# Patient Record
Sex: FEMALE | Race: WHITE | NOT HISPANIC OR LATINO | Employment: OTHER | URBAN - NONMETROPOLITAN AREA
[De-identification: names, ages, dates, MRNs, and addresses within clinical notes are randomized per-mention and may not be internally consistent; named-entity substitution may affect disease eponyms.]

---

## 2017-02-03 RX ORDER — LANCETS 33 GAUGE
EACH MISCELLANEOUS
Refills: 0 | OUTPATIENT
Start: 2017-02-03

## 2017-02-09 ENCOUNTER — OFFICE VISIT (OUTPATIENT)
Dept: FAMILY MEDICINE CLINIC | Facility: CLINIC | Age: 72
End: 2017-02-09

## 2017-02-09 VITALS
WEIGHT: 186 LBS | BODY MASS INDEX: 32.96 KG/M2 | HEIGHT: 63 IN | HEART RATE: 85 BPM | DIASTOLIC BLOOD PRESSURE: 80 MMHG | OXYGEN SATURATION: 94 % | TEMPERATURE: 98.5 F | SYSTOLIC BLOOD PRESSURE: 144 MMHG

## 2017-02-09 DIAGNOSIS — E78.2 MIXED HYPERLIPIDEMIA: ICD-10-CM

## 2017-02-09 DIAGNOSIS — J40 BRONCHITIS: Primary | ICD-10-CM

## 2017-02-09 DIAGNOSIS — J30.1 ALLERGIC RHINITIS DUE TO POLLEN, UNSPECIFIED RHINITIS SEASONALITY: ICD-10-CM

## 2017-02-09 DIAGNOSIS — I10 ESSENTIAL HYPERTENSION: ICD-10-CM

## 2017-02-09 PROCEDURE — 99214 OFFICE O/P EST MOD 30 MIN: CPT | Performed by: PHYSICIAN ASSISTANT

## 2017-02-09 RX ORDER — SULFAMETHOXAZOLE AND TRIMETHOPRIM 800; 160 MG/1; MG/1
1 TABLET ORAL 2 TIMES DAILY
Qty: 20 TABLET | Refills: 0 | Status: SHIPPED | OUTPATIENT
Start: 2017-02-09 | End: 2017-03-09

## 2017-02-11 NOTE — PROGRESS NOTES
Subjective   Evie Shah is a 71 y.o. female.     History of Present Illness     Acute illness-  She complains of productive cough and runny nose x 3 days.    Hypertension - may be elevated due to acute illness.  Stable     Dyslipidemia  Compliance with treatment has been good. The patient exercises occasionally. She is currently being prescribed the following medication for her dyslipidemia - Lipitor (atorvastatin). Patient denies side effects associated with her medications. Most recent lipids include  Lab Results   Component Value Date    TRIG 203 (H) 10/31/2016    TRIG 197 (H) 02/25/2015    HDL 33 (L) 10/31/2016    HDL 32 (L) 02/25/2015    LDL 43 10/31/2016   stable    Allergic rhinitis - not at goal due to acute illness       The following portions of the patient's history were reviewed and updated as appropriate: allergies, current medications, past family history, past medical history, past social history, past surgical history and problem list.    Review of Systems   Constitutional: Negative for activity change, appetite change and fever.   HENT: Positive for rhinorrhea. Negative for ear pain, sinus pressure and sore throat.    Eyes: Negative for pain and visual disturbance.   Respiratory: Positive for cough. Negative for chest tightness.    Cardiovascular: Negative for chest pain and palpitations.   Gastrointestinal: Negative for abdominal pain, constipation, diarrhea, nausea and vomiting.   Endocrine: Negative for polydipsia and polyuria.   Genitourinary: Negative for dysuria and frequency.   Musculoskeletal: Negative for back pain and myalgias.   Skin: Negative for color change and rash.   Allergic/Immunologic: Negative for food allergies and immunocompromised state.   Neurological: Negative for dizziness, syncope and headaches.   Hematological: Negative for adenopathy. Does not bruise/bleed easily.   Psychiatric/Behavioral: Negative for hallucinations and suicidal ideas. The patient is not  nervous/anxious.        Objective    Physical Exam   Constitutional: She is oriented to person, place, and time. She appears well-developed and well-nourished.   HENT:   Head: Normocephalic and atraumatic.   Nose: Nose normal.   Mouth/Throat: Oropharynx is clear and moist.   Eyes: Conjunctivae and EOM are normal. Pupils are equal, round, and reactive to light.   Neck: Normal range of motion. Neck supple. No tracheal deviation present. No thyromegaly present.   Cardiovascular: Normal rate, regular rhythm, normal heart sounds and intact distal pulses.    No murmur heard.  Pulmonary/Chest: Effort normal and breath sounds normal. No respiratory distress. She has no wheezes.   Abdominal: Soft. Bowel sounds are normal. There is no tenderness. There is no guarding.   Musculoskeletal: Normal range of motion. She exhibits no edema or tenderness.   Lymphadenopathy:     She has no cervical adenopathy.   Neurological: She is alert and oriented to person, place, and time.   Skin: Skin is warm and dry. No rash noted.   Psychiatric: She has a normal mood and affect. Her behavior is normal.   Nursing note and vitals reviewed.      Assessment/Plan   Diagnoses and all orders for this visit:    Bronchitis  -     sulfamethoxazole-trimethoprim (BACTRIM DS) 800-160 MG per tablet; Take 1 tablet by mouth 2 (Two) Times a Day.    Essential hypertension    Mixed hyperlipidemia    Allergic rhinitis due to pollen, unspecified rhinitis seasonality

## 2017-03-07 PROBLEM — I73.9 PERIPHERAL ARTERIAL DISEASE: Status: ACTIVE | Noted: 2017-03-07

## 2017-03-07 PROBLEM — E66.9 OBESITY: Status: ACTIVE | Noted: 2017-03-07

## 2017-03-09 ENCOUNTER — OFFICE VISIT (OUTPATIENT)
Dept: FAMILY MEDICINE CLINIC | Facility: CLINIC | Age: 72
End: 2017-03-09

## 2017-03-09 VITALS
SYSTOLIC BLOOD PRESSURE: 134 MMHG | DIASTOLIC BLOOD PRESSURE: 80 MMHG | TEMPERATURE: 98.4 F | HEART RATE: 92 BPM | HEIGHT: 64 IN | WEIGHT: 185 LBS | OXYGEN SATURATION: 90 % | BODY MASS INDEX: 31.58 KG/M2

## 2017-03-09 DIAGNOSIS — B07.9 VIRAL WARTS, UNSPECIFIED TYPE: ICD-10-CM

## 2017-03-09 DIAGNOSIS — Z00.00 MEDICARE ANNUAL WELLNESS VISIT, SUBSEQUENT: Primary | ICD-10-CM

## 2017-03-09 PROCEDURE — G0439 PPPS, SUBSEQ VISIT: HCPCS | Performed by: PHYSICIAN ASSISTANT

## 2017-03-09 PROCEDURE — 96160 PT-FOCUSED HLTH RISK ASSMT: CPT | Performed by: PHYSICIAN ASSISTANT

## 2017-03-09 RX ORDER — PHENOL 1.4 %
600 AEROSOL, SPRAY (ML) MUCOUS MEMBRANE DAILY
COMMUNITY
End: 2017-09-11

## 2017-03-09 RX ORDER — BUPROPION HYDROCHLORIDE 150 MG/1
150 TABLET, EXTENDED RELEASE ORAL DAILY
COMMUNITY
End: 2017-06-20

## 2017-03-09 NOTE — PROGRESS NOTES
QUICK REFERENCE INFORMATION:  The ABCs of the Annual Wellness Visit    Subsequent Medicare Wellness Visit    HEALTH RISK ASSESSMENT    1945    Recent Hospitalizations:  No recent hospitalization(s)..        Current Medical Providers:  Patient Care Team:  RUBI Michael as PCP - General  RUBI Michael as PCP - Family Medicine        Smoking Status:  History   Smoking Status   • Former Smoker   • Types: Cigarettes   • Quit date: 5/26/2015   Smokeless Tobacco   • Not on file       Alcohol Consumption:  History   Alcohol Use No       Depression Screen:   PHQ-9 Depression Screening 3/9/2017   Little interest or pleasure in doing things 0   Feeling down, depressed, or hopeless 0   Trouble falling or staying asleep, or sleeping too much 0   Feeling tired or having little energy 0   Poor appetite or overeating 0   Feeling bad about yourself - or that you are a failure or have let yourself or your family down 0   Trouble concentrating on things, such as reading the newspaper or watching television 0   Moving or speaking so slowly that other people could have noticed. Or the opposite - being so fidgety or restless that you have been moving around a lot more than usual 0   Thoughts that you would be better off dead, or of hurting yourself in some way 0   PHQ-9 Total Score 0   If you checked off any problems, how difficult have these problems made it for you to do your work, take care of things at home, or get along with other people? Not difficult at all       Health Habits and Functional and Cognitive Screening:  Functional & Cognitive Status 3/9/2017   Do you have difficulty preparing food and eating? No   Do you have difficulty bathing yourself? No   Do you have difficulty getting dressed? No   Do you have difficulty using the toilet? No   Do you have difficulty moving around from place to place? No   In the past year have you fallen or experienced a near fall? No   Do you need help using the phone?  No    Are you deaf or do you have serious difficulty hearing?  No   Do you need help with transportation? No   Do you need help shopping? No   Do you need help preparing meals?  No   Do you need help with housework?  No   Do you need help with laundry? No   Do you need help taking your medications? No   Do you need help managing money? No   Do you have difficulty concentrating, remembering or making decisions? No       Health Habits  Current Diet: Well Balanced Diet  Dental Exam: Not up to date   Dental exam 2002 and pt only has 5 teeth.  She states she is not interested in dental exam at this time.    Eye Exam: Up to date  Exercise: 3  Current Exercise Activities Include: Housecleaning    Does the patient have evidence of cognitive impairment? No    Asprin use counseling:yes      Recent Lab Results:  CMP:  Lab Results   Component Value Date     (H) 10/31/2016    BUN 20 10/31/2016    CREATININE 0.77 10/31/2016    EGFRIFNONA 74 10/31/2016    EGFRIFAFRI 90 10/31/2016    BCR 26.0 (H) 10/31/2016     10/31/2016    K 4.7 10/31/2016    CO2 20.3 (L) 10/31/2016    CALCIUM 9.4 10/31/2016    PROTENTOTREF 6.9 10/31/2016    ALBUMIN 4.40 10/31/2016    LABGLOBREF 2.5 10/31/2016    LABIL2 1.8 10/31/2016    BILITOT 0.6 10/31/2016    ALKPHOS 115 10/31/2016    AST 19 10/31/2016    ALT 19 10/31/2016     Lipid Panel:  Lab Results   Component Value Date    CHLPL 117 10/31/2016    TRIG 203 (H) 10/31/2016    HDL 33 (L) 10/31/2016    VLDL 40.6 10/31/2016    LDL 43 10/31/2016     LDL:     HbA1c:  Lab Results   Component Value Date    HGBA1C 7.00 (H) 10/31/2016     Urine Microalbumin:     Visual Acuity:  Right eye 20/20  Left eye 20/25  Both eyes 20/20    Age-appropriate Screening Schedule:  Refer to the list below for future screening recommendations based on patient's age, sex and/or medical conditions. Orders for these recommended tests are listed in the plan section. The patient has been provided with a written plan.    Health  Maintenance   Topic Date Due   • TDAP/TD VACCINES (1 - Tdap) 05/09/2017 (Originally 6/5/1964)   • PNEUMOCOCCAL VACCINES (65+ LOW/MEDIUM RISK) (1 of 2 - PCV13) 05/23/2017 (Originally 6/5/2012)   • DIABETIC EYE EXAM  08/16/2017   • DXA SCAN  08/20/2017   • LIPID PANEL  10/31/2017   • HEMOGLOBIN A1C  10/31/2017   • DIABETIC FOOT EXAM  11/07/2017   • URINE MICROALBUMIN  11/07/2017   • MAMMOGRAM  04/21/2018   • COLONOSCOPY  11/15/2026   • INFLUENZA VACCINE  Completed   • ZOSTER VACCINE  Addressed        Subjective   History of Present Illness    Evie Shah is a 71 y.o. female who presents for an Subsequent Wellness Visit.    The following portions of the patient's history were reviewed and updated as appropriate: allergies, current medications, past family history, past medical history, past social history, past surgical history and problem list.    Outpatient Medications Prior to Visit   Medication Sig Dispense Refill   • amLODIPine-benazepril (LOTREL) 10-40 MG per capsule Take 1 capsule by mouth Daily. 30 capsule 5   • atorvastatin (LIPITOR) 10 MG tablet Take 1 tablet by mouth Every Night. 30 tablet 5   • clopidogrel (PLAVIX) 75 MG tablet Take 1 tablet by mouth Daily. 30 tablet 5   • JARDIANCE 25 MG tablet TAKE 1 TABLET BY MOUTH EVERY MORNING 30 tablet 0   • Loratadine 10 MG capsule Take 10 mg by mouth Daily. 30 each 5   • omega-3 acid ethyl esters (LOVAZA) 1 G capsule Take 2 capsules by mouth 2 (Two) Times a Day. 120 capsule 5   • vitamin D (ERGOCALCIFEROL) 12826 UNITS capsule capsule Take 1 capsule by mouth 1 (One) Time Per Week. 4 capsule 5   • sulfamethoxazole-trimethoprim (BACTRIM DS) 800-160 MG per tablet Take 1 tablet by mouth 2 (Two) Times a Day. 20 tablet 0     No facility-administered medications prior to visit.        Patient Active Problem List   Diagnosis   • DM type 2 with diabetic mixed hyperlipidemia   • HLD (hyperlipidemia)   • HTN (hypertension)   • Senile osteoporosis   • Aorto-iliac  atherosclerosis   • Allergic rhinitis   • Tobacco use disorder   • Peripheral arterial disease   • Obesity       Advanced Care Planning:  has NO advanced directive - information provided to the patient today    Identification of Risk Factors:  Risk factors include: weight , cardiovascular risk and inactivity.    Review of Systems   Constitutional: Negative for activity change, appetite change and fever.   HENT: Negative for ear pain, sinus pressure and sore throat.    Eyes: Negative for pain and visual disturbance.   Respiratory: Negative for cough and chest tightness.    Cardiovascular: Negative for chest pain and palpitations.   Gastrointestinal: Negative for abdominal pain, constipation, diarrhea, nausea and vomiting.   Endocrine: Negative for polydipsia and polyuria.   Genitourinary: Negative for dysuria and frequency.   Musculoskeletal: Negative for back pain and myalgias.   Skin: Negative for color change and rash.        Wart on left arm   Allergic/Immunologic: Negative for food allergies and immunocompromised state.   Neurological: Negative for dizziness, syncope and headaches.   Hematological: Negative for adenopathy. Does not bruise/bleed easily.   Psychiatric/Behavioral: Negative for hallucinations and suicidal ideas. The patient is not nervous/anxious.        Compared to one year ago, the patient feels her physical health is the same.  Compared to one year ago, the patient feels her mental health is the same.    Objective     Physical Exam   Constitutional: She is oriented to person, place, and time. She appears well-developed and well-nourished.   HENT:   Head: Normocephalic and atraumatic.   Nose: Nose normal.   Mouth/Throat: Oropharynx is clear and moist.   Eyes: Conjunctivae and EOM are normal. Pupils are equal, round, and reactive to light.   Neck: Normal range of motion. Neck supple. No tracheal deviation present. No thyromegaly present.   Cardiovascular: Normal rate, regular rhythm, normal heart  "sounds and intact distal pulses.    No murmur heard.  Pulmonary/Chest: Effort normal and breath sounds normal. No respiratory distress. She has no wheezes.   Abdominal: Soft. Bowel sounds are normal. There is no tenderness. There is no guarding.   Musculoskeletal: Normal range of motion. She exhibits no edema or tenderness.   Lymphadenopathy:     She has no cervical adenopathy.   Neurological: She is alert and oriented to person, place, and time.   Skin: Skin is warm and dry. No rash noted.   Wart on left upper arm   Psychiatric: She has a normal mood and affect. Her behavior is normal.   Nursing note and vitals reviewed.      Vitals:    03/09/17 1131   BP: 134/80   BP Location: Left arm   Patient Position: Sitting   Cuff Size: Adult   Pulse: 92   Temp: 98.4 °F (36.9 °C)   TempSrc: Oral   SpO2: 90%   Weight: 185 lb (83.9 kg)   Height: 63.5\" (161.3 cm)       Body mass index is 32.26 kg/(m^2).  Discussed the patient's BMI with her. The BMI is above average; BMI management plan is completed.    Assessment/Plan   Patient Self-Management and Personalized Health Advice  The patient has been provided with information about: diet and exercise and preventive services including:   · Advanced directives: has NO advanced directive - information provided to the patient today, Exercise counseling provided.    Visit Diagnoses:    ICD-10-CM ICD-9-CM   1. Medicare annual wellness visit, subsequent Z00.00 V70.0   2. Viral warts, unspecified type B07.9 078.10    cryotherapy was performed on wart located on her left upper arm today.  Patient tolerated the procedure well.    No orders of the defined types were placed in this encounter.      Outpatient Encounter Prescriptions as of 3/9/2017   Medication Sig Dispense Refill   • amLODIPine-benazepril (LOTREL) 10-40 MG per capsule Take 1 capsule by mouth Daily. 30 capsule 5   • atorvastatin (LIPITOR) 10 MG tablet Take 1 tablet by mouth Every Night. 30 tablet 5   • buPROPion SR (WELLBUTRIN " SR) 150 MG 12 hr tablet Take 150 mg by mouth Daily.     • calcium carbonate (OS-DOMINGUEZ) 600 MG tablet Take 600 mg by mouth Daily.     • clopidogrel (PLAVIX) 75 MG tablet Take 1 tablet by mouth Daily. 30 tablet 5   • JARDIANCE 25 MG tablet TAKE 1 TABLET BY MOUTH EVERY MORNING 30 tablet 0   • Loratadine 10 MG capsule Take 10 mg by mouth Daily. 30 each 5   • omega-3 acid ethyl esters (LOVAZA) 1 G capsule Take 2 capsules by mouth 2 (Two) Times a Day. 120 capsule 5   • vitamin D (ERGOCALCIFEROL) 55495 UNITS capsule capsule Take 1 capsule by mouth 1 (One) Time Per Week. 4 capsule 5   • [DISCONTINUED] sulfamethoxazole-trimethoprim (BACTRIM DS) 800-160 MG per tablet Take 1 tablet by mouth 2 (Two) Times a Day. 20 tablet 0     No facility-administered encounter medications on file as of 3/9/2017.        Reviewed use of high risk medication in the elderly: yes  Reviewed for potential of harmful drug interactions in the elderly: yes    Follow Up:  Return in about 3 months (around 6/9/2017) for Followup with Camryn.     An After Visit Summary and PPPS with all of these plans were given to the patient.

## 2017-03-09 NOTE — PATIENT INSTRUCTIONS
Advance Directive  Advance directives are the legal documents that allow you to make choices about your health care and medical treatment if you cannot speak for yourself. Advance directives are a way for you to communicate your wishes to family, friends, and health care providers. The specified people can then convey your decisions about end-of-life care to avoid confusion if you should become unable to communicate.  Ideally, the process of discussing and writing advance directives should happen over time rather than making decisions all at once. Advance directives can be modified as your situation changes, and you can change your mind at any time, even after you have signed the advance directives. Each state has its own laws regarding advance directives.  You may want to check with your health care provider, , or state representative about the law in your state. Below are some examples of advance directives.  LIVING WILL  A living will is a set of instructions documenting your wishes about medical care when you cannot care for yourself. It is used if you become:  · Terminally ill.  · Incapacitated.  · Unable to communicate.  · Unable to make decisions.  Items to consider in your living will include:  · The use or non-use of life-sustaining equipment, such as dialysis machines and breathing machines (ventilators).  · A do not resuscitate (DNR) order, which is the instruction not to use cardiopulmonary resuscitation (CPR) if breathing or heartbeat stops.  · Tube feeding.  · Withholding of food and fluids.  · Comfort (palliative) care when the goal becomes comfort rather than a cure.  · Organ and tissue donation.  A living will does not give instructions about distribution of your money and property if you should pass away. It is advisable to seek the expert advice of a  in drawing up a will regarding your possessions. Decisions about taxes, beneficiaries, and asset distribution will be legally binding.  This process can relieve your family and friends of any burdens surrounding disputes or questions that may come up about the allocation of your assets.  DO NOT RESUSCITATE (DNR)  A do not resuscitate (DNR) order is a request to not have CPR in the event that your heart stops beating or you stop breathing. Unless given other instructions, a health care provider will try to help any patient whose heart has stopped or who has stopped breathing.   HEALTH CARE PROXY AND DURABLE POWER OF  FOR HEALTH CARE  A health care proxy is a person (agent) appointed to make medical decisions for you if you cannot. Generally, people choose someone they know well and trust to represent their preferences when they can no longer do so. You should be sure to ask this person for agreement to act as your agent. An agent may have to exercise judgment in the event of a medical decision for which your wishes are not known.  The durable power of  for health care is the legal document that names your health care proxy. Once written, it should be:  · Signed.  · Notarized.  · Dated.  · Copied.  · Witnessed.  · Incorporated into your medical record.  You may also want to appoint someone to manage your financial affairs if you cannot. This is called a durable power of  for finances. It is a separate legal document from the durable power of  for health care. You may choose the same person or someone different from your health care proxy to act as your agent in financial matters.     This information is not intended to replace advice given to you by your health care provider. Make sure you discuss any questions you have with your health care provider.     Document Released: 03/26/2009 Document Revised: 12/23/2014 Document Reviewed: 05/07/2014  Elsevier Interactive Patient Education ©2016 [employer]

## 2017-04-20 ENCOUNTER — OFFICE VISIT (OUTPATIENT)
Dept: FAMILY MEDICINE CLINIC | Facility: CLINIC | Age: 72
End: 2017-04-20

## 2017-04-20 VITALS
OXYGEN SATURATION: 88 % | WEIGHT: 184 LBS | SYSTOLIC BLOOD PRESSURE: 150 MMHG | BODY MASS INDEX: 31.41 KG/M2 | TEMPERATURE: 98.4 F | DIASTOLIC BLOOD PRESSURE: 90 MMHG | HEART RATE: 88 BPM | HEIGHT: 64 IN

## 2017-04-20 DIAGNOSIS — E11.69 DM TYPE 2 WITH DIABETIC MIXED HYPERLIPIDEMIA (HCC): ICD-10-CM

## 2017-04-20 DIAGNOSIS — J30.1 ALLERGIC RHINITIS DUE TO POLLEN, UNSPECIFIED RHINITIS SEASONALITY: ICD-10-CM

## 2017-04-20 DIAGNOSIS — E78.2 DM TYPE 2 WITH DIABETIC MIXED HYPERLIPIDEMIA (HCC): ICD-10-CM

## 2017-04-20 DIAGNOSIS — I10 ESSENTIAL HYPERTENSION: ICD-10-CM

## 2017-04-20 DIAGNOSIS — N76.0 ACUTE VAGINITIS: Primary | ICD-10-CM

## 2017-04-20 PROCEDURE — 99214 OFFICE O/P EST MOD 30 MIN: CPT | Performed by: PHYSICIAN ASSISTANT

## 2017-04-20 RX ORDER — FLUCONAZOLE 150 MG/1
150 TABLET ORAL DAILY
Qty: 2 TABLET | Refills: 0 | Status: SHIPPED | OUTPATIENT
Start: 2017-04-20 | End: 2017-09-11

## 2017-04-21 DIAGNOSIS — E55.9 VITAMIN D DEFICIENCY: ICD-10-CM

## 2017-04-21 RX ORDER — ERGOCALCIFEROL 1.25 MG/1
CAPSULE ORAL
Qty: 4 CAPSULE | Refills: 0 | Status: SHIPPED | OUTPATIENT
Start: 2017-04-21 | End: 2017-09-11 | Stop reason: SDUPTHER

## 2017-04-23 NOTE — PROGRESS NOTES
Subjective   Evie Shah is a 71 y.o. female.     History of Present Illness     Vaginitis-   She complains of moderate vaginal itching without burning x 1 month.      Hypertension - stable but may be elevated due to acute issue    Allergic rhinitis - stable    Diabetes  Current symptoms include paresthesia of the feet.  Evaluation to date has been: fasting blood sugar. Home sugars: BGs are running  consistent with Hgb A1C. Current treatments: more intensive attention to diet which has been effective. Most recent hemoglobin A1c   Lab Results   Component Value Date    HGBA1C 7.00 (H) 10/31/2016    HGBA1C 7.1 (H) 02/25/2015    stable    The following portions of the patient's history were reviewed and updated as appropriate: allergies, current medications, past family history, past medical history, past social history, past surgical history and problem list.    Review of Systems   Constitutional: Negative for activity change, appetite change and fever.   HENT: Negative for ear pain, sinus pressure and sore throat.    Eyes: Negative for pain and visual disturbance.   Respiratory: Negative for cough and chest tightness.    Cardiovascular: Negative for chest pain and palpitations.   Gastrointestinal: Negative for abdominal pain, constipation, diarrhea, nausea and vomiting.   Endocrine: Negative for polydipsia and polyuria.   Genitourinary: Negative for dysuria and frequency.        Vaginitis   Musculoskeletal: Negative for back pain and myalgias.   Skin: Negative for color change and rash.   Allergic/Immunologic: Negative for food allergies and immunocompromised state.   Neurological: Negative for dizziness, syncope and headaches.   Hematological: Negative for adenopathy. Does not bruise/bleed easily.   Psychiatric/Behavioral: Negative for hallucinations and suicidal ideas. The patient is not nervous/anxious.         Objective   Physical Exam   Constitutional: She is oriented to person, place, and time. She  appears well-developed and well-nourished.   HENT:   Head: Normocephalic and atraumatic.   Nose: Nose normal.   Mouth/Throat: Oropharynx is clear and moist.   Eyes: Conjunctivae and EOM are normal. Pupils are equal, round, and reactive to light.   Neck: Normal range of motion. Neck supple. No tracheal deviation present. No thyromegaly present.   Cardiovascular: Normal rate, regular rhythm, normal heart sounds and intact distal pulses.    No murmur heard.  Pulmonary/Chest: Effort normal and breath sounds normal. No respiratory distress. She has no wheezes.   Abdominal: Soft. Bowel sounds are normal. There is no tenderness. There is no guarding.   Musculoskeletal: Normal range of motion. She exhibits no edema or tenderness.   Lymphadenopathy:     She has no cervical adenopathy.   Neurological: She is alert and oriented to person, place, and time.   Skin: Skin is warm and dry. No rash noted.   Psychiatric: She has a normal mood and affect. Her behavior is normal.   Nursing note and vitals reviewed.      Assessment/Plan   Diagnoses and all orders for this visit:    Acute vaginitis  -     fluconazole (DIFLUCAN) 150 MG tablet; Take 1 tablet by mouth Daily.    Essential hypertension    Allergic rhinitis due to pollen, unspecified rhinitis seasonality    DM type 2 with diabetic mixed hyperlipidemia

## 2017-05-16 ENCOUNTER — OFFICE VISIT (OUTPATIENT)
Dept: CARDIOLOGY | Facility: CLINIC | Age: 72
End: 2017-05-16

## 2017-05-16 VITALS
DIASTOLIC BLOOD PRESSURE: 88 MMHG | BODY MASS INDEX: 32.18 KG/M2 | WEIGHT: 181.6 LBS | HEIGHT: 63 IN | HEART RATE: 69 BPM | SYSTOLIC BLOOD PRESSURE: 130 MMHG

## 2017-05-16 DIAGNOSIS — I73.9 PERIPHERAL ARTERIAL DISEASE (HCC): Primary | ICD-10-CM

## 2017-05-16 DIAGNOSIS — E55.9 VITAMIN D DEFICIENCY: ICD-10-CM

## 2017-05-16 DIAGNOSIS — E78.5 DYSLIPIDEMIA: ICD-10-CM

## 2017-05-16 DIAGNOSIS — I10 ESSENTIAL HYPERTENSION: ICD-10-CM

## 2017-05-16 PROCEDURE — 99214 OFFICE O/P EST MOD 30 MIN: CPT | Performed by: INTERNAL MEDICINE

## 2017-05-18 RX ORDER — ERGOCALCIFEROL 1.25 MG/1
CAPSULE ORAL
Qty: 4 CAPSULE | Refills: 0 | Status: SHIPPED | OUTPATIENT
Start: 2017-05-18 | End: 2017-06-12 | Stop reason: SINTOL

## 2017-06-12 ENCOUNTER — OFFICE VISIT (OUTPATIENT)
Dept: FAMILY MEDICINE CLINIC | Facility: CLINIC | Age: 72
End: 2017-06-12

## 2017-06-12 VITALS
OXYGEN SATURATION: 92 % | HEART RATE: 80 BPM | TEMPERATURE: 98.9 F | HEIGHT: 63 IN | SYSTOLIC BLOOD PRESSURE: 120 MMHG | DIASTOLIC BLOOD PRESSURE: 70 MMHG | WEIGHT: 182 LBS | BODY MASS INDEX: 32.25 KG/M2

## 2017-06-12 DIAGNOSIS — I10 ESSENTIAL HYPERTENSION: ICD-10-CM

## 2017-06-12 DIAGNOSIS — E78.2 MIXED HYPERLIPIDEMIA: ICD-10-CM

## 2017-06-12 DIAGNOSIS — E11.69 DM TYPE 2 WITH DIABETIC MIXED HYPERLIPIDEMIA (HCC): Primary | ICD-10-CM

## 2017-06-12 DIAGNOSIS — E78.2 DM TYPE 2 WITH DIABETIC MIXED HYPERLIPIDEMIA (HCC): Primary | ICD-10-CM

## 2017-06-12 DIAGNOSIS — E55.9 VITAMIN D DEFICIENCY: ICD-10-CM

## 2017-06-12 PROCEDURE — 90670 PCV13 VACCINE IM: CPT | Performed by: PHYSICIAN ASSISTANT

## 2017-06-12 PROCEDURE — 99214 OFFICE O/P EST MOD 30 MIN: CPT | Performed by: PHYSICIAN ASSISTANT

## 2017-06-12 PROCEDURE — G0009 ADMIN PNEUMOCOCCAL VACCINE: HCPCS | Performed by: PHYSICIAN ASSISTANT

## 2017-06-12 RX ORDER — ATORVASTATIN CALCIUM 10 MG/1
TABLET, FILM COATED ORAL
COMMUNITY
Start: 2017-05-17 | End: 2017-11-16

## 2017-06-12 NOTE — PROGRESS NOTES
Subjective   Evie Shah is a 72 y.o. female.     History of Present Illness     Diabetes  She is here today to follow-up on chronic issues including diabetes.  Current symptoms include none.  Evaluation to date has been: fasting blood sugar. Home sugars: BGs are running  consistent with Hgb A1C. Current treatments: SGLT2 inhibitor - Jardiance Most recent hemoglobin A1c   Lab Results   Component Value Date    HGBA1C 7.00 (H) 10/31/2016    HGBA1C 7.1 (H) 02/25/2015    Stable    Dyslipidemia  Compliance with treatment has been good. The patient exercises intermittently. She is currently being prescribed the following medication for her dyslipidemia - atorvastatin. Patient denies side effects associated with her medications. Most recent lipids include  Lab Results   Component Value Date    TRIG 203 (H) 10/31/2016    TRIG 197 (H) 02/25/2015    HDL 33 (L) 10/31/2016    HDL 32 (L) 02/25/2015    LDL 43 10/31/2016   Not at goal    Hypertension-well controlled with Lotrel    Vitamin D deficiency-stable with supplementation    The following portions of the patient's history were reviewed and updated as appropriate: allergies, current medications, past family history, past medical history, past social history, past surgical history and problem list.    Review of Systems   Constitutional: Negative for activity change, appetite change and fever.   HENT: Negative for ear pain, sinus pressure and sore throat.    Eyes: Negative for pain and visual disturbance.   Respiratory: Negative for cough and chest tightness.    Cardiovascular: Negative for chest pain and palpitations.   Gastrointestinal: Negative for abdominal pain, constipation, diarrhea, nausea and vomiting.   Endocrine: Negative for polydipsia and polyuria.   Genitourinary: Negative for dysuria and frequency.   Musculoskeletal: Negative for back pain and myalgias.   Skin: Negative for color change and rash.   Allergic/Immunologic: Negative for food allergies and  immunocompromised state.   Neurological: Negative for dizziness, syncope and headaches.   Hematological: Negative for adenopathy. Does not bruise/bleed easily.   Psychiatric/Behavioral: Negative for hallucinations and suicidal ideas. The patient is not nervous/anxious.        Objective   Physical Exam   Constitutional: She is oriented to person, place, and time. She appears well-developed and well-nourished.   HENT:   Head: Normocephalic and atraumatic.   Nose: Nose normal.   Mouth/Throat: Oropharynx is clear and moist.   Eyes: Conjunctivae and EOM are normal. Pupils are equal, round, and reactive to light.   Neck: Normal range of motion. Neck supple. No tracheal deviation present. No thyromegaly present.   Cardiovascular: Normal rate, regular rhythm, normal heart sounds and intact distal pulses.    No murmur heard.  Pulmonary/Chest: Effort normal and breath sounds normal. No respiratory distress. She has no wheezes.   Abdominal: Soft. Bowel sounds are normal. There is no tenderness. There is no guarding.   Musculoskeletal: Normal range of motion. She exhibits no edema or tenderness.   Lymphadenopathy:     She has no cervical adenopathy.   Neurological: She is alert and oriented to person, place, and time.   Skin: Skin is warm and dry. No rash noted.   Psychiatric: She has a normal mood and affect. Her behavior is normal.   Nursing note and vitals reviewed.      Assessment/Plan   Diagnoses and all orders for this visit:    DM type 2 with diabetic mixed hyperlipidemia  -     Hemoglobin A1c; Future  -     MicroAlbumin, Urine, Random; Future    Mixed hyperlipidemia  -     Comprehensive Metabolic Panel; Future  -     Lipid Panel; Future    Essential hypertension    Vitamin D deficiency  -     Vitamin D 25 Hydroxy; Future    Other orders  -     Pneumococcal Conjugate Vaccine 13-Valent All

## 2017-06-17 DIAGNOSIS — E55.9 VITAMIN D DEFICIENCY: ICD-10-CM

## 2017-06-20 RX ORDER — ERGOCALCIFEROL 1.25 MG/1
CAPSULE ORAL
Qty: 4 CAPSULE | Refills: 0 | Status: SHIPPED | OUTPATIENT
Start: 2017-06-20 | End: 2017-07-21 | Stop reason: SDUPTHER

## 2017-06-20 RX ORDER — BUPROPION HYDROCHLORIDE 150 MG/1
TABLET, EXTENDED RELEASE ORAL
Qty: 60 TABLET | Refills: 0 | Status: SHIPPED | OUTPATIENT
Start: 2017-06-20 | End: 2017-08-19 | Stop reason: SDUPTHER

## 2017-07-21 DIAGNOSIS — E55.9 VITAMIN D DEFICIENCY: ICD-10-CM

## 2017-07-24 RX ORDER — ERGOCALCIFEROL 1.25 MG/1
CAPSULE ORAL
Qty: 4 CAPSULE | Refills: 0 | Status: SHIPPED | OUTPATIENT
Start: 2017-07-24 | End: 2017-09-11

## 2017-08-19 DIAGNOSIS — E11.69 DM TYPE 2 WITH DIABETIC MIXED HYPERLIPIDEMIA (HCC): ICD-10-CM

## 2017-08-19 DIAGNOSIS — E78.2 DM TYPE 2 WITH DIABETIC MIXED HYPERLIPIDEMIA (HCC): ICD-10-CM

## 2017-08-19 DIAGNOSIS — E55.9 VITAMIN D DEFICIENCY: ICD-10-CM

## 2017-08-22 RX ORDER — BUPROPION HYDROCHLORIDE 150 MG/1
TABLET, EXTENDED RELEASE ORAL
Qty: 60 TABLET | Refills: 0 | Status: SHIPPED | OUTPATIENT
Start: 2017-08-22 | End: 2017-09-11

## 2017-08-22 RX ORDER — EMPAGLIFLOZIN 25 MG/1
TABLET, FILM COATED ORAL
Qty: 30 TABLET | Refills: 5 | Status: SHIPPED | OUTPATIENT
Start: 2017-08-22 | End: 2017-09-11

## 2017-08-22 RX ORDER — BUPROPION HYDROCHLORIDE 150 MG/1
TABLET, EXTENDED RELEASE ORAL
Qty: 60 TABLET | Refills: 0 | Status: SHIPPED | OUTPATIENT
Start: 2017-08-22 | End: 2017-09-11 | Stop reason: SDUPTHER

## 2017-08-22 RX ORDER — ERGOCALCIFEROL 1.25 MG/1
CAPSULE ORAL
Qty: 4 CAPSULE | Refills: 0 | Status: SHIPPED | OUTPATIENT
Start: 2017-08-22 | End: 2018-03-15 | Stop reason: SDUPTHER

## 2017-09-05 ENCOUNTER — LAB (OUTPATIENT)
Dept: FAMILY MEDICINE CLINIC | Facility: CLINIC | Age: 72
End: 2017-09-05

## 2017-09-05 DIAGNOSIS — E55.9 VITAMIN D DEFICIENCY: ICD-10-CM

## 2017-09-05 DIAGNOSIS — E11.69 DM TYPE 2 WITH DIABETIC MIXED HYPERLIPIDEMIA (HCC): ICD-10-CM

## 2017-09-05 DIAGNOSIS — E78.2 DM TYPE 2 WITH DIABETIC MIXED HYPERLIPIDEMIA (HCC): ICD-10-CM

## 2017-09-05 DIAGNOSIS — E78.2 MIXED HYPERLIPIDEMIA: ICD-10-CM

## 2017-09-06 LAB
25(OH)D3+25(OH)D2 SERPL-MCNC: 50 NG/ML
ALBUMIN SERPL-MCNC: 4.6 G/DL (ref 3.4–4.8)
ALBUMIN/GLOB SERPL: 1.9 G/DL (ref 1.5–2.5)
ALP SERPL-CCNC: 96 U/L (ref 35–104)
ALT SERPL-CCNC: 19 U/L (ref 10–36)
AST SERPL-CCNC: 21 U/L (ref 10–30)
BILIRUB SERPL-MCNC: 0.6 MG/DL (ref 0.2–1.8)
BUN SERPL-MCNC: 25 MG/DL (ref 7–21)
BUN/CREAT SERPL: 32.5 (ref 7–25)
CALCIUM SERPL-MCNC: 9.7 MG/DL (ref 7.7–10)
CHLORIDE SERPL-SCNC: 107 MMOL/L (ref 99–112)
CHOLEST SERPL-MCNC: 130 MG/DL (ref 0–200)
CO2 SERPL-SCNC: 23.1 MMOL/L (ref 24.3–31.9)
CREAT SERPL-MCNC: 0.77 MG/DL (ref 0.43–1.29)
GLOBULIN SER CALC-MCNC: 2.4 GM/DL
GLUCOSE SERPL-MCNC: 128 MG/DL (ref 70–110)
HBA1C MFR BLD: 6.5 % (ref 4.5–5.7)
HDLC SERPL-MCNC: 36 MG/DL (ref 60–100)
LDLC SERPL CALC-MCNC: 60 MG/DL (ref 0–100)
MICROALBUMIN UR-MCNC: 33.8 UG/ML
POTASSIUM SERPL-SCNC: 4.1 MMOL/L (ref 3.5–5.3)
PROT SERPL-MCNC: 7 G/DL (ref 6–8)
SODIUM SERPL-SCNC: 141 MMOL/L (ref 135–153)
TRIGL SERPL-MCNC: 172 MG/DL (ref 0–150)
VLDLC SERPL CALC-MCNC: 34.4 MG/DL

## 2017-09-08 NOTE — PROGRESS NOTES
Called and informed pt about labs encouraged her to consume more water. Let her know about her A1c and told her what the number represented. CRISTINE

## 2017-09-11 ENCOUNTER — OFFICE VISIT (OUTPATIENT)
Dept: FAMILY MEDICINE CLINIC | Facility: CLINIC | Age: 72
End: 2017-09-11

## 2017-09-11 VITALS
OXYGEN SATURATION: 87 % | HEIGHT: 63 IN | WEIGHT: 182 LBS | SYSTOLIC BLOOD PRESSURE: 158 MMHG | HEART RATE: 79 BPM | DIASTOLIC BLOOD PRESSURE: 88 MMHG | TEMPERATURE: 98.6 F | BODY MASS INDEX: 32.25 KG/M2

## 2017-09-11 DIAGNOSIS — I10 ESSENTIAL HYPERTENSION: ICD-10-CM

## 2017-09-11 DIAGNOSIS — E11.69 DM TYPE 2 WITH DIABETIC MIXED HYPERLIPIDEMIA (HCC): ICD-10-CM

## 2017-09-11 DIAGNOSIS — E55.9 VITAMIN D DEFICIENCY: ICD-10-CM

## 2017-09-11 DIAGNOSIS — H68.013 EUSTACHIAN SALPINGITIS, ACUTE, BILATERAL: ICD-10-CM

## 2017-09-11 DIAGNOSIS — J44.1 COPD EXACERBATION (HCC): Primary | ICD-10-CM

## 2017-09-11 DIAGNOSIS — F17.200 TOBACCO USE DISORDER: ICD-10-CM

## 2017-09-11 DIAGNOSIS — E78.2 MIXED HYPERLIPIDEMIA: ICD-10-CM

## 2017-09-11 DIAGNOSIS — E78.2 DM TYPE 2 WITH DIABETIC MIXED HYPERLIPIDEMIA (HCC): ICD-10-CM

## 2017-09-11 PROCEDURE — 99214 OFFICE O/P EST MOD 30 MIN: CPT | Performed by: PHYSICIAN ASSISTANT

## 2017-09-11 RX ORDER — BUPROPION HYDROCHLORIDE 150 MG/1
150 TABLET, EXTENDED RELEASE ORAL 2 TIMES DAILY
Qty: 60 TABLET | Refills: 5 | Status: SHIPPED | OUTPATIENT
Start: 2017-09-11 | End: 2018-03-15 | Stop reason: SDUPTHER

## 2017-09-11 RX ORDER — DOXYCYCLINE HYCLATE 100 MG/1
100 CAPSULE ORAL 2 TIMES DAILY
Qty: 20 CAPSULE | Refills: 0 | Status: SHIPPED | OUTPATIENT
Start: 2017-09-11 | End: 2017-12-11

## 2017-09-11 RX ORDER — FLUTICASONE PROPIONATE 50 MCG
2 SPRAY, SUSPENSION (ML) NASAL DAILY
Qty: 18.2 ML | Refills: 3 | Status: SHIPPED | OUTPATIENT
Start: 2017-09-11 | End: 2018-03-15 | Stop reason: SDUPTHER

## 2017-09-11 RX ORDER — ERGOCALCIFEROL 1.25 MG/1
50000 CAPSULE ORAL
Qty: 4 CAPSULE | Refills: 5 | Status: SHIPPED | OUTPATIENT
Start: 2017-09-11 | End: 2017-12-11

## 2017-09-11 RX ORDER — ALBUTEROL SULFATE 2.5 MG/3ML
2.5 SOLUTION RESPIRATORY (INHALATION) EVERY 4 HOURS PRN
Qty: 270 ML | Refills: 5 | Status: SHIPPED | OUTPATIENT
Start: 2017-09-11 | End: 2018-03-15 | Stop reason: SDUPTHER

## 2017-09-11 NOTE — PROGRESS NOTES
Subjective   Evie Shah is a 72 y.o. female.     History of Present Illness     Acute illness-  She complains of productive cough with thick clear sputum for the last 2 weeks.  She reports that her ears feel full and she also has associated chest congestion.    Previous tobacco smoker-she states that she is no longer smoking cigarettes with the use of Wellbutrin 150 mg.    Vitamin D deficiency-stable with vitamin D supplementation every weekly    Diabetes mellitus type 2 with associated hyperlipidemia-well controlled despite acute illness.    Hypertension-stable    Dyslipidemia  Compliance with treatment has been good. The patient exercises intermittently. She is currently being prescribed the following medication for her dyslipidemia - atorvastatin. Patient denies side effects associated with her medications. Most recent lipids include  Lab Results   Component Value Date    TRIG 172 (H) 09/05/2017    TRIG 203 (H) 10/31/2016    HDL 36 (L) 09/05/2017    HDL 33 (L) 10/31/2016    LDL 60 09/05/2017    LDL 43 10/31/2016       The following portions of the patient's history were reviewed and updated as appropriate: allergies, current medications, past family history, past medical history, past social history, past surgical history and problem list.    Review of Systems   Constitutional: Negative for activity change, appetite change and fever.   HENT: Positive for congestion. Negative for ear pain, sinus pressure and sore throat.    Eyes: Negative for pain and visual disturbance.   Respiratory: Positive for cough. Negative for chest tightness.    Cardiovascular: Negative for chest pain and palpitations.   Gastrointestinal: Negative for abdominal pain, constipation, diarrhea, nausea and vomiting.   Endocrine: Negative for polydipsia and polyuria.   Genitourinary: Negative for dysuria and frequency.   Musculoskeletal: Negative for back pain and myalgias.   Skin: Negative for color change and rash.    Allergic/Immunologic: Negative for food allergies and immunocompromised state.   Neurological: Negative for dizziness, syncope and headaches.   Hematological: Negative for adenopathy. Does not bruise/bleed easily.   Psychiatric/Behavioral: Negative for hallucinations and suicidal ideas. The patient is not nervous/anxious.        Objective   Physical Exam   Constitutional: She is oriented to person, place, and time. She appears well-developed and well-nourished.   HENT:   Head: Normocephalic and atraumatic.   Nose: Nose normal.   Clear fluid noted behind TMs bilaterally.  Oropharynx erythematous.   Eyes: Conjunctivae and EOM are normal. Pupils are equal, round, and reactive to light.   Neck: Normal range of motion. Neck supple. No tracheal deviation present. No thyromegaly present.   Cardiovascular: Normal rate, regular rhythm, normal heart sounds and intact distal pulses.    No murmur heard.  Pulmonary/Chest: Effort normal. No respiratory distress. She has no wheezes.   Bilateral bronchovesicular breath sounds noted diffusely   Abdominal: Soft. Bowel sounds are normal. There is no tenderness. There is no guarding.   Musculoskeletal: Normal range of motion. She exhibits no edema or tenderness.   Lymphadenopathy:     She has no cervical adenopathy.   Neurological: She is alert and oriented to person, place, and time.   Skin: Skin is warm and dry. No rash noted.   Psychiatric: She has a normal mood and affect. Her behavior is normal.   Nursing note and vitals reviewed.      Assessment/Plan   Diagnoses and all orders for this visit:    COPD exacerbation  -     albuterol (PROVENTIL) (2.5 MG/3ML) 0.083% nebulizer solution; Take 2.5 mg by nebulization Every 4 (Four) Hours As Needed for Wheezing.  -     doxycycline (VIBRAMYCIN) 100 MG capsule; Take 1 capsule by mouth 2 (Two) Times a Day.    Vitamin D deficiency  -     vitamin D (ERGOCALCIFEROL) 14543 units capsule capsule; Take 1 capsule by mouth Every 7 (Seven)  Days.    Tobacco use disorder  -     buPROPion SR (WELLBUTRIN SR) 150 MG 12 hr tablet; Take 1 tablet by mouth 2 (Two) Times a Day.    DM type 2 with diabetic mixed hyperlipidemia    Essential hypertension    Mixed hyperlipidemia    Eustachian salpingitis, acute, bilateral  -     fluticasone (FLONASE) 50 MCG/ACT nasal spray; 2 sprays into each nostril Daily.

## 2017-10-12 ENCOUNTER — FLU SHOT (OUTPATIENT)
Dept: FAMILY MEDICINE CLINIC | Facility: CLINIC | Age: 72
End: 2017-10-12

## 2017-10-12 PROCEDURE — G0008 ADMIN INFLUENZA VIRUS VAC: HCPCS | Performed by: GENERAL PRACTICE

## 2017-10-12 PROCEDURE — 90686 IIV4 VACC NO PRSV 0.5 ML IM: CPT | Performed by: GENERAL PRACTICE

## 2017-11-14 DIAGNOSIS — E78.2 MIXED HYPERLIPIDEMIA: ICD-10-CM

## 2017-11-14 DIAGNOSIS — I70.0 AORTO-ILIAC ATHEROSCLEROSIS (HCC): ICD-10-CM

## 2017-11-14 DIAGNOSIS — J30.2 OTHER SEASONAL ALLERGIC RHINITIS: ICD-10-CM

## 2017-11-14 DIAGNOSIS — I10 ESSENTIAL HYPERTENSION: ICD-10-CM

## 2017-11-14 DIAGNOSIS — I70.8 AORTO-ILIAC ATHEROSCLEROSIS (HCC): ICD-10-CM

## 2017-11-16 RX ORDER — LORATADINE 10 MG/1
TABLET ORAL
Qty: 30 TABLET | Refills: 5 | Status: SHIPPED | OUTPATIENT
Start: 2017-11-16 | End: 2018-03-15 | Stop reason: SDUPTHER

## 2017-11-16 RX ORDER — CLOPIDOGREL BISULFATE 75 MG/1
TABLET ORAL
Qty: 30 TABLET | Refills: 5 | Status: SHIPPED | OUTPATIENT
Start: 2017-11-16 | End: 2018-03-15 | Stop reason: SDUPTHER

## 2017-11-16 RX ORDER — AMLODIPINE BESYLATE AND BENAZEPRIL HYDROCHLORIDE 10; 40 MG/1; MG/1
CAPSULE ORAL
Qty: 30 CAPSULE | Refills: 5 | Status: SHIPPED | OUTPATIENT
Start: 2017-11-16 | End: 2018-03-15 | Stop reason: SDUPTHER

## 2017-11-16 RX ORDER — ATORVASTATIN CALCIUM 10 MG/1
TABLET, FILM COATED ORAL
Qty: 30 TABLET | Refills: 5 | Status: SHIPPED | OUTPATIENT
Start: 2017-11-16 | End: 2018-03-15 | Stop reason: SDUPTHER

## 2017-11-29 ENCOUNTER — OFFICE VISIT (OUTPATIENT)
Dept: FAMILY MEDICINE CLINIC | Facility: CLINIC | Age: 72
End: 2017-11-29

## 2017-11-29 VITALS
HEART RATE: 88 BPM | OXYGEN SATURATION: 92 % | SYSTOLIC BLOOD PRESSURE: 140 MMHG | DIASTOLIC BLOOD PRESSURE: 90 MMHG | TEMPERATURE: 97.8 F | HEIGHT: 63 IN | WEIGHT: 183 LBS | BODY MASS INDEX: 32.43 KG/M2

## 2017-11-29 DIAGNOSIS — E78.2 DM TYPE 2 WITH DIABETIC MIXED HYPERLIPIDEMIA (HCC): ICD-10-CM

## 2017-11-29 DIAGNOSIS — I73.9 PERIPHERAL ARTERIAL DISEASE (HCC): ICD-10-CM

## 2017-11-29 DIAGNOSIS — I70.8 AORTO-ILIAC ATHEROSCLEROSIS (HCC): ICD-10-CM

## 2017-11-29 DIAGNOSIS — E11.69 DM TYPE 2 WITH DIABETIC MIXED HYPERLIPIDEMIA (HCC): ICD-10-CM

## 2017-11-29 DIAGNOSIS — E78.2 MIXED HYPERLIPIDEMIA: ICD-10-CM

## 2017-11-29 DIAGNOSIS — R68.89 FLU-LIKE SYMPTOMS: Primary | ICD-10-CM

## 2017-11-29 DIAGNOSIS — I70.0 AORTO-ILIAC ATHEROSCLEROSIS (HCC): ICD-10-CM

## 2017-11-29 DIAGNOSIS — J06.9 ACUTE URI: ICD-10-CM

## 2017-11-29 DIAGNOSIS — I10 ESSENTIAL HYPERTENSION: ICD-10-CM

## 2017-11-29 DIAGNOSIS — M81.0 SENILE OSTEOPOROSIS: ICD-10-CM

## 2017-11-29 DIAGNOSIS — H61.23 EXCESSIVE CERUMEN IN BOTH EAR CANALS: ICD-10-CM

## 2017-11-29 LAB
EXPIRATION DATE: NORMAL
FLUAV AG NPH QL: NORMAL
FLUBV AG NPH QL: NORMAL
INTERNAL CONTROL: NORMAL
Lab: NORMAL

## 2017-11-29 PROCEDURE — 69210 REMOVE IMPACTED EAR WAX UNI: CPT | Performed by: NURSE PRACTITIONER

## 2017-11-29 PROCEDURE — 87804 INFLUENZA ASSAY W/OPTIC: CPT | Performed by: NURSE PRACTITIONER

## 2017-11-29 PROCEDURE — 99214 OFFICE O/P EST MOD 30 MIN: CPT | Performed by: NURSE PRACTITIONER

## 2017-11-29 PROCEDURE — 96372 THER/PROPH/DIAG INJ SC/IM: CPT | Performed by: NURSE PRACTITIONER

## 2017-11-29 RX ORDER — METHYLPREDNISOLONE ACETATE 40 MG/ML
40 INJECTION, SUSPENSION INTRA-ARTICULAR; INTRALESIONAL; INTRAMUSCULAR; SOFT TISSUE ONCE
Status: COMPLETED | OUTPATIENT
Start: 2017-11-29 | End: 2017-11-29

## 2017-11-29 RX ORDER — CEFTRIAXONE 1 G/1
1 INJECTION, POWDER, FOR SOLUTION INTRAMUSCULAR; INTRAVENOUS ONCE
Status: COMPLETED | OUTPATIENT
Start: 2017-11-29 | End: 2017-11-29

## 2017-11-29 RX ORDER — AZITHROMYCIN 250 MG/1
TABLET, FILM COATED ORAL
Qty: 6 TABLET | Refills: 0 | Status: SHIPPED | OUTPATIENT
Start: 2017-11-29 | End: 2017-12-11

## 2017-11-29 RX ADMIN — CEFTRIAXONE 1 G: 1 INJECTION, POWDER, FOR SOLUTION INTRAMUSCULAR; INTRAVENOUS at 10:33

## 2017-11-29 RX ADMIN — METHYLPREDNISOLONE ACETATE 40 MG: 40 INJECTION, SUSPENSION INTRA-ARTICULAR; INTRALESIONAL; INTRAMUSCULAR; SOFT TISSUE at 10:42

## 2017-11-29 NOTE — PROGRESS NOTES
Subjective   Evie Shah is a 72 y.o. female.     Chief Complaint   Patient presents with   • URI   • Cough   • Diabetes   • Hypertension       History of Present Illness       Patient is here today complaining of cough and URI symptoms ×3 days.  She is a former smoker.  Currently takes Wellbutrin to help prevent smoking.  History of hypertension which is often elevated.  She currently receives amlodipine and Benzapril.  She does check her blood pressure randomly at home and reports that her primary care provider is aware that her blood pressure generally runs elevated.    She does have an inhaler at home which she has used over the weekend without much improvement.    Positive chills and fatigue.  Positive body aches and productive cough.  Sinus pressure is present.  Patient complains of ear pain and itching.        Medical history is significant for type 2 diabetes, hyperlipidemia, hypertension, aortic iliac atherosclerosis, peripheral artery disease, senile osteoporosis and history of tobacco abuse.      The following portions of the patient's history were reviewed and updated as appropriate: allergies, current medications, past family history, past medical history, past social history, past surgical history and problem list.    Review of Systems   Constitutional: Positive for activity change, appetite change, chills, fatigue and fever. Negative for diaphoresis.   HENT: Positive for congestion, ear discharge, ear pain and sinus pain. Negative for sore throat and trouble swallowing.    Respiratory: Positive for cough, chest tightness, shortness of breath and wheezing. Negative for choking.    Cardiovascular: Negative for chest pain and palpitations.   Gastrointestinal: Negative for abdominal pain, blood in stool, constipation, diarrhea, nausea and vomiting.   Endocrine: Negative.    Genitourinary: Negative for dysuria.   Musculoskeletal: Positive for arthralgias.   Skin: Negative for rash.  "  Allergic/Immunologic: Negative.    Hematological: Negative.    Psychiatric/Behavioral: Negative for dysphoric mood, self-injury and suicidal ideas.       Objective     /90  Pulse 88  Temp 97.8 °F (36.6 °C) (Oral)   Ht 63\" (160 cm)  Wt 183 lb (83 kg)  SpO2 92%  BMI 32.42 kg/m2      Physical Exam   Constitutional: She is oriented to person, place, and time. She appears well-developed and well-nourished. She has a sickly appearance.   Appears acutely ill.    HENT:   Head: Normocephalic.   Right Ear: Hearing normal. No lacerations. There is drainage. No swelling. No foreign bodies. No mastoid tenderness. A middle ear effusion is present.   Left Ear: Hearing normal. No lacerations. There is drainage. No swelling. No foreign bodies. No mastoid tenderness. A middle ear effusion is present.   Nose: Mucosal edema and rhinorrhea present. No nose lacerations, sinus tenderness or nasal deformity. No epistaxis.  No foreign bodies.   Mouth/Throat: Uvula is midline. Oropharyngeal exudate and posterior oropharyngeal erythema present. No tonsillar abscesses.   TM's are cloudy bilateral.  Bilateral ear canals impacted with cerumen, removed via provider with flexi-loop. Honey colored cerumen removed, tolerated well.      Eyes: EOM are normal. Pupils are equal, round, and reactive to light.   Neck: Normal range of motion. Neck supple. No thyromegaly present.   Cardiovascular: Normal rate, regular rhythm, normal heart sounds and intact distal pulses.    Initial elevation of blood pressure with improved reading obtained prior to injection administration    Pulmonary/Chest: Effort normal. No respiratory distress. She has wheezes in the right upper field and the left upper field.   Cough noted    Abdominal: Soft. Bowel sounds are normal. She exhibits no distension. There is no tenderness. There is no guarding.   Lymphadenopathy:     She has cervical adenopathy.        Right cervical: Superficial cervical adenopathy present. " No deep cervical adenopathy present.       Left cervical: Superficial cervical adenopathy present. No deep cervical adenopathy present.   Neurological: She is alert and oriented to person, place, and time. She has normal reflexes.   Skin: Skin is warm and dry. No rash noted.   Psychiatric: She has a normal mood and affect. Her speech is normal and behavior is normal. Judgment and thought content normal. Cognition and memory are normal.       Assessment/Plan     Problem List Items Addressed This Visit        Cardiovascular and Mediastinum    DM type 2 with diabetic mixed hyperlipidemia    HLD (hyperlipidemia)    HTN (hypertension)    Aorto-iliac atherosclerosis    Overview     S/p stenting left external iliac artery         Peripheral arterial disease    Overview     a. Class III left lower extremity claudication.  b. Arterial duplex, 10/27/2014, showed moderate-to-severe stenosis of the left lower extremity.  c. Peripheral angiograms by Dr. Bernard, 02/25/2015, showed 90% stenosis of the left external iliac artery treated with 7.0 x 39 mm stent. Otherwise, non-obstructive plaque.               Respiratory    Acute URI    Relevant Medications    cefTRIAXone (ROCEPHIN) injection 1 g (Start on 11/29/2017 10:45 AM)    azithromycin (ZITHROMAX Z-RAJNI) 250 MG tablet    methylPREDNISolone acetate (DEPO-medrol) injection 40 mg (Start on 11/29/2017 11:00 AM)       Musculoskeletal and Integument    Senile osteoporosis       Other    Flu-like symptoms - Primary    Relevant Orders    POCT Influenza A/B (Completed)      Other Visit Diagnoses     Excessive cerumen in both ear canals          I have discussed diagnosis in detail today allowing time for questions and answers. Pt is aware of reasons to seek urgent or emergent medical care as well as reasons to return to the clinic for evaluation. Possible side effects, interactions and progression of symptoms discussed as well. Pt / family states understanding.   Avoid use of inhaler  for at least 4 hours after use of 3 way cough syrup. Avoid 3 way cough syrup for more than 3 days consistently.   Fluids, rest, symptomatic treatment advised. Discussed symptoms to report as well as reasons to seek urgent or emergent medical attention. Understanding stated.   RTC 2-5 days if not improved, sooner if condition worsens/changes. Symptomatic care advised as well as reasons for urgent or emergent care. Pt / family state understanding.   Continue to monitor blood pressure and report readings > 150/90 or pulse elevations over 100.   Follow up with PCP as scheduled.       Written script for 3-way cough syrup one tsp every eight hours prn, disp 6 oz. Equal parts Robitussin DM, Liquid phenergan and Albuterol syrup.          This document has been electronically signed by:  JOAQUINA Harvey, NP-C

## 2017-12-11 ENCOUNTER — OFFICE VISIT (OUTPATIENT)
Dept: FAMILY MEDICINE CLINIC | Facility: CLINIC | Age: 72
End: 2017-12-11

## 2017-12-11 VITALS
TEMPERATURE: 97.8 F | SYSTOLIC BLOOD PRESSURE: 130 MMHG | HEIGHT: 63 IN | BODY MASS INDEX: 32.78 KG/M2 | WEIGHT: 185 LBS | OXYGEN SATURATION: 92 % | DIASTOLIC BLOOD PRESSURE: 90 MMHG | HEART RATE: 88 BPM

## 2017-12-11 DIAGNOSIS — M81.0 SENILE OSTEOPOROSIS: Primary | ICD-10-CM

## 2017-12-11 DIAGNOSIS — E78.2 DM TYPE 2 WITH DIABETIC MIXED HYPERLIPIDEMIA (HCC): ICD-10-CM

## 2017-12-11 DIAGNOSIS — E11.69 DM TYPE 2 WITH DIABETIC MIXED HYPERLIPIDEMIA (HCC): ICD-10-CM

## 2017-12-11 DIAGNOSIS — I10 ESSENTIAL HYPERTENSION: ICD-10-CM

## 2017-12-11 PROCEDURE — 99214 OFFICE O/P EST MOD 30 MIN: CPT | Performed by: PHYSICIAN ASSISTANT

## 2017-12-12 PROBLEM — J06.9 ACUTE URI: Status: RESOLVED | Noted: 2017-11-29 | Resolved: 2017-12-12

## 2017-12-12 PROBLEM — R68.89 FLU-LIKE SYMPTOMS: Status: RESOLVED | Noted: 2017-11-29 | Resolved: 2017-12-12

## 2017-12-12 NOTE — PROGRESS NOTES
Subjective   Evie Shah is a 72 y.o. female.     History of Present Illness     Osteoporosis-  She's here today to follow-up on chronic issues including osteoporosis.  She currently takes calcium with vitamin D daily.  She is due for her bone density scan.  Not at goal    Diabetes  Current symptoms include none.  Evaluation to date has been: fasting blood sugar. Home sugars: BGs are running  consistent with Hgb A1C. Current treatments: SGLT2 inhibitor - Jardiance and more intensive attention to diet which has been effective. Most recent hemoglobin A1c   Lab Results   Component Value Date    HGBA1C 6.50 (H) 09/05/2017    HGBA1C 7.00 (H) 10/31/2016    HGBA1C 7.1 (H) 02/25/2015    Stable    Hypertension-controlled with Lotrel 10/40 mg daily    The following portions of the patient's history were reviewed and updated as appropriate: allergies, current medications, past family history, past medical history, past social history, past surgical history and problem list.    Review of Systems   Constitutional: Negative for activity change, appetite change and fever.   HENT: Negative for ear pain, sinus pressure and sore throat.    Eyes: Negative for pain and visual disturbance.   Respiratory: Negative for cough and chest tightness.    Cardiovascular: Negative for chest pain and palpitations.   Gastrointestinal: Negative for abdominal pain, constipation, diarrhea, nausea and vomiting.   Endocrine: Negative for polydipsia and polyuria.   Genitourinary: Negative for dysuria and frequency.   Musculoskeletal: Negative for back pain and myalgias.   Skin: Negative for color change and rash.   Allergic/Immunologic: Negative for food allergies and immunocompromised state.   Neurological: Negative for dizziness, syncope and headaches.   Hematological: Negative for adenopathy. Does not bruise/bleed easily.   Psychiatric/Behavioral: Negative for hallucinations and suicidal ideas. The patient is not nervous/anxious.         Objective   Physical Exam   Constitutional: She is oriented to person, place, and time. She appears well-developed and well-nourished.   HENT:   Head: Normocephalic and atraumatic.   Nose: Nose normal.   Mouth/Throat: Oropharynx is clear and moist.   Eyes: Conjunctivae and EOM are normal. Pupils are equal, round, and reactive to light.   Neck: Normal range of motion. Neck supple. No tracheal deviation present. No thyromegaly present.   Cardiovascular: Normal rate, regular rhythm, normal heart sounds and intact distal pulses.    No murmur heard.  Pulmonary/Chest: Effort normal and breath sounds normal. No respiratory distress. She has no wheezes.   Abdominal: Soft. Bowel sounds are normal. There is no tenderness. There is no guarding.   Musculoskeletal: Normal range of motion. She exhibits no edema or tenderness.   Lymphadenopathy:     She has no cervical adenopathy.   Neurological: She is alert and oriented to person, place, and time.   Skin: Skin is warm and dry. No rash noted.   Psychiatric: She has a normal mood and affect. Her behavior is normal.   Nursing note and vitals reviewed.      Assessment/Plan   Diagnoses and all orders for this visit:    Senile osteoporosis  -     DEXA Bone Density Axial; Future    DM type 2 with diabetic mixed hyperlipidemia  -     Empagliflozin (JARDIANCE) 25 MG tablet; Take 25 mg by mouth Daily.    Essential hypertension    I will follow-up with her in 3 months time or sooner if needed

## 2017-12-26 DIAGNOSIS — E78.2 MIXED HYPERLIPIDEMIA: ICD-10-CM

## 2017-12-28 RX ORDER — OMEGA-3-ACID ETHYL ESTERS 1 G/1
CAPSULE, LIQUID FILLED ORAL
Qty: 120 CAPSULE | Refills: 5 | OUTPATIENT
Start: 2017-12-28 | End: 2018-03-15 | Stop reason: SDUPTHER

## 2017-12-29 ENCOUNTER — OFFICE VISIT (OUTPATIENT)
Dept: FAMILY MEDICINE CLINIC | Facility: CLINIC | Age: 72
End: 2017-12-29

## 2017-12-29 ENCOUNTER — HOSPITAL ENCOUNTER (OUTPATIENT)
Dept: BONE DENSITY | Facility: HOSPITAL | Age: 72
Discharge: HOME | End: 2017-12-29
Admitting: PHYSICIAN ASSISTANT

## 2017-12-29 VITALS
SYSTOLIC BLOOD PRESSURE: 130 MMHG | OXYGEN SATURATION: 88 % | DIASTOLIC BLOOD PRESSURE: 82 MMHG | WEIGHT: 183 LBS | TEMPERATURE: 98.4 F | HEIGHT: 63 IN | BODY MASS INDEX: 32.43 KG/M2 | HEART RATE: 98 BPM

## 2017-12-29 DIAGNOSIS — I10 ESSENTIAL HYPERTENSION: ICD-10-CM

## 2017-12-29 DIAGNOSIS — B37.31 VAGINA, CANDIDIASIS: ICD-10-CM

## 2017-12-29 DIAGNOSIS — M81.0 SENILE OSTEOPOROSIS: ICD-10-CM

## 2017-12-29 DIAGNOSIS — J40 BRONCHITIS: Primary | ICD-10-CM

## 2017-12-29 DIAGNOSIS — E78.2 MIXED HYPERLIPIDEMIA: ICD-10-CM

## 2017-12-29 PROCEDURE — 77080 DXA BONE DENSITY AXIAL: CPT

## 2017-12-29 PROCEDURE — 99214 OFFICE O/P EST MOD 30 MIN: CPT | Performed by: PHYSICIAN ASSISTANT

## 2017-12-29 PROCEDURE — 77080 DXA BONE DENSITY AXIAL: CPT | Performed by: RADIOLOGY

## 2017-12-29 PROCEDURE — 96372 THER/PROPH/DIAG INJ SC/IM: CPT | Performed by: PHYSICIAN ASSISTANT

## 2017-12-29 RX ORDER — METHYLPREDNISOLONE ACETATE 80 MG/ML
80 INJECTION, SUSPENSION INTRA-ARTICULAR; INTRALESIONAL; INTRAMUSCULAR; SOFT TISSUE ONCE
Status: COMPLETED | OUTPATIENT
Start: 2017-12-29 | End: 2017-12-29

## 2017-12-29 RX ORDER — LEVOFLOXACIN 500 MG/1
500 TABLET, FILM COATED ORAL DAILY
Qty: 10 TABLET | Refills: 0 | Status: SHIPPED | OUTPATIENT
Start: 2017-12-29 | End: 2018-01-08

## 2017-12-29 RX ORDER — FLUCONAZOLE 150 MG/1
150 TABLET ORAL DAILY
Qty: 3 TABLET | Refills: 0 | Status: SHIPPED | OUTPATIENT
Start: 2017-12-29 | End: 2018-03-15

## 2017-12-29 RX ADMIN — METHYLPREDNISOLONE ACETATE 80 MG: 80 INJECTION, SUSPENSION INTRA-ARTICULAR; INTRALESIONAL; INTRAMUSCULAR; SOFT TISSUE at 16:27

## 2017-12-29 NOTE — PROGRESS NOTES
Subjective   Evie Shah is a 72 y.o. female.     History of Present Illness     Cough-  She complains of cough productive of thick green sputum, fatigue, bilateral earache, ear congestion and nausea intermittently for the past month.  She initially took a azithromycin 5 day pack with minimal relief.    Vaginal discharge-  She complains of white thick vaginal discharge with associated pruritus.  Onset 3 weeks ago after finishing Z-Isael.    Hypertension-well controlled despite acute illness    Dyslipidemia  Compliance with treatment has been good. The patient exercises intermittently. She is currently being prescribed the following medication for her dyslipidemia - atorvastatin. Patient denies side effects associated with her medications. Most recent lipids include  Lab Results   Component Value Date    TRIG 172 (H) 09/05/2017    TRIG 203 (H) 10/31/2016    HDL 36 (L) 09/05/2017    HDL 33 (L) 10/31/2016    LDL 60 09/05/2017    LDL 43 10/31/2016   Stable    Osteoporosis-stable with vitamin D and calcium supplementation    The following portions of the patient's history were reviewed and updated as appropriate: allergies, current medications, past family history, past medical history, past social history, past surgical history and problem list.    Review of Systems   Constitutional: Positive for chills and fatigue. Negative for activity change, appetite change and fever.   HENT: Positive for ear pain and sore throat. Negative for sinus pressure.    Eyes: Negative for pain and visual disturbance.   Respiratory: Positive for cough. Negative for chest tightness.    Cardiovascular: Negative for chest pain and palpitations.   Gastrointestinal: Positive for nausea. Negative for abdominal pain, constipation, diarrhea and vomiting.   Endocrine: Negative for polydipsia and polyuria.   Genitourinary: Negative for dysuria and frequency.   Musculoskeletal: Negative for back pain and myalgias.   Skin: Negative for color  change and rash.   Allergic/Immunologic: Negative for food allergies and immunocompromised state.   Neurological: Negative for dizziness, syncope and headaches.   Hematological: Negative for adenopathy. Does not bruise/bleed easily.   Psychiatric/Behavioral: Negative for hallucinations and suicidal ideas. The patient is not nervous/anxious.        Objective   Physical Exam   Constitutional: She is oriented to person, place, and time. She appears well-developed and well-nourished.   HENT:   Head: Normocephalic and atraumatic.   Nose: Nose normal.   Oropharynx erythematous.  Clear fluid noted behind left TM.   Eyes: Conjunctivae and EOM are normal. Pupils are equal, round, and reactive to light.   Neck: Normal range of motion. Neck supple. No tracheal deviation present. No thyromegaly present.   Cardiovascular: Normal rate, regular rhythm, normal heart sounds and intact distal pulses.    No murmur heard.  Pulmonary/Chest: Effort normal. No respiratory distress. She has no wheezes.   Bronchovesicular bilateral breath sounds noted.   Abdominal: Soft. Bowel sounds are normal. There is no tenderness. There is no guarding.   Musculoskeletal: Normal range of motion. She exhibits no edema or tenderness.   Lymphadenopathy:     She has no cervical adenopathy.   Neurological: She is alert and oriented to person, place, and time.   Skin: Skin is warm and dry. No rash noted.   Psychiatric: She has a normal mood and affect. Her behavior is normal.   Nursing note and vitals reviewed.      Assessment/Plan   Diagnoses and all orders for this visit:    Bronchitis  -     levoFLOXacin (LEVAQUIN) 500 MG tablet; Take 1 tablet by mouth Daily for 10 days.  -     methylPREDNISolone acetate (DEPO-medrol) injection 80 mg; Inject 1 mL into the shoulder, thigh, or buttocks 1 (One) Time.    Vagina, candidiasis  -     fluconazole (DIFLUCAN) 150 MG tablet; Take 1 tablet by mouth Daily.    Essential hypertension    Mixed hyperlipidemia    Senile  osteoporosis    Prescription was written for nebulizer machine and tubing to use every 6 hours for albuterol nebulizer treatments.  I'll be glad to follow-up with her as needed for these issues.

## 2018-01-02 DIAGNOSIS — M81.0 SENILE OSTEOPOROSIS: Primary | ICD-10-CM

## 2018-01-02 RX ORDER — ALENDRONATE SODIUM 70 MG/1
70 TABLET ORAL
Qty: 4 TABLET | Refills: 5 | Status: SHIPPED | OUTPATIENT
Start: 2018-01-02 | End: 2018-01-29 | Stop reason: SDDI

## 2018-01-29 ENCOUNTER — OFFICE VISIT (OUTPATIENT)
Dept: FAMILY MEDICINE CLINIC | Facility: CLINIC | Age: 73
End: 2018-01-29

## 2018-01-29 VITALS
WEIGHT: 182 LBS | SYSTOLIC BLOOD PRESSURE: 104 MMHG | HEIGHT: 63 IN | DIASTOLIC BLOOD PRESSURE: 80 MMHG | BODY MASS INDEX: 32.25 KG/M2 | TEMPERATURE: 98.3 F | OXYGEN SATURATION: 92 % | HEART RATE: 96 BPM

## 2018-01-29 DIAGNOSIS — M85.80 SENILE OSTEOPENIA: ICD-10-CM

## 2018-01-29 DIAGNOSIS — E78.2 MIXED HYPERLIPIDEMIA: ICD-10-CM

## 2018-01-29 DIAGNOSIS — I10 ESSENTIAL HYPERTENSION: ICD-10-CM

## 2018-01-29 DIAGNOSIS — Z00.00 MEDICARE ANNUAL WELLNESS VISIT, SUBSEQUENT: Primary | ICD-10-CM

## 2018-01-29 PROCEDURE — G0439 PPPS, SUBSEQ VISIT: HCPCS | Performed by: PHYSICIAN ASSISTANT

## 2018-01-29 PROCEDURE — 96160 PT-FOCUSED HLTH RISK ASSMT: CPT | Performed by: PHYSICIAN ASSISTANT

## 2018-01-29 RX ORDER — ASPIRIN 81 MG/1
81 TABLET ORAL DAILY
COMMUNITY
End: 2018-12-14 | Stop reason: SDUPTHER

## 2018-01-29 NOTE — PROGRESS NOTES
QUICK REFERENCE INFORMATION:  The ABCs of the Annual Wellness Visit    Subsequent Medicare Wellness Visit    HEALTH RISK ASSESSMENT    1945    Recent Hospitalizations:  No hospitalization(s) within the last year..        Current Medical Providers:  Patient Care Team:  RUBI Michael as PCP - General  RUBI Michael as PCP - Family Medicine        Smoking Status:  History   Smoking Status   • Former Smoker   • Types: Cigarettes   • Quit date: 5/26/2015   Smokeless Tobacco   • Not on file       Alcohol Consumption:  History   Alcohol Use No       Depression Screen:   PHQ-2/PHQ-9 Depression Screening 1/29/2018   Little interest or pleasure in doing things 0   Feeling down, depressed, or hopeless 0   Trouble falling or staying asleep, or sleeping too much 0   Feeling tired or having little energy 1   Poor appetite or overeating 0   Feeling bad about yourself - or that you are a failure or have let yourself or your family down 0   Trouble concentrating on things, such as reading the newspaper or watching television 0   Moving or speaking so slowly that other people could have noticed. Or the opposite - being so fidgety or restless that you have been moving around a lot more than usual 0   Thoughts that you would be better off dead, or of hurting yourself in some way 0   Total Score 1   If you checked off any problems, how difficult have these problems made it for you to do your work, take care of things at home, or get along with other people? -       Health Habits and Functional and Cognitive Screening:  Functional & Cognitive Status 1/29/2018   Do you have difficulty preparing food and eating? No   Do you have difficulty bathing yourself, getting dressed or grooming yourself? No   Do you have difficulty using the toilet? No   Do you have difficulty moving around from place to place? No   Do you have trouble with steps or getting out of a bed or a chair? Yes   In the past year have you fallen or  experienced a near fall? Yes   Current Diet Low Fat Diet   Dental Exam Not up to date   Eye Exam Up to date   Exercise (times per week) 0 times per week   Current Exercise Activities Include None   Do you need help using the phone?  No   Are you deaf or do you have serious difficulty hearing?  No   Do you need help with transportation? No   Do you need help shopping? No   Do you need help preparing meals?  No   Do you need help with housework?  No   Do you need help with laundry? No   Do you need help taking your medications? No   Do you need help managing money? No   Have you felt unusual stress, anger or loneliness in the last month? No   Who do you live with? Child   If you need help, do you have trouble finding someone available to you? No   Have you been bothered in the last four weeks by sexual problems? No   Do you have difficulty concentrating, remembering or making decisions? No           Does the patient have evidence of cognitive impairment? No    Aspirin use counseling: Taking ASA appropriately as indicated      Recent Lab Results:  CMP:  Lab Results   Component Value Date     (H) 09/05/2017    BUN 25 (H) 09/05/2017    CREATININE 0.77 09/05/2017    EGFRIFNONA 74 09/05/2017    EGFRIFAFRI 90 09/05/2017    BCR 32.5 (H) 09/05/2017     09/05/2017    K 4.1 09/05/2017    CO2 23.1 (L) 09/05/2017    CALCIUM 9.7 09/05/2017    PROTENTOTREF 7.0 09/05/2017    ALBUMIN 4.60 09/05/2017    LABGLOBREF 2.4 09/05/2017    LABIL2 1.9 09/05/2017    BILITOT 0.6 09/05/2017    ALKPHOS 96 09/05/2017    AST 21 09/05/2017    ALT 19 09/05/2017     Lipid Panel:  Lab Results   Component Value Date    TRIG 172 (H) 09/05/2017    HDL 36 (L) 09/05/2017    VLDL 34.4 09/05/2017    LDLDIRECT 63 02/25/2015     HbA1c:  Lab Results   Component Value Date    HGBA1C 6.50 (H) 09/05/2017       Visual Acuity:   Visual Acuity Screening    Right eye Left eye Both eyes   Without correction:      With correction: 20/20 20/25 20/20      Hearing acuity:  Whisper test  Greater than 5 feet left ear  Greater than 5 feet right ear    Age-appropriate Screening Schedule:  Refer to the list below for future screening recommendations based on patient's age, sex and/or medical conditions. Orders for these recommended tests are listed in the plan section. The patient has been provided with a written plan.    Health Maintenance   Topic Date Due   • HEMOGLOBIN A1C  03/05/2018   • MAMMOGRAM  04/21/2018   • PNEUMOCOCCAL VACCINES (65+ LOW/MEDIUM RISK) (2 of 2 - PPSV23) 06/12/2018   • LIPID PANEL  09/05/2018   • URINE MICROALBUMIN  09/05/2018   • DIABETIC EYE EXAM  11/21/2018   • DIABETIC FOOT EXAM  12/11/2018   • DXA SCAN  12/29/2020   • COLONOSCOPY  11/07/2026   • TDAP/TD VACCINES (2 - Td) 06/12/2027   • INFLUENZA VACCINE  Completed   • ZOSTER VACCINE  Addressed        Subjective   History of Present Illness    Evie Shah is a 72 y.o. female who presents for an Subsequent Wellness Visit.    Osteoporosis-  Not at goal.  12/29/2017 DEXA scan revealed T score right femur neck of -2.5.  She reports GI intolerance with the use of Fosamax.  She does continue to take calcium and vitamin D daily.    Hypertension-stable    Dyslipidemia  Compliance with treatment has been good. The patient exercises intermittently. She is currently being prescribed the following medication for her dyslipidemia - lifestyle modifcation, atorvastatin, omega 3 fatty acids. Patient denies side effects associated with her medications. Most recent lipids include  Lab Results   Component Value Date    TRIG 172 (H) 09/05/2017    TRIG 203 (H) 10/31/2016    HDL 36 (L) 09/05/2017    HDL 33 (L) 10/31/2016    LDL 60 09/05/2017    LDL 43 10/31/2016   Not at goal      The following portions of the patient's history were reviewed and updated as appropriate: allergies, current medications, past family history, past medical history, past social history, past surgical history and problem  list.    Outpatient Medications Prior to Visit   Medication Sig Dispense Refill   • albuterol (PROVENTIL) (2.5 MG/3ML) 0.083% nebulizer solution Take 2.5 mg by nebulization Every 4 (Four) Hours As Needed for Wheezing. 270 mL 5   • amLODIPine-benazepril (LOTREL) 10-40 MG per capsule TAKE ONE CAPSULE BY MOUTH ONCE DAILY 30 capsule 5   • atorvastatin (LIPITOR) 10 MG tablet TAKE 1 TABLET BY MOUTH EVERY NIGHT 30 tablet 5   • buPROPion SR (WELLBUTRIN SR) 150 MG 12 hr tablet Take 1 tablet by mouth 2 (Two) Times a Day. 60 tablet 5   • clopidogrel (PLAVIX) 75 MG tablet TAKE ONE TABLET BY MOUTH ONCE DAILY 30 tablet 5   • Empagliflozin (JARDIANCE) 25 MG tablet Take 25 mg by mouth Daily. 30 tablet 5   • fluconazole (DIFLUCAN) 150 MG tablet Take 1 tablet by mouth Daily. 3 tablet 0   • fluticasone (FLONASE) 50 MCG/ACT nasal spray 2 sprays into each nostril Daily. 18.2 mL 3   • loratadine (CLARITIN) 10 MG tablet TAKE ONE TABLET BY MOUTH ONCE DAILY 30 tablet 5   • omega-3 acid ethyl esters (LOVAZA) 1 g capsule TAKE TWO CAPSULES BY MOUTH TWICE DAILY 120 capsule 5   • vitamin D (ERGOCALCIFEROL) 09107 units capsule capsule TAKE ONE CAPSULE BY MOUTH ONCE A WEEK 4 capsule 0   • alendronate (FOSAMAX) 70 MG tablet Take 1 tablet by mouth Every 7 (Seven) Days. 4 tablet 5     No facility-administered medications prior to visit.        Patient Active Problem List   Diagnosis   • DM type 2 with diabetic mixed hyperlipidemia   • HLD (hyperlipidemia)   • HTN (hypertension)   • Senile osteoporosis   • Aorto-iliac atherosclerosis   • Allergic rhinitis   • Tobacco use disorder   • Peripheral arterial disease   • Obesity       Advance Care Planning:  has an advance directive - a copy HAS NOT been provided. Have asked the patient to send this to us to add to record.    Identification of Risk Factors:  Risk factors include: weight , cardiovascular risk, inactivity and polypharmacy.    Review of Systems   Constitutional: Negative for activity change,  appetite change and fever.   HENT: Negative for ear pain, sinus pressure and sore throat.    Eyes: Negative for pain and visual disturbance.   Respiratory: Negative for cough and chest tightness.    Cardiovascular: Negative for chest pain and palpitations.   Gastrointestinal: Negative for abdominal pain, constipation, diarrhea, nausea and vomiting.   Endocrine: Negative for polydipsia and polyuria.   Genitourinary: Negative for dysuria and frequency.   Musculoskeletal: Negative for back pain and myalgias.   Skin: Negative for color change and rash.   Allergic/Immunologic: Negative for food allergies and immunocompromised state.   Neurological: Negative for dizziness, syncope and headaches.   Hematological: Negative for adenopathy. Does not bruise/bleed easily.   Psychiatric/Behavioral: Negative for hallucinations and suicidal ideas. The patient is not nervous/anxious.        Compared to one year ago, the patient feels her physical health is the same.  Compared to one year ago, the patient feels her mental health is the same.    Objective     Physical Exam   Constitutional: She is oriented to person, place, and time. She appears well-developed and well-nourished.   HENT:   Head: Normocephalic and atraumatic.   Nose: Nose normal.   Mouth/Throat: Oropharynx is clear and moist.   Eyes: Conjunctivae and EOM are normal. Pupils are equal, round, and reactive to light.   Neck: Normal range of motion. Neck supple. No tracheal deviation present. No thyromegaly present.   Cardiovascular: Normal rate, regular rhythm, normal heart sounds and intact distal pulses.    No murmur heard.  Pulmonary/Chest: Effort normal and breath sounds normal. No respiratory distress. She has no wheezes.   Abdominal: Soft. Bowel sounds are normal. There is no tenderness. There is no guarding.   Musculoskeletal: Normal range of motion. She exhibits no edema or tenderness.   Lymphadenopathy:     She has no cervical adenopathy.   Neurological: She is  "alert and oriented to person, place, and time.   Skin: Skin is warm and dry. No rash noted.   Psychiatric: She has a normal mood and affect. Her behavior is normal.   Nursing note and vitals reviewed.      Vitals:    01/29/18 1515   BP: 104/80   Pulse: 96   Temp: 98.3 °F (36.8 °C)   TempSrc: Oral   SpO2: 92%   Weight: 82.6 kg (182 lb)   Height: 160 cm (62.99\")       Body mass index is 32.25 kg/(m^2).  Discussed the patient's BMI with her. BMI is above normal parameters. Follow-up plan includes:  exercise counseling and nutrition counseling.    Assessment/Plan   Patient Self-Management and Personalized Health Advice  The patient has been provided with information about: prevention of cardiac or vascular disease and designing advance directives and preventive services including:   · Advance directive, Nutrition counseling provided.    Visit Diagnoses:    ICD-10-CM ICD-9-CM   1. Medicare annual wellness visit, subsequent Z00.00 V70.0   2. Senile osteopenia M85.80 733.90   3. Essential hypertension I10 401.9   4. Mixed hyperlipidemia E78.2 272.2     Discontinue Fosamax due to GI intolerance  Start Prolia    No orders of the defined types were placed in this encounter.      Outpatient Encounter Prescriptions as of 1/29/2018   Medication Sig Dispense Refill   • albuterol (PROVENTIL) (2.5 MG/3ML) 0.083% nebulizer solution Take 2.5 mg by nebulization Every 4 (Four) Hours As Needed for Wheezing. 270 mL 5   • amLODIPine-benazepril (LOTREL) 10-40 MG per capsule TAKE ONE CAPSULE BY MOUTH ONCE DAILY 30 capsule 5   • aspirin 81 MG EC tablet Take 81 mg by mouth Daily.     • atorvastatin (LIPITOR) 10 MG tablet TAKE 1 TABLET BY MOUTH EVERY NIGHT 30 tablet 5   • buPROPion SR (WELLBUTRIN SR) 150 MG 12 hr tablet Take 1 tablet by mouth 2 (Two) Times a Day. 60 tablet 5   • clopidogrel (PLAVIX) 75 MG tablet TAKE ONE TABLET BY MOUTH ONCE DAILY 30 tablet 5   • Empagliflozin (JARDIANCE) 25 MG tablet Take 25 mg by mouth Daily. 30 tablet 5   • " fluconazole (DIFLUCAN) 150 MG tablet Take 1 tablet by mouth Daily. 3 tablet 0   • fluticasone (FLONASE) 50 MCG/ACT nasal spray 2 sprays into each nostril Daily. 18.2 mL 3   • loratadine (CLARITIN) 10 MG tablet TAKE ONE TABLET BY MOUTH ONCE DAILY 30 tablet 5   • omega-3 acid ethyl esters (LOVAZA) 1 g capsule TAKE TWO CAPSULES BY MOUTH TWICE DAILY 120 capsule 5   • vitamin D (ERGOCALCIFEROL) 76823 units capsule capsule TAKE ONE CAPSULE BY MOUTH ONCE A WEEK 4 capsule 0   • denosumab (PROLIA) 60 MG/ML solution syringe Inject 1 mL under the skin 1 (One) Time for 1 dose. 1.8 mL 1   • [DISCONTINUED] alendronate (FOSAMAX) 70 MG tablet Take 1 tablet by mouth Every 7 (Seven) Days. 4 tablet 5     No facility-administered encounter medications on file as of 1/29/2018.        Reviewed use of high risk medication in the elderly: yes  Reviewed for potential of harmful drug interactions in the elderly: yes    Follow Up:  No Follow-up on file.     An After Visit Summary and PPPS with all of these plans were given to the patient.

## 2018-01-31 ENCOUNTER — CLINICAL SUPPORT (OUTPATIENT)
Dept: FAMILY MEDICINE CLINIC | Facility: CLINIC | Age: 73
End: 2018-01-31

## 2018-01-31 DIAGNOSIS — M81.0 SENILE OSTEOPOROSIS: Primary | ICD-10-CM

## 2018-01-31 PROCEDURE — 96372 THER/PROPH/DIAG INJ SC/IM: CPT | Performed by: PHYSICIAN ASSISTANT

## 2018-03-03 DIAGNOSIS — E55.9 VITAMIN D DEFICIENCY: ICD-10-CM

## 2018-03-05 RX ORDER — ERGOCALCIFEROL 1.25 MG/1
CAPSULE ORAL
Qty: 4 CAPSULE | Refills: 5 | Status: SHIPPED | OUTPATIENT
Start: 2018-03-05 | End: 2018-03-15 | Stop reason: SDUPTHER

## 2018-03-05 RX ORDER — LANCETS 33 GAUGE
EACH MISCELLANEOUS
Qty: 60 EACH | Refills: 5 | Status: SHIPPED | OUTPATIENT
Start: 2018-03-05 | End: 2021-06-01

## 2018-03-15 ENCOUNTER — OFFICE VISIT (OUTPATIENT)
Dept: FAMILY MEDICINE CLINIC | Facility: CLINIC | Age: 73
End: 2018-03-15

## 2018-03-15 VITALS
TEMPERATURE: 98.5 F | HEIGHT: 63 IN | HEART RATE: 93 BPM | DIASTOLIC BLOOD PRESSURE: 80 MMHG | BODY MASS INDEX: 32.43 KG/M2 | SYSTOLIC BLOOD PRESSURE: 150 MMHG | WEIGHT: 183 LBS | OXYGEN SATURATION: 90 %

## 2018-03-15 DIAGNOSIS — J30.2 OTHER SEASONAL ALLERGIC RHINITIS: ICD-10-CM

## 2018-03-15 DIAGNOSIS — J30.1 CHRONIC ALLERGIC RHINITIS DUE TO POLLEN, UNSPECIFIED SEASONALITY: ICD-10-CM

## 2018-03-15 DIAGNOSIS — B37.31 VAGINA, CANDIDIASIS: Primary | ICD-10-CM

## 2018-03-15 DIAGNOSIS — F17.200 TOBACCO USE DISORDER: ICD-10-CM

## 2018-03-15 DIAGNOSIS — E78.2 MIXED HYPERLIPIDEMIA: ICD-10-CM

## 2018-03-15 DIAGNOSIS — E11.69 DM TYPE 2 WITH DIABETIC MIXED HYPERLIPIDEMIA (HCC): ICD-10-CM

## 2018-03-15 DIAGNOSIS — I70.0 AORTO-ILIAC ATHEROSCLEROSIS (HCC): ICD-10-CM

## 2018-03-15 DIAGNOSIS — I10 ESSENTIAL HYPERTENSION: ICD-10-CM

## 2018-03-15 DIAGNOSIS — E55.9 VITAMIN D DEFICIENCY: ICD-10-CM

## 2018-03-15 DIAGNOSIS — J42 CHRONIC BRONCHITIS, UNSPECIFIED CHRONIC BRONCHITIS TYPE (HCC): ICD-10-CM

## 2018-03-15 DIAGNOSIS — I70.8 AORTO-ILIAC ATHEROSCLEROSIS (HCC): ICD-10-CM

## 2018-03-15 DIAGNOSIS — E78.2 DM TYPE 2 WITH DIABETIC MIXED HYPERLIPIDEMIA (HCC): ICD-10-CM

## 2018-03-15 PROCEDURE — 99214 OFFICE O/P EST MOD 30 MIN: CPT | Performed by: PHYSICIAN ASSISTANT

## 2018-03-15 RX ORDER — MELATONIN
1000 DAILY
COMMUNITY
End: 2018-12-14 | Stop reason: SDUPTHER

## 2018-03-16 RX ORDER — ERGOCALCIFEROL 1.25 MG/1
50000 CAPSULE ORAL WEEKLY
Qty: 4 CAPSULE | Refills: 5 | Status: SHIPPED | OUTPATIENT
Start: 2018-03-16 | End: 2018-06-15 | Stop reason: SDUPTHER

## 2018-03-16 RX ORDER — ALBUTEROL SULFATE 2.5 MG/3ML
2.5 SOLUTION RESPIRATORY (INHALATION) EVERY 4 HOURS PRN
Qty: 270 ML | Refills: 5 | Status: SHIPPED | OUTPATIENT
Start: 2018-03-16 | End: 2020-04-02

## 2018-03-16 RX ORDER — AMLODIPINE BESYLATE AND BENAZEPRIL HYDROCHLORIDE 10; 40 MG/1; MG/1
1 CAPSULE ORAL DAILY
Qty: 30 CAPSULE | Refills: 5 | Status: SHIPPED | OUTPATIENT
Start: 2018-03-16 | End: 2018-06-15 | Stop reason: SDUPTHER

## 2018-03-16 RX ORDER — FLUTICASONE PROPIONATE 50 MCG
2 SPRAY, SUSPENSION (ML) NASAL DAILY
Qty: 18.2 ML | Refills: 3 | Status: SHIPPED | OUTPATIENT
Start: 2018-03-16 | End: 2018-06-15 | Stop reason: SDUPTHER

## 2018-03-16 RX ORDER — BUPROPION HYDROCHLORIDE 150 MG/1
150 TABLET, EXTENDED RELEASE ORAL EVERY 12 HOURS SCHEDULED
Qty: 60 TABLET | Refills: 5 | Status: SHIPPED | OUTPATIENT
Start: 2018-03-16 | End: 2018-06-15 | Stop reason: SDUPTHER

## 2018-03-16 RX ORDER — OMEGA-3-ACID ETHYL ESTERS 1 G/1
2 CAPSULE, LIQUID FILLED ORAL 2 TIMES DAILY
Qty: 120 CAPSULE | Refills: 5 | OUTPATIENT
Start: 2018-03-16 | End: 2018-06-15

## 2018-03-16 RX ORDER — LORATADINE 10 MG/1
10 TABLET ORAL DAILY
Qty: 30 TABLET | Refills: 5 | Status: SHIPPED | OUTPATIENT
Start: 2018-03-16 | End: 2018-06-15 | Stop reason: SDUPTHER

## 2018-03-16 RX ORDER — CLOPIDOGREL BISULFATE 75 MG/1
75 TABLET ORAL DAILY
Qty: 30 TABLET | Refills: 5 | Status: SHIPPED | OUTPATIENT
Start: 2018-03-16 | End: 2018-06-15 | Stop reason: SDUPTHER

## 2018-03-16 RX ORDER — FLUCONAZOLE 150 MG/1
150 TABLET ORAL DAILY
Qty: 5 TABLET | Refills: 0 | Status: SHIPPED | OUTPATIENT
Start: 2018-03-16 | End: 2018-06-15

## 2018-03-16 RX ORDER — ATORVASTATIN CALCIUM 10 MG/1
10 TABLET, FILM COATED ORAL NIGHTLY
Qty: 30 TABLET | Refills: 5 | Status: SHIPPED | OUTPATIENT
Start: 2018-03-16 | End: 2018-06-15 | Stop reason: SDUPTHER

## 2018-03-16 NOTE — PROGRESS NOTES
Subjective   Evie Shah is a 72 y.o. female.     Chief complaint-vaginal itching    History of Present Illness     Vaginal itching-  She complains of vaginal itching and white thick vaginal discharge for the past week.    COPD-stable with albuterol HFA    Hypertension-stable at home with blood pressure readings reported to be less than 140/90 consistently.    Dyslipidemia  Compliance with treatment has been good. The patient exercises intermittently. She is currently being prescribed the following medication for her dyslipidemia - lifestyle modifcation, atorvastatin. Patient denies side effects associated with her medications. Most recent lipids include  Lab Results   Component Value Date    TRIG 172 (H) 09/05/2017    TRIG 203 (H) 10/31/2016    HDL 36 (L) 09/05/2017    HDL 33 (L) 10/31/2016    LDL 60 09/05/2017    LDL 43 10/31/2016    LDL 63 02/25/2015   Not at goal    Diabetes  Current symptoms include none and hld.  Evaluation to date has been: fasting blood sugar. Home sugars: BGs are running  consistent with Hgb A1C. Current treatments: SGLT2 inhibitor - Jardiance and more intensive attention to diet which has been effective.  Most recent hemoglobin A1c   Lab Results   Component Value Date    HGBA1C 6.50 (H) 09/05/2017    HGBA1C 7.00 (H) 10/31/2016    HGBA1C 7.1 (H) 02/25/2015    Stable    Chronic allergic rhinitis-stable with Flonase    Vitamin D deficiency-stable with vitamin D supplementation      The following portions of the patient's history were reviewed and updated as appropriate: allergies, current medications, past family history, past medical history, past social history, past surgical history and problem list.    Review of Systems   Constitutional: Negative for activity change, appetite change and fever.   HENT: Negative for ear pain, sinus pressure and sore throat.    Eyes: Negative for pain and visual disturbance.   Respiratory: Negative for cough and chest tightness.    Cardiovascular:  "Negative for chest pain and palpitations.   Gastrointestinal: Negative for abdominal pain, constipation, diarrhea, nausea and vomiting.   Endocrine: Negative for polydipsia and polyuria.   Genitourinary: Positive for vaginal discharge (and itching). Negative for dysuria and frequency.   Musculoskeletal: Negative for back pain and myalgias.   Skin: Negative for color change and rash.   Allergic/Immunologic: Negative for food allergies and immunocompromised state.   Neurological: Negative for dizziness, syncope and headaches.   Hematological: Negative for adenopathy. Does not bruise/bleed easily.   Psychiatric/Behavioral: Negative for hallucinations and suicidal ideas. The patient is not nervous/anxious.        /80   Pulse 93   Temp 98.5 °F (36.9 °C) (Oral)   Ht 160 cm (62.99\")   Wt 83 kg (183 lb)   SpO2 90%   BMI 32.43 kg/m²       Physical Exam   Constitutional: She is oriented to person, place, and time. She appears well-developed and well-nourished.   HENT:   Head: Normocephalic and atraumatic.   Nose: Nose normal.   Mouth/Throat: Oropharynx is clear and moist.   Eyes: Conjunctivae and EOM are normal. Pupils are equal, round, and reactive to light.   Neck: Normal range of motion. Neck supple. No tracheal deviation present. No thyromegaly present.   Cardiovascular: Normal rate, regular rhythm, normal heart sounds and intact distal pulses.    No murmur heard.  Pulmonary/Chest: Effort normal and breath sounds normal. No respiratory distress. She has no wheezes.   Abdominal: Soft. Bowel sounds are normal. There is no tenderness. There is no guarding.   Musculoskeletal: Normal range of motion. She exhibits no edema or tenderness.   Lymphadenopathy:     She has no cervical adenopathy.   Neurological: She is alert and oriented to person, place, and time.   Skin: Skin is warm and dry. No rash noted.   Psychiatric: She has a normal mood and affect. Her behavior is normal.   Nursing note and vitals " reviewed.      Assessment/Plan     Diagnoses and all orders for this visit:    Vagina, candidiasis  -     fluconazole (DIFLUCAN) 150 MG tablet; Take 1 tablet by mouth Daily.    Chronic bronchitis, unspecified chronic bronchitis type  -     albuterol (PROVENTIL) (2.5 MG/3ML) 0.083% nebulizer solution; Take 2.5 mg by nebulization Every 4 (Four) Hours As Needed for Wheezing.    Essential hypertension  -     amLODIPine-benazepril (LOTREL) 10-40 MG per capsule; Take 1 capsule by mouth Daily.  -     Comprehensive Metabolic Panel; Future    Mixed hyperlipidemia  Comments:  She was advised follow-up cholesterol diet  Orders:  -     atorvastatin (LIPITOR) 10 MG tablet; Take 1 tablet by mouth Every Night.  -     omega-3 acid ethyl esters (LOVAZA) 1 g capsule; Take 2 capsules by mouth 2 (Two) Times a Day.  -     Lipid Panel; Future    Tobacco use disorder  -     buPROPion SR (WELLBUTRIN SR) 150 MG 12 hr tablet; Take 1 tablet by mouth Every 12 (Twelve) Hours.    Aorto-iliac atherosclerosis  -     clopidogrel (PLAVIX) 75 MG tablet; Take 1 tablet by mouth Daily.    DM type 2 with diabetic mixed hyperlipidemia  -     Empagliflozin (JARDIANCE) 25 MG tablet; Take 25 mg by mouth Daily.  -     Hemoglobin A1c; Future  -     MicroAlbumin, Urine, Random - Urine, Clean Catch; Future    Chronic allergic rhinitis due to pollen, unspecified seasonality  -     fluticasone (FLONASE) 50 MCG/ACT nasal spray; 2 sprays into each nostril Daily.    Other seasonal allergic rhinitis  -     loratadine (CLARITIN) 10 MG tablet; Take 1 tablet by mouth Daily.    Vitamin D deficiency  -     vitamin D (ERGOCALCIFEROL) 06392 units capsule capsule; Take 1 capsule by mouth 1 (One) Time Per Week.  -     Vitamin D 25 Hydroxy; Future                 This document has been electronically signed by:  Camryn Mota PA-C

## 2018-06-08 ENCOUNTER — LAB (OUTPATIENT)
Dept: FAMILY MEDICINE CLINIC | Facility: CLINIC | Age: 73
End: 2018-06-08

## 2018-06-08 DIAGNOSIS — E55.9 VITAMIN D DEFICIENCY: ICD-10-CM

## 2018-06-08 DIAGNOSIS — E11.69 DM TYPE 2 WITH DIABETIC MIXED HYPERLIPIDEMIA (HCC): ICD-10-CM

## 2018-06-08 DIAGNOSIS — E78.2 DM TYPE 2 WITH DIABETIC MIXED HYPERLIPIDEMIA (HCC): ICD-10-CM

## 2018-06-08 DIAGNOSIS — I10 ESSENTIAL HYPERTENSION: ICD-10-CM

## 2018-06-08 DIAGNOSIS — E78.2 MIXED HYPERLIPIDEMIA: ICD-10-CM

## 2018-06-08 LAB
25(OH)D3+25(OH)D2 SERPL-MCNC: 60 NG/ML
ALBUMIN SERPL-MCNC: 4.5 G/DL (ref 3.4–4.8)
ALBUMIN/GLOB SERPL: 2 G/DL (ref 1.5–2.5)
ALP SERPL-CCNC: 95 U/L (ref 35–104)
ALT SERPL-CCNC: 25 U/L (ref 10–36)
AST SERPL-CCNC: 24 U/L (ref 10–30)
BILIRUB SERPL-MCNC: 0.5 MG/DL (ref 0.2–1.8)
BUN SERPL-MCNC: 18 MG/DL (ref 7–21)
BUN/CREAT SERPL: 23.1 (ref 7–25)
CALCIUM SERPL-MCNC: 9 MG/DL (ref 7.7–10)
CHLORIDE SERPL-SCNC: 108 MMOL/L (ref 99–112)
CHOLEST SERPL-MCNC: 104 MG/DL (ref 0–200)
CO2 SERPL-SCNC: 24.8 MMOL/L (ref 24.3–31.9)
CREAT SERPL-MCNC: 0.78 MG/DL (ref 0.43–1.29)
GFR SERPLBLD CREATININE-BSD FMLA CKD-EPI: 73 ML/MIN/1.73
GFR SERPLBLD CREATININE-BSD FMLA CKD-EPI: 88 ML/MIN/1.73
GLOBULIN SER CALC-MCNC: 2.3 GM/DL
GLUCOSE SERPL-MCNC: 147 MG/DL (ref 70–110)
HBA1C MFR BLD: 6.5 % (ref 4.5–5.7)
HDLC SERPL-MCNC: 34 MG/DL (ref 60–100)
LDLC SERPL CALC-MCNC: 33 MG/DL (ref 0–100)
POTASSIUM SERPL-SCNC: 4 MMOL/L (ref 3.5–5.3)
PROT SERPL-MCNC: 6.8 G/DL (ref 6–8)
SODIUM SERPL-SCNC: 140 MMOL/L (ref 135–153)
TRIGL SERPL-MCNC: 187 MG/DL (ref 0–150)
VLDLC SERPL CALC-MCNC: 37.4 MG/DL

## 2018-06-15 ENCOUNTER — OFFICE VISIT (OUTPATIENT)
Dept: FAMILY MEDICINE CLINIC | Facility: CLINIC | Age: 73
End: 2018-06-15

## 2018-06-15 VITALS
DIASTOLIC BLOOD PRESSURE: 72 MMHG | HEIGHT: 63 IN | WEIGHT: 181 LBS | BODY MASS INDEX: 32.07 KG/M2 | OXYGEN SATURATION: 97 % | SYSTOLIC BLOOD PRESSURE: 122 MMHG | HEART RATE: 87 BPM | TEMPERATURE: 98.2 F

## 2018-06-15 DIAGNOSIS — E78.2 MIXED HYPERLIPIDEMIA: ICD-10-CM

## 2018-06-15 DIAGNOSIS — E11.69 DM TYPE 2 WITH DIABETIC MIXED HYPERLIPIDEMIA (HCC): Primary | ICD-10-CM

## 2018-06-15 DIAGNOSIS — I70.8 AORTO-ILIAC ATHEROSCLEROSIS (HCC): ICD-10-CM

## 2018-06-15 DIAGNOSIS — J30.2 OTHER SEASONAL ALLERGIC RHINITIS: ICD-10-CM

## 2018-06-15 DIAGNOSIS — E78.2 DM TYPE 2 WITH DIABETIC MIXED HYPERLIPIDEMIA (HCC): Primary | ICD-10-CM

## 2018-06-15 DIAGNOSIS — I10 ESSENTIAL HYPERTENSION: ICD-10-CM

## 2018-06-15 DIAGNOSIS — B37.2 CANDIDIASIS, INTERTRIGINOUS: ICD-10-CM

## 2018-06-15 DIAGNOSIS — E55.9 VITAMIN D DEFICIENCY: ICD-10-CM

## 2018-06-15 DIAGNOSIS — I70.0 AORTO-ILIAC ATHEROSCLEROSIS (HCC): ICD-10-CM

## 2018-06-15 DIAGNOSIS — F17.200 TOBACCO USE DISORDER: ICD-10-CM

## 2018-06-15 DIAGNOSIS — J30.1 CHRONIC ALLERGIC RHINITIS DUE TO POLLEN, UNSPECIFIED SEASONALITY: ICD-10-CM

## 2018-06-15 PROCEDURE — 99214 OFFICE O/P EST MOD 30 MIN: CPT | Performed by: PHYSICIAN ASSISTANT

## 2018-06-15 RX ORDER — LORATADINE 10 MG/1
10 TABLET ORAL DAILY
Qty: 30 TABLET | Refills: 5 | Status: SHIPPED | OUTPATIENT
Start: 2018-06-15 | End: 2018-12-14 | Stop reason: SDUPTHER

## 2018-06-15 RX ORDER — FLUTICASONE PROPIONATE 50 MCG
2 SPRAY, SUSPENSION (ML) NASAL DAILY
Qty: 18.2 ML | Refills: 3 | Status: SHIPPED | OUTPATIENT
Start: 2018-06-15 | End: 2020-03-13 | Stop reason: SDUPTHER

## 2018-06-15 RX ORDER — AMLODIPINE BESYLATE AND BENAZEPRIL HYDROCHLORIDE 10; 40 MG/1; MG/1
1 CAPSULE ORAL DAILY
Qty: 30 CAPSULE | Refills: 5 | Status: SHIPPED | OUTPATIENT
Start: 2018-06-15 | End: 2018-12-14 | Stop reason: SDUPTHER

## 2018-06-15 RX ORDER — ATORVASTATIN CALCIUM 10 MG/1
10 TABLET, FILM COATED ORAL NIGHTLY
Qty: 30 TABLET | Refills: 5 | Status: SHIPPED | OUTPATIENT
Start: 2018-06-15 | End: 2018-12-14 | Stop reason: SDUPTHER

## 2018-06-15 RX ORDER — ERGOCALCIFEROL 1.25 MG/1
50000 CAPSULE ORAL WEEKLY
Qty: 4 CAPSULE | Refills: 5 | Status: SHIPPED | OUTPATIENT
Start: 2018-06-15 | End: 2018-12-14 | Stop reason: SDUPTHER

## 2018-06-15 RX ORDER — BUPROPION HYDROCHLORIDE 150 MG/1
150 TABLET, EXTENDED RELEASE ORAL EVERY 12 HOURS SCHEDULED
Qty: 60 TABLET | Refills: 5 | Status: SHIPPED | OUTPATIENT
Start: 2018-06-15 | End: 2018-12-14 | Stop reason: SDUPTHER

## 2018-06-15 RX ORDER — FLUCONAZOLE 150 MG/1
150 TABLET ORAL DAILY
Qty: 4 TABLET | Refills: 1 | Status: SHIPPED | OUTPATIENT
Start: 2018-06-15 | End: 2018-09-14 | Stop reason: SDUPTHER

## 2018-06-15 RX ORDER — CLOPIDOGREL BISULFATE 75 MG/1
75 TABLET ORAL DAILY
Qty: 30 TABLET | Refills: 5 | Status: SHIPPED | OUTPATIENT
Start: 2018-06-15 | End: 2018-12-14 | Stop reason: SDUPTHER

## 2018-06-15 NOTE — PROGRESS NOTES
"Subjective   Evie Shah is a 73 y.o. female.     Chief complaint-hyperlipidemia    History of Present Illness     Hyperlipidemia-  She is here to follow-up on chronic issues including hyperlipidemia.  Well controlled with Lipitor    Diabetes mellitus type 2-  She reports home blood glucose runs around 150s.  Hemoglobin A1c on 6/8/2018 was 6.5.  Well controlled with Jardiance    Hypertension-stable with Lotrel    Allergic rhinitis-controlled with loratadine    Vitamin D deficiency-stable with vitamin D supplementation    Atherosclerosis-stable with Plavix    Rash-  She complains of rash in groin area that is pruritic.  Onset one week ago.    The following portions of the patient's history were reviewed and updated as appropriate: allergies, current medications, past family history, past medical history, past social history, past surgical history and problem list.    Review of Systems   Constitutional: Negative for activity change, appetite change and fever.   HENT: Negative for ear pain, sinus pressure and sore throat.    Eyes: Negative for pain and visual disturbance.   Respiratory: Negative for cough and chest tightness.    Cardiovascular: Negative for chest pain and palpitations.   Gastrointestinal: Negative for abdominal pain, constipation, diarrhea, nausea and vomiting.   Endocrine: Negative for polydipsia and polyuria.   Genitourinary: Negative for dysuria and frequency.   Musculoskeletal: Negative for back pain and myalgias.   Skin: Positive for rash. Negative for color change.   Allergic/Immunologic: Negative for food allergies and immunocompromised state.   Neurological: Negative for dizziness, syncope and headaches.   Hematological: Negative for adenopathy. Does not bruise/bleed easily.   Psychiatric/Behavioral: Negative for hallucinations and suicidal ideas. The patient is not nervous/anxious.        /72   Pulse 87   Temp 98.2 °F (36.8 °C) (Oral)   Ht 160 cm (62.99\")   Wt 82.1 kg (181 " lb)   SpO2 97%   BMI 32.07 kg/m²   Lab on 06/08/2018   Component Date Value Ref Range Status   • Glucose 06/08/2018 147* 70 - 110 mg/dL Final   • BUN 06/08/2018 18  7 - 21 mg/dL Final   • Creatinine 06/08/2018 0.78  0.43 - 1.29 mg/dL Final   • eGFR Non  Am 06/08/2018 73  >60 mL/min/1.73 Final   • eGFR African Am 06/08/2018 88  >60 mL/min/1.73 Final   • BUN/Creatinine Ratio 06/08/2018 23.1  7.0 - 25.0 Final   • Sodium 06/08/2018 140  135 - 153 mmol/L Final   • Potassium 06/08/2018 4.0  3.5 - 5.3 mmol/L Final   • Chloride 06/08/2018 108  99 - 112 mmol/L Final   • Total CO2 06/08/2018 24.8  24.3 - 31.9 mmol/L Final   • Calcium 06/08/2018 9.0  7.7 - 10.0 mg/dL Final   • Total Protein 06/08/2018 6.8  6.0 - 8.0 g/dL Final   • Albumin 06/08/2018 4.50  3.40 - 4.80 g/dL Final   • Globulin 06/08/2018 2.3  gm/dL Final   • A/G Ratio 06/08/2018 2.0  1.5 - 2.5 g/dL Final   • Total Bilirubin 06/08/2018 0.5  0.2 - 1.8 mg/dL Final   • Alkaline Phosphatase 06/08/2018 95  35 - 104 U/L Final   • AST (SGOT) 06/08/2018 24  10 - 30 U/L Final   • ALT (SGPT) 06/08/2018 25  10 - 36 U/L Final   • Total Cholesterol 06/08/2018 104  0 - 200 mg/dL Final   • Triglycerides 06/08/2018 187* 0 - 150 mg/dL Final   • HDL Cholesterol 06/08/2018 34* 60 - 100 mg/dL Final   • VLDL Cholesterol 06/08/2018 37.4  mg/dL Final   • LDL Cholesterol  06/08/2018 33  0 - 100 mg/dL Final   • Hemoglobin A1C 06/08/2018 6.50* 4.50 - 5.70 % Final   • 25 Hydroxy, Vitamin D 06/08/2018 60.0  ng/ml Final       Physical Exam   Constitutional: She is oriented to person, place, and time. She appears well-developed and well-nourished.   HENT:   Head: Normocephalic and atraumatic.   Nose: Nose normal.   Mouth/Throat: Oropharynx is clear and moist.   Eyes: Conjunctivae and EOM are normal. Pupils are equal, round, and reactive to light.   Neck: Normal range of motion. Neck supple. No tracheal deviation present. No thyromegaly present.   Cardiovascular: Normal rate, regular  rhythm, normal heart sounds and intact distal pulses.    No murmur heard.  Pulmonary/Chest: Effort normal and breath sounds normal. No respiratory distress. She has no wheezes.   Abdominal: Soft. Bowel sounds are normal. There is no tenderness. There is no guarding.   Musculoskeletal: Normal range of motion. She exhibits no edema or tenderness.   Lymphadenopathy:     She has no cervical adenopathy.   Neurological: She is alert and oriented to person, place, and time.   Skin: Skin is warm and dry. Rash noted.   Erythematous moist skin noted in intertriginous coronary   Psychiatric: She has a normal mood and affect. Her behavior is normal.   Nursing note and vitals reviewed.      Assessment/Plan     Diagnoses and all orders for this visit:    DM type 2 with diabetic mixed hyperlipidemia  -     Empagliflozin (JARDIANCE) 25 MG tablet; Take 25 mg by mouth Daily.    Mixed hyperlipidemia  -     atorvastatin (LIPITOR) 10 MG tablet; Take 1 tablet by mouth Every Night.    Essential hypertension  -     amLODIPine-benazepril (LOTREL) 10-40 MG per capsule; Take 1 capsule by mouth Daily.    Other seasonal allergic rhinitis  -     loratadine (CLARITIN) 10 MG tablet; Take 1 tablet by mouth Daily.    Mixed hyperlipidemia  Comments:  She was advised follow-up cholesterol diet  Orders:  -     atorvastatin (LIPITOR) 10 MG tablet; Take 1 tablet by mouth Every Night.    Tobacco use disorder  -     buPROPion SR (WELLBUTRIN SR) 150 MG 12 hr tablet; Take 1 tablet by mouth Every 12 (Twelve) Hours.    Chronic allergic rhinitis due to pollen, unspecified seasonality  -     fluticasone (FLONASE) 50 MCG/ACT nasal spray; 2 sprays into each nostril Daily.    Vitamin D deficiency  -     vitamin D (ERGOCALCIFEROL) 21156 units capsule capsule; Take 1 capsule by mouth 1 (One) Time Per Week.    Aorto-iliac atherosclerosis  -     clopidogrel (PLAVIX) 75 MG tablet; Take 1 tablet by mouth Daily.    Candidiasis, intertriginous  -     fluconazole (DIFLUCAN)  150 MG tablet; Take 1 tablet by mouth Daily.                 This document has been electronically signed by:  Camryn Mota PA-C

## 2018-07-09 ENCOUNTER — OFFICE VISIT (OUTPATIENT)
Dept: FAMILY MEDICINE CLINIC | Facility: CLINIC | Age: 73
End: 2018-07-09

## 2018-07-09 VITALS
SYSTOLIC BLOOD PRESSURE: 150 MMHG | HEART RATE: 99 BPM | TEMPERATURE: 98.2 F | OXYGEN SATURATION: 94 % | BODY MASS INDEX: 32.43 KG/M2 | WEIGHT: 183 LBS | DIASTOLIC BLOOD PRESSURE: 80 MMHG | HEIGHT: 63 IN

## 2018-07-09 DIAGNOSIS — S61.211A LACERATION OF LEFT INDEX FINGER WITHOUT FOREIGN BODY WITHOUT DAMAGE TO NAIL, INITIAL ENCOUNTER: Primary | ICD-10-CM

## 2018-07-09 PROCEDURE — 12001 RPR S/N/AX/GEN/TRNK 2.5CM/<: CPT | Performed by: PHYSICIAN ASSISTANT

## 2018-07-09 PROCEDURE — 90471 IMMUNIZATION ADMIN: CPT | Performed by: PHYSICIAN ASSISTANT

## 2018-07-09 PROCEDURE — 90715 TDAP VACCINE 7 YRS/> IM: CPT | Performed by: PHYSICIAN ASSISTANT

## 2018-07-09 PROCEDURE — 99213 OFFICE O/P EST LOW 20 MIN: CPT | Performed by: PHYSICIAN ASSISTANT

## 2018-07-09 NOTE — PROGRESS NOTES
"Subjective   Evie Shah is a 73 y.o. female.     Chief complaint-finger laceration    History of Present Illness     Finger laceration-  She complains of cutting her left index finger with a sharp knife while cutting meat this afternoon.  Injury occurred approximately 1 hour ago.  She states that she has been holding pressure to minimize bleeding.    The following portions of the patient's history were reviewed and updated as appropriate: allergies, current medications, past family history, past medical history, past social history, past surgical history and problem list.    Review of Systems   Constitutional: Negative for activity change, appetite change and fever.   HENT: Negative for ear pain, sinus pressure and sore throat.    Eyes: Negative for pain and visual disturbance.   Respiratory: Negative for cough and chest tightness.    Cardiovascular: Negative for chest pain and palpitations.   Gastrointestinal: Negative for abdominal pain, constipation, diarrhea, nausea and vomiting.   Endocrine: Negative for polydipsia and polyuria.   Genitourinary: Negative for dysuria and frequency.   Musculoskeletal: Negative for back pain and myalgias.   Skin: Positive for wound. Negative for color change and rash.   Allergic/Immunologic: Negative for food allergies and immunocompromised state.   Neurological: Negative for dizziness, syncope and headaches.   Hematological: Negative for adenopathy. Does not bruise/bleed easily.   Psychiatric/Behavioral: Negative for hallucinations and suicidal ideas. The patient is not nervous/anxious.        /80   Pulse 99   Temp 98.2 °F (36.8 °C) (Oral)   Ht 160 cm (62.99\")   Wt 83 kg (183 lb)   SpO2 94%   BMI 32.43 kg/m²     Physical Exam   Cardiovascular: Normal rate, regular rhythm and normal heart sounds.    No murmur heard.  Pulmonary/Chest: Effort normal and breath sounds normal. She has no wheezes.   Musculoskeletal: Normal range of motion. She exhibits tenderness. "   Left index finger range of motion intact with flexion and extension.  Tenderness is as expected with swelling noted tip of left index finger.  Approximately 1.5 centimeter U-shaped laceration noted that does not involve the fingernail.   Skin: Skin is warm and dry.   Psychiatric: She has a normal mood and affect. Her behavior is normal.   Nursing note and vitals reviewed.      Assessment/Plan     Diagnoses and all orders for this visit:    Laceration of left index finger without foreign body without damage to nail, initial encounter  -     Tdap Vaccine Greater Than or Equal To 8yo IM      Procedure: Suture placement left index finger due to laceration  The procedure risks and benefits were explained to patient she gave verbal consent have the procedure performed.  Indications: Laceration left index finger  Provider: Camryn Mota PA-C  Description: The left hand/finger was prepped and draped in sterile fashion.  A digital nerve block was performed on the left index finger using 2 cc of 1% lidocaine.  6 simple interrupted sutures were placed to close the lacerated area.  Pressure was held and a sterile dressing was placed.  No consultations  Estimated blood loss: Minimal  Patient tolerated the procedure well.    She will return in 10 days for suture removal or sooner if needed.       This document has been electronically signed by:  Camryn Mota PA-C

## 2018-07-19 ENCOUNTER — OFFICE VISIT (OUTPATIENT)
Dept: FAMILY MEDICINE CLINIC | Facility: CLINIC | Age: 73
End: 2018-07-19

## 2018-07-19 VITALS
HEART RATE: 80 BPM | BODY MASS INDEX: 32.43 KG/M2 | SYSTOLIC BLOOD PRESSURE: 134 MMHG | DIASTOLIC BLOOD PRESSURE: 74 MMHG | OXYGEN SATURATION: 92 % | TEMPERATURE: 98.4 F | WEIGHT: 183 LBS | HEIGHT: 63 IN

## 2018-07-19 DIAGNOSIS — S61.211D LACERATION OF LEFT INDEX FINGER WITHOUT FOREIGN BODY WITHOUT DAMAGE TO NAIL, SUBSEQUENT ENCOUNTER: Primary | ICD-10-CM

## 2018-07-19 PROCEDURE — 99024 POSTOP FOLLOW-UP VISIT: CPT | Performed by: PHYSICIAN ASSISTANT

## 2018-07-19 NOTE — PROGRESS NOTES
"Subjective   Evie Shah is a 73 y.o. female.     Chief complaint-finger laceration    History of Present Illness     Finger laceration-  She complains of cutting her left index finger with a sharp knife while cutting meat 10 days ago. She has been healing well after suturing.    The following portions of the patient's history were reviewed and updated as appropriate: allergies, current medications, past family history, past medical history, past social history, past surgical history and problem list.    Review of Systems   Constitutional: Negative for activity change, appetite change and fever.   HENT: Negative for ear pain, sinus pressure and sore throat.    Eyes: Negative for pain and visual disturbance.   Respiratory: Negative for cough and chest tightness.    Cardiovascular: Negative for chest pain and palpitations.   Gastrointestinal: Negative for abdominal pain, constipation, diarrhea, nausea and vomiting.   Endocrine: Negative for polydipsia and polyuria.   Genitourinary: Negative for dysuria and frequency.   Musculoskeletal: Negative for back pain and myalgias.   Skin: Positive for wound. Negative for color change and rash.   Allergic/Immunologic: Negative for food allergies and immunocompromised state.   Neurological: Negative for dizziness, syncope and headaches.   Hematological: Negative for adenopathy. Does not bruise/bleed easily.   Psychiatric/Behavioral: Negative for hallucinations and suicidal ideas. The patient is not nervous/anxious.        /74   Pulse 80   Temp 98.4 °F (36.9 °C) (Oral)   Ht 160 cm (62.99\")   Wt 83 kg (183 lb)   SpO2 92%   BMI 32.43 kg/m²     Physical Exam   Cardiovascular: Normal rate, regular rhythm and normal heart sounds.    No murmur heard.  Pulmonary/Chest: Effort normal and breath sounds normal. She has no wheezes.   Musculoskeletal: Normal range of motion. She exhibits no edema or tenderness.   Left index finger range of motion intact with flexion and " extension.  Approximately 1.5 centimeter U-shaped laceration healing well with sutures intact.  It should be noted that does not involve the fingernail.   Skin: Skin is warm and dry.   Psychiatric: She has a normal mood and affect. Her behavior is normal.   Nursing note and vitals reviewed.      Assessment/Plan     Diagnoses and all orders for this visit:    Laceration of left index finger without foreign body without damage to nail, subsequent encounter        This is her postop visit.  Sutures were removed today.  Patient tolerated suture removal well.    This document has been electronically signed by:  Camryn Mota PA-C

## 2018-08-06 RX ORDER — OMEGA-3-ACID ETHYL ESTERS 1 G/1
CAPSULE, LIQUID FILLED ORAL
Qty: 120 CAPSULE | Refills: 5 | Status: SHIPPED | OUTPATIENT
Start: 2018-08-06 | End: 2018-12-14 | Stop reason: SDUPTHER

## 2018-09-14 ENCOUNTER — OFFICE VISIT (OUTPATIENT)
Dept: FAMILY MEDICINE CLINIC | Facility: CLINIC | Age: 73
End: 2018-09-14

## 2018-09-14 VITALS
DIASTOLIC BLOOD PRESSURE: 80 MMHG | OXYGEN SATURATION: 99 % | SYSTOLIC BLOOD PRESSURE: 148 MMHG | WEIGHT: 184 LBS | BODY MASS INDEX: 32.6 KG/M2 | HEIGHT: 63 IN | HEART RATE: 92 BPM | TEMPERATURE: 97.8 F

## 2018-09-14 DIAGNOSIS — Z12.31 ENCOUNTER FOR SCREENING MAMMOGRAM FOR MALIGNANT NEOPLASM OF BREAST: ICD-10-CM

## 2018-09-14 DIAGNOSIS — I70.90 ATHEROSCLEROSIS: ICD-10-CM

## 2018-09-14 DIAGNOSIS — I10 ESSENTIAL HYPERTENSION: ICD-10-CM

## 2018-09-14 DIAGNOSIS — B37.2 CANDIDIASIS, INTERTRIGINOUS: ICD-10-CM

## 2018-09-14 DIAGNOSIS — I73.9 PAD (PERIPHERAL ARTERY DISEASE) (HCC): Primary | ICD-10-CM

## 2018-09-14 DIAGNOSIS — E11.69 DM TYPE 2 WITH DIABETIC MIXED HYPERLIPIDEMIA (HCC): ICD-10-CM

## 2018-09-14 DIAGNOSIS — E78.2 MIXED HYPERLIPIDEMIA: ICD-10-CM

## 2018-09-14 DIAGNOSIS — E78.2 DM TYPE 2 WITH DIABETIC MIXED HYPERLIPIDEMIA (HCC): ICD-10-CM

## 2018-09-14 PROCEDURE — 99214 OFFICE O/P EST MOD 30 MIN: CPT | Performed by: PHYSICIAN ASSISTANT

## 2018-09-14 PROCEDURE — G0009 ADMIN PNEUMOCOCCAL VACCINE: HCPCS | Performed by: PHYSICIAN ASSISTANT

## 2018-09-14 PROCEDURE — 90732 PPSV23 VACC 2 YRS+ SUBQ/IM: CPT | Performed by: PHYSICIAN ASSISTANT

## 2018-09-14 PROCEDURE — 90686 IIV4 VACC NO PRSV 0.5 ML IM: CPT | Performed by: PHYSICIAN ASSISTANT

## 2018-09-14 PROCEDURE — G0008 ADMIN INFLUENZA VIRUS VAC: HCPCS | Performed by: PHYSICIAN ASSISTANT

## 2018-09-14 RX ORDER — FLUCONAZOLE 150 MG/1
150 TABLET ORAL DAILY
Qty: 4 TABLET | Refills: 1 | Status: SHIPPED | OUTPATIENT
Start: 2018-09-14 | End: 2018-12-14

## 2018-09-14 NOTE — PROGRESS NOTES
Subjective   Evie Shah is a 73 y.o. female.     Chief complaint-peripheral artery disease    History of Present Illness     Peripheral artery disease-  She's here today with complaints of chronic leg pain.  Significantly improved with Plavix.  She states that she had previously canceled her appointment with cardiology but would like to see them now.    2/25/2015 cardiac catheterization with percutaneous angioplasty of the left external iliac artery and stenting of the left external iliac artery    Vaginal itching-  She complains of recurrent vaginal itching and white discharge.  Onset one week ago.    Hypertension-stable with Lotrel.  She reports home blush blood pressure less than 140/90 consistently.    Hyperlipidemia-improved with Lipitor    Diabetes mellitus type 2-well controlled with the use of Jardiance    The following portions of the patient's history were reviewed and updated as appropriate: allergies, current medications, past family history, past medical history, past social history, past surgical history and problem list.    Review of Systems   Constitutional: Negative for activity change, appetite change and fever.   HENT: Negative for ear pain, sinus pressure and sore throat.    Eyes: Negative for pain and visual disturbance.   Respiratory: Negative for cough and chest tightness.    Cardiovascular: Negative for chest pain and palpitations.   Gastrointestinal: Negative for abdominal pain, constipation, diarrhea, nausea and vomiting.   Endocrine: Negative for polydipsia and polyuria.   Genitourinary: Negative for dysuria and frequency.   Musculoskeletal: Negative for back pain and myalgias.        Chronic leg pain   Skin: Negative for color change and rash.   Allergic/Immunologic: Negative for food allergies and immunocompromised state.   Neurological: Negative for dizziness, syncope and headaches.   Hematological: Negative for adenopathy. Does not bruise/bleed easily.  "  Psychiatric/Behavioral: Negative for hallucinations and suicidal ideas. The patient is not nervous/anxious.        /80   Pulse 92   Temp 97.8 °F (36.6 °C) (Tympanic)   Ht 160 cm (62.99\")   Wt 83.5 kg (184 lb)   SpO2 99%   BMI 32.60 kg/m²   Lab on 06/08/2018   Component Date Value Ref Range Status   • Glucose 06/08/2018 147* 70 - 110 mg/dL Final   • BUN 06/08/2018 18  7 - 21 mg/dL Final   • Creatinine 06/08/2018 0.78  0.43 - 1.29 mg/dL Final   • eGFR Non  Am 06/08/2018 73  >60 mL/min/1.73 Final   • eGFR African Am 06/08/2018 88  >60 mL/min/1.73 Final   • BUN/Creatinine Ratio 06/08/2018 23.1  7.0 - 25.0 Final   • Sodium 06/08/2018 140  135 - 153 mmol/L Final   • Potassium 06/08/2018 4.0  3.5 - 5.3 mmol/L Final   • Chloride 06/08/2018 108  99 - 112 mmol/L Final   • Total CO2 06/08/2018 24.8  24.3 - 31.9 mmol/L Final   • Calcium 06/08/2018 9.0  7.7 - 10.0 mg/dL Final   • Total Protein 06/08/2018 6.8  6.0 - 8.0 g/dL Final   • Albumin 06/08/2018 4.50  3.40 - 4.80 g/dL Final   • Globulin 06/08/2018 2.3  gm/dL Final   • A/G Ratio 06/08/2018 2.0  1.5 - 2.5 g/dL Final   • Total Bilirubin 06/08/2018 0.5  0.2 - 1.8 mg/dL Final   • Alkaline Phosphatase 06/08/2018 95  35 - 104 U/L Final   • AST (SGOT) 06/08/2018 24  10 - 30 U/L Final   • ALT (SGPT) 06/08/2018 25  10 - 36 U/L Final   • Total Cholesterol 06/08/2018 104  0 - 200 mg/dL Final   • Triglycerides 06/08/2018 187* 0 - 150 mg/dL Final   • HDL Cholesterol 06/08/2018 34* 60 - 100 mg/dL Final   • VLDL Cholesterol 06/08/2018 37.4  mg/dL Final   • LDL Cholesterol  06/08/2018 33  0 - 100 mg/dL Final   • Hemoglobin A1C 06/08/2018 6.50* 4.50 - 5.70 % Final   • 25 Hydroxy, Vitamin D 06/08/2018 60.0  ng/ml Final       Physical Exam   Constitutional: She is oriented to person, place, and time. She appears well-developed and well-nourished.   HENT:   Head: Normocephalic and atraumatic.   Nose: Nose normal.   Mouth/Throat: Oropharynx is clear and moist.   Eyes: " Pupils are equal, round, and reactive to light. Conjunctivae and EOM are normal.   Neck: Normal range of motion. Neck supple. No tracheal deviation present. No thyromegaly present.   Cardiovascular: Normal rate, regular rhythm, normal heart sounds and intact distal pulses.    No murmur heard.  Pulmonary/Chest: Effort normal and breath sounds normal. No respiratory distress. She has no wheezes.   Abdominal: Soft. Bowel sounds are normal. There is no tenderness. There is no guarding.   Musculoskeletal: Normal range of motion. She exhibits no edema or tenderness.   Lymphadenopathy:     She has no cervical adenopathy.   Neurological: She is alert and oriented to person, place, and time.   Skin: Skin is warm and dry. No rash noted.   Psychiatric: She has a normal mood and affect. Her behavior is normal.   Nursing note and vitals reviewed.      Assessment/Plan     Diagnoses and all orders for this visit:    PAD (peripheral artery disease) (CMS/[facility])  -     Ambulatory Referral to Cardiology    Candidiasis, intertriginous  -     fluconazole (DIFLUCAN) 150 MG tablet; Take 1 tablet by mouth Daily.  -     Mammo Screening Digital Tomosynthesis Bilateral With CAD; Future    Encounter for screening mammogram for malignant neoplasm of breast   -     Mammo Screening Digital Tomosynthesis Bilateral With CAD; Future    Atherosclerosis  -     Ambulatory Referral to Cardiology    Essential hypertension    Mixed hyperlipidemia    DM type 2 with diabetic mixed hyperlipidemia (CMS/[facility])    Other orders  -     Pneumococcal Polysaccharide Vaccine 23-Valent Greater Than or Equal To 3yo Subcutaneous / IM  -     Fluarix/Fluzone/Afluria Quad>6 Months                 This document has been electronically signed by:  Camryn Mota PA-C

## 2018-10-30 ENCOUNTER — OFFICE VISIT (OUTPATIENT)
Dept: CARDIOLOGY | Facility: CLINIC | Age: 73
End: 2018-10-30

## 2018-10-30 VITALS
HEIGHT: 63 IN | DIASTOLIC BLOOD PRESSURE: 74 MMHG | WEIGHT: 186 LBS | BODY MASS INDEX: 32.96 KG/M2 | SYSTOLIC BLOOD PRESSURE: 130 MMHG | OXYGEN SATURATION: 92 % | HEART RATE: 81 BPM

## 2018-10-30 DIAGNOSIS — I70.90 ATHEROSCLEROSIS: ICD-10-CM

## 2018-10-30 DIAGNOSIS — I10 ESSENTIAL HYPERTENSION: ICD-10-CM

## 2018-10-30 DIAGNOSIS — E78.2 MIXED HYPERLIPIDEMIA: ICD-10-CM

## 2018-10-30 DIAGNOSIS — F17.200 TOBACCO USE DISORDER: ICD-10-CM

## 2018-10-30 DIAGNOSIS — I73.9 PAD (PERIPHERAL ARTERY DISEASE) (HCC): Primary | ICD-10-CM

## 2018-10-30 PROCEDURE — 99213 OFFICE O/P EST LOW 20 MIN: CPT | Performed by: INTERNAL MEDICINE

## 2018-10-30 NOTE — PROGRESS NOTES
Encounter Date:10/30/2018      Patient ID: Evie Shah is a 73 y.o. female.    Chief Complaint: Peripheral Vascular Disease      PROBLEM LIST:  1. Peripheral arterial disease:   a. Class III left lower extremity  claudication.  b. Arterial duplex, 10/27/2014, showed moderate-to-severe stenosis of the left lower extremity.  c. Peripheral angiograms by Dr. Bernard, 02/25/2015, showed 90% stenosis of the left external iliac artery treated with 7.0 x 39 mm stent. Otherwise,  non-obstructive plaque.   2. Hypertension.   3. Dyslipidemia.   4.  Type 2 diabetes.   5. Obesity with BMI 35.   6. History of tobacco abuse with cessation.  7. Osteoporosis.   8. Vertigo.   9. Surgical history:  a.  Tonsillectomy and adenoidectomy.  1. Skin cancer excision from nose.     History of Present Illness  Patient presents today for follow-up with a history of PAD and cardiac risk factors.  She comes in today with no complaint of exertional chest pain or dyspnea, and orthopnea PND no claudication.  She does state she has some tingling in the left toes last night which Her awake.  She has no exertional lower extremity pain.  Her blood pressure generally runs 120-130 systolic.  Her cholesterol was checked this past June and shows an essentially 1-1 HDL to LDL ratio.  She states she frequently holds her statin for Diflucan dosing.    No Known Allergies      Current Outpatient Prescriptions:   •  albuterol (PROVENTIL) (2.5 MG/3ML) 0.083% nebulizer solution, Take 2.5 mg by nebulization Every 4 (Four) Hours As Needed for Wheezing., Disp: 270 mL, Rfl: 5  •  amLODIPine-benazepril (LOTREL) 10-40 MG per capsule, Take 1 capsule by mouth Daily., Disp: 30 capsule, Rfl: 5  •  aspirin 81 MG EC tablet, Take 81 mg by mouth Daily., Disp: , Rfl:   •  atorvastatin (LIPITOR) 10 MG tablet, Take 1 tablet by mouth Every Night., Disp: 30 tablet, Rfl: 5  •  buPROPion SR (WELLBUTRIN SR) 150 MG 12 hr tablet, Take 1 tablet by mouth Every 12 (Twelve)  Hours. (Patient taking differently: Take 150 mg by mouth Daily.), Disp: 60 tablet, Rfl: 5  •  cholecalciferol (VITAMIN D3) 1000 units tablet, Take 1,000 Units by mouth Daily., Disp: , Rfl:   •  clopidogrel (PLAVIX) 75 MG tablet, Take 1 tablet by mouth Daily., Disp: 30 tablet, Rfl: 5  •  Empagliflozin (JARDIANCE) 25 MG tablet, Take 25 mg by mouth Daily., Disp: 30 tablet, Rfl: 5  •  fluconazole (DIFLUCAN) 150 MG tablet, Take 1 tablet by mouth Daily. (Patient taking differently: Take 150 mg by mouth Daily As Needed.), Disp: 4 tablet, Rfl: 1  •  fluticasone (FLONASE) 50 MCG/ACT nasal spray, 2 sprays into each nostril Daily., Disp: 18.2 mL, Rfl: 3  •  loratadine (CLARITIN) 10 MG tablet, Take 1 tablet by mouth Daily., Disp: 30 tablet, Rfl: 5  •  omega-3 acid ethyl esters (LOVAZA) 1 g capsule, TAKE TWO CAPSULES BY MOUTH TWICE DAILY, Disp: 120 capsule, Rfl: 5  •  ONE TOUCH ULTRA TEST test strip, USE ONE STRIP TO CHECK GLUCOSE ONCE DAILY, Disp: 100 each, Rfl: 5  •  ONETOUCH DELICA LANCETS 33G misc, USE A NEW LANCET TO CHECK GLUCOSE ONCE DAILY, Disp: 60 each, Rfl: 5  •  vitamin D (ERGOCALCIFEROL) 16233 units capsule capsule, Take 1 capsule by mouth 1 (One) Time Per Week., Disp: 4 capsule, Rfl: 5    The following portions of the patient's history were reviewed and updated as appropriate: allergies, current medications, past family history, past medical history, past social history, past surgical history and problem list.    ROS  Review of Systems   Constitution: Negative for chills, fever, weight gain and weight loss.   Cardiovascular: Negative for chest pain, claudication, dyspnea on exertion, leg swelling, orthopnea, palpitations, paroxysmal nocturnal dyspnea and syncope.        No dizziness   Gastrointestinal: Negative for abdominal pain, constipation, diarrhea, nausea and vomiting.   Genitourinary:        No urinary symptoms   Neurological:        No symptoms of stroke.   All other systems reviewed and are  "negative.    Objective:     Blood pressure 130/74, pulse 81, height 160 cm (63\"), weight 84.4 kg (186 lb), SpO2 92 %.        Physical Exam  Constitutional: She appears well-developed and well-nourished.   HENT:   HEENT exam unremarkable.   Neck: Neck supple. No JVD present.   No carotid bruits.   Cardiovascular: Normal rate, regular rhythm and normal heart sounds.    No murmur heard.  2 plus symmetric pulses.   Pulmonary/Chest: Breath sounds normal. Does not exhibit tenderness.   Abdominal:   Abdomen benign.   Musculoskeletal: Does not exhibit edema.   Neurological:   Neurological exam unremarkable.   Vitals reviewed.    Lab Review:       Triglycerides 0 - 150 mg/dL 187   172   203      HDL Cholesterol 60 - 100 mg/dL 34   36   33      VLDL Cholesterol mg/dL 37.4  34.4  40.6     LDL Cholesterol  0 - 100 mg/dL 33  60  43    Resulting Agency  [facility]           Procedures       Assessment:      Diagnosis Plan   1. PAD (peripheral artery disease) (CMS/HCC)  No current claudication    2.     3. Essential hypertension  Well controlled current medical regimen    4. Mixed hyperlipidemia  Excellent response to current therapy      Plan:   Stable from cardiovascular standpoint.  Continue current medications.   FU in 12MO, sooner as needed.  Thank you for allowing us to participate in the care of your patient.       Scribed for Rashad Bernard MD by Frank Parker PA-C. 10/30/2018  5:09 PM     IRashad MD, personally performed the services described in this documentation as scribed by the above named individual in my presence, and it is both accurate and complete.  10/30/2018  5:09 PM        Please note that portions of this note may have been completed with a voice recognition program. Efforts were made to edit the dictations, but occasionally words are mistranscribed.      "

## 2018-11-05 ENCOUNTER — HOSPITAL ENCOUNTER (OUTPATIENT)
Dept: MAMMOGRAPHY | Facility: HOSPITAL | Age: 73
Discharge: HOME | End: 2018-11-05
Admitting: PHYSICIAN ASSISTANT

## 2018-11-05 DIAGNOSIS — B37.2 CANDIDIASIS, INTERTRIGINOUS: ICD-10-CM

## 2018-11-05 DIAGNOSIS — Z12.31 ENCOUNTER FOR SCREENING MAMMOGRAM FOR MALIGNANT NEOPLASM OF BREAST: ICD-10-CM

## 2018-11-05 PROCEDURE — 77067 SCR MAMMO BI INCL CAD: CPT

## 2018-11-05 PROCEDURE — 77063 BREAST TOMOSYNTHESIS BI: CPT

## 2018-11-05 PROCEDURE — 77067 SCR MAMMO BI INCL CAD: CPT | Performed by: RADIOLOGY

## 2018-11-05 PROCEDURE — 77063 BREAST TOMOSYNTHESIS BI: CPT | Performed by: RADIOLOGY

## 2018-11-09 ENCOUNTER — TELEPHONE (OUTPATIENT)
Dept: FAMILY MEDICINE CLINIC | Facility: CLINIC | Age: 73
End: 2018-11-09

## 2018-11-09 NOTE — TELEPHONE ENCOUNTER
I called sophie informed her mammogram was normal      ----- Message from RUBI Michael sent at 11/8/2018  2:38 PM EST -----  Please inform the patient that her mammogram was benign

## 2018-11-09 NOTE — TELEPHONE ENCOUNTER
-I called Evie informed her mammogram was nor                ---- Message from RUBI Michael sent at 11/8/2018  2:38 PM EST -----  Please inform the patient that her mammogram was benign

## 2018-12-14 ENCOUNTER — OFFICE VISIT (OUTPATIENT)
Dept: FAMILY MEDICINE CLINIC | Facility: CLINIC | Age: 73
End: 2018-12-14

## 2018-12-14 VITALS
HEIGHT: 63 IN | TEMPERATURE: 98.4 F | WEIGHT: 186 LBS | BODY MASS INDEX: 32.96 KG/M2 | OXYGEN SATURATION: 97 % | SYSTOLIC BLOOD PRESSURE: 140 MMHG | HEART RATE: 90 BPM | DIASTOLIC BLOOD PRESSURE: 70 MMHG

## 2018-12-14 DIAGNOSIS — E11.69 DM TYPE 2 WITH DIABETIC MIXED HYPERLIPIDEMIA (HCC): Primary | ICD-10-CM

## 2018-12-14 DIAGNOSIS — F17.200 TOBACCO USE DISORDER: ICD-10-CM

## 2018-12-14 DIAGNOSIS — J30.2 OTHER SEASONAL ALLERGIC RHINITIS: ICD-10-CM

## 2018-12-14 DIAGNOSIS — E55.9 VITAMIN D DEFICIENCY: ICD-10-CM

## 2018-12-14 DIAGNOSIS — E78.2 DM TYPE 2 WITH DIABETIC MIXED HYPERLIPIDEMIA (HCC): Primary | ICD-10-CM

## 2018-12-14 DIAGNOSIS — I70.0 AORTO-ILIAC ATHEROSCLEROSIS (HCC): ICD-10-CM

## 2018-12-14 DIAGNOSIS — E78.2 MIXED HYPERLIPIDEMIA: ICD-10-CM

## 2018-12-14 DIAGNOSIS — I70.8 AORTO-ILIAC ATHEROSCLEROSIS (HCC): ICD-10-CM

## 2018-12-14 DIAGNOSIS — I10 ESSENTIAL HYPERTENSION: ICD-10-CM

## 2018-12-14 PROCEDURE — 99214 OFFICE O/P EST MOD 30 MIN: CPT | Performed by: PHYSICIAN ASSISTANT

## 2018-12-14 RX ORDER — ATORVASTATIN CALCIUM 10 MG/1
10 TABLET, FILM COATED ORAL NIGHTLY
Qty: 30 TABLET | Refills: 5 | Status: SHIPPED | OUTPATIENT
Start: 2018-12-14 | End: 2019-06-14 | Stop reason: SDUPTHER

## 2018-12-14 RX ORDER — ASPIRIN 81 MG/1
81 TABLET ORAL DAILY
Qty: 30 TABLET | Refills: 5 | Status: SHIPPED | OUTPATIENT
Start: 2018-12-14 | End: 2020-09-28 | Stop reason: SDUPTHER

## 2018-12-14 RX ORDER — LORATADINE 10 MG/1
10 TABLET ORAL DAILY
Qty: 30 TABLET | Refills: 5 | Status: SHIPPED | OUTPATIENT
Start: 2018-12-14 | End: 2019-06-14 | Stop reason: SDUPTHER

## 2018-12-14 RX ORDER — ERGOCALCIFEROL 1.25 MG/1
50000 CAPSULE ORAL WEEKLY
Qty: 4 CAPSULE | Refills: 5 | Status: SHIPPED | OUTPATIENT
Start: 2018-12-14 | End: 2019-06-14 | Stop reason: SDUPTHER

## 2018-12-14 RX ORDER — BUPROPION HYDROCHLORIDE 150 MG/1
150 TABLET, EXTENDED RELEASE ORAL EVERY 12 HOURS SCHEDULED
Qty: 60 TABLET | Refills: 5 | Status: SHIPPED | OUTPATIENT
Start: 2018-12-14 | End: 2019-06-14 | Stop reason: SDUPTHER

## 2018-12-14 RX ORDER — OMEGA-3-ACID ETHYL ESTERS 1 G/1
2 CAPSULE, LIQUID FILLED ORAL 2 TIMES DAILY
Qty: 120 CAPSULE | Refills: 5 | Status: SHIPPED | OUTPATIENT
Start: 2018-12-14 | End: 2019-06-14 | Stop reason: SDUPTHER

## 2018-12-14 RX ORDER — MELATONIN
1000 DAILY
Qty: 30 TABLET | Refills: 5 | Status: SHIPPED | OUTPATIENT
Start: 2018-12-14 | End: 2019-06-14 | Stop reason: SDUPTHER

## 2018-12-14 RX ORDER — AMLODIPINE BESYLATE AND BENAZEPRIL HYDROCHLORIDE 10; 40 MG/1; MG/1
1 CAPSULE ORAL DAILY
Qty: 30 CAPSULE | Refills: 5 | Status: SHIPPED | OUTPATIENT
Start: 2018-12-14 | End: 2019-03-14 | Stop reason: SDUPTHER

## 2018-12-14 RX ORDER — CLOPIDOGREL BISULFATE 75 MG/1
75 TABLET ORAL DAILY
Qty: 30 TABLET | Refills: 5 | Status: SHIPPED | OUTPATIENT
Start: 2018-12-14 | End: 2019-03-14 | Stop reason: SDUPTHER

## 2018-12-17 NOTE — PROGRESS NOTES
"Subjective   Evie Shah is a 73 y.o. female.       Chief Complaint -  diabetes    History of Present Illness -      Diabetes-  Stable with jardiance and diet  Lab Results   Component Value Date    HGBA1C 6.50 (H) 06/08/2018     Hypertension- stable with lotrel    Hyperlipidemia- improved with lipitor and lovaza    Atherosclerosis of aorta- stable with plavix    Allergic rhinitis- stable with claritin    Vit d def- stable with vit d supplementation      The following portions of the patient's history were reviewed and updated as appropriate: allergies, current medications, past family history, past medical history, past social history, past surgical history and problem list.    Review of Systems   Constitutional: Negative for activity change, appetite change and fever.   HENT: Negative for ear pain, sinus pressure and sore throat.    Eyes: Negative for pain and visual disturbance.   Respiratory: Negative for cough and chest tightness.    Cardiovascular: Negative for chest pain and palpitations.   Gastrointestinal: Negative for abdominal pain, constipation, diarrhea, nausea and vomiting.   Endocrine: Negative for polydipsia and polyuria.   Genitourinary: Negative for dysuria and frequency.   Musculoskeletal: Negative for back pain and myalgias.   Skin: Negative for color change and rash.   Allergic/Immunologic: Negative for food allergies and immunocompromised state.   Neurological: Negative for dizziness, syncope and headaches.   Hematological: Negative for adenopathy. Does not bruise/bleed easily.   Psychiatric/Behavioral: Negative for hallucinations and suicidal ideas. The patient is not nervous/anxious.        /70   Pulse 90   Temp 98.4 °F (36.9 °C) (Oral)   Ht 160 cm (62.99\")   Wt 84.4 kg (186 lb)   SpO2 97%   BMI 32.96 kg/m²   Lab on 06/08/2018   Component Date Value Ref Range Status   • Glucose 06/08/2018 147* 70 - 110 mg/dL Final   • BUN 06/08/2018 18  7 - 21 mg/dL Final   • Creatinine " 06/08/2018 0.78  0.43 - 1.29 mg/dL Final   • eGFR Non  Am 06/08/2018 73  >60 mL/min/1.73 Final   • eGFR African Am 06/08/2018 88  >60 mL/min/1.73 Final   • BUN/Creatinine Ratio 06/08/2018 23.1  7.0 - 25.0 Final   • Sodium 06/08/2018 140  135 - 153 mmol/L Final   • Potassium 06/08/2018 4.0  3.5 - 5.3 mmol/L Final   • Chloride 06/08/2018 108  99 - 112 mmol/L Final   • Total CO2 06/08/2018 24.8  24.3 - 31.9 mmol/L Final   • Calcium 06/08/2018 9.0  7.7 - 10.0 mg/dL Final   • Total Protein 06/08/2018 6.8  6.0 - 8.0 g/dL Final   • Albumin 06/08/2018 4.50  3.40 - 4.80 g/dL Final   • Globulin 06/08/2018 2.3  gm/dL Final   • A/G Ratio 06/08/2018 2.0  1.5 - 2.5 g/dL Final   • Total Bilirubin 06/08/2018 0.5  0.2 - 1.8 mg/dL Final   • Alkaline Phosphatase 06/08/2018 95  35 - 104 U/L Final   • AST (SGOT) 06/08/2018 24  10 - 30 U/L Final   • ALT (SGPT) 06/08/2018 25  10 - 36 U/L Final   • Total Cholesterol 06/08/2018 104  0 - 200 mg/dL Final   • Triglycerides 06/08/2018 187* 0 - 150 mg/dL Final   • HDL Cholesterol 06/08/2018 34* 60 - 100 mg/dL Final   • VLDL Cholesterol 06/08/2018 37.4  mg/dL Final   • LDL Cholesterol  06/08/2018 33  0 - 100 mg/dL Final   • Hemoglobin A1C 06/08/2018 6.50* 4.50 - 5.70 % Final   • 25 Hydroxy, Vitamin D 06/08/2018 60.0  ng/ml Final       Physical Exam   Constitutional: She is oriented to person, place, and time. She appears well-developed and well-nourished.   HENT:   Head: Normocephalic and atraumatic.   Nose: Nose normal.   Mouth/Throat: Oropharynx is clear and moist.   Eyes: Conjunctivae and EOM are normal. Pupils are equal, round, and reactive to light.   Neck: Normal range of motion. Neck supple. No tracheal deviation present. No thyromegaly present.   Cardiovascular: Normal rate, regular rhythm, normal heart sounds and intact distal pulses.   No murmur heard.  Pulmonary/Chest: Effort normal and breath sounds normal. No respiratory distress. She has no wheezes.   Abdominal: Soft. Bowel  sounds are normal. There is no tenderness. There is no guarding.   Musculoskeletal: Normal range of motion. She exhibits no edema or tenderness.   Lymphadenopathy:     She has no cervical adenopathy.   Neurological: She is alert and oriented to person, place, and time.   Skin: Skin is warm and dry. No rash noted.   Psychiatric: She has a normal mood and affect. Her behavior is normal.   Nursing note and vitals reviewed.      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  -     Hemoglobin A1c; Future  -     MicroAlbumin, Urine, Random - Urine, Clean Catch; Future    Essential hypertension  -     amLODIPine-benazepril (LOTREL) 10-40 MG per capsule; Take 1 capsule by mouth Daily.  -     Comprehensive Metabolic Panel; Future    Mixed hyperlipidemia  Comments:  She was advised follow-up cholesterol diet  Orders:  -     atorvastatin (LIPITOR) 10 MG tablet; Take 1 tablet by mouth Every Night.  -     Lipid Panel; Future    Tobacco use disorder  -     buPROPion SR (WELLBUTRIN SR) 150 MG 12 hr tablet; Take 1 tablet by mouth Every 12 (Twelve) Hours.    Aorto-iliac atherosclerosis (CMS/HCC)  -     clopidogrel (PLAVIX) 75 MG tablet; Take 1 tablet by mouth Daily.    Other seasonal allergic rhinitis  -     loratadine (CLARITIN) 10 MG tablet; Take 1 tablet by mouth Daily.    Vitamin D deficiency  -     vitamin D (ERGOCALCIFEROL) 42939 units capsule capsule; Take 1 capsule by mouth 1 (One) Time Per Week.    Other orders  -     aspirin 81 MG EC tablet; Take 1 tablet by mouth Daily.  -     cholecalciferol (VITAMIN D3) 1000 units tablet; Take 1 tablet by mouth Daily.  -     omega-3 acid ethyl esters (LOVAZA) 1 g capsule; Take 2 capsules by mouth 2 (Two) Times a Day.                 This document has been electronically signed by:  Camryn Mota PA-C

## 2018-12-20 DIAGNOSIS — B37.2 CANDIDIASIS, INTERTRIGINOUS: ICD-10-CM

## 2018-12-20 RX ORDER — FLUCONAZOLE 150 MG/1
TABLET ORAL
Qty: 4 TABLET | Refills: 1 | Status: SHIPPED | OUTPATIENT
Start: 2018-12-20 | End: 2019-03-14 | Stop reason: SDUPTHER

## 2019-03-10 NOTE — TELEPHONE ENCOUNTER
refill  
Airway patent, Nasal mucosa clear. Mouth- +mucous membranes dry. Throat has no vesicles, no oropharyngeal exudates and uvula is midline. +oropharynx with multiple teeth missing, pt states is chronic

## 2019-03-11 ENCOUNTER — LAB (OUTPATIENT)
Dept: FAMILY MEDICINE CLINIC | Facility: CLINIC | Age: 74
End: 2019-03-11

## 2019-03-11 DIAGNOSIS — E11.69 DM TYPE 2 WITH DIABETIC MIXED HYPERLIPIDEMIA (HCC): ICD-10-CM

## 2019-03-11 DIAGNOSIS — E55.9 VITAMIN D DEFICIENCY: ICD-10-CM

## 2019-03-11 DIAGNOSIS — I10 ESSENTIAL HYPERTENSION: ICD-10-CM

## 2019-03-11 DIAGNOSIS — J30.1 ALLERGIC RHINITIS DUE TO POLLEN, UNSPECIFIED SEASONALITY: ICD-10-CM

## 2019-03-11 DIAGNOSIS — E78.2 MIXED HYPERLIPIDEMIA: ICD-10-CM

## 2019-03-11 DIAGNOSIS — E78.2 DM TYPE 2 WITH DIABETIC MIXED HYPERLIPIDEMIA (HCC): ICD-10-CM

## 2019-03-11 DIAGNOSIS — I70.0 AORTO-ILIAC ATHEROSCLEROSIS (HCC): Primary | ICD-10-CM

## 2019-03-11 DIAGNOSIS — E53.9 VITAMIN B DEFICIENCY: ICD-10-CM

## 2019-03-11 DIAGNOSIS — I73.9 PAD (PERIPHERAL ARTERY DISEASE) (HCC): ICD-10-CM

## 2019-03-11 DIAGNOSIS — I70.90 ATHEROSCLEROSIS: ICD-10-CM

## 2019-03-11 DIAGNOSIS — I70.8 AORTO-ILIAC ATHEROSCLEROSIS (HCC): Primary | ICD-10-CM

## 2019-03-11 PROCEDURE — 36415 COLL VENOUS BLD VENIPUNCTURE: CPT | Performed by: PHYSICIAN ASSISTANT

## 2019-03-12 LAB
25(OH)D3+25(OH)D2 SERPL-MCNC: 29.4 NG/ML (ref 30–100)
ALBUMIN SERPL-MCNC: 4.5 G/DL (ref 3.5–5.2)
ALBUMIN/GLOB SERPL: 1.9 G/DL
ALP SERPL-CCNC: 110 U/L (ref 39–117)
ALT SERPL-CCNC: 21 U/L (ref 1–33)
AST SERPL-CCNC: 14 U/L (ref 1–32)
BASOPHILS # BLD AUTO: 0.06 10*3/MM3 (ref 0–0.2)
BASOPHILS NFR BLD AUTO: 0.6 % (ref 0–1.5)
BILIRUB SERPL-MCNC: 0.4 MG/DL (ref 0.1–1.2)
BUN SERPL-MCNC: 28 MG/DL (ref 8–23)
BUN/CREAT SERPL: 30.8 (ref 7–25)
CALCIUM SERPL-MCNC: 9.7 MG/DL (ref 8.6–10.5)
CHLORIDE SERPL-SCNC: 103 MMOL/L (ref 98–107)
CHOLEST SERPL-MCNC: 144 MG/DL (ref 0–200)
CO2 SERPL-SCNC: 23.8 MMOL/L (ref 22–29)
CREAT SERPL-MCNC: 0.91 MG/DL (ref 0.57–1)
EOSINOPHIL # BLD AUTO: 0.25 10*3/MM3 (ref 0–0.4)
EOSINOPHIL NFR BLD AUTO: 2.7 % (ref 0.3–6.2)
ERYTHROCYTE [DISTWIDTH] IN BLOOD BY AUTOMATED COUNT: 13.3 % (ref 12.3–15.4)
GLOBULIN SER CALC-MCNC: 2.4 GM/DL
GLUCOSE SERPL-MCNC: 160 MG/DL (ref 65–99)
HBA1C MFR BLD: 6.7 % (ref 4.8–5.6)
HCT VFR BLD AUTO: 52 % (ref 34–46.6)
HDLC SERPL-MCNC: 41 MG/DL (ref 40–60)
HGB BLD-MCNC: 16.7 G/DL (ref 12–15.9)
IMM GRANULOCYTES # BLD AUTO: 0.03 10*3/MM3 (ref 0–0.05)
IMM GRANULOCYTES NFR BLD AUTO: 0.3 % (ref 0–0.5)
LDLC SERPL CALC-MCNC: 41 MG/DL (ref 0–100)
LYMPHOCYTES # BLD AUTO: 2.76 10*3/MM3 (ref 0.7–3.1)
LYMPHOCYTES NFR BLD AUTO: 29.7 % (ref 19.6–45.3)
MCH RBC QN AUTO: 30 PG (ref 26.6–33)
MCHC RBC AUTO-ENTMCNC: 32.1 G/DL (ref 31.5–35.7)
MCV RBC AUTO: 93.5 FL (ref 79–97)
MONOCYTES # BLD AUTO: 0.75 10*3/MM3 (ref 0.1–0.9)
MONOCYTES NFR BLD AUTO: 8.1 % (ref 5–12)
NEUTROPHILS # BLD AUTO: 5.43 10*3/MM3 (ref 1.4–7)
NEUTROPHILS NFR BLD AUTO: 58.6 % (ref 42.7–76)
NRBC BLD AUTO-RTO: 0 /100 WBC (ref 0–0)
PLATELET # BLD AUTO: 208 10*3/MM3 (ref 140–450)
POTASSIUM SERPL-SCNC: 4.3 MMOL/L (ref 3.5–5.2)
PROT SERPL-MCNC: 6.9 G/DL (ref 6–8.5)
RBC # BLD AUTO: 5.56 10*6/MM3 (ref 3.77–5.28)
SODIUM SERPL-SCNC: 141 MMOL/L (ref 136–145)
TRIGL SERPL-MCNC: 311 MG/DL (ref 0–150)
TSH SERPL DL<=0.005 MIU/L-ACNC: 3.08 UIU/ML (ref 0.45–4.5)
VIT B12 SERPL-MCNC: 168 PG/ML (ref 211–946)
VLDLC SERPL CALC-MCNC: 62.2 MG/DL (ref 5–40)
WBC # BLD AUTO: 9.28 10*3/MM3 (ref 3.4–10.8)

## 2019-03-14 ENCOUNTER — TELEPHONE (OUTPATIENT)
Dept: FAMILY MEDICINE CLINIC | Facility: CLINIC | Age: 74
End: 2019-03-14

## 2019-03-14 ENCOUNTER — OFFICE VISIT (OUTPATIENT)
Dept: FAMILY MEDICINE CLINIC | Facility: CLINIC | Age: 74
End: 2019-03-14

## 2019-03-14 VITALS
OXYGEN SATURATION: 92 % | SYSTOLIC BLOOD PRESSURE: 150 MMHG | HEART RATE: 91 BPM | DIASTOLIC BLOOD PRESSURE: 88 MMHG | HEIGHT: 63 IN | BODY MASS INDEX: 33.66 KG/M2 | WEIGHT: 190 LBS | TEMPERATURE: 98.3 F

## 2019-03-14 DIAGNOSIS — J30.1 ALLERGIC RHINITIS DUE TO POLLEN, UNSPECIFIED SEASONALITY: ICD-10-CM

## 2019-03-14 DIAGNOSIS — M81.0 SENILE OSTEOPOROSIS: ICD-10-CM

## 2019-03-14 DIAGNOSIS — F17.200 TOBACCO USE DISORDER: ICD-10-CM

## 2019-03-14 DIAGNOSIS — E66.09 CLASS 1 OBESITY DUE TO EXCESS CALORIES WITH SERIOUS COMORBIDITY AND BODY MASS INDEX (BMI) OF 33.0 TO 33.9 IN ADULT: ICD-10-CM

## 2019-03-14 DIAGNOSIS — B37.2 CANDIDIASIS, INTERTRIGINOUS: ICD-10-CM

## 2019-03-14 DIAGNOSIS — I10 ESSENTIAL HYPERTENSION: ICD-10-CM

## 2019-03-14 DIAGNOSIS — E78.2 DM TYPE 2 WITH DIABETIC MIXED HYPERLIPIDEMIA (HCC): ICD-10-CM

## 2019-03-14 DIAGNOSIS — E78.2 MIXED HYPERLIPIDEMIA: ICD-10-CM

## 2019-03-14 DIAGNOSIS — E11.69 DM TYPE 2 WITH DIABETIC MIXED HYPERLIPIDEMIA (HCC): ICD-10-CM

## 2019-03-14 DIAGNOSIS — I70.0 AORTO-ILIAC ATHEROSCLEROSIS (HCC): ICD-10-CM

## 2019-03-14 DIAGNOSIS — Z00.00 MEDICARE ANNUAL WELLNESS VISIT, SUBSEQUENT: Primary | ICD-10-CM

## 2019-03-14 DIAGNOSIS — I70.8 AORTO-ILIAC ATHEROSCLEROSIS (HCC): ICD-10-CM

## 2019-03-14 DIAGNOSIS — I73.9 PAD (PERIPHERAL ARTERY DISEASE) (HCC): ICD-10-CM

## 2019-03-14 PROCEDURE — G0439 PPPS, SUBSEQ VISIT: HCPCS | Performed by: PHYSICIAN ASSISTANT

## 2019-03-14 PROCEDURE — 96160 PT-FOCUSED HLTH RISK ASSMT: CPT | Performed by: PHYSICIAN ASSISTANT

## 2019-03-14 RX ORDER — FLUCONAZOLE 150 MG/1
150 TABLET ORAL DAILY
Qty: 10 TABLET | Refills: 1 | Status: SHIPPED | OUTPATIENT
Start: 2019-03-14 | End: 2019-06-14

## 2019-03-14 RX ORDER — CLOPIDOGREL BISULFATE 75 MG/1
75 TABLET ORAL DAILY
Qty: 90 TABLET | Refills: 1 | Status: SHIPPED | OUTPATIENT
Start: 2019-03-14 | End: 2019-09-13 | Stop reason: SDUPTHER

## 2019-03-14 RX ORDER — AMLODIPINE BESYLATE AND BENAZEPRIL HYDROCHLORIDE 10; 40 MG/1; MG/1
1 CAPSULE ORAL DAILY
Qty: 90 CAPSULE | Refills: 1 | Status: SHIPPED | OUTPATIENT
Start: 2019-03-14 | End: 2019-09-13 | Stop reason: SDUPTHER

## 2019-03-14 NOTE — PATIENT INSTRUCTIONS
"Carbohydrate Counting for Diabetes Mellitus, Adult  Carbohydrate counting is a method of keeping track of how many carbohydrates you eat. Eating carbohydrates naturally increases the amount of sugar (glucose) in the blood. Counting how many carbohydrates you eat helps keep your blood glucose within normal limits, which helps you manage your diabetes (diabetes mellitus).  It is important to know how many carbohydrates you can safely have in each meal. This is different for every person. A diet and nutrition specialist (registered dietitian) can help you make a meal plan and calculate how many carbohydrates you should have at each meal and snack.  Carbohydrates are found in the following foods:  · Grains, such as breads and cereals.  · Dried beans and soy products.  · Starchy vegetables, such as potatoes, peas, and corn.  · Fruit and fruit juices.  · Milk and yogurt.  · Sweets and snack foods, such as cake, cookies, candy, chips, and soft drinks.    How do I count carbohydrates?  There are two ways to count carbohydrates in food. You can use either of the methods or a combination of both.  Reading \"Nutrition Facts\" on packaged food  The \"Nutrition Facts\" list is included on the labels of almost all packaged foods and beverages in the [place] It includes:  · The serving size.  · Information about nutrients in each serving, including the grams (g) of carbohydrate per serving.    To use the “Nutrition Facts\":  · Decide how many servings you will have.  · Multiply the number of servings by the number of carbohydrates per serving.  · The resulting number is the total amount of carbohydrates that you will be having.    Learning standard serving sizes of other foods  When you eat carbohydrate foods that are not packaged or do not include \"Nutrition Facts\" on the label, you need to measure the servings in order to count the amount of carbohydrates:  · Measure the foods that you will eat with a food scale or measuring cup, if " needed.  · Decide how many standard-size servings you will eat.  · Multiply the number of servings by 15. Most carbohydrate-rich foods have about 15 g of carbohydrates per serving.  ? For example, if you eat 8 oz (170 g) of strawberries, you will have eaten 2 servings and 30 g of carbohydrates (2 servings x 15 g = 30 g).  · For foods that have more than one food mixed, such as soups and casseroles, you must count the carbohydrates in each food that is included.    The following list contains standard serving sizes of common carbohydrate-rich foods. Each of these servings has about 15 g of carbohydrates:  · ½ hamburger bun or ½ English muffin.  · ½ oz (15 mL) syrup.  · ½ oz (14 g) jelly.  · 1 slice of bread.  · 1 six-inch tortilla.  · 3 oz (85 g) cooked rice or pasta.  · 4 oz (113 g) cooked dried beans.  · 4 oz (113 g) starchy vegetable, such as peas, corn, or potatoes.  · 4 oz (113 g) hot cereal.  · 4 oz (113 g) mashed potatoes or ¼ of a large baked potato.  · 4 oz (113 g) canned or frozen fruit.  · 4 oz (120 mL) fruit juice.  · 4-6 crackers.  · 6 chicken nuggets.  · 6 oz (170 g) unsweetened dry cereal.  · 6 oz (170 g) plain fat-free yogurt or yogurt sweetened with artificial sweeteners.  · 8 oz (240 mL) milk.  · 8 oz (170 g) fresh fruit or one small piece of fruit.  · 24 oz (680 g) popped popcorn.    Example of carbohydrate counting  Sample meal  · 3 oz (85 g) chicken breast.  · 6 oz (170 g) brown rice.  · 4 oz (113 g) corn.  · 8 oz (240 mL) milk.  · 8 oz (170 g) strawberries with sugar-free whipped topping.  Carbohydrate calculation  1. Identify the foods that contain carbohydrates:  ? Rice.  ? Corn.  ? Milk.  ? Strawberries.  2. Calculate how many servings you have of each food:  ? 2 servings rice.  ? 1 serving corn.  ? 1 serving milk.  ? 1 serving strawberries.  3. Multiply each number of servings by 15 g:  ? 2 servings rice x 15 g = 30 g.  ? 1 serving corn x 15 g = 15 g.  ? 1 serving milk x 15 g = 15 g.  ? 1  serving strawberries x 15 g = 15 g.  4. Add together all of the amounts to find the total grams of carbohydrates eaten:  ? 30 g + 15 g + 15 g + 15 g = 75 g of carbohydrates total.  Summary  · Carbohydrate counting is a method of keeping track of how many carbohydrates you eat.  · Eating carbohydrates naturally increases the amount of sugar (glucose) in the blood.  · Counting how many carbohydrates you eat helps keep your blood glucose within normal limits, which helps you manage your diabetes.  · A diet and nutrition specialist (registered dietitian) can help you make a meal plan and calculate how many carbohydrates you should have at each meal and snack.  This information is not intended to replace advice given to you by your health care provider. Make sure you discuss any questions you have with your health care provider.  Document Released: 12/18/2006 Document Revised: 06/27/2018 Document Reviewed: 05/31/2017  [employer] Interactive Patient Education © 2019 [employer]  Cholesterol  Cholesterol is a fat. Your body needs a small amount of cholesterol. Cholesterol (plaque) may build up in your blood vessels (arteries). That makes you more likely to have a heart attack or stroke.  You cannot feel your cholesterol level. Having a blood test is the only way to find out if your level is high. Keep your test results. Work with your doctor to keep your cholesterol at a good level.  What do the results mean?  · Total cholesterol is how much cholesterol is in your blood.  · LDL is bad cholesterol. This is the type that can build up. Try to have low LDL.  · HDL is good cholesterol. It cleans your blood vessels and carries LDL away. Try to have high HDL.  · Triglycerides are fat that the body can store or burn for energy.  What are good levels of cholesterol?  · Total cholesterol below 200.  · LDL below 100 is good for people who have health risks. LDL below 70 is good for people who have very high risks.  · HDL above 40 is  good. It is best to have HDL of 60 or higher.  · Triglycerides below 150.  How can I lower my cholesterol?  Diet  Follow your diet program as told by your doctor.  · Choose fish, white meat chicken, or turkey that is roasted or baked. Try not to eat red meat, fried foods, sausage, or lunch meats.  · Eat lots of fresh fruits and vegetables.  · Choose whole grains, beans, pasta, potatoes, and cereals.  · Choose olive oil, corn oil, or canola oil. Only use small amounts.  · Try not to eat butter, mayonnaise, shortening, or palm kernel oils.  · Try not to eat foods with trans fats.  · Choose low-fat or nonfat dairy foods.  ? Drink skim or nonfat milk.  ? Eat low-fat or nonfat yogurt and cheeses.  ? Try not to drink whole milk or cream.  ? Try not to eat ice cream, egg yolks, or full-fat cheeses.  · Healthy desserts include martita food cake, alf snaps, animal crackers, hard candy, popsicles, and low-fat or nonfat frozen yogurt. Try not to eat pastries, cakes, pies, and cookies.    Exercise  Follow your exercise program as told by your doctor.  · Be more active. Try gardening, walking, and taking the stairs.  · Ask your doctor about ways that you can be more active.    Medicine  · Take over-the-counter and prescription medicines only as told by your doctor.  This information is not intended to replace advice given to you by your health care provider. Make sure you discuss any questions you have with your health care provider.  Document Released: 03/16/2010 Document Revised: 07/19/2017 Document Reviewed: 06/29/2017  [employer] Interactive Patient Education © 2019 [employer]  Advance Directive  Advance directives are legal documents that let you make choices ahead of time about your health care and medical treatment in case you become unable to communicate for yourself. Advance directives are a way for you to communicate your wishes to family, friends, and health care providers. This can help convey your decisions about  end-of-life care if you become unable to communicate.  Discussing and writing advance directives should happen over time rather than all at once. Advance directives can be changed depending on your situation and what you want, even after you have signed the advance directives.  If you do not have an advance directive, some states assign family decision makers to act on your behalf based on how closely you are related to them. Each state has its own laws regarding advance directives. You may want to check with your health care provider, , or state representative about the laws in your state. There are different types of advance directives, such as:  · Medical power of .  · Living will.  · Do not resuscitate (DNR) or do not attempt resuscitation (DNAR) order.    Health care proxy and medical power of   A health care proxy, also called a health care agent, is a person who is appointed to make medical decisions for you in cases in which you are unable to make the decisions yourself. Generally, people choose someone they know well and trust to represent their preferences. Make sure to ask this person for an agreement to act as your proxy. A proxy may have to exercise judgment in the event of a medical decision for which your wishes are not known.  A medical power of  is a legal document that names your health care proxy. Depending on the laws in your state, after the document is written, it may also need to be:  · Signed.  · Notarized.  · Dated.  · Copied.  · Witnessed.  · Incorporated into your medical record.    You may also want to appoint someone to manage your financial affairs in a situation in which you are unable to do so. This is called a durable power of  for finances. It is a separate legal document from the durable power of  for health care. You may choose the same person or someone different from your health care proxy to act as your agent in financial  matters.  If you do not appoint a proxy, or if there is a concern that the proxy is not acting in your best interests, a court-appointed guardian may be designated to act on your behalf.  Living will  A living will is a set of instructions documenting your wishes about medical care when you cannot express them yourself. Health care providers should keep a copy of your living will in your medical record. You may want to give a copy to family members or friends. To alert caregivers in case of an emergency, you can place a card in your wallet to let them know that you have a living will and where they can find it. A living will is used if you become:  · Terminally ill.  · Incapacitated.  · Unable to communicate or make decisions.    Items to consider in your living will include:  · The use or non-use of life-sustaining equipment, such as dialysis machines and breathing machines (ventilators).  · A DNR or DNAR order, which is the instruction not to use cardiopulmonary resuscitation (CPR) if breathing or heartbeat stops.  · The use or non-use of tube feeding.  · Withholding of food and fluids.  · Comfort (palliative) care when the goal becomes comfort rather than a cure.  · Organ and tissue donation.    A living will does not give instructions for distributing your money and property if you should pass away. It is recommended that you seek the advice of a  when writing a will. Decisions about taxes, beneficiaries, and asset distribution will be legally binding. This process can relieve your family and friends of any concerns surrounding disputes or questions that may come up about the distribution of your assets.  DNR or DNAR  A DNR or DNAR order is a request not to have CPR in the event that your heart stops beating or you stop breathing. If a DNR or DNAR order has not been made and shared, a health care provider will try to help any patient whose heart has stopped or who has stopped breathing. If you plan to have  surgery, talk with your health care provider about how your DNR or DNAR order will be followed if problems occur.  Summary  · Advance directives are the legal documents that allow you to make choices ahead of time about your health care and medical treatment in case you become unable to communicate for yourself.  · The process of discussing and writing advance directives should happen over time. You can change the advance directives, even after you have signed them.  · Advance directives include DNR or DNAR orders, living douglass, and designating an agent as your medical power of .  This information is not intended to replace advice given to you by your health care provider. Make sure you discuss any questions you have with your health care provider.  Document Released: 2009 Document Revised: 2017 Document Reviewed: 2017  [employer] Interactive Patient Education © 2019 [employer]    Medicare Wellness  Personal Prevention Plan of Service     Date of Office Visit:  2019  Encounter Provider:  RUBI Michael  Place of Service:  [facility]  Patient Name: Evie Shah  :  1945    As part of the Medicare Wellness portion of your visit today, we are providing you with this personalized preventive plan of services (PPPS). This plan is based upon recommendations of the [place] Preventive Services Task Force (USPSTF) and the Advisory Committee on Immunization Practices (ACIP).    This lists the preventive care services that should be considered, and provides dates of when you are due. Items listed as completed are up-to-date and do not require any further intervention.    Health Maintenance   Topic Date Due   • ZOSTER VACCINE (2 of 2) 2017   • DIABETIC EYE EXAM  2019   • HEMOGLOBIN A1C  2019   • LIPID PANEL  2020   • MEDICARE ANNUAL WELLNESS  2020   • DIABETIC FOOT EXAM  2020   • URINE MICROALBUMIN   03/14/2020   • MAMMOGRAM  11/05/2020   • DXA SCAN  12/29/2020   • COLONOSCOPY  11/07/2026   • TDAP/TD VACCINES (2 - Td) 07/09/2028   • INFLUENZA VACCINE  Completed   • PNEUMOCOCCAL VACCINES (65+ LOW/MEDIUM RISK)  Completed   • HEPATITIS C SCREENING  Addressed       Orders Placed This Encounter   Procedures   • MicroAlbumin, Urine, Random - Urine, Clean Catch     Standing Status:   Future     Standing Expiration Date:   3/13/2020   • Ambulatory Referral to Podiatry     Referral Priority:   Routine     Referral Type:   Consultation     Referral Reason:   Specialty Services Required     Requested Specialty:   Podiatry     Number of Visits Requested:   1       No Follow-up on file.

## 2019-03-14 NOTE — PROGRESS NOTES
QUICK REFERENCE INFORMATION:  The ABCs of the Annual Wellness Visit    Subsequent Medicare Wellness Visit    HEALTH RISK ASSESSMENT    1945    Recent Hospitalizations:  No hospitalization(s) within the last year..        Current Medical Providers:  Patient Care Team:  Camryn Mota PA as PCP - General  Camryn Mota PA as PCP - Family Medicine        Smoking Status:  Social History     Tobacco Use   Smoking Status Former Smoker   • Types: Cigarettes   • Last attempt to quit: 5/26/2015   • Years since quitting: 3.8       Alcohol Consumption:  Social History     Substance and Sexual Activity   Alcohol Use No       Depression Screen:   PHQ-2/PHQ-9 Depression Screening 3/14/2019   Little interest or pleasure in doing things 0   Feeling down, depressed, or hopeless 0   Trouble falling or staying asleep, or sleeping too much -   Feeling tired or having little energy -   Poor appetite or overeating -   Feeling bad about yourself - or that you are a failure or have let yourself or your family down -   Trouble concentrating on things, such as reading the newspaper or watching television -   Moving or speaking so slowly that other people could have noticed. Or the opposite - being so fidgety or restless that you have been moving around a lot more than usual -   Thoughts that you would be better off dead, or of hurting yourself in some way -   Total Score 0   If you checked off any problems, how difficult have these problems made it for you to do your work, take care of things at home, or get along with other people? -       Health Habits and Functional and Cognitive Screening:  Functional & Cognitive Status 3/14/2019   Do you have difficulty preparing food and eating? No   Do you have difficulty bathing yourself, getting dressed or grooming yourself? No   Do you have difficulty using the toilet? No   Do you have difficulty moving around from place to place? No   Do you have trouble with steps or getting out of  a bed or a chair? No   In the past year have you fallen or experienced a near fall? No   Current Diet Well Balanced Diet   Dental Exam Up to date   Eye Exam Not up to date   Exercise (times per week) 0 times per week   Current Exercise Activities Include None   Do you need help using the phone?  No   Are you deaf or do you have serious difficulty hearing?  No   Do you need help with transportation? No   Do you need help shopping? No   Do you need help preparing meals?  -   Do you need help with housework?  No   Do you need help with laundry? No   Do you need help taking your medications? No   Do you need help managing money? No   Do you ever drive or ride in a car without wearing a seat belt? No   Have you felt unusual stress, anger or loneliness in the last month? -   Who do you live with? Alone   If you need help, do you have trouble finding someone available to you? No   Have you been bothered in the last four weeks by sexual problems? No   Do you have difficulty concentrating, remembering or making decisions? No       Does the patient have evidence of cognitive impairment? No    Aspirin use counseling: Does not need ASA (and currently is not on it)    Fall Assessment risk= Zero  The patient does not have a history of falls. I did complete a risk assessment for falls. A plan of care for falls was not documented.      Recent Lab Results:  CMP:  Lab Results   Component Value Date     (H) 03/11/2019    BUN 28 (H) 03/11/2019    CREATININE 0.91 03/11/2019    EGFRIFNONA 61 03/11/2019    EGFRIFAFRI 74 03/11/2019    BCR 30.8 (H) 03/11/2019     03/11/2019    K 4.3 03/11/2019    CO2 23.8 03/11/2019    CALCIUM 9.7 03/11/2019    PROTENTOTREF 6.9 03/11/2019    ALBUMIN 4.50 03/11/2019    LABGLOBREF 2.4 03/11/2019    LABIL2 1.9 03/11/2019    BILITOT 0.4 03/11/2019    ALKPHOS 110 03/11/2019    AST 14 03/11/2019    ALT 21 03/11/2019     Lipid Panel:  Lab Results   Component Value Date    TRIG 311 (H) 03/11/2019     HDL 41 03/11/2019    VLDL 62.2 (H) 03/11/2019     HbA1c:  Lab Results   Component Value Date    HGBA1C 6.70 (H) 03/11/2019       Visual Acuity:   Visual Acuity Screening    Right eye Left eye Both eyes   Without correction:      With correction: 20/20 20/25 20/20     Hearing acuity:  Whisper test  Greater than 5 feet left ear  Greater than 5 feet right ear    Age-appropriate Screening Schedule:  Refer to the list below for future screening recommendations based on patient's age, sex and/or medical conditions. Orders for these recommended tests are listed in the plan section. The patient has been provided with a written plan.    Health Maintenance   Topic Date Due   • ZOSTER VACCINE (2 of 2) 04/06/2017   • DIABETIC EYE EXAM  04/18/2019   • HEMOGLOBIN A1C  09/11/2019   • LIPID PANEL  03/11/2020   • DIABETIC FOOT EXAM  03/14/2020   • URINE MICROALBUMIN  03/14/2020   • MAMMOGRAM  11/05/2020   • DXA SCAN  12/29/2020   • COLONOSCOPY  11/07/2026   • TDAP/TD VACCINES (2 - Td) 07/09/2028   • INFLUENZA VACCINE  Completed   • PNEUMOCOCCAL VACCINES (65+ LOW/MEDIUM RISK)  Completed        Subjective   History of Present Illness    Evie Shah is a 73 y.o. female who presents for an Subsequent Wellness Visit.    Hypertension-stable with Lotrel    Intertriginous candidiasis-stable with Diflucan as needed    Atherosclerosis-stable with risk factor modification including Lipitor and Plavix    Diabetes mellitus type 2-controlled with Jardiance and diabetic diet  Lab Results   Component Value Date    HGBA1C 6.70 (H) 03/11/2019     Hyperlipidemia-improved with Lipitor    Peripheral artery disease-stable with risk factor modification and Plavix    Allergic rhinitis-stable with Flonase and Claritin    Obesity-not at goal with current diet.    Osteoporosis-stable with calcium and vitamin D supplementation    Tobacco use disorder-not at goal due to continued smoking    The following portions of the patient's history were reviewed  and updated as appropriate: allergies, current medications, past family history, past medical history, past social history, past surgical history and problem list.    Outpatient Medications Prior to Visit   Medication Sig Dispense Refill   • albuterol (PROVENTIL) (2.5 MG/3ML) 0.083% nebulizer solution Take 2.5 mg by nebulization Every 4 (Four) Hours As Needed for Wheezing. 270 mL 5   • aspirin 81 MG EC tablet Take 1 tablet by mouth Daily. 30 tablet 5   • atorvastatin (LIPITOR) 10 MG tablet Take 1 tablet by mouth Every Night. 30 tablet 5   • buPROPion SR (WELLBUTRIN SR) 150 MG 12 hr tablet Take 1 tablet by mouth Every 12 (Twelve) Hours. 60 tablet 5   • cholecalciferol (VITAMIN D3) 1000 units tablet Take 1 tablet by mouth Daily. 30 tablet 5   • Empagliflozin (JARDIANCE) 25 MG tablet Take 25 mg by mouth Daily. 30 tablet 5   • fluticasone (FLONASE) 50 MCG/ACT nasal spray 2 sprays into each nostril Daily. 18.2 mL 3   • loratadine (CLARITIN) 10 MG tablet Take 1 tablet by mouth Daily. 30 tablet 5   • omega-3 acid ethyl esters (LOVAZA) 1 g capsule Take 2 capsules by mouth 2 (Two) Times a Day. 120 capsule 5   • ONE TOUCH ULTRA TEST test strip USE ONE STRIP TO CHECK GLUCOSE ONCE DAILY 100 each 5   • ONETOUCH DELICA LANCETS 33G misc USE A NEW LANCET TO CHECK GLUCOSE ONCE DAILY 60 each 5   • vitamin D (ERGOCALCIFEROL) 54456 units capsule capsule Take 1 capsule by mouth 1 (One) Time Per Week. 4 capsule 5   • amLODIPine-benazepril (LOTREL) 10-40 MG per capsule Take 1 capsule by mouth Daily. 30 capsule 5   • clopidogrel (PLAVIX) 75 MG tablet Take 1 tablet by mouth Daily. 30 tablet 5   • fluconazole (DIFLUCAN) 150 MG tablet TAKE 1 TABLET BY MOUTH ONCE DAILY 4 tablet 1     No facility-administered medications prior to visit.        Patient Active Problem List   Diagnosis   • DM type 2 with diabetic mixed hyperlipidemia (CMS/HCC)   • Hyperlipidemia   • HTN (hypertension)   • Senile osteoporosis   • Aorto-iliac atherosclerosis  (CMS/[facility])   • Allergic rhinitis   • Tobacco use disorder   • PAD (peripheral artery disease) (CMS/[facility])   • Obesity   • Atherosclerosis       Advance Care Planning:  has NO advance directive - information provided to the patient today    Identification of Risk Factors:  Risk factors include: weight , cardiovascular risk and polypharmacy.    Review of Systems   Constitutional: Negative for activity change, appetite change and fever.   HENT: Negative for ear pain, sinus pressure and sore throat.    Eyes: Negative for pain and visual disturbance.   Respiratory: Negative for cough and chest tightness.    Cardiovascular: Negative for chest pain and palpitations.   Gastrointestinal: Negative for abdominal pain, constipation, diarrhea, nausea and vomiting.   Endocrine: Negative for polydipsia and polyuria.   Genitourinary: Negative for dysuria and frequency.   Musculoskeletal: Negative for back pain and myalgias.   Skin: Negative for color change and rash.   Allergic/Immunologic: Negative for food allergies and immunocompromised state.   Neurological: Negative for dizziness, syncope and headaches.   Hematological: Negative for adenopathy. Does not bruise/bleed easily.   Psychiatric/Behavioral: Negative for hallucinations and suicidal ideas. The patient is not nervous/anxious.        Compared to one year ago, the patient feels her physical health is the same.  Compared to one year ago, the patient feels her mental health is the same.    Objective     Physical Exam   Constitutional: She is oriented to person, place, and time. She appears well-developed and well-nourished.   HENT:   Head: Normocephalic and atraumatic.   Nose: Nose normal.   Mouth/Throat: Oropharynx is clear and moist.   Eyes: Conjunctivae and EOM are normal. Pupils are equal, round, and reactive to light.   Neck: Normal range of motion. Neck supple. No tracheal deviation present. No thyromegaly present.   Cardiovascular: Normal rate, regular rhythm, normal  "heart sounds and intact distal pulses.   No murmur heard.  Pulmonary/Chest: Effort normal and breath sounds normal. No respiratory distress. She has no wheezes.   Abdominal: Soft. Bowel sounds are normal. There is no tenderness. There is no guarding.   Musculoskeletal: Normal range of motion. She exhibits no edema or tenderness.   Lymphadenopathy:     She has no cervical adenopathy.   Neurological: She is alert and oriented to person, place, and time.   Skin: Skin is warm and dry. No rash noted.   Psychiatric: She has a normal mood and affect. Her behavior is normal.   Nursing note and vitals reviewed.      Vitals:    03/14/19 1019   BP: 150/88   Pulse: 91   Temp: 98.3 °F (36.8 °C)   TempSrc: Oral   SpO2: 92%   Weight: 86.2 kg (190 lb)   Height: 160 cm (62.99\")   PainSc: 0-No pain       Patient's Body mass index is 33.67 kg/m². BMI is above normal parameters. Recommendations include: educational material and nutrition counseling.      Assessment/Plan   Patient Self-Management and Personalized Health Advice  The patient has been provided with information about: diet and designing advance directives and preventive services including:   · Advance directive, Fall Risk assessment done, Nutrition counseling provided.    Visit Diagnoses:    ICD-10-CM ICD-9-CM   1. Medicare annual wellness visit, subsequent Z00.00 V70.0   2. Essential hypertension I10 401.9   3. Candidiasis, intertriginous B37.2 112.3   4. Aorto-iliac atherosclerosis (CMS/[facility]) I70.0 440.0    I70.299 440.20   5. DM type 2 with diabetic mixed hyperlipidemia (CMS/[facility]) E11.69 250.80    E78.2 272.2   6. Mixed hyperlipidemia E78.2 272.2   7. PAD (peripheral artery disease) (CMS/[facility]) I73.9 443.9   8. Allergic rhinitis due to pollen, unspecified seasonality J30.1 477.0   9. Class 1 obesity due to excess calories with serious comorbidity and body mass index (BMI) of 33.0 to 33.9 in adult E66.09 278.00    Z68.33 V85.33   10. Senile osteoporosis M81.0 733.01   11. " Tobacco use disorder F17.200 305.1       Orders Placed This Encounter   Procedures   • MicroAlbumin, Urine, Random - Urine, Clean Catch     Standing Status:   Future     Standing Expiration Date:   3/13/2020   • Ambulatory Referral to Podiatry     Referral Priority:   Routine     Referral Type:   Consultation     Referral Reason:   Specialty Services Required     Requested Specialty:   Podiatry     Number of Visits Requested:   1       Outpatient Encounter Medications as of 3/14/2019   Medication Sig Dispense Refill   • albuterol (PROVENTIL) (2.5 MG/3ML) 0.083% nebulizer solution Take 2.5 mg by nebulization Every 4 (Four) Hours As Needed for Wheezing. 270 mL 5   • amLODIPine-benazepril (LOTREL) 10-40 MG per capsule Take 1 capsule by mouth Daily. 90 capsule 1   • aspirin 81 MG EC tablet Take 1 tablet by mouth Daily. 30 tablet 5   • atorvastatin (LIPITOR) 10 MG tablet Take 1 tablet by mouth Every Night. 30 tablet 5   • buPROPion SR (WELLBUTRIN SR) 150 MG 12 hr tablet Take 1 tablet by mouth Every 12 (Twelve) Hours. 60 tablet 5   • cholecalciferol (VITAMIN D3) 1000 units tablet Take 1 tablet by mouth Daily. 30 tablet 5   • clopidogrel (PLAVIX) 75 MG tablet Take 1 tablet by mouth Daily. 90 tablet 1   • Empagliflozin (JARDIANCE) 25 MG tablet Take 25 mg by mouth Daily. 30 tablet 5   • fluconazole (DIFLUCAN) 150 MG tablet Take 1 tablet by mouth Daily. 10 tablet 1   • fluticasone (FLONASE) 50 MCG/ACT nasal spray 2 sprays into each nostril Daily. 18.2 mL 3   • loratadine (CLARITIN) 10 MG tablet Take 1 tablet by mouth Daily. 30 tablet 5   • omega-3 acid ethyl esters (LOVAZA) 1 g capsule Take 2 capsules by mouth 2 (Two) Times a Day. 120 capsule 5   • ONE TOUCH ULTRA TEST test strip USE ONE STRIP TO CHECK GLUCOSE ONCE DAILY 100 each 5   • ONETOUCH DELICA LANCETS 33G misc USE A NEW LANCET TO CHECK GLUCOSE ONCE DAILY 60 each 5   • vitamin D (ERGOCALCIFEROL) 83370 units capsule capsule Take 1 capsule by mouth 1 (One) Time Per Week.  4 capsule 5   • [DISCONTINUED] amLODIPine-benazepril (LOTREL) 10-40 MG per capsule Take 1 capsule by mouth Daily. 30 capsule 5   • [DISCONTINUED] clopidogrel (PLAVIX) 75 MG tablet Take 1 tablet by mouth Daily. 30 tablet 5   • [DISCONTINUED] fluconazole (DIFLUCAN) 150 MG tablet TAKE 1 TABLET BY MOUTH ONCE DAILY 4 tablet 1     No facility-administered encounter medications on file as of 3/14/2019.        Reviewed use of high risk medication in the elderly: yes  Reviewed for potential of harmful drug interactions in the elderly: yes    Follow Up:  Return in about 3 months (around 6/14/2019) for Followup with Camryn.     An After Visit Summary and PPPS with all of these plans were given to the patient.

## 2019-03-14 NOTE — TELEPHONE ENCOUNTER
I called [facility] about sophie glucose monitor and they are cking with her to see if she has anew card if it doesn't pay they will call me back                            ----- Message from RUBI Michael sent at 3/14/2019 10:56 AM EDT -----   Please CALL [facility] and see what I have to fill out to get her a blood glucose monitor.  She has already taken rx to them 3 months ago and says then state we have to fill out something.

## 2019-03-18 ENCOUNTER — TELEPHONE (OUTPATIENT)
Dept: FAMILY MEDICINE CLINIC | Facility: CLINIC | Age: 74
End: 2019-03-18

## 2019-03-18 DIAGNOSIS — E53.8 B12 DEFICIENCY: Primary | ICD-10-CM

## 2019-03-18 RX ORDER — CYANOCOBALAMIN 1000 UG/ML
1000 INJECTION, SOLUTION INTRAMUSCULAR; SUBCUTANEOUS
Status: DISCONTINUED | OUTPATIENT
Start: 2019-03-19 | End: 2019-11-05

## 2019-03-18 NOTE — TELEPHONE ENCOUNTER
--  I called evie , she will be in tomorrow for vitamin b12 inj                      --- Message from RUBI Michael sent at 3/18/2019  2:12 PM EDT -----  I have put in an order for her to have monthly B12 injections.  Asked her to come in for B12 injection to bring her B12 up.  This should increase her energy level.    ----- Message -----  From: Keila Murphy MA  Sent: 3/18/2019  10:12 AM  To: RUBI Michael    Evie is not taking vitamin b .she thought she was but its vitamin d that she taking bid. She does take a otc vitamin  ----- Message -----  From: Camryn Mota PA  Sent: 3/17/2019   8:12 PM  To: [facility]    Please inform the patient that her B12 level was low.  She currently taking B12 supplementation?

## 2019-03-18 NOTE — TELEPHONE ENCOUNTER
I called sophie she is taking otc vitamin and vitamin d but no b12          ----- Message from RUBI Michael sent at 3/17/2019  8:12 PM EDT -----  Please inform the patient that her B12 level was low.  She currently taking B12 supplementation?

## 2019-03-19 ENCOUNTER — CLINICAL SUPPORT (OUTPATIENT)
Dept: FAMILY MEDICINE CLINIC | Facility: CLINIC | Age: 74
End: 2019-03-19

## 2019-03-19 DIAGNOSIS — E53.8 VITAMIN B 12 DEFICIENCY: ICD-10-CM

## 2019-03-19 PROCEDURE — 96372 THER/PROPH/DIAG INJ SC/IM: CPT | Performed by: PHYSICIAN ASSISTANT

## 2019-03-19 RX ADMIN — CYANOCOBALAMIN 1000 MCG: 1000 INJECTION, SOLUTION INTRAMUSCULAR; SUBCUTANEOUS at 10:01

## 2019-04-19 ENCOUNTER — CLINICAL SUPPORT (OUTPATIENT)
Dept: FAMILY MEDICINE CLINIC | Facility: CLINIC | Age: 74
End: 2019-04-19

## 2019-04-19 DIAGNOSIS — E53.8 B12 DEFICIENCY: ICD-10-CM

## 2019-04-19 PROCEDURE — 96372 THER/PROPH/DIAG INJ SC/IM: CPT | Performed by: PHYSICIAN ASSISTANT

## 2019-04-19 RX ADMIN — CYANOCOBALAMIN 1000 MCG: 1000 INJECTION, SOLUTION INTRAMUSCULAR; SUBCUTANEOUS at 11:14

## 2019-05-23 ENCOUNTER — CLINICAL SUPPORT (OUTPATIENT)
Dept: FAMILY MEDICINE CLINIC | Facility: CLINIC | Age: 74
End: 2019-05-23

## 2019-05-23 PROCEDURE — 96372 THER/PROPH/DIAG INJ SC/IM: CPT | Performed by: PHYSICIAN ASSISTANT

## 2019-05-23 RX ADMIN — CYANOCOBALAMIN 1000 MCG: 1000 INJECTION, SOLUTION INTRAMUSCULAR; SUBCUTANEOUS at 10:18

## 2019-06-14 ENCOUNTER — OFFICE VISIT (OUTPATIENT)
Dept: FAMILY MEDICINE CLINIC | Facility: CLINIC | Age: 74
End: 2019-06-14

## 2019-06-14 VITALS
HEIGHT: 63 IN | TEMPERATURE: 98.6 F | DIASTOLIC BLOOD PRESSURE: 80 MMHG | HEART RATE: 87 BPM | BODY MASS INDEX: 33.31 KG/M2 | OXYGEN SATURATION: 93 % | SYSTOLIC BLOOD PRESSURE: 128 MMHG | WEIGHT: 188 LBS

## 2019-06-14 DIAGNOSIS — E78.2 MIXED HYPERLIPIDEMIA: ICD-10-CM

## 2019-06-14 DIAGNOSIS — E11.69 DM TYPE 2 WITH DIABETIC MIXED HYPERLIPIDEMIA (HCC): ICD-10-CM

## 2019-06-14 DIAGNOSIS — E11.42 DIABETIC PERIPHERAL NEUROPATHY ASSOCIATED WITH TYPE 2 DIABETES MELLITUS (HCC): Primary | ICD-10-CM

## 2019-06-14 DIAGNOSIS — E55.9 VITAMIN D DEFICIENCY: ICD-10-CM

## 2019-06-14 DIAGNOSIS — E53.8 B12 DEFICIENCY: ICD-10-CM

## 2019-06-14 DIAGNOSIS — E78.2 DM TYPE 2 WITH DIABETIC MIXED HYPERLIPIDEMIA (HCC): ICD-10-CM

## 2019-06-14 DIAGNOSIS — F41.1 GAD (GENERALIZED ANXIETY DISORDER): ICD-10-CM

## 2019-06-14 DIAGNOSIS — J30.2 OTHER SEASONAL ALLERGIC RHINITIS: ICD-10-CM

## 2019-06-14 PROCEDURE — 99214 OFFICE O/P EST MOD 30 MIN: CPT | Performed by: PHYSICIAN ASSISTANT

## 2019-06-14 PROCEDURE — 96372 THER/PROPH/DIAG INJ SC/IM: CPT | Performed by: PHYSICIAN ASSISTANT

## 2019-06-14 RX ORDER — ATORVASTATIN CALCIUM 10 MG/1
10 TABLET, FILM COATED ORAL NIGHTLY
Qty: 30 TABLET | Refills: 5 | Status: SHIPPED | OUTPATIENT
Start: 2019-06-14 | End: 2019-09-13 | Stop reason: SDUPTHER

## 2019-06-14 RX ORDER — MELATONIN
1000 DAILY
Qty: 30 TABLET | Refills: 5 | Status: SHIPPED | OUTPATIENT
Start: 2019-06-14 | End: 2019-09-13

## 2019-06-14 RX ORDER — ERGOCALCIFEROL 1.25 MG/1
50000 CAPSULE ORAL WEEKLY
Qty: 4 CAPSULE | Refills: 5 | Status: SHIPPED | OUTPATIENT
Start: 2019-06-14 | End: 2019-12-13 | Stop reason: SDUPTHER

## 2019-06-14 RX ORDER — BUPROPION HYDROCHLORIDE 150 MG/1
150 TABLET, EXTENDED RELEASE ORAL EVERY 12 HOURS SCHEDULED
Qty: 60 TABLET | Refills: 5 | Status: SHIPPED | OUTPATIENT
Start: 2019-06-14 | End: 2019-09-13 | Stop reason: SDUPTHER

## 2019-06-14 RX ORDER — CYANOCOBALAMIN 1000 UG/ML
1000 INJECTION, SOLUTION INTRAMUSCULAR; SUBCUTANEOUS
Status: DISCONTINUED | OUTPATIENT
Start: 2019-06-14 | End: 2019-11-05

## 2019-06-14 RX ORDER — LORATADINE 10 MG/1
10 TABLET ORAL DAILY
Qty: 30 TABLET | Refills: 5 | Status: SHIPPED | OUTPATIENT
Start: 2019-06-14 | End: 2019-09-13 | Stop reason: SDUPTHER

## 2019-06-14 RX ORDER — OMEGA-3-ACID ETHYL ESTERS 1 G/1
2 CAPSULE, LIQUID FILLED ORAL 2 TIMES DAILY
Qty: 120 CAPSULE | Refills: 5 | Status: SHIPPED | OUTPATIENT
Start: 2019-06-14 | End: 2019-09-13 | Stop reason: SDUPTHER

## 2019-06-14 RX ADMIN — CYANOCOBALAMIN 1000 MCG: 1000 INJECTION, SOLUTION INTRAMUSCULAR; SUBCUTANEOUS at 11:57

## 2019-06-16 NOTE — PROGRESS NOTES
Subjective   Evie Shah is a 74 y.o. female.       Chief Complaint -foot pain    History of Present Illness -      Foot pain-  She complains of foot pain and lower leg pain secondary to diabetic peripheral neuropathy.  Pain is described as moderate burning that is worse in the evenings and at night.  Onset greater than 6 months.  She declines the use of gabapentin or Lyrica at this time due to them being narcotic medications and having side effects of weight gain.    Diabetes mellitus type 2- continue Jardiance and low-cholesterol diet  Lab Results   Component Value Date    HGBA1C 6.70 (H) 03/11/2019     B12 deficiency- not at goal.  She is due for B12 injection today.    Hyperlipidemia-improved with Lipitor but not at goal  Lab Results   Component Value Date    CHLPL 144 03/11/2019    TRIG 311 (H) 03/11/2019    HDL 41 03/11/2019    LDL 41 03/11/2019     Generalized anxiety disorder-stable with Wellbutrin usually start for smoking cessation but no longer smokes.    Seasonal allergic rhinitis-stable with Claritin    Vitamin D deficiency-stable with vitamin D supplementation      The following portions of the patient's history were reviewed and updated as appropriate: allergies, current medications, past family history, past medical history, past social history, past surgical history and problem list.    Review of Systems   Constitutional: Negative for activity change, appetite change and fever.   HENT: Negative for ear pain, sinus pressure and sore throat.    Eyes: Negative for pain and visual disturbance.   Respiratory: Negative for cough and chest tightness.    Cardiovascular: Negative for chest pain and palpitations.   Gastrointestinal: Negative for abdominal pain, constipation, diarrhea, nausea and vomiting.   Endocrine: Negative for polydipsia and polyuria.   Genitourinary: Negative for dysuria and frequency.   Musculoskeletal: Negative for back pain and myalgias.        Foot/ lower leg pain   Skin:  "Negative for color change and rash.   Allergic/Immunologic: Negative for food allergies and immunocompromised state.   Neurological: Negative for dizziness, syncope and headaches.   Hematological: Negative for adenopathy. Does not bruise/bleed easily.   Psychiatric/Behavioral: Negative for hallucinations and suicidal ideas. The patient is not nervous/anxious.        /80   Pulse 87   Temp 98.6 °F (37 °C) (Oral)   Ht 160 cm (62.99\")   Wt 85.3 kg (188 lb)   SpO2 93%   BMI 33.31 kg/m²   Lab on 03/11/2019   Component Date Value Ref Range Status   • Vitamin B-12 03/11/2019 168* 211 - 946 pg/mL Final   • 25 Hydroxy, Vitamin D 03/11/2019 29.4* 30.0 - 100.0 ng/ml Final   • TSH 03/11/2019 3.080  0.450 - 4.500 uIU/mL Final   • WBC 03/11/2019 9.28  3.40 - 10.80 10*3/mm3 Final   • RBC 03/11/2019 5.56* 3.77 - 5.28 10*6/mm3 Final   • Hemoglobin 03/11/2019 16.7* 12.0 - 15.9 g/dL Final   • Hematocrit 03/11/2019 52.0* 34.0 - 46.6 % Final   • MCV 03/11/2019 93.5  79.0 - 97.0 fL Final   • MCH 03/11/2019 30.0  26.6 - 33.0 pg Final   • MCHC 03/11/2019 32.1  31.5 - 35.7 g/dL Final   • RDW 03/11/2019 13.3  12.3 - 15.4 % Final   • Platelets 03/11/2019 208  140 - 450 10*3/mm3 Final   • Neutrophil Rel % 03/11/2019 58.6  42.7 - 76.0 % Final   • Lymphocyte Rel % 03/11/2019 29.7  19.6 - 45.3 % Final   • Monocyte Rel % 03/11/2019 8.1  5.0 - 12.0 % Final   • Eosinophil Rel % 03/11/2019 2.7  0.3 - 6.2 % Final   • Basophil Rel % 03/11/2019 0.6  0.0 - 1.5 % Final   • Neutrophils Absolute 03/11/2019 5.43  1.40 - 7.00 10*3/mm3 Final   • Lymphocytes Absolute 03/11/2019 2.76  0.70 - 3.10 10*3/mm3 Final   • Monocytes Absolute 03/11/2019 0.75  0.10 - 0.90 10*3/mm3 Final   • Eosinophils Absolute 03/11/2019 0.25  0.00 - 0.40 10*3/mm3 Final   • Basophils Absolute 03/11/2019 0.06  0.00 - 0.20 10*3/mm3 Final   • Immature Granulocyte Rel % 03/11/2019 0.3  0.0 - 0.5 % Final   • Immature Grans Absolute 03/11/2019 0.03  0.00 - 0.05 10*3/mm3 Final   • " nRBC 03/11/2019 0.0  0.0 - 0.0 /100 WBC Final   • Glucose 03/11/2019 160* 65 - 99 mg/dL Final   • BUN 03/11/2019 28* 8 - 23 mg/dL Final   • Creatinine 03/11/2019 0.91  0.57 - 1.00 mg/dL Final   • eGFR Non African Am 03/11/2019 61  >60 mL/min/1.73 Final   • eGFR African Am 03/11/2019 74  >60 mL/min/1.73 Final   • BUN/Creatinine Ratio 03/11/2019 30.8* 7.0 - 25.0 Final   • Sodium 03/11/2019 141  136 - 145 mmol/L Final   • Potassium 03/11/2019 4.3  3.5 - 5.2 mmol/L Final   • Chloride 03/11/2019 103  98 - 107 mmol/L Final   • Total CO2 03/11/2019 23.8  22.0 - 29.0 mmol/L Final   • Calcium 03/11/2019 9.7  8.6 - 10.5 mg/dL Final   • Total Protein 03/11/2019 6.9  6.0 - 8.5 g/dL Final   • Albumin 03/11/2019 4.50  3.50 - 5.20 g/dL Final   • Globulin 03/11/2019 2.4  gm/dL Final   • A/G Ratio 03/11/2019 1.9  g/dL Final   • Total Bilirubin 03/11/2019 0.4  0.1 - 1.2 mg/dL Final   • Alkaline Phosphatase 03/11/2019 110  39 - 117 U/L Final   • AST (SGOT) 03/11/2019 14  1 - 32 U/L Final   • ALT (SGPT) 03/11/2019 21  1 - 33 U/L Final   • Total Cholesterol 03/11/2019 144  0 - 200 mg/dL Final   • Triglycerides 03/11/2019 311* 0 - 150 mg/dL Final   • HDL Cholesterol 03/11/2019 41  40 - 60 mg/dL Final   • VLDL Cholesterol 03/11/2019 62.2* 5 - 40 mg/dL Final   • LDL Cholesterol  03/11/2019 41  0 - 100 mg/dL Final   • Hemoglobin A1C 03/11/2019 6.70* 4.80 - 5.60 % Final       Physical Exam   Constitutional: She is oriented to person, place, and time. She appears well-developed and well-nourished.   HENT:   Head: Normocephalic and atraumatic.   Nose: Nose normal.   Mouth/Throat: Oropharynx is clear and moist.   Eyes: Conjunctivae and EOM are normal. Pupils are equal, round, and reactive to light.   Neck: Normal range of motion. Neck supple. No tracheal deviation present. No thyromegaly present.   Cardiovascular: Normal rate, regular rhythm, normal heart sounds and intact distal pulses.   No murmur heard.  Pulmonary/Chest: Effort normal and  breath sounds normal. No respiratory distress. She has no wheezes.   Abdominal: Soft. Bowel sounds are normal. There is no tenderness. There is no guarding.   Musculoskeletal: Normal range of motion. She exhibits tenderness. She exhibits no edema.   Decreased vibratory sense noted tops of bilateral feet.  2+ dorsalis pedis pulses noted bilaterally.  Diabetic foot exam was performed today.   Lymphadenopathy:     She has no cervical adenopathy.   Neurological: She is alert and oriented to person, place, and time.   Skin: Skin is warm and dry. No rash noted.   Psychiatric: She has a normal mood and affect. Her behavior is normal.   Nursing note and vitals reviewed.      Assessment/Plan     Diagnoses and all orders for this visit:    Diabetic peripheral neuropathy associated with type 2 diabetes mellitus (CMS/HCC)  Comments:  Prescription written for compounded neuropathic/musculoskeletal transdermal pain cream and faxed to [facility] pharmacy.  Prescription written for diabetic shoes    Mixed hyperlipidemia  Comments:  Continue Lipitor and start a low carbohydrate diet since triglycerides still elevated at 311  Orders:  -     atorvastatin (LIPITOR) 10 MG tablet; Take 1 tablet by mouth Every Night.  -     omega-3 acid ethyl esters (LOVAZA) 1 g capsule; Take 2 capsules by mouth 2 (Two) Times a Day.  -     Comprehensive Metabolic Panel; Future  -     Lipid Panel; Future    VISHAL (generalized anxiety disorder)  -     buPROPion SR (WELLBUTRIN SR) 150 MG 12 hr tablet; Take 1 tablet by mouth Every 12 (Twelve) Hours.    DM type 2 with diabetic mixed hyperlipidemia (CMS/HCC)  -     Empagliflozin (JARDIANCE) 25 MG tablet; Take 25 mg by mouth Daily.  -     MicroAlbumin, Urine, Random - Urine, Clean Catch; Future  -     Hemoglobin A1c; Future    Other seasonal allergic rhinitis  -     loratadine (CLARITIN) 10 MG tablet; Take 1 tablet by mouth Daily.    Vitamin D deficiency  -     cholecalciferol (VITAMIN D3) 1000 units tablet; Take 1  tablet by mouth Daily.  -     vitamin D (ERGOCALCIFEROL) 68523 units capsule capsule; Take 1 capsule by mouth 1 (One) Time Per Week.  -     Vitamin D 25 Hydroxy; Future    B12 deficiency  Comments:  B12 injection given today  Orders:  -     cyanocobalamin injection 1,000 mcg    Patient's Body mass index is 33.31 kg/m². BMI is above normal parameters. Recommendations include: educational material.                This document has been electronically signed by:  Camryn Mota PA-C

## 2019-06-16 NOTE — PATIENT INSTRUCTIONS
"Carbohydrate Counting for Diabetes Mellitus, Adult  Carbohydrate counting is a method of keeping track of how many carbohydrates you eat. Eating carbohydrates naturally increases the amount of sugar (glucose) in the blood. Counting how many carbohydrates you eat helps keep your blood glucose within normal limits, which helps you manage your diabetes (diabetes mellitus).  It is important to know how many carbohydrates you can safely have in each meal. This is different for every person. A diet and nutrition specialist (registered dietitian) can help you make a meal plan and calculate how many carbohydrates you should have at each meal and snack.  Carbohydrates are found in the following foods:  · Grains, such as breads and cereals.  · Dried beans and soy products.  · Starchy vegetables, such as potatoes, peas, and corn.  · Fruit and fruit juices.  · Milk and yogurt.  · Sweets and snack foods, such as cake, cookies, candy, chips, and soft drinks.    How do I count carbohydrates?  There are two ways to count carbohydrates in food. You can use either of the methods or a combination of both.  Reading \"Nutrition Facts\" on packaged food  The \"Nutrition Facts\" list is included on the labels of almost all packaged foods and beverages in the [place] It includes:  · The serving size.  · Information about nutrients in each serving, including the grams (g) of carbohydrate per serving.    To use the “Nutrition Facts\":  · Decide how many servings you will have.  · Multiply the number of servings by the number of carbohydrates per serving.  · The resulting number is the total amount of carbohydrates that you will be having.    Learning standard serving sizes of other foods  When you eat carbohydrate foods that are not packaged or do not include \"Nutrition Facts\" on the label, you need to measure the servings in order to count the amount of carbohydrates:  · Measure the foods that you will eat with a food scale or measuring cup, if " needed.  · Decide how many standard-size servings you will eat.  · Multiply the number of servings by 15. Most carbohydrate-rich foods have about 15 g of carbohydrates per serving.  ? For example, if you eat 8 oz (170 g) of strawberries, you will have eaten 2 servings and 30 g of carbohydrates (2 servings x 15 g = 30 g).  · For foods that have more than one food mixed, such as soups and casseroles, you must count the carbohydrates in each food that is included.    The following list contains standard serving sizes of common carbohydrate-rich foods. Each of these servings has about 15 g of carbohydrates:  · ½ hamburger bun or ½ English muffin.  · ½ oz (15 mL) syrup.  · ½ oz (14 g) jelly.  · 1 slice of bread.  · 1 six-inch tortilla.  · 3 oz (85 g) cooked rice or pasta.  · 4 oz (113 g) cooked dried beans.  · 4 oz (113 g) starchy vegetable, such as peas, corn, or potatoes.  · 4 oz (113 g) hot cereal.  · 4 oz (113 g) mashed potatoes or ¼ of a large baked potato.  · 4 oz (113 g) canned or frozen fruit.  · 4 oz (120 mL) fruit juice.  · 4-6 crackers.  · 6 chicken nuggets.  · 6 oz (170 g) unsweetened dry cereal.  · 6 oz (170 g) plain fat-free yogurt or yogurt sweetened with artificial sweeteners.  · 8 oz (240 mL) milk.  · 8 oz (170 g) fresh fruit or one small piece of fruit.  · 24 oz (680 g) popped popcorn.    Example of carbohydrate counting  Sample meal  · 3 oz (85 g) chicken breast.  · 6 oz (170 g) brown rice.  · 4 oz (113 g) corn.  · 8 oz (240 mL) milk.  · 8 oz (170 g) strawberries with sugar-free whipped topping.  Carbohydrate calculation  1. Identify the foods that contain carbohydrates:  ? Rice.  ? Corn.  ? Milk.  ? Strawberries.  2. Calculate how many servings you have of each food:  ? 2 servings rice.  ? 1 serving corn.  ? 1 serving milk.  ? 1 serving strawberries.  3. Multiply each number of servings by 15 g:  ? 2 servings rice x 15 g = 30 g.  ? 1 serving corn x 15 g = 15 g.  ? 1 serving milk x 15 g = 15 g.  ? 1  serving strawberries x 15 g = 15 g.  4. Add together all of the amounts to find the total grams of carbohydrates eaten:  ? 30 g + 15 g + 15 g + 15 g = 75 g of carbohydrates total.  Summary  · Carbohydrate counting is a method of keeping track of how many carbohydrates you eat.  · Eating carbohydrates naturally increases the amount of sugar (glucose) in the blood.  · Counting how many carbohydrates you eat helps keep your blood glucose within normal limits, which helps you manage your diabetes.  · A diet and nutrition specialist (registered dietitian) can help you make a meal plan and calculate how many carbohydrates you should have at each meal and snack.  This information is not intended to replace advice given to you by your health care provider. Make sure you discuss any questions you have with your health care provider.  Document Released: 12/18/2006 Document Revised: 06/27/2018 Document Reviewed: 05/31/2017  [employer] Interactive Patient Education © 2019 [employer]

## 2019-06-27 DIAGNOSIS — B37.2 CANDIDIASIS, INTERTRIGINOUS: ICD-10-CM

## 2019-06-27 RX ORDER — FLUCONAZOLE 150 MG/1
TABLET ORAL
Qty: 10 TABLET | Refills: 1 | Status: SHIPPED | OUTPATIENT
Start: 2019-06-27 | End: 2019-09-13

## 2019-07-17 ENCOUNTER — CLINICAL SUPPORT (OUTPATIENT)
Dept: FAMILY MEDICINE CLINIC | Facility: CLINIC | Age: 74
End: 2019-07-17

## 2019-07-17 PROCEDURE — 96372 THER/PROPH/DIAG INJ SC/IM: CPT | Performed by: PHYSICIAN ASSISTANT

## 2019-07-17 RX ADMIN — CYANOCOBALAMIN 1000 MCG: 1000 INJECTION, SOLUTION INTRAMUSCULAR; SUBCUTANEOUS at 10:09

## 2019-08-15 ENCOUNTER — CLINICAL SUPPORT (OUTPATIENT)
Dept: FAMILY MEDICINE CLINIC | Facility: CLINIC | Age: 74
End: 2019-08-15

## 2019-08-15 DIAGNOSIS — E53.8 VITAMIN B12 DEFICIENCY: ICD-10-CM

## 2019-08-15 PROCEDURE — 96372 THER/PROPH/DIAG INJ SC/IM: CPT | Performed by: PHYSICIAN ASSISTANT

## 2019-08-15 RX ADMIN — CYANOCOBALAMIN 1000 MCG: 1000 INJECTION, SOLUTION INTRAMUSCULAR; SUBCUTANEOUS at 09:00

## 2019-09-06 ENCOUNTER — LAB (OUTPATIENT)
Dept: FAMILY MEDICINE CLINIC | Facility: CLINIC | Age: 74
End: 2019-09-06

## 2019-09-06 DIAGNOSIS — E11.69 DM TYPE 2 WITH DIABETIC MIXED HYPERLIPIDEMIA (HCC): ICD-10-CM

## 2019-09-06 DIAGNOSIS — E78.2 MIXED HYPERLIPIDEMIA: ICD-10-CM

## 2019-09-06 DIAGNOSIS — E55.9 VITAMIN D DEFICIENCY: ICD-10-CM

## 2019-09-06 DIAGNOSIS — E78.2 DM TYPE 2 WITH DIABETIC MIXED HYPERLIPIDEMIA (HCC): ICD-10-CM

## 2019-09-07 LAB
25(OH)D3+25(OH)D2 SERPL-MCNC: 32.6 NG/ML (ref 30–100)
ALBUMIN SERPL-MCNC: 4.3 G/DL (ref 3.5–5.2)
ALBUMIN/GLOB SERPL: 2.2 G/DL
ALP SERPL-CCNC: 101 U/L (ref 39–117)
ALT SERPL-CCNC: 15 U/L (ref 1–33)
AST SERPL-CCNC: 15 U/L (ref 1–32)
BILIRUB SERPL-MCNC: 0.5 MG/DL (ref 0.2–1.2)
BUN SERPL-MCNC: 16 MG/DL (ref 8–23)
BUN/CREAT SERPL: 23.9 (ref 7–25)
CALCIUM SERPL-MCNC: 9 MG/DL (ref 8.6–10.5)
CHLORIDE SERPL-SCNC: 106 MMOL/L (ref 98–107)
CHOLEST SERPL-MCNC: 114 MG/DL (ref 0–200)
CO2 SERPL-SCNC: 21.8 MMOL/L (ref 22–29)
CREAT SERPL-MCNC: 0.67 MG/DL (ref 0.57–1)
GLOBULIN SER CALC-MCNC: 2 GM/DL
GLUCOSE SERPL-MCNC: 150 MG/DL (ref 65–99)
HBA1C MFR BLD: 7 % (ref 4.8–5.6)
HDLC SERPL-MCNC: 38 MG/DL (ref 40–60)
LDLC SERPL CALC-MCNC: 34 MG/DL (ref 0–100)
MICROALBUMIN UR-MCNC: 15.9 UG/ML
POTASSIUM SERPL-SCNC: 3.8 MMOL/L (ref 3.5–5.2)
PROT SERPL-MCNC: 6.3 G/DL (ref 6–8.5)
SODIUM SERPL-SCNC: 142 MMOL/L (ref 136–145)
TRIGL SERPL-MCNC: 211 MG/DL (ref 0–150)
VLDLC SERPL CALC-MCNC: 42.2 MG/DL

## 2019-09-13 ENCOUNTER — OFFICE VISIT (OUTPATIENT)
Dept: FAMILY MEDICINE CLINIC | Facility: CLINIC | Age: 74
End: 2019-09-13

## 2019-09-13 VITALS
OXYGEN SATURATION: 91 % | HEART RATE: 90 BPM | BODY MASS INDEX: 32.78 KG/M2 | TEMPERATURE: 98 F | RESPIRATION RATE: 16 BRPM | SYSTOLIC BLOOD PRESSURE: 144 MMHG | HEIGHT: 63 IN | WEIGHT: 185 LBS | DIASTOLIC BLOOD PRESSURE: 78 MMHG

## 2019-09-13 DIAGNOSIS — E78.2 DM TYPE 2 WITH DIABETIC MIXED HYPERLIPIDEMIA (HCC): ICD-10-CM

## 2019-09-13 DIAGNOSIS — E11.69 DM TYPE 2 WITH DIABETIC MIXED HYPERLIPIDEMIA (HCC): ICD-10-CM

## 2019-09-13 DIAGNOSIS — I70.8 AORTO-ILIAC ATHEROSCLEROSIS (HCC): ICD-10-CM

## 2019-09-13 DIAGNOSIS — I10 ESSENTIAL HYPERTENSION: ICD-10-CM

## 2019-09-13 DIAGNOSIS — E55.9 VITAMIN D DEFICIENCY: Primary | ICD-10-CM

## 2019-09-13 DIAGNOSIS — J30.2 OTHER SEASONAL ALLERGIC RHINITIS: ICD-10-CM

## 2019-09-13 DIAGNOSIS — E78.2 MIXED HYPERLIPIDEMIA: ICD-10-CM

## 2019-09-13 DIAGNOSIS — E53.8 B12 DEFICIENCY: ICD-10-CM

## 2019-09-13 DIAGNOSIS — F41.1 GAD (GENERALIZED ANXIETY DISORDER): ICD-10-CM

## 2019-09-13 DIAGNOSIS — I70.0 AORTO-ILIAC ATHEROSCLEROSIS (HCC): ICD-10-CM

## 2019-09-13 PROCEDURE — 90674 CCIIV4 VAC NO PRSV 0.5 ML IM: CPT | Performed by: PHYSICIAN ASSISTANT

## 2019-09-13 PROCEDURE — 99214 OFFICE O/P EST MOD 30 MIN: CPT | Performed by: PHYSICIAN ASSISTANT

## 2019-09-13 PROCEDURE — 96372 THER/PROPH/DIAG INJ SC/IM: CPT | Performed by: PHYSICIAN ASSISTANT

## 2019-09-13 PROCEDURE — G0008 ADMIN INFLUENZA VIRUS VAC: HCPCS | Performed by: PHYSICIAN ASSISTANT

## 2019-09-13 RX ORDER — CLOPIDOGREL BISULFATE 75 MG/1
75 TABLET ORAL DAILY
Qty: 90 TABLET | Refills: 1 | Status: SHIPPED | OUTPATIENT
Start: 2019-09-13 | End: 2020-03-13 | Stop reason: SDUPTHER

## 2019-09-13 RX ORDER — OMEGA-3-ACID ETHYL ESTERS 1 G/1
2 CAPSULE, LIQUID FILLED ORAL 2 TIMES DAILY
Qty: 120 CAPSULE | Refills: 5 | Status: SHIPPED | OUTPATIENT
Start: 2019-09-13 | End: 2020-03-13 | Stop reason: SDUPTHER

## 2019-09-13 RX ORDER — LORATADINE 10 MG/1
10 TABLET ORAL DAILY
Qty: 30 TABLET | Refills: 5 | Status: SHIPPED | OUTPATIENT
Start: 2019-09-13 | End: 2020-03-13 | Stop reason: SDUPTHER

## 2019-09-13 RX ORDER — AMLODIPINE BESYLATE AND BENAZEPRIL HYDROCHLORIDE 10; 40 MG/1; MG/1
1 CAPSULE ORAL DAILY
Qty: 90 CAPSULE | Refills: 1 | Status: SHIPPED | OUTPATIENT
Start: 2019-09-13 | End: 2020-03-13 | Stop reason: SDUPTHER

## 2019-09-13 RX ORDER — BUPROPION HYDROCHLORIDE 150 MG/1
150 TABLET, EXTENDED RELEASE ORAL EVERY 12 HOURS SCHEDULED
Qty: 60 TABLET | Refills: 5 | Status: SHIPPED | OUTPATIENT
Start: 2019-09-13 | End: 2020-03-13 | Stop reason: SDUPTHER

## 2019-09-13 RX ORDER — CYANOCOBALAMIN 1000 UG/ML
1000 INJECTION, SOLUTION INTRAMUSCULAR; SUBCUTANEOUS ONCE
Status: COMPLETED | OUTPATIENT
Start: 2019-09-13 | End: 2019-09-13

## 2019-09-13 RX ORDER — ATORVASTATIN CALCIUM 10 MG/1
10 TABLET, FILM COATED ORAL NIGHTLY
Qty: 30 TABLET | Refills: 5 | Status: SHIPPED | OUTPATIENT
Start: 2019-09-13 | End: 2020-03-13 | Stop reason: SDUPTHER

## 2019-09-13 RX ADMIN — CYANOCOBALAMIN 1000 MCG: 1000 INJECTION, SOLUTION INTRAMUSCULAR; SUBCUTANEOUS at 10:52

## 2019-09-13 NOTE — PROGRESS NOTES
Subjective   Evie Shah is a 74 y.o. female.       Chief Complaint -nausea    History of Present Illness -      Nausea-  She complains of nausea when she takes daily vitamin D.  She states that she is able to tolerate the weekly vitamin D if she eats with it.    Vitamin D deficiency- not at goal since she is not tolerating her vitamin D supplements due to nausea.    Diabetes-  Stable with Jardiance and diabetic diet  Lab Results   Component Value Date    HGBA1C 7.00 (H) 09/06/2019     Hyperlipidemia-improved with lovaza and Lipitor  Lab Results   Component Value Date    CHLPL 114 09/06/2019    CHLPL 144 03/11/2019    CHLPL 104 06/08/2018     Lab Results   Component Value Date    TRIG 211 (H) 09/06/2019    TRIG 311 (H) 03/11/2019    TRIG 187 (H) 06/08/2018     Lab Results   Component Value Date    HDL 38 (L) 09/06/2019    HDL 41 03/11/2019    HDL 34 (L) 06/08/2018     Lab Results   Component Value Date    LDL 34 09/06/2019    LDL 41 03/11/2019    LDL 33 06/08/2018     Generalized anxiety disorder-improved with Wellbutrin.  She denies any SI or HI.    Hypertension- slightly elevated today.  She states that she does not take blood pressure regularly at home but she is taking her Lotrel    Atherosclerosis-stable with risk factor modification including Plavix and Lipitor    Allergic rhinitis- stable with Flonase    B12 deficiency-stable with B12 supplementation.  She does complain of some fatigue      The following portions of the patient's history were reviewed and updated as appropriate: allergies, current medications, past family history, past medical history, past social history, past surgical history and problem list.    Review of Systems   Constitutional: Positive for fatigue. Negative for activity change, appetite change and fever.   HENT: Negative for ear pain, sinus pressure and sore throat.    Eyes: Negative for pain and visual disturbance.   Respiratory: Negative for cough and chest tightness.   "  Cardiovascular: Negative for chest pain and palpitations.   Gastrointestinal: Positive for nausea. Negative for abdominal pain, constipation, diarrhea and vomiting.   Endocrine: Negative for polydipsia and polyuria.   Genitourinary: Negative for dysuria and frequency.   Musculoskeletal: Negative for back pain and myalgias.   Skin: Negative for color change and rash.   Allergic/Immunologic: Negative for food allergies and immunocompromised state.   Neurological: Negative for dizziness, syncope and headaches.   Hematological: Negative for adenopathy. Does not bruise/bleed easily.   Psychiatric/Behavioral: Negative for hallucinations and suicidal ideas. The patient is not nervous/anxious.        /78 (BP Location: Right arm, Patient Position: Sitting)   Pulse 90   Temp 98 °F (36.7 °C) (Oral)   Resp 16   Ht 160 cm (62.99\")   Wt 83.9 kg (185 lb)   SpO2 91%   BMI 32.78 kg/m²   Lab on 09/06/2019   Component Date Value Ref Range Status   • 25 Hydroxy, Vitamin D 09/06/2019 32.6  30.0 - 100.0 ng/ml Final   • Total Cholesterol 09/06/2019 114  0 - 200 mg/dL Final   • Triglycerides 09/06/2019 211* 0 - 150 mg/dL Final   • HDL Cholesterol 09/06/2019 38* 40 - 60 mg/dL Final   • VLDL Cholesterol 09/06/2019 42.2  mg/dL Final   • LDL Cholesterol  09/06/2019 34  0 - 100 mg/dL Final   • Hemoglobin A1C 09/06/2019 7.00* 4.80 - 5.60 % Final   • Microalbumin, Urine 09/06/2019 15.9  Not Estab. ug/mL Final   • Glucose 09/06/2019 150* 65 - 99 mg/dL Final   • BUN 09/06/2019 16  8 - 23 mg/dL Final   • Creatinine 09/06/2019 0.67  0.57 - 1.00 mg/dL Final   • eGFR Non  Am 09/06/2019 87  >60 mL/min/1.73 Final   • eGFR African Am 09/06/2019 105  >60 mL/min/1.73 Final   • BUN/Creatinine Ratio 09/06/2019 23.9  7.0 - 25.0 Final   • Sodium 09/06/2019 142  136 - 145 mmol/L Final   • Potassium 09/06/2019 3.8  3.5 - 5.2 mmol/L Final   • Chloride 09/06/2019 106  98 - 107 mmol/L Final   • Total CO2 09/06/2019 21.8* 22.0 - 29.0 mmol/L " Final   • Calcium 09/06/2019 9.0  8.6 - 10.5 mg/dL Final   • Total Protein 09/06/2019 6.3  6.0 - 8.5 g/dL Final   • Albumin 09/06/2019 4.30  3.50 - 5.20 g/dL Final   • Globulin 09/06/2019 2.0  gm/dL Final   • A/G Ratio 09/06/2019 2.2  g/dL Final   • Total Bilirubin 09/06/2019 0.5  0.2 - 1.2 mg/dL Final   • Alkaline Phosphatase 09/06/2019 101  39 - 117 U/L Final   • AST (SGOT) 09/06/2019 15  1 - 32 U/L Final   • ALT (SGPT) 09/06/2019 15  1 - 33 U/L Final       Physical Exam   Constitutional: She is oriented to person, place, and time. She appears well-developed and well-nourished.   HENT:   Head: Normocephalic and atraumatic.   Nose: Nose normal.   Mouth/Throat: Oropharynx is clear and moist.   Eyes: Conjunctivae and EOM are normal. Pupils are equal, round, and reactive to light.   Neck: Normal range of motion. Neck supple. No tracheal deviation present. No thyromegaly present.   Cardiovascular: Normal rate, regular rhythm, normal heart sounds and intact distal pulses.   No murmur heard.  Pulmonary/Chest: Effort normal and breath sounds normal. No respiratory distress. She has no wheezes.   Abdominal: Soft. Bowel sounds are normal. There is no tenderness. There is no guarding.   Musculoskeletal: Normal range of motion. She exhibits no edema or tenderness.   Lymphadenopathy:     She has no cervical adenopathy.   Neurological: She is alert and oriented to person, place, and time.   Skin: Skin is warm and dry. No rash noted.   Psychiatric: She has a normal mood and affect. Her behavior is normal.   Nursing note and vitals reviewed.      Assessment/Plan     Diagnoses and all orders for this visit:    Vitamin D deficiency  Comments:  d/c daily vit d 1000iu due to nausea  continue vit d 50,000 iu weekly with food    Essential hypertension  Comments:  Advise daily blood pressure and pulse log and bring with her to the next visit.  Continue Lotrel  Orders:  -     amLODIPine-benazepril (LOTREL) 10-40 MG per capsule; Take 1  capsule by mouth Daily.    Mixed hyperlipidemia  Comments:  Continue Lovaza and Lipitor and advised low carbohydrate diet  Orders:  -     atorvastatin (LIPITOR) 10 MG tablet; Take 1 tablet by mouth Every Night.  -     omega-3 acid ethyl esters (LOVAZA) 1 g capsule; Take 2 capsules by mouth 2 (Two) Times a Day.    VISHAL (generalized anxiety disorder)  Comments:  Continue Wellbutrin  Orders:  -     buPROPion SR (WELLBUTRIN SR) 150 MG 12 hr tablet; Take 1 tablet by mouth Every 12 (Twelve) Hours.    Aorto-iliac atherosclerosis (CMS/HCC)  Comments:  Continue Plavix and Lipitor  Orders:  -     clopidogrel (PLAVIX) 75 MG tablet; Take 1 tablet by mouth Daily.    DM type 2 with diabetic mixed hyperlipidemia (CMS/HCC)  -     Empagliflozin (JARDIANCE) 25 MG tablet; Take 25 mg by mouth Daily.    Other seasonal allergic rhinitis  -     loratadine (CLARITIN) 10 MG tablet; Take 1 tablet by mouth Daily.    B12 deficiency  -     cyanocobalamin injection 1,000 mcg    Other orders  -     Flucelvax Quad=>4Years (PFS)                     This document has been electronically signed by:  Camryn Mota PA-C

## 2019-10-17 DIAGNOSIS — B37.2 CANDIDIASIS, INTERTRIGINOUS: ICD-10-CM

## 2019-10-17 RX ORDER — FLUCONAZOLE 150 MG/1
TABLET ORAL
Qty: 10 TABLET | Refills: 1 | Status: SHIPPED | OUTPATIENT
Start: 2019-10-17 | End: 2019-12-13

## 2019-10-18 ENCOUNTER — CLINICAL SUPPORT (OUTPATIENT)
Dept: FAMILY MEDICINE CLINIC | Facility: CLINIC | Age: 74
End: 2019-10-18

## 2019-10-18 DIAGNOSIS — E53.8 VITAMIN B12 DEFICIENCY: ICD-10-CM

## 2019-10-18 PROCEDURE — 96372 THER/PROPH/DIAG INJ SC/IM: CPT | Performed by: PHYSICIAN ASSISTANT

## 2019-10-18 RX ADMIN — CYANOCOBALAMIN 1000 MCG: 1000 INJECTION, SOLUTION INTRAMUSCULAR; SUBCUTANEOUS at 11:46

## 2019-11-05 ENCOUNTER — OFFICE VISIT (OUTPATIENT)
Dept: CARDIOLOGY | Facility: CLINIC | Age: 74
End: 2019-11-05

## 2019-11-05 VITALS
BODY MASS INDEX: 33.13 KG/M2 | SYSTOLIC BLOOD PRESSURE: 126 MMHG | HEART RATE: 88 BPM | WEIGHT: 187 LBS | HEIGHT: 63 IN | DIASTOLIC BLOOD PRESSURE: 66 MMHG

## 2019-11-05 DIAGNOSIS — I70.0 AORTO-ILIAC ATHEROSCLEROSIS (HCC): Primary | ICD-10-CM

## 2019-11-05 DIAGNOSIS — I70.8 AORTO-ILIAC ATHEROSCLEROSIS (HCC): Primary | ICD-10-CM

## 2019-11-05 DIAGNOSIS — I10 ESSENTIAL HYPERTENSION: ICD-10-CM

## 2019-11-05 DIAGNOSIS — E78.2 MIXED HYPERLIPIDEMIA: ICD-10-CM

## 2019-11-05 PROCEDURE — 99214 OFFICE O/P EST MOD 30 MIN: CPT | Performed by: INTERNAL MEDICINE

## 2019-11-05 NOTE — PROGRESS NOTES
[facility] Cardiology    Encounter Date:2019        Patient ID: Evie Shah is a 74 y.o. female.  : 1945     PCP: Camryn Mota PA     Chief Complaint: PAD (peripheral artery disease) (CMS/Formerly [facility])      PROBLEM LIST:  1. Peripheral arterial disease:   a. Class III left lower extremity claudication.  b. Arterial duplex, 10/27/2014: Moderate-to-severe stenosis of the left lower extremity.  c. Peripheral angiograms, 2015, Dr. Bernard: 90% stenosis of the left external iliac artery treated with 7.0 x 39 mm stent. Otherwise, non-obstructive plaque.   2. Hypertension.   3. Dyslipidemia.   4. Type 2 diabetes.   5. Obesity with BMI 32.   6. History of tobacco abuse with cessation in .  7. Osteoporosis.   8. Vertigo.   9. Surgical history:  a. Tonsillectomy and adenoidectomy.  b. Skin cancer excision from nose.    History of Present Illness  Patient presents today for annual follow-up with a history of peripheral artery disease and cardiac risk factors. Since last visit, she has been feeling well overall from a cardiovascular standpoint. She remains active with house work, with no significant limitations. She denies any chest pain, shortness of breath, claudication, palpitations, dizziness, and syncope.    No Known Allergies      Current Outpatient Medications:   •  albuterol (PROVENTIL) (2.5 MG/3ML) 0.083% nebulizer solution, Take 2.5 mg by nebulization Every 4 (Four) Hours As Needed for Wheezing., Disp: 270 mL, Rfl: 5  •  amLODIPine-benazepril (LOTREL) 10-40 MG per capsule, Take 1 capsule by mouth Daily., Disp: 90 capsule, Rfl: 1  •  aspirin 81 MG EC tablet, Take 1 tablet by mouth Daily., Disp: 30 tablet, Rfl: 5  •  atorvastatin (LIPITOR) 10 MG tablet, Take 1 tablet by mouth Every Night., Disp: 30 tablet, Rfl: 5  •  buPROPion SR (WELLBUTRIN SR) 150 MG 12 hr tablet, Take 1 tablet by mouth Every 12 (Twelve) Hours., Disp: 60 tablet, Rfl: 5  •  clopidogrel (PLAVIX)  75 MG tablet, Take 1 tablet by mouth Daily., Disp: 90 tablet, Rfl: 1  •  Empagliflozin (JARDIANCE) 25 MG tablet, Take 25 mg by mouth Daily., Disp: 30 tablet, Rfl: 5  •  fluconazole (DIFLUCAN) 150 MG tablet, TAKE 1 TABLET BY MOUTH ONCE DAILY, Disp: 10 tablet, Rfl: 1  •  fluticasone (FLONASE) 50 MCG/ACT nasal spray, 2 sprays into each nostril Daily., Disp: 18.2 mL, Rfl: 3  •  loratadine (CLARITIN) 10 MG tablet, Take 1 tablet by mouth Daily., Disp: 30 tablet, Rfl: 5  •  omega-3 acid ethyl esters (LOVAZA) 1 g capsule, Take 2 capsules by mouth 2 (Two) Times a Day., Disp: 120 capsule, Rfl: 5  •  ONE TOUCH ULTRA TEST test strip, USE ONE STRIP TO CHECK GLUCOSE ONCE DAILY, Disp: 100 each, Rfl: 5  •  ONETOUCH DELICA LANCETS 33G misc, USE A NEW LANCET TO CHECK GLUCOSE ONCE DAILY, Disp: 60 each, Rfl: 5  •  vitamin D (ERGOCALCIFEROL) 87397 units capsule capsule, Take 1 capsule by mouth 1 (One) Time Per Week., Disp: 4 capsule, Rfl: 5  No current facility-administered medications for this visit.     The following portions of the patient's history were reviewed and updated as appropriate: allergies, current medications, past family history, past medical history, past social history, past surgical history and problem list.    ROS  Review of Systems   Constitution: Negative for chills, fatigue, fever, generalized weakness, weight gain and weight loss.   Cardiovascular: Negative for chest pain, claudication, dyspnea on exertion, leg swelling, orthopnea, palpitations, paroxysmal nocturnal dyspnea and syncope.   Respiratory: Negative for cough, shortness of breath, and wheezing.  HENT: Negative for ear pain, nosebleeds, and tinnitus.  Gastrointestinal: Negative for abdominal pain, constipation, diarrhea, nausea and vomiting.   Genitourinary: No urinary symptoms   Musculoskeletal: Negative for muscle cramps.  Neurological: Negative for dizziness, headaches, loss of balance, numbness, and symptoms of stroke. Positive for  "neuropathy.  Psychiatric: Normal mental status.     All other systems reviewed and are negative.    Objective:     /66 (BP Location: Right arm, Patient Position: Sitting)   Pulse 88   Ht 160 cm (63\")   Wt 84.8 kg (187 lb)   BMI 33.13 kg/m²        Physical Exam  Constitutional: Patient appears well-developed and well-nourished.   HENT: HEENT exam unremarkable.   Neck: Neck supple. No JVD present. No carotid bruits.   Cardiovascular: Normal rate, regular rhythm and normal heart sounds. No murmur heard.   2+ symmetric pulses.   Pulmonary/Chest: Breath sounds normal. Does not exhibit tenderness.  Few rhonchi at lung bases which cleared with coughing  Abdominal: Abdomen benign.   Musculoskeletal: Does not exhibit edema.   Neurological: Neurological exam unremarkable.   Vitals reviewed.    Lab Review:   Lab Results   Component Value Date    GLUCOSE 101 (H) 02/26/2015    BUN 16 09/06/2019    CREATININE 0.67 09/06/2019    EGFRIFNONA 87 09/06/2019    EGFRIFAFRI 105 09/06/2019    BCR 23.9 09/06/2019    K 3.8 09/06/2019    CO2 21.8 (L) 09/06/2019    CALCIUM 9.0 09/06/2019    PROTENTOTREF 6.3 09/06/2019    ALBUMIN 4.30 09/06/2019    LABIL2 2.2 09/06/2019    AST 15 09/06/2019    ALT 15 09/06/2019     Lab Results   Component Value Date    CHLPL 114 09/06/2019    TRIG 211 (H) 09/06/2019    HDL 38 (L) 09/06/2019    LDL 34 09/06/2019      Lab Results   Component Value Date    WBC 9.28 03/11/2019    HGB 16.7 (H) 03/11/2019    HCT 52.0 (H) 03/11/2019    MCV 93.5 03/11/2019     03/11/2019     Lab Results   Component Value Date    TSH 3.080 03/11/2019     Lab Results   Component Value Date    HGBA1C 7.00 (H) 09/06/2019        Procedures       Assessment:      Diagnosis Plan   1. PAD status post previous intervention, stable. No current claudication. Continue current medications.   2. Essential hypertension  Well-controlled, continue current medications.   3. Mixed hyperlipidemia  Well-controlled, continue atorvastatin " 10 mg.  Monitored by PCP.     Plan:   Breathing exercises recommended.  Stable cardiac status.  Continue current medications.   FU in 12 MO, sooner as needed.  Thank you for allowing us to participate in the care of your patient.     Scribed for Rashad Bernard MD by Sole Cleemnts. 11/5/2019  1:23 PM      IRashad MD, personally performed the services described in this documentation as scribed by the above named individual in my presence, and it is both accurate and complete.  11/5/2019  1:27 PM        Please note that portions of this note may have been completed with a voice recognition program. Efforts were made to edit the dictations, but occasionally words are mistranscribed.

## 2019-11-26 ENCOUNTER — OFFICE VISIT (OUTPATIENT)
Dept: FAMILY MEDICINE CLINIC | Facility: CLINIC | Age: 74
End: 2019-11-26

## 2019-11-26 VITALS
DIASTOLIC BLOOD PRESSURE: 64 MMHG | BODY MASS INDEX: 32.96 KG/M2 | OXYGEN SATURATION: 93 % | HEART RATE: 80 BPM | TEMPERATURE: 98.5 F | SYSTOLIC BLOOD PRESSURE: 120 MMHG | WEIGHT: 186 LBS | HEIGHT: 63 IN

## 2019-11-26 DIAGNOSIS — J40 BRONCHITIS: Primary | ICD-10-CM

## 2019-11-26 PROCEDURE — 99213 OFFICE O/P EST LOW 20 MIN: CPT | Performed by: PHYSICIAN ASSISTANT

## 2019-11-26 RX ORDER — CEFDINIR 300 MG/1
600 CAPSULE ORAL DAILY
Qty: 20 CAPSULE | Refills: 0 | Status: SHIPPED | OUTPATIENT
Start: 2019-11-26 | End: 2019-12-06

## 2019-11-26 NOTE — PROGRESS NOTES
"Subjective   Evie Shah is a 74 y.o. female.       Chief Complaint -cough    History of Present Illness -       Cough-  She complains of productive cough with green thick sputum and associated sore throat for the past 4 days.  Minimal relief with Coricidin HBP.    The following portions of the patient's history were reviewed and updated as appropriate: allergies, current medications, past family history, past medical history, past social history, past surgical history and problem list.    Review of Systems   Constitutional: Positive for chills and fatigue. Negative for activity change and appetite change.   HENT: Positive for congestion. Negative for ear pain, sinus pressure and sore throat.    Eyes: Negative for pain and visual disturbance.   Respiratory: Positive for cough. Negative for chest tightness.    Cardiovascular: Negative for chest pain and palpitations.   Gastrointestinal: Negative for abdominal pain, constipation, diarrhea, nausea and vomiting.   Endocrine: Negative for polydipsia and polyuria.   Genitourinary: Negative for dysuria and frequency.   Musculoskeletal: Negative for back pain and myalgias.   Skin: Negative for color change and rash.   Allergic/Immunologic: Negative for food allergies and immunocompromised state.   Neurological: Negative for dizziness, syncope and headaches.   Hematological: Negative for adenopathy. Does not bruise/bleed easily.   Psychiatric/Behavioral: Negative for hallucinations and suicidal ideas. The patient is not nervous/anxious.        /64   Pulse 80   Temp 98.5 °F (36.9 °C) (Oral)   Ht 160 cm (62.99\")   Wt 84.4 kg (186 lb)   SpO2 93%   BMI 32.96 kg/m²     Physical Exam   Constitutional: She is oriented to person, place, and time. She appears well-developed and well-nourished. No distress.   HENT:   Head: Normocephalic and atraumatic.   Right Ear: Tympanic membrane, external ear and ear canal normal.   Left Ear: Tympanic membrane, external ear and " ear canal normal.   Oropharynx erythematous without exudates   Neck: Normal range of motion. Neck supple. No thyromegaly present.   Cardiovascular: Normal rate, regular rhythm and normal heart sounds.   No murmur heard.  Pulmonary/Chest: Effort normal. She has no wheezes. She has no rales.   Bronchovesicular breath sounds noted bilaterally   Lymphadenopathy:     She has no cervical adenopathy.   Neurological: She is alert and oriented to person, place, and time.   Skin: Skin is warm and dry. No rash noted. She is not diaphoretic.   Psychiatric: She has a normal mood and affect. Her behavior is normal.   Nursing note and vitals reviewed.      Assessment/Plan     Diagnoses and all orders for this visit:    Bronchitis  -     cefdinir (OMNICEF) 300 MG capsule; Take 2 capsules by mouth Daily for 10 days.                           This document has been electronically signed by:  Camryn Mota PA-C

## 2019-12-06 RX ORDER — LIDOCAINE AND PRILOCAINE 25; 25 MG/G; MG/G
CREAM TOPICAL
Qty: 240 G | Refills: 5 | Status: SHIPPED | OUTPATIENT
Start: 2019-12-06 | End: 2022-08-12

## 2019-12-13 ENCOUNTER — OFFICE VISIT (OUTPATIENT)
Dept: FAMILY MEDICINE CLINIC | Facility: CLINIC | Age: 74
End: 2019-12-13

## 2019-12-13 VITALS
TEMPERATURE: 98.4 F | WEIGHT: 186.4 LBS | HEIGHT: 63 IN | HEART RATE: 80 BPM | SYSTOLIC BLOOD PRESSURE: 140 MMHG | DIASTOLIC BLOOD PRESSURE: 70 MMHG | OXYGEN SATURATION: 91 % | BODY MASS INDEX: 33.03 KG/M2

## 2019-12-13 DIAGNOSIS — M81.0 SENILE OSTEOPOROSIS: ICD-10-CM

## 2019-12-13 DIAGNOSIS — E78.2 MIXED HYPERLIPIDEMIA: ICD-10-CM

## 2019-12-13 DIAGNOSIS — E53.8 VITAMIN B12 DEFICIENCY: ICD-10-CM

## 2019-12-13 DIAGNOSIS — E11.69 DM TYPE 2 WITH DIABETIC MIXED HYPERLIPIDEMIA (HCC): Primary | ICD-10-CM

## 2019-12-13 DIAGNOSIS — E78.2 DM TYPE 2 WITH DIABETIC MIXED HYPERLIPIDEMIA (HCC): Primary | ICD-10-CM

## 2019-12-13 DIAGNOSIS — E55.9 VITAMIN D DEFICIENCY: ICD-10-CM

## 2019-12-13 DIAGNOSIS — I10 ESSENTIAL HYPERTENSION: ICD-10-CM

## 2019-12-13 PROCEDURE — 96372 THER/PROPH/DIAG INJ SC/IM: CPT | Performed by: PHYSICIAN ASSISTANT

## 2019-12-13 PROCEDURE — 99214 OFFICE O/P EST MOD 30 MIN: CPT | Performed by: PHYSICIAN ASSISTANT

## 2019-12-13 RX ORDER — AZITHROMYCIN 250 MG/1
TABLET, FILM COATED ORAL
Qty: 6 TABLET | Refills: 0 | Status: SHIPPED | OUTPATIENT
Start: 2019-12-13 | End: 2020-03-13

## 2019-12-13 RX ORDER — ERGOCALCIFEROL 1.25 MG/1
50000 CAPSULE ORAL WEEKLY
Qty: 4 CAPSULE | Refills: 5 | Status: SHIPPED | OUTPATIENT
Start: 2019-12-13 | End: 2020-03-13 | Stop reason: SDUPTHER

## 2019-12-13 RX ORDER — CYANOCOBALAMIN 1000 UG/ML
1000 INJECTION, SOLUTION INTRAMUSCULAR; SUBCUTANEOUS
Status: DISCONTINUED | OUTPATIENT
Start: 2019-12-13 | End: 2020-08-27

## 2019-12-13 RX ORDER — ZOLEDRONIC ACID 5 MG/100ML
5 INJECTION, SOLUTION INTRAVENOUS ONCE
Qty: 100 ML | Refills: 0
Start: 2019-12-13 | End: 2019-12-13

## 2019-12-13 RX ADMIN — CYANOCOBALAMIN 1000 MCG: 1000 INJECTION, SOLUTION INTRAMUSCULAR; SUBCUTANEOUS at 10:44

## 2019-12-13 NOTE — PROGRESS NOTES
Subjective   Evie Shah is a 74 y.o. female.       Chief Complaint -diabetes    History of Present Illness -      Diabetes mellitus type 2-  Controlled with Jardiance and diet  Lab Results   Component Value Date    HGBA1C 7.00 (H) 09/06/2019     Hypertension- stable with Lotrel.  She reports home blood pressure less than 130/80 consistently    Hyperlipidemia-improved with Lipitor  Lab Results   Component Value Date    CHLPL 114 09/06/2019    CHLPL 144 03/11/2019    CHLPL 104 06/08/2018     Lab Results   Component Value Date    TRIG 211 (H) 09/06/2019    TRIG 311 (H) 03/11/2019    TRIG 187 (H) 06/08/2018     Lab Results   Component Value Date    HDL 38 (L) 09/06/2019    HDL 41 03/11/2019    HDL 34 (L) 06/08/2018     Lab Results   Component Value Date    LDL 34 09/06/2019    LDL 41 03/11/2019    LDL 33 06/08/2018     B12 deficiency-stable with B12 supplementation    Osteoporosis-  She is unable to tolerate oral bisphosphonates such as Fosamax due to esophageal reflux.  Stable with the use of Reclast yearly calcium and vitamin D.    Vitamin D deficiency-stable with vitamin D supplementation    The following portions of the patient's history were reviewed and updated as appropriate: allergies, current medications, past family history, past medical history, past social history, past surgical history and problem list.    Review of Systems   Constitutional: Positive for fatigue (chronic with b12 def). Negative for activity change, appetite change and fever.   HENT: Negative for ear pain, sinus pressure and sore throat.    Eyes: Negative for pain and visual disturbance.   Respiratory: Negative for cough and chest tightness.    Cardiovascular: Negative for chest pain and palpitations.   Gastrointestinal: Negative for abdominal pain, constipation, diarrhea, nausea and vomiting.   Endocrine: Negative for polydipsia and polyuria.   Genitourinary: Negative for dysuria and frequency.   Musculoskeletal: Negative for back  "pain and myalgias.   Skin: Negative for color change and rash.   Allergic/Immunologic: Negative for food allergies and immunocompromised state.   Neurological: Negative for dizziness, syncope and headaches.   Hematological: Negative for adenopathy. Does not bruise/bleed easily.   Psychiatric/Behavioral: Negative for hallucinations and suicidal ideas. The patient is not nervous/anxious.        /70 (BP Location: Right arm, Patient Position: Sitting, Cuff Size: Adult)   Pulse 80   Temp 98.4 °F (36.9 °C) (Temporal)   Ht 160 cm (62.99\")   Wt 84.6 kg (186 lb 6.4 oz)   SpO2 91%   BMI 33.03 kg/m²   Lab on 09/06/2019   Component Date Value Ref Range Status   • 25 Hydroxy, Vitamin D 09/06/2019 32.6  30.0 - 100.0 ng/ml Final   • Total Cholesterol 09/06/2019 114  0 - 200 mg/dL Final   • Triglycerides 09/06/2019 211* 0 - 150 mg/dL Final   • HDL Cholesterol 09/06/2019 38* 40 - 60 mg/dL Final   • VLDL Cholesterol 09/06/2019 42.2  mg/dL Final   • LDL Cholesterol  09/06/2019 34  0 - 100 mg/dL Final   • Hemoglobin A1C 09/06/2019 7.00* 4.80 - 5.60 % Final   • Microalbumin, Urine 09/06/2019 15.9  Not Estab. ug/mL Final   • Glucose 09/06/2019 150* 65 - 99 mg/dL Final   • BUN 09/06/2019 16  8 - 23 mg/dL Final   • Creatinine 09/06/2019 0.67  0.57 - 1.00 mg/dL Final   • eGFR Non  Am 09/06/2019 87  >60 mL/min/1.73 Final   • eGFR African Am 09/06/2019 105  >60 mL/min/1.73 Final   • BUN/Creatinine Ratio 09/06/2019 23.9  7.0 - 25.0 Final   • Sodium 09/06/2019 142  136 - 145 mmol/L Final   • Potassium 09/06/2019 3.8  3.5 - 5.2 mmol/L Final   • Chloride 09/06/2019 106  98 - 107 mmol/L Final   • Total CO2 09/06/2019 21.8* 22.0 - 29.0 mmol/L Final   • Calcium 09/06/2019 9.0  8.6 - 10.5 mg/dL Final   • Total Protein 09/06/2019 6.3  6.0 - 8.5 g/dL Final   • Albumin 09/06/2019 4.30  3.50 - 5.20 g/dL Final   • Globulin 09/06/2019 2.0  gm/dL Final   • A/G Ratio 09/06/2019 2.2  g/dL Final   • Total Bilirubin 09/06/2019 0.5  0.2 - 1.2 " mg/dL Final   • Alkaline Phosphatase 09/06/2019 101  39 - 117 U/L Final   • AST (SGOT) 09/06/2019 15  1 - 32 U/L Final   • ALT (SGPT) 09/06/2019 15  1 - 33 U/L Final       Physical Exam   Constitutional: She is oriented to person, place, and time. She appears well-developed and well-nourished.   HENT:   Head: Normocephalic and atraumatic.   Nose: Nose normal.   Mouth/Throat: Oropharynx is clear and moist.   Eyes: Pupils are equal, round, and reactive to light. Conjunctivae and EOM are normal.   Neck: Normal range of motion. Neck supple. No tracheal deviation present. No thyromegaly present.   Cardiovascular: Normal rate, regular rhythm, normal heart sounds and intact distal pulses.   No murmur heard.  Pulmonary/Chest: Effort normal and breath sounds normal. No respiratory distress. She has no wheezes.   Abdominal: Soft. Bowel sounds are normal. There is no tenderness. There is no guarding.   Musculoskeletal: Normal range of motion. She exhibits no edema or tenderness.   Lymphadenopathy:     She has no cervical adenopathy.   Neurological: She is alert and oriented to person, place, and time.   Skin: Skin is warm and dry. No rash noted.   Psychiatric: She has a normal mood and affect. Her behavior is normal.   Nursing note and vitals reviewed.      Assessment/Plan     Diagnoses and all orders for this visit:    DM type 2 with diabetic mixed hyperlipidemia (CMS/HCC)  Comments:  Continue Jardiance and diet  Orders:  -     Hemoglobin A1c; Future  -     MicroAlbumin, Urine, Random - Urine, Clean Catch; Future    Vitamin B12 deficiency  -     cyanocobalamin injection 1,000 mcg    Essential hypertension  Comments:  Continue Lotrel  Orders:  -     Comprehensive Metabolic Panel; Future    Mixed hyperlipidemia  Comments:  Continue Lipitor and advised lower carbohydrate diet due to continued elevation in triglycerides  Orders:  -     Lipid Panel; Future    Vitamin D deficiency  -     vitamin D (ERGOCALCIFEROL) 1.25 MG (38803  UT) capsule capsule; Take 1 capsule by mouth 1 (One) Time Per Week.  -     Vitamin D 25 Hydroxy; Future    Senile osteoporosis  -     zoledronic acid (RECLAST) 5 MG/100ML solution infusion; Infuse 100 mL into a venous catheter 1 (One) Time for 1 dose.  -     Ambulatory Referral to [facility] For Infusion Treatment    Other orders  -     azithromycin (ZITHROMAX) 250 MG tablet; Take 2 tablets the first day, then 1 tablet daily for 4 days.                 This document has been electronically signed by:  Camryn Mota PA-C

## 2020-01-09 ENCOUNTER — OFFICE VISIT (OUTPATIENT)
Dept: FAMILY MEDICINE CLINIC | Facility: CLINIC | Age: 75
End: 2020-01-09

## 2020-01-09 VITALS
DIASTOLIC BLOOD PRESSURE: 88 MMHG | SYSTOLIC BLOOD PRESSURE: 160 MMHG | HEART RATE: 107 BPM | BODY MASS INDEX: 33.31 KG/M2 | HEIGHT: 63 IN | TEMPERATURE: 98.3 F | WEIGHT: 188 LBS | OXYGEN SATURATION: 89 %

## 2020-01-09 DIAGNOSIS — I10 ESSENTIAL HYPERTENSION: ICD-10-CM

## 2020-01-09 DIAGNOSIS — J10.1 INFLUENZA A: Primary | ICD-10-CM

## 2020-01-09 DIAGNOSIS — E78.2 DM TYPE 2 WITH DIABETIC MIXED HYPERLIPIDEMIA (HCC): ICD-10-CM

## 2020-01-09 DIAGNOSIS — E78.2 MIXED HYPERLIPIDEMIA: ICD-10-CM

## 2020-01-09 DIAGNOSIS — E11.69 DM TYPE 2 WITH DIABETIC MIXED HYPERLIPIDEMIA (HCC): ICD-10-CM

## 2020-01-09 LAB
EXPIRATION DATE: ABNORMAL
FLUAV AG NPH QL: POSITIVE
FLUBV AG NPH QL: NEGATIVE
INTERNAL CONTROL: ABNORMAL
Lab: ABNORMAL

## 2020-01-09 PROCEDURE — 87804 INFLUENZA ASSAY W/OPTIC: CPT | Performed by: PHYSICIAN ASSISTANT

## 2020-01-09 PROCEDURE — 99214 OFFICE O/P EST MOD 30 MIN: CPT | Performed by: PHYSICIAN ASSISTANT

## 2020-01-10 ENCOUNTER — TELEPHONE (OUTPATIENT)
Dept: FAMILY MEDICINE CLINIC | Facility: CLINIC | Age: 75
End: 2020-01-10

## 2020-01-10 NOTE — PROGRESS NOTES
Subjective   Evie Shah is a 74 y.o. female.       Chief Complaint -cough    History of Present Illness -      Cough-  She complains of sudden onset of nausea vomiting diarrhea and dry cough 1 week ago.  She reports that vomiting and diarrhea have subsided but she continues to cough and has extreme fatigue.  Minimal relief with azithromycin.    Hypertension-  Uncontrolled today with elevated blood pressure despite taking Lotrel.  May be elevated today due to acute illness    Diabetes mellitus type 2-  Stable with diet.  Lab Results   Component Value Date    HGBA1C 7.00 (H) 09/06/2019     Hyperlipidemia-stable with Lipitor  Lab Results   Component Value Date    CHLPL 114 09/06/2019    CHLPL 144 03/11/2019    CHLPL 104 06/08/2018     Lab Results   Component Value Date    TRIG 211 (H) 09/06/2019    TRIG 311 (H) 03/11/2019    TRIG 187 (H) 06/08/2018     Lab Results   Component Value Date    HDL 38 (L) 09/06/2019    HDL 41 03/11/2019    HDL 34 (L) 06/08/2018     Lab Results   Component Value Date    LDL 34 09/06/2019    LDL 41 03/11/2019    LDL 33 06/08/2018     Rapid influenza testing in office today was positive     The following portions of the patient's history were reviewed and updated as appropriate: allergies, current medications, past family history, past medical history, past social history, past surgical history and problem list.    Review of Systems   Constitutional: Positive for appetite change and fatigue. Negative for activity change and fever.   HENT: Positive for congestion. Negative for ear pain, sinus pressure and sore throat.    Eyes: Negative for pain and visual disturbance.   Respiratory: Positive for cough. Negative for chest tightness.    Cardiovascular: Negative for chest pain and palpitations.   Gastrointestinal: Positive for diarrhea, nausea and vomiting. Negative for abdominal pain and constipation.   Endocrine: Negative for polydipsia and polyuria.   Genitourinary: Negative for  "dysuria and frequency.   Musculoskeletal: Negative for back pain and myalgias.   Skin: Negative for color change and rash.   Allergic/Immunologic: Negative for food allergies and immunocompromised state.   Neurological: Positive for headaches. Negative for dizziness and syncope.   Hematological: Negative for adenopathy. Does not bruise/bleed easily.   Psychiatric/Behavioral: Negative for hallucinations and suicidal ideas. The patient is not nervous/anxious.        /88   Pulse 107   Temp 98.3 °F (36.8 °C) (Oral)   Ht 160 cm (62.99\")   Wt 85.3 kg (188 lb)   SpO2 (!) 89%   BMI 33.31 kg/m²   Lab on 09/06/2019   Component Date Value Ref Range Status   • 25 Hydroxy, Vitamin D 09/06/2019 32.6  30.0 - 100.0 ng/ml Final   • Total Cholesterol 09/06/2019 114  0 - 200 mg/dL Final   • Triglycerides 09/06/2019 211* 0 - 150 mg/dL Final   • HDL Cholesterol 09/06/2019 38* 40 - 60 mg/dL Final   • VLDL Cholesterol 09/06/2019 42.2  mg/dL Final   • LDL Cholesterol  09/06/2019 34  0 - 100 mg/dL Final   • Hemoglobin A1C 09/06/2019 7.00* 4.80 - 5.60 % Final   • Microalbumin, Urine 09/06/2019 15.9  Not Estab. ug/mL Final   • Glucose 09/06/2019 150* 65 - 99 mg/dL Final   • BUN 09/06/2019 16  8 - 23 mg/dL Final   • Creatinine 09/06/2019 0.67  0.57 - 1.00 mg/dL Final   • eGFR Non  Am 09/06/2019 87  >60 mL/min/1.73 Final   • eGFR African Am 09/06/2019 105  >60 mL/min/1.73 Final   • BUN/Creatinine Ratio 09/06/2019 23.9  7.0 - 25.0 Final   • Sodium 09/06/2019 142  136 - 145 mmol/L Final   • Potassium 09/06/2019 3.8  3.5 - 5.2 mmol/L Final   • Chloride 09/06/2019 106  98 - 107 mmol/L Final   • Total CO2 09/06/2019 21.8* 22.0 - 29.0 mmol/L Final   • Calcium 09/06/2019 9.0  8.6 - 10.5 mg/dL Final   • Total Protein 09/06/2019 6.3  6.0 - 8.5 g/dL Final   • Albumin 09/06/2019 4.30  3.50 - 5.20 g/dL Final   • Globulin 09/06/2019 2.0  gm/dL Final   • A/G Ratio 09/06/2019 2.2  g/dL Final   • Total Bilirubin 09/06/2019 0.5  0.2 - 1.2 " mg/dL Final   • Alkaline Phosphatase 09/06/2019 101  39 - 117 U/L Final   • AST (SGOT) 09/06/2019 15  1 - 32 U/L Final   • ALT (SGPT) 09/06/2019 15  1 - 33 U/L Final       Physical Exam   Constitutional: She is oriented to person, place, and time. She appears well-developed. No distress.   Lethargic pleasant lady   HENT:   Head: Normocephalic and atraumatic.   Nose: Nose normal.   Oropharynx erythematous without exudates   Eyes: Pupils are equal, round, and reactive to light. Conjunctivae and EOM are normal.   Neck: Normal range of motion. Neck supple. No tracheal deviation present. No thyromegaly present.   Cardiovascular: Normal rate, regular rhythm, normal heart sounds and intact distal pulses.   No murmur heard.  Pulmonary/Chest: Effort normal and breath sounds normal. No respiratory distress. She has no wheezes.   Abdominal: Soft. Bowel sounds are normal. There is no tenderness. There is no guarding.   Musculoskeletal: Normal range of motion. She exhibits no edema or tenderness.   Lymphadenopathy:     She has no cervical adenopathy.   Neurological: She is alert and oriented to person, place, and time.   Skin: Skin is warm and dry. No rash noted. She is not diaphoretic.   Psychiatric: She has a normal mood and affect. Her behavior is normal.   Nursing note and vitals reviewed.      Assessment/Plan     Diagnoses and all orders for this visit:    Influenza A  Comments:  Symptomatic care advised.  She was advised to stay well-hydrated and to RTC if symptoms persist >5 days.  Orders:  -     POCT Influenza A/B    Essential hypertension  Comments:  Continue Lotrel  Continue to monitor as this may be elevated today due to acute illness    DM type 2 with diabetic mixed hyperlipidemia (CMS/HCC)  Comments:  Continue diabetic diet and advised patient that she may have temporarily elevated glucose due to acute illness    Mixed hyperlipidemia  Comments:  Continue Lipitor  Advised low-cholesterol diet            This document  has been electronically signed by:  Camryn Mota PA-C

## 2020-01-10 NOTE — TELEPHONE ENCOUNTER
I CALLED Concepcion TO SEE HOW SHE WAS DOING AND SHE SAID SHE WAS FEELING BETTER AND DOING JUST WHAT SHAY TOLD HER TO DO        ----- Message from RUBI Michael sent at 1/10/2020 12:52 PM EST -----  Please contact Concepcion today and check on her.  She was diagnosed with influenza yesterday and her O2 sat was only 89%.  How she feeling today?  How is her cough better or worse?  Does she need anything?

## 2020-01-24 ENCOUNTER — RESULTS ENCOUNTER (OUTPATIENT)
Dept: FAMILY MEDICINE CLINIC | Facility: CLINIC | Age: 75
End: 2020-01-24

## 2020-01-24 DIAGNOSIS — E78.2 MIXED HYPERLIPIDEMIA: ICD-10-CM

## 2020-01-25 ENCOUNTER — OFFICE VISIT (OUTPATIENT)
Dept: FAMILY MEDICINE CLINIC | Facility: CLINIC | Age: 75
End: 2020-01-25

## 2020-01-25 VITALS
RESPIRATION RATE: 14 BRPM | WEIGHT: 185.4 LBS | SYSTOLIC BLOOD PRESSURE: 130 MMHG | BODY MASS INDEX: 32.85 KG/M2 | HEART RATE: 81 BPM | HEIGHT: 63 IN | TEMPERATURE: 98 F | OXYGEN SATURATION: 91 % | DIASTOLIC BLOOD PRESSURE: 82 MMHG

## 2020-01-25 DIAGNOSIS — J06.9 UPPER RESPIRATORY TRACT INFECTION, UNSPECIFIED TYPE: ICD-10-CM

## 2020-01-25 DIAGNOSIS — J40 BRONCHITIS: Primary | ICD-10-CM

## 2020-01-25 LAB
EXPIRATION DATE: NORMAL
FLUAV AG NPH QL: NEGATIVE
FLUBV AG NPH QL: NEGATIVE
INTERNAL CONTROL: NORMAL
Lab: NORMAL

## 2020-01-25 PROCEDURE — 99213 OFFICE O/P EST LOW 20 MIN: CPT | Performed by: NURSE PRACTITIONER

## 2020-01-25 PROCEDURE — 87804 INFLUENZA ASSAY W/OPTIC: CPT | Performed by: NURSE PRACTITIONER

## 2020-01-25 PROCEDURE — 96372 THER/PROPH/DIAG INJ SC/IM: CPT | Performed by: NURSE PRACTITIONER

## 2020-01-25 RX ORDER — ALBUTEROL SULFATE 90 UG/1
2 AEROSOL, METERED RESPIRATORY (INHALATION) EVERY 4 HOURS PRN
Qty: 1 INHALER | Refills: 1 | Status: SHIPPED | OUTPATIENT
Start: 2020-01-25 | End: 2020-03-13 | Stop reason: SDUPTHER

## 2020-01-25 RX ORDER — METHYLPREDNISOLONE ACETATE 80 MG/ML
80 INJECTION, SUSPENSION INTRA-ARTICULAR; INTRALESIONAL; INTRAMUSCULAR; SOFT TISSUE ONCE
Status: COMPLETED | OUTPATIENT
Start: 2020-01-25 | End: 2020-01-25

## 2020-01-25 RX ORDER — BENZONATATE 200 MG/1
200 CAPSULE ORAL 3 TIMES DAILY PRN
Qty: 20 CAPSULE | Refills: 0 | Status: SHIPPED | OUTPATIENT
Start: 2020-01-25 | End: 2020-05-29

## 2020-01-25 RX ADMIN — METHYLPREDNISOLONE ACETATE 80 MG: 80 INJECTION, SUSPENSION INTRA-ARTICULAR; INTRALESIONAL; INTRAMUSCULAR; SOFT TISSUE at 10:49

## 2020-01-25 NOTE — PROGRESS NOTES
Evie Shah is a 74 y.o. female who presents to the clinic today c/o upper respiratory symptoms which started three to four weeks ago. In that time, she has been diagnosed with Strep throat and Influenza A during different episodes. Associated symptoms include congestion, ear pressure, headache, nausea, rhinorrhea, and cough with associated wheezing. She had felt better in the last week but then went shopping and the next day, she was developing respiratory symptoms. She has tried no medications or home remedies for her symptoms.     URI    The current episode started 1 to 4 weeks ago. The problem has been waxing and waning. There has been no fever. Associated symptoms include congestion, coughing, ear pain, headaches, nausea, rhinorrhea and wheezing. Pertinent negatives include no chest pain, sore throat or vomiting. She has tried nothing for the symptoms.      Refer to ROS for additional information.    The following portions of the patient's history were reviewed and updated as appropriate: allergies, current medications, past family history, past medical history, past social history, past surgical history and problem list.    Current Outpatient Medications:   •  albuterol (PROVENTIL) (2.5 MG/3ML) 0.083% nebulizer solution, Take 2.5 mg by nebulization Every 4 (Four) Hours As Needed for Wheezing., Disp: 270 mL, Rfl: 5  •  amLODIPine-benazepril (LOTREL) 10-40 MG per capsule, Take 1 capsule by mouth Daily., Disp: 90 capsule, Rfl: 1  •  aspirin 81 MG EC tablet, Take 1 tablet by mouth Daily., Disp: 30 tablet, Rfl: 5  •  atorvastatin (LIPITOR) 10 MG tablet, Take 1 tablet by mouth Every Night., Disp: 30 tablet, Rfl: 5  •  buPROPion SR (WELLBUTRIN SR) 150 MG 12 hr tablet, Take 1 tablet by mouth Every 12 (Twelve) Hours., Disp: 60 tablet, Rfl: 5  •  clopidogrel (PLAVIX) 75 MG tablet, Take 1 tablet by mouth Daily., Disp: 90 tablet, Rfl: 1  •  Empagliflozin (JARDIANCE) 25 MG tablet, Take 25 mg by mouth Daily.,  Disp: 30 tablet, Rfl: 5  •  fluticasone (FLONASE) 50 MCG/ACT nasal spray, 2 sprays into each nostril Daily., Disp: 18.2 mL, Rfl: 3  •  lidocaine-prilocaine (EMLA) 2.5-2.5 % cream, APPLY 1-2 GRAMS (1-2 PUMPS) TO AFFECTED AREA 3-4 TIMES DAILY, Disp: 240 g, Rfl: 5  •  loratadine (CLARITIN) 10 MG tablet, Take 1 tablet by mouth Daily., Disp: 30 tablet, Rfl: 5  •  omega-3 acid ethyl esters (LOVAZA) 1 g capsule, Take 2 capsules by mouth 2 (Two) Times a Day., Disp: 120 capsule, Rfl: 5  •  ONE TOUCH ULTRA TEST test strip, USE ONE STRIP TO CHECK GLUCOSE ONCE DAILY, Disp: 100 each, Rfl: 5  •  ONETOUCH DELICA LANCETS 33G misc, USE A NEW LANCET TO CHECK GLUCOSE ONCE DAILY, Disp: 60 each, Rfl: 5  •  vitamin D (ERGOCALCIFEROL) 1.25 MG (91807 UT) capsule capsule, Take 1 capsule by mouth 1 (One) Time Per Week., Disp: 4 capsule, Rfl: 5  •  albuterol sulfate  (90 Base) MCG/ACT inhaler, Inhale 2 puffs Every 4 (Four) Hours As Needed for Shortness of Air., Disp: 1 inhaler, Rfl: 1  •  azithromycin (ZITHROMAX) 250 MG tablet, Take 2 tablets the first day, then 1 tablet daily for 4 days., Disp: 6 tablet, Rfl: 0  •  benzonatate (TESSALON) 200 MG capsule, Take 1 capsule by mouth 3 (Three) Times a Day As Needed for Cough., Disp: 20 capsule, Rfl: 0    Current Facility-Administered Medications:   •  cyanocobalamin injection 1,000 mcg, 1,000 mcg, Intramuscular, Q28 Days, Camryn Mota PA, 1,000 mcg at 12/13/19 1044    No Known Allergies    Review of Systems   Constitutional: Positive for appetite change, fatigue and fever.   HENT: Positive for congestion, ear pain, postnasal drip, rhinorrhea and sinus pressure. Negative for sore throat.    Respiratory: Positive for cough, chest tightness and wheezing.    Cardiovascular: Negative for chest pain.   Gastrointestinal: Positive for nausea. Negative for vomiting.   Neurological: Positive for headaches.   Hematological: Positive for adenopathy.   All other systems reviewed and are  "negative.    Visit Vitals  /82   Pulse 81   Temp 98 °F (36.7 °C) (Temporal)   Resp 14   Ht 160 cm (62.99\")   Wt 84.1 kg (185 lb 6.4 oz)   SpO2 91%   BMI 32.85 kg/m²     Physical Exam   Constitutional: She is oriented to person, place, and time. She appears well-developed and well-nourished. No distress.   HENT:   Head: Normocephalic.   Right Ear: Ear canal normal. Tympanic membrane is not erythematous and not bulging. A middle ear effusion is present.   Left Ear: Ear canal normal. Tympanic membrane is not erythematous and not bulging. A middle ear effusion is present.   Nose: Mucosal edema and rhinorrhea present. Right sinus exhibits no maxillary sinus tenderness and no frontal sinus tenderness. Left sinus exhibits no maxillary sinus tenderness and no frontal sinus tenderness.   Mouth/Throat: Oropharynx is clear and moist and mucous membranes are normal. No oropharyngeal exudate.   Eyes: Pupils are equal, round, and reactive to light. Conjunctivae are normal. Right eye exhibits no discharge. Left eye exhibits no discharge. No scleral icterus.   Neck: Neck supple.   Cardiovascular: Normal rate, regular rhythm and normal heart sounds. Exam reveals no friction rub.   No murmur heard.  Pulmonary/Chest: Effort normal. No respiratory distress. She has decreased breath sounds. She has no wheezes. She has no rhonchi. She has no rales.   Abdominal: There is no tenderness. There is no guarding.   Musculoskeletal: She exhibits no edema.   Lymphadenopathy:     She has no cervical adenopathy.   Neurological: She is alert and oriented to person, place, and time.   Skin: Skin is warm and dry. Capillary refill takes less than 2 seconds. No rash noted. No erythema.   Psychiatric: She has a normal mood and affect. Her behavior is normal. Judgment and thought content normal.   Nursing note and vitals reviewed.    Results for orders placed or performed in visit on 01/25/20   POC Influenza A / B   Result Value Ref Range    Rapid " Influenza A Ag Negative Negative    Rapid Influenza B Ag Negative Negative    Internal Control Passed Passed    Lot Number [id number]     Expiration Date 09/30/2020        Assessment/Plan   Diagnoses and all orders for this visit:    Bronchitis  -     methylPREDNISolone acetate (DEPO-medrol) injection 80 mg  -     benzonatate (TESSALON) 200 MG capsule; Take 1 capsule by mouth 3 (Three) Times a Day As Needed for Cough.  -     albuterol sulfate  (90 Base) MCG/ACT inhaler; Inhale 2 puffs Every 4 (Four) Hours As Needed for Shortness of Air.    Upper respiratory tract infection, unspecified type  -     POC Influenza A / B    Findings and recommendations discussed with Evie. Reviewed results of her Influenza A&B tests which were negative. Treatment options discussed. Counseled regarding supportive care measures. She does have a nebulizer at home but has not been using it. Recommended she use it consistently for the next two to three days. Encouraged her to seek further medical evaluation  if symptoms worsen or do not improve within 48-72 hours.       This document has been electronically signed by JOAQUINA Arellano, AB OLIVAS  January 26, 2020 11:54 AM

## 2020-03-09 ENCOUNTER — LAB (OUTPATIENT)
Dept: FAMILY MEDICINE CLINIC | Facility: CLINIC | Age: 75
End: 2020-03-09

## 2020-03-09 DIAGNOSIS — E78.2 DM TYPE 2 WITH DIABETIC MIXED HYPERLIPIDEMIA (HCC): ICD-10-CM

## 2020-03-09 DIAGNOSIS — I10 ESSENTIAL HYPERTENSION: ICD-10-CM

## 2020-03-09 DIAGNOSIS — E11.69 DM TYPE 2 WITH DIABETIC MIXED HYPERLIPIDEMIA (HCC): ICD-10-CM

## 2020-03-09 DIAGNOSIS — E55.9 VITAMIN D DEFICIENCY: ICD-10-CM

## 2020-03-10 LAB
25(OH)D3+25(OH)D2 SERPL-MCNC: 32.3 NG/ML (ref 30–100)
ALBUMIN SERPL-MCNC: 4.2 G/DL (ref 3.5–5.2)
ALBUMIN/GLOB SERPL: 1.7 G/DL
ALP SERPL-CCNC: 121 U/L (ref 39–117)
ALT SERPL-CCNC: 14 U/L (ref 1–33)
AST SERPL-CCNC: 9 U/L (ref 1–32)
BILIRUB SERPL-MCNC: 0.5 MG/DL (ref 0.2–1.2)
BUN SERPL-MCNC: 16 MG/DL (ref 8–23)
BUN/CREAT SERPL: 21.6 (ref 7–25)
CALCIUM SERPL-MCNC: 9.2 MG/DL (ref 8.6–10.5)
CHLORIDE SERPL-SCNC: 104 MMOL/L (ref 98–107)
CHOLEST SERPL-MCNC: 131 MG/DL (ref 0–200)
CO2 SERPL-SCNC: 21.2 MMOL/L (ref 22–29)
CREAT SERPL-MCNC: 0.74 MG/DL (ref 0.57–1)
GLOBULIN SER CALC-MCNC: 2.5 GM/DL
GLUCOSE SERPL-MCNC: 174 MG/DL (ref 65–99)
HBA1C MFR BLD: 7.2 % (ref 4.8–5.6)
HDLC SERPL-MCNC: 43 MG/DL (ref 40–60)
LDLC SERPL CALC-MCNC: 55 MG/DL (ref 0–100)
POTASSIUM SERPL-SCNC: 4 MMOL/L (ref 3.5–5.2)
PROT SERPL-MCNC: 6.7 G/DL (ref 6–8.5)
SODIUM SERPL-SCNC: 140 MMOL/L (ref 136–145)
TRIGL SERPL-MCNC: 166 MG/DL (ref 0–150)
VLDLC SERPL CALC-MCNC: 33.2 MG/DL

## 2020-03-13 ENCOUNTER — OFFICE VISIT (OUTPATIENT)
Dept: FAMILY MEDICINE CLINIC | Facility: CLINIC | Age: 75
End: 2020-03-13

## 2020-03-13 VITALS
HEIGHT: 63 IN | WEIGHT: 185 LBS | OXYGEN SATURATION: 94 % | HEART RATE: 106 BPM | DIASTOLIC BLOOD PRESSURE: 94 MMHG | BODY MASS INDEX: 32.78 KG/M2 | SYSTOLIC BLOOD PRESSURE: 160 MMHG | TEMPERATURE: 97.9 F

## 2020-03-13 DIAGNOSIS — I10 ESSENTIAL HYPERTENSION: ICD-10-CM

## 2020-03-13 DIAGNOSIS — J30.1 CHRONIC ALLERGIC RHINITIS DUE TO POLLEN: ICD-10-CM

## 2020-03-13 DIAGNOSIS — I70.8 AORTO-ILIAC ATHEROSCLEROSIS (HCC): ICD-10-CM

## 2020-03-13 DIAGNOSIS — F41.1 GAD (GENERALIZED ANXIETY DISORDER): ICD-10-CM

## 2020-03-13 DIAGNOSIS — I70.0 AORTO-ILIAC ATHEROSCLEROSIS (HCC): ICD-10-CM

## 2020-03-13 DIAGNOSIS — J30.2 OTHER SEASONAL ALLERGIC RHINITIS: ICD-10-CM

## 2020-03-13 DIAGNOSIS — E78.2 DM TYPE 2 WITH DIABETIC MIXED HYPERLIPIDEMIA (HCC): ICD-10-CM

## 2020-03-13 DIAGNOSIS — E78.2 MIXED HYPERLIPIDEMIA: ICD-10-CM

## 2020-03-13 DIAGNOSIS — E11.69 DM TYPE 2 WITH DIABETIC MIXED HYPERLIPIDEMIA (HCC): ICD-10-CM

## 2020-03-13 DIAGNOSIS — R05.9 COUGH: ICD-10-CM

## 2020-03-13 DIAGNOSIS — E11.65 TYPE 2 DIABETES MELLITUS WITH HYPERGLYCEMIA, WITHOUT LONG-TERM CURRENT USE OF INSULIN (HCC): Primary | ICD-10-CM

## 2020-03-13 DIAGNOSIS — E55.9 VITAMIN D DEFICIENCY: ICD-10-CM

## 2020-03-13 PROCEDURE — 99214 OFFICE O/P EST MOD 30 MIN: CPT | Performed by: PHYSICIAN ASSISTANT

## 2020-03-13 RX ORDER — LORATADINE 10 MG/1
10 TABLET ORAL DAILY
Qty: 30 TABLET | Refills: 5 | Status: SHIPPED | OUTPATIENT
Start: 2020-03-13 | End: 2020-05-29 | Stop reason: SDUPTHER

## 2020-03-13 RX ORDER — ALBUTEROL SULFATE 90 UG/1
2 AEROSOL, METERED RESPIRATORY (INHALATION) EVERY 4 HOURS PRN
Qty: 1 INHALER | Refills: 1 | Status: SHIPPED | OUTPATIENT
Start: 2020-03-13 | End: 2021-01-28 | Stop reason: SDUPTHER

## 2020-03-13 RX ORDER — FLUTICASONE PROPIONATE 50 MCG
2 SPRAY, SUSPENSION (ML) NASAL DAILY
Qty: 18.2 ML | Refills: 3 | Status: SHIPPED | OUTPATIENT
Start: 2020-03-13 | End: 2020-07-27

## 2020-03-13 RX ORDER — CLOPIDOGREL BISULFATE 75 MG/1
75 TABLET ORAL DAILY
Qty: 90 TABLET | Refills: 1 | Status: SHIPPED | OUTPATIENT
Start: 2020-03-13 | End: 2020-07-27 | Stop reason: SDUPTHER

## 2020-03-13 RX ORDER — BUPROPION HYDROCHLORIDE 150 MG/1
150 TABLET, EXTENDED RELEASE ORAL EVERY 12 HOURS SCHEDULED
Qty: 60 TABLET | Refills: 5 | Status: SHIPPED | OUTPATIENT
Start: 2020-03-13 | End: 2020-07-27 | Stop reason: SDUPTHER

## 2020-03-13 RX ORDER — OMEGA-3-ACID ETHYL ESTERS 1 G/1
2 CAPSULE, LIQUID FILLED ORAL 2 TIMES DAILY
Qty: 120 CAPSULE | Refills: 5 | Status: SHIPPED | OUTPATIENT
Start: 2020-03-13 | End: 2020-07-27 | Stop reason: SDUPTHER

## 2020-03-13 RX ORDER — ERGOCALCIFEROL 1.25 MG/1
50000 CAPSULE ORAL WEEKLY
Qty: 4 CAPSULE | Refills: 5 | Status: SHIPPED | OUTPATIENT
Start: 2020-03-13 | End: 2020-07-27 | Stop reason: SDUPTHER

## 2020-03-13 RX ORDER — AMLODIPINE BESYLATE AND BENAZEPRIL HYDROCHLORIDE 10; 40 MG/1; MG/1
1 CAPSULE ORAL DAILY
Qty: 90 CAPSULE | Refills: 1 | Status: SHIPPED | OUTPATIENT
Start: 2020-03-13 | End: 2020-07-27 | Stop reason: SDUPTHER

## 2020-03-13 RX ORDER — ATORVASTATIN CALCIUM 10 MG/1
10 TABLET, FILM COATED ORAL NIGHTLY
Qty: 30 TABLET | Refills: 5 | Status: SHIPPED | OUTPATIENT
Start: 2020-03-13 | End: 2020-07-27 | Stop reason: SDUPTHER

## 2020-03-14 NOTE — PROGRESS NOTES
Subjective   Evie Shah is a 74 y.o. female.       Chief Complaint -diabetes    History of Present Illness -      Diabetes-  Not at goal with Jardiance.  She does have associated hyperlipidemia  Lab Results   Component Value Date    HGBA1C 7.20 (H) 03/09/2020     Hyperlipidemia-stable with Lovaza and Lipitor  Lab Results   Component Value Date    CHLPL 131 03/09/2020    CHLPL 114 09/06/2019    CHLPL 144 03/11/2019     Lab Results   Component Value Date    TRIG 166 (H) 03/09/2020    TRIG 211 (H) 09/06/2019    TRIG 311 (H) 03/11/2019     Lab Results   Component Value Date    HDL 43 03/09/2020    HDL 38 (L) 09/06/2019    HDL 41 03/11/2019     Lab Results   Component Value Date    LDL 55 03/09/2020    LDL 34 09/06/2019    LDL 41 03/11/2019     Hypertension- not at goal today despite the use of Lotrel.  Patient states that her blood pressure is less than 130/80 consistently at home.  She declines any medication changes at this time and states that she would like to check it at home prior to changing medication.    Cough-  Intermittent cough stable with albuterol    Anxiety-stable with Wellbutrin.  She denies any SI or HI.    Arterio iliac atherosclerosis-stable with risk factor modification including Plavix and Lipitor    Chronic allergic rhinitis-stable with Flonase    Vitamin D deficiency-stable with vitamin D      The following portions of the patient's history were reviewed and updated as appropriate: allergies, current medications, past family history, past medical history, past social history, past surgical history and problem list.    Review of Systems   Constitutional: Negative for activity change, appetite change, fatigue and fever.   HENT: Negative for ear pain, sinus pressure and sore throat.    Eyes: Negative for pain and visual disturbance.   Respiratory: Positive for cough. Negative for chest tightness.    Cardiovascular: Negative for chest pain and palpitations.   Gastrointestinal: Negative for  "abdominal pain, constipation, diarrhea, nausea and vomiting.   Endocrine: Negative for polydipsia and polyuria.   Genitourinary: Negative for dysuria and frequency.   Musculoskeletal: Negative for back pain and myalgias.   Skin: Negative for color change and rash.   Allergic/Immunologic: Negative for food allergies and immunocompromised state.   Neurological: Negative for dizziness, syncope and headaches.   Hematological: Negative for adenopathy. Does not bruise/bleed easily.   Psychiatric/Behavioral: Negative for hallucinations and suicidal ideas. The patient is not nervous/anxious.        /94   Pulse 106   Temp 97.9 °F (36.6 °C) (Oral)   Ht 160 cm (63\")   Wt 83.9 kg (185 lb)   SpO2 94%   BMI 32.77 kg/m²     Physical Exam   Constitutional: She is oriented to person, place, and time. She appears well-developed and well-nourished.   HENT:   Head: Normocephalic and atraumatic.   Nose: Nose normal.   Mouth/Throat: Oropharynx is clear and moist.   Eyes: Pupils are equal, round, and reactive to light. Conjunctivae and EOM are normal.   Neck: Normal range of motion. Neck supple. No tracheal deviation present. No thyromegaly present.   Cardiovascular: Normal rate, regular rhythm, normal heart sounds and intact distal pulses.   No murmur heard.  Pulmonary/Chest: Effort normal and breath sounds normal. No respiratory distress. She has no wheezes.   Abdominal: Soft. Bowel sounds are normal. There is no tenderness. There is no guarding.   Musculoskeletal: Normal range of motion. She exhibits no edema or tenderness.   Lymphadenopathy:     She has no cervical adenopathy.   Neurological: She is alert and oriented to person, place, and time.   Skin: Skin is warm and dry. No rash noted.   Psychiatric: She has a normal mood and affect. Her behavior is normal.   Nursing note and vitals reviewed.      Assessment/Plan     Diagnoses and all orders for this visit:    Type 2 diabetes mellitus with hyperglycemia, without " long-term current use of insulin (CMS/[facility])  Comments:  Continue Jardiance  Advised low carbohydrate diabetic diet    Cough  -     albuterol sulfate  (90 Base) MCG/ACT inhaler; Inhale 2 puffs Every 4 (Four) Hours As Needed for Shortness of Air.    Essential hypertension  Comments:  Advise daily blood pressure and pulse log   Patient to report readings in 2 weeks time.  Patient declines medication changes at this time  Orders:  -     amLODIPine-benazepril (LOTREL) 10-40 MG per capsule; Take 1 capsule by mouth Daily.    Mixed hyperlipidemia  Comments:  Continue Lovaza and Lipitor and advised low carbohydrate diet  Orders:  -     atorvastatin (LIPITOR) 10 MG tablet; Take 1 tablet by mouth Every Night.  -     omega-3 acid ethyl esters (LOVAZA) 1 g capsule; Take 2 capsules by mouth 2 (Two) Times a Day.    VISHAL (generalized anxiety disorder)  Comments:  Continue Wellbutrin  Orders:  -     buPROPion SR (WELLBUTRIN SR) 150 MG 12 hr tablet; Take 1 tablet by mouth Every 12 (Twelve) Hours.    Aorto-iliac atherosclerosis (CMS/[facility])  Comments:  Continue Plavix and Lipitor  Orders:  -     clopidogrel (PLAVIX) 75 MG tablet; Take 1 tablet by mouth Daily.    DM type 2 with diabetic mixed hyperlipidemia (CMS/[facility])  -     Empagliflozin (JARDIANCE) 25 MG tablet; Take 25 mg by mouth Daily.    Chronic allergic rhinitis due to pollen  -     fluticasone (FLONASE) 50 MCG/ACT nasal spray; 2 sprays into the nostril(s) as directed by provider Daily.    Other seasonal allergic rhinitis  -     loratadine (CLARITIN) 10 MG tablet; Take 1 tablet by mouth Daily.    Vitamin D deficiency  -     vitamin D (ERGOCALCIFEROL) 1.25 MG (67357 UT) capsule capsule; Take 1 capsule by mouth 1 (One) Time Per Week.            This document has been electronically signed by:  Camryn Mota PA-C

## 2020-03-14 NOTE — PATIENT INSTRUCTIONS
"Carbohydrate Counting for Diabetes Mellitus, Adult    Carbohydrate counting is a method of keeping track of how many carbohydrates you eat. Eating carbohydrates naturally increases the amount of sugar (glucose) in the blood. Counting how many carbohydrates you eat helps keep your blood glucose within normal limits, which helps you manage your diabetes (diabetes mellitus).  It is important to know how many carbohydrates you can safely have in each meal. This is different for every person. A diet and nutrition specialist (registered dietitian) can help you make a meal plan and calculate how many carbohydrates you should have at each meal and snack.  Carbohydrates are found in the following foods:  · Grains, such as breads and cereals.  · Dried beans and soy products.  · Starchy vegetables, such as potatoes, peas, and corn.  · Fruit and fruit juices.  · Milk and yogurt.  · Sweets and snack foods, such as cake, cookies, candy, chips, and soft drinks.  How do I count carbohydrates?  There are two ways to count carbohydrates in food. You can use either of the methods or a combination of both.  Reading \"Nutrition Facts\" on packaged food  The \"Nutrition Facts\" list is included on the labels of almost all packaged foods and beverages in the [place] It includes:  · The serving size.  · Information about nutrients in each serving, including the grams (g) of carbohydrate per serving.  To use the “Nutrition Facts\":  · Decide how many servings you will have.  · Multiply the number of servings by the number of carbohydrates per serving.  · The resulting number is the total amount of carbohydrates that you will be having.  Learning standard serving sizes of other foods  When you eat carbohydrate foods that are not packaged or do not include \"Nutrition Facts\" on the label, you need to measure the servings in order to count the amount of carbohydrates:  · Measure the foods that you will eat with a food scale or measuring cup, if " needed.  · Decide how many standard-size servings you will eat.  · Multiply the number of servings by 15. Most carbohydrate-rich foods have about 15 g of carbohydrates per serving.  ? For example, if you eat 8 oz (170 g) of strawberries, you will have eaten 2 servings and 30 g of carbohydrates (2 servings x 15 g = 30 g).  · For foods that have more than one food mixed, such as soups and casseroles, you must count the carbohydrates in each food that is included.  The following list contains standard serving sizes of common carbohydrate-rich foods. Each of these servings has about 15 g of carbohydrates:  · ½ hamburger bun or ½ English muffin.  · ½ oz (15 mL) syrup.  · ½ oz (14 g) jelly.  · 1 slice of bread.  · 1 six-inch tortilla.  · 3 oz (85 g) cooked rice or pasta.  · 4 oz (113 g) cooked dried beans.  · 4 oz (113 g) starchy vegetable, such as peas, corn, or potatoes.  · 4 oz (113 g) hot cereal.  · 4 oz (113 g) mashed potatoes or ¼ of a large baked potato.  · 4 oz (113 g) canned or frozen fruit.  · 4 oz (120 mL) fruit juice.  · 4-6 crackers.  · 6 chicken nuggets.  · 6 oz (170 g) unsweetened dry cereal.  · 6 oz (170 g) plain fat-free yogurt or yogurt sweetened with artificial sweeteners.  · 8 oz (240 mL) milk.  · 8 oz (170 g) fresh fruit or one small piece of fruit.  · 24 oz (680 g) popped popcorn.  Example of carbohydrate counting  Sample meal  · 3 oz (85 g) chicken breast.  · 6 oz (170 g) brown rice.  · 4 oz (113 g) corn.  · 8 oz (240 mL) milk.  · 8 oz (170 g) strawberries with sugar-free whipped topping.  Carbohydrate calculation  1. Identify the foods that contain carbohydrates:  ? Rice.  ? Corn.  ? Milk.  ? Strawberries.  2. Calculate how many servings you have of each food:  ? 2 servings rice.  ? 1 serving corn.  ? 1 serving milk.  ? 1 serving strawberries.  3. Multiply each number of servings by 15 g:  ? 2 servings rice x 15 g = 30 g.  ? 1 serving corn x 15 g = 15 g.  ? 1 serving milk x 15 g = 15 g.  ? 1  serving strawberries x 15 g = 15 g.  4. Add together all of the amounts to find the total grams of carbohydrates eaten:  ? 30 g + 15 g + 15 g + 15 g = 75 g of carbohydrates total.  Summary  · Carbohydrate counting is a method of keeping track of how many carbohydrates you eat.  · Eating carbohydrates naturally increases the amount of sugar (glucose) in the blood.  · Counting how many carbohydrates you eat helps keep your blood glucose within normal limits, which helps you manage your diabetes.  · A diet and nutrition specialist (registered dietitian) can help you make a meal plan and calculate how many carbohydrates you should have at each meal and snack.  This information is not intended to replace advice given to you by your health care provider. Make sure you discuss any questions you have with your health care provider.  Document Released: 12/18/2006 Document Revised: 01/14/2020 Document Reviewed: 05/31/2017  [employer] Patient Education © 2020 [employer]

## 2020-03-17 ENCOUNTER — APPOINTMENT (OUTPATIENT)
Dept: INFUSION THERAPY | Facility: HOSPITAL | Age: 75
End: 2020-03-17

## 2020-04-01 DIAGNOSIS — J42 CHRONIC BRONCHITIS, UNSPECIFIED CHRONIC BRONCHITIS TYPE (HCC): ICD-10-CM

## 2020-04-02 RX ORDER — ALBUTEROL SULFATE 2.5 MG/3ML
SOLUTION RESPIRATORY (INHALATION)
Qty: 225 ML | Refills: 0 | Status: SHIPPED | OUTPATIENT
Start: 2020-04-02 | End: 2020-04-29 | Stop reason: SDUPTHER

## 2020-04-07 ENCOUNTER — TELEPHONE (OUTPATIENT)
Dept: FAMILY MEDICINE CLINIC | Facility: CLINIC | Age: 75
End: 2020-04-07

## 2020-04-07 RX ORDER — AMOXICILLIN 875 MG/1
875 TABLET, COATED ORAL EVERY 12 HOURS SCHEDULED
Qty: 20 TABLET | Refills: 0 | Status: SHIPPED | OUTPATIENT
Start: 2020-04-07 | End: 2020-05-29

## 2020-04-07 NOTE — TELEPHONE ENCOUNTER
Pt is aware of this information.       ----- Message from JOAQUINA Lanza sent at 4/7/2020  3:47 PM EDT -----  I sent Amoxicillin    ----- Message -----  From: Genevieve Hoffmann MA  Sent: 4/7/2020   3:36 PM EDT  To: JOAQUINA aLnza    Patient called in and stated that she has been sick for 3-4 days now.     She is coughing, has sob, wheezing, headache, and a lot of drainage. No fever. She has been using her inhaler and allergy medicine.     She stated that she does get seasonal allergies and this is how her flare up usually start. She wanted to see if we could send her in a antibiotic?

## 2020-04-14 ENCOUNTER — OFFICE VISIT (OUTPATIENT)
Dept: FAMILY MEDICINE CLINIC | Facility: CLINIC | Age: 75
End: 2020-04-14

## 2020-04-14 VITALS — TEMPERATURE: 97.1 F

## 2020-04-14 DIAGNOSIS — J42 CHRONIC BRONCHITIS, UNSPECIFIED CHRONIC BRONCHITIS TYPE (HCC): Primary | ICD-10-CM

## 2020-04-14 PROCEDURE — 99441 PR PHYS/QHP TELEPHONE EVALUATION 5-10 MIN: CPT | Performed by: NURSE PRACTITIONER

## 2020-04-14 RX ORDER — GUAIFENESIN AND CODEINE PHOSPHATE 100; 10 MG/5ML; MG/5ML
5 SOLUTION ORAL 3 TIMES DAILY PRN
Qty: 180 ML | Refills: 0 | Status: SHIPPED | OUTPATIENT
Start: 2020-04-14 | End: 2020-05-29

## 2020-04-14 RX ORDER — DOXYCYCLINE 100 MG/1
100 CAPSULE ORAL EVERY 12 HOURS SCHEDULED
Qty: 20 CAPSULE | Refills: 0 | Status: SHIPPED | OUTPATIENT
Start: 2020-04-14 | End: 2020-04-22 | Stop reason: SDUPTHER

## 2020-04-14 NOTE — PROGRESS NOTES
You have chosen to receive care through a telephone visit today. Do you consent to use a telephone visit for your medical care today? Yes    Establish patient called office of APRN to discuss health conditions.  Patient was due for routine checkup but due to current pandemic is not able to come to the office.    Brief HPI/ROS obtained as follows:    URI symptoms-reports not any better since beginning amoxicillin approx 1 week ago.  No new symptoms.  She reports continued cough.  She is using nebulizer treatments.  She has used Robitussin OTC but it has not helped much.  She reports she is not able to sleep due to cough.  No fever.  Mild SOA.  Rhinorrhea.  No HA or dizziness.  No GI symptoms.  She reports     Review of Systems   Constitutional: Negative for appetite change, fatigue and unexpected weight change.   HENT: Negative for congestion, ear pain, nosebleeds, postnasal drip, rhinorrhea, sore throat, trouble swallowing and voice change.    Eyes: Negative for photophobia, pain and visual disturbance.   Respiratory: Positive for cough, shortness of breath and wheezing. Negative for chest tightness.    Cardiovascular: Negative for chest pain and palpitations.   Gastrointestinal: Negative for abdominal pain, blood in stool, constipation, diarrhea and nausea.   Endocrine: Negative for cold intolerance and polydipsia.   Genitourinary: Negative for difficulty urinating, flank pain, frequency and hematuria.   Musculoskeletal: Negative for arthralgias, back pain, gait problem, joint swelling and myalgias.   Skin: Negative for color change and rash.   Allergic/Immunologic: Negative.    Neurological: Negative for dizziness, syncope, numbness and headaches.   Hematological: Negative.    Psychiatric/Behavioral: Negative for dysphoric mood, sleep disturbance and suicidal ideas. The patient is not nervous/anxious.    All other systems reviewed and are negative.      The current allergy list and medication list was reviewed  with patient for accuracy.     Assessment     Objective     Physical Exam     Patient alert and oriented.  Able to follow conversation without sounding breathless.  No obvious distress.  Thought processes congruent with conversation.  Answers and ask questions without difficulty.  Patient actively coughing during conversation.     Problem List Items Addressed This Visit     None      Visit Diagnoses     Chronic bronchitis, unspecified chronic bronchitis type (CMS/HCC)    -  Primary    Relevant Medications    guaiFENesin-codeine (G Tussin AC) 100-10 MG/5ML liquid    doxycycline (MONODOX) 100 MG capsule            Patient instructed and advised to call if symptoms are increasing or new symptoms occur.    Understands reasons for urgent and emergent care.  Patient (& family) verbalized agreement for treatment plan.     Generalized precautions advised including social distancing, hand washing, cough/sneeze hygiene.     [facility] #[id number] reviewed no medications noted.  Rx for cough syrup with codeine provided.    Stop Amoxicillin.  Will begin Doxycycline  Patient to report if symptoms are not improving by the end of week.   Will consider oral steroids if not improving.       This visit has been rescheduled as a phone visit to comply with patient safety concerns in accordance with CDC recommendations. Total time of discussion was 6 minutes.          This document has been electronically signed by:  JOAQUINA Lanza, TRE-C

## 2020-04-22 DIAGNOSIS — J42 CHRONIC BRONCHITIS, UNSPECIFIED CHRONIC BRONCHITIS TYPE (HCC): ICD-10-CM

## 2020-04-22 RX ORDER — DOXYCYCLINE 100 MG/1
100 CAPSULE ORAL EVERY 12 HOURS SCHEDULED
Qty: 20 CAPSULE | Refills: 0 | Status: SHIPPED | OUTPATIENT
Start: 2020-04-22 | End: 2020-05-29

## 2020-04-22 NOTE — TELEPHONE ENCOUNTER
Patient called in requesting a refill on this medicine. She said she is some better but not completely cleared up.

## 2020-04-28 ENCOUNTER — APPOINTMENT (OUTPATIENT)
Dept: INFUSION THERAPY | Facility: HOSPITAL | Age: 75
End: 2020-04-28

## 2020-04-29 RX ORDER — IPRATROPIUM BROMIDE AND ALBUTEROL SULFATE 2.5; .5 MG/3ML; MG/3ML
3 SOLUTION RESPIRATORY (INHALATION) EVERY 4 HOURS PRN
Qty: 540 ML | Refills: 5 | Status: SHIPPED | OUTPATIENT
Start: 2020-04-29 | End: 2020-09-28

## 2020-05-01 ENCOUNTER — LAB (OUTPATIENT)
Dept: LAB | Facility: HOSPITAL | Age: 75
End: 2020-05-01

## 2020-05-01 ENCOUNTER — OFFICE VISIT (OUTPATIENT)
Dept: FAMILY MEDICINE CLINIC | Facility: CLINIC | Age: 75
End: 2020-05-01

## 2020-05-01 VITALS
WEIGHT: 187 LBS | DIASTOLIC BLOOD PRESSURE: 90 MMHG | TEMPERATURE: 98.2 F | HEART RATE: 78 BPM | HEIGHT: 63 IN | SYSTOLIC BLOOD PRESSURE: 160 MMHG | BODY MASS INDEX: 33.13 KG/M2 | OXYGEN SATURATION: 88 %

## 2020-05-01 DIAGNOSIS — R05.9 COUGH: ICD-10-CM

## 2020-05-01 DIAGNOSIS — I10 ESSENTIAL HYPERTENSION: ICD-10-CM

## 2020-05-01 DIAGNOSIS — J44.1 COPD WITH ACUTE EXACERBATION (HCC): Primary | ICD-10-CM

## 2020-05-01 DIAGNOSIS — E78.2 MIXED HYPERLIPIDEMIA: ICD-10-CM

## 2020-05-01 DIAGNOSIS — R09.02 HYPOXIA: ICD-10-CM

## 2020-05-01 PROCEDURE — 99214 OFFICE O/P EST MOD 30 MIN: CPT | Performed by: PHYSICIAN ASSISTANT

## 2020-05-01 PROCEDURE — U0003 INFECTIOUS AGENT DETECTION BY NUCLEIC ACID (DNA OR RNA); SEVERE ACUTE RESPIRATORY SYNDROME CORONAVIRUS 2 (SARS-COV-2) (CORONAVIRUS DISEASE [COVID-19]), AMPLIFIED PROBE TECHNIQUE, MAKING USE OF HIGH THROUGHPUT TECHNOLOGIES AS DESCRIBED BY CMS-2020-01-R: HCPCS

## 2020-05-01 RX ORDER — MONTELUKAST SODIUM 10 MG/1
TABLET ORAL
COMMUNITY
Start: 2020-04-15 | End: 2020-05-29 | Stop reason: SDUPTHER

## 2020-05-01 RX ORDER — PREDNISONE 20 MG/1
TABLET ORAL
COMMUNITY
Start: 2020-04-15 | End: 2020-05-01

## 2020-05-01 RX ORDER — AZITHROMYCIN 250 MG/1
TABLET, FILM COATED ORAL
Qty: 6 TABLET | Refills: 0 | Status: SHIPPED | OUTPATIENT
Start: 2020-05-01 | End: 2020-05-29

## 2020-05-01 RX ORDER — PREDNISONE 20 MG/1
20 TABLET ORAL SEE ADMIN INSTRUCTIONS
Qty: 20 TABLET | Refills: 0 | Status: SHIPPED | OUTPATIENT
Start: 2020-05-01 | End: 2020-05-29

## 2020-05-01 NOTE — PATIENT INSTRUCTIONS

## 2020-05-01 NOTE — PROGRESS NOTES
Subjective   Evie Shah is a 74 y.o. female.       Chief Complaint -cough    History of Present Illness -      Cough-  She complains of productive cough with thick sputum, shortness of breath and extreme fatigue.  Onset approximately 2 weeks ago.  Patient states that she finished 1 round of doxycycline and symptoms had worsened so she went to [facility] emergency room on 4/15/2020.  She was placed on doxycycline (second course), prednisone 20 mg daily, and continues to take her Claritin and Singulair.  She states she has had some minimal improvement in her symptoms.  She has had some relief with DuoNeb nebulizer treatments.  Patient states that her shortness of breath and extreme fatigue are not improving although her cough seems to be more productive now.    COPD-not at goal due to acute respiratory illness despite the use of albuterol/DuoNeb    Hypertension- not at goal with Lotrel likely due to acute coughing    Hyperlipidemia-stable with atorvastatin  Lab Results   Component Value Date    CHLPL 131 03/09/2020    CHLPL 114 09/06/2019    CHLPL 144 03/11/2019     Lab Results   Component Value Date    TRIG 166 (H) 03/09/2020    TRIG 211 (H) 09/06/2019    TRIG 311 (H) 03/11/2019     Lab Results   Component Value Date    HDL 43 03/09/2020    HDL 38 (L) 09/06/2019    HDL 41 03/11/2019     Lab Results   Component Value Date    LDL 55 03/09/2020    LDL 34 09/06/2019    LDL 41 03/11/2019         The following portions of the patient's history were reviewed and updated as appropriate: allergies, current medications, past family history, past medical history, past social history, past surgical history and problem list.    Review of Systems   Constitutional: Positive for fatigue. Negative for activity change, appetite change and fever.   HENT: Positive for congestion and sore throat. Negative for ear pain and sinus pressure.    Eyes: Negative for pain and visual disturbance.   Respiratory: Positive for cough  "and shortness of breath. Negative for chest tightness.    Cardiovascular: Negative for chest pain and palpitations.   Gastrointestinal: Negative for abdominal pain, constipation, diarrhea, nausea and vomiting.   Endocrine: Negative for polydipsia and polyuria.   Genitourinary: Negative for dysuria and frequency.   Musculoskeletal: Negative for back pain and myalgias.   Skin: Negative for color change and rash.   Allergic/Immunologic: Negative for food allergies and immunocompromised state.   Neurological: Negative for dizziness, syncope and headaches.   Hematological: Negative for adenopathy. Does not bruise/bleed easily.   Psychiatric/Behavioral: Negative for hallucinations and suicidal ideas. The patient is not nervous/anxious.        /90   Pulse 78   Temp 98.2 °F (36.8 °C) (Temporal)   Ht 160 cm (63\")   Wt 84.8 kg (187 lb)   SpO2 (!) 88%   BMI 33.13 kg/m²   Appointment on 03/10/2020   Component Date Value Ref Range Status   • Creatinine, Urine 03/10/2020 30.0  Not Estab. mg/dL Final   • Microalbumin, Urine 03/10/2020 9.2  Not Estab. ug/mL Final   • Microalbumin/Creatinine Ratio 03/10/2020 31* 0 - 29 mg/g creat Final     Lab Results   Component Value Date    WBC 9.28 03/11/2019    HGB 16.7 (H) 03/11/2019    HCT 52.0 (H) 03/11/2019    MCV 93.5 03/11/2019     03/11/2019         Physical Exam   Constitutional: She is oriented to person, place, and time. She appears well-developed and well-nourished.   HENT:   Head: Normocephalic and atraumatic.   Nose: Nose normal.   Mouth/Throat: Oropharynx is clear and moist.   Eyes: Pupils are equal, round, and reactive to light. Conjunctivae and EOM are normal.   Neck: Normal range of motion. Neck supple. No tracheal deviation present. No thyromegaly present.   Cardiovascular: Normal rate, regular rhythm, normal heart sounds and intact distal pulses.   No murmur heard.  Pulmonary/Chest: Effort normal. No respiratory distress. She has no wheezes. "   Bronchovesicular breath sounds noted bilaterally   Abdominal: Soft. Bowel sounds are normal. There is no tenderness. There is no guarding.   Musculoskeletal: Normal range of motion. She exhibits no edema or tenderness.   Lymphadenopathy:     She has no cervical adenopathy.   Neurological: She is alert and oriented to person, place, and time.   Skin: Skin is warm and dry. No rash noted.   Psychiatric: She has a normal mood and affect. Her behavior is normal.   Nursing note and vitals reviewed.      Assessment/Plan     Diagnoses and all orders for this visit:    COPD with acute exacerbation (CMS/[facility])  Comments:  Further assessment including SARS testing and chest x-ray  Continue doxycycline until gone  Start azithromycin and increase prednisone  Orders:  -     predniSONE (DELTASONE) 20 MG tablet; Take 1 tablet by mouth See Admin Instructions. 2 daily x 5 days then 1 daily x 5 days  -     azithromycin (Zithromax) 250 MG tablet; Take 2 tablets the first day, then 1 tablet daily for 4 days.  -     XR Chest PA & Lateral; Future    Cough  -     SARS-CoV-2, MAXI ([facility]) - Swab, Nasopharynx; Future  -     XR Chest PA & Lateral; Future    Essential hypertension  Comments:  Continue to monitor  Continue current medications including Lotrel    Mixed hyperlipidemia  Comments:  Advised low-cholesterol diet  Continue Lipitor    Hypoxia  Comments:  Testing for COVID-19 ordered  Advise DuoNeb every 6 hours      Patient advised to quarantine at home for the next 2 weeks and ensure test results are available.  She was advised that if symptoms should persist or worsen that she should immediately contact the office or on-call provider for further instructions.  I plan to follow-up with her after test results all are available or sooner if needed.          This document has been electronically signed by:  Camryn Mota PA-C

## 2020-05-03 LAB — SARS-COV-2 RNA RESP QL NAA+PROBE: NOT DETECTED

## 2020-05-29 ENCOUNTER — OFFICE VISIT (OUTPATIENT)
Dept: FAMILY MEDICINE CLINIC | Facility: CLINIC | Age: 75
End: 2020-05-29

## 2020-05-29 DIAGNOSIS — E78.2 DM TYPE 2 WITH DIABETIC MIXED HYPERLIPIDEMIA (HCC): ICD-10-CM

## 2020-05-29 DIAGNOSIS — I10 ESSENTIAL HYPERTENSION: ICD-10-CM

## 2020-05-29 DIAGNOSIS — J30.1 NON-SEASONAL ALLERGIC RHINITIS DUE TO POLLEN: Primary | ICD-10-CM

## 2020-05-29 DIAGNOSIS — J44.1 COPD EXACERBATION (HCC): ICD-10-CM

## 2020-05-29 DIAGNOSIS — E11.69 DM TYPE 2 WITH DIABETIC MIXED HYPERLIPIDEMIA (HCC): ICD-10-CM

## 2020-05-29 PROCEDURE — 99443 PR PHYS/QHP TELEPHONE EVALUATION 21-30 MIN: CPT | Performed by: PHYSICIAN ASSISTANT

## 2020-05-29 RX ORDER — LORATADINE 10 MG/1
10 TABLET ORAL DAILY
Qty: 30 TABLET | Refills: 5 | Status: SHIPPED | OUTPATIENT
Start: 2020-05-29 | End: 2020-07-27 | Stop reason: SDUPTHER

## 2020-05-29 RX ORDER — MONTELUKAST SODIUM 10 MG/1
10 TABLET ORAL NIGHTLY
Qty: 30 TABLET | Refills: 5 | Status: SHIPPED | OUTPATIENT
Start: 2020-05-29 | End: 2020-07-27 | Stop reason: SDUPTHER

## 2020-05-29 NOTE — PATIENT INSTRUCTIONS
"Carbohydrate Counting for Diabetes Mellitus, Adult    Carbohydrate counting is a method of keeping track of how many carbohydrates you eat. Eating carbohydrates naturally increases the amount of sugar (glucose) in the blood. Counting how many carbohydrates you eat helps keep your blood glucose within normal limits, which helps you manage your diabetes (diabetes mellitus).  It is important to know how many carbohydrates you can safely have in each meal. This is different for every person. A diet and nutrition specialist (registered dietitian) can help you make a meal plan and calculate how many carbohydrates you should have at each meal and snack.  Carbohydrates are found in the following foods:  · Grains, such as breads and cereals.  · Dried beans and soy products.  · Starchy vegetables, such as potatoes, peas, and corn.  · Fruit and fruit juices.  · Milk and yogurt.  · Sweets and snack foods, such as cake, cookies, candy, chips, and soft drinks.  How do I count carbohydrates?  There are two ways to count carbohydrates in food. You can use either of the methods or a combination of both.  Reading \"Nutrition Facts\" on packaged food  The \"Nutrition Facts\" list is included on the labels of almost all packaged foods and beverages in the [place] It includes:  · The serving size.  · Information about nutrients in each serving, including the grams (g) of carbohydrate per serving.  To use the “Nutrition Facts\":  · Decide how many servings you will have.  · Multiply the number of servings by the number of carbohydrates per serving.  · The resulting number is the total amount of carbohydrates that you will be having.  Learning standard serving sizes of other foods  When you eat carbohydrate foods that are not packaged or do not include \"Nutrition Facts\" on the label, you need to measure the servings in order to count the amount of carbohydrates:  · Measure the foods that you will eat with a food scale or measuring cup, if " needed.  · Decide how many standard-size servings you will eat.  · Multiply the number of servings by 15. Most carbohydrate-rich foods have about 15 g of carbohydrates per serving.  ? For example, if you eat 8 oz (170 g) of strawberries, you will have eaten 2 servings and 30 g of carbohydrates (2 servings x 15 g = 30 g).  · For foods that have more than one food mixed, such as soups and casseroles, you must count the carbohydrates in each food that is included.  The following list contains standard serving sizes of common carbohydrate-rich foods. Each of these servings has about 15 g of carbohydrates:  · ½ hamburger bun or ½ English muffin.  · ½ oz (15 mL) syrup.  · ½ oz (14 g) jelly.  · 1 slice of bread.  · 1 six-inch tortilla.  · 3 oz (85 g) cooked rice or pasta.  · 4 oz (113 g) cooked dried beans.  · 4 oz (113 g) starchy vegetable, such as peas, corn, or potatoes.  · 4 oz (113 g) hot cereal.  · 4 oz (113 g) mashed potatoes or ¼ of a large baked potato.  · 4 oz (113 g) canned or frozen fruit.  · 4 oz (120 mL) fruit juice.  · 4-6 crackers.  · 6 chicken nuggets.  · 6 oz (170 g) unsweetened dry cereal.  · 6 oz (170 g) plain fat-free yogurt or yogurt sweetened with artificial sweeteners.  · 8 oz (240 mL) milk.  · 8 oz (170 g) fresh fruit or one small piece of fruit.  · 24 oz (680 g) popped popcorn.  Example of carbohydrate counting  Sample meal  · 3 oz (85 g) chicken breast.  · 6 oz (170 g) brown rice.  · 4 oz (113 g) corn.  · 8 oz (240 mL) milk.  · 8 oz (170 g) strawberries with sugar-free whipped topping.  Carbohydrate calculation  1. Identify the foods that contain carbohydrates:  ? Rice.  ? Corn.  ? Milk.  ? Strawberries.  2. Calculate how many servings you have of each food:  ? 2 servings rice.  ? 1 serving corn.  ? 1 serving milk.  ? 1 serving strawberries.  3. Multiply each number of servings by 15 g:  ? 2 servings rice x 15 g = 30 g.  ? 1 serving corn x 15 g = 15 g.  ? 1 serving milk x 15 g = 15 g.  ? 1  serving strawberries x 15 g = 15 g.  4. Add together all of the amounts to find the total grams of carbohydrates eaten:  ? 30 g + 15 g + 15 g + 15 g = 75 g of carbohydrates total.  Summary  · Carbohydrate counting is a method of keeping track of how many carbohydrates you eat.  · Eating carbohydrates naturally increases the amount of sugar (glucose) in the blood.  · Counting how many carbohydrates you eat helps keep your blood glucose within normal limits, which helps you manage your diabetes.  · A diet and nutrition specialist (registered dietitian) can help you make a meal plan and calculate how many carbohydrates you should have at each meal and snack.  This information is not intended to replace advice given to you by your health care provider. Make sure you discuss any questions you have with your health care provider.  Document Released: 12/18/2006 Document Revised: 07/12/2018 Document Reviewed: 05/31/2017  Elsevier Patient Education © 2020 [employer]

## 2020-05-29 NOTE — PROGRESS NOTES
"Subjective   Evie Shah is a 74 y.o. female.     Telephone visit    You have chosen to receive care through a telephone visit. Do you consent to use a telephone visit for your medical care today? Yes    This visit has been rescheduled as a phone visit to comply with patient safety concerns in accordance with CDC recommendations. Total time of discussion was 21 minutes.      Chief Complaint -shortness of breath    History of Present Illness -      Shortness of breath-  She complains of continued shortness of breath due to COPD.  She states that her symptoms are exacerbated by environmental allergens.  Some relief with Claritin.    COPD-not at goal due to shortness of breath and environmental allergens.  The following portions of the patient's history were reviewed and updated as appropriate: allergies, current medications, past family history, past medical history, past social history, past surgical history and problem list.  She was treated earlier this month with azithromycin and prednisone which significantly improved her COPD exacerbation.  She does still have some residual cough as well as this shortness of breath.  She states she is not interested in oxygen in her home as she does not want to get \"addicted to it.\"    Diabetes mellitus type 2-stable with Jardiance and diet  Lab Results   Component Value Date    HGBA1C 7.20 (H) 03/09/2020       Hypertension- controlled with Lotrel    Review of Systems   Constitutional: Negative for activity change, appetite change, fatigue and fever.   HENT: Negative for congestion, ear pain, sinus pressure and sore throat.    Eyes: Negative for pain and visual disturbance.   Respiratory: Positive for cough and shortness of breath. Negative for chest tightness.    Cardiovascular: Negative for chest pain and palpitations.   Gastrointestinal: Negative for abdominal pain, constipation, diarrhea, nausea and vomiting.   Endocrine: Negative for polydipsia and polyuria. " "  Genitourinary: Negative for dysuria and frequency.   Musculoskeletal: Negative for back pain and myalgias.   Skin: Negative for color change and rash.   Allergic/Immunologic: Negative for food allergies and immunocompromised state.   Neurological: Negative for dizziness, syncope and headaches.   Hematological: Negative for adenopathy. Does not bruise/bleed easily.   Psychiatric/Behavioral: Negative for hallucinations and suicidal ideas. The patient is not nervous/anxious.        There were no vitals taken for this visit.  Lab on 05/01/2020   Component Date Value Ref Range Status   • SARS-CoV-2, MAXI 05/01/2020 Not Detected  Not Detected Final       Physical Exam   Psychiatric: She has a normal mood and affect. Her behavior is normal. Thought content normal.       Assessment/Plan     Diagnoses and all orders for this visit:    Non-seasonal allergic rhinitis due to pollen  Comments:  Start Singulair  Continue Claritin  Orders:  -     montelukast (SINGULAIR) 10 MG tablet; Take 1 tablet by mouth Every Night.  -     loratadine (CLARITIN) 10 MG tablet; Take 1 tablet by mouth Daily.    COPD exacerbation (CMS/[facility])  Comments:  Start Singulair  Advised albuterol neb treatments every 6 hours  Patient declines home oxygen    DM type 2 with diabetic mixed hyperlipidemia (CMS/HCC)  Comments:  Advised low carbohydrate diabetic diet  Continue Jardiance  Continue to monitor    Essential hypertension  Comments:  Continue Lotrel  Continue to monitor      COPD-  We had a discussion regarding her O2 sat of 88% while ill earlier this month.  Patient states that she would like to try conservative measures including the Singulair and albuterol nebulizer treatments at this time.  She declines evaluation for home oxygen stating that she does not want to get \"addicted to it.\"    copd  Advised regarding symptomatic treatment.  Discussed the importance of avoiding unnecessary antibiotic therapy.  Suggested OTC remedies.  Encouraged to report " if any worse or if any new symptoms.  Call in 5 days if symptoms aren't resolving.    I will follow-up with her in 2 weeks time.    This document has been electronically signed by:  Camryn Mota PA-C

## 2020-06-09 ENCOUNTER — OFFICE VISIT (OUTPATIENT)
Dept: FAMILY MEDICINE CLINIC | Facility: CLINIC | Age: 75
End: 2020-06-09

## 2020-06-09 DIAGNOSIS — J44.1 COPD WITH ACUTE EXACERBATION (HCC): Primary | ICD-10-CM

## 2020-06-09 DIAGNOSIS — R09.02 HYPOXIA: ICD-10-CM

## 2020-06-09 DIAGNOSIS — E78.2 DM TYPE 2 WITH DIABETIC MIXED HYPERLIPIDEMIA (HCC): ICD-10-CM

## 2020-06-09 DIAGNOSIS — E11.69 DM TYPE 2 WITH DIABETIC MIXED HYPERLIPIDEMIA (HCC): ICD-10-CM

## 2020-06-09 DIAGNOSIS — I10 ESSENTIAL HYPERTENSION: ICD-10-CM

## 2020-06-09 PROCEDURE — 99443 PR PHYS/QHP TELEPHONE EVALUATION 21-30 MIN: CPT | Performed by: PHYSICIAN ASSISTANT

## 2020-06-09 RX ORDER — PREDNISONE 20 MG/1
TABLET ORAL
Qty: 10 TABLET | Refills: 0 | Status: SHIPPED | OUTPATIENT
Start: 2020-06-09 | End: 2020-06-19

## 2020-06-09 RX ORDER — LEVOFLOXACIN 500 MG/1
500 TABLET, FILM COATED ORAL DAILY
Qty: 10 TABLET | Refills: 0 | Status: SHIPPED | OUTPATIENT
Start: 2020-06-09 | End: 2020-06-19

## 2020-06-09 NOTE — PROGRESS NOTES
Subjective   Evie Shah is a 75 y.o. female.     Telephone visit    You have chosen to receive care through a telephone visit. Do you consent to use a telephone visit for your medical care today? Yes    This visit has been rescheduled as a phone visit to comply with patient safety concerns in accordance with CDC recommendations. Total time of discussion was 21 minutes.      Chief Complaint -shortness of breath    History of Present Illness -      Shortness of breath-  She complains of productive cough and shortness of breath with home oxygen saturation ranging from 82-85% without oxygen.  She states that she did borrow some oxygen from her daughter which helped but she only wore it for about 30 minutes last night.  She denies any fever.    COPD-not at goal due to acute exacerbation    Diabetes mellitus type 2- currently controlled with diet  Lab Results   Component Value Date    HGBA1C 7.20 (H) 03/09/2020     Hypertension-stable with amlodipine/benazepril    The following portions of the patient's history were reviewed and updated as appropriate: allergies, current medications, past family history, past medical history, past social history, past surgical history and problem list.    Review of Systems   Constitutional: Positive for activity change and fatigue. Negative for appetite change and fever.   HENT: Positive for congestion. Negative for ear pain, sinus pressure and sore throat.    Eyes: Negative for pain and visual disturbance.   Respiratory: Positive for cough and shortness of breath. Negative for chest tightness.    Cardiovascular: Negative for chest pain and palpitations.   Gastrointestinal: Negative for abdominal pain, constipation, diarrhea, nausea and vomiting.   Endocrine: Negative for polydipsia and polyuria.   Genitourinary: Negative for dysuria and frequency.   Musculoskeletal: Negative for back pain and myalgias.   Skin: Negative for color change and rash.   Allergic/Immunologic: Negative  for food allergies and immunocompromised state.   Neurological: Negative for dizziness, syncope and headaches.   Hematological: Negative for adenopathy. Does not bruise/bleed easily.   Psychiatric/Behavioral: Negative for hallucinations and suicidal ideas. The patient is not nervous/anxious.        There were no vitals taken for this visit.  Lab on 05/01/2020   Component Date Value Ref Range Status   • SARS-CoV-2, MAXI 05/01/2020 Not Detected  Not Detected Final       Physical Exam   Psychiatric: She has a normal mood and affect. Her behavior is normal. Thought content normal.       Assessment/Plan     Diagnoses and all orders for this visit:    COPD with acute exacerbation (CMS/[facility])  Comments:  She declines ER visit for potential hospitalization.  Start Levaquin and prednisone  Orders:  -     levoFLOXacin (LEVAQUIN) 500 MG tablet; Take 1 tablet by mouth Daily for 10 days.  -     predniSONE (DELTASONE) 20 MG tablet; 2 daily    DM type 2 with diabetic mixed hyperlipidemia (CMS/[facility])  Comments:  Advised low carbohydrate diabetic diet    Essential hypertension  Comments:  Continue Lotrel  Continue to monitor    Hypoxia  Comments:  Order written for home oxygen supplies that will be sent to [facility] so they can set this up for her at home      COPD-  We had a long discussion regarding her symptoms and the fact that she likely needs to be hospitalized to stabilize her O2 sat.  Patient declines ER evaluation and hospitalization at this time.  Plan to set up home oxygen and treat her with antibiotic and prednisone.  Patient was strongly advised that if symptoms do not immediately improve that she should go to the emergency room.      This document has been electronically signed by:  Camryn Mota PA-C   deferred

## 2020-06-12 ENCOUNTER — OFFICE VISIT (OUTPATIENT)
Dept: FAMILY MEDICINE CLINIC | Facility: CLINIC | Age: 75
End: 2020-06-12

## 2020-06-12 DIAGNOSIS — E11.69 DM TYPE 2 WITH DIABETIC MIXED HYPERLIPIDEMIA (HCC): ICD-10-CM

## 2020-06-12 DIAGNOSIS — E78.2 DM TYPE 2 WITH DIABETIC MIXED HYPERLIPIDEMIA (HCC): ICD-10-CM

## 2020-06-12 DIAGNOSIS — Z00.00 HEALTHCARE MAINTENANCE: ICD-10-CM

## 2020-06-12 DIAGNOSIS — Z87.891 FORMER SMOKER: ICD-10-CM

## 2020-06-12 DIAGNOSIS — I73.9 PAD (PERIPHERAL ARTERY DISEASE) (HCC): Primary | ICD-10-CM

## 2020-06-12 DIAGNOSIS — E78.2 MIXED HYPERLIPIDEMIA: ICD-10-CM

## 2020-06-12 DIAGNOSIS — I10 ESSENTIAL HYPERTENSION: ICD-10-CM

## 2020-06-12 DIAGNOSIS — J44.9 COPD MIXED TYPE (HCC): ICD-10-CM

## 2020-06-12 PROCEDURE — 99214 OFFICE O/P EST MOD 30 MIN: CPT | Performed by: GENERAL PRACTICE

## 2020-06-12 RX ORDER — BUDESONIDE 1 MG/2ML
1 INHALANT ORAL 2 TIMES DAILY
Qty: 60 EACH | Refills: 5 | Status: SHIPPED | OUTPATIENT
Start: 2020-06-12 | End: 2020-07-27 | Stop reason: SDUPTHER

## 2020-06-12 NOTE — PROGRESS NOTES
Subjective   Evie Shah is a 75 y.o. female.     History of Present Illness     COPD  She complains of persistent shortness of breath with any exertion associated with a cough productive of whitish-yellowish sputum and wheezing.  There is no history of any chest pain or hemoptysis.  She admits to intermittent nasal congestion but denies any other upper respiratory tract symptoms and has had no fever, chills, night sweats, or weight loss.  She quit smoking over 5 years ago following a more than 50-pack-year history.  She is currently using either albuterol by MDI or albuterol/ipratropium by nebulizer every 4 hours on average.  She is currently on a course of oral corticosteroids and levofloxacin with no apparent side effects.  Chest x-ray performed at [facility] on 4/15/2020 was reported as showing a tortuous aorta but was otherwise unremarkable    PAD  She has a history of a previous left lower extremity angioplasty with stenting.  She remains on dual antiplatelet therapy but admits that she is not taking her aspirin regularly.  She denies any apparent side effects however    Diabetes  Current symptoms include none. Patient denies paresthesia of the feet, visual disturbances, polydipsia, polyuria, hypoglycemia and foot ulcerations. Evaluation to date has been: hemoglobin A1C.  Current treatments: SGLT2 inhibitor - empagliflozin. Most recent hemoglobin A1c   Lab Results   Component Value Date    HGBA1C 7.20 (H) 03/09/2020    HGBA1C 7.00 (H) 09/06/2019    HGBA1C 6.70 (H) 03/11/2019      Dyslipidemia  Compliance with treatment has been fair. The patient's activity remains quite limited due to her shortness of breath. She is currently being prescribed the following medication for her dyslipidemia - atorvastatin, omega 3 fatty acids. Patient denies side effects associated with her medications. Most recent lipids include  Lab Results   Component Value Date    TRIG 166 (H) 03/09/2020    TRIG 211 (H)  09/06/2019    HDL 43 03/09/2020    HDL 38 (L) 09/06/2019    LDL 55 03/09/2020    LDL 34 09/06/2019    LDL 63 02/25/2015     Hypertension  Home blood pressure readings: not doing. Associated signs and symptoms: dyspnea. Patient denies: chest pain, palpitations, orthopnea, paroxysmal nocturnal dyspnea and peripheral edema. Current antihypertensive medications includes benazepril and amlodipine. Medication compliance: taking as prescribed. Most recent creatinine   Lab Results   Component Value Date    CREATININE 0.74 03/09/2020     The following portions of the patient's history were reviewed and updated as appropriate: allergies, current medications, past family history, past medical history, past social history, past surgical history and problem list.    Review of Systems   Constitutional: Positive for activity change and fatigue. Negative for appetite change, chills and fever.   HENT: Positive for congestion and rhinorrhea. Negative for ear pain, postnasal drip, sinus pressure and sore throat.    Eyes: Negative for visual disturbance.   Respiratory: Positive for cough, shortness of breath and wheezing. Negative for chest tightness.    Cardiovascular: Negative for chest pain and palpitations.   Gastrointestinal: Negative for abdominal pain, constipation, diarrhea, nausea and vomiting.   Endocrine: Negative for polydipsia and polyuria.   Genitourinary: Negative for dysuria and frequency.   Musculoskeletal: Negative for arthralgias, back pain and myalgias.   Skin: Negative for rash.   Neurological: Negative for dizziness, weakness, numbness and headaches.   Psychiatric/Behavioral: Negative for hallucinations and suicidal ideas. The patient is not nervous/anxious.      Objective   Physical Exam   Constitutional: She is oriented to person, place, and time. No distress.   Alert and oriented.  Bright and in fair spirits.  Mild shortness of breath with exertion.  No apparent distress at rest.  No pallor cyanosis diaphoresis  or jaundice   Eyes: Pupils are equal, round, and reactive to light. EOM are normal. No scleral icterus.   Neck: No JVD present. Carotid bruit is not present. No tracheal deviation present. No thyromegaly present.   Cardiovascular: Normal rate, regular rhythm, S1 normal, S2 normal and intact distal pulses. Exam reveals no gallop, no S3 and no S4.   No murmur heard.  Pulmonary/Chest: No stridor. No respiratory distress. She has decreased breath sounds in the right lower field and the left lower field. She has wheezes (diffuse - mild). She has rhonchi in the right lower field. She has no rales.   Pulmonary hyperinflation   Abdominal: Soft. Normal aorta and bowel sounds are normal. She exhibits no distension, no abdominal bruit and no mass. There is no hepatosplenomegaly. There is no tenderness. No hernia.   Musculoskeletal: She exhibits no tenderness or deformity.     Vascular Status -  Her right foot exhibits no edema. Her left foot exhibits no edema.  Lymphadenopathy:        Head (right side): No submandibular adenopathy present.        Head (left side): No submandibular adenopathy present.     She has no cervical adenopathy.   Neurological: She is alert and oriented to person, place, and time. No cranial nerve deficit. She exhibits normal muscle tone. Coordination normal.   Skin: Skin is warm and dry. No rash noted. She is not diaphoretic. No cyanosis. No pallor. Nails show no clubbing.   Psychiatric: She has a normal mood and affect.     Assessment/Plan   Problems Addressed this Visit        Cardiovascular and Mediastinum    DM type 2 with diabetic mixed hyperlipidemia (CMS/[facility])  Encouraged to continue to work on her diet and exercise plan.  Continue current medication    Essential hypertension  As above.   Continue current medication.    Mixed hyperlipidemia  As above.   Continue current medication.    PAD (peripheral artery disease) (CMS/[facility])   Reminded regarding the importance of risk factor  modification.  Reviewed the potential benefits and risks of low-dose rivaroxaban.  This would take the place of ASA.  Patient will consider       Respiratory    COPD mixed type (CMS/HCC)  With recent exacerbation  Will add nebulized corticosteroids  Continue remainder medication  Screening low-dose CT of the chest will be arranged  We will consider a pulmonology assessment    Relevant Medications    budesonide (PULMICORT) 1 MG/2ML nebulizer solution       Other    Former smoker    Relevant Orders    CT Chest Low Dose Wo    Healthcare maintenance    Relevant Orders    CT Chest Low Dose Wo

## 2020-06-13 VITALS
RESPIRATION RATE: 14 BRPM | HEART RATE: 100 BPM | WEIGHT: 190 LBS | OXYGEN SATURATION: 86 % | HEIGHT: 63 IN | DIASTOLIC BLOOD PRESSURE: 88 MMHG | TEMPERATURE: 97.1 F | BODY MASS INDEX: 33.66 KG/M2 | SYSTOLIC BLOOD PRESSURE: 154 MMHG

## 2020-06-13 PROBLEM — I70.90 ATHEROSCLEROSIS: Status: RESOLVED | Noted: 2018-09-14 | Resolved: 2020-06-13

## 2020-06-17 ENCOUNTER — HOSPITAL ENCOUNTER (OUTPATIENT)
Dept: CT IMAGING | Facility: HOSPITAL | Age: 75
Discharge: HOME | End: 2020-06-17
Admitting: GENERAL PRACTICE

## 2020-06-17 DIAGNOSIS — Z87.891 FORMER SMOKER: ICD-10-CM

## 2020-06-17 DIAGNOSIS — Z00.00 HEALTHCARE MAINTENANCE: ICD-10-CM

## 2020-06-17 PROCEDURE — G0297 LDCT FOR LUNG CA SCREEN: HCPCS

## 2020-06-17 PROCEDURE — G0297 LDCT FOR LUNG CA SCREEN: HCPCS | Performed by: RADIOLOGY

## 2020-06-18 NOTE — PROGRESS NOTES
She is doing better but she has been using her O2 24/7.    -- Please let patient know that her CT moderate COPD and coronary artery calcifications - no surprises   How is her breathing this week?

## 2020-06-26 ENCOUNTER — OFFICE VISIT (OUTPATIENT)
Dept: FAMILY MEDICINE CLINIC | Facility: CLINIC | Age: 75
End: 2020-06-26

## 2020-06-26 DIAGNOSIS — J44.9 COPD MIXED TYPE (HCC): Primary | ICD-10-CM

## 2020-06-26 PROCEDURE — 99441 PR PHYS/QHP TELEPHONE EVALUATION 5-10 MIN: CPT | Performed by: PHYSICIAN ASSISTANT

## 2020-06-26 NOTE — PROGRESS NOTES
Subjective   Evie Shah is a 75 y.o. female.     Telephone visit    You have chosen to receive care through a telephone visit. Do you consent to use a telephone visit for your medical care today? Yes    This visit has been rescheduled as a phone visit to comply with patient safety concerns in accordance with CDC recommendations. Total time of discussion was 8 minutes.      Chief Complaint -COPD    History of Present Illness -      COPD-  She reports that shortness of breath is improved at rest.  She now has home oxygen at 2 L.  She states that her oxygen at home remains 94-95% when she is wearing her 2 L of oxygen.  She does wear the oxygen 24/7.  Some improvement with the use of Pulmicort, DuoNeb, and albuterol.  She states that she does not have to rinse out her mouth after use.    The following portions of the patient's history were reviewed and updated as appropriate: allergies, current medications, past family history, past medical history, past social history, past surgical history and problem list.    Review of Systems   Constitutional: Negative for activity change, appetite change, fatigue and fever.   HENT: Negative for ear pain, sinus pressure and sore throat.    Eyes: Negative for pain and visual disturbance.   Respiratory: Positive for shortness of breath. Negative for cough and chest tightness.    Cardiovascular: Negative for chest pain and palpitations.   Gastrointestinal: Negative for abdominal pain, constipation, diarrhea, nausea and vomiting.   Endocrine: Negative for polydipsia and polyuria.   Genitourinary: Negative for dysuria and frequency.   Musculoskeletal: Negative for back pain and myalgias.   Skin: Negative for color change and rash.   Allergic/Immunologic: Negative for food allergies and immunocompromised state.   Neurological: Negative for dizziness, syncope and headaches.   Hematological: Negative for adenopathy. Does not bruise/bleed easily.   Psychiatric/Behavioral: Negative  for hallucinations and suicidal ideas. The patient is not nervous/anxious.        There were no vitals taken for this visit.  Lab on 05/01/2020   Component Date Value Ref Range Status   • SARS-CoV-2, MAXI 05/01/2020 Not Detected  Not Detected Final       Physical Exam   Psychiatric: She has a normal mood and affect. Her behavior is normal. Thought content normal.       Assessment/Plan     Diagnoses and all orders for this visit:    COPD mixed type (CMS/[facility])  Comments:  Continue Pulmicort, DuoNeb, and albuterol  Continue home O2 at 2 L 24/7  Advised to report any change in symptoms            This document has been electronically signed by:  Camryn Mota PA-C

## 2020-07-02 DIAGNOSIS — B37.2 CANDIDIASIS, INTERTRIGINOUS: ICD-10-CM

## 2020-07-06 RX ORDER — FLUCONAZOLE 150 MG/1
TABLET ORAL
Qty: 10 TABLET | Refills: 0 | Status: SHIPPED | OUTPATIENT
Start: 2020-07-06 | End: 2020-07-27

## 2020-07-27 ENCOUNTER — OFFICE VISIT (OUTPATIENT)
Dept: FAMILY MEDICINE CLINIC | Facility: CLINIC | Age: 75
End: 2020-07-27

## 2020-07-27 DIAGNOSIS — I70.0 AORTO-ILIAC ATHEROSCLEROSIS (HCC): ICD-10-CM

## 2020-07-27 DIAGNOSIS — E55.9 VITAMIN D DEFICIENCY: ICD-10-CM

## 2020-07-27 DIAGNOSIS — I70.8 AORTO-ILIAC ATHEROSCLEROSIS (HCC): ICD-10-CM

## 2020-07-27 DIAGNOSIS — E78.2 MIXED HYPERLIPIDEMIA: ICD-10-CM

## 2020-07-27 DIAGNOSIS — J96.11 CHRONIC RESPIRATORY FAILURE WITH HYPOXIA (HCC): ICD-10-CM

## 2020-07-27 DIAGNOSIS — E11.69 DM TYPE 2 WITH DIABETIC MIXED HYPERLIPIDEMIA (HCC): ICD-10-CM

## 2020-07-27 DIAGNOSIS — J44.9 COPD MIXED TYPE (HCC): Primary | ICD-10-CM

## 2020-07-27 DIAGNOSIS — I10 ESSENTIAL HYPERTENSION: ICD-10-CM

## 2020-07-27 DIAGNOSIS — E78.2 DM TYPE 2 WITH DIABETIC MIXED HYPERLIPIDEMIA (HCC): ICD-10-CM

## 2020-07-27 DIAGNOSIS — J30.1 NON-SEASONAL ALLERGIC RHINITIS DUE TO POLLEN: ICD-10-CM

## 2020-07-27 DIAGNOSIS — F41.1 GAD (GENERALIZED ANXIETY DISORDER): ICD-10-CM

## 2020-07-27 PROCEDURE — 99442 PR PHYS/QHP TELEPHONE EVALUATION 11-20 MIN: CPT | Performed by: PHYSICIAN ASSISTANT

## 2020-07-27 RX ORDER — LORATADINE 10 MG/1
10 TABLET ORAL DAILY
Qty: 90 TABLET | Refills: 3 | Status: SHIPPED | OUTPATIENT
Start: 2020-07-27 | End: 2021-06-01 | Stop reason: SDUPTHER

## 2020-07-27 RX ORDER — BUDESONIDE 1 MG/2ML
1 INHALANT ORAL 2 TIMES DAILY
Qty: 60 EACH | Refills: 5 | Status: SHIPPED | OUTPATIENT
Start: 2020-07-27 | End: 2021-03-04

## 2020-07-27 RX ORDER — BUPROPION HYDROCHLORIDE 150 MG/1
150 TABLET, EXTENDED RELEASE ORAL EVERY 12 HOURS SCHEDULED
Qty: 180 TABLET | Refills: 3 | Status: SHIPPED | OUTPATIENT
Start: 2020-07-27 | End: 2021-06-01 | Stop reason: SDUPTHER

## 2020-07-27 RX ORDER — ERGOCALCIFEROL 1.25 MG/1
50000 CAPSULE ORAL WEEKLY
Qty: 12 CAPSULE | Refills: 3 | Status: SHIPPED | OUTPATIENT
Start: 2020-07-27 | End: 2021-06-01 | Stop reason: SDUPTHER

## 2020-07-27 RX ORDER — MONTELUKAST SODIUM 10 MG/1
10 TABLET ORAL NIGHTLY
Qty: 90 TABLET | Refills: 3 | Status: SHIPPED | OUTPATIENT
Start: 2020-07-27 | End: 2021-06-01 | Stop reason: SDUPTHER

## 2020-07-27 RX ORDER — AMLODIPINE BESYLATE AND BENAZEPRIL HYDROCHLORIDE 10; 40 MG/1; MG/1
1 CAPSULE ORAL DAILY
Qty: 90 CAPSULE | Refills: 3 | Status: SHIPPED | OUTPATIENT
Start: 2020-07-27 | End: 2021-06-01 | Stop reason: SDUPTHER

## 2020-07-27 RX ORDER — OMEGA-3-ACID ETHYL ESTERS 1 G/1
2 CAPSULE, LIQUID FILLED ORAL 2 TIMES DAILY
Qty: 180 CAPSULE | Refills: 3 | Status: SHIPPED | OUTPATIENT
Start: 2020-07-27 | End: 2021-04-12

## 2020-07-27 RX ORDER — ATORVASTATIN CALCIUM 10 MG/1
10 TABLET, FILM COATED ORAL NIGHTLY
Qty: 90 TABLET | Refills: 3 | Status: SHIPPED | OUTPATIENT
Start: 2020-07-27 | End: 2021-06-01 | Stop reason: SDUPTHER

## 2020-07-27 RX ORDER — CLOPIDOGREL BISULFATE 75 MG/1
75 TABLET ORAL DAILY
Qty: 90 TABLET | Refills: 3 | Status: SHIPPED | OUTPATIENT
Start: 2020-07-27 | End: 2021-06-01 | Stop reason: SDUPTHER

## 2020-07-29 VITALS — BODY MASS INDEX: 33.66 KG/M2 | WEIGHT: 190 LBS | HEIGHT: 63 IN | OXYGEN SATURATION: 93 % | HEART RATE: 82 BPM

## 2020-07-29 NOTE — PROGRESS NOTES
Subjective   Evie Shah is a 75 y.o. female.     Telephone Visit    You have chosen to receive care through a telephone visit. Do you consent to use a telephone visit for your medical care today? Yes    This visit has been rescheduled as a phone visit to comply with patient safety concerns in accordance with CDC recommendations. Total time of discussion was 11 minutes.    Chief Complaint -COPD    History of Present Illness -      COPD-  Not at goal with DuoNeb alone.  Patient states that her cough and shortness of breath have worsened since she stopped inhaled steroids and went strictly on her DuoNeb.  She states oxygen today is 93% on 2 L which she uses 24/7.  She is interested in a portable oxygen concentrator.    Hypertension-stable with amlodipine/benazepril    Hyperlipidemia-stable with advised on Lipitor  Lab Results   Component Value Date    CHLPL 131 03/09/2020    CHLPL 114 09/06/2019    CHLPL 144 03/11/2019     Lab Results   Component Value Date    TRIG 166 (H) 03/09/2020    TRIG 211 (H) 09/06/2019    TRIG 311 (H) 03/11/2019     Lab Results   Component Value Date    HDL 43 03/09/2020    HDL 38 (L) 09/06/2019    HDL 41 03/11/2019     Lab Results   Component Value Date    LDL 55 03/09/2020    LDL 34 09/06/2019    LDL 41 03/11/2019     Generalized anxiety disorder-stable with Wellbutrin.  She denies any SI or HI.    Arterio iliac atherosclerosis-stable with risk factor modification including Plavix and Lipitor    Diabetes mellitus type 2-  Stable with Jardiance and diet    Allergic rhinitis-improved with Singulair since it was added to her Claritin.    Vitamin deficiency-stable vitamin D supplementation      The following portions of the patient's history were reviewed and updated as appropriate: allergies, current medications, past family history, past medical history, past social history, past surgical history and problem list.    Review of Systems   Constitutional: Negative for activity change,  "appetite change, fatigue and fever.   HENT: Negative for ear pain, sinus pressure and sore throat.    Eyes: Negative for pain and visual disturbance.   Respiratory: Positive for cough and shortness of breath. Negative for chest tightness.    Cardiovascular: Negative for chest pain and palpitations.   Gastrointestinal: Negative for abdominal pain, constipation, diarrhea, nausea and vomiting.   Endocrine: Negative for polydipsia and polyuria.   Genitourinary: Negative for dysuria and frequency.   Musculoskeletal: Negative for back pain and myalgias.   Skin: Negative for color change and rash.   Allergic/Immunologic: Negative for food allergies and immunocompromised state.   Neurological: Negative for dizziness, syncope and headaches.   Hematological: Negative for adenopathy. Does not bruise/bleed easily.   Psychiatric/Behavioral: Negative for hallucinations and suicidal ideas. The patient is not nervous/anxious.        Pulse 82   Ht 160 cm (63\")   Wt 86.2 kg (190 lb)   SpO2 93%   BMI 33.66 kg/m²     Physical Exam   Psychiatric: She has a normal mood and affect. Her behavior is normal. Thought content normal.       Assessment/Plan     Diagnoses and all orders for this visit:    COPD mixed type (CMS/HCC)  Comments:  Start Pulmicort  Advised patient to rinse out mouth and gargle after use to prevent thrush  Orders:  -     budesonide (PULMICORT) 1 MG/2ML nebulizer solution; Take 2 mL by nebulization 2 (two) times a day.    Essential hypertension  Comments:  Advise daily blood pressure and pulse log   Continue current medications  Orders:  -     amLODIPine-benazepril (LOTREL) 10-40 MG per capsule; Take 1 capsule by mouth Daily.    Mixed hyperlipidemia  Comments:  Continue Lovaza and Lipitor and advised low carbohydrate diet  Orders:  -     atorvastatin (LIPITOR) 10 MG tablet; Take 1 tablet by mouth Every Night.  -     omega-3 acid ethyl esters (LOVAZA) 1 g capsule; Take 2 capsules by mouth 2 (Two) Times a Day.    VISHAL " (generalized anxiety disorder)  Comments:  Continue Wellbutrin  Orders:  -     buPROPion SR (WELLBUTRIN SR) 150 MG 12 hr tablet; Take 1 tablet by mouth Every 12 (Twelve) Hours.    Aorto-iliac atherosclerosis (CMS/HCC)  Comments:  Continue Plavix and Lipitor  Orders:  -     clopidogrel (PLAVIX) 75 MG tablet; Take 1 tablet by mouth Daily.    DM type 2 with diabetic mixed hyperlipidemia (CMS/[facility])  Comments:  Continue Jardiance  Advised fasting and 2-hour PP blood glucose readings  Orders:  -     Empagliflozin (Jardiance) 25 MG tablet; Take 25 mg by mouth Daily.    Non-seasonal allergic rhinitis due to pollen  Comments:  Continue Singulair and Claritin  Orders:  -     loratadine (CLARITIN) 10 MG tablet; Take 1 tablet by mouth Daily.  -     montelukast (SINGULAIR) 10 MG tablet; Take 1 tablet by mouth Every Night.    Vitamin D deficiency  -     vitamin D (ERGOCALCIFEROL) 1.25 MG (79050 UT) capsule capsule; Take 1 capsule by mouth 1 (One) Time Per Week.    Chronic respiratory failure with hypoxia (CMS/[facility])  Comments:  Prescription written for portable oxygen concentrator  Continue oxygen 24/7            This document has been electronically signed by:  Camryn Mota PA-C

## 2020-08-27 ENCOUNTER — OFFICE VISIT (OUTPATIENT)
Dept: FAMILY MEDICINE CLINIC | Facility: CLINIC | Age: 75
End: 2020-08-27

## 2020-08-27 VITALS
OXYGEN SATURATION: 93 % | BODY MASS INDEX: 33.66 KG/M2 | SYSTOLIC BLOOD PRESSURE: 162 MMHG | HEIGHT: 63 IN | DIASTOLIC BLOOD PRESSURE: 92 MMHG | WEIGHT: 190 LBS

## 2020-08-27 DIAGNOSIS — E78.2 MIXED HYPERLIPIDEMIA: ICD-10-CM

## 2020-08-27 DIAGNOSIS — E11.69 DM TYPE 2 WITH DIABETIC MIXED HYPERLIPIDEMIA (HCC): ICD-10-CM

## 2020-08-27 DIAGNOSIS — E78.2 DM TYPE 2 WITH DIABETIC MIXED HYPERLIPIDEMIA (HCC): ICD-10-CM

## 2020-08-27 DIAGNOSIS — I10 ESSENTIAL HYPERTENSION: Primary | ICD-10-CM

## 2020-08-27 PROCEDURE — 99442 PR PHYS/QHP TELEPHONE EVALUATION 11-20 MIN: CPT | Performed by: PHYSICIAN ASSISTANT

## 2020-08-27 RX ORDER — POTASSIUM CHLORIDE 750 MG/1
10 TABLET, FILM COATED, EXTENDED RELEASE ORAL DAILY
Qty: 30 TABLET | Refills: 5 | Status: SHIPPED | OUTPATIENT
Start: 2020-08-27 | End: 2020-09-28 | Stop reason: SDUPTHER

## 2020-08-27 RX ORDER — FUROSEMIDE 20 MG/1
20 TABLET ORAL DAILY
Qty: 30 TABLET | Refills: 5 | Status: SHIPPED | OUTPATIENT
Start: 2020-08-27 | End: 2020-09-28 | Stop reason: SDUPTHER

## 2020-08-27 NOTE — PATIENT INSTRUCTIONS
"Carbohydrate Counting for Diabetes Mellitus, Adult    Carbohydrate counting is a method of keeping track of how many carbohydrates you eat. Eating carbohydrates naturally increases the amount of sugar (glucose) in the blood. Counting how many carbohydrates you eat helps keep your blood glucose within normal limits, which helps you manage your diabetes (diabetes mellitus).  It is important to know how many carbohydrates you can safely have in each meal. This is different for every person. A diet and nutrition specialist (registered dietitian) can help you make a meal plan and calculate how many carbohydrates you should have at each meal and snack.  Carbohydrates are found in the following foods:  · Grains, such as breads and cereals.  · Dried beans and soy products.  · Starchy vegetables, such as potatoes, peas, and corn.  · Fruit and fruit juices.  · Milk and yogurt.  · Sweets and snack foods, such as cake, cookies, candy, chips, and soft drinks.  How do I count carbohydrates?  There are two ways to count carbohydrates in food. You can use either of the methods or a combination of both.  Reading \"Nutrition Facts\" on packaged food  The \"Nutrition Facts\" list is included on the labels of almost all packaged foods and beverages in the [place] It includes:  · The serving size.  · Information about nutrients in each serving, including the grams (g) of carbohydrate per serving.  To use the “Nutrition Facts\":  · Decide how many servings you will have.  · Multiply the number of servings by the number of carbohydrates per serving.  · The resulting number is the total amount of carbohydrates that you will be having.  Learning standard serving sizes of other foods  When you eat carbohydrate foods that are not packaged or do not include \"Nutrition Facts\" on the label, you need to measure the servings in order to count the amount of carbohydrates:  · Measure the foods that you will eat with a food scale or measuring cup, if " needed.  · Decide how many standard-size servings you will eat.  · Multiply the number of servings by 15. Most carbohydrate-rich foods have about 15 g of carbohydrates per serving.  ? For example, if you eat 8 oz (170 g) of strawberries, you will have eaten 2 servings and 30 g of carbohydrates (2 servings x 15 g = 30 g).  · For foods that have more than one food mixed, such as soups and casseroles, you must count the carbohydrates in each food that is included.  The following list contains standard serving sizes of common carbohydrate-rich foods. Each of these servings has about 15 g of carbohydrates:  · ½ hamburger bun or ½ English muffin.  · ½ oz (15 mL) syrup.  · ½ oz (14 g) jelly.  · 1 slice of bread.  · 1 six-inch tortilla.  · 3 oz (85 g) cooked rice or pasta.  · 4 oz (113 g) cooked dried beans.  · 4 oz (113 g) starchy vegetable, such as peas, corn, or potatoes.  · 4 oz (113 g) hot cereal.  · 4 oz (113 g) mashed potatoes or ¼ of a large baked potato.  · 4 oz (113 g) canned or frozen fruit.  · 4 oz (120 mL) fruit juice.  · 4-6 crackers.  · 6 chicken nuggets.  · 6 oz (170 g) unsweetened dry cereal.  · 6 oz (170 g) plain fat-free yogurt or yogurt sweetened with artificial sweeteners.  · 8 oz (240 mL) milk.  · 8 oz (170 g) fresh fruit or one small piece of fruit.  · 24 oz (680 g) popped popcorn.  Example of carbohydrate counting  Sample meal  · 3 oz (85 g) chicken breast.  · 6 oz (170 g) brown rice.  · 4 oz (113 g) corn.  · 8 oz (240 mL) milk.  · 8 oz (170 g) strawberries with sugar-free whipped topping.  Carbohydrate calculation  1. Identify the foods that contain carbohydrates:  ? Rice.  ? Corn.  ? Milk.  ? Strawberries.  2. Calculate how many servings you have of each food:  ? 2 servings rice.  ? 1 serving corn.  ? 1 serving milk.  ? 1 serving strawberries.  3. Multiply each number of servings by 15 g:  ? 2 servings rice x 15 g = 30 g.  ? 1 serving corn x 15 g = 15 g.  ? 1 serving milk x 15 g = 15 g.  ? 1  serving strawberries x 15 g = 15 g.  4. Add together all of the amounts to find the total grams of carbohydrates eaten:  ? 30 g + 15 g + 15 g + 15 g = 75 g of carbohydrates total.  Summary  · Carbohydrate counting is a method of keeping track of how many carbohydrates you eat.  · Eating carbohydrates naturally increases the amount of sugar (glucose) in the blood.  · Counting how many carbohydrates you eat helps keep your blood glucose within normal limits, which helps you manage your diabetes.  · A diet and nutrition specialist (registered dietitian) can help you make a meal plan and calculate how many carbohydrates you should have at each meal and snack.  This information is not intended to replace advice given to you by your health care provider. Make sure you discuss any questions you have with your health care provider.  Document Released: 12/18/2006 Document Revised: 07/12/2018 Document Reviewed: 05/31/2017  [employer] Patient Education © 2020 [employer]      Fat and Cholesterol Restricted Eating Plan  Getting too much fat and cholesterol in your diet may cause health problems. Choosing the right foods helps keep your fat and cholesterol at normal levels. This can keep you from getting certain diseases.  Your doctor may recommend an eating plan that includes:  · Total fat: ______% or less of total calories a day.  · Saturated fat: ______% or less of total calories a day.  · Cholesterol: less than _________mg a day.  · Fiber: ______g a day.  What are tips for following this plan?  Meal planning  · At meals, divide your plate into four equal parts:  ? Fill one-half of your plate with vegetables and green salads.  ? Fill one-fourth of your plate with whole grains.  ? Fill one-fourth of your plate with low-fat (lean) protein foods.  · Eat fish that is high in omega-3 fats at least two times a week. This includes mackerel, tuna, sardines, and salmon.  · Eat foods that are high in fiber, such as whole grains, beans,  "apples, broccoli, carrots, peas, and barley.  General tips    · Work with your doctor to lose weight if you need to.  · Avoid:  ? Foods with added sugar.  ? Fried foods.  ? Foods with partially hydrogenated oils.  · Limit alcohol intake to no more than 1 drink a day for nonpregnant women and 2 drinks a day for men. One drink equals 12 oz of beer, 5 oz of wine, or 1½ oz of hard liquor.  Reading food labels  · Check food labels for:  ? Trans fats.  ? Partially hydrogenated oils.  ? Saturated fat (g) in each serving.  ? Cholesterol (mg) in each serving.  ? Fiber (g) in each serving.  · Choose foods with healthy fats, such as:  ? Monounsaturated fats.  ? Polyunsaturated fats.  ? Omega-3 fats.  · Choose grain products that have whole grains. Look for the word \"whole\" as the first word in the ingredient list.  Cooking  · Cook foods using low-fat methods. These include baking, boiling, grilling, and broiling.  · Eat more home-cooked foods. Eat at restaurants and buffets less often.  · Avoid cooking using saturated fats, such as butter, cream, palm oil, palm kernel oil, and coconut oil.  Recommended foods    Fruits  · All fresh, canned (in natural juice), or frozen fruits.  Vegetables  · Fresh or frozen vegetables (raw, steamed, roasted, or grilled). Green salads.  Grains  · Whole grains, such as whole wheat or whole grain breads, crackers, cereals, and pasta. Unsweetened oatmeal, bulgur, barley, quinoa, or brown rice. Corn or whole wheat flour tortillas.  Meats and other protein foods  · Ground beef (85% or leaner), grass-fed beef, or beef trimmed of fat. Skinless chicken or turkey. Ground chicken or turkey. Pork trimmed of fat. All fish and seafood. Egg whites. Dried beans, peas, or lentils. Unsalted nuts or seeds. Unsalted canned beans. Nut butters without added sugar or oil.  Dairy  · Low-fat or nonfat dairy products, such as skim or 1% milk, 2% or reduced-fat cheeses, low-fat and fat-free ricotta or cottage cheese, or " plain low-fat and nonfat yogurt.  Fats and oils  · Tub margarine without trans fats. Light or reduced-fat mayonnaise and salad dressings. Avocado. Olive, canola, sesame, or safflower oils.  The items listed above may not be a complete list of foods and beverages you can eat. Contact a dietitian for more information.  Foods to avoid  Fruits  · Canned fruit in heavy syrup. Fruit in cream or butter sauce. Fried fruit.  Vegetables  · Vegetables cooked in cheese, cream, or butter sauce. Fried vegetables.  Grains  · White bread. White pasta. White rice. Cornbread. Bagels, pastries, and croissants. Crackers and snack foods that contain trans fat and hydrogenated oils.  Meats and other protein foods  · Fatty cuts of meat. Ribs, chicken wings, palomo, sausage, bologna, salami, chitterlings, fatback, hot dogs, bratwurst, and packaged lunch meats. Liver and organ meats. Whole eggs and egg yolks. Chicken and turkey with skin. Fried meat.  Dairy  · Whole or 2% milk, cream, half-and-half, and cream cheese. Whole milk cheeses. Whole-fat or sweetened yogurt. Full-fat cheeses. Nondairy creamers and whipped toppings. Processed cheese, cheese spreads, and cheese curds.  Beverages  · Alcohol. Sugar-sweetened drinks such as sodas, lemonade, and fruit drinks.  Fats and oils  · Butter, stick margarine, lard, shortening, ghee, or palomo fat. Coconut, palm kernel, and palm oils.  Sweets and desserts  · Corn syrup, sugars, honey, and molasses. Candy. Jam and jelly. Syrup. Sweetened cereals. Cookies, pies, cakes, donuts, muffins, and ice cream.  The items listed above may not be a complete list of foods and beverages you should avoid. Contact a dietitian for more information.  Summary  · Choosing the right foods helps keep your fat and cholesterol at normal levels. This can keep you from getting certain diseases.  · At meals, fill one-half of your plate with vegetables and green salads.  · Eat high-fiber foods, like whole grains, beans, apples,  carrots, peas, and barley.  · Limit added sugar, saturated fats, alcohol, and fried foods.  This information is not intended to replace advice given to you by your health care provider. Make sure you discuss any questions you have with your health care provider.  Document Released: 06/18/2013 Document Revised: 08/21/2019 Document Reviewed: 09/04/2018  [employer] Patient Education © 2020 [employer]

## 2020-08-27 NOTE — PROGRESS NOTES
Subjective   Evie Shah is a 75 y.o. female.     Telephone Visit    You have chosen to receive care through a telephone visit. Do you consent to use a telephone visit for your medical care today? Yes    This visit has been rescheduled as a phone visit to comply with patient safety concerns in accordance with CDC recommendations. Total time of discussion was 11 minutes.    Chief Complaint -hypertension    History of Present Illness -      Hypertension-  She reports elevated blood pressure 150-160s over 80s-90s consistently despite the use of Lotrel 10/40 mg daily.  Not at goal    Diabetes mellitus-  Stable with Jardiance and diet.  She reports 2-hour postprandial blood glucose 150-160s  Lab Results   Component Value Date    HGBA1C 7.20 (H) 03/09/2020     Hyperlipidemia-stable with atorvastatin and diet  Lab Results   Component Value Date    CHLPL 131 03/09/2020    CHLPL 114 09/06/2019    CHLPL 144 03/11/2019     Lab Results   Component Value Date    TRIG 166 (H) 03/09/2020    TRIG 211 (H) 09/06/2019    TRIG 311 (H) 03/11/2019     Lab Results   Component Value Date    HDL 43 03/09/2020    HDL 38 (L) 09/06/2019    HDL 41 03/11/2019     Lab Results   Component Value Date    LDL 55 03/09/2020    LDL 34 09/06/2019    LDL 41 03/11/2019           The following portions of the patient's history were reviewed and updated as appropriate: allergies, current medications, past family history, past medical history, past social history, past surgical history and problem list.    Review of Systems   Constitutional: Negative for activity change, appetite change, fatigue and fever.   HENT: Negative for ear pain, sinus pressure and sore throat.    Eyes: Negative for pain and visual disturbance.   Respiratory: Negative for cough and chest tightness.    Cardiovascular: Negative for chest pain and palpitations.   Gastrointestinal: Negative for abdominal pain, constipation, diarrhea, nausea and vomiting.   Endocrine: Negative for  "polydipsia and polyuria.   Genitourinary: Negative for dysuria and frequency.   Musculoskeletal: Negative for back pain and myalgias.   Skin: Negative for color change and rash.   Allergic/Immunologic: Negative for food allergies and immunocompromised state.   Neurological: Negative for dizziness, syncope and headaches.   Hematological: Negative for adenopathy. Does not bruise/bleed easily.   Psychiatric/Behavioral: Negative for hallucinations and suicidal ideas. The patient is not nervous/anxious.        /92 Comment: patient reported  Ht 160 cm (63\")   Wt 86.2 kg (190 lb) Comment: patient reported  SpO2 93% Comment: patient reported  BMI 33.66 kg/m²     Physical Exam   Constitutional: She is oriented to person, place, and time.   Neurological: She is alert and oriented to person, place, and time.   Psychiatric: She has a normal mood and affect. Her behavior is normal. Thought content normal.       Assessment/Plan     Diagnoses and all orders for this visit:    Essential hypertension  Comments:  Continue Lotrel 10/40 mg daily  Start Lasix and potassium every morning  Advised daily blood pressure and pulse log  Orders:  -     furosemide (Lasix) 20 MG tablet; Take 1 tablet by mouth Daily.  -     potassium chloride (K-DUR) 10 MEQ CR tablet; Take 1 tablet by mouth Daily.    DM type 2 with diabetic mixed hyperlipidemia (CMS/HCC)  Comments:  Continue Jardiance  Advised low carbohydrate diabetic diet    Mixed hyperlipidemia  Comments:  Continue atorvastatin  Advised low-cholesterol diet      I will follow-up with her in 1 month's time or sooner if needed      This document has been electronically signed by:  Camryn Mota PA-C    "

## 2020-09-28 ENCOUNTER — OFFICE VISIT (OUTPATIENT)
Dept: FAMILY MEDICINE CLINIC | Facility: CLINIC | Age: 75
End: 2020-09-28

## 2020-09-28 VITALS
BODY MASS INDEX: 35.4 KG/M2 | DIASTOLIC BLOOD PRESSURE: 82 MMHG | TEMPERATURE: 97.7 F | HEART RATE: 93 BPM | HEIGHT: 63 IN | SYSTOLIC BLOOD PRESSURE: 156 MMHG | WEIGHT: 199.8 LBS | OXYGEN SATURATION: 96 %

## 2020-09-28 DIAGNOSIS — E78.2 DM TYPE 2 WITH DIABETIC MIXED HYPERLIPIDEMIA (HCC): ICD-10-CM

## 2020-09-28 DIAGNOSIS — E11.69 DM TYPE 2 WITH DIABETIC MIXED HYPERLIPIDEMIA (HCC): ICD-10-CM

## 2020-09-28 DIAGNOSIS — E78.2 MIXED HYPERLIPIDEMIA: ICD-10-CM

## 2020-09-28 DIAGNOSIS — I10 ESSENTIAL HYPERTENSION: Primary | ICD-10-CM

## 2020-09-28 PROCEDURE — G0008 ADMIN INFLUENZA VIRUS VAC: HCPCS | Performed by: PHYSICIAN ASSISTANT

## 2020-09-28 PROCEDURE — 99214 OFFICE O/P EST MOD 30 MIN: CPT | Performed by: PHYSICIAN ASSISTANT

## 2020-09-28 PROCEDURE — 90686 IIV4 VACC NO PRSV 0.5 ML IM: CPT | Performed by: PHYSICIAN ASSISTANT

## 2020-09-28 RX ORDER — FUROSEMIDE 20 MG/1
20 TABLET ORAL DAILY
Qty: 90 TABLET | Refills: 3 | Status: SHIPPED | OUTPATIENT
Start: 2020-09-28 | End: 2021-06-01 | Stop reason: SDUPTHER

## 2020-09-28 RX ORDER — ASPIRIN 81 MG/1
81 TABLET ORAL DAILY
Qty: 90 TABLET | Refills: 3 | Status: SHIPPED | OUTPATIENT
Start: 2020-09-28 | End: 2022-06-14 | Stop reason: SDUPTHER

## 2020-09-28 RX ORDER — CHLORTHALIDONE 25 MG/1
25 TABLET ORAL DAILY
Qty: 30 TABLET | Refills: 5 | Status: SHIPPED | OUTPATIENT
Start: 2020-09-28 | End: 2021-01-28

## 2020-09-28 RX ORDER — POTASSIUM CHLORIDE 750 MG/1
10 TABLET, FILM COATED, EXTENDED RELEASE ORAL DAILY
Qty: 90 TABLET | Refills: 3 | Status: SHIPPED | OUTPATIENT
Start: 2020-09-28 | End: 2021-06-01

## 2020-09-28 NOTE — PROGRESS NOTES
Subjective   Evie Shah is a 75 y.o. female.       Chief Complaint -hypertension    History of Present Illness -      Hypertension-  Uncontrolled with amlodipine 10 mg/benazepril 40 mg and Lasix 20 mg daily.  Patient reports blood pressure usually runs 140- 160/80-90s.    Diabetes mellitus type 2- not at goal as patient states that she has been eating more carbohydrates lately.  She currently uses Jardiance.    Lab Results   Component Value Date    HGBA1C 7.20 (H) 03/09/2020     Hyperlipidemia-stable with atorvastatin and low-cholesterol diet  Lab Results   Component Value Date    CHLPL 131 03/09/2020    CHLPL 114 09/06/2019    CHLPL 144 03/11/2019     Lab Results   Component Value Date    TRIG 166 (H) 03/09/2020    TRIG 211 (H) 09/06/2019    TRIG 311 (H) 03/11/2019     Lab Results   Component Value Date    HDL 43 03/09/2020    HDL 38 (L) 09/06/2019    HDL 41 03/11/2019     Lab Results   Component Value Date    LDL 55 03/09/2020    LDL 34 09/06/2019    LDL 41 03/11/2019       The following portions of the patient's history were reviewed and updated as appropriate: allergies, current medications, past family history, past medical history, past social history, past surgical history and problem list.    Review of Systems   Constitutional: Negative for activity change, appetite change, fatigue and fever.   HENT: Negative for ear pain, sinus pressure and sore throat.    Eyes: Negative for pain and visual disturbance.   Respiratory: Negative for cough and chest tightness.    Cardiovascular: Negative for chest pain and palpitations.   Gastrointestinal: Negative for abdominal pain, constipation, diarrhea, nausea and vomiting.   Endocrine: Negative for polydipsia and polyuria.   Genitourinary: Negative for dysuria and frequency.   Musculoskeletal: Negative for back pain and myalgias.   Skin: Negative for color change and rash.   Allergic/Immunologic: Negative for food allergies and immunocompromised state.  "  Neurological: Positive for headaches (with elevated bp). Negative for dizziness and syncope.   Hematological: Negative for adenopathy. Does not bruise/bleed easily.   Psychiatric/Behavioral: Negative for hallucinations and suicidal ideas. The patient is not nervous/anxious.        /82   Pulse 93   Temp 97.7 °F (36.5 °C)   Ht 160 cm (63\")   Wt 90.6 kg (199 lb 12.8 oz)   SpO2 96%   BMI 35.39 kg/m²     Physical Exam  Vitals signs and nursing note reviewed.   Constitutional:       Appearance: She is well-developed. She is obese.   HENT:      Head: Normocephalic and atraumatic.      Right Ear: Tympanic membrane normal.      Left Ear: Tympanic membrane normal.      Nose: Nose normal.      Mouth/Throat:      Mouth: Mucous membranes are moist.      Pharynx: No posterior oropharyngeal erythema.   Eyes:      Extraocular Movements: Extraocular movements intact.      Conjunctiva/sclera: Conjunctivae normal.   Neck:      Musculoskeletal: Normal range of motion and neck supple.      Thyroid: No thyromegaly.      Trachea: No tracheal deviation.   Cardiovascular:      Rate and Rhythm: Normal rate and regular rhythm.      Heart sounds: Normal heart sounds. No murmur.   Pulmonary:      Effort: Pulmonary effort is normal. No respiratory distress.      Breath sounds: Normal breath sounds. No wheezing.   Abdominal:      General: Bowel sounds are normal.      Palpations: Abdomen is soft.      Tenderness: There is no abdominal tenderness. There is no guarding.   Musculoskeletal: Normal range of motion.         General: No tenderness.   Lymphadenopathy:      Cervical: No cervical adenopathy.   Skin:     General: Skin is warm and dry.      Findings: No rash.   Neurological:      General: No focal deficit present.      Mental Status: She is alert and oriented to person, place, and time.   Psychiatric:         Mood and Affect: Mood normal.         Behavior: Behavior normal.         Thought Content: Thought content normal. "         Assessment/Plan     Diagnoses and all orders for this visit:    Essential hypertension  Comments:  Continue Lotrel 10/40 mg daily  Start Lasix and potassium every morning  Advised daily blood pressure and pulse log  Orders:  -     furosemide (Lasix) 20 MG tablet; Take 1 tablet by mouth Daily.  -     potassium chloride 10 MEQ CR tablet; Take 1 tablet by mouth Daily.  -     chlorthalidone (HYGROTON) 25 MG tablet; Take 1 tablet by mouth Daily.    DM type 2 with diabetic mixed hyperlipidemia (CMS/[facility])  -     Blood Glucose Monitoring Suppl misc; 1 each 3 (Three) Times a Day.    Mixed hyperlipidemia  Comments:  Advised low-cholesterol diet  Continue atorvastatin    Other orders  -     Fluarix Quad >6 Months (5038-6229)  -     aspirin 81 MG EC tablet; Take 1 tablet by mouth Daily.    She was advised to do blood pressure and pulse log daily and bring with her to follow-up visit in 2 weeks.        This document has been electronically signed by:  Camryn Mota PA-C

## 2020-09-28 NOTE — PATIENT INSTRUCTIONS

## 2020-10-08 ENCOUNTER — OFFICE VISIT (OUTPATIENT)
Dept: FAMILY MEDICINE CLINIC | Facility: CLINIC | Age: 75
End: 2020-10-08

## 2020-10-08 VITALS
OXYGEN SATURATION: 93 % | TEMPERATURE: 96.9 F | HEIGHT: 63 IN | DIASTOLIC BLOOD PRESSURE: 76 MMHG | SYSTOLIC BLOOD PRESSURE: 118 MMHG | WEIGHT: 196.2 LBS | BODY MASS INDEX: 34.76 KG/M2 | HEART RATE: 94 BPM

## 2020-10-08 DIAGNOSIS — E78.2 DM TYPE 2 WITH DIABETIC MIXED HYPERLIPIDEMIA (HCC): ICD-10-CM

## 2020-10-08 DIAGNOSIS — I10 ESSENTIAL HYPERTENSION: Primary | ICD-10-CM

## 2020-10-08 DIAGNOSIS — E11.69 DM TYPE 2 WITH DIABETIC MIXED HYPERLIPIDEMIA (HCC): ICD-10-CM

## 2020-10-08 DIAGNOSIS — M81.0 SENILE OSTEOPOROSIS: ICD-10-CM

## 2020-10-08 DIAGNOSIS — E78.2 MIXED HYPERLIPIDEMIA: ICD-10-CM

## 2020-10-08 LAB
25(OH)D3+25(OH)D2 SERPL-MCNC: 39.5 NG/ML (ref 30–100)
ALBUMIN SERPL-MCNC: 4.7 G/DL (ref 3.5–5.2)
ALBUMIN/GLOB SERPL: 2.2 G/DL
ALP SERPL-CCNC: 105 U/L (ref 39–117)
ALT SERPL-CCNC: 21 U/L (ref 1–33)
AST SERPL-CCNC: 15 U/L (ref 1–32)
BILIRUB SERPL-MCNC: 0.7 MG/DL (ref 0–1.2)
BUN SERPL-MCNC: 37 MG/DL (ref 8–23)
BUN/CREAT SERPL: 29.6 (ref 7–25)
CALCIUM SERPL-MCNC: 9.3 MG/DL (ref 8.6–10.5)
CHLORIDE SERPL-SCNC: 94 MMOL/L (ref 98–107)
CHOLEST SERPL-MCNC: 139 MG/DL (ref 0–200)
CO2 SERPL-SCNC: 26.6 MMOL/L (ref 22–29)
CREAT SERPL-MCNC: 1.25 MG/DL (ref 0.57–1)
GLOBULIN SER CALC-MCNC: 2.1 GM/DL
GLUCOSE SERPL-MCNC: 233 MG/DL (ref 65–99)
HBA1C MFR BLD: 8 % (ref 4.8–5.6)
HDLC SERPL-MCNC: 48 MG/DL (ref 40–60)
LDLC SERPL CALC-MCNC: 49 MG/DL (ref 0–100)
POTASSIUM SERPL-SCNC: 3.5 MMOL/L (ref 3.5–5.2)
PROT SERPL-MCNC: 6.8 G/DL (ref 6–8.5)
SODIUM SERPL-SCNC: 134 MMOL/L (ref 136–145)
TRIGL SERPL-MCNC: 276 MG/DL (ref 0–150)
VLDLC SERPL CALC-MCNC: 42 MG/DL (ref 5–40)

## 2020-10-08 PROCEDURE — 99214 OFFICE O/P EST MOD 30 MIN: CPT | Performed by: PHYSICIAN ASSISTANT

## 2020-10-08 NOTE — PROGRESS NOTES
Subjective   Evie Shah is a 75 y.o. female.       Chief Complaint -hypertension    History of Present Illness -      Hypertension-  Significantly improved since she started taking Lasix along with her Lotrel 10/40 mg daily.  Patient reports that her home blood pressures less than 130/80 consistently.    Diabetes mellitus type 2-stable with Jardiance and diet  Lab Results   Component Value Date    HGBA1C 7.20 (H) 03/09/2020     Hyperlipidemia- stable with atorvastatin and diet  Lab Results   Component Value Date    CHLPL 131 03/09/2020    CHLPL 114 09/06/2019    CHLPL 144 03/11/2019     Lab Results   Component Value Date    TRIG 166 (H) 03/09/2020    TRIG 211 (H) 09/06/2019    TRIG 311 (H) 03/11/2019     Lab Results   Component Value Date    HDL 43 03/09/2020    HDL 38 (L) 09/06/2019    HDL 41 03/11/2019     Lab Results   Component Value Date    LDL 55 03/09/2020    LDL 34 09/06/2019    LDL 41 03/11/2019     Osteoporosis-stable with calcium and vitamin D supplementation    The following portions of the patient's history were reviewed and updated as appropriate: allergies, current medications, past family history, past medical history, past social history, past surgical history and problem list.    Review of Systems   Constitutional: Negative for activity change, appetite change, fatigue and fever.   HENT: Negative for ear pain, sinus pressure and sore throat.    Eyes: Negative for pain and visual disturbance.   Respiratory: Negative for cough and chest tightness.    Cardiovascular: Negative for chest pain and palpitations.   Gastrointestinal: Negative for abdominal pain, constipation, diarrhea, nausea and vomiting.   Endocrine: Negative for polydipsia and polyuria.   Genitourinary: Negative for dysuria and frequency.   Musculoskeletal: Negative for back pain and myalgias.   Skin: Negative for color change and rash.   Allergic/Immunologic: Negative for food allergies and immunocompromised state.  "  Neurological: Negative for dizziness, syncope and headaches.   Hematological: Negative for adenopathy. Does not bruise/bleed easily.   Psychiatric/Behavioral: Negative for hallucinations and suicidal ideas. The patient is not nervous/anxious.        /76   Pulse 94   Temp 96.9 °F (36.1 °C)   Ht 160 cm (63\")   Wt 89 kg (196 lb 3.2 oz)   SpO2 93%   BMI 34.76 kg/m²     Physical Exam  Vitals signs and nursing note reviewed.   Constitutional:       Appearance: Normal appearance. She is well-developed.   HENT:      Head: Normocephalic and atraumatic.      Right Ear: Tympanic membrane normal.      Left Ear: Tympanic membrane normal.      Nose: Nose normal.      Mouth/Throat:      Mouth: Mucous membranes are moist.      Pharynx: No oropharyngeal exudate or posterior oropharyngeal erythema.   Eyes:      Extraocular Movements: Extraocular movements intact.      Conjunctiva/sclera: Conjunctivae normal.   Neck:      Musculoskeletal: Normal range of motion and neck supple.      Thyroid: No thyromegaly.      Trachea: No tracheal deviation.   Cardiovascular:      Rate and Rhythm: Normal rate and regular rhythm.      Heart sounds: Normal heart sounds. No murmur.   Pulmonary:      Effort: Pulmonary effort is normal. No respiratory distress.      Breath sounds: Normal breath sounds. No wheezing.   Abdominal:      General: Bowel sounds are normal.      Palpations: Abdomen is soft.      Tenderness: There is no abdominal tenderness. There is no guarding.   Musculoskeletal: Normal range of motion.         General: No tenderness.   Lymphadenopathy:      Cervical: No cervical adenopathy.   Skin:     General: Skin is warm and dry.      Findings: No rash.   Neurological:      General: No focal deficit present.      Mental Status: She is alert and oriented to person, place, and time.   Psychiatric:         Mood and Affect: Mood normal.         Behavior: Behavior normal.         Thought Content: Thought content normal. "         Assessment/Plan     Diagnoses and all orders for this visit:    Essential hypertension  Comments:  Advised to continue Lotrel and Lasix along with potassium supplementation  Continue to monitor and report any consistent BP readings greater than 130/80  Orders:  -     Comprehensive Metabolic Panel; Future  -     Comprehensive Metabolic Panel  -     Lipid panel    DM type 2 with diabetic mixed hyperlipidemia (CMS/HCC)  Comments:  Continue Jardiance  Continue low carbohydrate diabetic diet  Orders:  -     Hemoglobin A1c; Future  -     Hemoglobin A1c  -     Lipid panel    Mixed hyperlipidemia  Comments:  Advised low-cholesterol diet  Continue Lipitor  Orders:  -     Lipid Panel; Future  -     Lipid Panel  -     Lipid panel    Senile osteoporosis  Comments:  Continue calcium and vitamin D supplementation  Fall risk precaution advised  Orders:  -     Vitamin D 25 Hydroxy; Future  -     Vitamin D 25 Hydroxy  -     Lipid panel            This document has been electronically signed by:  Camryn Mota PA-C

## 2020-10-27 ENCOUNTER — OFFICE VISIT (OUTPATIENT)
Dept: FAMILY MEDICINE CLINIC | Facility: CLINIC | Age: 75
End: 2020-10-27

## 2020-10-27 VITALS
SYSTOLIC BLOOD PRESSURE: 110 MMHG | OXYGEN SATURATION: 94 % | TEMPERATURE: 97.1 F | BODY MASS INDEX: 34.91 KG/M2 | HEART RATE: 90 BPM | DIASTOLIC BLOOD PRESSURE: 80 MMHG | HEIGHT: 63 IN | WEIGHT: 197 LBS

## 2020-10-27 DIAGNOSIS — J44.9 COPD MIXED TYPE (HCC): ICD-10-CM

## 2020-10-27 DIAGNOSIS — I10 ESSENTIAL HYPERTENSION: ICD-10-CM

## 2020-10-27 DIAGNOSIS — I73.9 PAD (PERIPHERAL ARTERY DISEASE) (HCC): ICD-10-CM

## 2020-10-27 DIAGNOSIS — F41.1 GAD (GENERALIZED ANXIETY DISORDER): ICD-10-CM

## 2020-10-27 DIAGNOSIS — E66.09 CLASS 1 OBESITY DUE TO EXCESS CALORIES WITH SERIOUS COMORBIDITY AND BODY MASS INDEX (BMI) OF 34.0 TO 34.9 IN ADULT: ICD-10-CM

## 2020-10-27 DIAGNOSIS — Z00.00 MEDICARE ANNUAL WELLNESS VISIT, SUBSEQUENT: Primary | ICD-10-CM

## 2020-10-27 DIAGNOSIS — J30.9 CHRONIC ALLERGIC RHINITIS: ICD-10-CM

## 2020-10-27 DIAGNOSIS — M81.0 SENILE OSTEOPOROSIS: ICD-10-CM

## 2020-10-27 DIAGNOSIS — E78.2 DM TYPE 2 WITH DIABETIC MIXED HYPERLIPIDEMIA (HCC): ICD-10-CM

## 2020-10-27 DIAGNOSIS — E78.2 MIXED HYPERLIPIDEMIA: ICD-10-CM

## 2020-10-27 DIAGNOSIS — E11.69 DM TYPE 2 WITH DIABETIC MIXED HYPERLIPIDEMIA (HCC): ICD-10-CM

## 2020-10-27 PROCEDURE — G0439 PPPS, SUBSEQ VISIT: HCPCS | Performed by: PHYSICIAN ASSISTANT

## 2020-10-27 PROCEDURE — 96160 PT-FOCUSED HLTH RISK ASSMT: CPT | Performed by: PHYSICIAN ASSISTANT

## 2020-10-27 NOTE — PATIENT INSTRUCTIONS
Medicare Wellness  Personal Prevention Plan of Service     Date of Office Visit:  10/27/2020  Encounter Provider:  RUBI Michael  Place of Service:  [facility]  Patient Name: Evie Shah  :  1945    As part of the Medicare Wellness portion of your visit today, we are providing you with this personalized preventive plan of services (PPPS). This plan is based upon recommendations of the [place] Preventive Services Task Force (USPSTF) and the Advisory Committee on Immunization Practices (ACIP).    This lists the preventive care services that should be considered, and provides dates of when you are due. Items listed as completed are up-to-date and do not require any further intervention.    Health Maintenance   Topic Date Due   • ZOSTER VACCINE (2 of 2) 10/27/2020 (Originally 2017)   • DIABETIC FOOT EXAM  2021 (Originally 3/14/2020)   • MAMMOGRAM  2020   • DXA SCAN  2020   • URINE MICROALBUMIN  03/10/2021   • HEMOGLOBIN A1C  2021   • LIPID PANEL  10/08/2021   • ANNUAL WELLNESS VISIT  10/27/2021   • DIABETIC EYE EXAM  10/27/2021   • COLONOSCOPY  2026   • TDAP/TD VACCINES (3 - Td) 2028   • INFLUENZA VACCINE  Completed   • Pneumococcal Vaccine 65+  Completed   • HEPATITIS C SCREENING  Addressed       Orders Placed This Encounter   Procedures   • Ambulatory Referral to Ophthalmology     Referral Priority:   Routine     Referral Type:   Consultation     Referral Reason:   Patient Preference     Requested Specialty:   Ophthalmology     Number of Visits Requested:   1       Return in about 3 months (around 2021) for Followup with Camryn.          Advance Directive    Advance directives are legal documents that let you make choices ahead of time about your health care and medical treatment in case you become unable to communicate for yourself. Advance directives are a way for you to make known your wishes to family,  friends, and health care providers. This can let others know about your end-of-life care if you become unable to communicate.  Discussing and writing advance directives should happen over time rather than all at once. Advance directives can be changed depending on your situation and what you want, even after you have signed the advance directives.  There are different types of advance directives, such as:  · Medical power of .  · Living will.  · Do not resuscitate (DNR) or do not attempt resuscitation (DNAR) order.  Health care proxy and medical power of   A health care proxy is also called a health care agent. This is a person who is appointed to make medical decisions for you in cases where you are unable to make the decisions yourself. Generally, people choose someone they know well and trust to represent their preferences. Make sure to ask this person for an agreement to act as your proxy. A proxy may have to exercise judgment in the event of a medical decision for which your wishes are not known.  A medical power of  is a legal document that names your health care proxy. Depending on the laws in your state, after the document is written, it may also need to be:  · Signed.  · Notarized.  · Dated.  · Copied.  · Witnessed.  · Incorporated into your medical record.  You may also want to appoint someone to manage your money in a situation in which you are unable to do so. This is called a durable power of  for finances. It is a separate legal document from the durable power of  for health care. You may choose the same person or someone different from your health care proxy to act as your agent in money matters.  If you do not appoint a proxy, or if there is a concern that the proxy is not acting in your best interests, a court may appoint a guardian to act on your behalf.  Living will  A living will is a set of instructions that state your wishes about medical care when you  cannot express them yourself. Health care providers should keep a copy of your living will in your medical record. You may want to give a copy to family members or friends. To alert caregivers in case of an emergency, you can place a card in your wallet to let them know that you have a living will and where they can find it. A living will is used if you become:  · Terminally ill.  · Disabled.  · Unable to communicate or make decisions.  Items to consider in your living will include:  · To use or not to use life-support equipment, such as dialysis machines and breathing machines (ventilators).  · A DNR or DNAR order. This tells health care providers not to use cardiopulmonary resuscitation (CPR) if breathing or heartbeat stops.  · To use or not to use tube feeding.  · To be given or not to be given food and fluids.  · Comfort (palliative) care when the goal becomes comfort rather than a cure.  · Donation of organs and tissues.  A living will does not give instructions for distributing your money and property if you should pass away.  DNR or DNAR  A DNR or DNAR order is a request not to have CPR in the event that your heart stops beating or you stop breathing. If a DNR or DNAR order has not been made and shared, a health care provider will try to help any patient whose heart has stopped or who has stopped breathing. If you plan to have surgery, talk with your health care provider about how your DNR or DNAR order will be followed if problems occur.  What if I do not have an advance directive?  If you do not have an advance directive, some states assign family decision makers to act on your behalf based on how closely you are related to them. Each state has its own laws about advance directives. You may want to check with your health care provider, , or state representative about the laws in your state.  Summary  · Advance directives are the legal documents that allow you to make choices ahead of time about your  "health care and medical treatment in case you become unable to tell others about your care.  · The process of discussing and writing advance directives should happen over time. You can change the advance directives, even after you have signed them.  · Advance directives include DNR or DNAR orders, living douglass, and designating an agent as your medical power of .  This information is not intended to replace advice given to you by your health care provider. Make sure you discuss any questions you have with your health care provider.  Document Released: 03/26/2009 Document Revised: 07/16/2020 Document Reviewed: 07/16/2020  [employer] Patient Education © 2020 [employer]      Carbohydrate Counting for Diabetes Mellitus, Adult    Carbohydrate counting is a method of keeping track of how many carbohydrates you eat. Eating carbohydrates naturally increases the amount of sugar (glucose) in the blood. Counting how many carbohydrates you eat helps keep your blood glucose within normal limits, which helps you manage your diabetes (diabetes mellitus).  It is important to know how many carbohydrates you can safely have in each meal. This is different for every person. A diet and nutrition specialist (registered dietitian) can help you make a meal plan and calculate how many carbohydrates you should have at each meal and snack.  Carbohydrates are found in the following foods:  · Grains, such as breads and cereals.  · Dried beans and soy products.  · Starchy vegetables, such as potatoes, peas, and corn.  · Fruit and fruit juices.  · Milk and yogurt.  · Sweets and snack foods, such as cake, cookies, candy, chips, and soft drinks.  How do I count carbohydrates?  There are two ways to count carbohydrates in food. You can use either of the methods or a combination of both.  Reading \"Nutrition Facts\" on packaged food  The \"Nutrition Facts\" list is included on the labels of almost all packaged foods and beverages in the [place] It " "includes:  · The serving size.  · Information about nutrients in each serving, including the grams (g) of carbohydrate per serving.  To use the “Nutrition Facts\":  · Decide how many servings you will have.  · Multiply the number of servings by the number of carbohydrates per serving.  · The resulting number is the total amount of carbohydrates that you will be having.  Learning standard serving sizes of other foods  When you eat carbohydrate foods that are not packaged or do not include \"Nutrition Facts\" on the label, you need to measure the servings in order to count the amount of carbohydrates:  · Measure the foods that you will eat with a food scale or measuring cup, if needed.  · Decide how many standard-size servings you will eat.  · Multiply the number of servings by 15. Most carbohydrate-rich foods have about 15 g of carbohydrates per serving.  ? For example, if you eat 8 oz (170 g) of strawberries, you will have eaten 2 servings and 30 g of carbohydrates (2 servings x 15 g = 30 g).  · For foods that have more than one food mixed, such as soups and casseroles, you must count the carbohydrates in each food that is included.  The following list contains standard serving sizes of common carbohydrate-rich foods. Each of these servings has about 15 g of carbohydrates:  · ½ hamburger bun or ½ English muffin.  · ½ oz (15 mL) syrup.  · ½ oz (14 g) jelly.  · 1 slice of bread.  · 1 six-inch tortilla.  · 3 oz (85 g) cooked rice or pasta.  · 4 oz (113 g) cooked dried beans.  · 4 oz (113 g) starchy vegetable, such as peas, corn, or potatoes.  · 4 oz (113 g) hot cereal.  · 4 oz (113 g) mashed potatoes or ¼ of a large baked potato.  · 4 oz (113 g) canned or frozen fruit.  · 4 oz (120 mL) fruit juice.  · 4-6 crackers.  · 6 chicken nuggets.  · 6 oz (170 g) unsweetened dry cereal.  · 6 oz (170 g) plain fat-free yogurt or yogurt sweetened with artificial sweeteners.  · 8 oz (240 mL) milk.  · 8 oz (170 g) fresh fruit or one " small piece of fruit.  · 24 oz (680 g) popped popcorn.  Example of carbohydrate counting  Sample meal  · 3 oz (85 g) chicken breast.  · 6 oz (170 g) brown rice.  · 4 oz (113 g) corn.  · 8 oz (240 mL) milk.  · 8 oz (170 g) strawberries with sugar-free whipped topping.  Carbohydrate calculation  1. Identify the foods that contain carbohydrates:  ? Rice.  ? Corn.  ? Milk.  ? Strawberries.  2. Calculate how many servings you have of each food:  ? 2 servings rice.  ? 1 serving corn.  ? 1 serving milk.  ? 1 serving strawberries.  3. Multiply each number of servings by 15 g:  ? 2 servings rice x 15 g = 30 g.  ? 1 serving corn x 15 g = 15 g.  ? 1 serving milk x 15 g = 15 g.  ? 1 serving strawberries x 15 g = 15 g.  4. Add together all of the amounts to find the total grams of carbohydrates eaten:  ? 30 g + 15 g + 15 g + 15 g = 75 g of carbohydrates total.  Summary  · Carbohydrate counting is a method of keeping track of how many carbohydrates you eat.  · Eating carbohydrates naturally increases the amount of sugar (glucose) in the blood.  · Counting how many carbohydrates you eat helps keep your blood glucose within normal limits, which helps you manage your diabetes.  · A diet and nutrition specialist (registered dietitian) can help you make a meal plan and calculate how many carbohydrates you should have at each meal and snack.  This information is not intended to replace advice given to you by your health care provider. Make sure you discuss any questions you have with your health care provider.  Document Released: 12/18/2006 Document Revised: 07/12/2018 Document Reviewed: 05/31/2017  Elsevier Patient Education © 2020 [employer]

## 2020-10-27 NOTE — PROGRESS NOTES
The ABCs of the Annual Wellness Visit  Subsequent Medicare Wellness Visit    Chief Complaint   Patient presents with   • Medicare Wellness-subsequent       Subjective   History of Present Illness:  Evie Shah is a 75 y.o. female who presents for a Subsequent Medicare Wellness Visit.    Diabetes mellitus type 2-not at goal with Jardiance and diet  Lab Results   Component Value Date    HGBA1C 8.00 (H) 10/08/2020     Hypertension-controlled with Lotrel, chlorthalidone and Lasix    Hyperlipidemia-  Stable with atorvastatin and diet    Chronic allergic rhinitis-stable with Singulair and Claritin    COPD-stable with albuterol and Pulmicort    Obesity-  Not at goal with current diet and exercise    Osteoporosis-stable with calcium and vitamin D supplementation    Generalized anxiety disorder-stable with Wellbutrin.  She denies any SI or HI.    Peripheral artery disease-stable with risk factor modification including aspirin, Plavix and Lipitor    HEALTH RISK ASSESSMENT    Recent Hospitalizations:  No hospitalization(s) within the last year.    Current Medical Providers:  Patient Care Team:  Camryn Mota PA as PCP - General  Camryn Mota PA as PCP - Family Medicine    Smoking Status:  Social History     Tobacco Use   Smoking Status Former Smoker   • Types: Cigarettes   • Quit date: 2015   • Years since quittin.4   Smokeless Tobacco Never Used       Alcohol Consumption:  Social History     Substance and Sexual Activity   Alcohol Use No       Depression Screen:   PHQ-2/PHQ-9 Depression Screening 10/27/2020   Little interest or pleasure in doing things 2   Feeling down, depressed, or hopeless 0   Trouble falling or staying asleep, or sleeping too much 0   Feeling tired or having little energy 1   Poor appetite or overeating 0   Feeling bad about yourself - or that you are a failure or have let yourself or your family down 0   Trouble concentrating on things, such as reading the newspaper or  watching television 0   Moving or speaking so slowly that other people could have noticed. Or the opposite - being so fidgety or restless that you have been moving around a lot more than usual 0   Thoughts that you would be better off dead, or of hurting yourself in some way 0   Total Score 3   If you checked off any problems, how difficult have these problems made it for you to do your work, take care of things at home, or get along with other people? Not difficult at all       Fall Risk Screen:  She has a low fall risk.    Health Habits and Functional and Cognitive Screening:  Functional & Cognitive Status 10/27/2020   Do you have difficulty preparing food and eating? No   Do you have difficulty bathing yourself, getting dressed or grooming yourself? No   Do you have difficulty using the toilet? No   Do you have difficulty moving around from place to place? No   Do you have trouble with steps or getting out of a bed or a chair? Yes   Current Diet Well Balanced Diet   Dental Exam Not up to date   Eye Exam Not up to date   Exercise (times per week) 5 times per week   Current Exercises Include House Cleaning   Current Exercise Activities Include -   Do you need help using the phone?  No   Are you deaf or do you have serious difficulty hearing?  Yes   Do you need help with transportation? No   Do you need help shopping? Yes   Do you need help preparing meals?  No   Do you need help with housework?  No   Do you need help with laundry? No   Do you need help taking your medications? No   Do you need help managing money? No   Do you ever drive or ride in a car without wearing a seat belt? No   Have you felt unusual stress, anger or loneliness in the last month? No   Who do you live with? Alone   If you need help, do you have trouble finding someone available to you? No   Have you been bothered in the last four weeks by sexual problems? No   Do you have difficulty concentrating, remembering or making decisions? Yes      Does the patient have evidence of cognitive impairment? No    Vision Screening (10/27/2020)  Edited by: Char Avery MA   Right eye Left eye Both eyes   With correction 20/20 20/25 20/20       Asprin use counseling:Taking ASA appropriately as indicated    Age-appropriate Screening Schedule:  Refer to the list below for future screening recommendations based on patient's age, sex and/or medical conditions. Orders for these recommended tests are listed in the plan section. The patient has been provided with a written plan.    Health Maintenance   Topic Date Due   • ZOSTER VACCINE (2 of 2) 10/27/2020 (Originally 4/6/2017)   • DIABETIC FOOT EXAM  07/13/2021 (Originally 3/14/2020)   • MAMMOGRAM  11/05/2020   • DXA SCAN  12/29/2020   • URINE MICROALBUMIN  03/10/2021   • HEMOGLOBIN A1C  04/08/2021   • LIPID PANEL  10/08/2021   • DIABETIC EYE EXAM  10/27/2021   • COLONOSCOPY  11/07/2026   • TDAP/TD VACCINES (3 - Td) 07/09/2028   • INFLUENZA VACCINE  Completed          The following portions of the patient's history were reviewed and updated as appropriate: allergies, current medications, past family history, past medical history, past social history, past surgical history and problem list.    Outpatient Medications Prior to Visit   Medication Sig Dispense Refill   • albuterol sulfate  (90 Base) MCG/ACT inhaler Inhale 2 puffs Every 4 (Four) Hours As Needed for Shortness of Air. 1 inhaler 1   • amLODIPine-benazepril (LOTREL) 10-40 MG per capsule Take 1 capsule by mouth Daily. 90 capsule 3   • aspirin 81 MG EC tablet Take 1 tablet by mouth Daily. 90 tablet 3   • atorvastatin (LIPITOR) 10 MG tablet Take 1 tablet by mouth Every Night. 90 tablet 3   • Blood Glucose Monitoring Suppl misc 1 each 3 (Three) Times a Day. 1 each 0   • budesonide (PULMICORT) 1 MG/2ML nebulizer solution Take 2 mL by nebulization 2 (two) times a day. 60 each 5   • buPROPion SR (WELLBUTRIN SR) 150 MG 12 hr tablet Take 1 tablet by mouth Every  12 (Twelve) Hours. 180 tablet 3   • chlorthalidone (HYGROTON) 25 MG tablet Take 1 tablet by mouth Daily. 30 tablet 5   • clopidogrel (PLAVIX) 75 MG tablet Take 1 tablet by mouth Daily. 90 tablet 3   • Empagliflozin (Jardiance) 25 MG tablet Take 25 mg by mouth Daily. 90 tablet 3   • furosemide (Lasix) 20 MG tablet Take 1 tablet by mouth Daily. 90 tablet 3   • lidocaine-prilocaine (EMLA) 2.5-2.5 % cream APPLY 1-2 GRAMS (1-2 PUMPS) TO AFFECTED AREA 3-4 TIMES DAILY 240 g 5   • loratadine (CLARITIN) 10 MG tablet Take 1 tablet by mouth Daily. 90 tablet 3   • montelukast (SINGULAIR) 10 MG tablet Take 1 tablet by mouth Every Night. 90 tablet 3   • omega-3 acid ethyl esters (LOVAZA) 1 g capsule Take 2 capsules by mouth 2 (Two) Times a Day. 180 capsule 3   • ONE TOUCH ULTRA TEST test strip USE ONE STRIP TO CHECK GLUCOSE ONCE DAILY 100 each 5   • ONETOUCH DELICA LANCETS 33G misc USE A NEW LANCET TO CHECK GLUCOSE ONCE DAILY 60 each 5   • potassium chloride 10 MEQ CR tablet Take 1 tablet by mouth Daily. 90 tablet 3   • vitamin D (ERGOCALCIFEROL) 1.25 MG (12893 UT) capsule capsule Take 1 capsule by mouth 1 (One) Time Per Week. 12 capsule 3     No facility-administered medications prior to visit.        Patient Active Problem List   Diagnosis   • DM type 2 with diabetic mixed hyperlipidemia (CMS/HCC)   • Mixed hyperlipidemia   • Essential hypertension   • Senile osteoporosis   • Chronic allergic rhinitis   • VISHAL (generalized anxiety disorder)   • PAD (peripheral artery disease) (CMS/HCC)   • Obesity   • COPD mixed type (CMS/HCC)   • Former smoker       Advanced Care Planning:  ACP discussion was held with the patient during this visit. Patient does not have an advance directive, information provided.    Review of Systems   Constitutional: Negative for activity change, appetite change, fatigue and fever.   HENT: Negative for ear pain, sinus pressure and sore throat.    Eyes: Negative for pain and visual disturbance.   Respiratory:  "Negative for cough and chest tightness.    Cardiovascular: Negative for chest pain and palpitations.   Gastrointestinal: Negative for abdominal pain, constipation, diarrhea, nausea and vomiting.   Endocrine: Negative for polydipsia and polyuria.   Genitourinary: Negative for dysuria and frequency.   Musculoskeletal: Negative for back pain and myalgias.   Skin: Negative for color change and rash.   Allergic/Immunologic: Negative for food allergies and immunocompromised state.   Neurological: Negative for dizziness, syncope and headaches.   Hematological: Negative for adenopathy. Does not bruise/bleed easily.   Psychiatric/Behavioral: Negative for hallucinations and suicidal ideas. The patient is not nervous/anxious.        Compared to one year ago, the patient feels her physical health is worse.  Compared to one year ago, the patient feels her mental health is better.    Reviewed chart for potential of high risk medication in the elderly: yes  Reviewed chart for potential of harmful drug interactions in the elderly:yes    Objective         Vitals:    10/27/20 1456   BP: 110/80   Pulse: 90   Temp: 97.1 °F (36.2 °C)   TempSrc: Tympanic   SpO2: 94%   Weight: 89.4 kg (197 lb)   Height: 160 cm (63\")   PainSc: 0-No pain       Body mass index is 34.9 kg/m².  Discussed the patient's BMI with her. The BMI is above average; BMI management plan is completed.    Physical Exam  Vitals signs and nursing note reviewed.   Constitutional:       Appearance: Normal appearance. She is well-developed.   HENT:      Head: Normocephalic and atraumatic.      Right Ear: Tympanic membrane normal.      Left Ear: Tympanic membrane normal.      Nose: Nose normal.      Mouth/Throat:      Mouth: Mucous membranes are moist.      Pharynx: No oropharyngeal exudate or posterior oropharyngeal erythema.   Eyes:      Extraocular Movements: Extraocular movements intact.      Conjunctiva/sclera: Conjunctivae normal.   Neck:      Musculoskeletal: Normal " range of motion and neck supple.      Thyroid: No thyromegaly.      Trachea: No tracheal deviation.   Cardiovascular:      Rate and Rhythm: Normal rate and regular rhythm.      Heart sounds: Normal heart sounds. No murmur.   Pulmonary:      Effort: Pulmonary effort is normal. No respiratory distress.      Breath sounds: Normal breath sounds. No wheezing.   Abdominal:      General: Bowel sounds are normal.      Palpations: Abdomen is soft.      Tenderness: There is no abdominal tenderness. There is no guarding.   Musculoskeletal: Normal range of motion.         General: No tenderness.   Lymphadenopathy:      Cervical: No cervical adenopathy.   Skin:     General: Skin is warm and dry.      Findings: No rash.   Neurological:      General: No focal deficit present.      Mental Status: She is alert and oriented to person, place, and time.   Psychiatric:         Mood and Affect: Mood normal.         Behavior: Behavior normal.         Thought Content: Thought content normal.         Lab Results   Component Value Date     (H) 10/08/2020    CHLPL 139 10/08/2020    TRIG 276 (H) 10/08/2020    HDL 48 10/08/2020    LDL 49 10/08/2020    VLDL 42 (H) 10/08/2020    HGBA1C 8.00 (H) 10/08/2020        Assessment/Plan   Medicare Risks and Personalized Health Plan  CMS Preventative Services Quick Reference  Advance Directive Discussion  Cardiovascular risk  Obesity/Overweight   Polypharmacy    The above risks/problems have been discussed with the patient.  Pertinent information has been shared with the patient in the After Visit Summary.  Follow up plans and orders are seen below in the Assessment/Plan Section.    Diagnoses and all orders for this visit:    1. Medicare annual wellness visit, subsequent (Primary)  Comments:  Performed and documented today    2. DM type 2 with diabetic mixed hyperlipidemia (CMS/[facility])  Comments:  Continue Jardiance  Advised low carbohydrate diabetic diet  Orders:  -     Ambulatory Referral to  Ophthalmology    3. Essential hypertension  Comments:  Continue to monitor  Continue Lotrel, chlorthalidone and Lasix    4. Mixed hyperlipidemia  Comments:  Continue atorvastatin  Advised low-cholesterol diet    5. Chronic allergic rhinitis  Comments:  Continue Singulair and Claritin    6. COPD mixed type (CMS/[facility])  Comments:  Continue albuterol and Pulmicort    7. Class 1 obesity due to excess calories with serious comorbidity and body mass index (BMI) of 34.0 to 34.9 in adult  Comments:  Advised 30 minutes CV exercise daily  Advised a low-cholesterol diabetic diet    8. Senile osteoporosis  Comments:  Continue calcium and vitamin D supplementation    9. VISHAL (generalized anxiety disorder)  Comments:  Continue Wellbutrin  Conservative measures with dealing with stress were advised    10. PAD (peripheral artery disease) (CMS/[facility])  Comments:  Continue aspirin Plavix and Lipitor      Follow Up:  Return in about 3 months (around 1/27/2021) for Followup with Camryn.     An After Visit Summary and PPPS were given to the patient.

## 2020-11-17 ENCOUNTER — OFFICE VISIT (OUTPATIENT)
Dept: CARDIOLOGY | Facility: CLINIC | Age: 75
End: 2020-11-17

## 2020-11-17 VITALS
HEIGHT: 63 IN | BODY MASS INDEX: 35.08 KG/M2 | SYSTOLIC BLOOD PRESSURE: 142 MMHG | DIASTOLIC BLOOD PRESSURE: 82 MMHG | WEIGHT: 198 LBS | HEART RATE: 82 BPM

## 2020-11-17 DIAGNOSIS — E78.2 MIXED HYPERLIPIDEMIA: ICD-10-CM

## 2020-11-17 DIAGNOSIS — I73.9 PAD (PERIPHERAL ARTERY DISEASE) (HCC): Primary | ICD-10-CM

## 2020-11-17 DIAGNOSIS — I10 ESSENTIAL HYPERTENSION: ICD-10-CM

## 2020-11-17 PROCEDURE — 99214 OFFICE O/P EST MOD 30 MIN: CPT | Performed by: INTERNAL MEDICINE

## 2020-11-17 NOTE — PROGRESS NOTES
[facility] Cardiology    Encounter Date: 2020    Patient ID: Evie Shah is a 75 y.o. female.  : 1945     PCP: Camryn Mota PA       Chief Complaint: Aorto-iliac atherosclerosis (CMS/[facility])      PROBLEM LIST:  1. Peripheral arterial disease:   a. Class III left lower extremity claudication.  b. Arterial duplex, 10/27/2014: Moderate-to-severe stenosis of the left lower extremity.  c. Peripheral angiograms, 2015, Dr. Bernard: 90% stenosis of the left external iliac artery treated with 7.0 x 39 mm stent. Otherwise, non-obstructive plaque.   2. Hypertension.   3. Dyslipidemia.   4. Type 2 diabetes.   5. Obesity with BMI 32.   6. History of tobacco abuse with cessation in .  7. Osteoporosis.   8. Vertigo.   9. Surgical history:  a. Tonsillectomy and adenoidectomy.  b. Skin cancer excision from nose.    History of Present Illness  Patient presents today for an annual follow-up with a history of PAD s/p previous intervention and cardiac risk factors. Since last visit, she has been feeling well overall from a cardiovascular standpoint. She has had influenza and bronchitis since her last visit but has not been hospitalized. She has not been exercising but she notes that it is possible for her to start doing so with the use of her oxygen. Patient denies chest pain, shortness of breath, palpitations, edema, dizziness, and syncope.     Allergies   Allergen Reactions   • Codeine Shortness Of Breath         Current Outpatient Medications:   •  albuterol sulfate  (90 Base) MCG/ACT inhaler, Inhale 2 puffs Every 4 (Four) Hours As Needed for Shortness of Air., Disp: 1 inhaler, Rfl: 1  •  amLODIPine-benazepril (LOTREL) 10-40 MG per capsule, Take 1 capsule by mouth Daily., Disp: 90 capsule, Rfl: 3  •  aspirin 81 MG EC tablet, Take 1 tablet by mouth Daily., Disp: 90 tablet, Rfl: 3  •  atorvastatin (LIPITOR) 10 MG tablet, Take 1 tablet by mouth Every Night., Disp: 90  tablet, Rfl: 3  •  Blood Glucose Monitoring Suppl misc, 1 each 3 (Three) Times a Day., Disp: 1 each, Rfl: 0  •  budesonide (PULMICORT) 1 MG/2ML nebulizer solution, Take 2 mL by nebulization 2 (two) times a day., Disp: 60 each, Rfl: 5  •  buPROPion SR (WELLBUTRIN SR) 150 MG 12 hr tablet, Take 1 tablet by mouth Every 12 (Twelve) Hours., Disp: 180 tablet, Rfl: 3  •  chlorthalidone (HYGROTON) 25 MG tablet, Take 1 tablet by mouth Daily., Disp: 30 tablet, Rfl: 5  •  clopidogrel (PLAVIX) 75 MG tablet, Take 1 tablet by mouth Daily., Disp: 90 tablet, Rfl: 3  •  Empagliflozin (Jardiance) 25 MG tablet, Take 25 mg by mouth Daily., Disp: 90 tablet, Rfl: 3  •  furosemide (Lasix) 20 MG tablet, Take 1 tablet by mouth Daily., Disp: 90 tablet, Rfl: 3  •  lidocaine-prilocaine (EMLA) 2.5-2.5 % cream, APPLY 1-2 GRAMS (1-2 PUMPS) TO AFFECTED AREA 3-4 TIMES DAILY, Disp: 240 g, Rfl: 5  •  loratadine (CLARITIN) 10 MG tablet, Take 1 tablet by mouth Daily., Disp: 90 tablet, Rfl: 3  •  montelukast (SINGULAIR) 10 MG tablet, Take 1 tablet by mouth Every Night., Disp: 90 tablet, Rfl: 3  •  O2 (OXYGEN), Inhale 2 L/min Every Night., Disp: , Rfl:   •  omega-3 acid ethyl esters (LOVAZA) 1 g capsule, Take 2 capsules by mouth 2 (Two) Times a Day., Disp: 180 capsule, Rfl: 3  •  ONE TOUCH ULTRA TEST test strip, USE ONE STRIP TO CHECK GLUCOSE ONCE DAILY, Disp: 100 each, Rfl: 5  •  ONETOUCH DELICA LANCETS 33G misc, USE A NEW LANCET TO CHECK GLUCOSE ONCE DAILY, Disp: 60 each, Rfl: 5  •  potassium chloride 10 MEQ CR tablet, Take 1 tablet by mouth Daily., Disp: 90 tablet, Rfl: 3  •  vitamin D (ERGOCALCIFEROL) 1.25 MG (34841 UT) capsule capsule, Take 1 capsule by mouth 1 (One) Time Per Week., Disp: 12 capsule, Rfl: 3    The following portions of the patient's history were reviewed and updated as appropriate: allergies, current medications, past family history, past medical history, past social history, past surgical history and problem list.    ROS  Review of  "Systems   Constitution: Negative for chills, fever, fatigue, generalized weakness.   Cardiovascular: Negative for chest pain, dyspnea on exertion, leg swelling, palpitations, orthopnea, and syncope.   Respiratory: Negative for cough, shortness of breath, and wheezing.  HENT: Negative for ear pain, nosebleeds, and tinnitus.  Gastrointestinal: Negative for abdominal pain, constipation, diarrhea, nausea and vomiting.   Genitourinary: No urinary symptoms.  Musculoskeletal: Negative for muscle cramps.  Neurological: Negative for dizziness, headaches, loss of balance, numbness, and symptoms of stroke.  Psychiatric: Normal mental status.     All other systems reviewed and are negative.        Objective:     /82 (BP Location: Left arm, Patient Position: Sitting)   Pulse 82   Ht 160 cm (63\")   Wt 89.8 kg (198 lb)   BMI 35.07 kg/m²    Repeat BP: 128/74    Physical Exam  Constitutional: Patient appears well-developed and well-nourished.   HENT: HEENT exam unremarkable.   Neck: Neck supple. No JVD present. No carotid bruits.   Cardiovascular: Normal rate, regular rhythm and normal heart sounds. No murmur heard.   2+ symmetric pulses.   Pulmonary/Chest: Breath sounds normal. Does not exhibit tenderness.   Abdominal: Abdomen benign.   Musculoskeletal: Does not exhibit edema.   Neurological: Neurological exam unremarkable.   Vitals reviewed.    Data Review:   Lab Results   Component Value Date     (H) 10/08/2020    BUN 37 (H) 10/08/2020    CREATININE 1.25 (H) 10/08/2020    EGFRIFNONA 42 (L) 10/08/2020    EGFRIFAFRI 51 (L) 10/08/2020    BCR 29.6 (H) 10/08/2020    K 3.5 10/08/2020    CO2 26.6 10/08/2020    CALCIUM 9.3 10/08/2020    ALBUMIN 4.70 10/08/2020    AST 15 10/08/2020    ALT 21 10/08/2020     Lab Results   Component Value Date    CHLPL 139 10/08/2020    TRIG 276 (H) 10/08/2020    HDL 48 10/08/2020    LDL 49 10/08/2020      Lab Results   Component Value Date    HGBA1C 8.00 (H) 10/08/2020        Procedures     "   Assessment:      Diagnosis Plan   1. PAD (peripheral artery disease) (CMS/HCC)  No current symptoms; continue current medications.    2. Essential hypertension  Acceptable control; continue current medications.   3. Mixed hyperlipidemia  Well-controlled; continue atorvastatin 10 mg nightly.     Plan:   Stable cardiac and vascular status.   Continue current medications.   FU in 12 MO, sooner as needed.  Thank you for allowing us to participate in the care of your patient.     Scribed for Rashad Bernard MD by Layla Bronson. 11/17/2020  11:28 EST      I, Rashad Bernard MD, personally performed the services described in this documentation as scribed by the above named individual in my presence, and it is both accurate and complete.  11/17/2020  16:07 EST      Please note that portions of this note may have been completed with a voice recognition program. Efforts were made to edit the dictations, but occasionally words are mistranscribed.

## 2020-12-14 ENCOUNTER — OFFICE VISIT (OUTPATIENT)
Dept: FAMILY MEDICINE CLINIC | Facility: CLINIC | Age: 75
End: 2020-12-14

## 2020-12-14 VITALS
OXYGEN SATURATION: 91 % | WEIGHT: 198 LBS | BODY MASS INDEX: 35.08 KG/M2 | HEIGHT: 63 IN | SYSTOLIC BLOOD PRESSURE: 126 MMHG | DIASTOLIC BLOOD PRESSURE: 72 MMHG

## 2020-12-14 DIAGNOSIS — E78.2 DM TYPE 2 WITH DIABETIC MIXED HYPERLIPIDEMIA (HCC): ICD-10-CM

## 2020-12-14 DIAGNOSIS — J44.1 COPD WITH EXACERBATION (HCC): Primary | ICD-10-CM

## 2020-12-14 DIAGNOSIS — E11.69 DM TYPE 2 WITH DIABETIC MIXED HYPERLIPIDEMIA (HCC): ICD-10-CM

## 2020-12-14 PROCEDURE — 99442 PR PHYS/QHP TELEPHONE EVALUATION 11-20 MIN: CPT | Performed by: PHYSICIAN ASSISTANT

## 2020-12-14 RX ORDER — LEVOFLOXACIN 500 MG/1
500 TABLET, FILM COATED ORAL DAILY
Qty: 10 TABLET | Refills: 0 | Status: SHIPPED | OUTPATIENT
Start: 2020-12-14 | End: 2020-12-24

## 2020-12-14 RX ORDER — PREDNISONE 20 MG/1
TABLET ORAL
Qty: 10 TABLET | Refills: 0 | Status: SHIPPED | OUTPATIENT
Start: 2020-12-14 | End: 2020-12-24

## 2020-12-14 NOTE — PROGRESS NOTES
Subjective   Evie Shah is a 75 y.o. female.     Telephone Visit    You have chosen to receive care through a telephone visit. Do you consent to use a telephone visit for your medical care today? Yes    This visit has been rescheduled as a phone visit to comply with patient safety concerns in accordance with CDC recommendations. Total time of discussion was 12 minutes.    Chief Complaint -smothering    History of Present Illness -      Smothering-  She complains of smothering, productive cough and wheezing that began last night.  Minimal relief with ipratropium and Pulmicort nebulizers.  She states that she has not left her home and has not been in contact with anyone so her risk of COVID-19 is very low.    COPD-not at goal due to acute exacerbation    Diabetes mellitus type 2-stable with Jardiance and diet  Lab Results   Component Value Date    HGBA1C 8.00 (H) 10/08/2020     Hyperlipidemia-stable with atorvastatin and diet    The following portions of the patient's history were reviewed and updated as appropriate: allergies, current medications, past family history, past medical history, past social history, past surgical history and problem list.    Review of Systems   Constitutional: Positive for fatigue. Negative for activity change, appetite change and fever.   HENT: Negative for ear pain, sinus pressure and sore throat.    Eyes: Negative for pain and visual disturbance.   Respiratory: Positive for cough, shortness of breath and wheezing. Negative for chest tightness.    Cardiovascular: Negative for chest pain and palpitations.   Gastrointestinal: Negative for abdominal pain, constipation, diarrhea, nausea and vomiting.   Endocrine: Negative for polydipsia and polyuria.   Genitourinary: Negative for dysuria and frequency.   Musculoskeletal: Negative for back pain and myalgias.   Skin: Negative for color change and rash.   Allergic/Immunologic: Negative for food allergies and immunocompromised state.  "  Neurological: Negative for dizziness, syncope and headaches.   Hematological: Negative for adenopathy. Does not bruise/bleed easily.   Psychiatric/Behavioral: Negative for hallucinations and suicidal ideas. The patient is not nervous/anxious.        /72   Ht 160 cm (63\")   Wt 89.8 kg (198 lb)   SpO2 91% Comment: on 2 L oxygen  BMI 35.07 kg/m²     Physical Exam  Vitals signs and nursing note reviewed.   Neurological:      Mental Status: She is alert and oriented to person, place, and time.   Psychiatric:         Mood and Affect: Mood normal.         Thought Content: Thought content normal.         Assessment/Plan     Diagnoses and all orders for this visit:    1. COPD with exacerbation (CMS/[facility]) (Primary)  Comments:  Start Levaquin and prednisone    Orders:  -     predniSONE (DELTASONE) 20 MG tablet; 2 daily  Dispense: 10 tablet; Refill: 0  -     levoFLOXacin (LEVAQUIN) 500 MG tablet; Take 1 tablet by mouth Daily for 10 days.  Dispense: 10 tablet; Refill: 0    2. DM type 2 with diabetic mixed hyperlipidemia (CMS/[facility])  Comments:  Continue Jardiance, atorvastatin and diet  Advised prednisone may acutely elevate blood glucose    COPD exacerbation  Advised regarding symptomatic treatment.  Suggested OTC remedies.  Antibiotic as per orders.  Encouraged to report if any worse or if any new symptoms.  Call in 5 days if symptoms aren't resolving.          This document has been electronically signed by:  Camryn Mota PA-C    "

## 2020-12-22 ENCOUNTER — OFFICE VISIT (OUTPATIENT)
Dept: FAMILY MEDICINE CLINIC | Facility: CLINIC | Age: 75
End: 2020-12-22

## 2020-12-22 VITALS
DIASTOLIC BLOOD PRESSURE: 80 MMHG | WEIGHT: 198 LBS | OXYGEN SATURATION: 93 % | BODY MASS INDEX: 35.08 KG/M2 | HEIGHT: 63 IN | SYSTOLIC BLOOD PRESSURE: 140 MMHG

## 2020-12-22 DIAGNOSIS — J44.1 COPD WITH EXACERBATION (HCC): ICD-10-CM

## 2020-12-22 PROCEDURE — 99442 PR PHYS/QHP TELEPHONE EVALUATION 11-20 MIN: CPT | Performed by: PHYSICIAN ASSISTANT

## 2020-12-22 RX ORDER — PREDNISONE 20 MG/1
20 TABLET ORAL SEE ADMIN INSTRUCTIONS
Qty: 7 TABLET | Refills: 0 | Status: SHIPPED | OUTPATIENT
Start: 2020-12-22 | End: 2021-01-28

## 2020-12-22 NOTE — PROGRESS NOTES
Subjective   Evie Shah is a 75 y.o. female.     Telephone Visit    You have chosen to receive care through a telephone visit. Do you consent to use a telephone visit for your medical care today? Yes    This visit has been rescheduled as a phone visit to comply with patient safety concerns in accordance with CDC recommendations. Total time of discussion was 13 minutes.    Chief Complaint -shortness of breath    History of Present Illness -      Shortness of breath-  She complains of shortness of breath with continued cough of clear mucus.  Some relief with Levaquin for the last 9 days and she has 1 day left of Levaquin.  She also had some relief initially with prednisone 20 mg a day for 5 days but the mucus production and cough persist.  She denies any fever over the last few days.    COPD-not at goal due to acute illness.  She does complain of just generalized weakness and fatigue.    She reports that she has not left her house in the last 14 days and has had very minimal contact with outside person.  She states that she is a very low risk for Covid.    The following portions of the patient's history were reviewed and updated as appropriate: allergies, current medications, past family history, past medical history, past social history, past surgical history and problem list.    Review of Systems   Constitutional: Positive for activity change and fatigue. Negative for appetite change and fever.   HENT: Positive for congestion. Negative for ear pain, sinus pressure and sore throat.    Eyes: Negative for pain and visual disturbance.   Respiratory: Positive for cough and shortness of breath. Negative for chest tightness.    Cardiovascular: Negative for chest pain and palpitations.   Gastrointestinal: Negative for abdominal pain, constipation, diarrhea, nausea and vomiting.   Endocrine: Negative for polydipsia and polyuria.   Genitourinary: Negative for dysuria and frequency.   Musculoskeletal: Negative for  "back pain and myalgias.   Skin: Negative for color change and rash.   Allergic/Immunologic: Negative for food allergies and immunocompromised state.   Neurological: Negative for dizziness, syncope and headaches.   Hematological: Negative for adenopathy. Does not bruise/bleed easily.   Psychiatric/Behavioral: Negative for hallucinations and suicidal ideas. The patient is not nervous/anxious.        /80   Ht 160 cm (63\") Comment: pt reported all vitals today  Wt 89.8 kg (198 lb)   SpO2 93%   BMI 35.07 kg/m²     Physical Exam  Vitals signs reviewed.   Neurological:      Mental Status: She is alert and oriented to person, place, and time.   Psychiatric:         Mood and Affect: Mood normal.         Thought Content: Thought content normal.         Assessment/Plan     Diagnoses and all orders for this visit:    1. COPD with exacerbation (CMS/[facility])  Comments:  Finished last day of Levaquin for full 10-day course  Rx prednisone taper for an additional 10 days    Orders:  -     predniSONE (DELTASONE) 20 MG tablet; Take 1 tablet by mouth See Admin Instructions. 1tab daily x 4 days then 1/2 tab qd 6 days  Dispense: 7 tablet; Refill: 0      COPD exacerbation  Advised regarding symptomatic treatment.  Suggested OTC remedies.  Encouraged to report if any worse or if any new symptoms.  Call in 5 days if symptoms aren't resolving.  She was advised to continue conservative treatment as she is finishing a 10-day course of Levaquin.  We discussed the mucus production and inflammation with relation to her cough and conservative measures for treating this.  She declines chest x-ray at this time  Patient declines going to emergency room at this time  She declines Covid testing at this time  Advised patient that I am on call over [facility] and if she has any problems or symptoms persist or worsen she is welcome to call me for further evaluation or treatment.      This document has been electronically signed by:  Camryn Mota, " STEPHANIE

## 2020-12-22 NOTE — PATIENT INSTRUCTIONS

## 2021-01-28 ENCOUNTER — OFFICE VISIT (OUTPATIENT)
Dept: FAMILY MEDICINE CLINIC | Facility: CLINIC | Age: 76
End: 2021-01-28

## 2021-01-28 VITALS
DIASTOLIC BLOOD PRESSURE: 84 MMHG | HEIGHT: 63 IN | HEART RATE: 97 BPM | BODY MASS INDEX: 35.08 KG/M2 | WEIGHT: 198 LBS | SYSTOLIC BLOOD PRESSURE: 139 MMHG

## 2021-01-28 DIAGNOSIS — I73.9 PAD (PERIPHERAL ARTERY DISEASE) (HCC): Chronic | ICD-10-CM

## 2021-01-28 DIAGNOSIS — J44.9 MIXED TYPE COPD (CHRONIC OBSTRUCTIVE PULMONARY DISEASE) (HCC): Chronic | ICD-10-CM

## 2021-01-28 DIAGNOSIS — J96.11 CHRONIC RESPIRATORY FAILURE WITH HYPOXIA (HCC): Chronic | ICD-10-CM

## 2021-01-28 DIAGNOSIS — E11.65 TYPE 2 DIABETES MELLITUS WITH HYPERGLYCEMIA, WITHOUT LONG-TERM CURRENT USE OF INSULIN (HCC): Primary | Chronic | ICD-10-CM

## 2021-01-28 DIAGNOSIS — I10 ESSENTIAL HYPERTENSION: Chronic | ICD-10-CM

## 2021-01-28 DIAGNOSIS — E78.2 DM TYPE 2 WITH DIABETIC MIXED HYPERLIPIDEMIA (HCC): Chronic | ICD-10-CM

## 2021-01-28 DIAGNOSIS — E66.01 MORBIDLY OBESE (HCC): Chronic | ICD-10-CM

## 2021-01-28 DIAGNOSIS — E11.69 DM TYPE 2 WITH DIABETIC MIXED HYPERLIPIDEMIA (HCC): Chronic | ICD-10-CM

## 2021-01-28 DIAGNOSIS — J96.11 CHRONIC RESPIRATORY FAILURE WITH HYPOXIA (HCC): ICD-10-CM

## 2021-01-28 PROCEDURE — 99443 PR PHYS/QHP TELEPHONE EVALUATION 21-30 MIN: CPT | Performed by: PHYSICIAN ASSISTANT

## 2021-01-28 RX ORDER — GLYBURIDE 2.5 MG/1
2.5 TABLET ORAL
Qty: 30 TABLET | Refills: 5 | Status: SHIPPED | OUTPATIENT
Start: 2021-01-28 | End: 2021-06-01 | Stop reason: SDUPTHER

## 2021-01-28 RX ORDER — ALBUTEROL SULFATE 90 UG/1
2 AEROSOL, METERED RESPIRATORY (INHALATION) EVERY 4 HOURS PRN
Qty: 18 G | Refills: 5 | Status: SHIPPED | OUTPATIENT
Start: 2021-01-28 | End: 2022-09-09 | Stop reason: SDUPTHER

## 2021-01-28 NOTE — PATIENT INSTRUCTIONS
"Fat and Cholesterol Restricted Eating Plan  Getting too much fat and cholesterol in your diet may cause health problems. Choosing the right foods helps keep your fat and cholesterol at normal levels. This can keep you from getting certain diseases.  Your doctor may recommend an eating plan that includes:  · Total fat: ______% or less of total calories a day.  · Saturated fat: ______% or less of total calories a day.  · Cholesterol: less than _________mg a day.  · Fiber: ______g a day.  What are tips for following this plan?  Meal planning  · At meals, divide your plate into four equal parts:  ? Fill one-half of your plate with vegetables and green salads.  ? Fill one-fourth of your plate with whole grains.  ? Fill one-fourth of your plate with low-fat (lean) protein foods.  · Eat fish that is high in omega-3 fats at least two times a week. This includes mackerel, tuna, sardines, and salmon.  · Eat foods that are high in fiber, such as whole grains, beans, apples, broccoli, carrots, peas, and barley.  General tips    · Work with your doctor to lose weight if you need to.  · Avoid:  ? Foods with added sugar.  ? Fried foods.  ? Foods with partially hydrogenated oils.  · Limit alcohol intake to no more than 1 drink a day for nonpregnant women and 2 drinks a day for men. One drink equals 12 oz of beer, 5 oz of wine, or 1½ oz of hard liquor.  Reading food labels  · Check food labels for:  ? Trans fats.  ? Partially hydrogenated oils.  ? Saturated fat (g) in each serving.  ? Cholesterol (mg) in each serving.  ? Fiber (g) in each serving.  · Choose foods with healthy fats, such as:  ? Monounsaturated fats.  ? Polyunsaturated fats.  ? Omega-3 fats.  · Choose grain products that have whole grains. Look for the word \"whole\" as the first word in the ingredient list.  Cooking  · Cook foods using low-fat methods. These include baking, boiling, grilling, and broiling.  · Eat more home-cooked foods. Eat at restaurants and buffets " less often.  · Avoid cooking using saturated fats, such as butter, cream, palm oil, palm kernel oil, and coconut oil.  Recommended foods    Fruits  · All fresh, canned (in natural juice), or frozen fruits.  Vegetables  · Fresh or frozen vegetables (raw, steamed, roasted, or grilled). Green salads.  Grains  · Whole grains, such as whole wheat or whole grain breads, crackers, cereals, and pasta. Unsweetened oatmeal, bulgur, barley, quinoa, or brown rice. Corn or whole wheat flour tortillas.  Meats and other protein foods  · Ground beef (85% or leaner), grass-fed beef, or beef trimmed of fat. Skinless chicken or turkey. Ground chicken or turkey. Pork trimmed of fat. All fish and seafood. Egg whites. Dried beans, peas, or lentils. Unsalted nuts or seeds. Unsalted canned beans. Nut butters without added sugar or oil.  Dairy  · Low-fat or nonfat dairy products, such as skim or 1% milk, 2% or reduced-fat cheeses, low-fat and fat-free ricotta or cottage cheese, or plain low-fat and nonfat yogurt.  Fats and oils  · Tub margarine without trans fats. Light or reduced-fat mayonnaise and salad dressings. Avocado. Olive, canola, sesame, or safflower oils.  The items listed above may not be a complete list of foods and beverages you can eat. Contact a dietitian for more information.  Foods to avoid  Fruits  · Canned fruit in heavy syrup. Fruit in cream or butter sauce. Fried fruit.  Vegetables  · Vegetables cooked in cheese, cream, or butter sauce. Fried vegetables.  Grains  · White bread. White pasta. White rice. Cornbread. Bagels, pastries, and croissants. Crackers and snack foods that contain trans fat and hydrogenated oils.  Meats and other protein foods  · Fatty cuts of meat. Ribs, chicken wings, palomo, sausage, bologna, salami, chitterlings, fatback, hot dogs, bratwurst, and packaged lunch meats. Liver and organ meats. Whole eggs and egg yolks. Chicken and turkey with skin. Fried meat.  Dairy  · Whole or 2% milk, cream,  half-and-half, and cream cheese. Whole milk cheeses. Whole-fat or sweetened yogurt. Full-fat cheeses. Nondairy creamers and whipped toppings. Processed cheese, cheese spreads, and cheese curds.  Beverages  · Alcohol. Sugar-sweetened drinks such as sodas, lemonade, and fruit drinks.  Fats and oils  · Butter, stick margarine, lard, shortening, ghee, or palomo fat. Coconut, palm kernel, and palm oils.  Sweets and desserts  · Corn syrup, sugars, honey, and molasses. Candy. Jam and jelly. Syrup. Sweetened cereals. Cookies, pies, cakes, donuts, muffins, and ice cream.  The items listed above may not be a complete list of foods and beverages you should avoid. Contact a dietitian for more information.  Summary  · Choosing the right foods helps keep your fat and cholesterol at normal levels. This can keep you from getting certain diseases.  · At meals, fill one-half of your plate with vegetables and green salads.  · Eat high-fiber foods, like whole grains, beans, apples, carrots, peas, and barley.  · Limit added sugar, saturated fats, alcohol, and fried foods.  This information is not intended to replace advice given to you by your health care provider. Make sure you discuss any questions you have with your health care provider.  Document Revised: 08/21/2019 Document Reviewed: 09/04/2018  [employer] Patient Education © 2020 [employer]      Carbohydrate Counting for Diabetes Mellitus, Adult    Carbohydrate counting is a method of keeping track of how many carbohydrates you eat. Eating carbohydrates naturally increases the amount of sugar (glucose) in the blood. Counting how many carbohydrates you eat helps keep your blood glucose within normal limits, which helps you manage your diabetes (diabetes mellitus).  It is important to know how many carbohydrates you can safely have in each meal. This is different for every person. A diet and nutrition specialist (registered dietitian) can help you make a meal plan and calculate how  "many carbohydrates you should have at each meal and snack.  Carbohydrates are found in the following foods:  · Grains, such as breads and cereals.  · Dried beans and soy products.  · Starchy vegetables, such as potatoes, peas, and corn.  · Fruit and fruit juices.  · Milk and yogurt.  · Sweets and snack foods, such as cake, cookies, candy, chips, and soft drinks.  How do I count carbohydrates?  There are two ways to count carbohydrates in food. You can use either of the methods or a combination of both.  Reading \"Nutrition Facts\" on packaged food  The \"Nutrition Facts\" list is included on the labels of almost all packaged foods and beverages in the [place] It includes:  · The serving size.  · Information about nutrients in each serving, including the grams (g) of carbohydrate per serving.  To use the “Nutrition Facts\":  · Decide how many servings you will have.  · Multiply the number of servings by the number of carbohydrates per serving.  · The resulting number is the total amount of carbohydrates that you will be having.  Learning standard serving sizes of other foods  When you eat carbohydrate foods that are not packaged or do not include \"Nutrition Facts\" on the label, you need to measure the servings in order to count the amount of carbohydrates:  · Measure the foods that you will eat with a food scale or measuring cup, if needed.  · Decide how many standard-size servings you will eat.  · Multiply the number of servings by 15. Most carbohydrate-rich foods have about 15 g of carbohydrates per serving.  ? For example, if you eat 8 oz (170 g) of strawberries, you will have eaten 2 servings and 30 g of carbohydrates (2 servings x 15 g = 30 g).  · For foods that have more than one food mixed, such as soups and casseroles, you must count the carbohydrates in each food that is included.  The following list contains standard serving sizes of common carbohydrate-rich foods. Each of these servings has about 15 g of " carbohydrates:  · ½ hamburger bun or ½ English muffin.  · ½ oz (15 mL) syrup.  · ½ oz (14 g) jelly.  · 1 slice of bread.  · 1 six-inch tortilla.  · 3 oz (85 g) cooked rice or pasta.  · 4 oz (113 g) cooked dried beans.  · 4 oz (113 g) starchy vegetable, such as peas, corn, or potatoes.  · 4 oz (113 g) hot cereal.  · 4 oz (113 g) mashed potatoes or ¼ of a large baked potato.  · 4 oz (113 g) canned or frozen fruit.  · 4 oz (120 mL) fruit juice.  · 4-6 crackers.  · 6 chicken nuggets.  · 6 oz (170 g) unsweetened dry cereal.  · 6 oz (170 g) plain fat-free yogurt or yogurt sweetened with artificial sweeteners.  · 8 oz (240 mL) milk.  · 8 oz (170 g) fresh fruit or one small piece of fruit.  · 24 oz (680 g) popped popcorn.  Example of carbohydrate counting  Sample meal  · 3 oz (85 g) chicken breast.  · 6 oz (170 g) brown rice.  · 4 oz (113 g) corn.  · 8 oz (240 mL) milk.  · 8 oz (170 g) strawberries with sugar-free whipped topping.  Carbohydrate calculation  1. Identify the foods that contain carbohydrates:  ? Rice.  ? Corn.  ? Milk.  ? Strawberries.  2. Calculate how many servings you have of each food:  ? 2 servings rice.  ? 1 serving corn.  ? 1 serving milk.  ? 1 serving strawberries.  3. Multiply each number of servings by 15 g:  ? 2 servings rice x 15 g = 30 g.  ? 1 serving corn x 15 g = 15 g.  ? 1 serving milk x 15 g = 15 g.  ? 1 serving strawberries x 15 g = 15 g.  4. Add together all of the amounts to find the total grams of carbohydrates eaten:  ? 30 g + 15 g + 15 g + 15 g = 75 g of carbohydrates total.  Summary  · Carbohydrate counting is a method of keeping track of how many carbohydrates you eat.  · Eating carbohydrates naturally increases the amount of sugar (glucose) in the blood.  · Counting how many carbohydrates you eat helps keep your blood glucose within normal limits, which helps you manage your diabetes.  · A diet and nutrition specialist (registered dietitian) can help you make a meal plan and calculate  how many carbohydrates you should have at each meal and snack.  This information is not intended to replace advice given to you by your health care provider. Make sure you discuss any questions you have with your health care provider.  Document Revised: 07/12/2018 Document Reviewed: 05/31/2017  [employer] Patient Education © 2020 [employer] Inc.

## 2021-01-28 NOTE — PROGRESS NOTES
Subjective   Evie Shah is a 75 y.o. female.     Telephone Visit    You have chosen to receive care through a telephone visit. Do you consent to use a telephone visit for your medical care today? Yes    This visit has been rescheduled as a phone visit to comply with patient safety concerns in accordance with CDC recommendations. Total time of discussion was 21 minutes.    Chief Complaint -  diabetes    History of Present Illness -      Diabetes-  Not at goal with jardiance.  Failed metformin due to GI upset/diarrhea.  Lab Results   Component Value Date    HGBA1C 8.00 (H) 10/08/2020     Hypertension-  Not at goal with Lasix and chlorthalidone.  Patient states that she was having significant fatigue and blood pressure was low at 97/78.  She reports blood pressure has been running 130s over 80s with furosemide.    Chronic respiratory failure with hypoxia-  Stable with Pulmicort and supplemental oxygen    COPD-stable with Pulmicort and albuterol    Diabetes mellitus with hyperlipidemia-  Discussed the last lab work 10/8/2020 showing elevated triglycerides.  We discussed that this is likely due to uncontrolled diabetes.  Plan to get better glycemic control which should bring down the triglyceride portion of cholesterol    Peripheral artery disease-stable with Plavix    Obesity-  Not at goal with BMI of 35 and multiple comorbidities.    The following portions of the patient's history were reviewed and updated as appropriate: allergies, current medications, past family history, past medical history, past social history, past surgical history and problem list.    Review of Systems   Constitutional: Positive for fatigue. Negative for activity change, appetite change and fever.   HENT: Negative for ear pain, sinus pressure and sore throat.    Eyes: Negative for pain and visual disturbance.   Respiratory: Positive for shortness of breath (chronic). Negative for cough and chest tightness.    Cardiovascular: Negative  "for chest pain and palpitations.   Gastrointestinal: Negative for abdominal pain, constipation, diarrhea, nausea and vomiting.   Endocrine: Negative for polydipsia and polyuria.   Genitourinary: Negative for dysuria and frequency.   Musculoskeletal: Negative for back pain and myalgias.   Skin: Negative for color change and rash.   Allergic/Immunologic: Negative for food allergies and immunocompromised state.   Neurological: Negative for dizziness, syncope and headaches.   Hematological: Negative for adenopathy. Does not bruise/bleed easily.   Psychiatric/Behavioral: Negative for hallucinations and suicidal ideas. The patient is not nervous/anxious.        /84   Pulse 97   Ht 160 cm (63\")   Wt 89.8 kg (198 lb)   BMI 35.07 kg/m²   Office Visit on 10/08/2020   Component Date Value Ref Range Status   • 25 Hydroxy, Vitamin D 10/08/2020 39.5  30.0 - 100.0 ng/ml Final   • Hemoglobin A1C 10/08/2020 8.00* 4.80 - 5.60 % Final   • Glucose 10/08/2020 233* 65 - 99 mg/dL Final   • BUN 10/08/2020 37* 8 - 23 mg/dL Final   • Creatinine 10/08/2020 1.25* 0.57 - 1.00 mg/dL Final   • eGFR Non African Am 10/08/2020 42* >60 mL/min/1.73 Final   • eGFR African Am 10/08/2020 51* >60 mL/min/1.73 Final   • BUN/Creatinine Ratio 10/08/2020 29.6* 7.0 - 25.0 Final   • Sodium 10/08/2020 134* 136 - 145 mmol/L Final   • Potassium 10/08/2020 3.5  3.5 - 5.2 mmol/L Final   • Chloride 10/08/2020 94* 98 - 107 mmol/L Final   • Total CO2 10/08/2020 26.6  22.0 - 29.0 mmol/L Final   • Calcium 10/08/2020 9.3  8.6 - 10.5 mg/dL Final   • Total Protein 10/08/2020 6.8  6.0 - 8.5 g/dL Final   • Albumin 10/08/2020 4.70  3.50 - 5.20 g/dL Final   • Globulin 10/08/2020 2.1  gm/dL Final   • A/G Ratio 10/08/2020 2.2  g/dL Final   • Total Bilirubin 10/08/2020 0.7  0.0 - 1.2 mg/dL Final   • Alkaline Phosphatase 10/08/2020 105  39 - 117 U/L Final   • AST (SGOT) 10/08/2020 15  1 - 32 U/L Final   • ALT (SGPT) 10/08/2020 21  1 - 33 U/L Final   • Total Cholesterol " 10/08/2020 139  0 - 200 mg/dL Final   • Triglycerides 10/08/2020 276* 0 - 150 mg/dL Final   • HDL Cholesterol 10/08/2020 48  40 - 60 mg/dL Final   • VLDL Cholesterol [place] 10/08/2020 42* 5 - 40 mg/dL Final   • LDL Chol Calc ([facility]) 10/08/2020 49  0 - 100 mg/dL Final       Physical Exam  Vitals signs and nursing note reviewed.   Pulmonary:      Effort: Pulmonary effort is normal.      Breath sounds: Normal breath sounds. No wheezing.      Comments: No wheezing or heavy breathing noted on phone today  Neurological:      Mental Status: She is alert and oriented to person, place, and time.   Psychiatric:         Mood and Affect: Mood normal.         Thought Content: Thought content normal.         Assessment/Plan     Diagnoses and all orders for this visit:    1. Type 2 diabetes mellitus with hyperglycemia, without long-term current use of insulin (CMS/[facility]) (Primary)  Comments:  start glyburize  continue jardiance  advised low carb diabetic diet  Orders:  -     glyburide (DIAbeta) 2.5 MG tablet; Take 1 tablet by mouth Daily With Breakfast.  Dispense: 30 tablet; Refill: 5  -     Hemoglobin A1c; Future  -     Comprehensive Metabolic Panel; Future  -     MicroAlbumin, Urine, Random - Urine, Clean Catch; Future  -     Lipid Panel; Future    2. Chronic respiratory failure with hypoxia (CMS/[facility])  Comments:  continue 2L oxygen qhs  Orders:  -     albuterol sulfate  (90 Base) MCG/ACT inhaler; Inhale 2 puffs Every 4 (Four) Hours As Needed for Shortness of Air.  Dispense: 18 g; Refill: 5    3. Mixed type COPD (chronic obstructive pulmonary disease) (CMS/[facility])  Comments:  Continue Pulmicort and albuterol    4. DM type 2 with diabetic mixed hyperlipidemia (CMS/[facility])  Comments:  Advised low carbohydrate diet  Plan to get better control of glucose which should help triglycerides  Orders:  -     Hemoglobin A1c; Future  -     Lipid Panel; Future    5. PAD (peripheral artery disease) (CMS/[facility])  Comments:  Continue Plavix    6. Chronic  respiratory failure with hypoxia (CMS/[facility])  -     albuterol sulfate  (90 Base) MCG/ACT inhaler; Inhale 2 puffs Every 4 (Four) Hours As Needed for Shortness of Air.  Dispense: 18 g; Refill: 5    7. Morbidly obese (CMS/[facility])  Comments:  advised low carb diet  advised 30 CV activity as tolerated    8. Essential hypertension  Comments:  Discontinue chlorthalidone due to hypotension  Continue furosemide  Advised daily BP and pulse log            This document has been electronically signed by:  Camryn Mota PA-C

## 2021-02-02 DIAGNOSIS — I10 ESSENTIAL HYPERTENSION: ICD-10-CM

## 2021-02-02 RX ORDER — POTASSIUM CHLORIDE 750 MG/1
TABLET, EXTENDED RELEASE ORAL
Qty: 90 TABLET | Refills: 0 | Status: SHIPPED | OUTPATIENT
Start: 2021-02-02 | End: 2021-02-11

## 2021-02-10 DIAGNOSIS — I10 ESSENTIAL HYPERTENSION: ICD-10-CM

## 2021-02-11 RX ORDER — POTASSIUM CHLORIDE 750 MG/1
TABLET, EXTENDED RELEASE ORAL
Qty: 90 TABLET | Refills: 0 | Status: SHIPPED | OUTPATIENT
Start: 2021-02-11 | End: 2021-05-05

## 2021-02-25 ENCOUNTER — LAB (OUTPATIENT)
Dept: FAMILY MEDICINE CLINIC | Facility: CLINIC | Age: 76
End: 2021-02-25

## 2021-02-25 DIAGNOSIS — E11.65 TYPE 2 DIABETES MELLITUS WITH HYPERGLYCEMIA, WITHOUT LONG-TERM CURRENT USE OF INSULIN (HCC): Chronic | ICD-10-CM

## 2021-02-25 DIAGNOSIS — E78.2 DM TYPE 2 WITH DIABETIC MIXED HYPERLIPIDEMIA (HCC): Chronic | ICD-10-CM

## 2021-02-25 DIAGNOSIS — E66.01 MORBIDLY OBESE (HCC): Primary | ICD-10-CM

## 2021-02-25 DIAGNOSIS — E11.69 DM TYPE 2 WITH DIABETIC MIXED HYPERLIPIDEMIA (HCC): Chronic | ICD-10-CM

## 2021-02-26 LAB
ALBUMIN SERPL-MCNC: 4.5 G/DL (ref 3.5–5.2)
ALBUMIN/GLOB SERPL: 2.4 G/DL
ALP SERPL-CCNC: 88 U/L (ref 39–117)
ALT SERPL-CCNC: 22 U/L (ref 1–33)
AST SERPL-CCNC: 16 U/L (ref 1–32)
BILIRUB SERPL-MCNC: 0.6 MG/DL (ref 0–1.2)
BUN SERPL-MCNC: 24 MG/DL (ref 8–23)
BUN/CREAT SERPL: 30.4 (ref 7–25)
CALCIUM SERPL-MCNC: 9.3 MG/DL (ref 8.6–10.5)
CHLORIDE SERPL-SCNC: 104 MMOL/L (ref 98–107)
CHOLEST SERPL-MCNC: 128 MG/DL (ref 0–200)
CO2 SERPL-SCNC: 21.7 MMOL/L (ref 22–29)
CREAT SERPL-MCNC: 0.79 MG/DL (ref 0.57–1)
GLOBULIN SER CALC-MCNC: 1.9 GM/DL
GLUCOSE SERPL-MCNC: 170 MG/DL (ref 65–99)
HBA1C MFR BLD: 8.4 % (ref 4.8–5.6)
HDLC SERPL-MCNC: 47 MG/DL (ref 40–60)
LDLC SERPL CALC-MCNC: 43 MG/DL (ref 0–100)
MICROALBUMIN UR-MCNC: <3 UG/ML
POTASSIUM SERPL-SCNC: 3.9 MMOL/L (ref 3.5–5.2)
PROT SERPL-MCNC: 6.4 G/DL (ref 6–8.5)
SODIUM SERPL-SCNC: 140 MMOL/L (ref 136–145)
TRIGL SERPL-MCNC: 242 MG/DL (ref 0–150)
VLDLC SERPL CALC-MCNC: 38 MG/DL (ref 5–40)

## 2021-03-01 ENCOUNTER — OFFICE VISIT (OUTPATIENT)
Dept: FAMILY MEDICINE CLINIC | Facility: CLINIC | Age: 76
End: 2021-03-01

## 2021-03-01 VITALS
SYSTOLIC BLOOD PRESSURE: 150 MMHG | BODY MASS INDEX: 36.68 KG/M2 | WEIGHT: 207 LBS | OXYGEN SATURATION: 96 % | HEART RATE: 94 BPM | DIASTOLIC BLOOD PRESSURE: 86 MMHG | HEIGHT: 63 IN | TEMPERATURE: 96.9 F

## 2021-03-01 DIAGNOSIS — E78.2 MIXED HYPERLIPIDEMIA: Chronic | ICD-10-CM

## 2021-03-01 DIAGNOSIS — E11.65 TYPE 2 DIABETES MELLITUS WITH HYPERGLYCEMIA, WITHOUT LONG-TERM CURRENT USE OF INSULIN (HCC): Primary | Chronic | ICD-10-CM

## 2021-03-01 DIAGNOSIS — I10 ESSENTIAL HYPERTENSION: Chronic | ICD-10-CM

## 2021-03-01 PROCEDURE — 99214 OFFICE O/P EST MOD 30 MIN: CPT | Performed by: PHYSICIAN ASSISTANT

## 2021-03-01 NOTE — PATIENT INSTRUCTIONS
"Carbohydrate Counting for Diabetes Mellitus, Adult    Carbohydrate counting is a method of keeping track of how many carbohydrates you eat. Eating carbohydrates naturally increases the amount of sugar (glucose) in the blood. Counting how many carbohydrates you eat helps keep your blood glucose within normal limits, which helps you manage your diabetes (diabetes mellitus).  It is important to know how many carbohydrates you can safely have in each meal. This is different for every person. A diet and nutrition specialist (registered dietitian) can help you make a meal plan and calculate how many carbohydrates you should have at each meal and snack.  Carbohydrates are found in the following foods:  · Grains, such as breads and cereals.  · Dried beans and soy products.  · Starchy vegetables, such as potatoes, peas, and corn.  · Fruit and fruit juices.  · Milk and yogurt.  · Sweets and snack foods, such as cake, cookies, candy, chips, and soft drinks.  How do I count carbohydrates?  There are two ways to count carbohydrates in food. You can use either of the methods or a combination of both.  Reading \"Nutrition Facts\" on packaged food  The \"Nutrition Facts\" list is included on the labels of almost all packaged foods and beverages in the [place] It includes:  · The serving size.  · Information about nutrients in each serving, including the grams (g) of carbohydrate per serving.  To use the “Nutrition Facts\":  · Decide how many servings you will have.  · Multiply the number of servings by the number of carbohydrates per serving.  · The resulting number is the total amount of carbohydrates that you will be having.  Learning standard serving sizes of other foods  When you eat carbohydrate foods that are not packaged or do not include \"Nutrition Facts\" on the label, you need to measure the servings in order to count the amount of carbohydrates:  · Measure the foods that you will eat with a food scale or measuring cup, if " needed.  · Decide how many standard-size servings you will eat.  · Multiply the number of servings by 15. Most carbohydrate-rich foods have about 15 g of carbohydrates per serving.  ? For example, if you eat 8 oz (170 g) of strawberries, you will have eaten 2 servings and 30 g of carbohydrates (2 servings x 15 g = 30 g).  · For foods that have more than one food mixed, such as soups and casseroles, you must count the carbohydrates in each food that is included.  The following list contains standard serving sizes of common carbohydrate-rich foods. Each of these servings has about 15 g of carbohydrates:  · ½ hamburger bun or ½ English muffin.  · ½ oz (15 mL) syrup.  · ½ oz (14 g) jelly.  · 1 slice of bread.  · 1 six-inch tortilla.  · 3 oz (85 g) cooked rice or pasta.  · 4 oz (113 g) cooked dried beans.  · 4 oz (113 g) starchy vegetable, such as peas, corn, or potatoes.  · 4 oz (113 g) hot cereal.  · 4 oz (113 g) mashed potatoes or ¼ of a large baked potato.  · 4 oz (113 g) canned or frozen fruit.  · 4 oz (120 mL) fruit juice.  · 4-6 crackers.  · 6 chicken nuggets.  · 6 oz (170 g) unsweetened dry cereal.  · 6 oz (170 g) plain fat-free yogurt or yogurt sweetened with artificial sweeteners.  · 8 oz (240 mL) milk.  · 8 oz (170 g) fresh fruit or one small piece of fruit.  · 24 oz (680 g) popped popcorn.  Example of carbohydrate counting  Sample meal  · 3 oz (85 g) chicken breast.  · 6 oz (170 g) brown rice.  · 4 oz (113 g) corn.  · 8 oz (240 mL) milk.  · 8 oz (170 g) strawberries with sugar-free whipped topping.  Carbohydrate calculation  1. Identify the foods that contain carbohydrates:  ? Rice.  ? Corn.  ? Milk.  ? Strawberries.  2. Calculate how many servings you have of each food:  ? 2 servings rice.  ? 1 serving corn.  ? 1 serving milk.  ? 1 serving strawberries.  3. Multiply each number of servings by 15 g:  ? 2 servings rice x 15 g = 30 g.  ? 1 serving corn x 15 g = 15 g.  ? 1 serving milk x 15 g = 15 g.  ? 1  serving strawberries x 15 g = 15 g.  4. Add together all of the amounts to find the total grams of carbohydrates eaten:  ? 30 g + 15 g + 15 g + 15 g = 75 g of carbohydrates total.  Summary  · Carbohydrate counting is a method of keeping track of how many carbohydrates you eat.  · Eating carbohydrates naturally increases the amount of sugar (glucose) in the blood.  · Counting how many carbohydrates you eat helps keep your blood glucose within normal limits, which helps you manage your diabetes.  · A diet and nutrition specialist (registered dietitian) can help you make a meal plan and calculate how many carbohydrates you should have at each meal and snack.  This information is not intended to replace advice given to you by your health care provider. Make sure you discuss any questions you have with your health care provider.  Document Revised: 07/12/2018 Document Reviewed: 05/31/2017  [employer] Patient Education © 2020 [employer]      Fat and Cholesterol Restricted Eating Plan  Getting too much fat and cholesterol in your diet may cause health problems. Choosing the right foods helps keep your fat and cholesterol at normal levels. This can keep you from getting certain diseases.  Your doctor may recommend an eating plan that includes:  · Total fat: ______% or less of total calories a day.  · Saturated fat: ______% or less of total calories a day.  · Cholesterol: less than _________mg a day.  · Fiber: ______g a day.  What are tips for following this plan?  Meal planning  · At meals, divide your plate into four equal parts:  ? Fill one-half of your plate with vegetables and green salads.  ? Fill one-fourth of your plate with whole grains.  ? Fill one-fourth of your plate with low-fat (lean) protein foods.  · Eat fish that is high in omega-3 fats at least two times a week. This includes mackerel, tuna, sardines, and salmon.  · Eat foods that are high in fiber, such as whole grains, beans, apples, broccoli, carrots,  "peas, and barley.  General tips    · Work with your doctor to lose weight if you need to.  · Avoid:  ? Foods with added sugar.  ? Fried foods.  ? Foods with partially hydrogenated oils.  · Limit alcohol intake to no more than 1 drink a day for nonpregnant women and 2 drinks a day for men. One drink equals 12 oz of beer, 5 oz of wine, or 1½ oz of hard liquor.  Reading food labels  · Check food labels for:  ? Trans fats.  ? Partially hydrogenated oils.  ? Saturated fat (g) in each serving.  ? Cholesterol (mg) in each serving.  ? Fiber (g) in each serving.  · Choose foods with healthy fats, such as:  ? Monounsaturated fats.  ? Polyunsaturated fats.  ? Omega-3 fats.  · Choose grain products that have whole grains. Look for the word \"whole\" as the first word in the ingredient list.  Cooking  · Cook foods using low-fat methods. These include baking, boiling, grilling, and broiling.  · Eat more home-cooked foods. Eat at restaurants and buffets less often.  · Avoid cooking using saturated fats, such as butter, cream, palm oil, palm kernel oil, and coconut oil.  Recommended foods    Fruits  · All fresh, canned (in natural juice), or frozen fruits.  Vegetables  · Fresh or frozen vegetables (raw, steamed, roasted, or grilled). Green salads.  Grains  · Whole grains, such as whole wheat or whole grain breads, crackers, cereals, and pasta. Unsweetened oatmeal, bulgur, barley, quinoa, or brown rice. Corn or whole wheat flour tortillas.  Meats and other protein foods  · Ground beef (85% or leaner), grass-fed beef, or beef trimmed of fat. Skinless chicken or turkey. Ground chicken or turkey. Pork trimmed of fat. All fish and seafood. Egg whites. Dried beans, peas, or lentils. Unsalted nuts or seeds. Unsalted canned beans. Nut butters without added sugar or oil.  Dairy  · Low-fat or nonfat dairy products, such as skim or 1% milk, 2% or reduced-fat cheeses, low-fat and fat-free ricotta or cottage cheese, or plain low-fat and nonfat " yogurt.  Fats and oils  · Tub margarine without trans fats. Light or reduced-fat mayonnaise and salad dressings. Avocado. Olive, canola, sesame, or safflower oils.  The items listed above may not be a complete list of foods and beverages you can eat. Contact a dietitian for more information.  Foods to avoid  Fruits  · Canned fruit in heavy syrup. Fruit in cream or butter sauce. Fried fruit.  Vegetables  · Vegetables cooked in cheese, cream, or butter sauce. Fried vegetables.  Grains  · White bread. White pasta. White rice. Cornbread. Bagels, pastries, and croissants. Crackers and snack foods that contain trans fat and hydrogenated oils.  Meats and other protein foods  · Fatty cuts of meat. Ribs, chicken wings, palomo, sausage, bologna, salami, chitterlings, fatback, hot dogs, bratwurst, and packaged lunch meats. Liver and organ meats. Whole eggs and egg yolks. Chicken and turkey with skin. Fried meat.  Dairy  · Whole or 2% milk, cream, half-and-half, and cream cheese. Whole milk cheeses. Whole-fat or sweetened yogurt. Full-fat cheeses. Nondairy creamers and whipped toppings. Processed cheese, cheese spreads, and cheese curds.  Beverages  · Alcohol. Sugar-sweetened drinks such as sodas, lemonade, and fruit drinks.  Fats and oils  · Butter, stick margarine, lard, shortening, ghee, or palomo fat. Coconut, palm kernel, and palm oils.  Sweets and desserts  · Corn syrup, sugars, honey, and molasses. Candy. Jam and jelly. Syrup. Sweetened cereals. Cookies, pies, cakes, donuts, muffins, and ice cream.  The items listed above may not be a complete list of foods and beverages you should avoid. Contact a dietitian for more information.  Summary  · Choosing the right foods helps keep your fat and cholesterol at normal levels. This can keep you from getting certain diseases.  · At meals, fill one-half of your plate with vegetables and green salads.  · Eat high-fiber foods, like whole grains, beans, apples, carrots, peas, and  barley.  · Limit added sugar, saturated fats, alcohol, and fried foods.  This information is not intended to replace advice given to you by your health care provider. Make sure you discuss any questions you have with your health care provider.  Document Revised: 08/21/2019 Document Reviewed: 09/04/2018  [employer] Patient Education © 2020 [employer]

## 2021-03-01 NOTE — PROGRESS NOTES
Subjective   Evie Shah is a 75 y.o. female.       Chief Complaint -diabetes    History of Present Illness -    ROS    Diabetes-  Blood glucose is better controlled with 2-hour postprandial glucose 150-160 in the past month.  She states that she has stopped eating as many carbohydrates and sweets which should also help her glucose control.  She is currently taking Jardiance 25 mg and glyburide 2.5 mg.    Hypertension-  Stable with Lotrel and furosemide.  Patient states home blood pressures less than 130/80 consistently.    Hyperlipidemia-  Stable with atorvastatin and diet    The following portions of the patient's history were reviewed and updated as appropriate: allergies, current medications, past family history, past medical history, past social history, past surgical history and problem list.    Review of Systems   Constitutional: Negative for activity change, appetite change, fatigue and fever.   HENT: Negative for ear pain, sinus pressure and sore throat.    Eyes: Negative for pain and visual disturbance.   Respiratory: Negative for cough and chest tightness.    Cardiovascular: Negative for chest pain and palpitations.   Gastrointestinal: Negative for abdominal pain, constipation, diarrhea, nausea and vomiting.   Endocrine: Negative for polydipsia and polyuria.   Genitourinary: Negative for dysuria and frequency.   Musculoskeletal: Negative for back pain and myalgias.   Skin: Negative for color change and rash.   Allergic/Immunologic: Negative for food allergies and immunocompromised state.   Neurological: Negative for dizziness, syncope and headaches.   Hematological: Negative for adenopathy. Does not bruise/bleed easily.   Psychiatric/Behavioral: Negative for hallucinations and suicidal ideas. The patient is not nervous/anxious.        Objective  Vital signs:  /86 (BP Location: Right arm, Patient Position: Standing, Cuff Size: Adult)   Pulse 94   Temp 96.9 °F (36.1 °C) (Temporal)   Ht 160  "cm (62.99\")   Wt 93.9 kg (207 lb)   SpO2 96%   BMI 36.68 kg/m²     Physical Exam  Vitals signs and nursing note reviewed.   Constitutional:       Appearance: Normal appearance. She is well-developed.   HENT:      Head: Normocephalic and atraumatic.      Right Ear: Tympanic membrane normal.      Left Ear: Tympanic membrane normal.      Nose: Nose normal.      Mouth/Throat:      Mouth: Mucous membranes are moist.      Pharynx: No oropharyngeal exudate or posterior oropharyngeal erythema.   Eyes:      Extraocular Movements: Extraocular movements intact.      Conjunctiva/sclera: Conjunctivae normal.   Neck:      Musculoskeletal: Normal range of motion and neck supple.      Thyroid: No thyromegaly.      Trachea: No tracheal deviation.   Cardiovascular:      Rate and Rhythm: Normal rate and regular rhythm.      Heart sounds: Normal heart sounds. No murmur.   Pulmonary:      Effort: Pulmonary effort is normal. No respiratory distress.      Breath sounds: Normal breath sounds. No wheezing.   Abdominal:      General: Bowel sounds are normal.      Palpations: Abdomen is soft.      Tenderness: There is no abdominal tenderness. There is no guarding.   Musculoskeletal: Normal range of motion.         General: No tenderness.   Lymphadenopathy:      Cervical: No cervical adenopathy.   Skin:     General: Skin is warm and dry.      Findings: No rash.   Neurological:      General: No focal deficit present.      Mental Status: She is alert and oriented to person, place, and time.   Psychiatric:         Mood and Affect: Mood normal.         Behavior: Behavior normal.         Thought Content: Thought content normal.         Result Review :   The following data was reviewed by: RUBI Micheal on 03/01/2021:  CMP    CMP 3/9/20 10/8/20 2/25/21   Glucose 174 (A) 233 (A) 170 (A)   BUN 16 37 (A) 24 (A)   Creatinine 0.74 1.25 (A) 0.79   eGFR Non  Am 77 42 (A) 71   eGFR  Am 94 51 (A) 86   Sodium 140 134 (A) 140 "   Potassium 4.0 3.5 3.9   Chloride 104 94 (A) 104   Calcium 9.2 9.3 9.3   Total Protein 6.7 6.8 6.4   Albumin 4.20 4.70 4.50   Globulin 2.5 2.1 1.9   Total Bilirubin 0.5 0.7 0.6   Alkaline Phosphatase 121 (A) 105 88   AST (SGOT) 9 15 16   ALT (SGPT) 14 21 22   (A) Abnormal value       Comments are available for some flowsheets but are not being displayed.           Lipid Panel    Lipid Panel 3/9/20 10/8/20 2/25/21   Total Cholesterol 131 139 128   Triglycerides 166 (A) 276 (A) 242 (A)   HDL Cholesterol 43 48 47   VLDL Cholesterol 33.2 42 (A) 38   LDL Cholesterol  55 49 43   (A) Abnormal value       Comments are available for some flowsheets but are not being displayed.           A1C Last 3 Results    HGBA1C Last 3 Results 3/9/20 10/8/20 2/25/21   Hemoglobin A1C 7.20 (A) 8.00 (A) 8.40 (A)   (A) Abnormal value       Comments are available for some flowsheets but are not being displayed.                  Assessment/Plan     Diagnoses and all orders for this visit:    1. Type 2 diabetes mellitus with hyperglycemia, without long-term current use of insulin (CMS/[facility]) (Primary)  Comments:  Advised a low carbohydrate diabetic diet  Continue Jardiance and glyburide  Orders:  -     Comprehensive Metabolic Panel; Future  -     Hemoglobin A1c; Future  -     MicroAlbumin, Urine, Random - Urine, Clean Catch; Future    2. Essential hypertension  Comments:  Continue Lotrel and furosemide  Advised daily blood pressure and pulse monitoring and report any consistent readings greater than 140/90    3. Mixed hyperlipidemia  Comments:  Continue atorvastatin  Advised a low-cholesterol diet          Follow Up     Patient was given instructions and counseling regarding his condition or for health maintenance advice. Please see specific information pulled into the AVS if appropriate      This document has been electronically signed by:  Camryn Mota PA-C

## 2021-03-04 DIAGNOSIS — J44.9 COPD MIXED TYPE (HCC): ICD-10-CM

## 2021-03-04 RX ORDER — BUDESONIDE 1 MG/2ML
INHALANT ORAL
Qty: 120 ML | Refills: 0 | Status: SHIPPED | OUTPATIENT
Start: 2021-03-04 | End: 2021-04-05 | Stop reason: SDUPTHER

## 2021-04-05 ENCOUNTER — PROCEDURE VISIT (OUTPATIENT)
Dept: FAMILY MEDICINE CLINIC | Facility: CLINIC | Age: 76
End: 2021-04-05

## 2021-04-05 VITALS
BODY MASS INDEX: 35.97 KG/M2 | WEIGHT: 203 LBS | TEMPERATURE: 97.8 F | RESPIRATION RATE: 14 BRPM | HEART RATE: 90 BPM | HEIGHT: 63 IN | SYSTOLIC BLOOD PRESSURE: 126 MMHG | OXYGEN SATURATION: 92 % | DIASTOLIC BLOOD PRESSURE: 65 MMHG

## 2021-04-05 DIAGNOSIS — M81.0 SENILE OSTEOPOROSIS: ICD-10-CM

## 2021-04-05 DIAGNOSIS — Z87.891 FORMER SMOKER: ICD-10-CM

## 2021-04-05 DIAGNOSIS — E78.2 MIXED HYPERLIPIDEMIA: ICD-10-CM

## 2021-04-05 DIAGNOSIS — Z00.00 HEALTHCARE MAINTENANCE: ICD-10-CM

## 2021-04-05 DIAGNOSIS — E11.69 DM TYPE 2 WITH DIABETIC MIXED HYPERLIPIDEMIA (HCC): ICD-10-CM

## 2021-04-05 DIAGNOSIS — I73.9 PAD (PERIPHERAL ARTERY DISEASE) (HCC): ICD-10-CM

## 2021-04-05 DIAGNOSIS — J44.9 COPD MIXED TYPE (HCC): ICD-10-CM

## 2021-04-05 DIAGNOSIS — J44.9 COPD MIXED TYPE (HCC): Primary | ICD-10-CM

## 2021-04-05 DIAGNOSIS — E78.2 DM TYPE 2 WITH DIABETIC MIXED HYPERLIPIDEMIA (HCC): ICD-10-CM

## 2021-04-05 DIAGNOSIS — M17.0 PRIMARY OSTEOARTHRITIS OF BOTH KNEES: ICD-10-CM

## 2021-04-05 DIAGNOSIS — E66.01 MORBIDLY OBESE (HCC): ICD-10-CM

## 2021-04-05 DIAGNOSIS — I25.10 CORONARY ARTERY CALCIFICATION SEEN ON CT SCAN: ICD-10-CM

## 2021-04-05 DIAGNOSIS — I10 ESSENTIAL HYPERTENSION: ICD-10-CM

## 2021-04-05 PROBLEM — J96.11 CHRONIC RESPIRATORY FAILURE WITH HYPOXIA (HCC): Status: RESOLVED | Noted: 2021-01-28 | Resolved: 2021-04-05

## 2021-04-05 PROCEDURE — 20610 DRAIN/INJ JOINT/BURSA W/O US: CPT | Performed by: GENERAL PRACTICE

## 2021-04-05 PROCEDURE — 99214 OFFICE O/P EST MOD 30 MIN: CPT | Performed by: GENERAL PRACTICE

## 2021-04-05 RX ORDER — BUDESONIDE 1 MG/2ML
1 INHALANT ORAL 2 TIMES DAILY
Qty: 120 ML | Refills: 5 | Status: SHIPPED | OUTPATIENT
Start: 2021-04-05 | End: 2021-04-08 | Stop reason: SDUPTHER

## 2021-04-05 RX ORDER — TRIAMCINOLONE ACETONIDE 40 MG/ML
40 INJECTION, SUSPENSION INTRA-ARTICULAR; INTRAMUSCULAR ONCE
Status: SHIPPED | OUTPATIENT
Start: 2021-04-05

## 2021-04-05 NOTE — PROGRESS NOTES
Knee Injection Procedure Note    Pre-operative Diagnosis: osteoarthritis right knee     Post-operative Diagnosis: same    Indications: symptom relief    Anesthesia: Lidocaine 1% without epinephrine     Procedure Details   Verbal consent was obtained for the procedure. The joint was prepped with alcohol and a small wheel of anesthetic was injected into the subcutaneous tissue. A 25 gauge needle was inserted into the superior aspect of the joint from a lateral approach.  1 ml 1% lidocaine and 1 ml of triamcinolone (KENALOG) 40mg/ml was then injected into the joint. The needle was removed and the area cleansed and dressed.    Complications:  None; patient tolerated the procedure well.

## 2021-04-05 NOTE — PROGRESS NOTES
Subjective   Evie Shah is a 75 y.o. female.     History of Present Illness     Right Knee Pain  She returns with a 3 to 4 month history of increasing right knee pain.  She denies any strain or trauma and there has been no change in her activities.  The pain is described as a generalized ache with weightbearing.  This does not radiate elsewhere.  She admits to swelling along with stiffness toward the end of the day.  She denies any giving way, locking, redness, warmth, fever, or chills.  She gives no history of previous.  She is right-hand dominant    COPD  She complains of persistent shortness of breath with any exertion associated with a cough productive of whitish-yellowish sputum and wheezing.  There is no history of any chest pain or hemoptysis.  She admits to intermittent nasal congestion but denies any other upper respiratory tract symptoms and has had no fever, chills, night sweats, or weight loss.  She quit smoking over 6 years ago following a more than 50-pack-year history.  She is currently using nebulized budesonide twice daily with albuterol as needed.  Low-dose CT of the chest performed on 6/17/2020 was reported as showing moderate COPD along with moderate coronary artery calcifications    PAD  She has a history of a previous left lower extremity angioplasty with stenting.  She remains on dual antiplatelet therapy with no apparent side effects    Diabetes  Current symptoms include none. Patient denies paresthesia of the feet, visual disturbances, polydipsia, polyuria, hypoglycemia and foot ulcerations. Evaluation to date has been: hemoglobin A1C.  Current treatments: SGLT2 inhibitor - empagliflozin and a sulfonylurea - glyburide.   Lab Results   Component Value Date    HGBA1C 8.40 (H) 02/25/2021      Dyslipidemia  Compliance with treatment has been fair. The patient's activity remains quite limited due to her shortness of breath. She remains on atorvastatin, omega 3 fatty acids with no  apparent side effects  Lab Results   Component Value Date    CHLPL 128 02/25/2021    TRIG 242 (H) 02/25/2021    HDL 47 02/25/2021    LDL 43 02/25/2021     Hypertension  Home blood pressure readings: not doing. Associated signs and symptoms: dyspnea.  She continues to deny any chest pain, palpitations, orthopnea, paroxysmal nocturnal dyspnea or peripheral edema. Current antihypertensive medications includes benazepril and amlodipine. Medication compliance: taking as prescribed.   Lab Results   Component Value Date    GLUCOSE 101 (H) 02/26/2015    BUN 24 (H) 02/25/2021    CREATININE 0.79 02/25/2021    EGFRIFNONA 71 02/25/2021    EGFRIFAFRI 86 02/25/2021    BCR 30.4 (H) 02/25/2021    K 3.9 02/25/2021    CO2 21.7 (L) 02/25/2021    CALCIUM 9.3 02/25/2021    PROTENTOTREF 6.4 02/25/2021    ALBUMIN 4.50 02/25/2021    LABIL2 2.4 02/25/2021    AST 16 02/25/2021    ALT 22 02/25/2021     The following portions of the patient's history were reviewed and updated as appropriate: allergies, current medications, past medical history, past social history and problem list.    Review of Systems   Constitutional: Positive for fatigue. Negative for appetite change, chills and fever.   HENT: Positive for congestion and rhinorrhea. Negative for ear pain, postnasal drip, sinus pressure and sore throat.    Eyes: Negative for visual disturbance.   Respiratory: Positive for cough, shortness of breath and wheezing. Negative for chest tightness.    Cardiovascular: Negative for chest pain and palpitations.   Gastrointestinal: Negative for abdominal pain, constipation, diarrhea, nausea and vomiting.   Endocrine: Negative for polydipsia and polyuria.   Genitourinary: Negative for dysuria and frequency.   Musculoskeletal: Positive for arthralgias and joint swelling. Negative for back pain and myalgias.   Skin: Negative for rash.   Neurological: Negative for dizziness, weakness and numbness.   Psychiatric/Behavioral: Negative for hallucinations and  suicidal ideas. The patient is not nervous/anxious.      Objective   Physical Exam  Constitutional:       General: She is not in acute distress.     Appearance: Normal appearance. She is well-developed. She is not diaphoretic.      Comments: Bright and in good spirits.  Right antalgic gait.  No apparent distress at rest. No pallor, jaundice, diaphoresis, or cyanosis.   HENT:      Head: Atraumatic.      Right Ear: Tympanic membrane, ear canal and external ear normal.      Left Ear: Tympanic membrane, ear canal and external ear normal.   Eyes:      Conjunctiva/sclera: Conjunctivae normal.   Neck:      Thyroid: No thyroid mass or thyromegaly.      Vascular: No carotid bruit or JVD.      Trachea: Trachea normal. No tracheal deviation.   Cardiovascular:      Rate and Rhythm: Normal rate and regular rhythm.      Heart sounds: Normal heart sounds, S1 normal and S2 normal. No murmur heard.   No gallop.    Pulmonary:      Effort: Pulmonary effort is normal.      Breath sounds: Examination of the right-lower field reveals decreased breath sounds. Examination of the left-lower field reveals decreased breath sounds. Decreased breath sounds present. No wheezing.      Comments: Pulmonary hyperinflation  Abdominal:      General: Bowel sounds are normal. There is no distension.   Musculoskeletal:      Right knee: Swelling and deformity (mild valgus) present. No effusion, erythema or crepitus. Normal range of motion. Tenderness present over the medial joint line and lateral joint line.      Left knee: Swelling present. No effusion, erythema or crepitus. Normal range of motion. No tenderness.      Right lower leg: No edema.      Left lower leg: No edema.   Lymphadenopathy:      Head:      Right side of head: No submental, submandibular, tonsillar, preauricular, posterior auricular or occipital adenopathy.      Left side of head: No submental, submandibular, tonsillar, preauricular, posterior auricular or occipital adenopathy.       Cervical: No cervical adenopathy.      Upper Body:      Right upper body: No supraclavicular adenopathy.      Left upper body: No supraclavicular adenopathy.   Skin:     General: Skin is warm.      Coloration: Skin is not cyanotic, jaundiced or pale.      Findings: No rash.      Nails: There is no clubbing.   Neurological:      Mental Status: She is alert and oriented to person, place, and time.      Cranial Nerves: No cranial nerve deficit.      Motor: No tremor.      Coordination: Coordination normal.      Gait: Gait normal.   Psychiatric:         Speech: Speech normal.         Behavior: Behavior normal.         Thought Content: Thought content normal.       Assessment/Plan   Problems Addressed this Visit        Cardiac and Vasculature    DM type 2 with diabetic mixed hyperlipidemia (CMS/HCC)  Diabetes mellitus Type II, under fair control.   Encouraged to continue to pursue ADA diet  Encouraged aerobic exercise.  Continue current medication  Follow up with FITO Kimball    Essential hypertension   Hypertension: at goal. Evidence of target organ damage: peripheral artery disease and coronary artery calcifications on previous imaging.  Encouraged to continue to work on diet and exercise plan.   Continue current medication    Mixed hyperlipidemia  As above.   Continue current medication.    PAD (peripheral artery disease) (CMS/[facility])  Reminded regarding the importance of risk factor modification.  Continue current medication       Endocrine and Metabolic    Morbidly obese (CMS/[facility])       Health Encounters    Healthcare maintenance  Patient has received both doses of the COVID-19 vaccine.  Recommended an updated low-dose CT of the chest this summer.  Patient will consider       Musculoskeletal and Injuries    Primary osteoarthritis of both knees  Advised regarding symptomatic treatment  Patient wished to proceed with a corticosteroid injection.  See procedure note    Relevant Medications    triamcinolone acetonide  (KENALOG-40) injection 40 mg    Senile osteoporosis       Pulmonary and Pneumonias    COPD mixed type (CMS/[facility])    COPD is stable.  Encouraged to remain off cigarettes  Encouraged to remain as active as symptoms allow for  Continue current medication    Relevant Medications    budesonide (PULMICORT) 1 MG/2ML nebulizer solution       Tobacco    Former smoker      Diagnoses       Codes Comments    COPD mixed type (CMS/[facility])    -  Primary ICD-10-CM: J44.9  ICD-9-CM: 496 Start Pulmicort  Advised patient to rinse out mouth and gargle after use to prevent thrush    PAD (peripheral artery disease) (CMS/[facility])     ICD-10-CM: I73.9  ICD-9-CM: 443.9     Mixed hyperlipidemia     ICD-10-CM: E78.2  ICD-9-CM: 272.2     Essential hypertension     ICD-10-CM: I10  ICD-9-CM: 401.9     DM type 2 with diabetic mixed hyperlipidemia (CMS/[facility])     ICD-10-CM: E11.69, E78.2  ICD-9-CM: 250.80, 272.2     Morbidly obese (CMS/[facility])     ICD-10-CM: E66.01  ICD-9-CM: 278.01     Senile osteoporosis     ICD-10-CM: M81.0  ICD-9-CM: 733.01     Former smoker     ICD-10-CM: Z87.891  ICD-9-CM: V15.82     Primary osteoarthritis of both knees     ICD-10-CM: M17.0  ICD-9-CM: 715.16     Healthcare maintenance     ICD-10-CM: Z00.00  ICD-9-CM: V70.0

## 2021-04-08 RX ORDER — BUDESONIDE 1 MG/2ML
INHALANT ORAL
Qty: 120 ML | Refills: 0 | Status: SHIPPED | OUTPATIENT
Start: 2021-04-08 | End: 2021-12-14

## 2021-04-12 DIAGNOSIS — E78.2 MIXED HYPERLIPIDEMIA: ICD-10-CM

## 2021-04-12 RX ORDER — OMEGA-3-ACID ETHYL ESTERS 1 G/1
CAPSULE, LIQUID FILLED ORAL
Qty: 360 CAPSULE | Refills: 0 | Status: SHIPPED | OUTPATIENT
Start: 2021-04-12 | End: 2021-06-01 | Stop reason: SDUPTHER

## 2021-04-13 RX ORDER — LIDOCAINE HYDROCHLORIDE 10 MG/ML
1 INJECTION, SOLUTION INFILTRATION; PERINEURAL ONCE
Status: COMPLETED | OUTPATIENT
Start: 2021-04-13 | End: 2021-04-13

## 2021-04-13 RX ORDER — TRIAMCINOLONE ACETONIDE 40 MG/ML
40 INJECTION, SUSPENSION INTRA-ARTICULAR; INTRAMUSCULAR ONCE
Status: COMPLETED | OUTPATIENT
Start: 2021-04-13 | End: 2021-04-13

## 2021-04-13 RX ADMIN — LIDOCAINE HYDROCHLORIDE 1 ML: 10 INJECTION, SOLUTION INFILTRATION; PERINEURAL at 10:45

## 2021-04-13 RX ADMIN — TRIAMCINOLONE ACETONIDE 40 MG: 40 INJECTION, SUSPENSION INTRA-ARTICULAR; INTRAMUSCULAR at 10:46

## 2021-05-05 DIAGNOSIS — I10 ESSENTIAL HYPERTENSION: ICD-10-CM

## 2021-05-05 RX ORDER — POTASSIUM CHLORIDE 750 MG/1
TABLET, EXTENDED RELEASE ORAL
Qty: 90 TABLET | Refills: 0 | Status: SHIPPED | OUTPATIENT
Start: 2021-05-05 | End: 2021-05-17

## 2021-05-16 DIAGNOSIS — I10 ESSENTIAL HYPERTENSION: ICD-10-CM

## 2021-05-17 RX ORDER — POTASSIUM CHLORIDE 750 MG/1
TABLET, EXTENDED RELEASE ORAL
Qty: 90 TABLET | Refills: 0 | Status: SHIPPED | OUTPATIENT
Start: 2021-05-17 | End: 2021-06-01 | Stop reason: SDUPTHER

## 2021-05-25 ENCOUNTER — LAB (OUTPATIENT)
Dept: FAMILY MEDICINE CLINIC | Facility: CLINIC | Age: 76
End: 2021-05-25

## 2021-05-25 DIAGNOSIS — E78.2 MIXED HYPERLIPIDEMIA: ICD-10-CM

## 2021-05-25 DIAGNOSIS — E11.65 TYPE 2 DIABETES MELLITUS WITH HYPERGLYCEMIA, WITHOUT LONG-TERM CURRENT USE OF INSULIN (HCC): Chronic | ICD-10-CM

## 2021-05-25 DIAGNOSIS — I10 ESSENTIAL HYPERTENSION: Primary | ICD-10-CM

## 2021-05-25 LAB
ALBUMIN SERPL-MCNC: 4.4 G/DL (ref 3.5–5.2)
ALBUMIN/GLOB SERPL: 1.8 G/DL
ALP SERPL-CCNC: 112 U/L (ref 39–117)
ALT SERPL-CCNC: 15 U/L (ref 1–33)
AST SERPL-CCNC: 14 U/L (ref 1–32)
BILIRUB SERPL-MCNC: 0.5 MG/DL (ref 0–1.2)
BUN SERPL-MCNC: 23 MG/DL (ref 8–23)
BUN/CREAT SERPL: 27.4 (ref 7–25)
CALCIUM SERPL-MCNC: 9.5 MG/DL (ref 8.6–10.5)
CHLORIDE SERPL-SCNC: 105 MMOL/L (ref 98–107)
CHOLEST SERPL-MCNC: 125 MG/DL (ref 0–200)
CO2 SERPL-SCNC: 23.3 MMOL/L (ref 22–29)
CREAT SERPL-MCNC: 0.84 MG/DL (ref 0.57–1)
GLOBULIN SER CALC-MCNC: 2.4 GM/DL
GLUCOSE SERPL-MCNC: 160 MG/DL (ref 65–99)
HBA1C MFR BLD: 7.6 % (ref 4.8–5.6)
HDLC SERPL-MCNC: 46 MG/DL (ref 40–60)
LDLC SERPL CALC-MCNC: 49 MG/DL (ref 0–100)
POTASSIUM SERPL-SCNC: 4 MMOL/L (ref 3.5–5.2)
PROT SERPL-MCNC: 6.8 G/DL (ref 6–8.5)
SODIUM SERPL-SCNC: 143 MMOL/L (ref 136–145)
TRIGL SERPL-MCNC: 183 MG/DL (ref 0–150)
VLDLC SERPL CALC-MCNC: 30 MG/DL (ref 5–40)

## 2021-05-26 LAB — MICROALBUMIN UR-MCNC: <3 UG/ML

## 2021-06-01 ENCOUNTER — OFFICE VISIT (OUTPATIENT)
Dept: FAMILY MEDICINE CLINIC | Facility: CLINIC | Age: 76
End: 2021-06-01

## 2021-06-01 VITALS
TEMPERATURE: 97.1 F | HEIGHT: 63 IN | OXYGEN SATURATION: 95 % | BODY MASS INDEX: 36.86 KG/M2 | HEART RATE: 91 BPM | SYSTOLIC BLOOD PRESSURE: 158 MMHG | DIASTOLIC BLOOD PRESSURE: 88 MMHG | WEIGHT: 208 LBS

## 2021-06-01 DIAGNOSIS — E78.2 DM TYPE 2 WITH DIABETIC MIXED HYPERLIPIDEMIA (HCC): Chronic | ICD-10-CM

## 2021-06-01 DIAGNOSIS — E78.2 MIXED HYPERLIPIDEMIA: ICD-10-CM

## 2021-06-01 DIAGNOSIS — I70.0 AORTO-ILIAC ATHEROSCLEROSIS (HCC): ICD-10-CM

## 2021-06-01 DIAGNOSIS — K21.9 GASTROESOPHAGEAL REFLUX DISEASE WITHOUT ESOPHAGITIS: Primary | Chronic | ICD-10-CM

## 2021-06-01 DIAGNOSIS — E11.69 DM TYPE 2 WITH DIABETIC MIXED HYPERLIPIDEMIA (HCC): Chronic | ICD-10-CM

## 2021-06-01 DIAGNOSIS — F41.1 GAD (GENERALIZED ANXIETY DISORDER): ICD-10-CM

## 2021-06-01 DIAGNOSIS — E55.9 VITAMIN D DEFICIENCY: ICD-10-CM

## 2021-06-01 DIAGNOSIS — I10 ESSENTIAL HYPERTENSION: ICD-10-CM

## 2021-06-01 DIAGNOSIS — J30.1 NON-SEASONAL ALLERGIC RHINITIS DUE TO POLLEN: ICD-10-CM

## 2021-06-01 DIAGNOSIS — E11.65 TYPE 2 DIABETES MELLITUS WITH HYPERGLYCEMIA, WITHOUT LONG-TERM CURRENT USE OF INSULIN (HCC): Chronic | ICD-10-CM

## 2021-06-01 DIAGNOSIS — I70.8 AORTO-ILIAC ATHEROSCLEROSIS (HCC): ICD-10-CM

## 2021-06-01 PROCEDURE — 99214 OFFICE O/P EST MOD 30 MIN: CPT | Performed by: PHYSICIAN ASSISTANT

## 2021-06-01 RX ORDER — AMLODIPINE BESYLATE AND BENAZEPRIL HYDROCHLORIDE 10; 40 MG/1; MG/1
1 CAPSULE ORAL DAILY
Qty: 90 CAPSULE | Refills: 3 | Status: SHIPPED | OUTPATIENT
Start: 2021-06-01 | End: 2022-08-18

## 2021-06-01 RX ORDER — BUPROPION HYDROCHLORIDE 150 MG/1
150 TABLET, EXTENDED RELEASE ORAL EVERY 12 HOURS SCHEDULED
Qty: 180 TABLET | Refills: 3 | Status: SHIPPED | OUTPATIENT
Start: 2021-06-01 | End: 2022-07-21

## 2021-06-01 RX ORDER — LORATADINE 10 MG/1
10 TABLET ORAL DAILY
Qty: 90 TABLET | Refills: 3 | Status: SHIPPED | OUTPATIENT
Start: 2021-06-01 | End: 2021-07-11

## 2021-06-01 RX ORDER — ATORVASTATIN CALCIUM 10 MG/1
10 TABLET, FILM COATED ORAL DAILY
COMMUNITY
End: 2021-06-01 | Stop reason: SDUPTHER

## 2021-06-01 RX ORDER — OMEGA-3-ACID ETHYL ESTERS 1 G/1
2 CAPSULE, LIQUID FILLED ORAL 2 TIMES DAILY
Qty: 360 CAPSULE | Refills: 3 | Status: SHIPPED | OUTPATIENT
Start: 2021-06-01 | End: 2022-07-07

## 2021-06-01 RX ORDER — OMEGA-3-ACID ETHYL ESTERS 1 G/1
CAPSULE, LIQUID FILLED ORAL
COMMUNITY
Start: 2021-04-12 | End: 2021-06-01 | Stop reason: SDUPTHER

## 2021-06-01 RX ORDER — EMPAGLIFLOZIN 25 MG/1
25 TABLET, FILM COATED ORAL DAILY
Qty: 90 TABLET | Refills: 3 | Status: SHIPPED | OUTPATIENT
Start: 2021-06-01 | End: 2022-07-21

## 2021-06-01 RX ORDER — OMEPRAZOLE 40 MG/1
40 CAPSULE, DELAYED RELEASE ORAL DAILY
Qty: 90 CAPSULE | Refills: 3 | Status: SHIPPED | OUTPATIENT
Start: 2021-06-01 | End: 2022-06-14 | Stop reason: SDUPTHER

## 2021-06-01 RX ORDER — FUROSEMIDE 20 MG/1
TABLET ORAL
COMMUNITY
Start: 2021-05-04 | End: 2021-06-01 | Stop reason: SDUPTHER

## 2021-06-01 RX ORDER — MONTELUKAST SODIUM 10 MG/1
10 TABLET ORAL NIGHTLY
Qty: 90 TABLET | Refills: 3 | Status: SHIPPED | OUTPATIENT
Start: 2021-06-01 | End: 2022-07-21

## 2021-06-01 RX ORDER — ERGOCALCIFEROL 1.25 MG/1
50000 CAPSULE ORAL WEEKLY
Qty: 12 CAPSULE | Refills: 3 | Status: SHIPPED | OUTPATIENT
Start: 2021-06-01 | End: 2021-08-20

## 2021-06-01 RX ORDER — GLYBURIDE 2.5 MG/1
2.5 TABLET ORAL
Qty: 30 TABLET | Refills: 5 | Status: SHIPPED | OUTPATIENT
Start: 2021-06-01 | End: 2021-12-14 | Stop reason: SDUPTHER

## 2021-06-01 RX ORDER — POTASSIUM CHLORIDE 750 MG/1
10 TABLET, EXTENDED RELEASE ORAL DAILY
Qty: 90 TABLET | Refills: 3 | Status: SHIPPED | OUTPATIENT
Start: 2021-06-01 | End: 2021-08-09

## 2021-06-01 RX ORDER — OMEPRAZOLE 40 MG/1
40 CAPSULE, DELAYED RELEASE ORAL DAILY
Qty: 30 CAPSULE | Refills: 5 | Status: SHIPPED | OUTPATIENT
Start: 2021-06-01 | End: 2021-06-01 | Stop reason: SDUPTHER

## 2021-06-01 RX ORDER — ATORVASTATIN CALCIUM 10 MG/1
10 TABLET, FILM COATED ORAL DAILY
Qty: 90 TABLET | Refills: 3 | Status: SHIPPED | OUTPATIENT
Start: 2021-06-01 | End: 2021-10-18

## 2021-06-01 RX ORDER — FUROSEMIDE 20 MG/1
20 TABLET ORAL DAILY
Qty: 90 TABLET | Refills: 3 | Status: SHIPPED | OUTPATIENT
Start: 2021-06-01 | End: 2021-11-01

## 2021-06-01 RX ORDER — CLOPIDOGREL BISULFATE 75 MG/1
75 TABLET ORAL DAILY
Qty: 90 TABLET | Refills: 3 | Status: SHIPPED | OUTPATIENT
Start: 2021-06-01 | End: 2022-06-14 | Stop reason: SDUPTHER

## 2021-06-02 NOTE — PROGRESS NOTES
Subjective   Evie Shah is a 75 y.o. female.       Chief Complaint -acidic taste in mouth    History of Present Illness -    ROS      Acidic taste-  Patient complains of an acidic taste in her throat after laying supine at night.  She states that she has had some heartburn in the past as well.  Acidic taste onset approximately 3 months.    Hypertension-not at goal today with amlodipine/benazepril.  Patient states that she does not sleep well last night due to the acidic taste.  She reports home blood pressure less than 140/90 consistently when taking medication.    Hyperlipidemia-stable with atorvastatin and diet    Generalized anxiety disorder-stable with Wellbutrin.  She denies any SI or HI.    Atherosclerosis-stable with less risk factor modification including aspirin, Lipitor and Plavix    Diabetes mellitus type 2-  Not at goal although her A1c has decreased to 7.6.  Patient currently on glyburide 2.5 mg and Jardiance 25 mg.  Patient failed Metformin-due to diarrhea.    Allergic rhinitis-stable with Claritin and Singulair    Vitamin D deficiency-stable with vitamin D supplementation      The following portions of the patient's history were reviewed and updated as appropriate: allergies, current medications, past family history, past medical history, past social history, past surgical history and problem list.    Review of Systems   Constitutional: Negative for activity change, appetite change, fatigue and fever.   HENT: Negative for ear pain, sinus pressure and sore throat.    Eyes: Negative for pain and visual disturbance.   Respiratory: Negative for cough and chest tightness.    Cardiovascular: Negative for chest pain and palpitations.   Gastrointestinal: Negative for abdominal pain, constipation, diarrhea, nausea and vomiting.        Acidic taste in mouth   Endocrine: Positive for polydipsia. Negative for polyuria.   Genitourinary: Negative for dysuria and frequency.   Musculoskeletal: Negative for  "back pain and myalgias.   Skin: Negative for color change and rash.   Allergic/Immunologic: Negative for food allergies and immunocompromised state.   Neurological: Negative for dizziness, syncope and headaches.   Hematological: Negative for adenopathy. Does not bruise/bleed easily.   Psychiatric/Behavioral: Negative for hallucinations and suicidal ideas. The patient is not nervous/anxious.        Objective  Vital signs:  /88   Pulse 91   Temp 97.1 °F (36.2 °C) (Temporal)   Ht 160 cm (63\")   Wt 94.3 kg (208 lb)   SpO2 95%   BMI 36.85 kg/m²     Physical Exam  Vitals and nursing note reviewed.   Constitutional:       Appearance: Normal appearance. She is well-developed. She is obese.   HENT:      Head: Normocephalic and atraumatic.      Right Ear: Tympanic membrane normal.      Left Ear: Tympanic membrane normal.      Nose: Nose normal.      Mouth/Throat:      Mouth: Mucous membranes are moist.      Pharynx: No oropharyngeal exudate or posterior oropharyngeal erythema.   Eyes:      Extraocular Movements: Extraocular movements intact.      Conjunctiva/sclera: Conjunctivae normal.   Neck:      Thyroid: No thyromegaly.      Trachea: No tracheal deviation.   Cardiovascular:      Rate and Rhythm: Normal rate and regular rhythm.      Heart sounds: Normal heart sounds. No murmur heard.     Pulmonary:      Effort: Pulmonary effort is normal. No respiratory distress.      Breath sounds: Normal breath sounds. No wheezing.   Abdominal:      General: Bowel sounds are normal.      Palpations: Abdomen is soft.      Tenderness: There is no abdominal tenderness. There is no guarding.   Musculoskeletal:         General: No tenderness. Normal range of motion.      Cervical back: Normal range of motion and neck supple.   Lymphadenopathy:      Cervical: No cervical adenopathy.   Skin:     General: Skin is warm and dry.      Findings: No rash.   Neurological:      General: No focal deficit present.      Mental Status: She is " alert and oriented to person, place, and time.   Psychiatric:         Mood and Affect: Mood normal.         Behavior: Behavior normal.         Thought Content: Thought content normal.                    Assessment/Plan     Diagnoses and all orders for this visit:    1. Gastroesophageal reflux disease without esophagitis (Primary)  Comments:  Start omeprazole  Advised not to eat within 4 hours of laying supine  Conservative measures including elevation of head of bed advised  Orders:  -     Discontinue: omeprazole (priLOSEC) 40 MG capsule; Take 1 capsule by mouth Daily.  Dispense: 30 capsule; Refill: 5  -     omeprazole (priLOSEC) 40 MG capsule; Take 1 capsule by mouth Daily.  Dispense: 90 capsule; Refill: 3    2. Essential hypertension  Comments:  Advise daily blood pressure and pulse log   Start Lasix  Orders:  -     amLODIPine-benazepril (LOTREL) 10-40 MG per capsule; Take 1 capsule by mouth Daily.  Dispense: 90 capsule; Refill: 3  -     furosemide (Lasix) 20 MG tablet; Take 1 tablet by mouth Daily.  Dispense: 90 tablet; Refill: 3  -     potassium chloride (K-DUR,KLOR-CON) 10 MEQ CR tablet; Take 1 tablet by mouth Daily.  Dispense: 90 tablet; Refill: 3    3. Mixed hyperlipidemia  -     atorvastatin (LIPITOR) 10 MG tablet; Take 1 tablet by mouth Daily.  Dispense: 90 tablet; Refill: 3  -     omega-3 acid ethyl esters (LOVAZA) 1 g capsule; Take 2 capsules by mouth 2 (Two) Times a Day.  Dispense: 360 capsule; Refill: 3    4. VISHAL (generalized anxiety disorder)  Comments:  Continue Wellbutrin  Orders:  -     buPROPion SR (WELLBUTRIN SR) 150 MG 12 hr tablet; Take 1 tablet by mouth Every 12 (Twelve) Hours.  Dispense: 180 tablet; Refill: 3    5. Aorto-iliac atherosclerosis (CMS/HCC)  Comments:  Continue Plavix and Lipitor  Orders:  -     clopidogrel (PLAVIX) 75 MG tablet; Take 1 tablet by mouth Daily.  Dispense: 90 tablet; Refill: 3    6. DM type 2 with diabetic mixed hyperlipidemia (CMS/HCC)  Comments:  Continue Jardiance  and glyburide  Advised a more strict diabetic diet  Advised fasting and 2-hour PP blood glucose readings  Orders:  -     Empagliflozin (Jardiance) 25 MG tablet; Take 25 mg by mouth Daily.  Dispense: 90 tablet; Refill: 3  -     glucose blood test strip; 1 each by Other route 3 (Three) Times a Day. Use as instructed  Dispense: 100 each; Refill: 11  -     Lancets 30G misc; 1 each Daily.  Dispense: 100 each; Refill: 11    7. Essential hypertension  Comments:  Continue Lotrel 10/40 mg daily  Start Lasix and potassium every morning  Advised daily blood pressure and pulse log  Orders:  -     amLODIPine-benazepril (LOTREL) 10-40 MG per capsule; Take 1 capsule by mouth Daily.  Dispense: 90 capsule; Refill: 3  -     furosemide (Lasix) 20 MG tablet; Take 1 tablet by mouth Daily.  Dispense: 90 tablet; Refill: 3  -     potassium chloride (K-DUR,KLOR-CON) 10 MEQ CR tablet; Take 1 tablet by mouth Daily.  Dispense: 90 tablet; Refill: 3    8. Type 2 diabetes mellitus with hyperglycemia, without long-term current use of insulin (CMS/[facility])  Comments:  start glyburize  continue jardiance  advised low carb diabetic diet  Orders:  -     glyburide (DIAbeta) 2.5 MG tablet; Take 1 tablet by mouth Daily With Breakfast.  Dispense: 30 tablet; Refill: 5    9. Non-seasonal allergic rhinitis due to pollen  Comments:  Continue Singulair and Claritin  Orders:  -     loratadine (CLARITIN) 10 MG tablet; Take 1 tablet by mouth Daily.  Dispense: 90 tablet; Refill: 3  -     montelukast (SINGULAIR) 10 MG tablet; Take 1 tablet by mouth Every Night.  Dispense: 90 tablet; Refill: 3    10. Mixed hyperlipidemia  Comments:  Continue Lovaza and Lipitor and advised low carbohydrate diet  Orders:  -     atorvastatin (LIPITOR) 10 MG tablet; Take 1 tablet by mouth Daily.  Dispense: 90 tablet; Refill: 3  -     omega-3 acid ethyl esters (LOVAZA) 1 g capsule; Take 2 capsules by mouth 2 (Two) Times a Day.  Dispense: 360 capsule; Refill: 3    11. Vitamin D deficiency  -      vitamin D (ERGOCALCIFEROL) 1.25 MG (98480 UT) capsule capsule; Take 1 capsule by mouth 1 (One) Time Per Week.  Dispense: 12 capsule; Refill: 3            Patient was given instructions and counseling regarding his condition or for health maintenance advice. Please see specific information pulled into the AVS if appropriate      This document has been electronically signed by:  Camryn Mota PA-C

## 2021-06-02 NOTE — PATIENT INSTRUCTIONS
"Fat and Cholesterol Restricted Eating Plan  Getting too much fat and cholesterol in your diet may cause health problems. Choosing the right foods helps keep your fat and cholesterol at normal levels. This can keep you from getting certain diseases.  Your doctor may recommend an eating plan that includes:  · Total fat: ______% or less of total calories a day.  · Saturated fat: ______% or less of total calories a day.  · Cholesterol: less than _________mg a day.  · Fiber: ______g a day.  What are tips for following this plan?  Meal planning  · At meals, divide your plate into four equal parts:  ? Fill one-half of your plate with vegetables and green salads.  ? Fill one-fourth of your plate with whole grains.  ? Fill one-fourth of your plate with low-fat (lean) protein foods.  · Eat fish that is high in omega-3 fats at least two times a week. This includes mackerel, tuna, sardines, and salmon.  · Eat foods that are high in fiber, such as whole grains, beans, apples, broccoli, carrots, peas, and barley.  General tips    · Work with your doctor to lose weight if you need to.  · Avoid:  ? Foods with added sugar.  ? Fried foods.  ? Foods with partially hydrogenated oils.  · Limit alcohol intake to no more than 1 drink a day for nonpregnant women and 2 drinks a day for men. One drink equals 12 oz of beer, 5 oz of wine, or 1½ oz of hard liquor.  Reading food labels  · Check food labels for:  ? Trans fats.  ? Partially hydrogenated oils.  ? Saturated fat (g) in each serving.  ? Cholesterol (mg) in each serving.  ? Fiber (g) in each serving.  · Choose foods with healthy fats, such as:  ? Monounsaturated fats.  ? Polyunsaturated fats.  ? Omega-3 fats.  · Choose grain products that have whole grains. Look for the word \"whole\" as the first word in the ingredient list.  Cooking  · Cook foods using low-fat methods. These include baking, boiling, grilling, and broiling.  · Eat more home-cooked foods. Eat at restaurants and buffets " less often.  · Avoid cooking using saturated fats, such as butter, cream, palm oil, palm kernel oil, and coconut oil.  Recommended foods    Fruits  · All fresh, canned (in natural juice), or frozen fruits.  Vegetables  · Fresh or frozen vegetables (raw, steamed, roasted, or grilled). Green salads.  Grains  · Whole grains, such as whole wheat or whole grain breads, crackers, cereals, and pasta. Unsweetened oatmeal, bulgur, barley, quinoa, or brown rice. Corn or whole wheat flour tortillas.  Meats and other protein foods  · Ground beef (85% or leaner), grass-fed beef, or beef trimmed of fat. Skinless chicken or turkey. Ground chicken or turkey. Pork trimmed of fat. All fish and seafood. Egg whites. Dried beans, peas, or lentils. Unsalted nuts or seeds. Unsalted canned beans. Nut butters without added sugar or oil.  Dairy  · Low-fat or nonfat dairy products, such as skim or 1% milk, 2% or reduced-fat cheeses, low-fat and fat-free ricotta or cottage cheese, or plain low-fat and nonfat yogurt.  Fats and oils  · Tub margarine without trans fats. Light or reduced-fat mayonnaise and salad dressings. Avocado. Olive, canola, sesame, or safflower oils.  The items listed above may not be a complete list of foods and beverages you can eat. Contact a dietitian for more information.  Foods to avoid  Fruits  · Canned fruit in heavy syrup. Fruit in cream or butter sauce. Fried fruit.  Vegetables  · Vegetables cooked in cheese, cream, or butter sauce. Fried vegetables.  Grains  · White bread. White pasta. White rice. Cornbread. Bagels, pastries, and croissants. Crackers and snack foods that contain trans fat and hydrogenated oils.  Meats and other protein foods  · Fatty cuts of meat. Ribs, chicken wings, palomo, sausage, bologna, salami, chitterlings, fatback, hot dogs, bratwurst, and packaged lunch meats. Liver and organ meats. Whole eggs and egg yolks. Chicken and turkey with skin. Fried meat.  Dairy  · Whole or 2% milk, cream,  half-and-half, and cream cheese. Whole milk cheeses. Whole-fat or sweetened yogurt. Full-fat cheeses. Nondairy creamers and whipped toppings. Processed cheese, cheese spreads, and cheese curds.  Beverages  · Alcohol. Sugar-sweetened drinks such as sodas, lemonade, and fruit drinks.  Fats and oils  · Butter, stick margarine, lard, shortening, ghee, or palomo fat. Coconut, palm kernel, and palm oils.  Sweets and desserts  · Corn syrup, sugars, honey, and molasses. Candy. Jam and jelly. Syrup. Sweetened cereals. Cookies, pies, cakes, donuts, muffins, and ice cream.  The items listed above may not be a complete list of foods and beverages you should avoid. Contact a dietitian for more information.  Summary  · Choosing the right foods helps keep your fat and cholesterol at normal levels. This can keep you from getting certain diseases.  · At meals, fill one-half of your plate with vegetables and green salads.  · Eat high-fiber foods, like whole grains, beans, apples, carrots, peas, and barley.  · Limit added sugar, saturated fats, alcohol, and fried foods.  This information is not intended to replace advice given to you by your health care provider. Make sure you discuss any questions you have with your health care provider.  Document Revised: 08/21/2019 Document Reviewed: 09/04/2018  [employer] Patient Education © 2021 [employer]      Carbohydrate Counting for Diabetes Mellitus, Adult  Carbohydrate counting is a method of keeping track of how many carbohydrates you eat. Eating carbohydrates naturally increases the amount of sugar (glucose) in the blood. Counting how many carbohydrates you eat improves your blood glucose control, which helps you manage your diabetes.  It is important to know how many carbohydrates you can safely have in each meal. This is different for every person. A dietitian can help you make a meal plan and calculate how many carbohydrates you should have at each meal and snack.  What foods contain  carbohydrates?  Carbohydrates are found in the following foods:  · Grains, such as breads and cereals.  · Dried beans and soy products.  · Starchy vegetables, such as potatoes, peas, and corn.  · Fruit and fruit juices.  · Milk and yogurt.  · Sweets and snack foods, such as cake, cookies, candy, chips, and soft drinks.  How do I count carbohydrates in foods?  There are two ways to count carbohydrates in food. You can read food labels or learn standard serving sizes of foods. You can use either of the methods or a combination of both.  Using the Nutrition Facts label  The Nutrition Facts list is included on the labels of almost all packaged foods and beverages in the [place] It includes:  · The serving size.  · Information about nutrients in each serving, including the grams (g) of carbohydrate per serving.  To use the Nutrition Facts:  · Decide how many servings you will have.  · Multiply the number of servings by the number of carbohydrates per serving.  · The resulting number is the total amount of carbohydrates that you will be having.  Learning the standard serving sizes of foods  When you eat carbohydrate foods that are not packaged or do not include Nutrition Facts on the label, you need to measure the servings in order to count the amount of carbohydrates.  · Measure the foods that you will eat with a food scale or measuring cup, if needed.  · Decide how many standard-size servings you will eat.  · Multiply the number of servings by 15. For foods that contain carbohydrates, one serving equals 15 g of carbohydrates.  ? For example, if you eat 2 cups or 10 oz (300 g) of strawberries, you will have eaten 2 servings and 30 g of carbohydrates (2 servings x 15 g = 30 g).  · For foods that have more than one food mixed, such as soups and casseroles, you must count the carbohydrates in each food that is included.  The following list contains standard serving sizes of common carbohydrate-rich foods. Each of these  servings has about 15 g of carbohydrates:  · 1 slice of bread.  · 1 six-inch (15 cm) tortilla.  · ? cup or 2 oz (53 g) cooked rice or pasta.  · ½ cup or 3 oz (85 g) cooked or canned, drained and rinsed beans or lentils.  · ½ cup or 3 oz (85 g) starchy vegetable, such as peas, corn, or squash.  · ½ cup or 4 oz (120 g) hot cereal.  · ½ cup or 3 oz (85 g) boiled or mashed potatoes, or ¼ or 3 oz (85 g) of a large baked potato.  · ½ cup or 4 fl oz (118 mL) fruit juice.  · 1 cup or 8 fl oz (237 mL) milk.  · 1 small or 4 oz (106 g) apple.  · ½ or 2 oz (63 g) of a medium banana.  · 1 cup or 5 oz (150 g) strawberries.  · 3 cups or 1 oz (24 g) popped popcorn.  What is an example of carbohydrate counting?  To calculate the number of carbohydrates in this sample meal, follow the steps shown below.  Sample meal  · 3 oz (85 g) chicken breast.  · ? cup or 4 oz (106 g) brown rice.  · ½ cup or 3 oz (85 g) corn.  · 1 cup or 8 fl oz (237 mL) milk.  · 1 cup or 5 oz (150 g) strawberries with sugar-free whipped topping.  Carbohydrate calculation  1. Identify the foods that contain carbohydrates:  ? Rice.  ? Corn.  ? Milk.  ? Strawberries.  2. Calculate how many servings you have of each food:  ? 2 servings rice.  ? 1 serving corn.  ? 1 serving milk.  ? 1 serving strawberries.  3. Multiply each number of servings by 15 g:  ? 2 servings rice x 15 g = 30 g.  ? 1 serving corn x 15 g = 15 g.  ? 1 serving milk x 15 g = 15 g.  ? 1 serving strawberries x 15 g = 15 g.  4. Add together all of the amounts to find the total grams of carbohydrates eaten:  ? 30 g + 15 g + 15 g + 15 g = 75 g of carbohydrates total.  What are tips for following this plan?  Shopping  · Develop a meal plan and then make a shopping list.  · Buy fresh and frozen vegetables, fresh and frozen fruit, dairy, eggs, beans, lentils, and whole grains.  · Look at food labels. Choose foods that have more fiber and less sugar.  · Avoid processed foods and foods with added  sugars.  Meal planning  · Aim to have the same amount of carbohydrates at each meal and for each snack time.  · Plan to have regular, balanced meals and snacks.  Where to find more information  · American Diabetes Association: www.diabetes.org  · Centers for Disease Control and Prevention: www.cdc.gov  Summary  · Carbohydrate counting is a method of keeping track of how many carbohydrates you eat.  · Eating carbohydrates naturally increases the amount of sugar (glucose) in the blood.  · Counting how many carbohydrates you eat improves your blood glucose control, which helps you manage your diabetes.  · A dietitian can help you make a meal plan and calculate how many carbohydrates you should have at each meal and snack.  This information is not intended to replace advice given to you by your health care provider. Make sure you discuss any questions you have with your health care provider.  Document Revised: 12/18/2020 Document Reviewed: 12/18/2020  [employer] Patient Education © 2021 [employer]

## 2021-06-29 ENCOUNTER — TELEPHONE (OUTPATIENT)
Dept: FAMILY MEDICINE CLINIC | Facility: CLINIC | Age: 76
End: 2021-06-29

## 2021-06-29 DIAGNOSIS — R11.0 NAUSEA: Primary | ICD-10-CM

## 2021-06-29 RX ORDER — ONDANSETRON 4 MG/1
4 TABLET, FILM COATED ORAL EVERY 8 HOURS PRN
Qty: 30 TABLET | Refills: 0 | Status: SHIPPED | OUTPATIENT
Start: 2021-06-29 | End: 2021-12-14

## 2021-06-29 NOTE — TELEPHONE ENCOUNTER
Caller: Evie Shah A    Relationship to patient: Self    Best call back number: [phone]    Chief complaint:     Type of visit: OFFICE VISIT    Requested date: AS SOON AS POSSIBLE    If rescheduling, when is the original appointment:     Additional notes:    PATIENT STATED NAUSEA, UPSET STOMACH.  PATIENT STATED SHE HAS TRIED TO EAT DIFFERENT FOODS TO SEE IF IT WOULD HELP, BUT STILL FEELING SICK TO STOMACH.    PATIENT STATED SHE PREFERS TO SEE DOCTOR RENATA, WHO IS NOT AVAILABLE UNTIL 7/16.      PLEASE ADVISE

## 2021-06-29 NOTE — TELEPHONE ENCOUNTER
I will send a prescription for Zofran to her pharmacy which should help with the nausea.  If symptoms do not improve or worsen within the next 3 days then she should come into the office to see an available provider.

## 2021-07-05 DIAGNOSIS — J30.1 NON-SEASONAL ALLERGIC RHINITIS DUE TO POLLEN: ICD-10-CM

## 2021-08-09 DIAGNOSIS — I10 ESSENTIAL HYPERTENSION: ICD-10-CM

## 2021-08-09 RX ORDER — POTASSIUM CHLORIDE 750 MG/1
TABLET, EXTENDED RELEASE ORAL
Qty: 90 TABLET | Refills: 0 | Status: SHIPPED | OUTPATIENT
Start: 2021-08-09 | End: 2021-12-14

## 2021-08-20 DIAGNOSIS — E55.9 VITAMIN D DEFICIENCY: ICD-10-CM

## 2021-08-20 RX ORDER — ERGOCALCIFEROL 1.25 MG/1
CAPSULE ORAL
Qty: 12 CAPSULE | Refills: 0 | Status: SHIPPED | OUTPATIENT
Start: 2021-08-20 | End: 2021-11-11

## 2021-09-30 ENCOUNTER — OFFICE VISIT (OUTPATIENT)
Dept: FAMILY MEDICINE CLINIC | Facility: CLINIC | Age: 76
End: 2021-09-30

## 2021-09-30 VITALS
TEMPERATURE: 97.1 F | SYSTOLIC BLOOD PRESSURE: 130 MMHG | BODY MASS INDEX: 36.86 KG/M2 | WEIGHT: 208 LBS | OXYGEN SATURATION: 98 % | HEART RATE: 98 BPM | HEIGHT: 63 IN | DIASTOLIC BLOOD PRESSURE: 88 MMHG

## 2021-09-30 DIAGNOSIS — E78.2 MIXED HYPERLIPIDEMIA: Chronic | ICD-10-CM

## 2021-09-30 DIAGNOSIS — E53.8 B12 DEFICIENCY: Chronic | ICD-10-CM

## 2021-09-30 DIAGNOSIS — M25.561 ACUTE PAIN OF RIGHT KNEE: Primary | ICD-10-CM

## 2021-09-30 DIAGNOSIS — Z23 ENCOUNTER FOR IMMUNIZATION: ICD-10-CM

## 2021-09-30 DIAGNOSIS — E78.2 DM TYPE 2 WITH DIABETIC MIXED HYPERLIPIDEMIA (HCC): Chronic | ICD-10-CM

## 2021-09-30 DIAGNOSIS — I73.9 PAD (PERIPHERAL ARTERY DISEASE) (HCC): Chronic | ICD-10-CM

## 2021-09-30 DIAGNOSIS — Z00.00 HEALTHCARE MAINTENANCE: ICD-10-CM

## 2021-09-30 DIAGNOSIS — E11.69 DM TYPE 2 WITH DIABETIC MIXED HYPERLIPIDEMIA (HCC): Chronic | ICD-10-CM

## 2021-09-30 DIAGNOSIS — I10 ESSENTIAL HYPERTENSION: Chronic | ICD-10-CM

## 2021-09-30 LAB
BASOPHILS # BLD AUTO: 0.06 10*3/MM3 (ref 0–0.2)
BASOPHILS NFR BLD AUTO: 0.6 % (ref 0–1.5)
DEPRECATED RDW RBC AUTO: 45.7 FL (ref 37–54)
EOSINOPHIL # BLD AUTO: 0.24 10*3/MM3 (ref 0–0.4)
EOSINOPHIL NFR BLD AUTO: 2.5 % (ref 0.3–6.2)
ERYTHROCYTE [DISTWIDTH] IN BLOOD BY AUTOMATED COUNT: 13.3 % (ref 12.3–15.4)
HCT VFR BLD AUTO: 49.3 % (ref 34–46.6)
HGB BLD-MCNC: 15.5 G/DL (ref 12–15.9)
IMM GRANULOCYTES # BLD AUTO: 0.05 10*3/MM3 (ref 0–0.05)
IMM GRANULOCYTES NFR BLD AUTO: 0.5 % (ref 0–0.5)
LYMPHOCYTES # BLD AUTO: 2.78 10*3/MM3 (ref 0.7–3.1)
LYMPHOCYTES NFR BLD AUTO: 29.2 % (ref 19.6–45.3)
MCH RBC QN AUTO: 29.1 PG (ref 26.6–33)
MCHC RBC AUTO-ENTMCNC: 31.4 G/DL (ref 31.5–35.7)
MCV RBC AUTO: 92.7 FL (ref 79–97)
MONOCYTES # BLD AUTO: 0.78 10*3/MM3 (ref 0.1–0.9)
MONOCYTES NFR BLD AUTO: 8.2 % (ref 5–12)
NEUTROPHILS NFR BLD AUTO: 5.6 10*3/MM3 (ref 1.7–7)
NEUTROPHILS NFR BLD AUTO: 59 % (ref 42.7–76)
NRBC BLD AUTO-RTO: 0 /100 WBC (ref 0–0.2)
PLATELET # BLD AUTO: 228 10*3/MM3 (ref 140–450)
PMV BLD AUTO: 10.1 FL (ref 6–12)
RBC # BLD AUTO: 5.32 10*6/MM3 (ref 3.77–5.28)
WBC # BLD AUTO: 9.51 10*3/MM3 (ref 3.4–10.8)

## 2021-09-30 PROCEDURE — 96372 THER/PROPH/DIAG INJ SC/IM: CPT | Performed by: PHYSICIAN ASSISTANT

## 2021-09-30 PROCEDURE — 90662 IIV NO PRSV INCREASED AG IM: CPT | Performed by: PHYSICIAN ASSISTANT

## 2021-09-30 PROCEDURE — G0008 ADMIN INFLUENZA VIRUS VAC: HCPCS | Performed by: PHYSICIAN ASSISTANT

## 2021-09-30 PROCEDURE — 85025 COMPLETE CBC W/AUTO DIFF WBC: CPT | Performed by: PHYSICIAN ASSISTANT

## 2021-09-30 PROCEDURE — 99214 OFFICE O/P EST MOD 30 MIN: CPT | Performed by: PHYSICIAN ASSISTANT

## 2021-09-30 PROCEDURE — 36415 COLL VENOUS BLD VENIPUNCTURE: CPT | Performed by: PHYSICIAN ASSISTANT

## 2021-09-30 RX ORDER — CYANOCOBALAMIN 1000 UG/ML
1000 INJECTION, SOLUTION INTRAMUSCULAR; SUBCUTANEOUS
Status: SHIPPED | OUTPATIENT
Start: 2021-09-30

## 2021-09-30 RX ORDER — BLOOD SUGAR DIAGNOSTIC
STRIP MISCELLANEOUS
COMMUNITY
Start: 2021-09-09 | End: 2022-08-12

## 2021-09-30 RX ORDER — BUDESONIDE 1 MG/2ML
INHALANT ORAL
COMMUNITY
Start: 2021-07-03 | End: 2021-09-30 | Stop reason: SDUPTHER

## 2021-09-30 RX ORDER — POTASSIUM CHLORIDE 750 MG/1
10 TABLET, FILM COATED, EXTENDED RELEASE ORAL DAILY
COMMUNITY
Start: 2021-08-04 | End: 2021-11-23 | Stop reason: SDUPTHER

## 2021-09-30 RX ADMIN — CYANOCOBALAMIN 1000 MCG: 1000 INJECTION, SOLUTION INTRAMUSCULAR; SUBCUTANEOUS at 12:00

## 2021-10-02 LAB
ALBUMIN SERPL-MCNC: 4.7 G/DL (ref 3.7–4.7)
ALBUMIN/GLOB SERPL: 2 {RATIO} (ref 1.2–2.2)
ALP SERPL-CCNC: 113 IU/L (ref 44–121)
ALT SERPL-CCNC: 20 IU/L (ref 0–32)
ANA SER QL: NEGATIVE
AST SERPL-CCNC: 23 IU/L (ref 0–40)
BILIRUB SERPL-MCNC: 0.4 MG/DL (ref 0–1.2)
BUN SERPL-MCNC: 25 MG/DL (ref 8–27)
BUN/CREAT SERPL: 26 (ref 12–28)
CALCIUM SERPL-MCNC: 9.7 MG/DL (ref 8.7–10.3)
CHLORIDE SERPL-SCNC: 101 MMOL/L (ref 96–106)
CK SERPL-CCNC: 66 U/L (ref 32–182)
CO2 SERPL-SCNC: 18 MMOL/L (ref 20–29)
CREAT SERPL-MCNC: 0.96 MG/DL (ref 0.57–1)
GLOBULIN SER CALC-MCNC: 2.4 G/DL (ref 1.5–4.5)
GLUCOSE SERPL-MCNC: 248 MG/DL (ref 65–99)
HBA1C MFR BLD: 8.1 % (ref 4.8–5.6)
MICROALBUMIN UR-MCNC: <3 UG/ML
POTASSIUM SERPL-SCNC: 4.4 MMOL/L (ref 3.5–5.2)
PROT SERPL-MCNC: 7.1 G/DL (ref 6–8.5)
RHEUMATOID FACT SERPL-ACNC: <10 IU/ML (ref 0–13.9)
SODIUM SERPL-SCNC: 139 MMOL/L (ref 134–144)

## 2021-10-03 NOTE — PATIENT INSTRUCTIONS
Fall Prevention in the Home, Adult  Falls can cause injuries. They can happen to people of all ages. There are many things you can do to make your home safe and to help prevent falls. Ask for help when making these changes, if needed.  What actions can I take to prevent falls?  General Instructions  · Use good lighting in all rooms. Replace any light bulbs that burn out.  · Turn on the lights when you go into a dark area. Use night-lights.  · Keep items that you use often in easy-to-reach places. Lower the shelves around your home if necessary.  · Set up your furniture so you have a clear path. Avoid moving your furniture around.  · Do not have throw rugs and other things on the floor that can make you trip.  · Avoid walking on wet floors.  · If any of your floors are uneven, fix them.  · Add color or contrast paint or tape to clearly bambi and help you see:  ? Any grab bars or handrails.  ? First and last steps of stairways.  ? Where the edge of each step is.  · If you use a stepladder:  ? Make sure that it is fully opened. Do not climb a closed stepladder.  ? Make sure that both sides of the stepladder are locked into place.  ? Ask someone to hold the stepladder for you while you use it.  · If there are any pets around you, be aware of where they are.  What can I do in the bathroom?         · Keep the floor dry. Clean up any water that spills onto the floor as soon as it happens.  · Remove soap buildup in the tub or shower regularly.  · Use non-skid mats or decals on the floor of the tub or shower.  · Attach bath mats securely with double-sided, non-slip rug tape.  · If you need to sit down in the shower, use a plastic, non-slip stool.  · Install grab bars by the toilet and in the tub and shower. Do not use towel bars as grab bars.  What can I do in the bedroom?  · Make sure that you have a light by your bed that is easy to reach.  · Do not use any sheets or blankets that are too big for your bed. They should not  hang down onto the floor.  · Have a firm chair that has side arms. You can use this for support while you get dressed.  What can I do in the kitchen?  · Clean up any spills right away.  · If you need to reach something above you, use a strong step stool that has a grab bar.  · Keep electrical cords out of the way.  · Do not use floor polish or wax that makes floors slippery. If you must use wax, use non-skid floor wax.  What can I do with my stairs?  · Do not leave any items on the stairs.  · Make sure that you have a light switch at the top of the stairs and the bottom of the stairs. If you do not have them, ask someone to add them for you.  · Make sure that there are handrails on both sides of the stairs, and use them. Fix handrails that are broken or loose. Make sure that handrails are as long as the stairways.  · Install non-slip stair treads on all stairs in your home.  · Avoid having throw rugs at the top or bottom of the stairs. If you do have throw rugs, attach them to the floor with carpet tape.  · Choose a carpet that does not hide the edge of the steps on the stairway.  · Check any carpeting to make sure that it is firmly attached to the stairs. Fix any carpet that is loose or worn.  What can I do on the outside of my home?  · Use bright outdoor lighting.  · Regularly fix the edges of walkways and driveways and fix any cracks.  · Remove anything that might make you trip as you walk through a door, such as a raised step or threshold.  · Trim any bushes or trees on the path to your home.  · Regularly check to see if handrails are loose or broken. Make sure that both sides of any steps have handrails.  · Install guardrails along the edges of any raised decks and porches.  · Clear walking paths of anything that might make someone trip, such as tools or rocks.  · Have any leaves, snow, or ice cleared regularly.  · Use sand or salt on walking paths during winter.  · Clean up any spills in your garage right  away. This includes grease or oil spills.  What other actions can I take?  · Wear shoes that:  ? Have a low heel. Do not wear high heels.  ? Have rubber bottoms.  ? Are comfortable and fit you well.  ? Are closed at the toe. Do not wear open-toe sandals.  · Use tools that help you move around (mobility aids) if they are needed. These include:  ? Canes.  ? Walkers.  ? Scooters.  ? Crutches.  · Review your medicines with your doctor. Some medicines can make you feel dizzy. This can increase your chance of falling.  Ask your doctor what other things you can do to help prevent falls.  Where to find more information  · Centers for Disease Control and Prevention, [employer]: https://cdc.gov  · [employer] [place] [employer]: https://qu9dwpi.dionne.nih.gov  Contact a doctor if:  · You are afraid of falling at home.  · You feel weak, drowsy, or dizzy at home.  · You fall at home.  Summary  · There are many simple things that you can do to make your home safe and to help prevent falls.  · Ways to make your home safe include removing tripping hazards and installing grab bars in the bathroom.  · Ask for help when making these changes in your home.  This information is not intended to replace advice given to you by your health care provider. Make sure you discuss any questions you have with your health care provider.  Document Revised: 04/09/2020 Document Reviewed: 08/02/2018  [employer] Patient Education © 2021 [employer]      Fat and Cholesterol Restricted Eating Plan  Getting too much fat and cholesterol in your diet may cause health problems. Choosing the right foods helps keep your fat and cholesterol at normal levels. This can keep you from getting certain diseases.  Your doctor may recommend an eating plan that includes:  · Total fat: ______% or less of total calories a day.  · Saturated fat: ______% or less of total calories a day.  · Cholesterol: less than _________mg a day.  · Fiber: ______g a day.  What are tips for following  "this plan?  Meal planning  · At meals, divide your plate into four equal parts:  ? Fill one-half of your plate with vegetables and green salads.  ? Fill one-fourth of your plate with whole grains.  ? Fill one-fourth of your plate with low-fat (lean) protein foods.  · Eat fish that is high in omega-3 fats at least two times a week. This includes mackerel, tuna, sardines, and salmon.  · Eat foods that are high in fiber, such as whole grains, beans, apples, broccoli, carrots, peas, and barley.  General tips    · Work with your doctor to lose weight if you need to.  · Avoid:  ? Foods with added sugar.  ? Fried foods.  ? Foods with partially hydrogenated oils.  · Limit alcohol intake to no more than 1 drink a day for nonpregnant women and 2 drinks a day for men. One drink equals 12 oz of beer, 5 oz of wine, or 1½ oz of hard liquor.    Reading food labels  · Check food labels for:  ? Trans fats.  ? Partially hydrogenated oils.  ? Saturated fat (g) in each serving.  ? Cholesterol (mg) in each serving.  ? Fiber (g) in each serving.  · Choose foods with healthy fats, such as:  ? Monounsaturated fats.  ? Polyunsaturated fats.  ? Omega-3 fats.  · Choose grain products that have whole grains. Look for the word \"whole\" as the first word in the ingredient list.  Cooking  · Cook foods using low-fat methods. These include baking, boiling, grilling, and broiling.  · Eat more home-cooked foods. Eat at restaurants and buffets less often.  · Avoid cooking using saturated fats, such as butter, cream, palm oil, palm kernel oil, and coconut oil.  Recommended foods    Fruits  · All fresh, canned (in natural juice), or frozen fruits.  Vegetables  · Fresh or frozen vegetables (raw, steamed, roasted, or grilled). Green salads.  Grains  · Whole grains, such as whole wheat or whole grain breads, crackers, cereals, and pasta. Unsweetened oatmeal, bulgur, barley, quinoa, or brown rice. Corn or whole wheat flour tortillas.  Meats and other protein " foods  · Ground beef (85% or leaner), grass-fed beef, or beef trimmed of fat. Skinless chicken or turkey. Ground chicken or turkey. Pork trimmed of fat. All fish and seafood. Egg whites. Dried beans, peas, or lentils. Unsalted nuts or seeds. Unsalted canned beans. Nut butters without added sugar or oil.  Dairy  · Low-fat or nonfat dairy products, such as skim or 1% milk, 2% or reduced-fat cheeses, low-fat and fat-free ricotta or cottage cheese, or plain low-fat and nonfat yogurt.  Fats and oils  · Tub margarine without trans fats. Light or reduced-fat mayonnaise and salad dressings. Avocado. Olive, canola, sesame, or safflower oils.  The items listed above may not be a complete list of foods and beverages you can eat. Contact a dietitian for more information.  Foods to avoid  Fruits  · Canned fruit in heavy syrup. Fruit in cream or butter sauce. Fried fruit.  Vegetables  · Vegetables cooked in cheese, cream, or butter sauce. Fried vegetables.  Grains  · White bread. White pasta. White rice. Cornbread. Bagels, pastries, and croissants. Crackers and snack foods that contain trans fat and hydrogenated oils.  Meats and other protein foods  · Fatty cuts of meat. Ribs, chicken wings, palomo, sausage, bologna, salami, chitterlings, fatback, hot dogs, bratwurst, and packaged lunch meats. Liver and organ meats. Whole eggs and egg yolks. Chicken and turkey with skin. Fried meat.  Dairy  · Whole or 2% milk, cream, half-and-half, and cream cheese. Whole milk cheeses. Whole-fat or sweetened yogurt. Full-fat cheeses. Nondairy creamers and whipped toppings. Processed cheese, cheese spreads, and cheese curds.  Beverages  · Alcohol. Sugar-sweetened drinks such as sodas, lemonade, and fruit drinks.  Fats and oils  · Butter, stick margarine, lard, shortening, ghee, or palomo fat. Coconut, palm kernel, and palm oils.  Sweets and desserts  · Corn syrup, sugars, honey, and molasses. Candy. Jam and jelly. Syrup. Sweetened cereals. Cookies,  "pies, cakes, donuts, muffins, and ice cream.  The items listed above may not be a complete list of foods and beverages you should avoid. Contact a dietitian for more information.  Summary  · Choosing the right foods helps keep your fat and cholesterol at normal levels. This can keep you from getting certain diseases.  · At meals, fill one-half of your plate with vegetables and green salads.  · Eat high-fiber foods, like whole grains, beans, apples, carrots, peas, and barley.  · Limit added sugar, saturated fats, alcohol, and fried foods.  This information is not intended to replace advice given to you by your health care provider. Make sure you discuss any questions you have with your health care provider.  Document Revised: 04/21/2021 Document Reviewed: 04/21/2021  [employer] Patient Education © 2021 [employer]      https://[email or web address].org/docs/default-source/living-with-diabetes/conquering-the-grocery-store-v1.pdf?sfvrsn=4\">   Carbohydrate Counting for Diabetes Mellitus, Adult  Carbohydrate counting is a method of keeping track of how many carbohydrates you eat. Eating carbohydrates naturally increases the amount of sugar (glucose) in the blood. Counting how many carbohydrates you eat improves your blood glucose control, which helps you manage your diabetes.  It is important to know how many carbohydrates you can safely have in each meal. This is different for every person. A dietitian can help you make a meal plan and calculate how many carbohydrates you should have at each meal and snack.  What foods contain carbohydrates?  Carbohydrates are found in the following foods:  · Grains, such as breads and cereals.  · Dried beans and soy products.  · Starchy vegetables, such as potatoes, peas, and corn.  · Fruit and fruit juices.  · Milk and yogurt.  · Sweets and snack foods, such as cake, cookies, candy, chips, and soft drinks.  How do I count carbohydrates in foods?  There are two ways to count " carbohydrates in food. You can read food labels or learn standard serving sizes of foods. You can use either of the methods or a combination of both.  Using the Nutrition Facts label  The Nutrition Facts list is included on the labels of almost all packaged foods and beverages in the [place] It includes:  · The serving size.  · Information about nutrients in each serving, including the grams (g) of carbohydrate per serving.  To use the Nutrition Facts:  · Decide how many servings you will have.  · Multiply the number of servings by the number of carbohydrates per serving.  · The resulting number is the total amount of carbohydrates that you will be having.  Learning the standard serving sizes of foods  When you eat carbohydrate foods that are not packaged or do not include Nutrition Facts on the label, you need to measure the servings in order to count the amount of carbohydrates.  · Measure the foods that you will eat with a food scale or measuring cup, if needed.  · Decide how many standard-size servings you will eat.  · Multiply the number of servings by 15. For foods that contain carbohydrates, one serving equals 15 g of carbohydrates.  ? For example, if you eat 2 cups or 10 oz (300 g) of strawberries, you will have eaten 2 servings and 30 g of carbohydrates (2 servings x 15 g = 30 g).  · For foods that have more than one food mixed, such as soups and casseroles, you must count the carbohydrates in each food that is included.  The following list contains standard serving sizes of common carbohydrate-rich foods. Each of these servings has about 15 g of carbohydrates:  · 1 slice of bread.  · 1 six-inch (15 cm) tortilla.  · ? cup or 2 oz (53 g) cooked rice or pasta.  · ½ cup or 3 oz (85 g) cooked or canned, drained and rinsed beans or lentils.  · ½ cup or 3 oz (85 g) starchy vegetable, such as peas, corn, or squash.  · ½ cup or 4 oz (120 g) hot cereal.  · ½ cup or 3 oz (85 g) boiled or mashed potatoes, or ¼ or 3 oz  (85 g) of a large baked potato.  · ½ cup or 4 fl oz (118 mL) fruit juice.  · 1 cup or 8 fl oz (237 mL) milk.  · 1 small or 4 oz (106 g) apple.  · ½ or 2 oz (63 g) of a medium banana.  · 1 cup or 5 oz (150 g) strawberries.  · 3 cups or 1 oz (24 g) popped popcorn.  What is an example of carbohydrate counting?  To calculate the number of carbohydrates in this sample meal, follow the steps shown below.  Sample meal  · 3 oz (85 g) chicken breast.  · ? cup or 4 oz (106 g) brown rice.  · ½ cup or 3 oz (85 g) corn.  · 1 cup or 8 fl oz (237 mL) milk.  · 1 cup or 5 oz (150 g) strawberries with sugar-free whipped topping.  Carbohydrate calculation  1. Identify the foods that contain carbohydrates:  ? Rice.  ? Corn.  ? Milk.  ? Strawberries.  2. Calculate how many servings you have of each food:  ? 2 servings rice.  ? 1 serving corn.  ? 1 serving milk.  ? 1 serving strawberries.  3. Multiply each number of servings by 15 g:  ? 2 servings rice x 15 g = 30 g.  ? 1 serving corn x 15 g = 15 g.  ? 1 serving milk x 15 g = 15 g.  ? 1 serving strawberries x 15 g = 15 g.  4. Add together all of the amounts to find the total grams of carbohydrates eaten:  ? 30 g + 15 g + 15 g + 15 g = 75 g of carbohydrates total.  What are tips for following this plan?  Shopping  · Develop a meal plan and then make a shopping list.  · Buy fresh and frozen vegetables, fresh and frozen fruit, dairy, eggs, beans, lentils, and whole grains.  · Look at food labels. Choose foods that have more fiber and less sugar.  · Avoid processed foods and foods with added sugars.  Meal planning  · Aim to have the same amount of carbohydrates at each meal and for each snack time.  · Plan to have regular, balanced meals and snacks.  Where to find more information  · American Diabetes Association: www.diabetes.org  · Centers for Disease Control and Prevention: www.cdc.gov  Summary  · Carbohydrate counting is a method of keeping track of how many carbohydrates you  eat.  · Eating carbohydrates naturally increases the amount of sugar (glucose) in the blood.  · Counting how many carbohydrates you eat improves your blood glucose control, which helps you manage your diabetes.  · A dietitian can help you make a meal plan and calculate how many carbohydrates you should have at each meal and snack.  This information is not intended to replace advice given to you by your health care provider. Make sure you discuss any questions you have with your health care provider.  Document Revised: 12/17/2020 Document Reviewed: 12/18/2020  [employer] Patient Education © 2021 [employer]

## 2021-10-04 DIAGNOSIS — E78.2 DM TYPE 2 WITH DIABETIC MIXED HYPERLIPIDEMIA (HCC): Primary | ICD-10-CM

## 2021-10-04 DIAGNOSIS — E11.69 DM TYPE 2 WITH DIABETIC MIXED HYPERLIPIDEMIA (HCC): Primary | ICD-10-CM

## 2021-10-04 DIAGNOSIS — E78.2 MIXED HYPERLIPIDEMIA: ICD-10-CM

## 2021-10-04 DIAGNOSIS — J30.1 NON-SEASONAL ALLERGIC RHINITIS DUE TO POLLEN: ICD-10-CM

## 2021-10-04 RX ORDER — BENZOYL PEROXIDE
KIT TOPICAL
Qty: 90 TABLET | Refills: 0 | Status: SHIPPED | OUTPATIENT
Start: 2021-10-04 | End: 2021-12-14 | Stop reason: SDUPTHER

## 2021-10-06 DIAGNOSIS — M25.561 RIGHT KNEE PAIN, UNSPECIFIED CHRONICITY: Primary | ICD-10-CM

## 2021-10-07 ENCOUNTER — HOSPITAL ENCOUNTER (OUTPATIENT)
Dept: GENERAL RADIOLOGY | Facility: HOSPITAL | Age: 76
Discharge: HOME | End: 2021-10-07
Admitting: FAMILY MEDICINE

## 2021-10-07 ENCOUNTER — OFFICE VISIT (OUTPATIENT)
Dept: ORTHOPEDIC SURGERY | Facility: CLINIC | Age: 76
End: 2021-10-07

## 2021-10-07 VITALS
WEIGHT: 208 LBS | SYSTOLIC BLOOD PRESSURE: 134 MMHG | BODY MASS INDEX: 36.86 KG/M2 | HEART RATE: 84 BPM | HEIGHT: 63 IN | DIASTOLIC BLOOD PRESSURE: 82 MMHG

## 2021-10-07 DIAGNOSIS — Z79.01 CHRONIC ANTICOAGULATION: ICD-10-CM

## 2021-10-07 DIAGNOSIS — M25.561 CHRONIC PAIN OF RIGHT KNEE: Primary | ICD-10-CM

## 2021-10-07 DIAGNOSIS — G89.29 CHRONIC PAIN OF RIGHT KNEE: Primary | ICD-10-CM

## 2021-10-07 DIAGNOSIS — M79.661 RIGHT CALF PAIN: ICD-10-CM

## 2021-10-07 DIAGNOSIS — M79.89 RIGHT LEG SWELLING: ICD-10-CM

## 2021-10-07 DIAGNOSIS — M17.11 PRIMARY OSTEOARTHRITIS OF RIGHT KNEE: ICD-10-CM

## 2021-10-07 DIAGNOSIS — M25.561 RIGHT KNEE PAIN, UNSPECIFIED CHRONICITY: ICD-10-CM

## 2021-10-07 PROCEDURE — 99203 OFFICE O/P NEW LOW 30 MIN: CPT | Performed by: FAMILY MEDICINE

## 2021-10-07 PROCEDURE — 73562 X-RAY EXAM OF KNEE 3: CPT

## 2021-10-07 PROCEDURE — 73562 X-RAY EXAM OF KNEE 3: CPT | Performed by: RADIOLOGY

## 2021-10-07 NOTE — PROGRESS NOTES
"New Patient Visit      Patient: Evie Shah  YOB: 1945  Date of Encounter: 10/07/2021  PCP: Camryn Moat PA  Referring Provider: RUBI Michael     Subjective   Evie Shah is a 76 y.o. female who presents to the office today for evaluation of Initial Evaluation, Pain, and Edema of the Right Knee      Chief Complaint   Patient presents with   • Right Knee - Initial Evaluation, Pain, Edema       HPI  New patient who presents complaining of chronic right knee pain that has been bothering her more since February of this year.  Reports that her PCP gave her a steroid shot in the knee in April which helped for about 6 weeks and then wore off.  Complains of swelling, soreness on the lateral side of the knee and pain with movement.  Denies any injury.  States that the knee hurts and \"gives out on her sometimes\".  Patient is starting to get swelling in the calf and ankles along with some pain in those areas.  Denies any long car rides, airplane trips or periods of immobility.  Patient is on chronic anticoagulation due to needing a stent in the left leg.  Patient accompanied by her daughter Jacqueline  Patient Active Problem List   Diagnosis   • DM type 2 with diabetic mixed hyperlipidemia ([facility])   • Mixed hyperlipidemia   • Essential hypertension   • Senile osteoporosis   • Chronic allergic rhinitis   • VISHAL (generalized anxiety disorder)   • PAD (peripheral artery disease) ([facility])   • Morbidly obese ([facility])   • COPD mixed type ([facility])   • Former smoker   • Healthcare maintenance   • Type 2 diabetes mellitus with hyperglycemia, without long-term current use of insulin ([facility])   • Primary osteoarthritis of both knees   • Coronary artery calcification seen on CT scan       Past Medical History:   Diagnosis Date   • Allergic rhinitis    • Asthma    • Atherosclerosis    • COPD (chronic obstructive pulmonary disease) ([facility])    • Cough    • Diabetes mellitus ([facility])    • Hyperlipidemia    • Hypertension  "   • Menopause    • Nausea and vomiting    • Obesity 3/7/2017   • Osteoarthritis    • Osteoporosis    • Vertigo, benign paroxysmal        Past Surgical History:   Procedure Laterality Date   • CATARACT EXTRACTION     • COLONOSCOPY     • EYE SURGERY      catarac   • ILIAC ARTERY STENT  2015   • SKIN CANCER EXCISION      nose   • TONSILLECTOMY      and adenoidectomy   • TUBAL ABDOMINAL LIGATION         Family History   Problem Relation Age of Onset   • Arthritis Mother    • Asthma Mother    • Cancer Mother    • Osteoarthritis Mother    • Osteoporosis Mother    • Diabetes Mother    • Arthritis Father    • Hypertension Father    • Stroke Father    • Osteoarthritis Father    • Osteoporosis Father    • Heart disease Father    • Diabetes Father    • Breast cancer Neg Hx        Social History     Socioeconomic History   • Marital status:      Spouse name: Not on file   • Number of children: 3   • Years of education: 12   • Highest education level: Not on file   Tobacco Use   • Smoking status: Former Smoker     Types: Cigarettes     Quit date: 2015     Years since quittin.3   • Smokeless tobacco: Never Used   Vaping Use   • Vaping Use: Never used   Substance and Sexual Activity   • Alcohol use: No   • Drug use: No   • Sexual activity: Defer       Current Outpatient Medications   Medication Sig Dispense Refill   • Accu-Chek Guide test strip      • albuterol sulfate  (90 Base) MCG/ACT inhaler Inhale 2 puffs Every 4 (Four) Hours As Needed for Shortness of Air. 18 g 5   • amLODIPine-benazepril (LOTREL) 10-40 MG per capsule Take 1 capsule by mouth Daily. 90 capsule 3   • aspirin 81 MG EC tablet Take 1 tablet by mouth Daily. 90 tablet 3   • atorvastatin (LIPITOR) 10 MG tablet Take 1 tablet by mouth Daily. 90 tablet 3   • Blood Glucose Monitoring Suppl misc 1 each 3 (Three) Times a Day. 1 each 0   • budesonide (PULMICORT) 1 MG/2ML nebulizer solution INHALE 1 VIAL IN NEBULIZER TWICE DAILY 120 mL 0   •  buPROPion SR (WELLBUTRIN SR) 150 MG 12 hr tablet Take 1 tablet by mouth Every 12 (Twelve) Hours. 180 tablet 3   • clopidogrel (PLAVIX) 75 MG tablet Take 1 tablet by mouth Daily. 90 tablet 3   • Empagliflozin (Jardiance) 25 MG tablet Take 25 mg by mouth Daily. 90 tablet 3   • EQ Allergy Relief 10 MG tablet Take 1 tablet by mouth once daily 90 tablet 0   • furosemide (Lasix) 20 MG tablet Take 1 tablet by mouth Daily. 90 tablet 3   • glucose blood test strip 1 each by Other route 3 (Three) Times a Day. Use as instructed 100 each 11   • glyburide (DIAbeta) 2.5 MG tablet Take 1 tablet by mouth Daily With Breakfast. 30 tablet 5   • Lancets 30G misc 1 each Daily. 100 each 11   • lidocaine-prilocaine (EMLA) 2.5-2.5 % cream APPLY 1-2 GRAMS (1-2 PUMPS) TO AFFECTED AREA 3-4 TIMES DAILY 240 g 5   • montelukast (SINGULAIR) 10 MG tablet Take 1 tablet by mouth Every Night. 90 tablet 3   • O2 (OXYGEN) Inhale 2 L/min Every Night.     • omega-3 acid ethyl esters (LOVAZA) 1 g capsule Take 2 capsules by mouth 2 (Two) Times a Day. 360 capsule 3   • omeprazole (priLOSEC) 40 MG capsule Take 1 capsule by mouth Daily. 90 capsule 3   • ondansetron (Zofran) 4 MG tablet Take 1 tablet by mouth Every 8 (Eight) Hours As Needed for Nausea or Vomiting. 30 tablet 0   • SITagliptin (Januvia) 100 MG tablet Take 1 tablet by mouth Daily. 30 tablet 5   • vitamin D (ERGOCALCIFEROL) 1.25 MG (69446 UT) capsule capsule Take 1 capsule by mouth once a week 12 capsule 0   • potassium chloride (K-DUR,KLOR-CON) 10 MEQ CR tablet Take 1 tablet by mouth once daily 90 tablet 0   • potassium chloride 10 MEQ CR tablet Take 10 mEq by mouth Daily.       Current Facility-Administered Medications   Medication Dose Route Frequency Provider Last Rate Last Admin   • cyanocobalamin injection 1,000 mcg  1,000 mcg Intramuscular Q28 Days Camryn Mota PA   1,000 mcg at 09/30/21 1200   • triamcinolone acetonide (KENALOG-40) injection 40 mg  40 mg Intramuscular Once Lelo  "Darwin Ware MD           Allergies   Allergen Reactions   • Codeine Shortness Of Breath       Review of Systems   Constitutional: Positive for activity change. Negative for fever.   Respiratory: Negative for shortness of breath.    Cardiovascular: Positive for leg swelling. Negative for chest pain.   Musculoskeletal: Positive for arthralgias, gait problem, joint swelling and myalgias.   Neurological: Negative for weakness and numbness.       Visit Vitals  /82   Pulse 84   Ht 160 cm (63\")   Wt 94.3 kg (208 lb)   BMI 36.85 kg/m²     76 y.o.female  Physical Exam  Vitals and nursing note reviewed.   Constitutional:       General: She is not in acute distress.     Appearance: Normal appearance.   Cardiovascular:      Pulses: Normal pulses.   Pulmonary:      Effort: Pulmonary effort is normal. No respiratory distress.   Musculoskeletal:      Right knee: Effusion present. No erythema. Decreased range of motion. Tenderness present over the medial joint line and lateral joint line. No LCL laxity or MCL laxity. Abnormal meniscus and abnormal patellar mobility.      Instability Tests: Anterior drawer test negative. Posterior drawer test negative. Medial Adilia test positive and lateral Adilia test positive.      Right lower leg: Tenderness present. Edema present.      Right ankle: Swelling present. Tenderness present over the medial malleolus.      Comments: Right lower leg: General mild swelling without pitting.  Tender calf with spasm on lateral gastroc without palpable \"knot\"    Right ankle tender on medial malleolus and posterior tibial soft tissues   Skin:     General: Skin is warm and dry.      Findings: No erythema.   Neurological:      General: No focal deficit present.      Mental Status: She is alert.      Sensory: No sensory deficit.      Motor: No weakness.         Radiology Results:    XR Knee 3 View Right    Result Date: 10/7/2021  Arthritic change, but no acute bony abnormality.  This report was " finalized on 10/7/2021 12:54 PM by Dr. Cisco Emerson MD.        Assessment/Plan   Diagnoses and all orders for this visit:    1. Chronic pain of right knee (Primary)    2. Primary osteoarthritis of right knee    3. Right leg swelling  -     US Venous Doppler Lower Extremity Right (duplex); Future    4. Right calf pain  -     US Venous Doppler Lower Extremity Right (duplex); Future    5. Chronic anticoagulation     Discussion:  DVT unlikely due to chronic anticoagulation but I am ordering a venous Doppler ultrasound of the right lower extremity as a precaution.  Patient interested in injection therapy and we will try to get her precertified for Zilretta since she already tried a regular steroid and it did not last long enough.  Follow-up for injection.  Given home exercises on handouts to work on.  Given hinged knee brace for comfort.  Consider PT or advanced imaging             This document has been electronically signed by Jonathan Mitchell DO   October 7, 2021 13:47 EDT    Part of this note may be an electronic transcription/translation of spoken language to printed text using the Dragon Dictation System.

## 2021-10-08 ENCOUNTER — HOSPITAL ENCOUNTER (OUTPATIENT)
Dept: CARDIOLOGY | Facility: HOSPITAL | Age: 76
Discharge: HOME | End: 2021-10-08
Admitting: FAMILY MEDICINE

## 2021-10-08 DIAGNOSIS — M79.661 RIGHT CALF PAIN: ICD-10-CM

## 2021-10-08 DIAGNOSIS — M79.89 RIGHT LEG SWELLING: ICD-10-CM

## 2021-10-08 PROCEDURE — 93971 EXTREMITY STUDY: CPT | Performed by: RADIOLOGY

## 2021-10-08 PROCEDURE — 93971 EXTREMITY STUDY: CPT

## 2021-10-18 DIAGNOSIS — E78.2 MIXED HYPERLIPIDEMIA: ICD-10-CM

## 2021-10-18 RX ORDER — ATORVASTATIN CALCIUM 10 MG/1
TABLET, FILM COATED ORAL
Qty: 90 TABLET | Refills: 0 | Status: SHIPPED | OUTPATIENT
Start: 2021-10-18 | End: 2021-10-20

## 2021-10-19 DIAGNOSIS — E78.2 MIXED HYPERLIPIDEMIA: ICD-10-CM

## 2021-10-20 RX ORDER — ATORVASTATIN CALCIUM 10 MG/1
TABLET, FILM COATED ORAL
Qty: 90 TABLET | Refills: 0 | Status: SHIPPED | OUTPATIENT
Start: 2021-10-20 | End: 2021-12-14 | Stop reason: SDUPTHER

## 2021-10-26 ENCOUNTER — OFFICE VISIT (OUTPATIENT)
Dept: ORTHOPEDIC SURGERY | Facility: CLINIC | Age: 76
End: 2021-10-26

## 2021-10-26 VITALS
WEIGHT: 205 LBS | SYSTOLIC BLOOD PRESSURE: 154 MMHG | BODY MASS INDEX: 36.32 KG/M2 | HEIGHT: 63 IN | DIASTOLIC BLOOD PRESSURE: 98 MMHG | HEART RATE: 99 BPM

## 2021-10-26 DIAGNOSIS — M25.561 CHRONIC PAIN OF RIGHT KNEE: Primary | ICD-10-CM

## 2021-10-26 DIAGNOSIS — M17.11 PRIMARY OSTEOARTHRITIS OF RIGHT KNEE: ICD-10-CM

## 2021-10-26 DIAGNOSIS — M25.571 ACUTE RIGHT ANKLE PAIN: ICD-10-CM

## 2021-10-26 DIAGNOSIS — M79.661 RIGHT CALF PAIN: ICD-10-CM

## 2021-10-26 DIAGNOSIS — G89.29 CHRONIC PAIN OF RIGHT KNEE: Primary | ICD-10-CM

## 2021-10-26 DIAGNOSIS — M79.89 RIGHT LEG SWELLING: ICD-10-CM

## 2021-10-26 PROCEDURE — 20610 DRAIN/INJ JOINT/BURSA W/O US: CPT | Performed by: FAMILY MEDICINE

## 2021-10-26 PROCEDURE — 99213 OFFICE O/P EST LOW 20 MIN: CPT | Performed by: FAMILY MEDICINE

## 2021-10-26 RX ORDER — LIDOCAINE HYDROCHLORIDE 10 MG/ML
5 INJECTION, SOLUTION EPIDURAL; INFILTRATION; INTRACAUDAL; PERINEURAL
Status: COMPLETED | OUTPATIENT
Start: 2021-10-26 | End: 2021-10-26

## 2021-10-26 RX ADMIN — LIDOCAINE HYDROCHLORIDE 5 ML: 10 INJECTION, SOLUTION EPIDURAL; INFILTRATION; INTRACAUDAL; PERINEURAL at 10:20

## 2021-10-26 NOTE — PROGRESS NOTES
Follow Up Visit      Patient: Evie Shah  YOB: 1945  Date of Encounter: 10/26/2021  PCP: Camryn Mota PA  Referring Provider: No ref. provider found     Subjective   Evie Shah is a 76 y.o. female who presents to the office today for evaluation of Follow-up of the Right Knee      Chief Complaint   Patient presents with   • Right Knee - Follow-up       HPI  Patient presents for follow-up for right knee pain.  Had a venous Doppler ultrasound and needs results.  Has been using a knee brace and doing home exercises.  Patient thinks the knee brace is helpful, has only been doing exercises intermittently.  Feels like knee is a little improved overall.  Still would like to get Zilretta injection as we planned.  Concerned of continued ankle swelling.  Patient Active Problem List   Diagnosis   • DM type 2 with diabetic mixed hyperlipidemia ([facility])   • Mixed hyperlipidemia   • Essential hypertension   • Senile osteoporosis   • Chronic allergic rhinitis   • VISHAL (generalized anxiety disorder)   • PAD (peripheral artery disease) ([facility])   • Morbidly obese ([facility])   • COPD mixed type ([facility])   • Former smoker   • Healthcare maintenance   • Type 2 diabetes mellitus with hyperglycemia, without long-term current use of insulin ([facility])   • Primary osteoarthritis of both knees   • Coronary artery calcification seen on CT scan       Past Medical History:   Diagnosis Date   • Allergic rhinitis    • Asthma    • Atherosclerosis    • COPD (chronic obstructive pulmonary disease) ([facility])    • Cough    • Diabetes mellitus ([facility])    • Hyperlipidemia    • Hypertension    • Menopause    • Nausea and vomiting    • Obesity 3/7/2017   • Osteoarthritis    • Osteoporosis    • Vertigo, benign paroxysmal        Allergies   Allergen Reactions   • Codeine Shortness Of Breath       Review of Systems   Constitutional: Positive for activity change. Negative for fever.   Respiratory: Negative for shortness of breath.   "  Cardiovascular: Positive for leg swelling. Negative for chest pain.   Musculoskeletal: Positive for arthralgias, gait problem, joint swelling and myalgias.   Neurological: Negative for weakness and numbness.       Visit Vitals  /98   Pulse 99   Ht 160 cm (63\")   Wt 93 kg (205 lb)   BMI 36.31 kg/m²     76 y.o.female  Physical Exam  Vitals and nursing note reviewed.   Constitutional:       General: She is not in acute distress.     Appearance: Normal appearance.   Cardiovascular:      Pulses: Normal pulses.   Pulmonary:      Effort: Pulmonary effort is normal. No respiratory distress.   Musculoskeletal:      Right knee: Effusion present. No erythema. Decreased range of motion. Tenderness present over the medial joint line and lateral joint line. No LCL laxity or MCL laxity. Abnormal meniscus and abnormal patellar mobility.      Instability Tests: Anterior drawer test negative. Posterior drawer test negative. Medial Adilia test positive and lateral Adilia test positive.      Right lower leg: Tenderness present. Edema present.      Right ankle: Swelling present. Tenderness present over the lateral malleolus and medial malleolus. Decreased range of motion.      Comments: Right lower leg: General mild swelling without pitting.  Tender calf with spasm on lateral gastroc  Right ankle tender on medial/lateral malleolus and posterior tibial soft tissues.  Restricted dorsiflexion   Skin:     General: Skin is warm and dry.      Findings: No erythema.   Neurological:      General: No focal deficit present.      Mental Status: She is alert.      Sensory: No sensory deficit.      Motor: No weakness.           Radiology Results:    XR Knee 3 View Right    Result Date: 10/7/2021  Arthritic change, but no acute bony abnormality.  This report was finalized on 10/7/2021 12:54 PM by Dr. Cisco Emerson MD.      US Venous Doppler Lower Extremity Right (duplex)    Result Date: 10/8/2021  No DVT in the right lower extremity on " today's exam.  This report was finalized on 10/8/2021 12:43 PM by Dr. Cisco Emerson MD.      Large Joint Arthrocentesis: R knee  Date/Time: 10/26/2021 10:20 AM  Consent given by: patient  Site marked: site marked  Timeout: Immediately prior to procedure a time out was called to verify the correct patient, procedure, equipment, support staff and site/side marked as required   Supporting Documentation  Indications: pain   Procedure Details  Location: knee - R knee  Preparation: Patient was prepped in usual sterile fashion.  Needle size: 20 G  Approach: anterolateral (infrapatellar)  Medications administered: 5 mL lidocaine PF 1% 1 %; 32 mg Triamcinolone Acetonide 32 MG  Patient tolerance: patient tolerated the procedure well with no immediate complications      Injection site on the lateral knee was cleaned with alcohol and iodine and anesthesia was provided with ethyl chloride spray.  Then 5 cc of 1% lidocaine without epinephrine was injected into the subcutaneous tissues using a 1.5 inch 25-gauge needle for anesthesia.  Then using sterile technique and a 20-gauge 1.5 inch needle the knee joint was accessed 32mg of zilretta was injected.  The injection site was covered with sterile bandage.  There is no adverse effects and negligible  blood loss.  Follow-up care and precautions were discussed.    Assessment/Plan   Diagnoses and all orders for this visit:    1. Chronic pain of right knee (Primary)    2. Primary osteoarthritis of right knee    3. Right leg swelling    4. Right calf pain    5. Acute right ankle pain    Other orders  -     Large Joint Arthrocentesis         MEDS ORDERED DURING VISIT:  No orders of the defined types were placed in this encounter.    MEDICATION ISSUES:  Discussed medication options and treatment plan of prescribed medication as well as the risks, benefits, and side effects including potential falls, possible impaired driving and metabolic adversities among others. Patient is agreeable to  call the office with any worsening of symptoms or onset of side effects. Patient is agreeable to call 911 or go to the nearest ER should he/she begin having SI/HI.     Discussion:  Reviewed ultrasound with patient.  Zilretta injection in the right knee today in the office with some pain relief  Recommendation continue bracing and home exercise for the knee.  Patient does not want to work-up the ankle any further at this time.  Would probably get an x-ray to look for arthritis in the future if desired.  Recommend patient do home exercises for foot and ankle and calf as well as wear compression socks which should help with swelling.  Patient needs to discuss her swelling issues in toe paresthesias with her PCP as they may be related to her diabetes.  Follow-up with me in 1 month.           This document has been electronically signed by Jonathan Mitchell DO   October 26, 2021 10:21 EDT    Part of this note may be an electronic transcription/translation of spoken language to printed text using the Dragon Dictation System.

## 2021-10-31 DIAGNOSIS — I10 ESSENTIAL HYPERTENSION: ICD-10-CM

## 2021-11-01 RX ORDER — FUROSEMIDE 20 MG/1
TABLET ORAL
Qty: 90 TABLET | Refills: 0 | Status: SHIPPED | OUTPATIENT
Start: 2021-11-01 | End: 2021-12-14 | Stop reason: SDUPTHER

## 2021-11-10 DIAGNOSIS — E55.9 VITAMIN D DEFICIENCY: ICD-10-CM

## 2021-11-11 RX ORDER — ERGOCALCIFEROL 1.25 MG/1
CAPSULE ORAL
Qty: 12 CAPSULE | Refills: 0 | Status: SHIPPED | OUTPATIENT
Start: 2021-11-11 | End: 2021-11-15

## 2021-11-14 DIAGNOSIS — E55.9 VITAMIN D DEFICIENCY: ICD-10-CM

## 2021-11-15 RX ORDER — ERGOCALCIFEROL 1.25 MG/1
CAPSULE ORAL
Qty: 12 CAPSULE | Refills: 0 | Status: SHIPPED | OUTPATIENT
Start: 2021-11-15 | End: 2021-12-14 | Stop reason: SDUPTHER

## 2021-11-22 NOTE — PROGRESS NOTES
[facility] Cardiology    Encounter Date: 2021    Patient ID: Evie Shah is a 76 y.o. female.  : 1945     PCP: Camryn Mota PA       Chief Complaint: PAD (peripheral artery disease) (CMS/[facility])      PROBLEM LIST:  1. Peripheral arterial disease:   a. Class III left lower extremity claudication.  b. Arterial duplex, 10/27/2014: Moderate-to-severe stenosis of the left lower extremity.  c. Peripheral angiograms, 2015, Dr. Bernard: 90% stenosis of the left external iliac artery treated with 7.0 x 39 mm stent. Otherwise, non-obstructive plaque.   d. RLE venous duplex, 10/8/2021: No DVT.   2. Hypertension.   3. Dyslipidemia.   4. Type 2 diabetes.   5. Obesity with BMI 32.   6. History of tobacco abuse with cessation in .  7. Osteoporosis.   8. Vertigo.   9. Surgical history:  a. Tonsillectomy and adenoidectomy.  b. Skin cancer excision from nose.    History of Present Illness  Patient presents today for an annual follow-up with a history of peripheral artery disease and cardiac risk factors. Since last visit, she has been doing well overall from a cardiovascular standpoint. She's managed to social distance and avoid Coronavirus. Patient denies lower extremity pain, chest pain, shortness of breath, palpitations, edema, dizziness, and syncope.       Allergies   Allergen Reactions   • Codeine Shortness Of Breath         Current Outpatient Medications:   •  Accu-Chek Guide test strip, , Disp: , Rfl:   •  albuterol sulfate  (90 Base) MCG/ACT inhaler, Inhale 2 puffs Every 4 (Four) Hours As Needed for Shortness of Air., Disp: 18 g, Rfl: 5  •  amLODIPine-benazepril (LOTREL) 10-40 MG per capsule, Take 1 capsule by mouth Daily., Disp: 90 capsule, Rfl: 3  •  aspirin 81 MG EC tablet, Take 1 tablet by mouth Daily., Disp: 90 tablet, Rfl: 3  •  atorvastatin (LIPITOR) 10 MG tablet, TAKE 1 TABLET BY MOUTH ONCE DAILY AT NIGHT, Disp: 90 tablet, Rfl: 0  •  Blood  Glucose Monitoring Suppl misc, 1 each 3 (Three) Times a Day., Disp: 1 each, Rfl: 0  •  budesonide (PULMICORT) 1 MG/2ML nebulizer solution, INHALE 1 VIAL IN NEBULIZER TWICE DAILY, Disp: 120 mL, Rfl: 0  •  buPROPion SR (WELLBUTRIN SR) 150 MG 12 hr tablet, Take 1 tablet by mouth Every 12 (Twelve) Hours., Disp: 180 tablet, Rfl: 3  •  clopidogrel (PLAVIX) 75 MG tablet, Take 1 tablet by mouth Daily., Disp: 90 tablet, Rfl: 3  •  Empagliflozin (Jardiance) 25 MG tablet, Take 25 mg by mouth Daily., Disp: 90 tablet, Rfl: 3  •  EQ Allergy Relief 10 MG tablet, Take 1 tablet by mouth once daily, Disp: 90 tablet, Rfl: 0  •  furosemide (LASIX) 20 MG tablet, Take 1 tablet by mouth once daily, Disp: 90 tablet, Rfl: 0  •  glucose blood test strip, 1 each by Other route 3 (Three) Times a Day. Use as instructed, Disp: 100 each, Rfl: 11  •  glyburide (DIAbeta) 2.5 MG tablet, Take 1 tablet by mouth Daily With Breakfast., Disp: 30 tablet, Rfl: 5  •  Lancets 30G misc, 1 each Daily., Disp: 100 each, Rfl: 11  •  lidocaine-prilocaine (EMLA) 2.5-2.5 % cream, APPLY 1-2 GRAMS (1-2 PUMPS) TO AFFECTED AREA 3-4 TIMES DAILY, Disp: 240 g, Rfl: 5  •  montelukast (SINGULAIR) 10 MG tablet, Take 1 tablet by mouth Every Night., Disp: 90 tablet, Rfl: 3  •  O2 (OXYGEN), Inhale 2 L/min Every Night., Disp: , Rfl:   •  omega-3 acid ethyl esters (LOVAZA) 1 g capsule, Take 2 capsules by mouth 2 (Two) Times a Day., Disp: 360 capsule, Rfl: 3  •  omeprazole (priLOSEC) 40 MG capsule, Take 1 capsule by mouth Daily., Disp: 90 capsule, Rfl: 3  •  ondansetron (Zofran) 4 MG tablet, Take 1 tablet by mouth Every 8 (Eight) Hours As Needed for Nausea or Vomiting., Disp: 30 tablet, Rfl: 0  •  potassium chloride (K-DUR,KLOR-CON) 10 MEQ CR tablet, Take 1 tablet by mouth once daily, Disp: 90 tablet, Rfl: 0  •  SITagliptin (Januvia) 100 MG tablet, Take 1 tablet by mouth Daily., Disp: 30 tablet, Rfl: 5  •  vitamin D (ERGOCALCIFEROL) 1.25 MG (05681 UT) capsule capsule, Take 1  "capsule by mouth once a week, Disp: 12 capsule, Rfl: 0    Current Facility-Administered Medications:   •  cyanocobalamin injection 1,000 mcg, 1,000 mcg, Intramuscular, Q28 Days, Camryn Mota PA, 1,000 mcg at 09/30/21 1200  •  triamcinolone acetonide (KENALOG-40) injection 40 mg, 40 mg, Intramuscular, Once, Darwin Lind MD    The following portions of the patient's history were reviewed and updated as appropriate: allergies, current medications, past family history, past medical history, past social history, past surgical history and problem list.    ROS  Review of Systems   Constitution: Negative for chills, fever, fatigue, generalized weakness.   Cardiovascular: Negative for chest pain, dyspnea on exertion, leg swelling, palpitations, orthopnea, and syncope.   Respiratory: Negative for cough, shortness of breath, and wheezing.  HENT: Negative for ear pain, nosebleeds, and tinnitus.  Gastrointestinal: Negative for abdominal pain, constipation, diarrhea, nausea and vomiting.   Genitourinary: No urinary symptoms.  Musculoskeletal: Negative for muscle cramps.  Neurological: Negative for dizziness, headaches, loss of balance, numbness, and symptoms of stroke.  Psychiatric: Normal mental status.     All other systems reviewed and are negative.        Objective:     /80 (BP Location: Left arm, Patient Position: Sitting)   Pulse 85   Ht 160 cm (63\")   Wt 93.9 kg (207 lb)   SpO2 97%   BMI 36.67 kg/m²      Physical Exam  Constitutional: Patient appears well-developed and well-nourished.   HENT: HEENT exam unremarkable.   Neck: Neck supple. No JVD present. No carotid bruits.   Cardiovascular: Normal rate, regular rhythm and normal heart sounds. No murmur heard.   2+ symmetric pulses.   Pulmonary/Chest: Breath sounds normal. Does not exhibit tenderness.   Abdominal: Abdomen benign.   Musculoskeletal: Does not exhibit edema.   Neurological: Neurological exam unremarkable.   Vitals reviewed.    Data " Review:   Lab Results   Component Value Date    GLUCOSE 248 (H) 09/30/2021    BUN 25 09/30/2021    CREATININE 0.96 09/30/2021    EGFRIFNONA 58 (L) 09/30/2021    EGFRIFAFRI 67 09/30/2021    BCR 26 09/30/2021    K 4.4 09/30/2021    CO2 18 (L) 09/30/2021    CALCIUM 9.7 09/30/2021    ALBUMIN 4.7 09/30/2021    AST 23 09/30/2021    ALT 20 09/30/2021     Lab Results   Component Value Date    CHLPL 125 05/25/2021    TRIG 183 (H) 05/25/2021    HDL 46 05/25/2021    LDL 49 05/25/2021      Lab Results   Component Value Date    WBC 9.51 09/30/2021    RBC 5.32 (H) 09/30/2021    HGB 15.5 09/30/2021    HCT 49.3 (H) 09/30/2021    MCV 92.7 09/30/2021     09/30/2021     Lab Results   Component Value Date    HGBA1C 8.1 (H) 09/30/2021        Procedures       Assessment:      Diagnosis Plan   1. PAD (peripheral artery disease) (HCC)  Stable and asymptomatic; continue Plavix 75 mg   2. Essential hypertension  Well controlled; continue amlodipine-benazepril 10-40 mg and Lasix 20 mg    3. Mixed hyperlipidemia  Well controlled; continue atorvastatin 10 mg      Plan:   Stable cardiac status.   Encouraged daily walking 10 minutes 3 times daily and standing every hour.  Continue current medications.   FU in 12 MO, sooner as needed.  Thank you for allowing us to participate in the care of your patient.     Scribed for Rashad Bernard MD by Nevin Simpson. 11/23/2021 13:07 EST      I, Rashad Bernard MD, personally performed the services described in this documentation as scribed by the above named individual in my presence, and it is both accurate and complete.  11/23/2021  13:43 EST        Part of this note may be an electronic transcription/translation of spoken language to printed text using the Dragon Dictation System.

## 2021-11-23 ENCOUNTER — OFFICE VISIT (OUTPATIENT)
Dept: CARDIOLOGY | Facility: CLINIC | Age: 76
End: 2021-11-23

## 2021-11-23 VITALS
OXYGEN SATURATION: 97 % | HEIGHT: 63 IN | SYSTOLIC BLOOD PRESSURE: 132 MMHG | WEIGHT: 207 LBS | HEART RATE: 85 BPM | BODY MASS INDEX: 36.68 KG/M2 | DIASTOLIC BLOOD PRESSURE: 80 MMHG

## 2021-11-23 DIAGNOSIS — I10 ESSENTIAL HYPERTENSION: ICD-10-CM

## 2021-11-23 DIAGNOSIS — E78.2 MIXED HYPERLIPIDEMIA: ICD-10-CM

## 2021-11-23 DIAGNOSIS — I73.9 PAD (PERIPHERAL ARTERY DISEASE) (HCC): Primary | ICD-10-CM

## 2021-11-23 PROCEDURE — 99213 OFFICE O/P EST LOW 20 MIN: CPT | Performed by: INTERNAL MEDICINE

## 2021-12-14 ENCOUNTER — OFFICE VISIT (OUTPATIENT)
Dept: FAMILY MEDICINE CLINIC | Facility: CLINIC | Age: 76
End: 2021-12-14

## 2021-12-14 VITALS
WEIGHT: 209 LBS | SYSTOLIC BLOOD PRESSURE: 146 MMHG | HEART RATE: 90 BPM | TEMPERATURE: 96.8 F | DIASTOLIC BLOOD PRESSURE: 84 MMHG | OXYGEN SATURATION: 97 % | BODY MASS INDEX: 37.03 KG/M2 | HEIGHT: 63 IN

## 2021-12-14 DIAGNOSIS — M17.0 PRIMARY OSTEOARTHRITIS OF BOTH KNEES: Chronic | ICD-10-CM

## 2021-12-14 DIAGNOSIS — I73.9 PAD (PERIPHERAL ARTERY DISEASE) (HCC): Chronic | ICD-10-CM

## 2021-12-14 DIAGNOSIS — E53.8 B12 DEFICIENCY: ICD-10-CM

## 2021-12-14 DIAGNOSIS — Z78.0 MENOPAUSE: ICD-10-CM

## 2021-12-14 DIAGNOSIS — Z12.31 ENCOUNTER FOR SCREENING MAMMOGRAM FOR MALIGNANT NEOPLASM OF BREAST: ICD-10-CM

## 2021-12-14 DIAGNOSIS — F41.1 GAD (GENERALIZED ANXIETY DISORDER): Chronic | ICD-10-CM

## 2021-12-14 DIAGNOSIS — I10 ESSENTIAL HYPERTENSION: Chronic | ICD-10-CM

## 2021-12-14 DIAGNOSIS — E11.65 TYPE 2 DIABETES MELLITUS WITH HYPERGLYCEMIA, WITHOUT LONG-TERM CURRENT USE OF INSULIN (HCC): Chronic | ICD-10-CM

## 2021-12-14 DIAGNOSIS — E78.2 MIXED HYPERLIPIDEMIA: Chronic | ICD-10-CM

## 2021-12-14 DIAGNOSIS — E66.01 MORBIDLY OBESE (HCC): Chronic | ICD-10-CM

## 2021-12-14 DIAGNOSIS — M81.0 SENILE OSTEOPOROSIS: Chronic | ICD-10-CM

## 2021-12-14 DIAGNOSIS — E11.69 DM TYPE 2 WITH DIABETIC MIXED HYPERLIPIDEMIA (HCC): Chronic | ICD-10-CM

## 2021-12-14 DIAGNOSIS — J30.1 NON-SEASONAL ALLERGIC RHINITIS DUE TO POLLEN: ICD-10-CM

## 2021-12-14 DIAGNOSIS — J44.9 COPD MIXED TYPE (HCC): Chronic | ICD-10-CM

## 2021-12-14 DIAGNOSIS — E55.9 VITAMIN D DEFICIENCY: Chronic | ICD-10-CM

## 2021-12-14 DIAGNOSIS — E78.2 DM TYPE 2 WITH DIABETIC MIXED HYPERLIPIDEMIA (HCC): Chronic | ICD-10-CM

## 2021-12-14 DIAGNOSIS — I25.10 CORONARY ARTERY CALCIFICATION SEEN ON CT SCAN: Chronic | ICD-10-CM

## 2021-12-14 DIAGNOSIS — Z00.00 MEDICARE ANNUAL WELLNESS VISIT, SUBSEQUENT: Primary | ICD-10-CM

## 2021-12-14 PROCEDURE — G0439 PPPS, SUBSEQ VISIT: HCPCS | Performed by: PHYSICIAN ASSISTANT

## 2021-12-14 PROCEDURE — 96372 THER/PROPH/DIAG INJ SC/IM: CPT | Performed by: PHYSICIAN ASSISTANT

## 2021-12-14 PROCEDURE — 1159F MED LIST DOCD IN RCRD: CPT | Performed by: PHYSICIAN ASSISTANT

## 2021-12-14 PROCEDURE — 1170F FXNL STATUS ASSESSED: CPT | Performed by: PHYSICIAN ASSISTANT

## 2021-12-14 PROCEDURE — 96160 PT-FOCUSED HLTH RISK ASSMT: CPT | Performed by: PHYSICIAN ASSISTANT

## 2021-12-14 PROCEDURE — 1126F AMNT PAIN NOTED NONE PRSNT: CPT | Performed by: PHYSICIAN ASSISTANT

## 2021-12-14 RX ORDER — ATORVASTATIN CALCIUM 10 MG/1
10 TABLET, FILM COATED ORAL NIGHTLY
Qty: 90 TABLET | Refills: 3 | Status: SHIPPED | OUTPATIENT
Start: 2021-12-14 | End: 2022-03-14 | Stop reason: SDUPTHER

## 2021-12-14 RX ORDER — MONTELUKAST SODIUM 10 MG/1
TABLET ORAL
COMMUNITY
Start: 2021-11-15 | End: 2021-12-14 | Stop reason: SDUPTHER

## 2021-12-14 RX ORDER — LORATADINE 10 MG/1
10 TABLET ORAL DAILY
Qty: 90 TABLET | Refills: 3 | Status: SHIPPED | OUTPATIENT
Start: 2021-12-14

## 2021-12-14 RX ORDER — POTASSIUM CHLORIDE 750 MG/1
10 TABLET, EXTENDED RELEASE ORAL DAILY
Qty: 90 TABLET | Refills: 3 | Status: SHIPPED | OUTPATIENT
Start: 2021-12-14 | End: 2022-08-12

## 2021-12-14 RX ORDER — FUROSEMIDE 20 MG/1
20 TABLET ORAL DAILY
Qty: 90 TABLET | Refills: 3 | Status: SHIPPED | OUTPATIENT
Start: 2021-12-14 | End: 2022-02-07

## 2021-12-14 RX ORDER — ERGOCALCIFEROL 1.25 MG/1
50000 CAPSULE ORAL WEEKLY
Qty: 12 CAPSULE | Refills: 3 | Status: SHIPPED | OUTPATIENT
Start: 2021-12-14 | End: 2022-03-14 | Stop reason: SDUPTHER

## 2021-12-14 RX ORDER — CYANOCOBALAMIN 1000 UG/ML
1000 INJECTION, SOLUTION INTRAMUSCULAR; SUBCUTANEOUS ONCE
Status: COMPLETED | OUTPATIENT
Start: 2021-12-14 | End: 2021-12-14

## 2021-12-14 RX ORDER — GLYBURIDE 2.5 MG/1
2.5 TABLET ORAL
Qty: 90 TABLET | Refills: 3 | Status: SHIPPED | OUTPATIENT
Start: 2021-12-14 | End: 2022-01-13

## 2021-12-14 RX ADMIN — CYANOCOBALAMIN 1000 MCG: 1000 INJECTION, SOLUTION INTRAMUSCULAR; SUBCUTANEOUS at 12:21

## 2021-12-14 NOTE — PROGRESS NOTES
Injection  Injection performed in left deltoid by Christi Granados MA. Patient tolerated the procedure well without complications.  12/14/21   Christi Granados MA

## 2021-12-14 NOTE — PATIENT INSTRUCTIONS
Medicare Wellness  Personal Prevention Plan of Service     Date of Office Visit:  2021  Encounter Provider:  RUBI Michael  Place of Service:  [facility]  Patient Name: Evie Shah  :  1945    As part of the Medicare Wellness portion of your visit today, we are providing you with this personalized preventive plan of services (PPPS). This plan is based upon recommendations of the [place] Preventive Services Task Force (USPSTF) and the Advisory Committee on Immunization Practices (ACIP).    This lists the preventive care services that should be considered, and provides dates of when you are due. Items listed as completed are up-to-date and do not require any further intervention.    Health Maintenance   Topic Date Due   • MAMMOGRAM  2020   • DXA SCAN  2020   • COVID-19 Vaccine (3 - Booster for Moderna series) 2021   • ZOSTER VACCINE (1 of 2) 2021 (Originally 1997)   • DIABETIC EYE EXAM  2022   • HEMOGLOBIN A1C  2022   • LIPID PANEL  2022   • URINE MICROALBUMIN  2022   • ANNUAL WELLNESS VISIT  2022   • DIABETIC FOOT EXAM  2022   • COLORECTAL CANCER SCREENING  2026   • TDAP/TD VACCINES (3 - Td or Tdap) 2028   • INFLUENZA VACCINE  Completed   • Pneumococcal Vaccine 65+  Completed   • HEPATITIS C SCREENING  Addressed       Orders Placed This Encounter   Procedures   • DEXA Bone Density Axial     Standing Status:   Future     Standing Expiration Date:   2022   • Mammo Screening Digital Tomosynthesis Bilateral With CAD     Standing Status:   Future     Standing Expiration Date:   2022     Order Specific Question:   Reason for Exam:     Answer:   breast cancer screen   • Comprehensive Metabolic Panel     Standing Status:   Future     Standing Expiration Date:   12/15/2022     Order Specific Question:   Release to patient     Answer:   Immediate   • Hemoglobin A1c      Standing Status:   Future     Standing Expiration Date:   12/14/2022     Order Specific Question:   Release to patient     Answer:   Immediate   • MicroAlbumin, Urine, Random - Urine, Clean Catch     Standing Status:   Future     Standing Expiration Date:   12/14/2022     Order Specific Question:   Release to patient     Answer:   Immediate   • Lipid Panel     Standing Status:   Future     Standing Expiration Date:   12/14/2022   • Vitamin D 25 Hydroxy     Standing Status:   Future     Standing Expiration Date:   12/14/2022     Order Specific Question:   Release to patient     Answer:   Immediate   • Ambulatory Referral to Podiatry     Referral Priority:   Routine     Referral Type:   Consultation     Referral Reason:   Patient Preference     Requested Specialty:   Podiatry     Number of Visits Requested:   1   • CBC & Differential     Standing Status:   Future     Standing Expiration Date:   12/14/2022     Order Specific Question:   Manual Differential     Answer:   No       Return in about 3 months (around 3/14/2022) for Followup with Carmella Cowan just prior to return.          Advance Directive    Advance directives are legal documents that let you make choices ahead of time about your health care and medical treatment in case you become unable to communicate for yourself. Advance directives are a way for you to make known your wishes to family, friends, and health care providers. This can let others know about your end-of-life care if you become unable to communicate.  Discussing and writing advance directives should happen over time rather than all at once. Advance directives can be changed depending on your situation and what you want, even after you have signed the advance directives.  There are different types of advance directives, such as:  · Medical power of .  · Living will.  · Do not resuscitate (DNR) or do not attempt resuscitation (DNAR) order.  Health care proxy and medical power of    A health care proxy is also called a health care agent. This is a person who is appointed to make medical decisions for you in cases where you are unable to make the decisions yourself. Generally, people choose someone they know well and trust to represent their preferences. Make sure to ask this person for an agreement to act as your proxy. A proxy may have to exercise judgment in the event of a medical decision for which your wishes are not known.  A medical power of  is a legal document that names your health care proxy. Depending on the laws in your state, after the document is written, it may also need to be:  · Signed.  · Notarized.  · Dated.  · Copied.  · Witnessed.  · Incorporated into your medical record.  You may also want to appoint someone to manage your money in a situation in which you are unable to do so. This is called a durable power of  for finances. It is a separate legal document from the durable power of  for health care. You may choose the same person or someone different from your health care proxy to act as your agent in money matters.  If you do not appoint a proxy, or if there is a concern that the proxy is not acting in your best interests, a court may appoint a guardian to act on your behalf.  Living will  A living will is a set of instructions that state your wishes about medical care when you cannot express them yourself. Health care providers should keep a copy of your living will in your medical record. You may want to give a copy to family members or friends. To alert caregivers in case of an emergency, you can place a card in your wallet to let them know that you have a living will and where they can find it. A living will is used if you become:  · Terminally ill.  · Disabled.  · Unable to communicate or make decisions.  Items to consider in your living will include:  · To use or not to use life-support equipment, such as dialysis machines and  breathing machines (ventilators).  · A DNR or DNAR order. This tells health care providers not to use cardiopulmonary resuscitation (CPR) if breathing or heartbeat stops.  · To use or not to use tube feeding.  · To be given or not to be given food and fluids.  · Comfort (palliative) care when the goal becomes comfort rather than a cure.  · Donation of organs and tissues.  A living will does not give instructions for distributing your money and property if you should pass away.  DNR or DNAR  A DNR or DNAR order is a request not to have CPR in the event that your heart stops beating or you stop breathing. If a DNR or DNAR order has not been made and shared, a health care provider will try to help any patient whose heart has stopped or who has stopped breathing. If you plan to have surgery, talk with your health care provider about how your DNR or DNAR order will be followed if problems occur.  What if I do not have an advance directive?  If you do not have an advance directive, some states assign family decision makers to act on your behalf based on how closely you are related to them. Each state has its own laws about advance directives. You may want to check with your health care provider, , or state representative about the laws in your state.  Summary  · Advance directives are the legal documents that allow you to make choices ahead of time about your health care and medical treatment in case you become unable to tell others about your care.  · The process of discussing and writing advance directives should happen over time. You can change the advance directives, even after you have signed them.  · Advance directives include DNR or DNAR orders, living douglass, and designating an agent as your medical power of .  This information is not intended to replace advice given to you by your health care provider. Make sure you discuss any questions you have with your health care provider.  Document Revised:  01/28/2021 Document Reviewed: 07/16/2020  Elsevier Patient Education © 2021 [employer]      Fall Prevention in the Home, Adult  Falls can cause injuries. They can happen to people of all ages. There are many things you can do to make your home safe and to help prevent falls. Ask for help when making these changes, if needed.  What actions can I take to prevent falls?  General Instructions  · Use good lighting in all rooms. Replace any light bulbs that burn out.  · Turn on the lights when you go into a dark area. Use night-lights.  · Keep items that you use often in easy-to-reach places. Lower the shelves around your home if necessary.  · Set up your furniture so you have a clear path. Avoid moving your furniture around.  · Do not have throw rugs and other things on the floor that can make you trip.  · Avoid walking on wet floors.  · If any of your floors are uneven, fix them.  · Add color or contrast paint or tape to clearly bambi and help you see:  ? Any grab bars or handrails.  ? First and last steps of stairways.  ? Where the edge of each step is.  · If you use a stepladder:  ? Make sure that it is fully opened. Do not climb a closed stepladder.  ? Make sure that both sides of the stepladder are locked into place.  ? Ask someone to hold the stepladder for you while you use it.  · If there are any pets around you, be aware of where they are.  What can I do in the bathroom?         · Keep the floor dry. Clean up any water that spills onto the floor as soon as it happens.  · Remove soap buildup in the tub or shower regularly.  · Use non-skid mats or decals on the floor of the tub or shower.  · Attach bath mats securely with double-sided, non-slip rug tape.  · If you need to sit down in the shower, use a plastic, non-slip stool.  · Install grab bars by the toilet and in the tub and shower. Do not use towel bars as grab bars.  What can I do in the bedroom?  · Make sure that you have a light by your bed that is easy  to reach.  · Do not use any sheets or blankets that are too big for your bed. They should not hang down onto the floor.  · Have a firm chair that has side arms. You can use this for support while you get dressed.  What can I do in the kitchen?  · Clean up any spills right away.  · If you need to reach something above you, use a strong step stool that has a grab bar.  · Keep electrical cords out of the way.  · Do not use floor polish or wax that makes floors slippery. If you must use wax, use non-skid floor wax.  What can I do with my stairs?  · Do not leave any items on the stairs.  · Make sure that you have a light switch at the top of the stairs and the bottom of the stairs. If you do not have them, ask someone to add them for you.  · Make sure that there are handrails on both sides of the stairs, and use them. Fix handrails that are broken or loose. Make sure that handrails are as long as the stairways.  · Install non-slip stair treads on all stairs in your home.  · Avoid having throw rugs at the top or bottom of the stairs. If you do have throw rugs, attach them to the floor with carpet tape.  · Choose a carpet that does not hide the edge of the steps on the stairway.  · Check any carpeting to make sure that it is firmly attached to the stairs. Fix any carpet that is loose or worn.  What can I do on the outside of my home?  · Use bright outdoor lighting.  · Regularly fix the edges of walkways and driveways and fix any cracks.  · Remove anything that might make you trip as you walk through a door, such as a raised step or threshold.  · Trim any bushes or trees on the path to your home.  · Regularly check to see if handrails are loose or broken. Make sure that both sides of any steps have handrails.  · Install guardrails along the edges of any raised decks and porches.  · Clear walking paths of anything that might make someone trip, such as tools or rocks.  · Have any leaves, snow, or ice cleared regularly.  · Use  "sand or salt on walking paths during winter.  · Clean up any spills in your garage right away. This includes grease or oil spills.  What other actions can I take?  · Wear shoes that:  ? Have a low heel. Do not wear high heels.  ? Have rubber bottoms.  ? Are comfortable and fit you well.  ? Are closed at the toe. Do not wear open-toe sandals.  · Use tools that help you move around (mobility aids) if they are needed. These include:  ? Canes.  ? Walkers.  ? Scooters.  ? Crutches.  · Review your medicines with your doctor. Some medicines can make you feel dizzy. This can increase your chance of falling.  Ask your doctor what other things you can do to help prevent falls.  Where to find more information  · Centers for Disease Control and Prevention, STEAD[place]: https://cdc.gov  · National [place] on Aging: https://hi5jauk.dionne.nih.gov  Contact a doctor if:  · You are afraid of falling at home.  · You feel weak, drowsy, or dizzy at home.  · You fall at home.  Summary  · There are many simple things that you can do to make your home safe and to help prevent falls.  · Ways to make your home safe include removing tripping hazards and installing grab bars in the bathroom.  · Ask for help when making these changes in your home.  This information is not intended to replace advice given to you by your health care provider. Make sure you discuss any questions you have with your health care provider.  Document Revised: 04/09/2020 Document Reviewed: 08/02/2018  [employer] Patient Education © 2021 [employer]      https://www.diabeteseducator.org/docs/default-source/living-with-diabetes/conquering-the-grocery-store-v1.pdf?sfvrsn=4\">   Carbohydrate Counting for Diabetes Mellitus, Adult  Carbohydrate counting is a method of keeping track of how many carbohydrates you eat. Eating carbohydrates naturally increases the amount of sugar (glucose) in the blood. Counting how many carbohydrates you eat improves your blood glucose control, which " helps you manage your diabetes.  It is important to know how many carbohydrates you can safely have in each meal. This is different for every person. A dietitian can help you make a meal plan and calculate how many carbohydrates you should have at each meal and snack.  What foods contain carbohydrates?  Carbohydrates are found in the following foods:  · Grains, such as breads and cereals.  · Dried beans and soy products.  · Starchy vegetables, such as potatoes, peas, and corn.  · Fruit and fruit juices.  · Milk and yogurt.  · Sweets and snack foods, such as cake, cookies, candy, chips, and soft drinks.  How do I count carbohydrates in foods?  There are two ways to count carbohydrates in food. You can read food labels or learn standard serving sizes of foods. You can use either of the methods or a combination of both.  Using the Nutrition Facts label  The Nutrition Facts list is included on the labels of almost all packaged foods and beverages in the [place] It includes:  · The serving size.  · Information about nutrients in each serving, including the grams (g) of carbohydrate per serving.  To use the Nutrition Facts:  · Decide how many servings you will have.  · Multiply the number of servings by the number of carbohydrates per serving.  · The resulting number is the total amount of carbohydrates that you will be having.  Learning the standard serving sizes of foods  When you eat carbohydrate foods that are not packaged or do not include Nutrition Facts on the label, you need to measure the servings in order to count the amount of carbohydrates.  · Measure the foods that you will eat with a food scale or measuring cup, if needed.  · Decide how many standard-size servings you will eat.  · Multiply the number of servings by 15. For foods that contain carbohydrates, one serving equals 15 g of carbohydrates.  ? For example, if you eat 2 cups or 10 oz (300 g) of strawberries, you will have eaten 2 servings and 30 g of  carbohydrates (2 servings x 15 g = 30 g).  · For foods that have more than one food mixed, such as soups and casseroles, you must count the carbohydrates in each food that is included.  The following list contains standard serving sizes of common carbohydrate-rich foods. Each of these servings has about 15 g of carbohydrates:  · 1 slice of bread.  · 1 six-inch (15 cm) tortilla.  · ? cup or 2 oz (53 g) cooked rice or pasta.  · ½ cup or 3 oz (85 g) cooked or canned, drained and rinsed beans or lentils.  · ½ cup or 3 oz (85 g) starchy vegetable, such as peas, corn, or squash.  · ½ cup or 4 oz (120 g) hot cereal.  · ½ cup or 3 oz (85 g) boiled or mashed potatoes, or ¼ or 3 oz (85 g) of a large baked potato.  · ½ cup or 4 fl oz (118 mL) fruit juice.  · 1 cup or 8 fl oz (237 mL) milk.  · 1 small or 4 oz (106 g) apple.  · ½ or 2 oz (63 g) of a medium banana.  · 1 cup or 5 oz (150 g) strawberries.  · 3 cups or 1 oz (24 g) popped popcorn.  What is an example of carbohydrate counting?  To calculate the number of carbohydrates in this sample meal, follow the steps shown below.  Sample meal  · 3 oz (85 g) chicken breast.  · ? cup or 4 oz (106 g) brown rice.  · ½ cup or 3 oz (85 g) corn.  · 1 cup or 8 fl oz (237 mL) milk.  · 1 cup or 5 oz (150 g) strawberries with sugar-free whipped topping.  Carbohydrate calculation  1. Identify the foods that contain carbohydrates:  ? Rice.  ? Corn.  ? Milk.  ? Strawberries.  2. Calculate how many servings you have of each food:  ? 2 servings rice.  ? 1 serving corn.  ? 1 serving milk.  ? 1 serving strawberries.  3. Multiply each number of servings by 15 g:  ? 2 servings rice x 15 g = 30 g.  ? 1 serving corn x 15 g = 15 g.  ? 1 serving milk x 15 g = 15 g.  ? 1 serving strawberries x 15 g = 15 g.  4. Add together all of the amounts to find the total grams of carbohydrates eaten:  ? 30 g + 15 g + 15 g + 15 g = 75 g of carbohydrates total.  What are tips for following this  plan?  Shopping  · Develop a meal plan and then make a shopping list.  · Buy fresh and frozen vegetables, fresh and frozen fruit, dairy, eggs, beans, lentils, and whole grains.  · Look at food labels. Choose foods that have more fiber and less sugar.  · Avoid processed foods and foods with added sugars.  Meal planning  · Aim to have the same amount of carbohydrates at each meal and for each snack time.  · Plan to have regular, balanced meals and snacks.  Where to find more information  · American Diabetes Association: www.diabetes.org  · Centers for Disease Control and Prevention: www.cdc.gov  Summary  · Carbohydrate counting is a method of keeping track of how many carbohydrates you eat.  · Eating carbohydrates naturally increases the amount of sugar (glucose) in the blood.  · Counting how many carbohydrates you eat improves your blood glucose control, which helps you manage your diabetes.  · A dietitian can help you make a meal plan and calculate how many carbohydrates you should have at each meal and snack.  This information is not intended to replace advice given to you by your health care provider. Make sure you discuss any questions you have with your health care provider.  Document Revised: 12/17/2020 Document Reviewed: 12/18/2020  [employer] Patient Education © 2021 [employer]      Fat and Cholesterol Restricted Eating Plan  Getting too much fat and cholesterol in your diet may cause health problems. Choosing the right foods helps keep your fat and cholesterol at normal levels. This can keep you from getting certain diseases.  Your doctor may recommend an eating plan that includes:  · Total fat: ______% or less of total calories a day.  · Saturated fat: ______% or less of total calories a day.  · Cholesterol: less than _________mg a day.  · Fiber: ______g a day.  What are tips for following this plan?  Meal planning  · At meals, divide your plate into four equal parts:  ? Fill one-half of your plate with  "vegetables and green salads.  ? Fill one-fourth of your plate with whole grains.  ? Fill one-fourth of your plate with low-fat (lean) protein foods.  · Eat fish that is high in omega-3 fats at least two times a week. This includes mackerel, tuna, sardines, and salmon.  · Eat foods that are high in fiber, such as whole grains, beans, apples, broccoli, carrots, peas, and barley.  General tips    · Work with your doctor to lose weight if you need to.  · Avoid:  ? Foods with added sugar.  ? Fried foods.  ? Foods with partially hydrogenated oils.  · Limit alcohol intake to no more than 1 drink a day for nonpregnant women and 2 drinks a day for men. One drink equals 12 oz of beer, 5 oz of wine, or 1½ oz of hard liquor.    Reading food labels  · Check food labels for:  ? Trans fats.  ? Partially hydrogenated oils.  ? Saturated fat (g) in each serving.  ? Cholesterol (mg) in each serving.  ? Fiber (g) in each serving.  · Choose foods with healthy fats, such as:  ? Monounsaturated fats.  ? Polyunsaturated fats.  ? Omega-3 fats.  · Choose grain products that have whole grains. Look for the word \"whole\" as the first word in the ingredient list.  Cooking  · Cook foods using low-fat methods. These include baking, boiling, grilling, and broiling.  · Eat more home-cooked foods. Eat at restaurants and buffets less often.  · Avoid cooking using saturated fats, such as butter, cream, palm oil, palm kernel oil, and coconut oil.  Recommended foods    Fruits  · All fresh, canned (in natural juice), or frozen fruits.  Vegetables  · Fresh or frozen vegetables (raw, steamed, roasted, or grilled). Green salads.  Grains  · Whole grains, such as whole wheat or whole grain breads, crackers, cereals, and pasta. Unsweetened oatmeal, bulgur, barley, quinoa, or brown rice. Corn or whole wheat flour tortillas.  Meats and other protein foods  · Ground beef (85% or leaner), grass-fed beef, or beef trimmed of fat. Skinless chicken or turkey. Ground " chicken or turkey. Pork trimmed of fat. All fish and seafood. Egg whites. Dried beans, peas, or lentils. Unsalted nuts or seeds. Unsalted canned beans. Nut butters without added sugar or oil.  Dairy  · Low-fat or nonfat dairy products, such as skim or 1% milk, 2% or reduced-fat cheeses, low-fat and fat-free ricotta or cottage cheese, or plain low-fat and nonfat yogurt.  Fats and oils  · Tub margarine without trans fats. Light or reduced-fat mayonnaise and salad dressings. Avocado. Olive, canola, sesame, or safflower oils.  The items listed above may not be a complete list of foods and beverages you can eat. Contact a dietitian for more information.  Foods to avoid  Fruits  · Canned fruit in heavy syrup. Fruit in cream or butter sauce. Fried fruit.  Vegetables  · Vegetables cooked in cheese, cream, or butter sauce. Fried vegetables.  Grains  · White bread. White pasta. White rice. Cornbread. Bagels, pastries, and croissants. Crackers and snack foods that contain trans fat and hydrogenated oils.  Meats and other protein foods  · Fatty cuts of meat. Ribs, chicken wings, palomo, sausage, bologna, salami, chitterlings, fatback, hot dogs, bratwurst, and packaged lunch meats. Liver and organ meats. Whole eggs and egg yolks. Chicken and turkey with skin. Fried meat.  Dairy  · Whole or 2% milk, cream, half-and-half, and cream cheese. Whole milk cheeses. Whole-fat or sweetened yogurt. Full-fat cheeses. Nondairy creamers and whipped toppings. Processed cheese, cheese spreads, and cheese curds.  Beverages  · Alcohol. Sugar-sweetened drinks such as sodas, lemonade, and fruit drinks.  Fats and oils  · Butter, stick margarine, lard, shortening, ghee, or palomo fat. Coconut, palm kernel, and palm oils.  Sweets and desserts  · Corn syrup, sugars, honey, and molasses. Candy. Jam and jelly. Syrup. Sweetened cereals. Cookies, pies, cakes, donuts, muffins, and ice cream.  The items listed above may not be a complete list of foods and  beverages you should avoid. Contact a dietitian for more information.  Summary  · Choosing the right foods helps keep your fat and cholesterol at normal levels. This can keep you from getting certain diseases.  · At meals, fill one-half of your plate with vegetables and green salads.  · Eat high-fiber foods, like whole grains, beans, apples, carrots, peas, and barley.  · Limit added sugar, saturated fats, alcohol, and fried foods.  This information is not intended to replace advice given to you by your health care provider. Make sure you discuss any questions you have with your health care provider.  Document Revised: 04/21/2021 Document Reviewed: 04/21/2021  Elsevier Patient Education © 2021 [employer]

## 2021-12-14 NOTE — PROGRESS NOTES
The ABCs of the Annual Wellness Visit  Subsequent Medicare Wellness Visit    Chief Complaint   Patient presents with   • Medicare Wellness-subsequent      Subjective    History of Present Illness:  Evie Shah is a 76 y.o. female who presents for a Subsequent Medicare Wellness Visit.    Diabetes mellitus type 2-  Not at goal with recent A1c of 8.1.  Patient has been started on Jardiance since that time.  She states that she does not check her blood glucose regularly at home.  She continues to use glyburide as well.  Patient states that she does follow a low carbohydrate diabetic diet.    Hyperlipidemia-controlled with atorvastatin and diet    Allergic rhinitis-stable with loratadine and Singulair    Hypertension-controlled with Lotrel, furosemide and potassium    Vitamin D deficiency-stable with vitamin D supplementation    B12 deficiency-stable with B12 supplementation    Coronary artery disease-stable with risk factor modification including Plavix and atorvastatin    Peripheral artery disease-stable with risk factor modification including atorvastatin and Plavix    Obesity-not at goal with sedentary lifestyle and current diet    Generalized anxiety disorder-stable with Wellbutrin    Osteoarthritis-  Stable with conservative measures    Osteoporosis-stable with conservative measures vitamin D and calcium supplementation    COPD-stable with tobacco cessation.  She states that she only uses albuterol as needed    The following portions of the patient's history were reviewed and   updated as appropriate: allergies, current medications, past family history, past medical history, past social history, past surgical history and problem list.    Compared to one year ago, the patient feels her physical   health is better.    Compared to one year ago, the patient feels her mental   health is the same.    Recent Hospitalizations:  She was not admitted to the hospital during the last year.       Current Medical  Providers:  Patient Care Team:  Camryn Mota PA as PCP - General  Camryn Mota PA as PCP - Family Medicine    Outpatient Medications Prior to Visit   Medication Sig Dispense Refill   • Accu-Chek Guide test strip      • albuterol sulfate  (90 Base) MCG/ACT inhaler Inhale 2 puffs Every 4 (Four) Hours As Needed for Shortness of Air. 18 g 5   • amLODIPine-benazepril (LOTREL) 10-40 MG per capsule Take 1 capsule by mouth Daily. 90 capsule 3   • aspirin 81 MG EC tablet Take 1 tablet by mouth Daily. 90 tablet 3   • Blood Glucose Monitoring Suppl misc 1 each 3 (Three) Times a Day. 1 each 0   • buPROPion SR (WELLBUTRIN SR) 150 MG 12 hr tablet Take 1 tablet by mouth Every 12 (Twelve) Hours. 180 tablet 3   • clopidogrel (PLAVIX) 75 MG tablet Take 1 tablet by mouth Daily. 90 tablet 3   • Empagliflozin (Jardiance) 25 MG tablet Take 25 mg by mouth Daily. 90 tablet 3   • glucose blood test strip 1 each by Other route 3 (Three) Times a Day. Use as instructed 100 each 11   • Lancets 30G misc 1 each Daily. 100 each 11   • lidocaine-prilocaine (EMLA) 2.5-2.5 % cream APPLY 1-2 GRAMS (1-2 PUMPS) TO AFFECTED AREA 3-4 TIMES DAILY 240 g 5   • montelukast (SINGULAIR) 10 MG tablet Take 1 tablet by mouth Every Night. 90 tablet 3   • O2 (OXYGEN) Inhale 2 L/min Every Night.     • omega-3 acid ethyl esters (LOVAZA) 1 g capsule Take 2 capsules by mouth 2 (Two) Times a Day. 360 capsule 3   • omeprazole (priLOSEC) 40 MG capsule Take 1 capsule by mouth Daily. 90 capsule 3   • atorvastatin (LIPITOR) 10 MG tablet TAKE 1 TABLET BY MOUTH ONCE DAILY AT NIGHT 90 tablet 0   • budesonide (PULMICORT) 1 MG/2ML nebulizer solution INHALE 1 VIAL IN NEBULIZER TWICE DAILY 120 mL 0   • EQ Allergy Relief 10 MG tablet Take 1 tablet by mouth once daily 90 tablet 0   • furosemide (LASIX) 20 MG tablet Take 1 tablet by mouth once daily 90 tablet 0   • glyburide (DIAbeta) 2.5 MG tablet Take 1 tablet by mouth Daily With Breakfast. 30 tablet 5   •  ondansetron (Zofran) 4 MG tablet Take 1 tablet by mouth Every 8 (Eight) Hours As Needed for Nausea or Vomiting. 30 tablet 0   • potassium chloride (K-DUR,KLOR-CON) 10 MEQ CR tablet Take 1 tablet by mouth once daily 90 tablet 0   • SITagliptin (Januvia) 100 MG tablet Take 1 tablet by mouth Daily. 30 tablet 5   • vitamin D (ERGOCALCIFEROL) 1.25 MG (90101 UT) capsule capsule Take 1 capsule by mouth once a week 12 capsule 0   • montelukast (SINGULAIR) 10 MG tablet        Facility-Administered Medications Prior to Visit   Medication Dose Route Frequency Provider Last Rate Last Admin   • cyanocobalamin injection 1,000 mcg  1,000 mcg Intramuscular Q28 Days Camryn Mota PA   1,000 mcg at 09/30/21 1200   • triamcinolone acetonide (KENALOG-40) injection 40 mg  40 mg Intramuscular Once Darwin Lind MD           No opioid medication identified on active medication list. I have reviewed chart for other potential  high risk medication/s and harmful drug interactions in the elderly.          Aspirin is on active medication list. Aspirin use is indicated based on review of current medical condition/s. Pros and cons of this therapy have been discussed today. Benefits of this medication outweigh potential harm.  Patient has been encouraged to continue taking this medication.  .      Patient Active Problem List   Diagnosis   • DM type 2 with diabetic mixed hyperlipidemia (HCC)   • Mixed hyperlipidemia   • Essential hypertension   • Senile osteoporosis   • Chronic allergic rhinitis   • VISHAL (generalized anxiety disorder)   • PAD (peripheral artery disease) ([facility])   • Morbidly obese ([facility])   • COPD mixed type ([facility])   • Former smoker   • Type 2 diabetes mellitus with hyperglycemia, without long-term current use of insulin ([facility])   • Primary osteoarthritis of both knees   • Coronary artery calcification seen on CT scan     Advance Care Planning  Advance Directive is not on file.  ACP discussion was held with the patient  "during this visit. Patient does not have an advance directive, information provided.          Objective    Vitals:    12/14/21 1058   BP: 146/84   Pulse: 90   Temp: 96.8 °F (36 °C)   TempSrc: Temporal   SpO2: 97%   Weight: 94.8 kg (209 lb)   Height: 160 cm (63\")   PainSc: 0-No pain     BMI Readings from Last 1 Encounters:   12/14/21 37.02 kg/m²   BMI is above normal parameters. Recommendations include: educational material    Does the patient have evidence of cognitive impairment? No    Physical Exam  Vitals and nursing note reviewed.   Constitutional:       Appearance: Normal appearance. She is well-developed. She is obese.   HENT:      Head: Normocephalic and atraumatic.      Right Ear: Tympanic membrane normal.      Left Ear: Tympanic membrane normal.      Nose: Nose normal.      Mouth/Throat:      Mouth: Mucous membranes are moist.      Pharynx: No oropharyngeal exudate or posterior oropharyngeal erythema.   Eyes:      Extraocular Movements: Extraocular movements intact.      Conjunctiva/sclera: Conjunctivae normal.   Neck:      Thyroid: No thyromegaly.      Trachea: No tracheal deviation.   Cardiovascular:      Rate and Rhythm: Normal rate and regular rhythm.      Heart sounds: Normal heart sounds. No murmur heard.      Pulmonary:      Effort: Pulmonary effort is normal. No respiratory distress.      Breath sounds: Normal breath sounds. No wheezing.   Abdominal:      General: Bowel sounds are normal.      Palpations: Abdomen is soft.      Tenderness: There is no abdominal tenderness. There is no guarding.   Musculoskeletal:         General: No tenderness. Normal range of motion.      Cervical back: Normal range of motion and neck supple.   Lymphadenopathy:      Cervical: No cervical adenopathy.   Skin:     General: Skin is warm and dry.      Findings: No rash.   Neurological:      General: No focal deficit present.      Mental Status: She is alert and oriented to person, place, and time.   Psychiatric:         " Mood and Affect: Mood normal.         Behavior: Behavior normal.         Thought Content: Thought content normal.       Lab Results   Component Value Date    HGBA1C 8.1 (H) 2021     Lab Results   Component Value Date    GLUCOSE 248 (H) 2021    BUN 25 2021    CREATININE 0.96 2021    EGFRIFNONA 58 (L) 2021    EGFRIFAFRI 67 2021    BCR 26 2021    K 4.4 2021    CO2 18 (L) 2021    CALCIUM 9.7 2021    PROTENTOTREF 7.1 2021    ALBUMIN 4.7 2021    LABIL2 2.0 2021    AST 23 2021    ALT 20 2021     Lab Results   Component Value Date    CHLPL 125 2021    CHLPL 128 2021    CHLPL 139 10/08/2020     Lab Results   Component Value Date    TRIG 183 (H) 2021    TRIG 242 (H) 2021    TRIG 276 (H) 10/08/2020     Lab Results   Component Value Date    HDL 46 2021    HDL 47 2021    HDL 48 10/08/2020     Lab Results   Component Value Date    LDL 49 2021    LDL 43 2021    LDL 49 10/08/2020              HEALTH RISK ASSESSMENT    Smoking Status:  Social History     Tobacco Use   Smoking Status Former Smoker   • Types: Cigarettes   • Quit date: 2015   • Years since quittin.5   Smokeless Tobacco Never Used     Alcohol Consumption:  Social History     Substance and Sexual Activity   Alcohol Use No     Fall Risk Screen:    STEADI Fall Risk Assessment was completed, and patient is at MODERATE risk for falls. Assessment completed on:2021    Depression Screening:  PHQ-2/PHQ-9 Depression Screening 2021   Little interest or pleasure in doing things 0   Feeling down, depressed, or hopeless 0   Trouble falling or staying asleep, or sleeping too much -   Feeling tired or having little energy -   Poor appetite or overeating -   Feeling bad about yourself - or that you are a failure or have let yourself or your family down -   Trouble concentrating on things, such as reading the newspaper or watching  television -   Moving or speaking so slowly that other people could have noticed. Or the opposite - being so fidgety or restless that you have been moving around a lot more than usual -   Thoughts that you would be better off dead, or of hurting yourself in some way -   Total Score 0   If you checked off any problems, how difficult have these problems made it for you to do your work, take care of things at home, or get along with other people? -       Health Habits and Functional and Cognitive Screening:  Functional & Cognitive Status 12/14/2021   Do you have difficulty preparing food and eating? No   Do you have difficulty bathing yourself, getting dressed or grooming yourself? No   Do you have difficulty using the toilet? No   Do you have difficulty moving around from place to place? No   Do you have trouble with steps or getting out of a bed or a chair? No   Current Diet Well Balanced Diet   Dental Exam Not up to date   Eye Exam Up to date   Exercise (times per week) 4 times per week   Current Exercises Include House Cleaning;Walking   Current Exercise Activities Include -   Do you need help using the phone?  No   Are you deaf or do you have serious difficulty hearing?  No   Do you need help with transportation? No   Do you need help shopping? No   Do you need help preparing meals?  No   Do you need help with housework?  No   Do you need help with laundry? No   Do you need help taking your medications? No   Do you need help managing money? No   Do you ever drive or ride in a car without wearing a seat belt? No   Have you felt unusual stress, anger or loneliness in the last month? No   Who do you live with? Alone   If you need help, do you have trouble finding someone available to you? No   Have you been bothered in the last four weeks by sexual problems? No   Do you have difficulty concentrating, remembering or making decisions? No       Age-appropriate Screening Schedule:  Refer to the list below for future  screening recommendations based on patient's age, sex and/or medical conditions. Orders for these recommended tests are listed in the plan section. The patient has been provided with a written plan.    Health Maintenance   Topic Date Due   • MAMMOGRAM  11/05/2020   • DXA SCAN  12/29/2020   • ZOSTER VACCINE (1 of 2) 12/14/2021 (Originally 6/5/1995)   • DIABETIC EYE EXAM  02/08/2022   • HEMOGLOBIN A1C  03/30/2022   • LIPID PANEL  05/25/2022   • URINE MICROALBUMIN  09/30/2022   • DIABETIC FOOT EXAM  12/14/2022   • TDAP/TD VACCINES (3 - Td or Tdap) 07/09/2028   • INFLUENZA VACCINE  Completed     Hearing acuity:  Whisper test  Greater than 5 feet left ear  Greater than 5 feet right ear  Patient states her family does think she turns the TV up too loud but she is declining referral to ENT or audiology at this time.         Assessment/Plan   CMS Preventative Services Quick Reference  Risk Factors Identified During Encounter  Cardiovascular Disease  Depression/Dysphoria  Fall Risk-High or Moderate  Hearing Problem  Obesity/Overweight   Polypharmacy  The above risks/problems have been discussed with the patient.  Follow up actions/plans if indicated are seen below in the Assessment/Plan Section.  Pertinent information has been shared with the patient in the After Visit Summary.    Diagnoses and all orders for this visit:    1. Medicare annual wellness visit, subsequent (Primary)  Comments:  Performed and documented today    2. Menopause  -     DEXA Bone Density Axial; Future    3. Encounter for screening mammogram for malignant neoplasm of breast  -     Mammo Screening Digital Tomosynthesis Bilateral With CAD; Future    4. DM type 2 with diabetic mixed hyperlipidemia (HCC)  Comments:  Continue Jardiance and glyburide  Advised low carbohydrate diabetic diet  Orders:  -     Ambulatory Referral to Podiatry  -     Comprehensive Metabolic Panel; Future  -     Hemoglobin A1c; Future  -     MicroAlbumin, Urine, Random - Urine,  Clean Catch; Future    5. Mixed hyperlipidemia  Comments:  Continue atorvastatin  Advised low-cholesterol diet  Orders:  -     atorvastatin (LIPITOR) 10 MG tablet; Take 1 tablet by mouth Every Night.  Dispense: 90 tablet; Refill: 3  -     Lipid Panel; Future    6. Non-seasonal allergic rhinitis due to pollen  Comments:  Continue Singulair and Claritin  Orders:  -     loratadine (EQ Allergy Relief) 10 MG tablet; Take 1 tablet by mouth Daily.  Dispense: 90 tablet; Refill: 3    7. Essential hypertension  Comments:  Continue Lotrel 10/40 mg daily, Lasix and potassium every morning  Advised daily blood pressure and pulse log  Orders:  -     furosemide (LASIX) 20 MG tablet; Take 1 tablet by mouth Daily.  Dispense: 90 tablet; Refill: 3  -     Comprehensive Metabolic Panel; Future  -     potassium chloride (K-DUR,KLOR-CON) 10 MEQ CR tablet; Take 1 tablet by mouth Daily.  Dispense: 90 tablet; Refill: 3    8. Type 2 diabetes mellitus with hyperglycemia, without long-term current use of insulin ([facility])  Comments:  start glyburize  continue jardiance  advised low carb diabetic diet  Orders:  -     glyburide (DIAbeta) 2.5 MG tablet; Take 1 tablet by mouth Daily With Breakfast.  Dispense: 90 tablet; Refill: 3  -     SITagliptin (Januvia) 100 MG tablet; Take 1 tablet by mouth Daily.  Dispense: 90 tablet; Refill: 3  -     Comprehensive Metabolic Panel; Future    9. Vitamin D deficiency  Comments:  Continue vitamin D supplementation  Orders:  -     vitamin D (ERGOCALCIFEROL) 1.25 MG (01523 UT) capsule capsule; Take 1 capsule by mouth 1 (One) Time Per Week.  Dispense: 12 capsule; Refill: 3  -     Vitamin D 25 Hydroxy; Future    10. B12 deficiency  -     cyanocobalamin injection 1,000 mcg  -     CBC & Differential; Future    11. Coronary artery calcification seen on CT scan  Comments:  Continue risk factor modification including atorvastatin and Plavix    12. PAD (peripheral artery disease) ([facility])  Comments:  Preventative measures  advised including continuation of Plavix and atorvastatin    13. Morbidly obese (HCC)  Comments:  Advised 30 minutes CV activity daily as tolerated and low carbohydrate diabetic diet    14. VISHAL (generalized anxiety disorder)  Comments:  Continue Wellbutrin    15. Primary osteoarthritis of both knees  Comments:  Conservative measures advised including daily stretching    16. Senile osteoporosis  Comments:  Fall risk precautions advised  Advised calcium and vitamin D supplementation    17. COPD mixed type (HCC)  Comments:  Continue albuterol as needed        Follow Up:   Return in about 3 months (around 3/14/2022) for Followup with Carmella Cowan just prior to return.     An After Visit Summary and PPPS were made available to the patient.

## 2021-12-29 ENCOUNTER — LAB (OUTPATIENT)
Dept: FAMILY MEDICINE CLINIC | Facility: CLINIC | Age: 76
End: 2021-12-29

## 2021-12-29 DIAGNOSIS — R05.9 COUGH: Primary | ICD-10-CM

## 2021-12-29 DIAGNOSIS — U07.1 COVID-19: ICD-10-CM

## 2021-12-29 PROCEDURE — 0202U NFCT DS 22 TRGT SARS-COV-2: CPT | Performed by: NURSE PRACTITIONER

## 2022-01-12 DIAGNOSIS — E11.65 TYPE 2 DIABETES MELLITUS WITH HYPERGLYCEMIA, WITHOUT LONG-TERM CURRENT USE OF INSULIN: Chronic | ICD-10-CM

## 2022-01-13 RX ORDER — GLYBURIDE 2.5 MG/1
TABLET ORAL
Qty: 90 TABLET | Refills: 0 | Status: SHIPPED | OUTPATIENT
Start: 2022-01-13 | End: 2022-03-14 | Stop reason: SDUPTHER

## 2022-02-07 DIAGNOSIS — I10 ESSENTIAL HYPERTENSION: Chronic | ICD-10-CM

## 2022-02-07 RX ORDER — FUROSEMIDE 20 MG/1
TABLET ORAL
Qty: 90 TABLET | Refills: 0 | Status: SHIPPED | OUTPATIENT
Start: 2022-02-07 | End: 2022-03-14 | Stop reason: SDUPTHER

## 2022-02-14 ENCOUNTER — HOSPITAL ENCOUNTER (OUTPATIENT)
Dept: MAMMOGRAPHY | Facility: HOSPITAL | Age: 77
Discharge: HOME | End: 2022-02-14

## 2022-02-14 ENCOUNTER — HOSPITAL ENCOUNTER (OUTPATIENT)
Dept: BONE DENSITY | Facility: HOSPITAL | Age: 77
Discharge: HOME | End: 2022-02-14

## 2022-02-14 DIAGNOSIS — Z12.31 ENCOUNTER FOR SCREENING MAMMOGRAM FOR MALIGNANT NEOPLASM OF BREAST: ICD-10-CM

## 2022-02-14 DIAGNOSIS — Z78.0 MENOPAUSE: ICD-10-CM

## 2022-02-14 PROCEDURE — 77063 BREAST TOMOSYNTHESIS BI: CPT

## 2022-02-14 PROCEDURE — 77063 BREAST TOMOSYNTHESIS BI: CPT | Performed by: RADIOLOGY

## 2022-02-14 PROCEDURE — 77080 DXA BONE DENSITY AXIAL: CPT | Performed by: RADIOLOGY

## 2022-02-14 PROCEDURE — 77067 SCR MAMMO BI INCL CAD: CPT

## 2022-02-14 PROCEDURE — 77080 DXA BONE DENSITY AXIAL: CPT

## 2022-02-14 PROCEDURE — 77067 SCR MAMMO BI INCL CAD: CPT | Performed by: RADIOLOGY

## 2022-02-21 DIAGNOSIS — M85.80 OSTEOPENIA, UNSPECIFIED LOCATION: Primary | ICD-10-CM

## 2022-02-21 RX ORDER — RALOXIFENE HYDROCHLORIDE 60 MG/1
60 TABLET, FILM COATED ORAL DAILY
Qty: 30 TABLET | Refills: 5 | Status: SHIPPED | OUTPATIENT
Start: 2022-02-21 | End: 2022-08-22

## 2022-02-24 ENCOUNTER — APPOINTMENT (OUTPATIENT)
Dept: MAMMOGRAPHY | Facility: HOSPITAL | Age: 77
End: 2022-02-24

## 2022-02-24 ENCOUNTER — APPOINTMENT (OUTPATIENT)
Dept: ULTRASOUND IMAGING | Facility: HOSPITAL | Age: 77
End: 2022-02-24

## 2022-03-03 ENCOUNTER — OFFICE VISIT (OUTPATIENT)
Dept: FAMILY MEDICINE CLINIC | Facility: CLINIC | Age: 77
End: 2022-03-03

## 2022-03-03 VITALS
HEART RATE: 102 BPM | BODY MASS INDEX: 37.56 KG/M2 | SYSTOLIC BLOOD PRESSURE: 144 MMHG | WEIGHT: 212 LBS | HEIGHT: 63 IN | DIASTOLIC BLOOD PRESSURE: 86 MMHG | OXYGEN SATURATION: 93 % | TEMPERATURE: 97 F

## 2022-03-03 DIAGNOSIS — M79.89 SWELLING OF LEFT HAND: Primary | ICD-10-CM

## 2022-03-03 DIAGNOSIS — M79.642 LEFT HAND PAIN: ICD-10-CM

## 2022-03-03 PROCEDURE — 96372 THER/PROPH/DIAG INJ SC/IM: CPT | Performed by: NURSE PRACTITIONER

## 2022-03-03 PROCEDURE — 99213 OFFICE O/P EST LOW 20 MIN: CPT | Performed by: NURSE PRACTITIONER

## 2022-03-03 RX ORDER — DEXAMETHASONE SODIUM PHOSPHATE 4 MG/ML
8 INJECTION, SOLUTION INTRA-ARTICULAR; INTRALESIONAL; INTRAMUSCULAR; INTRAVENOUS; SOFT TISSUE ONCE
Status: CANCELLED | OUTPATIENT
Start: 2022-03-03

## 2022-03-03 RX ORDER — DEXAMETHASONE SODIUM PHOSPHATE 4 MG/ML
8 INJECTION, SOLUTION INTRA-ARTICULAR; INTRALESIONAL; INTRAMUSCULAR; INTRAVENOUS; SOFT TISSUE ONCE
Status: COMPLETED | OUTPATIENT
Start: 2022-03-03 | End: 2022-03-03

## 2022-03-03 RX ADMIN — DEXAMETHASONE SODIUM PHOSPHATE 8 MG: 4 INJECTION, SOLUTION INTRA-ARTICULAR; INTRALESIONAL; INTRAMUSCULAR; INTRAVENOUS; SOFT TISSUE at 10:03

## 2022-03-03 NOTE — PROGRESS NOTES
Injection  Injection performed in left dorsogluteal by Shakila Goldman MA. Patient tolerated the procedure well without complications.  03/03/22   Shakila Goldman MA

## 2022-03-03 NOTE — PROGRESS NOTES
Chief Complaint  Hand Pain    Subjective          Evie Shah is a 76 y.o. female who presents today to [facility] for initial evaluation for swelling and pain in left hand.    HPI:   Patient reports yesterday morning she woke up with severe left hand pain and tenderness. She states that the pain is constant and throbbing. She denies any known trauma or injury. Patient states that she has a history of OA but not gout which is what her daughter is concerned about it being. Patient states over the past week she has consumed pork, spaghetti, and yesterday chicken. She has taken tylenol for the pain but has gotten little relief, the only pain relief was after soaking in warm water.         Objective     Problem List:  Patient Active Problem List   Diagnosis   • DM type 2 with diabetic mixed hyperlipidemia ([facility])   • Mixed hyperlipidemia   • Essential hypertension   • Senile osteoporosis   • Chronic allergic rhinitis   • VISHAL (generalized anxiety disorder)   • PAD (peripheral artery disease) ([facility])   • Morbidly obese ([facility])   • COPD mixed type ([facility])   • Former smoker   • Type 2 diabetes mellitus with hyperglycemia, without long-term current use of insulin ([facility])   • Primary osteoarthritis of both knees   • Coronary artery calcification seen on CT scan       Allergy:   Allergies   Allergen Reactions   • Codeine Shortness Of Breath        Discontinued Medications:  There are no discontinued medications.    Current Medications:   Current Outpatient Medications   Medication Sig Dispense Refill   • Accu-Chek Guide test strip      • albuterol sulfate  (90 Base) MCG/ACT inhaler Inhale 2 puffs Every 4 (Four) Hours As Needed for Shortness of Air. 18 g 5   • amLODIPine-benazepril (LOTREL) 10-40 MG per capsule Take 1 capsule by mouth Daily. 90 capsule 3   • aspirin 81 MG EC tablet Take 1 tablet by mouth Daily. 90 tablet 3   • atorvastatin (LIPITOR) 10 MG tablet Take 1 tablet by  mouth Every Night. 90 tablet 3   • Blood Glucose Monitoring Suppl misc 1 each 3 (Three) Times a Day. 1 each 0   • buPROPion SR (WELLBUTRIN SR) 150 MG 12 hr tablet Take 1 tablet by mouth Every 12 (Twelve) Hours. 180 tablet 3   • clopidogrel (PLAVIX) 75 MG tablet Take 1 tablet by mouth Daily. 90 tablet 3   • Empagliflozin (Jardiance) 25 MG tablet Take 25 mg by mouth Daily. 90 tablet 3   • furosemide (LASIX) 20 MG tablet Take 1 tablet by mouth once daily 90 tablet 0   • glucose blood test strip 1 each by Other route 3 (Three) Times a Day. Use as instructed 100 each 11   • glyburide (DIAbeta) 2.5 MG tablet Take 1 tablet by mouth once daily with breakfast 90 tablet 0   • Lancets 30G misc 1 each Daily. 100 each 11   • lidocaine-prilocaine (EMLA) 2.5-2.5 % cream APPLY 1-2 GRAMS (1-2 PUMPS) TO AFFECTED AREA 3-4 TIMES DAILY 240 g 5   • loratadine (EQ Allergy Relief) 10 MG tablet Take 1 tablet by mouth Daily. 90 tablet 3   • montelukast (SINGULAIR) 10 MG tablet Take 1 tablet by mouth Every Night. 90 tablet 3   • O2 (OXYGEN) Inhale 2 L/min Every Night.     • omega-3 acid ethyl esters (LOVAZA) 1 g capsule Take 2 capsules by mouth 2 (Two) Times a Day. 360 capsule 3   • omeprazole (priLOSEC) 40 MG capsule Take 1 capsule by mouth Daily. 90 capsule 3   • potassium chloride (K-DUR,KLOR-CON) 10 MEQ CR tablet Take 1 tablet by mouth Daily. 90 tablet 3   • raloxifene (EVISTA) 60 MG tablet Take 1 tablet by mouth Daily. 30 tablet 5   • SITagliptin (Januvia) 100 MG tablet Take 1 tablet by mouth Daily. 90 tablet 3   • vitamin D (ERGOCALCIFEROL) 1.25 MG (38834 UT) capsule capsule Take 1 capsule by mouth 1 (One) Time Per Week. 12 capsule 3     Current Facility-Administered Medications   Medication Dose Route Frequency Provider Last Rate Last Admin   • cyanocobalamin injection 1,000 mcg  1,000 mcg Intramuscular Q28 Days Camryn Mota PA   1,000 mcg at 09/30/21 1200   • triamcinolone acetonide (KENALOG-40) injection 40 mg  40 mg  Intramuscular Once Darwin Lind MD           Past Medical History:  Past Medical History:   Diagnosis Date   • Allergic rhinitis    • Asthma    • Atherosclerosis    • COPD (chronic obstructive pulmonary disease) (HCC)    • Cough    • Diabetes mellitus (HCC)    • Hyperlipidemia    • Hypertension    • Menopause    • Nausea and vomiting    • Obesity 3/7/2017   • Osteoarthritis    • Osteoporosis    • Vertigo, benign paroxysmal        Past Surgical History:  Past Surgical History:   Procedure Laterality Date   • CATARACT EXTRACTION     • COLONOSCOPY     • EYE SURGERY      catarac   • ILIAC ARTERY STENT  02/25/2015   • SKIN CANCER EXCISION      nose   • TONSILLECTOMY      and adenoidectomy   • TUBAL ABDOMINAL LIGATION         Review of Systems:  Review of Systems   Constitutional: Negative.    HENT: Negative.    Eyes: Negative.    Respiratory: Negative.    Cardiovascular: Negative.    Gastrointestinal: Negative.    Genitourinary: Negative.    Musculoskeletal: Positive for arthralgias (left hand pain).   Skin: Negative.    Neurological: Negative.    Psychiatric/Behavioral: Negative.    All other systems reviewed and are negative.      Physical Exam:  Physical Exam  Vitals and nursing note reviewed.   Constitutional:       General: She is not in acute distress.     Appearance: Normal appearance. She is not ill-appearing.      Interventions: Face mask in place.   HENT:      Head: Normocephalic and atraumatic.   Cardiovascular:      Pulses: Normal pulses.   Pulmonary:      Effort: Pulmonary effort is normal. No respiratory distress.   Musculoskeletal:         General: Normal range of motion.      Left hand: Swelling and tenderness present.   Skin:     General: Skin is warm and dry.   Neurological:      Mental Status: She is alert and oriented to person, place, and time.   Psychiatric:         Mood and Affect: Mood normal.         Behavior: Behavior normal.         Vital Signs:   /86 (BP Location: Right arm,  "Patient Position: Sitting)   Pulse 102   Temp 97 °F (36.1 °C)   Ht 160 cm (63\")   Wt 96.2 kg (212 lb)   SpO2 93%   BMI 37.55 kg/m²      Lab Results:   Lab on 12/29/2021   Component Date Value Ref Range Status   • ADENOVIRUS, PCR 12/29/2021 Not Detected  Not Detected Final   • Coronavirus 229E 12/29/2021 Not Detected  Not Detected Final   • Coronavirus HKU1 12/29/2021 Not Detected  Not Detected Final   • Coronavirus NL63 12/29/2021 Not Detected  Not Detected Final   • Coronavirus OC43 12/29/2021 Not Detected  Not Detected Final   • COVID19 12/29/2021 Not Detected  Not Detected - Ref. Range Final   • Human Metapneumovirus 12/29/2021 Not Detected  Not Detected Final   • Human Rhinovirus/Enterovirus 12/29/2021 Not Detected  Not Detected Final   • Influenza A PCR 12/29/2021 Not Detected  Not Detected Final   • Influenza B PCR 12/29/2021 Not Detected  Not Detected Final   • Parainfluenza Virus 1 12/29/2021 Not Detected  Not Detected Final   • Parainfluenza Virus 2 12/29/2021 Not Detected  Not Detected Final   • Parainfluenza Virus 3 12/29/2021 Not Detected  Not Detected Final   • Parainfluenza Virus 4 12/29/2021 Not Detected  Not Detected Final   • RSV, PCR 12/29/2021 Not Detected  Not Detected Final   • Bordetella pertussis pcr 12/29/2021 Not Detected  Not Detected Final   • Bordetella parapertussis PCR 12/29/2021 Not Detected  Not Detected Final   • Chlamydophila pneumoniae PCR 12/29/2021 Not Detected  Not Detected Final   • Mycoplasma pneumo by PCR 12/29/2021 Not Detected  Not Detected Final   Office Visit on 09/30/2021   Component Date Value Ref Range Status   • WBC 09/30/2021 9.51  3.40 - 10.80 10*3/mm3 Final   • RBC 09/30/2021 5.32* 3.77 - 5.28 10*6/mm3 Final   • Hemoglobin 09/30/2021 15.5  12.0 - 15.9 g/dL Final   • Hematocrit 09/30/2021 49.3* 34.0 - 46.6 % Final   • MCV 09/30/2021 92.7  79.0 - 97.0 fL Final   • MCH 09/30/2021 29.1  26.6 - 33.0 pg Final   • MCHC 09/30/2021 31.4* 31.5 - 35.7 g/dL Final   • RDW " 09/30/2021 13.3  12.3 - 15.4 % Final   • RDW-SD 09/30/2021 45.7  37.0 - 54.0 fl Final   • MPV 09/30/2021 10.1  6.0 - 12.0 fL Final   • Platelets 09/30/2021 228  140 - 450 10*3/mm3 Final   • Neutrophil % 09/30/2021 59.0  42.7 - 76.0 % Final   • Lymphocyte % 09/30/2021 29.2  19.6 - 45.3 % Final   • Monocyte % 09/30/2021 8.2  5.0 - 12.0 % Final   • Eosinophil % 09/30/2021 2.5  0.3 - 6.2 % Final   • Basophil % 09/30/2021 0.6  0.0 - 1.5 % Final   • Immature Grans % 09/30/2021 0.5  0.0 - 0.5 % Final   • Neutrophils, Absolute 09/30/2021 5.60  1.70 - 7.00 10*3/mm3 Final   • Lymphocytes, Absolute 09/30/2021 2.78  0.70 - 3.10 10*3/mm3 Final   • Monocytes, Absolute 09/30/2021 0.78  0.10 - 0.90 10*3/mm3 Final   • Eosinophils, Absolute 09/30/2021 0.24  0.00 - 0.40 10*3/mm3 Final   • Basophils, Absolute 09/30/2021 0.06  0.00 - 0.20 10*3/mm3 Final   • Immature Grans, Absolute 09/30/2021 0.05  0.00 - 0.05 10*3/mm3 Final   • nRBC 09/30/2021 0.0  0.0 - 0.2 /100 WBC Final   • Hemoglobin A1C 09/30/2021 8.1* 4.8 - 5.6 % Final    Comment:          Prediabetes: 5.7 - 6.4           Diabetes: >6.4           Glycemic control for adults with diabetes: <7.0     • Microalbumin, Urine 09/30/2021 <3.0  Not Estab. ug/mL Final    **Verified by repeat analysis**   • Creatine Kinase 09/30/2021 66  32 - 182 U/L Final   • ANDREA Direct 09/30/2021 Negative  Negative Final   • RA Latex Turbid 09/30/2021 <10.0  0.0 - 13.9 IU/mL Final   • Glucose 09/30/2021 248* 65 - 99 mg/dL Final   • BUN 09/30/2021 25  8 - 27 mg/dL Final   • Creatinine 09/30/2021 0.96  0.57 - 1.00 mg/dL Final   • eGFR Non  Am 09/30/2021 58* >59 mL/min/1.73 Final   • eGFR African Am 09/30/2021 67  >59 mL/min/1.73 Final    Comment: **[facility] currently reports eGFR in compliance with the current**    recommendations of the [employer] Foundation. [facility] will    update reporting as new guidelines are published from the NKF-ASN    Task force.     • BUN/Creatinine Ratio 09/30/2021 26   12 - 28 Final   • Sodium 09/30/2021 139  134 - 144 mmol/L Final   • Potassium 09/30/2021 4.4  3.5 - 5.2 mmol/L Final   • Chloride 09/30/2021 101  96 - 106 mmol/L Final   • Total CO2 09/30/2021 18* 20 - 29 mmol/L Final   • Calcium 09/30/2021 9.7  8.7 - 10.3 mg/dL Final   • Total Protein 09/30/2021 7.1  6.0 - 8.5 g/dL Final   • Albumin 09/30/2021 4.7  3.7 - 4.7 g/dL Final   • Globulin 09/30/2021 2.4  1.5 - 4.5 g/dL Final   • A/G Ratio 09/30/2021 2.0  1.2 - 2.2 Final   • Total Bilirubin 09/30/2021 0.4  0.0 - 1.2 mg/dL Final   • Alkaline Phosphatase 09/30/2021 113  44 - 121 IU/L Final                  **Please note reference interval change**   • AST (SGOT) 09/30/2021 23  0 - 40 IU/L Final   • ALT (SGPT) 09/30/2021 20  0 - 32 IU/L Final       EKG Results:  No orders to display       Imaging Results:  DEXA Bone Density Axial    Result Date: 2/14/2022    Osteopenic  This report was finalized on 2/14/2022 12:51 PM by Dr. Abhilash Mcnair MD.      Mammo Screening Digital Tomosynthesis Bilateral With CAD    Result Date: 2/14/2022    Slightly increased retroareolar focal asymmetry measuring about 7 mm in the inferior right breast measuring about 3 cm for an nipple. Recommend spot compression imaging in this region.  RECOMMENDATIONS:   Slightly increased retroareolar focal asymmetry measuring about 7 mm in the inferior right breast measuring about 3 cm for an nipple. Recommend spot compression imaging in this region.  ASSESSMENT:   BI-RADS 0: Incomplete - Need additional imaging evaluation  This report was finalized on 2/14/2022 1:06 PM by Dr. Abhilash Mcnair MD.                  Assessment and Plan   Diagnoses and all orders for this visit:    1. Swelling of left hand (Primary)  Comments:  possibly a gout flare up considering diet consumed this week  8 mg dexa IM today  patient advised to avoid purines/foods that cause gout  follow up as needed    2. Left hand pain  -     dexamethasone (DECADRON) injection 8 mg        Meds ordered  during this visit:  New Medications Ordered This Visit   Medications   • dexamethasone (DECADRON) injection 8 mg       Patient Instructions:  Patient instructions given for the following visit diagnosis:    ICD-10-CM ICD-9-CM   1. Swelling of left hand  M79.89 729.81   2. Left hand pain  M79.642 729.5       Follow Up   Return for Next scheduled follow up.        This document has been electronically signed by JOAQUINA Montemayor  March 3, 2022 10:09 EST    Patient was given instructions and counseling regarding her condition or for health maintenance advice. Please see specific information pulled into the AVS if appropriate.     Part of this note may be an electronic transcription/translation of spoken language to printed text using the Dragon Dictation System.

## 2022-03-07 ENCOUNTER — LAB (OUTPATIENT)
Dept: FAMILY MEDICINE CLINIC | Facility: CLINIC | Age: 77
End: 2022-03-07

## 2022-03-07 DIAGNOSIS — E55.9 VITAMIN D DEFICIENCY: Chronic | ICD-10-CM

## 2022-03-07 DIAGNOSIS — I10 ESSENTIAL HYPERTENSION: Chronic | ICD-10-CM

## 2022-03-07 DIAGNOSIS — E11.65 TYPE 2 DIABETES MELLITUS WITH HYPERGLYCEMIA, WITHOUT LONG-TERM CURRENT USE OF INSULIN: Chronic | ICD-10-CM

## 2022-03-07 DIAGNOSIS — E11.69 DM TYPE 2 WITH DIABETIC MIXED HYPERLIPIDEMIA: Chronic | ICD-10-CM

## 2022-03-07 DIAGNOSIS — E78.2 DM TYPE 2 WITH DIABETIC MIXED HYPERLIPIDEMIA: Chronic | ICD-10-CM

## 2022-03-07 LAB
25(OH)D3+25(OH)D2 SERPL-MCNC: 35.8 NG/ML (ref 30–100)
ALBUMIN SERPL-MCNC: 4.5 G/DL (ref 3.5–5.2)
ALBUMIN/GLOB SERPL: 1.9 G/DL
ALP SERPL-CCNC: 110 U/L (ref 39–117)
ALT SERPL-CCNC: 17 U/L (ref 1–33)
AST SERPL-CCNC: 14 U/L (ref 1–32)
BASOPHILS # BLD AUTO: 0.06 10*3/MM3 (ref 0–0.2)
BASOPHILS NFR BLD AUTO: 0.6 % (ref 0–1.5)
BILIRUB SERPL-MCNC: 0.6 MG/DL (ref 0–1.2)
BUN SERPL-MCNC: 21 MG/DL (ref 8–23)
BUN/CREAT SERPL: 24.4 (ref 7–25)
CALCIUM SERPL-MCNC: 9.9 MG/DL (ref 8.6–10.5)
CHLORIDE SERPL-SCNC: 103 MMOL/L (ref 98–107)
CHOLEST SERPL-MCNC: 119 MG/DL (ref 0–200)
CO2 SERPL-SCNC: 20.9 MMOL/L (ref 22–29)
CREAT SERPL-MCNC: 0.86 MG/DL (ref 0.57–1)
EGFR GENE MUT ANL BLD/T: 71 ML/MIN/1.73
EOSINOPHIL # BLD AUTO: 0.23 10*3/MM3 (ref 0–0.4)
EOSINOPHIL NFR BLD AUTO: 2.1 % (ref 0.3–6.2)
ERYTHROCYTE [DISTWIDTH] IN BLOOD BY AUTOMATED COUNT: 13.2 % (ref 12.3–15.4)
GLOBULIN SER CALC-MCNC: 2.4 GM/DL
GLUCOSE SERPL-MCNC: 170 MG/DL (ref 65–99)
HBA1C MFR BLD: 7.8 % (ref 4.8–5.6)
HCT VFR BLD AUTO: 51 % (ref 34–46.6)
HDLC SERPL-MCNC: 47 MG/DL (ref 40–60)
HGB BLD-MCNC: 17 G/DL (ref 12–15.9)
IMM GRANULOCYTES # BLD AUTO: 0.09 10*3/MM3 (ref 0–0.05)
IMM GRANULOCYTES NFR BLD AUTO: 0.8 % (ref 0–0.5)
LDLC SERPL CALC-MCNC: 42 MG/DL (ref 0–100)
LYMPHOCYTES # BLD AUTO: 2.72 10*3/MM3 (ref 0.7–3.1)
LYMPHOCYTES NFR BLD AUTO: 25.2 % (ref 19.6–45.3)
MCH RBC QN AUTO: 30 PG (ref 26.6–33)
MCHC RBC AUTO-ENTMCNC: 33.3 G/DL (ref 31.5–35.7)
MCV RBC AUTO: 89.9 FL (ref 79–97)
MONOCYTES # BLD AUTO: 0.75 10*3/MM3 (ref 0.1–0.9)
MONOCYTES NFR BLD AUTO: 7 % (ref 5–12)
NEUTROPHILS # BLD AUTO: 6.93 10*3/MM3 (ref 1.7–7)
NEUTROPHILS NFR BLD AUTO: 64.3 % (ref 42.7–76)
NRBC BLD AUTO-RTO: 0.1 /100 WBC (ref 0–0.2)
PLATELET # BLD AUTO: 254 10*3/MM3 (ref 140–450)
POTASSIUM SERPL-SCNC: 4.2 MMOL/L (ref 3.5–5.2)
PROT SERPL-MCNC: 6.9 G/DL (ref 6–8.5)
RBC # BLD AUTO: 5.67 10*6/MM3 (ref 3.77–5.28)
SODIUM SERPL-SCNC: 142 MMOL/L (ref 136–145)
TRIGL SERPL-MCNC: 187 MG/DL (ref 0–150)
VLDLC SERPL CALC-MCNC: 30 MG/DL (ref 5–40)
WBC # BLD AUTO: 10.78 10*3/MM3 (ref 3.4–10.8)

## 2022-03-08 LAB — MICROALBUMIN UR-MCNC: <3 UG/ML

## 2022-03-10 ENCOUNTER — HOSPITAL ENCOUNTER (OUTPATIENT)
Dept: ULTRASOUND IMAGING | Facility: HOSPITAL | Age: 77
Discharge: HOME | End: 2022-03-10

## 2022-03-10 ENCOUNTER — HOSPITAL ENCOUNTER (OUTPATIENT)
Dept: MAMMOGRAPHY | Facility: HOSPITAL | Age: 77
Discharge: HOME | End: 2022-03-10

## 2022-03-10 DIAGNOSIS — R92.8 ABNORMAL MAMMOGRAM OF RIGHT BREAST: ICD-10-CM

## 2022-03-10 PROCEDURE — 77065 DX MAMMO INCL CAD UNI: CPT | Performed by: RADIOLOGY

## 2022-03-10 PROCEDURE — G0279 TOMOSYNTHESIS, MAMMO: HCPCS | Performed by: RADIOLOGY

## 2022-03-10 PROCEDURE — G0279 TOMOSYNTHESIS, MAMMO: HCPCS

## 2022-03-10 PROCEDURE — 77065 DX MAMMO INCL CAD UNI: CPT

## 2022-03-14 ENCOUNTER — OFFICE VISIT (OUTPATIENT)
Dept: FAMILY MEDICINE CLINIC | Facility: CLINIC | Age: 77
End: 2022-03-14

## 2022-03-14 VITALS
HEART RATE: 101 BPM | DIASTOLIC BLOOD PRESSURE: 82 MMHG | BODY MASS INDEX: 37.25 KG/M2 | HEIGHT: 63 IN | TEMPERATURE: 97.1 F | OXYGEN SATURATION: 95 % | SYSTOLIC BLOOD PRESSURE: 130 MMHG | WEIGHT: 210.2 LBS

## 2022-03-14 DIAGNOSIS — E78.2 MIXED HYPERLIPIDEMIA: Chronic | ICD-10-CM

## 2022-03-14 DIAGNOSIS — I10 ESSENTIAL HYPERTENSION: Chronic | ICD-10-CM

## 2022-03-14 DIAGNOSIS — R71.8 ELEVATED HEMATOCRIT: ICD-10-CM

## 2022-03-14 DIAGNOSIS — Z12.11 COLON CANCER SCREENING: ICD-10-CM

## 2022-03-14 DIAGNOSIS — E11.65 TYPE 2 DIABETES MELLITUS WITH HYPERGLYCEMIA, WITHOUT LONG-TERM CURRENT USE OF INSULIN: Chronic | ICD-10-CM

## 2022-03-14 DIAGNOSIS — M10.042 ACUTE IDIOPATHIC GOUT OF LEFT HAND: Primary | ICD-10-CM

## 2022-03-14 DIAGNOSIS — E55.9 VITAMIN D DEFICIENCY: Chronic | ICD-10-CM

## 2022-03-14 PROCEDURE — 99214 OFFICE O/P EST MOD 30 MIN: CPT | Performed by: PHYSICIAN ASSISTANT

## 2022-03-14 RX ORDER — FUROSEMIDE 20 MG/1
20 TABLET ORAL DAILY
Qty: 90 TABLET | Refills: 3 | Status: SHIPPED | OUTPATIENT
Start: 2022-03-14

## 2022-03-14 RX ORDER — GLYBURIDE 5 MG/1
5 TABLET ORAL
Qty: 90 TABLET | Refills: 3 | Status: SHIPPED | OUTPATIENT
Start: 2022-03-14 | End: 2022-07-12

## 2022-03-14 RX ORDER — ERGOCALCIFEROL 1.25 MG/1
50000 CAPSULE ORAL WEEKLY
Qty: 12 CAPSULE | Refills: 3 | Status: SHIPPED | OUTPATIENT
Start: 2022-03-14 | End: 2023-02-03

## 2022-03-14 RX ORDER — ATORVASTATIN CALCIUM 10 MG/1
10 TABLET, FILM COATED ORAL NIGHTLY
Qty: 90 TABLET | Refills: 3 | Status: SHIPPED | OUTPATIENT
Start: 2022-03-14

## 2022-03-14 RX ORDER — INDOMETHACIN 50 MG/1
50 CAPSULE ORAL
Qty: 30 CAPSULE | Refills: 0 | Status: SHIPPED | OUTPATIENT
Start: 2022-03-14 | End: 2022-12-09 | Stop reason: SDUPTHER

## 2022-03-14 RX ORDER — GLYBURIDE 2.5 MG/1
2.5 TABLET ORAL
Qty: 90 TABLET | Refills: 3 | Status: SHIPPED | OUTPATIENT
Start: 2022-03-14 | End: 2022-03-14

## 2022-03-16 PROBLEM — M10.042 ACUTE IDIOPATHIC GOUT OF LEFT HAND: Status: ACTIVE | Noted: 2022-03-16

## 2022-03-16 PROBLEM — E55.9 VITAMIN D DEFICIENCY: Chronic | Status: ACTIVE | Noted: 2022-03-16

## 2022-03-16 PROBLEM — R71.8 ELEVATED HEMATOCRIT: Status: ACTIVE | Noted: 2022-03-16

## 2022-03-16 NOTE — PATIENT INSTRUCTIONS
"https://www.diabeteseducator.org/docs/default-source/living-with-diabetes/conquering-the-grocery-store-v1.pdf?sfvrsn=4\">   Carbohydrate Counting for Diabetes Mellitus, Adult  Carbohydrate counting is a method of keeping track of how many carbohydrates you eat. Eating carbohydrates naturally increases the amount of sugar (glucose) in the blood. Counting how many carbohydrates you eat improves your blood glucose control, which helps you manage your diabetes.  It is important to know how many carbohydrates you can safely have in each meal. This is different for every person. A dietitian can help you make a meal plan and calculate how many carbohydrates you should have at each meal and snack.  What foods contain carbohydrates?  Carbohydrates are found in the following foods:  Grains, such as breads and cereals.  Dried beans and soy products.  Starchy vegetables, such as potatoes, peas, and corn.  Fruit and fruit juices.  Milk and yogurt.  Sweets and snack foods, such as cake, cookies, candy, chips, and soft drinks.  How do I count carbohydrates in foods?  There are two ways to count carbohydrates in food. You can read food labels or learn standard serving sizes of foods. You can use either of the methods or a combination of both.  Using the Nutrition Facts label  The Nutrition Facts list is included on the labels of almost all packaged foods and beverages in the [place] It includes:  The serving size.  Information about nutrients in each serving, including the grams (g) of carbohydrate per serving.  To use the Nutrition Facts:  Decide how many servings you will have.  Multiply the number of servings by the number of carbohydrates per serving.  The resulting number is the total amount of carbohydrates that you will be having.  Learning the standard serving sizes of foods  When you eat carbohydrate foods that are not packaged or do not include Nutrition Facts on the label, you need to measure the servings in order to count " the amount of carbohydrates.  Measure the foods that you will eat with a food scale or measuring cup, if needed.  Decide how many standard-size servings you will eat.  Multiply the number of servings by 15. For foods that contain carbohydrates, one serving equals 15 g of carbohydrates.  For example, if you eat 2 cups or 10 oz (300 g) of strawberries, you will have eaten 2 servings and 30 g of carbohydrates (2 servings x 15 g = 30 g).  For foods that have more than one food mixed, such as soups and casseroles, you must count the carbohydrates in each food that is included.  The following list contains standard serving sizes of common carbohydrate-rich foods. Each of these servings has about 15 g of carbohydrates:  1 slice of bread.  1 six-inch (15 cm) tortilla.  ? cup or 2 oz (53 g) cooked rice or pasta.  ½ cup or 3 oz (85 g) cooked or canned, drained and rinsed beans or lentils.  ½ cup or 3 oz (85 g) starchy vegetable, such as peas, corn, or squash.  ½ cup or 4 oz (120 g) hot cereal.  ½ cup or 3 oz (85 g) boiled or mashed potatoes, or ¼ or 3 oz (85 g) of a large baked potato.  ½ cup or 4 fl oz (118 mL) fruit juice.  1 cup or 8 fl oz (237 mL) milk.  1 small or 4 oz (106 g) apple.  ½ or 2 oz (63 g) of a medium banana.  1 cup or 5 oz (150 g) strawberries.  3 cups or 1 oz (24 g) popped popcorn.  What is an example of carbohydrate counting?  To calculate the number of carbohydrates in this sample meal, follow the steps shown below.  Sample meal  3 oz (85 g) chicken breast.  ? cup or 4 oz (106 g) brown rice.  ½ cup or 3 oz (85 g) corn.  1 cup or 8 fl oz (237 mL) milk.  1 cup or 5 oz (150 g) strawberries with sugar-free whipped topping.  Carbohydrate calculation  Identify the foods that contain carbohydrates:  Rice.  Corn.  Milk.  Strawberries.  Calculate how many servings you have of each food:  2 servings rice.  1 serving corn.  1 serving milk.  1 serving strawberries.  Multiply each number of servings by 15   servings rice x 15 g = 30 g.  1 serving corn x 15 g = 15 g.  1 serving milk x 15 g = 15 g.  1 serving strawberries x 15 g = 15 g.  Add together all of the amounts to find the total grams of carbohydrates eaten:  30 g + 15 g + 15 g + 15 g = 75 g of carbohydrates total.  What are tips for following this plan?  Shopping  Develop a meal plan and then make a shopping list.  Buy fresh and frozen vegetables, fresh and frozen fruit, dairy, eggs, beans, lentils, and whole grains.  Look at food labels. Choose foods that have more fiber and less sugar.  Avoid processed foods and foods with added sugars.  Meal planning  Aim to have the same amount of carbohydrates at each meal and for each snack time.  Plan to have regular, balanced meals and snacks.  Where to find more information  American Diabetes Association: www.diabetes.org  Centers for Disease Control and Prevention: www.cdc.gov  Summary  Carbohydrate counting is a method of keeping track of how many carbohydrates you eat.  Eating carbohydrates naturally increases the amount of sugar (glucose) in the blood.  Counting how many carbohydrates you eat improves your blood glucose control, which helps you manage your diabetes.  A dietitian can help you make a meal plan and calculate how many carbohydrates you should have at each meal and snack.  This information is not intended to replace advice given to you by your health care provider. Make sure you discuss any questions you have with your health care provider.  Document Revised: 12/17/2020 Document Reviewed: 12/18/2020  [employer] Patient Education © 2021 [employer]

## 2022-03-16 NOTE — PROGRESS NOTES
"Subjective   Evie Shah is a 76 y.o. female.       Chief Complaint -hand pain    History of Present Illness -    ROS    Hand pain-  Patient complains of sudden onset of erythematous swollen red joints base of left thumb with associated swelling.  Some relief with dexamethasone injection earlier this month for similar symptoms.  She states the steroid helped but then symptoms recurred 2 days ago.    Diabetes mellitus type 2-not at goal with glyburide 2.5 mg, Januvia and Jardiance    Hyperlipidemia-stable with atorvastatin and low-cholesterol diet    Vitamin D deficiency-stable with vitamin D supplementation    Hypertension-controlled with furosemide and Lotrel    Abnormal labs-  We discussed her recent lab work which does show some elevation in hematocrit.  She denies any history of blood disorders or smoking.    The following portions of the patient's history were reviewed and updated as appropriate: allergies, current medications, past family history, past medical history, past social history, past surgical history and problem list.    Review of Systems    Objective  Vital signs:  /82   Pulse 101   Temp 97.1 °F (36.2 °C)   Ht 160 cm (62.99\")   Wt 95.3 kg (210 lb 3.2 oz)   SpO2 95%   BMI 37.24 kg/m²     Physical Exam  Vitals and nursing note reviewed.   Constitutional:       Appearance: Normal appearance. She is well-developed.   Eyes:      Extraocular Movements: Extraocular movements intact.      Conjunctiva/sclera: Conjunctivae normal.   Cardiovascular:      Rate and Rhythm: Normal rate and regular rhythm.      Heart sounds: Normal heart sounds. No murmur heard.  Pulmonary:      Effort: Pulmonary effort is normal. No respiratory distress.      Breath sounds: Normal breath sounds. No wheezing.   Musculoskeletal:         General: Tenderness present.      Comments: Localized erythematous swollen left PIP joint of thumb   Skin:     General: Skin is warm and dry.      Findings: No rash. "   Neurological:      Mental Status: She is alert and oriented to person, place, and time.   Psychiatric:         Mood and Affect: Mood normal.         Behavior: Behavior normal.         Thought Content: Thought content normal.         The following data was reviewed by: RUBI Michael on 03/14/2022:  CMP    CMP 5/25/21 9/30/21 3/7/22   Glucose 160 (A) 248 (A) 170 (A)   BUN 23 25 21   Creatinine 0.84 0.96 0.86   eGFR Non  Am 66 58 (A)    eGFR African Am 81 67    Sodium 143 139 142   Potassium 4.0 4.4 4.2   Chloride 105 101 103   Calcium 9.5 9.7 9.9   Total Protein 6.8 7.1 6.9   Albumin 4.40 4.7 4.50   Globulin 2.4 2.4 2.4   Total Bilirubin 0.5 0.4 0.6   Alkaline Phosphatase 112 113 110   AST (SGOT) 14 23 14   ALT (SGPT) 15 20 17   (A) Abnormal value       Comments are available for some flowsheets but are not being displayed.           CBC w/diff    CBC w/Diff 9/30/21 3/7/22   WBC 9.51 10.78   RBC 5.32 (A) 5.67 (A)   Hemoglobin 15.5 17.0 (A)   Hematocrit 49.3 (A) 51.0 (A)   MCV 92.7 89.9   MCH 29.1 30.0   MCHC 31.4 (A) 33.3   RDW 13.3 13.2   Platelets 228 254   Neutrophil Rel % 59.0 64.3   Immature Granulocyte Rel % 0.5    Lymphocyte Rel % 29.2 25.2   Monocyte Rel % 8.2 7.0   Eosinophil Rel % 2.5 2.1   Basophil Rel % 0.6 0.6   (A) Abnormal value            Lipid Panel    Lipid Panel 5/25/21 3/7/22   Total Cholesterol 125 119   Triglycerides 183 (A) 187 (A)   HDL Cholesterol 46 47   VLDL Cholesterol 30 30   LDL Cholesterol  49 42   (A) Abnormal value       Comments are available for some flowsheets but are not being displayed.               Most Recent A1C    HGBA1C Most Recent 3/7/22   Hemoglobin A1C 7.80 (A)   (A) Abnormal value       Comments are available for some flowsheets but are not being displayed.                  Assessment/Plan     Diagnoses and all orders for this visit:    1. Acute idiopathic gout of left hand (Primary)  -     indomethacin (INDOCIN) 50 MG capsule; Take 1 capsule by mouth 3  (Three) Times a Day With Meals.  Dispense: 30 capsule; Refill: 0    2. Mixed hyperlipidemia  Comments:  Continue atorvastatin  Advised low-cholesterol diet  Orders:  -     atorvastatin (LIPITOR) 10 MG tablet; Take 1 tablet by mouth Every Night.  Dispense: 90 tablet; Refill: 3  -     Lipid Panel; Future    3. Vitamin D deficiency  Comments:  Continue vitamin D supplementation  Orders:  -     vitamin D (ERGOCALCIFEROL) 1.25 MG (87990 UT) capsule capsule; Take 1 capsule by mouth 1 (One) Time Per Week.  Dispense: 12 capsule; Refill: 3    4. Essential hypertension  Comments:  Continue Lotrel 10/40 mg daily, Lasix and potassium every morning  Advised daily blood pressure and pulse log  Orders:  -     furosemide (LASIX) 20 MG tablet; Take 1 tablet by mouth Daily.  Dispense: 90 tablet; Refill: 3    5. Type 2 diabetes mellitus with hyperglycemia, without long-term current use of insulin (HCC)  Comments:  Increase glyburide 5 mg  continue jardiance and Januvia  advised low carb diabetic diet  Orders:  -     Discontinue: glyburide (DIAbeta) 2.5 MG tablet; Take 1 tablet by mouth Daily With Breakfast.  Dispense: 90 tablet; Refill: 3  -     glyburide (DIAbeta) 5 MG tablet; Take 1 tablet by mouth Daily With Breakfast.  Dispense: 90 tablet; Refill: 3  -     Comprehensive Metabolic Panel; Future  -     Hemoglobin A1c; Future  -     MicroAlbumin, Urine, Random - Urine, Clean Catch; Future    6. Colon cancer screening  -     Cologuard - Stool, Per Rectum; Future    7. Elevated hematocrit  Comments:  Refer to hematology for further evaluation  Orders:  -     Ambulatory Referral to Hematology            Patient was given instructions and counseling regarding his condition or for health maintenance advice. Please see specific information pulled into the AVS if appropriate      This document has been electronically signed by:  Camryn Mota PA-C

## 2022-03-24 ENCOUNTER — CONSULT (OUTPATIENT)
Dept: ONCOLOGY | Facility: CLINIC | Age: 77
End: 2022-03-24

## 2022-03-24 VITALS
BODY MASS INDEX: 37.25 KG/M2 | TEMPERATURE: 97.3 F | HEART RATE: 105 BPM | RESPIRATION RATE: 18 BRPM | WEIGHT: 210.2 LBS | SYSTOLIC BLOOD PRESSURE: 172 MMHG | HEIGHT: 63 IN | OXYGEN SATURATION: 90 % | DIASTOLIC BLOOD PRESSURE: 89 MMHG

## 2022-03-24 DIAGNOSIS — R79.89 ELEVATED FERRITIN LEVEL: ICD-10-CM

## 2022-03-24 DIAGNOSIS — R71.8 OTHER ABNORMALITY OF RED BLOOD CELLS: ICD-10-CM

## 2022-03-24 DIAGNOSIS — D75.1 POLYCYTHEMIA: Primary | ICD-10-CM

## 2022-03-24 LAB
BASOPHILS # BLD AUTO: 0.07 10*3/MM3 (ref 0–0.2)
BASOPHILS NFR BLD AUTO: 0.7 % (ref 0–1.5)
DEPRECATED RDW RBC AUTO: 46.5 FL (ref 37–54)
EOSINOPHIL # BLD AUTO: 0.28 10*3/MM3 (ref 0–0.4)
EOSINOPHIL NFR BLD AUTO: 2.8 % (ref 0.3–6.2)
ERYTHROCYTE [DISTWIDTH] IN BLOOD BY AUTOMATED COUNT: 14 % (ref 12.3–15.4)
FERRITIN SERPL-MCNC: 200 NG/ML (ref 13–150)
HCT VFR BLD AUTO: 49.7 % (ref 34–46.6)
HGB BLD-MCNC: 16.2 G/DL (ref 12–15.9)
IMM GRANULOCYTES # BLD AUTO: 0.06 10*3/MM3 (ref 0–0.05)
IMM GRANULOCYTES NFR BLD AUTO: 0.6 % (ref 0–0.5)
IRON 24H UR-MRATE: 56 MCG/DL (ref 37–145)
IRON SATN MFR SERPL: 14 % (ref 20–50)
LYMPHOCYTES # BLD AUTO: 2.31 10*3/MM3 (ref 0.7–3.1)
LYMPHOCYTES NFR BLD AUTO: 22.9 % (ref 19.6–45.3)
MCH RBC QN AUTO: 29.8 PG (ref 26.6–33)
MCHC RBC AUTO-ENTMCNC: 32.6 G/DL (ref 31.5–35.7)
MCV RBC AUTO: 91.4 FL (ref 79–97)
MONOCYTES # BLD AUTO: 0.76 10*3/MM3 (ref 0.1–0.9)
MONOCYTES NFR BLD AUTO: 7.5 % (ref 5–12)
NEUTROPHILS NFR BLD AUTO: 6.62 10*3/MM3 (ref 1.7–7)
NEUTROPHILS NFR BLD AUTO: 65.5 % (ref 42.7–76)
NRBC BLD AUTO-RTO: 0 /100 WBC (ref 0–0.2)
PLATELET # BLD AUTO: 229 10*3/MM3 (ref 140–450)
PMV BLD AUTO: 9.5 FL (ref 6–12)
RBC # BLD AUTO: 5.44 10*6/MM3 (ref 3.77–5.28)
TIBC SERPL-MCNC: 413 MCG/DL (ref 298–536)
TRANSFERRIN SERPL-MCNC: 277 MG/DL (ref 200–360)
WBC NRBC COR # BLD: 10.1 10*3/MM3 (ref 3.4–10.8)

## 2022-03-24 PROCEDURE — 81270 JAK2 GENE: CPT

## 2022-03-24 PROCEDURE — 84466 ASSAY OF TRANSFERRIN: CPT | Performed by: INTERNAL MEDICINE

## 2022-03-24 PROCEDURE — 82668 ASSAY OF ERYTHROPOIETIN: CPT | Performed by: INTERNAL MEDICINE

## 2022-03-24 PROCEDURE — 99204 OFFICE O/P NEW MOD 45 MIN: CPT | Performed by: INTERNAL MEDICINE

## 2022-03-24 PROCEDURE — 85025 COMPLETE CBC W/AUTO DIFF WBC: CPT | Performed by: INTERNAL MEDICINE

## 2022-03-24 PROCEDURE — 83540 ASSAY OF IRON: CPT | Performed by: INTERNAL MEDICINE

## 2022-03-24 PROCEDURE — 81279 JAK2 GENE TRGT SEQUENCE ALYS: CPT

## 2022-03-24 PROCEDURE — 82728 ASSAY OF FERRITIN: CPT | Performed by: INTERNAL MEDICINE

## 2022-03-24 NOTE — PROGRESS NOTES
Evie Shah  [id number]  1945  3/24/2022      Referring Provider:   RUBI Michael    Reason for Consultation:   Polycythemia    Chief Complaint:  Polycythemia      History of Present Illness   Evie Shah is a very pleasant 76 y.o.  female who presents in new consultation at the request of RUBI Michael for further management and evaluation of polycythemia.    Ms. Callahan has had an erythrocytosis since at least 2019 and has not had phlebotomy. She is a previous smoker but quit six years prior to her initial consultation with me. She has an underlying diagnosis significant for COPD in which she follows with her primary provider and takes inhalers as well as breathing treatments. She has never been formally tested for sleep apnea but reports that she does snore at night and continues to feel fatigued during the day. She denies of any weight loss, night sweats, erythromelalgia, pruritis.              The following portions of the patient's history were reviewed and updated as appropriate: allergies, current medications, past family history, past medical history, past social history, past surgical history and problem list.    Allergies   Allergen Reactions   • Codeine Shortness Of Breath       Past Medical History:   Diagnosis Date   • Allergic rhinitis    • Asthma    • Atherosclerosis    • COPD (chronic obstructive pulmonary disease) (HCC)    • Cough    • Diabetes mellitus (HCC)    • Hyperlipidemia    • Hypertension    • Menopause    • Nausea and vomiting    • Obesity 3/7/2017   • Osteoarthritis    • Osteoporosis    • Vertigo, benign paroxysmal          Past Surgical History:   Procedure Laterality Date   • CATARACT EXTRACTION     • COLONOSCOPY     • EYE SURGERY      catarac   • ILIAC ARTERY STENT  02/25/2015   • SKIN CANCER EXCISION      nose   • TONSILLECTOMY      and adenoidectomy   • TUBAL ABDOMINAL LIGATION               Social History     Socioeconomic  History   • Marital status:    • Number of children: 3   • Years of education: 12   Tobacco Use   • Smoking status: Former Smoker     Types: Cigarettes     Quit date: 2015     Years since quittin.8   • Smokeless tobacco: Never Used   Vaping Use   • Vaping Use: Never used   Substance and Sexual Activity   • Alcohol use: No   • Drug use: No   • Sexual activity: Defer             Family History   Problem Relation Age of Onset   • Arthritis Mother    • Asthma Mother    • Cancer Mother    • Osteoarthritis Mother    • Osteoporosis Mother    • Diabetes Mother    • Arthritis Father    • Hypertension Father    • Stroke Father    • Osteoarthritis Father    • Osteoporosis Father    • Heart disease Father    • Diabetes Father    • Breast cancer Neg Hx    Mother - lung cancer - 80          Current Outpatient Medications:   •  Accu-Chek Guide test strip, , Disp: , Rfl:   •  albuterol sulfate  (90 Base) MCG/ACT inhaler, Inhale 2 puffs Every 4 (Four) Hours As Needed for Shortness of Air., Disp: 18 g, Rfl: 5  •  amLODIPine-benazepril (LOTREL) 10-40 MG per capsule, Take 1 capsule by mouth Daily., Disp: 90 capsule, Rfl: 3  •  atorvastatin (LIPITOR) 10 MG tablet, Take 1 tablet by mouth Every Night., Disp: 90 tablet, Rfl: 3  •  Blood Glucose Monitoring Suppl misc, 1 each 3 (Three) Times a Day., Disp: 1 each, Rfl: 0  •  clopidogrel (PLAVIX) 75 MG tablet, Take 1 tablet by mouth Daily., Disp: 90 tablet, Rfl: 3  •  Empagliflozin (Jardiance) 25 MG tablet, Take 25 mg by mouth Daily., Disp: 90 tablet, Rfl: 3  •  glucose blood test strip, 1 each by Other route 3 (Three) Times a Day. Use as instructed, Disp: 100 each, Rfl: 11  •  glyburide (DIAbeta) 5 MG tablet, Take 1 tablet by mouth Daily With Breakfast., Disp: 90 tablet, Rfl: 3  •  Lancets 30G misc, 1 each Daily., Disp: 100 each, Rfl: 11  •  O2 (OXYGEN), Inhale 2 L/min Every Night., Disp: , Rfl:   •  SITagliptin (Januvia) 100 MG tablet, Take 1 tablet by mouth Daily.,  Disp: 90 tablet, Rfl: 3  •  aspirin 81 MG EC tablet, Take 1 tablet by mouth Daily., Disp: 90 tablet, Rfl: 3  •  buPROPion SR (WELLBUTRIN SR) 150 MG 12 hr tablet, Take 1 tablet by mouth Every 12 (Twelve) Hours., Disp: 180 tablet, Rfl: 3  •  furosemide (LASIX) 20 MG tablet, Take 1 tablet by mouth Daily., Disp: 90 tablet, Rfl: 3  •  indomethacin (INDOCIN) 50 MG capsule, Take 1 capsule by mouth 3 (Three) Times a Day With Meals., Disp: 30 capsule, Rfl: 0  •  lidocaine-prilocaine (EMLA) 2.5-2.5 % cream, APPLY 1-2 GRAMS (1-2 PUMPS) TO AFFECTED AREA 3-4 TIMES DAILY, Disp: 240 g, Rfl: 5  •  loratadine (EQ Allergy Relief) 10 MG tablet, Take 1 tablet by mouth Daily., Disp: 90 tablet, Rfl: 3  •  montelukast (SINGULAIR) 10 MG tablet, Take 1 tablet by mouth Every Night., Disp: 90 tablet, Rfl: 3  •  omega-3 acid ethyl esters (LOVAZA) 1 g capsule, Take 2 capsules by mouth 2 (Two) Times a Day., Disp: 360 capsule, Rfl: 3  •  omeprazole (priLOSEC) 40 MG capsule, Take 1 capsule by mouth Daily., Disp: 90 capsule, Rfl: 3  •  potassium chloride (K-DUR,KLOR-CON) 10 MEQ CR tablet, Take 1 tablet by mouth Daily., Disp: 90 tablet, Rfl: 3  •  raloxifene (EVISTA) 60 MG tablet, Take 1 tablet by mouth Daily., Disp: 30 tablet, Rfl: 5  •  vitamin D (ERGOCALCIFEROL) 1.25 MG (16415 UT) capsule capsule, Take 1 capsule by mouth 1 (One) Time Per Week., Disp: 12 capsule, Rfl: 3    Current Facility-Administered Medications:   •  cyanocobalamin injection 1,000 mcg, 1,000 mcg, Intramuscular, Q28 Days, Camryn Mota PA, 1,000 mcg at 09/30/21 1200  •  triamcinolone acetonide (KENALOG-40) injection 40 mg, 40 mg, Intramuscular, Once, Darwin Lind MD          Review of Systems  Constitutional: No fever, chills, night sweats or weight loss.   HEENT:  No headaches, positive vision changes or hearing changes, no sinus drainage, sore throat.   Cardiovascular:  No palpitations, chest pain, syncopal episodes or edema.   Pulmonary:  No shortness of  breath, hemoptysis, or cough.   Gastrointestinal:  No nausea or vomiting.  No constipation or diarrhea. No abdominal pain. No melena or hematochezia.   Genitourinary:  No hematuria, or changes in urination.   Musculoskeletal:  Arthritic pain, or joint problems.   Skin: No rashes or pruritus.   Endocrine:  No hot flashes or chills   Hematologic:  No history of free bleeding, spontaneous bleeding or clotting problems. No lymphadenopathy.    Immunologic:  No allergies or frequent infections.   Neurologic: No numbness, tingling, or weakness.   Psychiatric:  No anxiety or depression.           Physical Exam  Vital Signs: These were reviewed and listed as per patient’s electronic medical chart  Vitals:    03/24/22 1452   BP: 172/89   Pulse: 105   Resp: 18   Temp: 97.3 °F (36.3 °C)   SpO2: 90%     General: Awake, alert and oriented, in no distress  HEENT: Head is atraumatic, normocephalic, extraocular movements full, no scleral icterus  Neck: supple, no jvd, lymphadenopathy or masses  Cardiovascular: regular rate and rhythm without murmurs, rubs or gallops  Pulmonary: non-labored, clear to auscultation bilaterally, no wheezing  Abdomen: soft, non-tender, non-distended, normal active bowel sounds present, no organomegaly  Extremities: No clubbing, cyanosis or edema  Lymph: No cervical, supraclavicular adenopathy  Neurologic: Mental status as above, alert, awake and oriented, grossly non-focal exam  Skin: warm, dry, intact          Pain Score:  Pain Score    03/24/22 1452   PainSc: 0-No pain       PHQ-Score Total:  PHQ-9 Total Score:          Labs / Studies:    Lab on 03/07/2022   Component Date Value   • Glucose 03/07/2022 170 (A)   • BUN 03/07/2022 21    • Creatinine 03/07/2022 0.86    • EGFR Result 03/07/2022 71.0    • BUN/Creatinine Ratio 03/07/2022 24.4    • Sodium 03/07/2022 142    • Potassium 03/07/2022 4.2    • Chloride 03/07/2022 103    • Total CO2 03/07/2022 20.9 (A)   • Calcium 03/07/2022 9.9    • Total Protein  03/07/2022 6.9    • Albumin 03/07/2022 4.50    • Globulin 03/07/2022 2.4    • A/G Ratio 03/07/2022 1.9    • Total Bilirubin 03/07/2022 0.6    • Alkaline Phosphatase 03/07/2022 110    • AST (SGOT) 03/07/2022 14    • ALT (SGPT) 03/07/2022 17    • Hemoglobin A1C 03/07/2022 7.80 (A)   • Microalbumin, Urine 03/07/2022 <3.0    • 25 Hydroxy, Vitamin D 03/07/2022 35.8    • Total Cholesterol 03/07/2022 119    • Triglycerides 03/07/2022 187 (A)   • HDL Cholesterol 03/07/2022 47    • VLDL Cholesterol Sonido 03/07/2022 30    • LDL Chol Calc ([facility]) 03/07/2022 42    • WBC 03/07/2022 10.78    • RBC 03/07/2022 5.67 (A)   • Hemoglobin 03/07/2022 17.0 (A)   • Hematocrit 03/07/2022 51.0 (A)   • MCV 03/07/2022 89.9    • MCH 03/07/2022 30.0    • MCHC 03/07/2022 33.3    • RDW 03/07/2022 13.2    • Platelets 03/07/2022 254    • Neutrophil Rel % 03/07/2022 64.3    • Lymphocyte Rel % 03/07/2022 25.2    • Monocyte Rel % 03/07/2022 7.0    • Eosinophil Rel % 03/07/2022 2.1    • Basophil Rel % 03/07/2022 0.6    • Neutrophils Absolute 03/07/2022 6.93    • Lymphocytes Absolute 03/07/2022 2.72    • Monocytes Absolute 03/07/2022 0.75    • Eosinophils Absolute 03/07/2022 0.23    • Basophils Absolute 03/07/2022 0.06    • Immature Granulocyte Rel* 03/07/2022 0.8 (A)   • Immature Grans Absolute 03/07/2022 0.09 (A)   • nRBC 03/07/2022 0.1    Lab on 12/29/2021   Component Date Value   • ADENOVIRUS, PCR 12/29/2021 Not Detected    • Coronavirus 229E 12/29/2021 Not Detected    • Coronavirus HKU1 12/29/2021 Not Detected    • Coronavirus NL63 12/29/2021 Not Detected    • Coronavirus OC43 12/29/2021 Not Detected    • COVID19 12/29/2021 Not Detected    • Human Metapneumovirus 12/29/2021 Not Detected    • Human Rhinovirus/Enterov* 12/29/2021 Not Detected    • Influenza A PCR 12/29/2021 Not Detected    • Influenza B PCR 12/29/2021 Not Detected    • Parainfluenza Virus 1 12/29/2021 Not Detected    • Parainfluenza Virus 2 12/29/2021 Not Detected    • Parainfluenza Virus  3 12/29/2021 Not Detected    • Parainfluenza Virus 4 12/29/2021 Not Detected    • RSV, PCR 12/29/2021 Not Detected    • Bordetella pertussis pcr 12/29/2021 Not Detected    • Bordetella parapertussis* 12/29/2021 Not Detected    • Chlamydophila pneumoniae* 12/29/2021 Not Detected    • Mycoplasma pneumo by PCR 12/29/2021 Not Detected    Office Visit on 09/30/2021   Component Date Value   • WBC 09/30/2021 9.51    • RBC 09/30/2021 5.32 (A)   • Hemoglobin 09/30/2021 15.5    • Hematocrit 09/30/2021 49.3 (A)   • MCV 09/30/2021 92.7    • MCH 09/30/2021 29.1    • MCHC 09/30/2021 31.4 (A)   • RDW 09/30/2021 13.3    • RDW-SD 09/30/2021 45.7    • MPV 09/30/2021 10.1    • Platelets 09/30/2021 228    • Neutrophil % 09/30/2021 59.0    • Lymphocyte % 09/30/2021 29.2    • Monocyte % 09/30/2021 8.2    • Eosinophil % 09/30/2021 2.5    • Basophil % 09/30/2021 0.6    • Immature Grans % 09/30/2021 0.5    • Neutrophils, Absolute 09/30/2021 5.60    • Lymphocytes, Absolute 09/30/2021 2.78    • Monocytes, Absolute 09/30/2021 0.78    • Eosinophils, Absolute 09/30/2021 0.24    • Basophils, Absolute 09/30/2021 0.06    • Immature Grans, Absolute 09/30/2021 0.05    • nRBC 09/30/2021 0.0    • Hemoglobin A1C 09/30/2021 8.1 (A)   • Microalbumin, Urine 09/30/2021 <3.0    • Creatine Kinase 09/30/2021 66    • ANDREA Direct 09/30/2021 Negative    • RA Latex Turbid 09/30/2021 <10.0    • Glucose 09/30/2021 248 (A)   • BUN 09/30/2021 25    • Creatinine 09/30/2021 0.96    • eGFR Non African Am 09/30/2021 58 (A)   • eGFR  Am 09/30/2021 67    • BUN/Creatinine Ratio 09/30/2021 26    • Sodium 09/30/2021 139    • Potassium 09/30/2021 4.4    • Chloride 09/30/2021 101    • Total CO2 09/30/2021 18 (A)   • Calcium 09/30/2021 9.7    • Total Protein 09/30/2021 7.1    • Albumin 09/30/2021 4.7    • Globulin 09/30/2021 2.4    • A/G Ratio 09/30/2021 2.0    • Total Bilirubin 09/30/2021 0.4    • Alkaline Phosphatase 09/30/2021 113    • AST (SGOT) 09/30/2021 23    • ALT  (SGPT) 09/30/2021 20                 Assessment/Plan   Evie Shah is a very pleasant 76 y.o.  female who presents in new consultation at the request of RUBI Michael for further management and evaluation of polycythemia.    Polycythemia  - It is possible that she has a secondary polycythemia due to her prior tobacco abuse along with possible underlying COPD. However given her ongoing elevation in hemoglobin and hematocrit I will start by obtaining a JAK2 mutational analysis as well and erythropoietin level to assess for an underlying myeloproliferative disorder. Will obtain a BCR ABL PCR at next visit.  If these studies however are unrevealing will obtain a secondary polycythemia work up. Possible polycythemia vera diagnosis as well as prognosis, and treatment were discussed with the patient.     - In the interim I will initiate phlebotomy for one unit (500 mL) to be taken every 2 weeks with goal of Hct  <52%. She does not require phlebotomy today.     ACO / BRIAN/Other  Quality measures  -  Evie Shah  reports that she quit smoking about 6 years ago. Her smoking use included cigarettes. She has never used smokeless tobacco.  -  Evie Shah received 2021 flu vaccine.  -  Evie Shah reports a pain score of 0.  Given her pain assessment as noted, treatment options were discussed and the following options were decided upon as a follow-up plan to address the patient's pain: continuation of current treatment plan for pain.  -  Current outpatient and discharge medications have been reconciled for the patient.  Reviewed by: Pascale Vizcaino MD      I will have the patient return in follow up appointment to review test results in one month. She understands that should she have any questions or concerns prior to her appointment she should give us a call at any time and I would be happy to see her sooner. It was a pleasure to see this patient in clinic  today, thank you for allowing me to participate in the care of this patient.          Pascale Vizcaino MD  03/24/22   14:54 EDT

## 2022-03-26 LAB — EPO SERPL-ACNC: 29.1 MIU/ML (ref 2.6–18.5)

## 2022-04-04 LAB
CITATION REF LAB TEST: NORMAL
CITATION REF LAB TEST: NORMAL
JAK2 GENE MUT ANL BLD/T: NORMAL
JAK2 P.V617F BLD/T QL: NORMAL
LAB DIRECTOR NAME PROVIDER: NORMAL
LAB DIRECTOR NAME PROVIDER: NORMAL
LABORATORY COMMENT REPORT: NORMAL
LABORATORY COMMENT REPORT: NORMAL
REF LAB TEST METHOD: NORMAL
REF LAB TEST METHOD: NORMAL
REFLEX: NORMAL
SPECIMEN PREPARATION: NORMAL

## 2022-04-28 ENCOUNTER — OFFICE VISIT (OUTPATIENT)
Dept: ONCOLOGY | Facility: CLINIC | Age: 77
End: 2022-04-28

## 2022-04-28 VITALS
OXYGEN SATURATION: 94 % | RESPIRATION RATE: 18 BRPM | SYSTOLIC BLOOD PRESSURE: 141 MMHG | HEIGHT: 63 IN | TEMPERATURE: 97.1 F | HEART RATE: 87 BPM | WEIGHT: 201 LBS | BODY MASS INDEX: 35.61 KG/M2 | DIASTOLIC BLOOD PRESSURE: 81 MMHG

## 2022-04-28 DIAGNOSIS — R79.89 ELEVATED FERRITIN LEVEL: ICD-10-CM

## 2022-04-28 DIAGNOSIS — D75.1 POLYCYTHEMIA: Primary | ICD-10-CM

## 2022-04-28 DIAGNOSIS — F17.210 TOBACCO DEPENDENCE DUE TO CIGARETTES: ICD-10-CM

## 2022-04-28 LAB
BASOPHILS # BLD AUTO: 0.06 10*3/MM3 (ref 0–0.2)
BASOPHILS NFR BLD AUTO: 0.6 % (ref 0–1.5)
DEPRECATED RDW RBC AUTO: 46.3 FL (ref 37–54)
EOSINOPHIL # BLD AUTO: 0.21 10*3/MM3 (ref 0–0.4)
EOSINOPHIL NFR BLD AUTO: 2 % (ref 0.3–6.2)
ERYTHROCYTE [DISTWIDTH] IN BLOOD BY AUTOMATED COUNT: 13.7 % (ref 12.3–15.4)
HCT VFR BLD AUTO: 50.6 % (ref 34–46.6)
HGB BLD-MCNC: 16.2 G/DL (ref 12–15.9)
IMM GRANULOCYTES # BLD AUTO: 0.05 10*3/MM3 (ref 0–0.05)
IMM GRANULOCYTES NFR BLD AUTO: 0.5 % (ref 0–0.5)
LYMPHOCYTES # BLD AUTO: 2.72 10*3/MM3 (ref 0.7–3.1)
LYMPHOCYTES NFR BLD AUTO: 26.2 % (ref 19.6–45.3)
MCH RBC QN AUTO: 29.8 PG (ref 26.6–33)
MCHC RBC AUTO-ENTMCNC: 32 G/DL (ref 31.5–35.7)
MCV RBC AUTO: 93 FL (ref 79–97)
MONOCYTES # BLD AUTO: 0.83 10*3/MM3 (ref 0.1–0.9)
MONOCYTES NFR BLD AUTO: 8 % (ref 5–12)
NEUTROPHILS NFR BLD AUTO: 6.53 10*3/MM3 (ref 1.7–7)
NEUTROPHILS NFR BLD AUTO: 62.7 % (ref 42.7–76)
NRBC BLD AUTO-RTO: 0 /100 WBC (ref 0–0.2)
PLATELET # BLD AUTO: 223 10*3/MM3 (ref 140–450)
PMV BLD AUTO: 9.5 FL (ref 6–12)
RBC # BLD AUTO: 5.44 10*6/MM3 (ref 3.77–5.28)
WBC NRBC COR # BLD: 10.4 10*3/MM3 (ref 3.4–10.8)

## 2022-04-28 PROCEDURE — 99214 OFFICE O/P EST MOD 30 MIN: CPT | Performed by: INTERNAL MEDICINE

## 2022-04-28 PROCEDURE — 85025 COMPLETE CBC W/AUTO DIFF WBC: CPT | Performed by: INTERNAL MEDICINE

## 2022-04-28 NOTE — PROGRESS NOTES
Evie Shah  [id number]  1945 4/29/2022      Referring Provider:   RUBI Michael    Reason for Follow Up:   Polycythemia    Chief Complaint:  Polycythemia      History of Present Illness   Evie Shah is a very pleasant 76 y.o.  female who presents in follow up appointment for further management and evaluation of polycythemia.    Ms. Callahan has had an erythrocytosis since at least 2019 and has not had phlebotomy. She is a previous smoker but quit six years prior to her initial consultation with me. She has an underlying diagnosis significant for COPD in which she follows with her primary provider and takes inhalers as well as breathing treatments. She has never been formally tested for sleep apnea but reports that she does snore at night and continues to feel fatigued during the day. She denies of any weight loss, night sweats, erythromelalgia, pruritis.      Interim History:  No significant changes since her last visit. She has not yet required phlebotomy.           The following portions of the patient's history were reviewed and updated as appropriate: allergies, current medications, past family history, past medical history, past social history, past surgical history and problem list.    Allergies   Allergen Reactions   • Codeine Shortness Of Breath       Past Medical History:   Diagnosis Date   • Allergic rhinitis    • Asthma    • Atherosclerosis    • COPD (chronic obstructive pulmonary disease) (HCC)    • Cough    • Diabetes mellitus (HCC)    • Hyperlipidemia    • Hypertension    • Menopause    • Nausea and vomiting    • Obesity 3/7/2017   • Osteoarthritis    • Osteoporosis    • Vertigo, benign paroxysmal          Past Surgical History:   Procedure Laterality Date   • CATARACT EXTRACTION     • COLONOSCOPY     • EYE SURGERY      catarac   • ILIAC ARTERY STENT  02/25/2015   • SKIN CANCER EXCISION      nose   • TONSILLECTOMY      and adenoidectomy   • TUBAL  ABDOMINAL LIGATION               Social History     Socioeconomic History   • Marital status:    • Number of children: 3   • Years of education: 12   Tobacco Use   • Smoking status: Former Smoker     Types: Cigarettes     Quit date: 2015     Years since quittin.9   • Smokeless tobacco: Never Used   Vaping Use   • Vaping Use: Never used   Substance and Sexual Activity   • Alcohol use: No   • Drug use: No   • Sexual activity: Defer             Family History   Problem Relation Age of Onset   • Arthritis Mother    • Asthma Mother    • Cancer Mother    • Osteoarthritis Mother    • Osteoporosis Mother    • Diabetes Mother    • Arthritis Father    • Hypertension Father    • Stroke Father    • Osteoarthritis Father    • Osteoporosis Father    • Heart disease Father    • Diabetes Father    • Breast cancer Neg Hx    Mother - lung cancer - 80          Current Outpatient Medications:   •  Accu-Chek Guide test strip, , Disp: , Rfl:   •  albuterol sulfate  (90 Base) MCG/ACT inhaler, Inhale 2 puffs Every 4 (Four) Hours As Needed for Shortness of Air., Disp: 18 g, Rfl: 5  •  amLODIPine-benazepril (LOTREL) 10-40 MG per capsule, Take 1 capsule by mouth Daily., Disp: 90 capsule, Rfl: 3  •  aspirin 81 MG EC tablet, Take 1 tablet by mouth Daily., Disp: 90 tablet, Rfl: 3  •  atorvastatin (LIPITOR) 10 MG tablet, Take 1 tablet by mouth Every Night., Disp: 90 tablet, Rfl: 3  •  Blood Glucose Monitoring Suppl misc, 1 each 3 (Three) Times a Day., Disp: 1 each, Rfl: 0  •  buPROPion SR (WELLBUTRIN SR) 150 MG 12 hr tablet, Take 1 tablet by mouth Every 12 (Twelve) Hours., Disp: 180 tablet, Rfl: 3  •  clopidogrel (PLAVIX) 75 MG tablet, Take 1 tablet by mouth Daily., Disp: 90 tablet, Rfl: 3  •  Empagliflozin (Jardiance) 25 MG tablet, Take 25 mg by mouth Daily., Disp: 90 tablet, Rfl: 3  •  furosemide (LASIX) 20 MG tablet, Take 1 tablet by mouth Daily., Disp: 90 tablet, Rfl: 3  •  glucose blood test strip, 1 each by Other  route 3 (Three) Times a Day. Use as instructed, Disp: 100 each, Rfl: 11  •  glyburide (DIAbeta) 5 MG tablet, Take 1 tablet by mouth Daily With Breakfast., Disp: 90 tablet, Rfl: 3  •  indomethacin (INDOCIN) 50 MG capsule, Take 1 capsule by mouth 3 (Three) Times a Day With Meals., Disp: 30 capsule, Rfl: 0  •  Lancets 30G misc, 1 each Daily., Disp: 100 each, Rfl: 11  •  lidocaine-prilocaine (EMLA) 2.5-2.5 % cream, APPLY 1-2 GRAMS (1-2 PUMPS) TO AFFECTED AREA 3-4 TIMES DAILY, Disp: 240 g, Rfl: 5  •  loratadine (EQ Allergy Relief) 10 MG tablet, Take 1 tablet by mouth Daily., Disp: 90 tablet, Rfl: 3  •  montelukast (SINGULAIR) 10 MG tablet, Take 1 tablet by mouth Every Night., Disp: 90 tablet, Rfl: 3  •  O2 (OXYGEN), Inhale 2 L/min Every Night., Disp: , Rfl:   •  omega-3 acid ethyl esters (LOVAZA) 1 g capsule, Take 2 capsules by mouth 2 (Two) Times a Day., Disp: 360 capsule, Rfl: 3  •  omeprazole (priLOSEC) 40 MG capsule, Take 1 capsule by mouth Daily., Disp: 90 capsule, Rfl: 3  •  potassium chloride (K-DUR,KLOR-CON) 10 MEQ CR tablet, Take 1 tablet by mouth Daily., Disp: 90 tablet, Rfl: 3  •  raloxifene (EVISTA) 60 MG tablet, Take 1 tablet by mouth Daily., Disp: 30 tablet, Rfl: 5  •  SITagliptin (Januvia) 100 MG tablet, Take 1 tablet by mouth Daily., Disp: 90 tablet, Rfl: 3  •  vitamin D (ERGOCALCIFEROL) 1.25 MG (15972 UT) capsule capsule, Take 1 capsule by mouth 1 (One) Time Per Week., Disp: 12 capsule, Rfl: 3    Current Facility-Administered Medications:   •  cyanocobalamin injection 1,000 mcg, 1,000 mcg, Intramuscular, Q28 Days, Camryn Mota PA, 1,000 mcg at 09/30/21 1200  •  triamcinolone acetonide (KENALOG-40) injection 40 mg, 40 mg, Intramuscular, Once, Darwin Lind MD          Review of Systems  Pertinent positives are listed as per history of present of illness, all other systems reviewed and are negative.          Physical Exam  Vital Signs: These were reviewed and listed as per patient’s  electronic medical chart  Vitals:    04/28/22 1451   BP: 141/81   Pulse: 87   Resp: 18   Temp: 97.1 °F (36.2 °C)   SpO2: 94%     General: Patient is awake, alert, and in no acute distress.  Head: Normocephalic, atraumatic  Eyes: EOMI. Conjunctivae and sclerae normal.  Ears: Ears appear intact with no abnormalities noted.   Neck: Trachea midline. No obvious JVD.  Lungs: Respirations appear to be regular, even and unlabored with no signs of respiratory distress. No audible wheezing.  Abdomen: No obvious abdominal distension.  MS: Muscle tone appears normal. No gross deformities.  Extremities: No clubbing, cyanosis or edema noted.  Skin: No visible bleeding, bruising, or rash.  Neurologic: Alert and oriented x3. No gross focal deficits.          Pain Score:  Pain Score    04/28/22 1451   PainSc: 0-No pain       PHQ-Score Total:  PHQ-9 Total Score:          Labs / Studies:    Office Visit on 04/28/2022   Component Date Value   • WBC 04/28/2022 10.40    • RBC 04/28/2022 5.44 (A)   • Hemoglobin 04/28/2022 16.2 (A)   • Hematocrit 04/28/2022 50.6 (A)   • MCV 04/28/2022 93.0    • MCH 04/28/2022 29.8    • MCHC 04/28/2022 32.0    • RDW 04/28/2022 13.7    • RDW-SD 04/28/2022 46.3    • MPV 04/28/2022 9.5    • Platelets 04/28/2022 223    • Neutrophil % 04/28/2022 62.7    • Lymphocyte % 04/28/2022 26.2    • Monocyte % 04/28/2022 8.0    • Eosinophil % 04/28/2022 2.0    • Basophil % 04/28/2022 0.6    • Immature Grans % 04/28/2022 0.5    • Neutrophils, Absolute 04/28/2022 6.53    • Lymphocytes, Absolute 04/28/2022 2.72    • Monocytes, Absolute 04/28/2022 0.83    • Eosinophils, Absolute 04/28/2022 0.21    • Basophils, Absolute 04/28/2022 0.06    • Immature Grans, Absolute 04/28/2022 0.05    • nRBC 04/28/2022 0.0    Consult on 03/24/2022   Component Date Value   • Erythropoietin 03/24/2022 29.1 (A)   • Ferritin 03/24/2022 200.00 (A)   • Iron 03/24/2022 56    • Iron Saturation 03/24/2022 14 (A)   • Transferrin 03/24/2022 277    • TIBC  03/24/2022 413    • JAK2 V617 Mutation Qnt R* 03/24/2022 Comment    • Background 03/24/2022 Comment    • Methodology 03/24/2022 Comment    • References 03/24/2022 Comment    • Director Review 03/24/2022 Comment    • Extraction 03/24/2022 Completed    • Reflex 03/24/2022 Comment    • WBC 03/24/2022 10.10    • RBC 03/24/2022 5.44 (A)   • Hemoglobin 03/24/2022 16.2 (A)   • Hematocrit 03/24/2022 49.7 (A)   • MCV 03/24/2022 91.4    • MCH 03/24/2022 29.8    • MCHC 03/24/2022 32.6    • RDW 03/24/2022 14.0    • RDW-SD 03/24/2022 46.5    • MPV 03/24/2022 9.5    • Platelets 03/24/2022 229    • Neutrophil % 03/24/2022 65.5    • Lymphocyte % 03/24/2022 22.9    • Monocyte % 03/24/2022 7.5    • Eosinophil % 03/24/2022 2.8    • Basophil % 03/24/2022 0.7    • Immature Grans % 03/24/2022 0.6 (A)   • Neutrophils, Absolute 03/24/2022 6.62    • Lymphocytes, Absolute 03/24/2022 2.31    • Monocytes, Absolute 03/24/2022 0.76    • Eosinophils, Absolute 03/24/2022 0.28    • Basophils, Absolute 03/24/2022 0.07    • Immature Grans, Absolute 03/24/2022 0.06 (A)   • nRBC 03/24/2022 0.0    • JAK2 Exons 12-15 Mut Det* 03/24/2022 Comment    • Background: 03/24/2022 Comment    • Method 03/24/2022 Comment    • References: 03/24/2022 Comment    • Director Review 03/24/2022 Comment    Lab on 03/07/2022   Component Date Value   • Glucose 03/07/2022 170 (A)   • BUN 03/07/2022 21    • Creatinine 03/07/2022 0.86    • EGFR Result 03/07/2022 71.0    • BUN/Creatinine Ratio 03/07/2022 24.4    • Sodium 03/07/2022 142    • Potassium 03/07/2022 4.2    • Chloride 03/07/2022 103    • Total CO2 03/07/2022 20.9 (A)   • Calcium 03/07/2022 9.9    • Total Protein 03/07/2022 6.9    • Albumin 03/07/2022 4.50    • Globulin 03/07/2022 2.4    • A/G Ratio 03/07/2022 1.9    • Total Bilirubin 03/07/2022 0.6    • Alkaline Phosphatase 03/07/2022 110    • AST (SGOT) 03/07/2022 14    • ALT (SGPT) 03/07/2022 17    • Hemoglobin A1C 03/07/2022 7.80 (A)   • Microalbumin, Urine  03/07/2022 <3.0    • 25 Hydroxy, Vitamin D 03/07/2022 35.8    • Total Cholesterol 03/07/2022 119    • Triglycerides 03/07/2022 187 (A)   • HDL Cholesterol 03/07/2022 47    • VLDL Cholesterol [place] 03/07/2022 30    • LDL Chol Calc ([facility]) 03/07/2022 42    • WBC 03/07/2022 10.78    • RBC 03/07/2022 5.67 (A)   • Hemoglobin 03/07/2022 17.0 (A)   • Hematocrit 03/07/2022 51.0 (A)   • MCV 03/07/2022 89.9    • MCH 03/07/2022 30.0    • MCHC 03/07/2022 33.3    • RDW 03/07/2022 13.2    • Platelets 03/07/2022 254    • Neutrophil Rel % 03/07/2022 64.3    • Lymphocyte Rel % 03/07/2022 25.2    • Monocyte Rel % 03/07/2022 7.0    • Eosinophil Rel % 03/07/2022 2.1    • Basophil Rel % 03/07/2022 0.6    • Neutrophils Absolute 03/07/2022 6.93    • Lymphocytes Absolute 03/07/2022 2.72    • Monocytes Absolute 03/07/2022 0.75    • Eosinophils Absolute 03/07/2022 0.23    • Basophils Absolute 03/07/2022 0.06    • Immature Granulocyte Rel* 03/07/2022 0.8 (A)   • Immature Grans Absolute 03/07/2022 0.09 (A)   • nRBC 03/07/2022 0.1    Lab on 12/29/2021   Component Date Value   • ADENOVIRUS, PCR 12/29/2021 Not Detected    • Coronavirus 229E 12/29/2021 Not Detected    • Coronavirus HKU1 12/29/2021 Not Detected    • Coronavirus NL63 12/29/2021 Not Detected    • Coronavirus OC43 12/29/2021 Not Detected    • COVID19 12/29/2021 Not Detected    • Human Metapneumovirus 12/29/2021 Not Detected    • Human Rhinovirus/Enterov* 12/29/2021 Not Detected    • Influenza A PCR 12/29/2021 Not Detected    • Influenza B PCR 12/29/2021 Not Detected    • Parainfluenza Virus 1 12/29/2021 Not Detected    • Parainfluenza Virus 2 12/29/2021 Not Detected    • Parainfluenza Virus 3 12/29/2021 Not Detected    • Parainfluenza Virus 4 12/29/2021 Not Detected    • RSV, PCR 12/29/2021 Not Detected    • Bordetella pertussis pcr 12/29/2021 Not Detected    • Bordetella parapertussis* 12/29/2021 Not Detected    • Chlamydophila pneumoniae* 12/29/2021 Not Detected    • Mycoplasma pneumo  by PCR 12/29/2021 Not Detected                     Assessment/Plan   Evie Shah is a very pleasant 76 y.o.  female who presents in follow up appointment for further management and evaluation of polycythemia.    Polycythemia  - It is possible that she has a secondary polycythemia due to her prior tobacco abuse along with COPD and possible JEFFY. - However given her ongoing elevation in hemoglobin and hematocrit I obtained a JAK2 mutational analysis V617F which was negative as well as Exon 12-15 which was also negative. 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 To further assess for an underlying myeloproliferative disorder will obtain a BCR ABL PCR today.    - She is a previous smoker and has not yet received screening CT therefore will order this along with abdominal US to rule out any masses. Given her history of snoring will place sleep referral and she is requesting if home testing is possible.  - She has not required phlebotomy yet and will have her return in six weeks for repeat CBC and will phlebotomize with goal of Hct <52%. She does not require phlebotomy today.     ACO / BRIAN/Other  Quality measures  -  Evie Shah  reports that she quit smoking about 6 years ago. Her smoking use included cigarettes. She has never used smokeless tobacco.  -  Evie Shah received 2021 flu vaccine.  -  Evie Shah reports a pain score of 0.  Given her pain assessment as noted, treatment options were discussed and the following options were decided upon as a follow-up plan to address the patient's pain: continuation of current treatment plan for pain.  -  Current outpatient and discharge medications have been reconciled for the patient.  Reviewed by: Pascale Vizcaino MD      I will have the patient return in follow up appointment to review test results in six weeks. She understands that should she have any questions or concerns  prior to her appointment she should give us a call at any time and I would be happy to see her sooner. It was a pleasure to see this patient in clinic today, thank you for allowing me to participate in the care of this patient.          Pascale Vizcaino MD  04/29/22   17:16 EDT     Addendum:  Tried to schedule CT scan and abdominal US but patient does not want to pursue these tests to further evaluate at this time.  5/11/22     Opioid Pregnancy And Lactation Text: These medications can lead to premature delivery and should be avoided during pregnancy. These medications are also present in breast milk in small amounts.

## 2022-05-06 LAB
INTERPRETATION: NEGATIVE
LAB DIRECTOR NAME PROVIDER: NORMAL
LABORATORY COMMENT REPORT: NORMAL
REF LAB TEST METHOD: NORMAL
T(ABL1,BCR)B2A2/CONTROL BLD/T: NORMAL %
T(ABL1,BCR)B3A2/CONTROL BLD/T: NORMAL %
T(ABL1,BCR)E1A2/CONTROL BLD/T: NORMAL %

## 2022-06-07 ENCOUNTER — LAB (OUTPATIENT)
Dept: FAMILY MEDICINE CLINIC | Facility: CLINIC | Age: 77
End: 2022-06-07

## 2022-06-07 DIAGNOSIS — E11.65 TYPE 2 DIABETES MELLITUS WITH HYPERGLYCEMIA, WITHOUT LONG-TERM CURRENT USE OF INSULIN: Chronic | ICD-10-CM

## 2022-06-07 DIAGNOSIS — E78.2 MIXED HYPERLIPIDEMIA: Primary | ICD-10-CM

## 2022-06-07 LAB
ALBUMIN SERPL-MCNC: 4.4 G/DL (ref 3.5–5.2)
ALBUMIN/GLOB SERPL: 1.8 G/DL
ALP SERPL-CCNC: 93 U/L (ref 39–117)
ALT SERPL-CCNC: 16 U/L (ref 1–33)
AST SERPL-CCNC: 15 U/L (ref 1–32)
BILIRUB SERPL-MCNC: 0.6 MG/DL (ref 0–1.2)
BUN SERPL-MCNC: 17 MG/DL (ref 8–23)
BUN/CREAT SERPL: 22.1 (ref 7–25)
CALCIUM SERPL-MCNC: 9.1 MG/DL (ref 8.6–10.5)
CHLORIDE SERPL-SCNC: 103 MMOL/L (ref 98–107)
CHOLEST SERPL-MCNC: 127 MG/DL (ref 0–200)
CO2 SERPL-SCNC: 21.1 MMOL/L (ref 22–29)
CREAT SERPL-MCNC: 0.77 MG/DL (ref 0.57–1)
EGFRCR SERPLBLD CKD-EPI 2021: 80.6 ML/MIN/1.73
GLOBULIN SER CALC-MCNC: 2.5 GM/DL
GLUCOSE SERPL-MCNC: 173 MG/DL (ref 65–99)
HBA1C MFR BLD: 7.9 % (ref 4.8–5.6)
HDLC SERPL-MCNC: 42 MG/DL (ref 40–60)
LDLC SERPL CALC-MCNC: 49 MG/DL (ref 0–100)
POTASSIUM SERPL-SCNC: 3.8 MMOL/L (ref 3.5–5.2)
PROT SERPL-MCNC: 6.9 G/DL (ref 6–8.5)
SODIUM SERPL-SCNC: 141 MMOL/L (ref 136–145)
TRIGL SERPL-MCNC: 227 MG/DL (ref 0–150)
VLDLC SERPL CALC-MCNC: 36 MG/DL (ref 5–40)

## 2022-06-08 LAB — MICROALBUMIN UR-MCNC: 3.3 UG/ML

## 2022-06-14 ENCOUNTER — OFFICE VISIT (OUTPATIENT)
Dept: FAMILY MEDICINE CLINIC | Facility: CLINIC | Age: 77
End: 2022-06-14

## 2022-06-14 VITALS
DIASTOLIC BLOOD PRESSURE: 80 MMHG | OXYGEN SATURATION: 92 % | HEIGHT: 63 IN | SYSTOLIC BLOOD PRESSURE: 138 MMHG | TEMPERATURE: 96.4 F | BODY MASS INDEX: 36.82 KG/M2 | WEIGHT: 207.8 LBS | HEART RATE: 97 BPM

## 2022-06-14 DIAGNOSIS — E11.65 TYPE 2 DIABETES MELLITUS WITH HYPERGLYCEMIA, WITHOUT LONG-TERM CURRENT USE OF INSULIN: Primary | Chronic | ICD-10-CM

## 2022-06-14 DIAGNOSIS — D45 POLYCYTHEMIA VERA: Chronic | ICD-10-CM

## 2022-06-14 DIAGNOSIS — I10 ESSENTIAL HYPERTENSION: Chronic | ICD-10-CM

## 2022-06-14 PROCEDURE — 99214 OFFICE O/P EST MOD 30 MIN: CPT | Performed by: PHYSICIAN ASSISTANT

## 2022-06-14 RX ORDER — ATORVASTATIN CALCIUM 10 MG/1
TABLET, FILM COATED ORAL
COMMUNITY
Start: 2022-04-12 | End: 2022-06-14 | Stop reason: SDUPTHER

## 2022-06-14 RX ORDER — SEMAGLUTIDE 1.34 MG/ML
0.25 INJECTION, SOLUTION SUBCUTANEOUS WEEKLY
Qty: 1.5 ML | Refills: 1 | Status: SHIPPED | OUTPATIENT
Start: 2022-06-14 | End: 2022-07-12 | Stop reason: SDUPTHER

## 2022-06-14 RX ORDER — OMEGA-3-ACID ETHYL ESTERS 1 G/1
CAPSULE, LIQUID FILLED ORAL
COMMUNITY
Start: 2022-04-02 | End: 2022-08-12

## 2022-06-14 RX ORDER — POTASSIUM CHLORIDE 750 MG/1
10 TABLET, FILM COATED, EXTENDED RELEASE ORAL DAILY
COMMUNITY
Start: 2022-04-27 | End: 2022-06-14 | Stop reason: SDUPTHER

## 2022-06-14 RX ORDER — CLOPIDOGREL BISULFATE 75 MG/1
TABLET ORAL
COMMUNITY
Start: 2022-04-02 | End: 2022-07-08

## 2022-06-14 RX ORDER — POTASSIUM CHLORIDE 750 MG/1
10 TABLET, FILM COATED, EXTENDED RELEASE ORAL DAILY
Qty: 90 TABLET | Refills: 3 | Status: SHIPPED | OUTPATIENT
Start: 2022-06-14

## 2022-06-14 NOTE — PROGRESS NOTES
"Subjective   Evie Shah is a 77 y.o. female.       Chief Complaint -diabetes    History of Present Illness -    ROS    Diabetes-  Not at goal with most recent hemoglobin A1c of 7.9.  Patient is currently on Jardiance, Januvia and glyburide.  Patient was unable to tolerate metformin due to excessive diarrhea in the past.    Polycythemia-  Patient's lab work was discussed in the last 3 lab draws does show elevation in red blood cells, hemoglobin and hematocrit.  This was discussed with the patient and shared decision making was used today.  Plan to refer to hematology for further evaluation    Hypertension-stable with amlodipine/benazepril    The following portions of the patient's history were reviewed and updated as appropriate: allergies, current medications, past family history, past medical history, past social history, past surgical history and problem list.    Review of Systems    Objective  Vital signs:  /80   Pulse 97   Temp 96.4 °F (35.8 °C)   Ht 160 cm (62.99\")   Wt 94.3 kg (207 lb 12.8 oz)   SpO2 92%   BMI 36.82 kg/m²     Physical Exam  Vitals and nursing note reviewed.   Constitutional:       Appearance: Normal appearance. She is well-developed.   Eyes:      Extraocular Movements: Extraocular movements intact.      Conjunctiva/sclera: Conjunctivae normal.   Cardiovascular:      Rate and Rhythm: Normal rate and regular rhythm.      Heart sounds: Normal heart sounds. No murmur heard.  Pulmonary:      Effort: Pulmonary effort is normal. No respiratory distress.      Breath sounds: Normal breath sounds. No wheezing.   Musculoskeletal:         General: No tenderness.   Skin:     General: Skin is warm and dry.      Findings: No rash.   Neurological:      Mental Status: She is alert and oriented to person, place, and time.   Psychiatric:         Mood and Affect: Mood normal.         Behavior: Behavior normal.         Thought Content: Thought content normal.         The following data " was reviewed by: RUBI Michael on 06/14/2022:  CMP    CMP 9/30/21 3/7/22 6/7/22   Glucose 248 (A) 170 (A) 173 (A)   BUN 25 21 17   Creatinine 0.96 0.86 0.77   eGFR Non  Am 58 (A)     eGFR African Am 67     Sodium 139 142 141   Potassium 4.4 4.2 3.8   Chloride 101 103 103   Calcium 9.7 9.9 9.1   Total Protein 7.1 6.9 6.9   Albumin 4.7 4.50 4.40   Globulin 2.4 2.4 2.5   Total Bilirubin 0.4 0.6 0.6   Alkaline Phosphatase 113 110 93   AST (SGOT) 23 14 15   ALT (SGPT) 20 17 16   (A) Abnormal value       Comments are available for some flowsheets but are not being displayed.           CBC w/diff    CBC w/Diff 3/7/22 3/24/22 4/28/22   WBC 10.78 10.10 10.40   RBC 5.67 (A) 5.44 (A) 5.44 (A)   Hemoglobin 17.0 (A) 16.2 (A) 16.2 (A)   Hematocrit 51.0 (A) 49.7 (A) 50.6 (A)   MCV 89.9 91.4 93.0   MCH 30.0 29.8 29.8   MCHC 33.3 32.6 32.0   RDW 13.2 14.0 13.7   Platelets 254 229 223   Neutrophil Rel % 64.3 65.5 62.7   Immature Granulocyte Rel %  0.6 (A) 0.5   Lymphocyte Rel % 25.2 22.9 26.2   Monocyte Rel % 7.0 7.5 8.0   Eosinophil Rel % 2.1 2.8 2.0   Basophil Rel % 0.6 0.7 0.6   (A) Abnormal value            Lipid Panel    Lipid Panel 3/7/22 6/7/22   Total Cholesterol 119 127   Triglycerides 187 (A) 227 (A)   HDL Cholesterol 47 42   VLDL Cholesterol 30 36   LDL Cholesterol  42 49   (A) Abnormal value       Comments are available for some flowsheets but are not being displayed.               Most Recent A1C    HGBA1C Most Recent 6/7/22   Hemoglobin A1C 7.90 (A)   (A) Abnormal value       Comments are available for some flowsheets but are not being displayed.                  Assessment & Plan     Diagnoses and all orders for this visit:    1. Type 2 diabetes mellitus with hyperglycemia, without long-term current use of insulin (HCC) (Primary)  Comments:  Start Ozempic 0.25 mg weekly  Continue Jardiance and Januvia  Discontinue glyburide  Unable to tolerate Glucophage  Advised low carbohydrate diabetic  diet  Orders:  -     Semaglutide,0.25 or 0.5MG/DOS, (Ozempic, 0.25 or 0.5 MG/DOSE,) 2 MG/1.5ML solution pen-injector; Inject 0.25 mg under the skin into the appropriate area as directed 1 (One) Time Per Week.  Dispense: 1.5 mL; Refill: 1    2. Polycythemia vera (HCC)  Comments:  Refer to hematology for further evaluation and treatment  Advised regular blood donation    3. Essential hypertension  Comments:  Continue amlodipine/benazepril  Continue to monitor    Other orders  -     potassium chloride 10 MEQ CR tablet; Take 1 tablet by mouth Daily.  Dispense: 90 tablet; Refill: 3            Patient was given instructions and counseling regarding his condition or for health maintenance advice. Please see specific information pulled into the AVS if appropriate      This document has been electronically signed by:  Camryn Mota PA-C

## 2022-06-14 NOTE — PATIENT INSTRUCTIONS
"https://www.diabeteseducator.org/docs/default-source/living-with-diabetes/conquering-the-grocery-store-v1.pdf?sfvrsn=4\">   Carbohydrate Counting for Diabetes Mellitus, Adult  Carbohydrate counting is a method of keeping track of how many carbohydrates you eat. Eating carbohydrates naturally increases the amount of sugar (glucose) in the blood. Counting how many carbohydrates you eat improves your blood glucose control, which helps you manage your diabetes.  It is important to know how many carbohydrates you can safely have in each meal. This is different for every person. A dietitian can help you make a meal plan and calculate how many carbohydrates you should have at each meal and snack.  What foods contain carbohydrates?  Carbohydrates are found in the following foods:  Grains, such as breads and cereals.  Dried beans and soy products.  Starchy vegetables, such as potatoes, peas, and corn.  Fruit and fruit juices.  Milk and yogurt.  Sweets and snack foods, such as cake, cookies, candy, chips, and soft drinks.  How do I count carbohydrates in foods?  There are two ways to count carbohydrates in food. You can read food labels or learn standard serving sizes of foods. You can use either of the methods or a combination of both.  Using the Nutrition Facts label  The Nutrition Facts list is included on the labels of almost all packaged foods and beverages in the [place] It includes:  The serving size.  Information about nutrients in each serving, including the grams (g) of carbohydrate per serving.  To use the Nutrition Facts:  Decide how many servings you will have.  Multiply the number of servings by the number of carbohydrates per serving.  The resulting number is the total amount of carbohydrates that you will be having.  Learning the standard serving sizes of foods  When you eat carbohydrate foods that are not packaged or do not include Nutrition Facts on the label, you need to measure the servings in order to count " the amount of carbohydrates.  Measure the foods that you will eat with a food scale or measuring cup, if needed.  Decide how many standard-size servings you will eat.  Multiply the number of servings by 15. For foods that contain carbohydrates, one serving equals 15 g of carbohydrates.  For example, if you eat 2 cups or 10 oz (300 g) of strawberries, you will have eaten 2 servings and 30 g of carbohydrates (2 servings x 15 g = 30 g).  For foods that have more than one food mixed, such as soups and casseroles, you must count the carbohydrates in each food that is included.  The following list contains standard serving sizes of common carbohydrate-rich foods. Each of these servings has about 15 g of carbohydrates:  1 slice of bread.  1 six-inch (15 cm) tortilla.  ? cup or 2 oz (53 g) cooked rice or pasta.  ½ cup or 3 oz (85 g) cooked or canned, drained and rinsed beans or lentils.  ½ cup or 3 oz (85 g) starchy vegetable, such as peas, corn, or squash.  ½ cup or 4 oz (120 g) hot cereal.  ½ cup or 3 oz (85 g) boiled or mashed potatoes, or ¼ or 3 oz (85 g) of a large baked potato.  ½ cup or 4 fl oz (118 mL) fruit juice.  1 cup or 8 fl oz (237 mL) milk.  1 small or 4 oz (106 g) apple.  ½ or 2 oz (63 g) of a medium banana.  1 cup or 5 oz (150 g) strawberries.  3 cups or 1 oz (24 g) popped popcorn.  What is an example of carbohydrate counting?  To calculate the number of carbohydrates in this sample meal, follow the steps shown below.  Sample meal  3 oz (85 g) chicken breast.  ? cup or 4 oz (106 g) brown rice.  ½ cup or 3 oz (85 g) corn.  1 cup or 8 fl oz (237 mL) milk.  1 cup or 5 oz (150 g) strawberries with sugar-free whipped topping.  Carbohydrate calculation  Identify the foods that contain carbohydrates:  Rice.  Corn.  Milk.  Strawberries.  Calculate how many servings you have of each food:  2 servings rice.  1 serving corn.  1 serving milk.  1 serving strawberries.  Multiply each number of servings by 15   servings rice x 15 g = 30 g.  1 serving corn x 15 g = 15 g.  1 serving milk x 15 g = 15 g.  1 serving strawberries x 15 g = 15 g.  Add together all of the amounts to find the total grams of carbohydrates eaten:  30 g + 15 g + 15 g + 15 g = 75 g of carbohydrates total.  What are tips for following this plan?  Shopping  Develop a meal plan and then make a shopping list.  Buy fresh and frozen vegetables, fresh and frozen fruit, dairy, eggs, beans, lentils, and whole grains.  Look at food labels. Choose foods that have more fiber and less sugar.  Avoid processed foods and foods with added sugars.  Meal planning  Aim to have the same amount of carbohydrates at each meal and for each snack time.  Plan to have regular, balanced meals and snacks.  Where to find more information  American Diabetes Association: www.diabetes.org  Centers for Disease Control and Prevention: www.cdc.gov  Summary  Carbohydrate counting is a method of keeping track of how many carbohydrates you eat.  Eating carbohydrates naturally increases the amount of sugar (glucose) in the blood.  Counting how many carbohydrates you eat improves your blood glucose control, which helps you manage your diabetes.  A dietitian can help you make a meal plan and calculate how many carbohydrates you should have at each meal and snack.  This information is not intended to replace advice given to you by your health care provider. Make sure you discuss any questions you have with your health care provider.  Document Revised: 12/17/2020 Document Reviewed: 12/18/2020  [employer] Patient Education © 2021 [employer]

## 2022-07-06 DIAGNOSIS — E78.2 MIXED HYPERLIPIDEMIA: ICD-10-CM

## 2022-07-07 RX ORDER — OMEGA-3-ACID ETHYL ESTERS 1 G/1
CAPSULE, LIQUID FILLED ORAL
Qty: 360 CAPSULE | Refills: 0 | Status: SHIPPED | OUTPATIENT
Start: 2022-07-07 | End: 2022-10-10

## 2022-07-08 RX ORDER — CLOPIDOGREL BISULFATE 75 MG/1
TABLET ORAL
Qty: 90 TABLET | Refills: 0 | Status: SHIPPED | OUTPATIENT
Start: 2022-07-08 | End: 2022-09-09 | Stop reason: SDUPTHER

## 2022-07-12 ENCOUNTER — OFFICE VISIT (OUTPATIENT)
Dept: FAMILY MEDICINE CLINIC | Facility: CLINIC | Age: 77
End: 2022-07-12

## 2022-07-12 VITALS
DIASTOLIC BLOOD PRESSURE: 80 MMHG | WEIGHT: 202 LBS | BODY MASS INDEX: 35.79 KG/M2 | OXYGEN SATURATION: 91 % | HEIGHT: 63 IN | HEART RATE: 101 BPM | SYSTOLIC BLOOD PRESSURE: 144 MMHG | TEMPERATURE: 97.5 F

## 2022-07-12 DIAGNOSIS — E78.2 MIXED HYPERLIPIDEMIA: Chronic | ICD-10-CM

## 2022-07-12 DIAGNOSIS — I10 ESSENTIAL HYPERTENSION: Chronic | ICD-10-CM

## 2022-07-12 DIAGNOSIS — E11.65 TYPE 2 DIABETES MELLITUS WITH HYPERGLYCEMIA, WITHOUT LONG-TERM CURRENT USE OF INSULIN: Primary | Chronic | ICD-10-CM

## 2022-07-12 PROCEDURE — 99214 OFFICE O/P EST MOD 30 MIN: CPT | Performed by: PHYSICIAN ASSISTANT

## 2022-07-12 RX ORDER — SEMAGLUTIDE 1.34 MG/ML
INJECTION, SOLUTION SUBCUTANEOUS
COMMUNITY
Start: 2022-06-14 | End: 2022-07-12

## 2022-07-12 NOTE — PROGRESS NOTES
"Subjective   Evie Shah is a 77 y.o. female.       Chief Complaint -diabetes    History of Present Illness -    ROS    Diabetes-  Not at goal as patient has blood glucose log with her today with readings fasting 170-200 since last visit.  She has discontinued the glyburide and has been on Ozempic.  There was some confusion with dosing and she has been taking Ozempic 0.5 mg weekly.  She continues to use the Jardiance and Januvia    Hypertension-  Not at goal with Lotrel and furosemide.  Patient has some significant stressors as someone has opened credit card accounts and charged money under her name.  She reports she is in the process of handling the issue but it is stressing her out.    Hyperlipidemia-stable with atorvastatin and diet    The following portions of the patient's history were reviewed and updated as appropriate: allergies, current medications, past family history, past medical history, past social history, past surgical history and problem list.    Review of Systems    Objective  Vital signs:  /80   Pulse 101   Temp 97.5 °F (36.4 °C) (Temporal)   Ht 160 cm (62.99\")   Wt 91.6 kg (202 lb)   SpO2 91%   BMI 35.79 kg/m²     Physical Exam  Vitals and nursing note reviewed.   Constitutional:       Appearance: Normal appearance. She is well-developed.   Eyes:      Extraocular Movements: Extraocular movements intact.      Conjunctiva/sclera: Conjunctivae normal.   Cardiovascular:      Rate and Rhythm: Normal rate and regular rhythm.      Heart sounds: Normal heart sounds. No murmur heard.  Pulmonary:      Effort: Pulmonary effort is normal. No respiratory distress.      Breath sounds: Normal breath sounds. No wheezing.   Musculoskeletal:         General: No tenderness.   Skin:     General: Skin is warm and dry.      Findings: No rash.   Neurological:      Mental Status: She is alert and oriented to person, place, and time.   Psychiatric:         Mood and Affect: Mood normal.         " Behavior: Behavior normal.         Thought Content: Thought content normal.         The following data was reviewed by: RUBI Michael on 07/12/2022:  CMP    CMP 9/30/21 3/7/22 6/7/22   Glucose 248 (A) 170 (A) 173 (A)   BUN 25 21 17   Creatinine 0.96 0.86 0.77   eGFR Non  Am 58 (A)     eGFR African Am 67     Sodium 139 142 141   Potassium 4.4 4.2 3.8   Chloride 101 103 103   Calcium 9.7 9.9 9.1   Total Protein 7.1 6.9 6.9   Albumin 4.7 4.50 4.40   Globulin 2.4 2.4 2.5   Total Bilirubin 0.4 0.6 0.6   Alkaline Phosphatase 113 110 93   AST (SGOT) 23 14 15   ALT (SGPT) 20 17 16   (A) Abnormal value       Comments are available for some flowsheets but are not being displayed.           CBC w/diff    CBC w/Diff 3/7/22 3/24/22 4/28/22   WBC 10.78 10.10 10.40   RBC 5.67 (A) 5.44 (A) 5.44 (A)   Hemoglobin 17.0 (A) 16.2 (A) 16.2 (A)   Hematocrit 51.0 (A) 49.7 (A) 50.6 (A)   MCV 89.9 91.4 93.0   MCH 30.0 29.8 29.8   MCHC 33.3 32.6 32.0   RDW 13.2 14.0 13.7   Platelets 254 229 223   Neutrophil Rel % 64.3 65.5 62.7   Immature Granulocyte Rel %  0.6 (A) 0.5   Lymphocyte Rel % 25.2 22.9 26.2   Monocyte Rel % 7.0 7.5 8.0   Eosinophil Rel % 2.1 2.8 2.0   Basophil Rel % 0.6 0.7 0.6   (A) Abnormal value            Lipid Panel    Lipid Panel 3/7/22 6/7/22   Total Cholesterol 119 127   Triglycerides 187 (A) 227 (A)   HDL Cholesterol 47 42   VLDL Cholesterol 30 36   LDL Cholesterol  42 49   (A) Abnormal value       Comments are available for some flowsheets but are not being displayed.               Most Recent A1C    HGBA1C Most Recent 6/7/22   Hemoglobin A1C 7.90 (A)   (A) Abnormal value       Comments are available for some flowsheets but are not being displayed.                  Assessment & Plan     Diagnoses and all orders for this visit:    1. Type 2 diabetes mellitus with hyperglycemia, without long-term current use of insulin (HCC) (Primary)  Comments:  Increase Ozempic 1 mg weekly  Continue Jardiance and  Januvia  Advised a low carbohydrate diabetic diet  Orders:  -     Semaglutide, 1 MG/DOSE, 4 MG/3ML solution pen-injector; Inject 1 mg under the skin into the appropriate area as directed 1 (One) Time Per Week.  Dispense: 3 mL; Refill: 5    2. Mixed hyperlipidemia  Comments:  Continue atorvastatin  Advised a low cholesterol diet    3. Essential hypertension  Comments:  Likely elevated secondary to temporary life stressors  Continue Lotrel and furosemide  Continue to monitor BP and pulse daily advised            Patient was given instructions and counseling regarding his condition or for health maintenance advice. Please see specific information pulled into the AVS if appropriate      This document has been electronically signed by:  Camryn Mota PA-C

## 2022-07-19 ENCOUNTER — TELEPHONE (OUTPATIENT)
Dept: FAMILY MEDICINE CLINIC | Facility: CLINIC | Age: 77
End: 2022-07-19

## 2022-07-19 NOTE — TELEPHONE ENCOUNTER
Patient called back letting us know that she can't get her medicine. I let her know that I looked and saw that it needed a PA and that Christi will work on it for and give her a call when she gets an approval or denial back. Patient is in understanding.

## 2022-07-19 NOTE — TELEPHONE ENCOUNTER
Caller: Evie Shah    Relationship: Self    Best call back number:     Requested Prescriptions:   PATIENT ADVISED IT WAS HER NEW DIABETIC MEDICATION     Pharmacy where request should be sent: [facility] [address] [place] [address] [phone] [facility] [phone] FX     Additional details provided by patient: PATIENT ADVISED THAT HER NEW DIABETIC MEDICATION WAS DENIED, AND SHE WENT TO TAKE HER OLD MEDICATION, OZEMPIC AND THAT DIDN'T WORK; SHE ALSO TRIED TO GET IT REFILLED AND THE [employer] REJECTED IT AS WELL ; PATIENT HAS BEEN WITHOUT HER MEDICATION FOR 5 DAYS  NOW    Does the patient have less than a 3 day supply:  [x] Yes  [] No    Laith Tucker Rep   07/19/22 08:31 EDT     [facility]

## 2022-07-21 DIAGNOSIS — F41.1 GAD (GENERALIZED ANXIETY DISORDER): ICD-10-CM

## 2022-07-21 DIAGNOSIS — E78.2 DM TYPE 2 WITH DIABETIC MIXED HYPERLIPIDEMIA: Chronic | ICD-10-CM

## 2022-07-21 DIAGNOSIS — J30.1 NON-SEASONAL ALLERGIC RHINITIS DUE TO POLLEN: ICD-10-CM

## 2022-07-21 DIAGNOSIS — E11.69 DM TYPE 2 WITH DIABETIC MIXED HYPERLIPIDEMIA: Chronic | ICD-10-CM

## 2022-07-21 RX ORDER — BUPROPION HYDROCHLORIDE 150 MG/1
TABLET, EXTENDED RELEASE ORAL
Qty: 180 TABLET | Refills: 0 | Status: SHIPPED | OUTPATIENT
Start: 2022-07-21 | End: 2022-07-29

## 2022-07-21 RX ORDER — EMPAGLIFLOZIN 25 MG/1
TABLET, FILM COATED ORAL
Qty: 90 TABLET | Refills: 0 | Status: SHIPPED | OUTPATIENT
Start: 2022-07-21 | End: 2022-09-09 | Stop reason: SDUPTHER

## 2022-07-21 RX ORDER — MONTELUKAST SODIUM 10 MG/1
TABLET ORAL
Qty: 90 TABLET | Refills: 0 | Status: SHIPPED | OUTPATIENT
Start: 2022-07-21 | End: 2022-08-12

## 2022-07-29 DIAGNOSIS — F41.1 GAD (GENERALIZED ANXIETY DISORDER): ICD-10-CM

## 2022-07-29 RX ORDER — BUPROPION HYDROCHLORIDE 150 MG/1
TABLET, EXTENDED RELEASE ORAL
Qty: 180 TABLET | Refills: 0 | Status: SHIPPED | OUTPATIENT
Start: 2022-07-29 | End: 2022-09-09 | Stop reason: SDUPTHER

## 2022-08-12 ENCOUNTER — OFFICE VISIT (OUTPATIENT)
Dept: FAMILY MEDICINE CLINIC | Facility: CLINIC | Age: 77
End: 2022-08-12

## 2022-08-12 VITALS
DIASTOLIC BLOOD PRESSURE: 84 MMHG | TEMPERATURE: 96.8 F | HEART RATE: 107 BPM | WEIGHT: 201 LBS | HEIGHT: 63 IN | SYSTOLIC BLOOD PRESSURE: 164 MMHG | BODY MASS INDEX: 35.61 KG/M2 | OXYGEN SATURATION: 92 %

## 2022-08-12 DIAGNOSIS — J30.1 NON-SEASONAL ALLERGIC RHINITIS DUE TO POLLEN: ICD-10-CM

## 2022-08-12 DIAGNOSIS — E11.65 TYPE 2 DIABETES MELLITUS WITH HYPERGLYCEMIA, WITHOUT LONG-TERM CURRENT USE OF INSULIN: Chronic | ICD-10-CM

## 2022-08-12 DIAGNOSIS — E11.69 DM TYPE 2 WITH DIABETIC MIXED HYPERLIPIDEMIA: Chronic | ICD-10-CM

## 2022-08-12 DIAGNOSIS — E78.2 DM TYPE 2 WITH DIABETIC MIXED HYPERLIPIDEMIA: Chronic | ICD-10-CM

## 2022-08-12 DIAGNOSIS — J44.1 COPD WITH ACUTE EXACERBATION: Primary | Chronic | ICD-10-CM

## 2022-08-12 DIAGNOSIS — I10 ESSENTIAL HYPERTENSION: Chronic | ICD-10-CM

## 2022-08-12 PROCEDURE — 99214 OFFICE O/P EST MOD 30 MIN: CPT | Performed by: PHYSICIAN ASSISTANT

## 2022-08-12 RX ORDER — MONTELUKAST SODIUM 10 MG/1
TABLET ORAL
Qty: 90 TABLET | Refills: 0 | Status: SHIPPED | OUTPATIENT
Start: 2022-08-12 | End: 2023-02-03

## 2022-08-12 RX ORDER — DOXYCYCLINE HYCLATE 100 MG/1
100 CAPSULE ORAL 2 TIMES DAILY
Qty: 20 CAPSULE | Refills: 0 | Status: SHIPPED | OUTPATIENT
Start: 2022-08-12 | End: 2022-08-22

## 2022-08-12 RX ORDER — METOPROLOL SUCCINATE 25 MG/1
25 TABLET, EXTENDED RELEASE ORAL DAILY
Qty: 30 TABLET | Refills: 5 | Status: SHIPPED | OUTPATIENT
Start: 2022-08-12 | End: 2022-09-09 | Stop reason: SDUPTHER

## 2022-08-12 NOTE — PROGRESS NOTES
"Subjective   Evie Shah is a 77 y.o. female.       Chief Complaint -cough    History of Present Illness -    ROS    Cough-  Patient complains of productive cough with green sputum, wheezing and chest congestion that began 5 days ago.  Minimal relief with albuterol nebulizer treatment and oxygen liters.  Reports no home COVID test today.    COPD-  Not at goal due to acute illness    Diabetes mellitus-  Not at goal with Jardiance, Ozempic and Januvia.  Patient states that she is eating lower carbohydrate foods.  She was unable to tolerate metformin in the past secondary to diarrhea.    Hypertension- not at goal with Lotrel 10/40 and Lasix 20 mg daily.    The following portions of the patient's history were reviewed and updated as appropriate: allergies, current medications, past family history, past medical history, past social history, past surgical history and problem list.    Review of Systems    Objective  Vital signs:  /84   Pulse 107   Temp 96.8 °F (36 °C) (Temporal)   Ht 160 cm (62.99\")   Wt 91.2 kg (201 lb)   SpO2 92%   BMI 35.62 kg/m²     Physical Exam  Vitals and nursing note reviewed.   Constitutional:       General: She is not in acute distress.     Appearance: Normal appearance. She is well-developed. She is not diaphoretic.   HENT:      Head: Normocephalic and atraumatic.      Right Ear: Tympanic membrane, ear canal and external ear normal.      Left Ear: Tympanic membrane, ear canal and external ear normal.      Nose: Nose normal.      Mouth/Throat:      Mouth: Mucous membranes are moist.      Pharynx: No oropharyngeal exudate or posterior oropharyngeal erythema.   Eyes:      Extraocular Movements: Extraocular movements intact.      Conjunctiva/sclera: Conjunctivae normal.   Neck:      Thyroid: No thyromegaly.   Cardiovascular:      Rate and Rhythm: Normal rate and regular rhythm.      Heart sounds: Normal heart sounds. No murmur heard.  Pulmonary:      Effort: Pulmonary " effort is normal. No respiratory distress.      Breath sounds: Wheezing present. No rales.   Musculoskeletal:         General: No tenderness.      Cervical back: Normal range of motion and neck supple.   Lymphadenopathy:      Cervical: No cervical adenopathy.   Skin:     General: Skin is warm and dry.      Findings: No rash.   Neurological:      Mental Status: She is alert and oriented to person, place, and time.   Psychiatric:         Mood and Affect: Mood normal.         Behavior: Behavior normal.         Thought Content: Thought content normal.         The following data was reviewed by: RUBI Michael on 08/12/2022:  CMP    CMP 9/30/21 3/7/22 6/7/22   Glucose 248 (A) 170 (A) 173 (A)   BUN 25 21 17   Creatinine 0.96 0.86 0.77   eGFR Non  Am 58 (A)     eGFR African Am 67     Sodium 139 142 141   Potassium 4.4 4.2 3.8   Chloride 101 103 103   Calcium 9.7 9.9 9.1   Total Protein 7.1 6.9 6.9   Albumin 4.7 4.50 4.40   Globulin 2.4 2.4 2.5   Total Bilirubin 0.4 0.6 0.6   Alkaline Phosphatase 113 110 93   AST (SGOT) 23 14 15   ALT (SGPT) 20 17 16   (A) Abnormal value       Comments are available for some flowsheets but are not being displayed.           CBC w/diff    CBC w/Diff 3/7/22 3/24/22 4/28/22   WBC 10.78 10.10 10.40   RBC 5.67 (A) 5.44 (A) 5.44 (A)   Hemoglobin 17.0 (A) 16.2 (A) 16.2 (A)   Hematocrit 51.0 (A) 49.7 (A) 50.6 (A)   MCV 89.9 91.4 93.0   MCH 30.0 29.8 29.8   MCHC 33.3 32.6 32.0   RDW 13.2 14.0 13.7   Platelets 254 229 223   Neutrophil Rel % 64.3 65.5 62.7   Immature Granulocyte Rel %  0.6 (A) 0.5   Lymphocyte Rel % 25.2 22.9 26.2   Monocyte Rel % 7.0 7.5 8.0   Eosinophil Rel % 2.1 2.8 2.0   Basophil Rel % 0.6 0.7 0.6   (A) Abnormal value            Lipid Panel    Lipid Panel 3/7/22 6/7/22   Total Cholesterol 119 127   Triglycerides 187 (A) 227 (A)   HDL Cholesterol 47 42   VLDL Cholesterol 30 36   LDL Cholesterol  42 49   (A) Abnormal value       Comments are available for some  flowsheets but are not being displayed.               Most Recent A1C    HGBA1C Most Recent 6/7/22   Hemoglobin A1C 7.90 (A)   (A) Abnormal value       Comments are available for some flowsheets but are not being displayed.                  Assessment & Plan     Diagnoses and all orders for this visit:    1. COPD with acute exacerbation (HCC) (Primary)  Comments:  Start doxycycline  Advised albuterol inhaler use   RTC if symptoms persist or worsen greater than 1 week  Orders:  -     doxycycline (VIBRAMYCIN) 100 MG capsule; Take 1 capsule by mouth 2 (Two) Times a Day for 10 days.  Dispense: 20 capsule; Refill: 0    2. Type 2 diabetes mellitus with hyperglycemia, without long-term current use of insulin (HCC)  Comments:  Continue Jardiance, Ozempic, Januvia and advised a low carbohydrate diet  Refer to diabetic specialist for further evaluation  Orders:  -     Blood Glucose Monitoring Suppl misc; 1 each 3 (Three) Times a Day.  Dispense: 1 each; Refill: 0  -     glucose blood test strip; Use as instructed  Dispense: 100 each; Refill: 11  -     Lancets 30G misc; 1 each 3 (Three) Times a Day.  Dispense: 100 each; Refill: 11    3. Essential hypertension  Comments:  Start Toprol-XL 25 mg daily  Continue Lotrel and Lasix  Advise daily BP and pulse monitoring  Orders:  -     metoprolol succinate XL (Toprol XL) 25 MG 24 hr tablet; Take 1 tablet by mouth Daily.  Dispense: 30 tablet; Refill: 5    4. DM type 2 with diabetic mixed hyperlipidemia (HCC)  Comments:  Continue Jardiance and glyburide  Advised a more strict diabetic diet  Advised fasting and 2-hour PP blood glucose readings            Patient was given instructions and counseling regarding his condition or for health maintenance advice. Please see specific information pulled into the AVS if appropriate      This document has been electronically signed by:  Camryn Mota PA-C

## 2022-08-14 NOTE — PATIENT INSTRUCTIONS
"https://www.diabeteseducator.org/docs/default-source/living-with-diabetes/conquering-the-grocery-store-v1.pdf?sfvrsn=4\">   Carbohydrate Counting for Diabetes Mellitus, Adult  Carbohydrate counting is a method of keeping track of how many carbohydrates you eat. Eating carbohydrates naturally increases the amount of sugar (glucose) in the blood. Counting how many carbohydrates you eat improves your blood glucose control, which helps you manage your diabetes.  It is important to know how many carbohydrates you can safely have in each meal. This is different for every person. A dietitian can help you make a meal plan and calculate how many carbohydrates you should have at each meal and snack.  What foods contain carbohydrates?  Carbohydrates are found in the following foods:  Grains, such as breads and cereals.  Dried beans and soy products.  Starchy vegetables, such as potatoes, peas, and corn.  Fruit and fruit juices.  Milk and yogurt.  Sweets and snack foods, such as cake, cookies, candy, chips, and soft drinks.  How do I count carbohydrates in foods?  There are two ways to count carbohydrates in food. You can read food labels or learn standard serving sizes of foods. You can use either of the methods or a combination of both.  Using the Nutrition Facts label  The Nutrition Facts list is included on the labels of almost all packaged foods and beverages in the [place] It includes:  The serving size.  Information about nutrients in each serving, including the grams (g) of carbohydrate per serving.  To use the Nutrition Facts:  Decide how many servings you will have.  Multiply the number of servings by the number of carbohydrates per serving.  The resulting number is the total amount of carbohydrates that you will be having.  Learning the standard serving sizes of foods  When you eat carbohydrate foods that are not packaged or do not include Nutrition Facts on the label, you need to measure the servings in order to count " the amount of carbohydrates.  Measure the foods that you will eat with a food scale or measuring cup, if needed.  Decide how many standard-size servings you will eat.  Multiply the number of servings by 15. For foods that contain carbohydrates, one serving equals 15 g of carbohydrates.  For example, if you eat 2 cups or 10 oz (300 g) of strawberries, you will have eaten 2 servings and 30 g of carbohydrates (2 servings x 15 g = 30 g).  For foods that have more than one food mixed, such as soups and casseroles, you must count the carbohydrates in each food that is included.  The following list contains standard serving sizes of common carbohydrate-rich foods. Each of these servings has about 15 g of carbohydrates:  1 slice of bread.  1 six-inch (15 cm) tortilla.  ? cup or 2 oz (53 g) cooked rice or pasta.  ½ cup or 3 oz (85 g) cooked or canned, drained and rinsed beans or lentils.  ½ cup or 3 oz (85 g) starchy vegetable, such as peas, corn, or squash.  ½ cup or 4 oz (120 g) hot cereal.  ½ cup or 3 oz (85 g) boiled or mashed potatoes, or ¼ or 3 oz (85 g) of a large baked potato.  ½ cup or 4 fl oz (118 mL) fruit juice.  1 cup or 8 fl oz (237 mL) milk.  1 small or 4 oz (106 g) apple.  ½ or 2 oz (63 g) of a medium banana.  1 cup or 5 oz (150 g) strawberries.  3 cups or 1 oz (24 g) popped popcorn.  What is an example of carbohydrate counting?  To calculate the number of carbohydrates in this sample meal, follow the steps shown below.  Sample meal  3 oz (85 g) chicken breast.  ? cup or 4 oz (106 g) brown rice.  ½ cup or 3 oz (85 g) corn.  1 cup or 8 fl oz (237 mL) milk.  1 cup or 5 oz (150 g) strawberries with sugar-free whipped topping.  Carbohydrate calculation  Identify the foods that contain carbohydrates:  Rice.  Corn.  Milk.  Strawberries.  Calculate how many servings you have of each food:  2 servings rice.  1 serving corn.  1 serving milk.  1 serving strawberries.  Multiply each number of servings by 15   servings rice x 15 g = 30 g.  1 serving corn x 15 g = 15 g.  1 serving milk x 15 g = 15 g.  1 serving strawberries x 15 g = 15 g.  Add together all of the amounts to find the total grams of carbohydrates eaten:  30 g + 15 g + 15 g + 15 g = 75 g of carbohydrates total.  What are tips for following this plan?  Shopping  Develop a meal plan and then make a shopping list.  Buy fresh and frozen vegetables, fresh and frozen fruit, dairy, eggs, beans, lentils, and whole grains.  Look at food labels. Choose foods that have more fiber and less sugar.  Avoid processed foods and foods with added sugars.  Meal planning  Aim to have the same amount of carbohydrates at each meal and for each snack time.  Plan to have regular, balanced meals and snacks.  Where to find more information  American Diabetes Association: www.diabetes.org  Centers for Disease Control and Prevention: www.cdc.gov  Summary  Carbohydrate counting is a method of keeping track of how many carbohydrates you eat.  Eating carbohydrates naturally increases the amount of sugar (glucose) in the blood.  Counting how many carbohydrates you eat improves your blood glucose control, which helps you manage your diabetes.  A dietitian can help you make a meal plan and calculate how many carbohydrates you should have at each meal and snack.  This information is not intended to replace advice given to you by your health care provider. Make sure you discuss any questions you have with your health care provider.  Document Revised: 12/17/2020 Document Reviewed: 12/18/2020  [employer] Patient Education © 2021 [employer]

## 2022-08-18 DIAGNOSIS — I10 ESSENTIAL HYPERTENSION: ICD-10-CM

## 2022-08-18 RX ORDER — AMLODIPINE BESYLATE AND BENAZEPRIL HYDROCHLORIDE 10; 40 MG/1; MG/1
CAPSULE ORAL
Qty: 90 CAPSULE | Refills: 0 | Status: SHIPPED | OUTPATIENT
Start: 2022-08-18 | End: 2022-09-09 | Stop reason: SDUPTHER

## 2022-08-21 DIAGNOSIS — M85.80 OSTEOPENIA, UNSPECIFIED LOCATION: ICD-10-CM

## 2022-08-22 RX ORDER — RALOXIFENE HYDROCHLORIDE 60 MG/1
TABLET, FILM COATED ORAL
Qty: 30 TABLET | Refills: 0 | Status: SHIPPED | OUTPATIENT
Start: 2022-08-22 | End: 2022-09-09 | Stop reason: SDUPTHER

## 2022-08-26 ENCOUNTER — OFFICE VISIT (OUTPATIENT)
Dept: FAMILY MEDICINE CLINIC | Facility: CLINIC | Age: 77
End: 2022-08-26

## 2022-08-26 DIAGNOSIS — E78.5 DYSLIPIDEMIA: ICD-10-CM

## 2022-08-26 DIAGNOSIS — I10 ESSENTIAL HYPERTENSION: Chronic | ICD-10-CM

## 2022-08-26 DIAGNOSIS — E11.65 TYPE 2 DIABETES MELLITUS WITH HYPERGLYCEMIA, WITHOUT LONG-TERM CURRENT USE OF INSULIN: Primary | Chronic | ICD-10-CM

## 2022-08-26 PROCEDURE — 99214 OFFICE O/P EST MOD 30 MIN: CPT | Performed by: NURSE PRACTITIONER

## 2022-08-26 NOTE — PROGRESS NOTES
History of Present Illness  Evie Shah is a 77 y.o. female who presents to the office today pertaining to her diabetes, type 2.  In addition Evie has been diagnosed with hypertension and dyslipidemia.  She has COPD in which she oxygen as part of her treatment plan.    Diabetes  She has type 2 diabetes mellitus. Pertinent negatives for hypoglycemia include no confusion, dizziness, headaches, nervousness/anxiousness, speech difficulty or tremors. Pertinent negatives for diabetes include no chest pain, no fatigue, no polydipsia, no polyphagia, no polyuria and no weakness. Risk factors for coronary artery disease include diabetes mellitus, dyslipidemia, hypertension, post-menopausal and sedentary lifestyle. Current diabetic treatment includes oral agent (dual therapy) (GLP-1). An ACE inhibitor/angiotensin II receptor blocker is being taken. Currently does live by herself and does find meal planning difficult.  She has made some lifestyle changes especially in her food choices.  Currently she is taking Jardiance, Januvia, and Ozempic.  She had previously been prescribed metformin which she could not tolerate due to diarrhea.  Lab Results   Component Value Date    HGBA1C 7.90 (H) 06/07/2022     Hypertension  This is a chronic problem. Pertinent negatives include no chest pain, headaches, palpitations or shortness of breath. Risk factors for coronary artery disease include diabetes mellitus, dyslipidemia, post-menopausal state and sedentary lifestyle. Current antihypertension treatment includes calcium channel blockers, diuretics and beta blockers.   Lab Results   Component Value Date    GLUCOSE 173 (H) 06/07/2022    BUN 17 06/07/2022    CREATININE 0.77 06/07/2022    BCR 22.1 06/07/2022    K 3.8 06/07/2022    CO2 21.1 (L) 06/07/2022    CALCIUM 9.1 06/07/2022    PROTENTOTREF 6.9 06/07/2022    ALBUMIN 4.40 06/07/2022    LABIL2 1.8 06/07/2022    AST 15 06/07/2022    ALT 16 06/07/2022  "    Dyslipidemia  Pertinent negatives include no chest pain or shortness of breath. Current antihyperlipidemic treatment includes statins. Risk factors for coronary artery disease include dyslipidemia, diabetes mellitus, hypertension and post-menopausal.   Lab Results   Component Value Date    CHLPL 127 06/07/2022    CHLPL 119 03/07/2022    CHLPL 125 05/25/2021     Lab Results   Component Value Date    TRIG 227 (H) 06/07/2022    TRIG 187 (H) 03/07/2022    TRIG 183 (H) 05/25/2021     Lab Results   Component Value Date    HDL 42 06/07/2022    HDL 47 03/07/2022    HDL 46 05/25/2021     Lab Results   Component Value Date    LDL 49 06/07/2022    LDL 42 03/07/2022    LDL 49 05/25/2021     The following portions of the patient's history were reviewed and updated as appropriate: allergies, current medications, past family history, past medical history, past social history, past surgical history and problem list.    Review of Systems   Constitutional: Negative for activity change, appetite change, chills, fever and unexpected weight change.   HENT: Negative.    Eyes: Negative for visual disturbance.   Respiratory: Negative for cough and wheezing.    Cardiovascular: Negative for leg swelling.   Gastrointestinal: Negative for constipation, diarrhea, nausea and vomiting.   Endocrine: Negative for cold intolerance, heat intolerance, polydipsia, polyphagia and polyuria.   Musculoskeletal: Positive for arthralgias and gait problem.   Skin: Negative for color change and rash.   Neurological: Negative for dizziness and light-headedness.   Hematological: Negative for adenopathy.   Psychiatric/Behavioral: Negative for decreased concentration.   All other systems reviewed and are negative.    Vital signs:  /80 (BP Location: Left arm, Patient Position: Sitting, Cuff Size: Adult)   Pulse 71   Temp 97.7 °F (36.5 °C) (Temporal)   Resp 14   Ht 160 cm (63\")   Wt 89.8 kg (198 lb)   SpO2 93%   BMI 35.07 kg/m²     Physical " Exam  Vitals and nursing note reviewed.   Constitutional:       General: She is not in acute distress.     Appearance: She is well-developed.      Interventions: Nasal cannula in place.   HENT:      Head: Normocephalic.      Nose: Nose normal.   Eyes:      General: No scleral icterus.        Right eye: No discharge.         Left eye: No discharge.      Conjunctiva/sclera: Conjunctivae normal.   Neck:      Thyroid: No thyromegaly.      Vascular: No JVD.   Cardiovascular:      Rate and Rhythm: Normal rate and regular rhythm.      Heart sounds: Normal heart sounds. No murmur heard.    No friction rub.   Pulmonary:      Effort: Pulmonary effort is normal. No respiratory distress.      Breath sounds: Decreased breath sounds present. No wheezing, rhonchi or rales.   Abdominal:      General: There is no distension.      Palpations: Abdomen is soft.      Tenderness: There is no abdominal tenderness. There is no guarding.   Musculoskeletal:      Cervical back: Neck supple.      Right lower leg: No edema.      Left lower leg: No edema.   Lymphadenopathy:      Cervical: No cervical adenopathy.   Skin:     General: Skin is warm and dry.      Capillary Refill: Capillary refill takes less than 2 seconds.      Findings: No erythema or rash.   Neurological:      Mental Status: She is alert and oriented to person, place, and time.      Cranial Nerves: Cranial nerves are intact.   Psychiatric:         Mood and Affect: Mood and affect normal.         Speech: Speech normal.         Behavior: Behavior is cooperative.         Thought Content: Thought content normal.         Cognition and Memory: Cognition and memory normal.     Result Review previous labs reviewed  Class 2 Severe Obesity (BMI >=35 and <=39.9). Obesity-related health conditions include the following: hypertension, diabetes mellitus, dyslipidemias and GERD. Obesity is improving with lifestyle modifications. BMI  is above average; BMI management plan is completed. We  discussed portion control and increasing exercise.     Assessment & Plan     Diagnoses and all orders for this visit:    1. Type 2 diabetes mellitus with hyperglycemia, without long-term current use of insulin (HCC) (Primary)  Comments:  Findings and recommendations discussed with Evie.  She will continue Jardiance, Januvia and Ozempic    2. Essential hypertension  Comments:  Not at goal today.  Encouraged her to increase activity continue her weight loss program.  Continue Lotrel, furosemide and metoprolol    3. Dyslipidemia  Comments:  Continue atorvastatin and omega      Follow Up follow-up with PCP, Camryn Mota PA-C September 9. Follow-up with me on September 30  Findings and recommendations discussed with Evie. Reviewed test results and treatment options..  For now, she will continue Jardiance, Januvia, and Ozempic.  She is due for labs in September.  After those labs are received she will follow-up with me regarding medication changes if needed.  We will consider either increasing her Ozempic to 2 mg. She has just started the 1 mg at this time.  Another option would be a trial of Janumet at which she may be able to tolerate versus just plain metformin.  Counseled her regarding nutrition plan including carbohydrate daily intake.  Resources provided for her.  Lifestyle modifications reinforced including nutrition and activity recommendations.   Evie was given instructions and counseling regarding her condition or for health maintenance advice. Please see specific information pulled into the AVS if appropriate      This document has been electronically signed by:

## 2022-08-28 VITALS
SYSTOLIC BLOOD PRESSURE: 148 MMHG | DIASTOLIC BLOOD PRESSURE: 80 MMHG | HEIGHT: 63 IN | TEMPERATURE: 97.7 F | WEIGHT: 198 LBS | HEART RATE: 71 BPM | BODY MASS INDEX: 35.08 KG/M2 | OXYGEN SATURATION: 93 % | RESPIRATION RATE: 14 BRPM

## 2022-09-09 ENCOUNTER — OFFICE VISIT (OUTPATIENT)
Dept: FAMILY MEDICINE CLINIC | Facility: CLINIC | Age: 77
End: 2022-09-09

## 2022-09-09 VITALS
TEMPERATURE: 96.8 F | SYSTOLIC BLOOD PRESSURE: 148 MMHG | HEART RATE: 78 BPM | HEIGHT: 63 IN | BODY MASS INDEX: 34.91 KG/M2 | DIASTOLIC BLOOD PRESSURE: 78 MMHG | OXYGEN SATURATION: 100 % | WEIGHT: 197 LBS

## 2022-09-09 DIAGNOSIS — E11.65 TYPE 2 DIABETES MELLITUS WITH HYPERGLYCEMIA, WITHOUT LONG-TERM CURRENT USE OF INSULIN: Chronic | ICD-10-CM

## 2022-09-09 DIAGNOSIS — E78.2 DM TYPE 2 WITH DIABETIC MIXED HYPERLIPIDEMIA: Chronic | ICD-10-CM

## 2022-09-09 DIAGNOSIS — M85.80 OSTEOPENIA, UNSPECIFIED LOCATION: Chronic | ICD-10-CM

## 2022-09-09 DIAGNOSIS — F41.1 GAD (GENERALIZED ANXIETY DISORDER): ICD-10-CM

## 2022-09-09 DIAGNOSIS — I10 ESSENTIAL HYPERTENSION: Chronic | ICD-10-CM

## 2022-09-09 DIAGNOSIS — E11.69 DM TYPE 2 WITH DIABETIC MIXED HYPERLIPIDEMIA: Chronic | ICD-10-CM

## 2022-09-09 DIAGNOSIS — E55.9 VITAMIN D DEFICIENCY: ICD-10-CM

## 2022-09-09 DIAGNOSIS — I73.9 PAD (PERIPHERAL ARTERY DISEASE): Chronic | ICD-10-CM

## 2022-09-09 DIAGNOSIS — I10 ESSENTIAL HYPERTENSION: Primary | Chronic | ICD-10-CM

## 2022-09-09 DIAGNOSIS — J96.11 CHRONIC RESPIRATORY FAILURE WITH HYPOXIA: Chronic | ICD-10-CM

## 2022-09-09 PROCEDURE — 99214 OFFICE O/P EST MOD 30 MIN: CPT | Performed by: PHYSICIAN ASSISTANT

## 2022-09-09 RX ORDER — CLOPIDOGREL BISULFATE 75 MG/1
75 TABLET ORAL DAILY
Qty: 90 TABLET | Refills: 3 | Status: SHIPPED | OUTPATIENT
Start: 2022-09-09

## 2022-09-09 RX ORDER — RALOXIFENE HYDROCHLORIDE 60 MG/1
60 TABLET, FILM COATED ORAL DAILY
Qty: 90 TABLET | Refills: 3 | Status: SHIPPED | OUTPATIENT
Start: 2022-09-09

## 2022-09-09 RX ORDER — AMLODIPINE BESYLATE AND BENAZEPRIL HYDROCHLORIDE 10; 40 MG/1; MG/1
1 CAPSULE ORAL DAILY
Qty: 90 CAPSULE | Refills: 3 | Status: SHIPPED | OUTPATIENT
Start: 2022-09-09

## 2022-09-09 RX ORDER — BUPROPION HYDROCHLORIDE 150 MG/1
150 TABLET, EXTENDED RELEASE ORAL EVERY 12 HOURS
Qty: 180 TABLET | Refills: 3 | Status: SHIPPED | OUTPATIENT
Start: 2022-09-09

## 2022-09-09 RX ORDER — ALBUTEROL SULFATE 90 UG/1
2 AEROSOL, METERED RESPIRATORY (INHALATION) EVERY 4 HOURS PRN
Qty: 18 G | Refills: 5 | Status: SHIPPED | OUTPATIENT
Start: 2022-09-09

## 2022-09-09 RX ORDER — METOPROLOL SUCCINATE 25 MG/1
25 TABLET, EXTENDED RELEASE ORAL DAILY
Qty: 90 TABLET | Refills: 3 | Status: SHIPPED | OUTPATIENT
Start: 2022-09-09

## 2022-09-11 NOTE — PATIENT INSTRUCTIONS
Fall Prevention in the Home, Adult  Falls can cause injuries and can happen to people of all ages. There are many things you can do to make your home safe and to help prevent falls. Ask for help when making these changes.  What actions can I take to prevent falls?  General Instructions  Use good lighting in all rooms. Replace any light bulbs that burn out.  Turn on the lights in dark areas. Use night-lights.  Keep items that you use often in easy-to-reach places. Lower the shelves around your home if needed.  Set up your furniture so you have a clear path. Avoid moving your furniture around.  Do not have throw rugs or other things on the floor that can make you trip.  Avoid walking on wet floors.  If any of your floors are uneven, fix them.  Add color or contrast paint or tape to clearly bambi and help you see:  Grab bars or handrails.  First and last steps of staircases.  Where the edge of each step is.  If you use a stepladder:  Make sure that it is fully opened. Do not climb a closed stepladder.  Make sure the sides of the stepladder are locked in place.  Ask someone to hold the stepladder while you use it.  Know where your pets are when moving through your home.  What can I do in the bathroom?         Keep the floor dry. Clean up any water on the floor right away.  Remove soap buildup in the tub or shower.  Use nonskid mats or decals on the floor of the tub or shower.  Attach bath mats securely with double-sided, nonslip rug tape.  If you need to sit down in the shower, use a plastic, nonslip stool.  Install grab bars by the toilet and in the tub and shower. Do not use towel bars as grab bars.  What can I do in the bedroom?  Make sure that you have a light by your bed that is easy to reach.  Do not use any sheets or blankets for your bed that hang to the floor.  Have a firm chair with side arms that you can use for support when you get dressed.  What can I do in the kitchen?  Clean up any spills right away.  If  you need to reach something above you, use a step stool with a grab bar.  Keep electrical cords out of the way.  Do not use floor polish or wax that makes floors slippery.  What can I do with my stairs?  Do not leave any items on the stairs.  Make sure that you have a light switch at the top and the bottom of the stairs.  Make sure that there are handrails on both sides of the stairs. Fix handrails that are broken or loose.  Install nonslip stair treads on all your stairs.  Avoid having throw rugs at the top or bottom of the stairs.  Choose a carpet that does not hide the edge of the steps on the stairs.  Check carpeting to make sure that it is firmly attached to the stairs. Fix carpet that is loose or worn.  What can I do on the outside of my home?  Use bright outdoor lighting.  Fix the edges of walkways and driveways and fix any cracks.  Remove anything that might make you trip as you walk through a door, such as a raised step or threshold.  Trim any bushes or trees on paths to your home.  Check to see if handrails are loose or broken and that both sides of all steps have handrails.  Install guardrails along the edges of any raised decks and porches.  Clear paths of anything that can make you trip, such as tools or rocks.  Have leaves, snow, or ice cleared regularly.  Use sand or salt on paths during winter.  Clean up any spills in your garage right away. This includes grease or oil spills.  What other actions can I take?  Wear shoes that:  Have a low heel. Do not wear high heels.  Have rubber bottoms.  Feel good on your feet and fit well.  Are closed at the toe. Do not wear open-toe sandals.  Use tools that help you move around if needed. These include:  Canes.  Walkers.  Scooters.  Crutches.  Review your medicines with your doctor. Some medicines can make you feel dizzy. This can increase your chance of falling.  Ask your doctor what else you can do to help prevent falls.  Where to find more information  Centers  "for Disease Control and Prevention, [facility]: www.cdc.gov  National [employer] on Aging: www.dionne.nih.gov  Contact a doctor if:  You are afraid of falling at home.  You feel weak, drowsy, or dizzy at home.  You fall at home.  Summary  There are many simple things that you can do to make your home safe and to help prevent falls.  Ways to make your home safe include removing things that can make you trip and installing grab bars in the bathroom.  Ask for help when making these changes in your home.  This information is not intended to replace advice given to you by your health care provider. Make sure you discuss any questions you have with your health care provider.  Document Revised: 07/21/2021 Document Reviewed: 07/21/2021  [employer] Patient Education © 2021 [email or web address].  https://www.diabeteseducator.org/docs/default-source/living-with-diabetes/conquering-the-grocery-store-v1.pdf?sfvrsn=4\">   Carbohydrate Counting for Diabetes Mellitus, Adult  Carbohydrate counting is a method of keeping track of how many carbohydrates you eat. Eating carbohydrates naturally increases the amount of sugar (glucose) in the blood. Counting how many carbohydrates you eat improves your blood glucose control, which helps you manage your diabetes.  It is important to know how many carbohydrates you can safely have in each meal. This is different for every person. A dietitian can help you make a meal plan and calculate how many carbohydrates you should have at each meal and snack.  What foods contain carbohydrates?  Carbohydrates are found in the following foods:  Grains, such as breads and cereals.  Dried beans and soy products.  Starchy vegetables, such as potatoes, peas, and corn.  Fruit and fruit juices.  Milk and yogurt.  Sweets and snack foods, such as cake, cookies, candy, chips, and soft drinks.  How do I count carbohydrates in foods?  There are two ways to count carbohydrates in food. You can read food labels or learn standard serving " sizes of foods. You can use either of the methods or a combination of both.  Using the Nutrition Facts label  The Nutrition Facts list is included on the labels of almost all packaged foods and beverages in the [place] It includes:  The serving size.  Information about nutrients in each serving, including the grams (g) of carbohydrate per serving.  To use the Nutrition Facts:  Decide how many servings you will have.  Multiply the number of servings by the number of carbohydrates per serving.  The resulting number is the total amount of carbohydrates that you will be having.  Learning the standard serving sizes of foods  When you eat carbohydrate foods that are not packaged or do not include Nutrition Facts on the label, you need to measure the servings in order to count the amount of carbohydrates.  Measure the foods that you will eat with a food scale or measuring cup, if needed.  Decide how many standard-size servings you will eat.  Multiply the number of servings by 15. For foods that contain carbohydrates, one serving equals 15 g of carbohydrates.  For example, if you eat 2 cups or 10 oz (300 g) of strawberries, you will have eaten 2 servings and 30 g of carbohydrates (2 servings x 15 g = 30 g).  For foods that have more than one food mixed, such as soups and casseroles, you must count the carbohydrates in each food that is included.  The following list contains standard serving sizes of common carbohydrate-rich foods. Each of these servings has about 15 g of carbohydrates:  1 slice of bread.  1 six-inch (15 cm) tortilla.  ? cup or 2 oz (53 g) cooked rice or pasta.  ½ cup or 3 oz (85 g) cooked or canned, drained and rinsed beans or lentils.  ½ cup or 3 oz (85 g) starchy vegetable, such as peas, corn, or squash.  ½ cup or 4 oz (120 g) hot cereal.  ½ cup or 3 oz (85 g) boiled or mashed potatoes, or ¼ or 3 oz (85 g) of a large baked potato.  ½ cup or 4 fl oz (118 mL) fruit juice.  1 cup or 8 fl oz (237 mL) milk.  1  small or 4 oz (106 g) apple.  ½ or 2 oz (63 g) of a medium banana.  1 cup or 5 oz (150 g) strawberries.  3 cups or 1 oz (24 g) popped popcorn.  What is an example of carbohydrate counting?  To calculate the number of carbohydrates in this sample meal, follow the steps shown below.  Sample meal  3 oz (85 g) chicken breast.  ? cup or 4 oz (106 g) brown rice.  ½ cup or 3 oz (85 g) corn.  1 cup or 8 fl oz (237 mL) milk.  1 cup or 5 oz (150 g) strawberries with sugar-free whipped topping.  Carbohydrate calculation  Identify the foods that contain carbohydrates:  Rice.  Corn.  Milk.  Strawberries.  Calculate how many servings you have of each food:  2 servings rice.  1 serving corn.  1 serving milk.  1 serving strawberries.  Multiply each number of servings by 15  servings rice x 15 g = 30 g.  1 serving corn x 15 g = 15 g.  1 serving milk x 15 g = 15 g.  1 serving strawberries x 15 g = 15 g.  Add together all of the amounts to find the total grams of carbohydrates eaten:  30 g + 15 g + 15 g + 15 g = 75 g of carbohydrates total.  What are tips for following this plan?  Shopping  Develop a meal plan and then make a shopping list.  Buy fresh and frozen vegetables, fresh and frozen fruit, dairy, eggs, beans, lentils, and whole grains.  Look at food labels. Choose foods that have more fiber and less sugar.  Avoid processed foods and foods with added sugars.  Meal planning  Aim to have the same amount of carbohydrates at each meal and for each snack time.  Plan to have regular, balanced meals and snacks.  Where to find more information  American Diabetes Association: www.diabetes.org  Centers for Disease Control and Prevention: www.cdc.gov  Summary  Carbohydrate counting is a method of keeping track of how many carbohydrates you eat.  Eating carbohydrates naturally increases the amount of sugar (glucose) in the blood.  Counting how many carbohydrates you eat improves your blood glucose control, which helps you manage your  diabetes.  A dietitian can help you make a meal plan and calculate how many carbohydrates you should have at each meal and snack.  This information is not intended to replace advice given to you by your health care provider. Make sure you discuss any questions you have with your health care provider.  Document Revised: 12/17/2020 Document Reviewed: 12/18/2020  [employer] Patient Education © 2021 [employer]  Fat and Cholesterol Restricted Eating Plan  Getting too much fat and cholesterol in your diet may cause health problems. Choosing the right foods helps keep your fat and cholesterol at normal levels. This can keep you from getting certain diseases.  Your doctor may recommend an eating plan that includes:  Total fat: ______% or less of total calories a day.  Saturated fat: ______% or less of total calories a day.  Cholesterol: less than _________mg a day.  Fiber: ______g a day.  What are tips for following this plan?  Meal planning  At meals, divide your plate into four equal parts:  Fill one-half of your plate with vegetables and green salads.  Fill one-fourth of your plate with whole grains.  Fill one-fourth of your plate with low-fat (lean) protein foods.  Eat fish that is high in omega-3 fats at least two times a week. This includes mackerel, tuna, sardines, and salmon.  Eat foods that are high in fiber, such as whole grains, beans, apples, broccoli, carrots, peas, and barley.  General tips    Work with your doctor to lose weight if you need to.  Avoid:  Foods with added sugar.  Fried foods.  Foods with partially hydrogenated oils.  Limit alcohol intake to no more than 1 drink a day for nonpregnant women and 2 drinks a day for men. One drink equals 12 oz of beer, 5 oz of wine, or 1½ oz of hard liquor.    Reading food labels  Check food labels for:  Trans fats.  Partially hydrogenated oils.  Saturated fat (g) in each serving.  Cholesterol (mg) in each serving.  Fiber (g) in each serving.  Choose foods with  "healthy fats, such as:  Monounsaturated fats.  Polyunsaturated fats.  Omega-3 fats.  Choose grain products that have whole grains. Look for the word \"whole\" as the first word in the ingredient list.  Cooking  Cook foods using low-fat methods. These include baking, boiling, grilling, and broiling.  Eat more home-cooked foods. Eat at restaurants and buffets less often.  Avoid cooking using saturated fats, such as butter, cream, palm oil, palm kernel oil, and coconut oil.  Recommended foods    Fruits  All fresh, canned (in natural juice), or frozen fruits.  Vegetables  Fresh or frozen vegetables (raw, steamed, roasted, or grilled). Green salads.  Grains  Whole grains, such as whole wheat or whole grain breads, crackers, cereals, and pasta. Unsweetened oatmeal, bulgur, barley, quinoa, or brown rice. Corn or whole wheat flour tortillas.  Meats and other protein foods  Ground beef (85% or leaner), grass-fed beef, or beef trimmed of fat. Skinless chicken or turkey. Ground chicken or turkey. Pork trimmed of fat. All fish and seafood. Egg whites. Dried beans, peas, or lentils. Unsalted nuts or seeds. Unsalted canned beans. Nut butters without added sugar or oil.  Dairy  Low-fat or nonfat dairy products, such as skim or 1% milk, 2% or reduced-fat cheeses, low-fat and fat-free ricotta or cottage cheese, or plain low-fat and nonfat yogurt.  Fats and oils  Tub margarine without trans fats. Light or reduced-fat mayonnaise and salad dressings. Avocado. Olive, canola, sesame, or safflower oils.  The items listed above may not be a complete list of foods and beverages you can eat. Contact a dietitian for more information.  Foods to avoid  Fruits  Canned fruit in heavy syrup. Fruit in cream or butter sauce. Fried fruit.  Vegetables  Vegetables cooked in cheese, cream, or butter sauce. Fried vegetables.  Grains  White bread. White pasta. White rice. Cornbread. Bagels, pastries, and croissants. Crackers and snack foods that contain " trans fat and hydrogenated oils.  Meats and other protein foods  Fatty cuts of meat. Ribs, chicken wings, palomo, sausage, bologna, salami, chitterlings, fatback, hot dogs, bratwurst, and packaged lunch meats. Liver and organ meats. Whole eggs and egg yolks. Chicken and turkey with skin. Fried meat.  Dairy  Whole or 2% milk, cream, half-and-half, and cream cheese. Whole milk cheeses. Whole-fat or sweetened yogurt. Full-fat cheeses. Nondairy creamers and whipped toppings. Processed cheese, cheese spreads, and cheese curds.  Beverages  Alcohol. Sugar-sweetened drinks such as sodas, lemonade, and fruit drinks.  Fats and oils  Butter, stick margarine, lard, shortening, ghee, or palomo fat. Coconut, palm kernel, and palm oils.  Sweets and desserts  Corn syrup, sugars, honey, and molasses. Candy. Jam and jelly. Syrup. Sweetened cereals. Cookies, pies, cakes, donuts, muffins, and ice cream.  The items listed above may not be a complete list of foods and beverages you should avoid. Contact a dietitian for more information.  Summary  Choosing the right foods helps keep your fat and cholesterol at normal levels. This can keep you from getting certain diseases.  At meals, fill one-half of your plate with vegetables and green salads.  Eat high-fiber foods, like whole grains, beans, apples, carrots, peas, and barley.  Limit added sugar, saturated fats, alcohol, and fried foods.  This information is not intended to replace advice given to you by your health care provider. Make sure you discuss any questions you have with your health care provider.  Document Revised: 04/21/2021 Document Reviewed: 04/21/2021  [employer] Patient Education © 2021 [employer]

## 2022-09-11 NOTE — PROGRESS NOTES
"Subjective   Evie Shah is a 77 y.o. female.       Chief Complaint -hypertension    History of Present Illness -    ROS    Hypertension-  Stable with amlodipine-benazepril, metoprolol and furosemide.  Patient reports blood pressure at home is usually 130s over 70-80s.    Chronic respiratory failure-stable with 2 L of oxygen with use of oxygen concentrator 24/7    Generalized anxiety disorder-stable with Wellbutrin    Peripheral artery disease-  Stable status post stent placement.  Patient currently on Plavix.  She denies any abnormal bleeding or bruising    Diabetes mellitus-  Stable with Jardiance, semaglutide and low carbohydrate diet    Osteopenia-  Stable with Evista calcium and vitamin D supplementation    Vitamin D deficiency-stable with vitamin D supplementation    The following portions of the patient's history were reviewed and updated as appropriate: allergies, current medications, past family history, past medical history, past social history, past surgical history and problem list.    Review of Systems    Objective  Vital signs:  /78   Pulse 78   Temp 96.8 °F (36 °C) (Temporal)   Ht 160 cm (63\")   Wt 89.4 kg (197 lb)   SpO2 100%   BMI 34.90 kg/m²     Physical Exam  Vitals and nursing note reviewed.   Constitutional:       Appearance: Normal appearance. She is well-developed.   Eyes:      Extraocular Movements: Extraocular movements intact.      Conjunctiva/sclera: Conjunctivae normal.   Cardiovascular:      Rate and Rhythm: Normal rate and regular rhythm.      Heart sounds: Normal heart sounds. No murmur heard.  Pulmonary:      Effort: Pulmonary effort is normal. No respiratory distress.      Breath sounds: Normal breath sounds. No wheezing.   Musculoskeletal:         General: No tenderness.   Skin:     General: Skin is warm and dry.      Findings: No rash.   Neurological:      Mental Status: She is alert and oriented to person, place, and time.   Psychiatric:         Mood and " Affect: Mood normal.         Behavior: Behavior normal.         Thought Content: Thought content normal.         The following data was reviewed by: RUBI Michael on 09/09/2022:  CMP    CMP 9/30/21 3/7/22 6/7/22   Glucose 248 (A) 170 (A) 173 (A)   BUN 25 21 17   Creatinine 0.96 0.86 0.77   eGFR Non  Am 58 (A)     eGFR African Am 67     Sodium 139 142 141   Potassium 4.4 4.2 3.8   Chloride 101 103 103   Calcium 9.7 9.9 9.1   Total Protein 7.1 6.9 6.9   Albumin 4.7 4.50 4.40   Globulin 2.4 2.4 2.5   Total Bilirubin 0.4 0.6 0.6   Alkaline Phosphatase 113 110 93   AST (SGOT) 23 14 15   ALT (SGPT) 20 17 16   (A) Abnormal value       Comments are available for some flowsheets but are not being displayed.           CBC w/diff    CBC w/Diff 3/7/22 3/24/22 4/28/22   WBC 10.78 10.10 10.40   RBC 5.67 (A) 5.44 (A) 5.44 (A)   Hemoglobin 17.0 (A) 16.2 (A) 16.2 (A)   Hematocrit 51.0 (A) 49.7 (A) 50.6 (A)   MCV 89.9 91.4 93.0   MCH 30.0 29.8 29.8   MCHC 33.3 32.6 32.0   RDW 13.2 14.0 13.7   Platelets 254 229 223   Neutrophil Rel % 64.3 65.5 62.7   Immature Granulocyte Rel %  0.6 (A) 0.5   Lymphocyte Rel % 25.2 22.9 26.2   Monocyte Rel % 7.0 7.5 8.0   Eosinophil Rel % 2.1 2.8 2.0   Basophil Rel % 0.6 0.7 0.6   (A) Abnormal value            Lipid Panel    Lipid Panel 3/7/22 6/7/22   Total Cholesterol 119 127   Triglycerides 187 (A) 227 (A)   HDL Cholesterol 47 42   VLDL Cholesterol 30 36   LDL Cholesterol  42 49   (A) Abnormal value       Comments are available for some flowsheets but are not being displayed.               Most Recent A1C    HGBA1C Most Recent 6/7/22   Hemoglobin A1C 7.90 (A)   (A) Abnormal value       Comments are available for some flowsheets but are not being displayed.                  Assessment & Plan     Diagnoses and all orders for this visit:    1. Essential hypertension (Primary)  Comments:  Advise daily blood pressure and pulse log   Continue Lotrel, furosemide and metoprolol  Advise daily  BP and pulse monitoring and report any consistent readings   Orders:  -     amLODIPine-benazepril (LOTREL) 10-40 MG per capsule; Take 1 capsule by mouth Daily.  Dispense: 90 capsule; Refill: 3  -     metoprolol succinate XL (Toprol XL) 25 MG 24 hr tablet; Take 1 tablet by mouth Daily.  Dispense: 90 tablet; Refill: 3  -     Lipid Panel; Future  -     CBC & Differential; Future    2. Chronic respiratory failure with hypoxia (HCC)  Comments:  continue 2L oxygen qhs  Orders:  -     albuterol sulfate  (90 Base) MCG/ACT inhaler; Inhale 2 puffs Every 4 (Four) Hours As Needed for Shortness of Air.  Dispense: 18 g; Refill: 5    3. VISHAL (generalized anxiety disorder)  Comments:  Continue Wellbutrin  Orders:  -     buPROPion SR (WELLBUTRIN SR) 150 MG 12 hr tablet; Take 1 tablet by mouth Every 12 (Twelve) Hours.  Dispense: 180 tablet; Refill: 3    4. PAD (peripheral artery disease) ([facility])  Comments:  Continue Plavix and risk factor modification including atorvastatin  Status post stent placement  Orders:  -     clopidogrel (PLAVIX) 75 MG tablet; Take 1 tablet by mouth Daily.  Dispense: 90 tablet; Refill: 3  -     Comprehensive Metabolic Panel; Future    5. DM type 2 with diabetic mixed hyperlipidemia ([facility])  Comments:  Continue Jardiance and semaglutide  Advised a more strict diabetic diet  Advised fasting and 2-hour PP blood glucose readings  Orders:  -     empagliflozin (Jardiance) 25 MG tablet tablet; Take 1 tablet by mouth Daily.  Dispense: 90 tablet; Refill: 3  -     Hemoglobin A1c; Future  -     Comprehensive Metabolic Panel; Future  -     Lipid Panel; Future    6. Essential hypertension  -     amLODIPine-benazepril (LOTREL) 10-40 MG per capsule; Take 1 capsule by mouth Daily.  Dispense: 90 capsule; Refill: 3  -     metoprolol succinate XL (Toprol XL) 25 MG 24 hr tablet; Take 1 tablet by mouth Daily.  Dispense: 90 tablet; Refill: 3  -     Lipid Panel; Future  -     CBC & Differential; Future    7. Osteopenia,  unspecified location  Comments:  Continue Evista calcium and vitamin D supplementation  Fall risk precautions advised  Orders:  -     raloxifene (EVISTA) 60 MG tablet; Take 1 tablet by mouth Daily.  Dispense: 90 tablet; Refill: 3    8. Type 2 diabetes mellitus with hyperglycemia, without long-term current use of insulin (HCC)  -     Semaglutide, 1 MG/DOSE, 4 MG/3ML solution pen-injector; Inject 1 mg under the skin into the appropriate area as directed 1 (One) Time Per Week.  Dispense: 3 mL; Refill: 5    9. Vitamin D deficiency  -     Vitamin D 25 Hydroxy; Future            Patient was given instructions and counseling regarding his condition or for health maintenance advice. Please see specific information pulled into the AVS if appropriate      This document has been electronically signed by:  Camryn Mota PA-C

## 2022-09-30 ENCOUNTER — OFFICE VISIT (OUTPATIENT)
Dept: FAMILY MEDICINE CLINIC | Facility: CLINIC | Age: 77
End: 2022-09-30

## 2022-09-30 VITALS
OXYGEN SATURATION: 93 % | SYSTOLIC BLOOD PRESSURE: 146 MMHG | HEART RATE: 69 BPM | TEMPERATURE: 96.8 F | WEIGHT: 200 LBS | HEIGHT: 63 IN | DIASTOLIC BLOOD PRESSURE: 80 MMHG | BODY MASS INDEX: 35.44 KG/M2

## 2022-09-30 DIAGNOSIS — J44.9 COPD MIXED TYPE: Chronic | ICD-10-CM

## 2022-09-30 DIAGNOSIS — E78.5 DYSLIPIDEMIA: Chronic | ICD-10-CM

## 2022-09-30 DIAGNOSIS — I10 ESSENTIAL HYPERTENSION: Chronic | ICD-10-CM

## 2022-09-30 DIAGNOSIS — E11.65 TYPE 2 DIABETES MELLITUS WITH HYPERGLYCEMIA, WITHOUT LONG-TERM CURRENT USE OF INSULIN: Primary | Chronic | ICD-10-CM

## 2022-09-30 PROCEDURE — 99214 OFFICE O/P EST MOD 30 MIN: CPT | Performed by: NURSE PRACTITIONER

## 2022-10-08 DIAGNOSIS — E78.2 MIXED HYPERLIPIDEMIA: ICD-10-CM

## 2022-10-10 RX ORDER — OMEGA-3-ACID ETHYL ESTERS 1 G/1
CAPSULE, LIQUID FILLED ORAL
Qty: 360 CAPSULE | Refills: 0 | Status: SHIPPED | OUTPATIENT
Start: 2022-10-10 | End: 2023-01-30 | Stop reason: SDUPTHER

## 2022-11-18 ENCOUNTER — OFFICE VISIT (OUTPATIENT)
Dept: FAMILY MEDICINE CLINIC | Facility: CLINIC | Age: 77
End: 2022-11-18

## 2022-11-18 DIAGNOSIS — E78.5 DYSLIPIDEMIA: Chronic | ICD-10-CM

## 2022-11-18 DIAGNOSIS — I10 ESSENTIAL HYPERTENSION: Chronic | ICD-10-CM

## 2022-11-18 DIAGNOSIS — E11.69 DM TYPE 2 WITH DIABETIC MIXED HYPERLIPIDEMIA: Primary | Chronic | ICD-10-CM

## 2022-11-18 DIAGNOSIS — E78.2 DM TYPE 2 WITH DIABETIC MIXED HYPERLIPIDEMIA: Primary | Chronic | ICD-10-CM

## 2022-11-18 PROCEDURE — 99214 OFFICE O/P EST MOD 30 MIN: CPT | Performed by: NURSE PRACTITIONER

## 2022-11-21 VITALS
SYSTOLIC BLOOD PRESSURE: 110 MMHG | WEIGHT: 199 LBS | TEMPERATURE: 97.2 F | OXYGEN SATURATION: 94 % | HEIGHT: 63 IN | HEART RATE: 68 BPM | DIASTOLIC BLOOD PRESSURE: 60 MMHG | BODY MASS INDEX: 35.26 KG/M2 | RESPIRATION RATE: 14 BRPM

## 2022-11-21 NOTE — PROGRESS NOTES
History of Present Illness  Evie Shah is a 77 y.o. female who presents to the office today pertaining to her diabetes, type 2.  In addition Evie has been diagnosed with hypertension and dyslipidemia.  She has COPD in which she oxygen as part of her treatment plan.    Diabetes  She has type 2 diabetes mellitus.Risk factors for coronary artery disease include diabetes mellitus, dyslipidemia, hypertension, post-menopausal and sedentary lifestyle.  An ACE inhibitor/angiotensin II receptor blocker is being taken. Currently she does live by herself and does find meal planning difficult.  She has made some lifestyle changes especially in her food choices.  Currently she is taking Jardiance, Januvia, and Ozempic.  She had previously been prescribed metformin which she could not tolerate due to diarrhea.  Lab Results   Component Value Date    HGBA1C 7.90 (H) 06/07/2022     Hypertension  This is a chronic problem. Pertinent negatives include no chest pain, headaches, palpitations or shortness of breath. Risk factors for coronary artery disease include diabetes mellitus, dyslipidemia, post-menopausal state and sedentary lifestyle. Current antihypertension treatment includes calcium channel blockers, diuretics and beta blockers.   Lab Results   Component Value Date    GLUCOSE 173 (H) 06/07/2022    BUN 17 06/07/2022    CREATININE 0.77 06/07/2022    BCR 22.1 06/07/2022    K 3.8 06/07/2022    CO2 21.1 (L) 06/07/2022    CALCIUM 9.1 06/07/2022    PROTENTOTREF 6.9 06/07/2022    ALBUMIN 4.40 06/07/2022    LABIL2 1.8 06/07/2022    AST 15 06/07/2022    ALT 16 06/07/2022     Dyslipidemia  Pertinent negatives include no chest pain or shortness of breath. Current antihyperlipidemic treatment includes statins. Risk factors for coronary artery disease include dyslipidemia, diabetes mellitus, hypertension and post-menopausal.   Lab Results   Component Value Date    CHLPL 127 06/07/2022    CHLPL 119 03/07/2022    CHLPL 125  "05/25/2021     Lab Results   Component Value Date    TRIG 227 (H) 06/07/2022    TRIG 187 (H) 03/07/2022    TRIG 183 (H) 05/25/2021     Lab Results   Component Value Date    HDL 42 06/07/2022    HDL 47 03/07/2022    HDL 46 05/25/2021     Lab Results   Component Value Date    LDL 49 06/07/2022    LDL 42 03/07/2022    LDL 49 05/25/2021     The following portions of the patient's history were reviewed and updated as appropriate: allergies, current medications, past family history, past medical history, past social history, past surgical history and problem list.    Review of Systems   Constitutional: Negative for activity change, appetite change, chills, fever and unexpected weight change.   HENT: Negative.    Eyes: Negative for visual disturbance.   Respiratory: Positive for cough and shortness of breath. Negative for wheezing.         COPD is stable    Cardiovascular: Negative for leg swelling.   Gastrointestinal: Negative for nausea and vomiting.   Endocrine: Negative for cold intolerance, heat intolerance, polydipsia, polyphagia and polyuria.   Musculoskeletal: Positive for arthralgias and gait problem.   Skin: Negative for color change and rash.   Allergic/Immunologic: Positive for environmental allergies.   Neurological: Negative for dizziness and light-headedness.   Hematological: Negative for adenopathy.   Psychiatric/Behavioral: Negative for decreased concentration.   All other systems reviewed and are negative.    Vital signs:  /60 (BP Location: Left arm, Patient Position: Sitting, Cuff Size: Adult)   Pulse 68   Temp 97.2 °F (36.2 °C) (Temporal)   Resp 14   Ht 160 cm (62.99\")   Wt 90.3 kg (199 lb)   SpO2 94%   BMI 35.26 kg/m²     Physical Exam  Vitals and nursing note reviewed.   Constitutional:       General: She is not in acute distress.     Appearance: She is well-developed.      Interventions: Face mask in place.   HENT:      Head: Normocephalic.      Nose: Nose normal.   Eyes:      General: " No scleral icterus.        Right eye: No discharge.         Left eye: No discharge.      Conjunctiva/sclera: Conjunctivae normal.   Neck:      Thyroid: No thyromegaly.      Vascular: No JVD.   Cardiovascular:      Rate and Rhythm: Normal rate and regular rhythm.      Heart sounds: Normal heart sounds. No murmur heard.    No friction rub.   Pulmonary:      Effort: Pulmonary effort is normal. No respiratory distress.      Breath sounds: Decreased breath sounds present. No wheezing, rhonchi or rales.   Abdominal:      General: There is no distension.      Palpations: Abdomen is soft.      Tenderness: There is no abdominal tenderness. There is no guarding.   Musculoskeletal:      Cervical back: Neck supple.      Right lower leg: No edema.      Left lower leg: No edema.   Lymphadenopathy:      Cervical: No cervical adenopathy.   Skin:     General: Skin is warm and dry.      Capillary Refill: Capillary refill takes less than 2 seconds.      Findings: No erythema or rash.   Neurological:      Mental Status: She is alert and oriented to person, place, and time.   Psychiatric:         Mood and Affect: Mood and affect normal.         Speech: Speech normal.         Behavior: Behavior is cooperative.         Thought Content: Thought content normal.         Cognition and Memory: Cognition and memory normal.       Class 2 Severe Obesity (BMI >=35 and <=39.9). Obesity-related health conditions include the following: hypertension, diabetes mellitus, dyslipidemias and osteoarthritis. Obesity is unchanged. BMI  is above average; BMI management plan is completed.  Provided information on  portion control and increasing exercise.     Assessment & Plan     Diagnoses and all orders for this visit:    1. DM type 2 with diabetic mixed hyperlipidemia (HCC) (Primary)  Comments:  Instructed her on correct procedure for Ozempic administration. She feels she will be okay to inject 1 mg.  She will continue  Jardiance and Januvia  Orders:  -      Cancel: POC Glycosylated Hemoglobin (Hb A1C)    2. Essential hypertension  Comments:  Well controlled.  Continue amlodipine-benazepril.  In addition she takes furosemide and metoprolol    3. Dyslipidemia  Comments:  Continue atorvastatin and Lovaza    Follow Up with her PCP in December  Findings and recommendations discussed with Evie. Reviewed correct procedure for injecting Ozempic. Lifestyle modifications reinforced including nutrition and activity recommendations.  Evie will follow up in December with her PCP sooner if problems/concerns occur.  She was given instructions and counseling regarding her condition or for health maintenance advice. Please see specific information pulled into the AVS if appropriate    This document has been electronically signed by:

## 2022-11-29 ENCOUNTER — TELEPHONE (OUTPATIENT)
Dept: FAMILY MEDICINE CLINIC | Facility: CLINIC | Age: 77
End: 2022-11-29

## 2022-11-29 NOTE — TELEPHONE ENCOUNTER
Spoke with patient and let her know that she needs to keep her appointment with Camryn for next week and also have her labs done before. Yang also wanted me to check to see how she was doing with her Ozempic. She said that she was doing good. She said that she is not having any nausea anymore since she started doing it in her thigh.

## 2022-12-02 ENCOUNTER — LAB (OUTPATIENT)
Dept: FAMILY MEDICINE CLINIC | Facility: CLINIC | Age: 77
End: 2022-12-02

## 2022-12-02 DIAGNOSIS — I73.9 PAD (PERIPHERAL ARTERY DISEASE): Chronic | ICD-10-CM

## 2022-12-02 DIAGNOSIS — E11.69 DM TYPE 2 WITH DIABETIC MIXED HYPERLIPIDEMIA: Chronic | ICD-10-CM

## 2022-12-02 DIAGNOSIS — E78.2 DM TYPE 2 WITH DIABETIC MIXED HYPERLIPIDEMIA: Chronic | ICD-10-CM

## 2022-12-02 DIAGNOSIS — E55.9 VITAMIN D DEFICIENCY: ICD-10-CM

## 2022-12-02 LAB
25(OH)D3+25(OH)D2 SERPL-MCNC: 37.4 NG/ML (ref 30–100)
ALBUMIN SERPL-MCNC: 4.1 G/DL (ref 3.5–5.2)
ALBUMIN/GLOB SERPL: 2 G/DL
ALP SERPL-CCNC: 91 U/L (ref 39–117)
ALT SERPL-CCNC: 11 U/L (ref 1–33)
AST SERPL-CCNC: 13 U/L (ref 1–32)
BILIRUB SERPL-MCNC: 0.5 MG/DL (ref 0–1.2)
BUN SERPL-MCNC: 18 MG/DL (ref 8–23)
BUN/CREAT SERPL: 22.5 (ref 7–25)
CALCIUM SERPL-MCNC: 9.2 MG/DL (ref 8.6–10.5)
CHLORIDE SERPL-SCNC: 106 MMOL/L (ref 98–107)
CHOLEST SERPL-MCNC: 109 MG/DL (ref 0–200)
CO2 SERPL-SCNC: 24.8 MMOL/L (ref 22–29)
CREAT SERPL-MCNC: 0.8 MG/DL (ref 0.57–1)
EGFRCR SERPLBLD CKD-EPI 2021: 76.9 ML/MIN/1.73
GLOBULIN SER CALC-MCNC: 2.1 GM/DL
GLUCOSE SERPL-MCNC: 163 MG/DL (ref 65–99)
HBA1C MFR BLD: 8.1 % (ref 4.8–5.6)
HDLC SERPL-MCNC: 42 MG/DL (ref 40–60)
LDLC SERPL CALC-MCNC: 37 MG/DL (ref 0–100)
POTASSIUM SERPL-SCNC: 4 MMOL/L (ref 3.5–5.2)
PROT SERPL-MCNC: 6.2 G/DL (ref 6–8.5)
SODIUM SERPL-SCNC: 141 MMOL/L (ref 136–145)
TRIGL SERPL-MCNC: 189 MG/DL (ref 0–150)
VLDLC SERPL CALC-MCNC: 30 MG/DL (ref 5–40)

## 2022-12-09 ENCOUNTER — OFFICE VISIT (OUTPATIENT)
Dept: FAMILY MEDICINE CLINIC | Facility: CLINIC | Age: 77
End: 2022-12-09

## 2022-12-09 VITALS
HEIGHT: 63 IN | DIASTOLIC BLOOD PRESSURE: 70 MMHG | OXYGEN SATURATION: 92 % | TEMPERATURE: 98.6 F | BODY MASS INDEX: 34.73 KG/M2 | HEART RATE: 86 BPM | SYSTOLIC BLOOD PRESSURE: 130 MMHG | WEIGHT: 196 LBS

## 2022-12-09 DIAGNOSIS — E78.2 DM TYPE 2 WITH DIABETIC MIXED HYPERLIPIDEMIA: Primary | Chronic | ICD-10-CM

## 2022-12-09 DIAGNOSIS — M1A.0420 IDIOPATHIC CHRONIC GOUT OF LEFT HAND WITHOUT TOPHUS: ICD-10-CM

## 2022-12-09 DIAGNOSIS — E11.65 TYPE 2 DIABETES MELLITUS WITH HYPERGLYCEMIA, WITHOUT LONG-TERM CURRENT USE OF INSULIN: ICD-10-CM

## 2022-12-09 DIAGNOSIS — E55.9 VITAMIN D DEFICIENCY: Chronic | ICD-10-CM

## 2022-12-09 DIAGNOSIS — E11.69 DM TYPE 2 WITH DIABETIC MIXED HYPERLIPIDEMIA: Primary | Chronic | ICD-10-CM

## 2022-12-09 DIAGNOSIS — I10 ESSENTIAL HYPERTENSION: ICD-10-CM

## 2022-12-09 PROCEDURE — 99214 OFFICE O/P EST MOD 30 MIN: CPT | Performed by: PHYSICIAN ASSISTANT

## 2022-12-09 RX ORDER — EMPAGLIFLOZIN 25 MG/1
TABLET, FILM COATED ORAL
COMMUNITY
Start: 2022-10-20 | End: 2022-12-09 | Stop reason: SDUPTHER

## 2022-12-09 RX ORDER — INDOMETHACIN 50 MG/1
50 CAPSULE ORAL
Qty: 30 CAPSULE | Refills: 0 | Status: SHIPPED | OUTPATIENT
Start: 2022-12-09 | End: 2023-02-03

## 2022-12-09 RX ORDER — AMLODIPINE BESYLATE AND BENAZEPRIL HYDROCHLORIDE 10; 40 MG/1; MG/1
CAPSULE ORAL
COMMUNITY
Start: 2022-11-26 | End: 2022-12-09 | Stop reason: SDUPTHER

## 2022-12-11 NOTE — PROGRESS NOTES
"Subjective   Evie Shah is a 77 y.o. female.       Chief Complaint -diabetes    History of Present Illness -    ROS    Diabetes-  Not at goal as there was miscommunication with how to use the Ozempic device.  Patient had been leaving the guard on the pen while injecting so it was not being absorbed properly.  She states that after proper education with JOAQUINA Cintron CDE recently she is now injecting the medication appropriately and states that she has noticed a little nausea and reduction in blood glucose for the past week.  She also uses Jardiance and Januvia    Gout-  Patient complains of pain in her left hand secondary to gout.  Some relief with indomethacin as needed.  She is aware of increased kidney issues with use of indomethacin and states she only uses it rarely when needed.    Hyperlipidemia-stable with atorvastatin and low-cholesterol diet    Hypertension-controlled with amlodipine/benazepril, furosemide and metoprolol    Vitamin D deficiency-stable with vitamin D supplementation    The following portions of the patient's history were reviewed and updated as appropriate: allergies, current medications, past family history, past medical history, past social history, past surgical history and problem list.    Review of Systems    Objective  Vital signs:  /70   Pulse 86   Temp 98.6 °F (37 °C) (Temporal)   Ht 160 cm (62.99\")   Wt 88.9 kg (196 lb)   SpO2 92%   BMI 34.73 kg/m²     Physical Exam  Vitals and nursing note reviewed.   Constitutional:       Appearance: Normal appearance. She is well-developed.   Eyes:      Extraocular Movements: Extraocular movements intact.      Conjunctiva/sclera: Conjunctivae normal.   Cardiovascular:      Rate and Rhythm: Normal rate and regular rhythm.      Heart sounds: Normal heart sounds. No murmur heard.  Pulmonary:      Effort: Pulmonary effort is normal. No respiratory distress.      Breath sounds: Normal breath sounds. No wheezing. "   Musculoskeletal:         General: No tenderness.   Skin:     General: Skin is warm and dry.      Findings: No rash.   Neurological:      Mental Status: She is alert and oriented to person, place, and time.   Psychiatric:         Mood and Affect: Mood normal.         Behavior: Behavior normal.         Thought Content: Thought content normal.         The following data was reviewed by: RUBI Michael on 12/09/2022:  CMP    CMP 3/7/22 6/7/22 12/2/22   Glucose 170 (A) 173 (A) 163 (A)   BUN 21 17 18   Creatinine 0.86 0.77 0.80   Sodium 142 141 141   Potassium 4.2 3.8 4.0   Chloride 103 103 106   Calcium 9.9 9.1 9.2   Total Protein 6.9 6.9 6.2   Albumin 4.50 4.40 4.10   Globulin 2.4 2.5 2.1   Total Bilirubin 0.6 0.6 0.5   Alkaline Phosphatase 110 93 91   AST (SGOT) 14 15 13   ALT (SGPT) 17 16 11   (A) Abnormal value            CBC w/diff    CBC w/Diff 3/7/22 3/24/22 4/28/22   WBC 10.78 10.10 10.40   RBC 5.67 (A) 5.44 (A) 5.44 (A)   Hemoglobin 17.0 (A) 16.2 (A) 16.2 (A)   Hematocrit 51.0 (A) 49.7 (A) 50.6 (A)   MCV 89.9 91.4 93.0   MCH 30.0 29.8 29.8   MCHC 33.3 32.6 32.0   RDW 13.2 14.0 13.7   Platelets 254 229 223   Neutrophil Rel % 64.3 65.5 62.7   Immature Granulocyte Rel %  0.6 (A) 0.5   Lymphocyte Rel % 25.2 22.9 26.2   Monocyte Rel % 7.0 7.5 8.0   Eosinophil Rel % 2.1 2.8 2.0   Basophil Rel % 0.6 0.7 0.6   (A) Abnormal value            Lipid Panel    Lipid Panel 3/7/22 6/7/22 12/2/22   Total Cholesterol 119 127 109   Triglycerides 187 (A) 227 (A) 189 (A)   HDL Cholesterol 47 42 42   VLDL Cholesterol 30 36 30   LDL Cholesterol  42 49 37   (A) Abnormal value       Comments are available for some flowsheets but are not being displayed.               Most Recent A1C    HGBA1C Most Recent 12/2/22   Hemoglobin A1C 8.10 (A)   (A) Abnormal value       Comments are available for some flowsheets but are not being displayed.                  Assessment & Plan     Diagnoses and all orders for this visit:    1. DM type  2 with diabetic mixed hyperlipidemia (HCC) (Primary)  Comments:  Continue Jardiance and Januvia  Reinforced proper use of Ozempic pen  Advise low carbohydrate diabetic diet  Orders:  -     Comprehensive Metabolic Panel; Future  -     Lipid Panel; Future  -     Hemoglobin A1c; Future    2. 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  Dispense: 30 capsule; Refill: 0    3. Type 2 diabetes mellitus with hyperglycemia, without long-term current use of insulin (HCC)  -     Hemoglobin A1c; Future    4. Essential hypertension  -     Comprehensive Metabolic Panel; Future    5. Vitamin D deficiency  -     Vitamin D,25-Hydroxy; Future            Patient was given instructions and counseling regarding his condition or for health maintenance advice. Please see specific information pulled into the AVS if appropriate      This document has been electronically signed by:  Camryn oMta PA-C

## 2022-12-16 DIAGNOSIS — E11.65 TYPE 2 DIABETES MELLITUS WITH HYPERGLYCEMIA, WITHOUT LONG-TERM CURRENT USE OF INSULIN: Chronic | ICD-10-CM

## 2022-12-16 RX ORDER — SITAGLIPTIN 100 MG/1
TABLET, FILM COATED ORAL
Qty: 90 TABLET | Refills: 0 | Status: SHIPPED | OUTPATIENT
Start: 2022-12-16 | End: 2022-12-22

## 2022-12-21 DIAGNOSIS — E11.65 TYPE 2 DIABETES MELLITUS WITH HYPERGLYCEMIA, WITHOUT LONG-TERM CURRENT USE OF INSULIN: Chronic | ICD-10-CM

## 2022-12-22 ENCOUNTER — TELEPHONE (OUTPATIENT)
Dept: FAMILY MEDICINE CLINIC | Facility: CLINIC | Age: 77
End: 2022-12-22

## 2022-12-22 DIAGNOSIS — E11.65 TYPE 2 DIABETES MELLITUS WITH HYPERGLYCEMIA, WITHOUT LONG-TERM CURRENT USE OF INSULIN: Primary | ICD-10-CM

## 2022-12-22 RX ORDER — TIRZEPATIDE 2.5 MG/.5ML
2.5 INJECTION, SOLUTION SUBCUTANEOUS
Qty: 2 ML | Refills: 3 | Status: SHIPPED | OUTPATIENT
Start: 2022-12-22 | End: 2023-03-17

## 2022-12-22 RX ORDER — SITAGLIPTIN 100 MG/1
TABLET, FILM COATED ORAL
Qty: 90 TABLET | Refills: 0 | Status: SHIPPED | OUTPATIENT
Start: 2022-12-22 | End: 2022-12-22

## 2022-12-22 NOTE — TELEPHONE ENCOUNTER
Caller: Evie Shah    Relationship: Self    Best call back number: [phone]    What is the best time to reach you: ANY     Who are you requesting to speak with (clinical staff, provider,  specific staff member): CLINICAL         What was the call regarding: PATIENT SAYS OZEMPIC IS UNABLE TO BE FILLED AT [facility] DUE TO THEM NOT HAVING IT. WANTS TO KNOW WHAT TO TRY. TOLD HER TO [facility] TO SEE IF THEY HAVE IT BUT SHE WANTS US TO CALL HER TO DISCUSS     Do you require a callback: YES        D

## 2022-12-22 NOTE — TELEPHONE ENCOUNTER
Inform the patient that I have sent in a prescription for mounjaro instead.  She can discontinue the Ozempic (since she was unable to get it filled) and also discontinue Januvia since she is starting the new diabetic medication.  It is also 1 injection weekly.

## 2022-12-27 ENCOUNTER — TELEPHONE (OUTPATIENT)
Dept: FAMILY MEDICINE CLINIC | Facility: CLINIC | Age: 77
End: 2022-12-27

## 2022-12-27 NOTE — TELEPHONE ENCOUNTER
Caller: Evie Shah    Relationship: Self    Best call back number: [phone]    What is the best time to reach you: ANYTIME    Who are you requesting to speak with (clinical staff, provider,  specific staff member): CLINICAL    What was the call regarding: PATIENT GOT A CALL LAST Thursday AND WAS ADVISED TO STOP A MEDICATION BUT SHE DOES NOT REMEMBER WHICH MEDICATION THAT WAS. PLEASE REACH OUT AND ADVISE.    Do you require a callback: YES

## 2023-01-26 ENCOUNTER — TELEPHONE (OUTPATIENT)
Dept: FAMILY MEDICINE CLINIC | Facility: CLINIC | Age: 78
End: 2023-01-26

## 2023-01-26 NOTE — TELEPHONE ENCOUNTER
Caller: Evie Shah    Relationship: Self    Best call back number: [phone]  What is the best time to reach you: ANY     Who are you requesting to speak with (clinical staff, provider,  specific staff member): CLINICAL         What was the call regarding: NEEDS A PRIOR AUTH FOR MOUNJARO. SHE DOES NOT HAVE ANY NOW AND NEEDS IT     Do you require a callback: YES

## 2023-01-30 ENCOUNTER — PRIOR AUTHORIZATION (OUTPATIENT)
Dept: FAMILY MEDICINE CLINIC | Facility: CLINIC | Age: 78
End: 2023-01-30
Payer: MEDICARE

## 2023-01-30 DIAGNOSIS — E78.2 MIXED HYPERLIPIDEMIA: ICD-10-CM

## 2023-01-30 RX ORDER — OMEGA-3-ACID ETHYL ESTERS 1 G/1
2 CAPSULE, LIQUID FILLED ORAL 2 TIMES DAILY
Qty: 360 CAPSULE | Refills: 1 | Status: SHIPPED | OUTPATIENT
Start: 2023-01-30

## 2023-02-02 DIAGNOSIS — E55.9 VITAMIN D DEFICIENCY: Chronic | ICD-10-CM

## 2023-02-02 DIAGNOSIS — M1A.0420 IDIOPATHIC CHRONIC GOUT OF LEFT HAND WITHOUT TOPHUS: ICD-10-CM

## 2023-02-02 DIAGNOSIS — J30.1 NON-SEASONAL ALLERGIC RHINITIS DUE TO POLLEN: ICD-10-CM

## 2023-02-03 RX ORDER — ERGOCALCIFEROL 1.25 MG/1
CAPSULE ORAL
Qty: 12 CAPSULE | Refills: 0 | Status: SHIPPED | OUTPATIENT
Start: 2023-02-03

## 2023-02-03 RX ORDER — MONTELUKAST SODIUM 10 MG/1
TABLET ORAL
Qty: 90 TABLET | Refills: 0 | Status: SHIPPED | OUTPATIENT
Start: 2023-02-03

## 2023-02-03 RX ORDER — INDOMETHACIN 50 MG/1
CAPSULE ORAL
Qty: 30 CAPSULE | Refills: 0 | Status: SHIPPED | OUTPATIENT
Start: 2023-02-03

## 2023-02-12 DIAGNOSIS — E55.9 VITAMIN D DEFICIENCY: Chronic | ICD-10-CM

## 2023-02-13 RX ORDER — ERGOCALCIFEROL 1.25 MG/1
CAPSULE ORAL
Qty: 12 CAPSULE | Refills: 0 | OUTPATIENT
Start: 2023-02-13

## 2023-02-18 DIAGNOSIS — E11.65 TYPE 2 DIABETES MELLITUS WITH HYPERGLYCEMIA, WITHOUT LONG-TERM CURRENT USE OF INSULIN: Chronic | ICD-10-CM

## 2023-02-20 RX ORDER — GLYBURIDE 5 MG/1
TABLET ORAL
Qty: 90 TABLET | Refills: 0 | Status: SHIPPED | OUTPATIENT
Start: 2023-02-20

## 2023-03-14 ENCOUNTER — LAB (OUTPATIENT)
Dept: FAMILY MEDICINE CLINIC | Facility: CLINIC | Age: 78
End: 2023-03-14
Payer: MEDICARE

## 2023-03-14 DIAGNOSIS — E78.2 DM TYPE 2 WITH DIABETIC MIXED HYPERLIPIDEMIA: Chronic | ICD-10-CM

## 2023-03-14 DIAGNOSIS — E55.9 VITAMIN D DEFICIENCY: Chronic | ICD-10-CM

## 2023-03-14 DIAGNOSIS — I10 ESSENTIAL HYPERTENSION: ICD-10-CM

## 2023-03-14 DIAGNOSIS — E11.69 DM TYPE 2 WITH DIABETIC MIXED HYPERLIPIDEMIA: Chronic | ICD-10-CM

## 2023-03-14 DIAGNOSIS — E11.65 TYPE 2 DIABETES MELLITUS WITH HYPERGLYCEMIA, WITHOUT LONG-TERM CURRENT USE OF INSULIN: ICD-10-CM

## 2023-03-15 LAB
25(OH)D3+25(OH)D2 SERPL-MCNC: 39.3 NG/ML (ref 30–100)
ALBUMIN SERPL-MCNC: 4.1 G/DL (ref 3.5–5.2)
ALBUMIN/GLOB SERPL: 2.2 G/DL
ALP SERPL-CCNC: 89 U/L (ref 39–117)
ALT SERPL-CCNC: 15 U/L (ref 1–33)
AST SERPL-CCNC: 10 U/L (ref 1–32)
BILIRUB SERPL-MCNC: 0.6 MG/DL (ref 0–1.2)
BUN SERPL-MCNC: 22 MG/DL (ref 8–23)
BUN/CREAT SERPL: 25.3 (ref 7–25)
CALCIUM SERPL-MCNC: 9.2 MG/DL (ref 8.6–10.5)
CHLORIDE SERPL-SCNC: 104 MMOL/L (ref 98–107)
CHOLEST SERPL-MCNC: 116 MG/DL (ref 0–200)
CO2 SERPL-SCNC: 24.3 MMOL/L (ref 22–29)
CREAT SERPL-MCNC: 0.87 MG/DL (ref 0.57–1)
EGFRCR SERPLBLD CKD-EPI 2021: 69.6 ML/MIN/1.73
GLOBULIN SER CALC-MCNC: 1.9 GM/DL
GLUCOSE SERPL-MCNC: 195 MG/DL (ref 65–99)
HBA1C MFR BLD: 8 % (ref 4.8–5.6)
HDLC SERPL-MCNC: 37 MG/DL (ref 40–60)
LDLC SERPL CALC-MCNC: 41 MG/DL (ref 0–100)
POTASSIUM SERPL-SCNC: 4.1 MMOL/L (ref 3.5–5.2)
PROT SERPL-MCNC: 6 G/DL (ref 6–8.5)
SODIUM SERPL-SCNC: 139 MMOL/L (ref 136–145)
TRIGL SERPL-MCNC: 244 MG/DL (ref 0–150)
VLDLC SERPL CALC-MCNC: 38 MG/DL (ref 5–40)

## 2023-03-17 ENCOUNTER — OFFICE VISIT (OUTPATIENT)
Dept: FAMILY MEDICINE CLINIC | Facility: CLINIC | Age: 78
End: 2023-03-17
Payer: MEDICARE

## 2023-03-17 VITALS
BODY MASS INDEX: 34.38 KG/M2 | TEMPERATURE: 98.3 F | DIASTOLIC BLOOD PRESSURE: 70 MMHG | HEART RATE: 72 BPM | WEIGHT: 194 LBS | HEIGHT: 63 IN | OXYGEN SATURATION: 94 % | SYSTOLIC BLOOD PRESSURE: 118 MMHG

## 2023-03-17 DIAGNOSIS — Z00.00 MEDICARE ANNUAL WELLNESS VISIT, SUBSEQUENT: Primary | ICD-10-CM

## 2023-03-17 DIAGNOSIS — E11.65 TYPE 2 DIABETES MELLITUS WITH HYPERGLYCEMIA, WITHOUT LONG-TERM CURRENT USE OF INSULIN: Chronic | ICD-10-CM

## 2023-03-17 PROCEDURE — 3052F HG A1C>EQUAL 8.0%<EQUAL 9.0%: CPT | Performed by: PHYSICIAN ASSISTANT

## 2023-03-17 PROCEDURE — 96160 PT-FOCUSED HLTH RISK ASSMT: CPT | Performed by: PHYSICIAN ASSISTANT

## 2023-03-17 PROCEDURE — 1159F MED LIST DOCD IN RCRD: CPT | Performed by: PHYSICIAN ASSISTANT

## 2023-03-17 PROCEDURE — G0439 PPPS, SUBSEQ VISIT: HCPCS | Performed by: PHYSICIAN ASSISTANT

## 2023-03-17 PROCEDURE — 1170F FXNL STATUS ASSESSED: CPT | Performed by: PHYSICIAN ASSISTANT

## 2023-03-17 PROCEDURE — 3074F SYST BP LT 130 MM HG: CPT | Performed by: PHYSICIAN ASSISTANT

## 2023-03-17 PROCEDURE — 3078F DIAST BP <80 MM HG: CPT | Performed by: PHYSICIAN ASSISTANT

## 2023-03-17 PROCEDURE — 1160F RVW MEDS BY RX/DR IN RCRD: CPT | Performed by: PHYSICIAN ASSISTANT

## 2023-03-17 RX ORDER — TIRZEPATIDE 5 MG/.5ML
5 INJECTION, SOLUTION SUBCUTANEOUS
Qty: 2 ML | Refills: 3 | Status: SHIPPED | OUTPATIENT
Start: 2023-03-17

## 2023-03-17 NOTE — PROGRESS NOTES
The ABCs of the Annual Wellness Visit  Subsequent Medicare Wellness Visit    Subjective      Evie Shah is a 77 y.o. female who presents for a Subsequent Medicare Wellness Visit.    The following portions of the patient's history were reviewed and   updated as appropriate: allergies, current medications, past family history, past medical history, past social history, past surgical history and problem list.    Compared to one year ago, the patient feels her physical   health is the same.    Compared to one year ago, the patient feels her mental   health is the same.    Recent Hospitalizations:  She was not admitted to the hospital during the last year.       Current Medical Providers:  Patient Care Team:  Camryn Mota PA as PCP - General (Family Medicine)  Rashad Bernard MD as Consulting Physician (Cardiology)    Outpatient Medications Prior to Visit   Medication Sig Dispense Refill   • albuterol sulfate  (90 Base) MCG/ACT inhaler Inhale 2 puffs Every 4 (Four) Hours As Needed for Shortness of Air. 18 g 5   • amLODIPine-benazepril (LOTREL) 10-40 MG per capsule Take 1 capsule by mouth Daily. 90 capsule 3   • atorvastatin (LIPITOR) 10 MG tablet Take 1 tablet by mouth Every Night. 90 tablet 3   • Blood Glucose Monitoring Suppl misc 1 each 3 (Three) Times a Day. 1 each 0   • buPROPion SR (WELLBUTRIN SR) 150 MG 12 hr tablet Take 1 tablet by mouth Every 12 (Twelve) Hours. 180 tablet 3   • clopidogrel (PLAVIX) 75 MG tablet Take 1 tablet by mouth Daily. 90 tablet 3   • empagliflozin (Jardiance) 25 MG tablet tablet Take 1 tablet by mouth Daily. 90 tablet 3   • furosemide (LASIX) 20 MG tablet Take 1 tablet by mouth Daily. 90 tablet 3   • glucose blood test strip 1 each by Other route 3 (Three) Times a Day. Use as instructed 100 each 11   • glucose blood test strip Use as instructed 100 each 11   • glyburide (DIAbeta) 5 MG tablet Take 1 tablet by mouth once daily with breakfast 90 tablet 0   •  VACCINE ADMINISTRATION RECORD  PARENT / GUARDIAN APPROVAL  Date: 2021  Vaccine administered to: Sabrina Mariscal     : 2008    MRN: [id number]    A copy of the appropriate Centers for Disease Control and Prevention Vaccine Information statement has indomethacin (INDOCIN) 50 MG capsule TAKE 1 CAPSULE BY MOUTH THREE TIMES DAILY WITH MEALS 30 capsule 0   • Lancets 30G misc 1 each 3 (Three) Times a Day. 100 each 11   • loratadine (EQ Allergy Relief) 10 MG tablet Take 1 tablet by mouth Daily. 90 tablet 3   • metoprolol succinate XL (Toprol XL) 25 MG 24 hr tablet Take 1 tablet by mouth Daily. 90 tablet 3   • montelukast (SINGULAIR) 10 MG tablet TAKE 1 TABLET BY MOUTH ONCE DAILY AT NIGHT 90 tablet 0   • O2 (OXYGEN) Inhale 2 L/min Every Night.     • omega-3 acid ethyl esters (LOVAZA) 1 g capsule Take 2 capsules by mouth 2 (Two) Times a Day. 360 capsule 1   • potassium chloride 10 MEQ CR tablet Take 1 tablet by mouth Daily. 90 tablet 3   • raloxifene (EVISTA) 60 MG tablet Take 1 tablet by mouth Daily. 90 tablet 3   • vitamin D (ERGOCALCIFEROL) 1.25 MG (95784 UT) capsule capsule Take 1 capsule by mouth once a week 12 capsule 0   • Tirzepatide (Mounjaro) 2.5 MG/0.5ML solution pen-injector Inject 2.5 mg under the skin into the appropriate area as directed Every 7 (Seven) Days. 2 mL 3     Facility-Administered Medications Prior to Visit   Medication Dose Route Frequency Provider Last Rate Last Admin   • cyanocobalamin injection 1,000 mcg  1,000 mcg Intramuscular Q28 Days Camryn Mota PA   1,000 mcg at 09/30/21 1200   • triamcinolone acetonide (KENALOG-40) injection 40 mg  40 mg Intramuscular Once Darwin Lind MD           No opioid medication identified on active medication list. I have reviewed chart for other potential  high risk medication/s and harmful drug interactions in the elderly.          Aspirin is not on active medication list.  Aspirin use is not indicated based on review of current medical condition/s. Risk of harm outweighs potential benefits.  .    Patient Active Problem List   Diagnosis   • DM type 2 with diabetic mixed hyperlipidemia (HCC)   • Dyslipidemia   • Essential hypertension   • Senile osteoporosis   • Chronic allergic rhinitis  "  • VISHAL (generalized anxiety disorder)   • PAD (peripheral artery disease) ([facility])   • Severe obesity with body mass index (BMI) of 35.0 to 35.9 and comorbidity ([facility])   • COPD mixed type ([facility])   • Former smoker   • Type 2 diabetes mellitus with hyperglycemia, without long-term current use of insulin ([facility])   • Primary osteoarthritis of both knees   • Coronary artery calcification seen on CT scan   • Elevated hematocrit   • Acute idiopathic gout of left hand   • Vitamin D deficiency   • Polycythemia vera ([facility])     Advance Care Planning  Advance Directive is not on file.  ACP discussion was held with the patient during this visit. Patient does not have an advance directive, information provided.     Objective    Vitals:    03/17/23 1055   BP: 118/70   Pulse: 72   Temp: 98.3 °F (36.8 °C)   TempSrc: Temporal   SpO2: 94%   Weight: 88 kg (194 lb)   Height: 160 cm (63\")   PainSc: 0-No pain     Estimated body mass index is 34.37 kg/m² as calculated from the following:    Height as of this encounter: 160 cm (63\").    Weight as of this encounter: 88 kg (194 lb).    BMI is >= 30 and <35. (Class 1 Obesity). The following options were offered after discussion;: exercise counseling/recommendations and nutrition counseling/recommendations      Does the patient have evidence of cognitive impairment?   No    Lab Results   Component Value Date    CHLPL 116 03/14/2023    TRIG 244 (H) 03/14/2023    HDL 37 (L) 03/14/2023    LDL 41 03/14/2023    VLDL 38 03/14/2023    HGBA1C 8.00 (H) 03/14/2023     Lab Results   Component Value Date    GLUCOSE 195 (H) 03/14/2023    BUN 22 03/14/2023    CREATININE 0.87 03/14/2023    EGFRRESULT 69.6 03/14/2023    EGFR 83 02/26/2015    BCR 25.3 (H) 03/14/2023    K 4.1 03/14/2023    CO2 24.3 03/14/2023    CALCIUM 9.2 03/14/2023    PROTENTOTREF 6.0 03/14/2023    ALBUMIN 4.1 03/14/2023    BILITOT 0.6 03/14/2023    AST 10 03/14/2023    ALT 15 03/14/2023     Lab Results   Component Value Date    HGBA1C 8.00 (H) " 2023            HEALTH RISK ASSESSMENT    Smoking Status:  Social History     Tobacco Use   Smoking Status Former   • Types: Cigarettes   • Quit date: 2015   • Years since quittin.8   Smokeless Tobacco Never     Alcohol Consumption:  Social History     Substance and Sexual Activity   Alcohol Use No     Fall Risk Screen:    RAEANN Fall Risk Assessment was completed, and patient is at LOW risk for falls.Assessment completed on:3/17/2023    Depression Screening:  PHQ-2/PHQ-9 Depression Screening 3/17/2023   Little Interest or Pleasure in Doing Things 0-->not at all   Feeling Down, Depressed or Hopeless 0-->not at all   PHQ-9: Brief Depression Severity Measure Score 0       Health Habits and Functional and Cognitive Screening:  Functional & Cognitive Status 3/17/2023   Do you have difficulty preparing food and eating? No   Do you have difficulty bathing yourself, getting dressed or grooming yourself? No   Do you have difficulty using the toilet? No   Do you have difficulty moving around from place to place? No   Do you have trouble with steps or getting out of a bed or a chair? No   Current Diet Well Balanced Diet   Dental Exam Up to date   Eye Exam Up to date   Exercise (times per week) 7 times per week   Current Exercises Include House Cleaning;Walking   Current Exercise Activities Include -   Do you need help using the phone?  No   Are you deaf or do you have serious difficulty hearing?  No   Do you need help with transportation? No   Do you need help shopping? No   Do you need help preparing meals?  No   Do you need help with housework?  No   Do you need help with laundry? No   Do you need help taking your medications? No   Do you need help managing money? No   Do you ever drive or ride in a car without wearing a seat belt? No   Have you felt unusual stress, anger or loneliness in the last month? No   Who do you live with? Alone   If you need help, do you have trouble finding someone available to you?  No   Have you been bothered in the last four weeks by sexual problems? No   Do you have difficulty concentrating, remembering or making decisions? No       Age-appropriate Screening Schedule:  Refer to the list below for future screening recommendations based on patient's age, sex and/or medical conditions. Orders for these recommended tests are listed in the plan section. The patient has been provided with a written plan.    Health Maintenance   Topic Date Due   • COVID-19 Vaccine (3 - Booster for Moderna series) 04/02/2021   • DIABETIC FOOT EXAM  12/14/2022   • ZOSTER VACCINE (1 of 2) 03/17/2023 (Originally 6/5/1997)   • INFLUENZA VACCINE  03/31/2023 (Originally 8/1/2022)   • URINE MICROALBUMIN  06/07/2023   • HEMOGLOBIN A1C  09/14/2023   • DIABETIC EYE EXAM  12/21/2023   • MAMMOGRAM  03/10/2024   • LIPID PANEL  03/14/2024   • ANNUAL WELLNESS VISIT  03/17/2024   • DXA SCAN  02/14/2025   • TDAP/TD VACCINES (3 - Td or Tdap) 07/09/2028   • COLORECTAL CANCER SCREENING  03/14/2032   • Pneumococcal Vaccine 65+  Completed   • HEPATITIS C SCREENING  Addressed                CMS Preventative Services Quick Reference  Risk Factors Identified During Encounter:    Inactivity/Sedentary: Patient was advised to exercise at least 150 minutes a week per CDC recommendations.  Polypharmacy: Medication List reviewed    The above risks/problems have been discussed with the patient.  Pertinent information has been shared with the patient in the After Visit Summary.    Diagnoses and all orders for this visit:    1. Medicare annual wellness visit, subsequent (Primary)  Comments:  Performed and documented today    2. Type 2 diabetes mellitus with hyperglycemia, without long-term current use of insulin (HCC)  Comments:  Increase Mounjaro 5 mg weekly  Advise daily blood glucose monitoring  Orders:  -     Ambulatory Referral to Podiatry  -     Tirzepatide (Mounjaro) 5 MG/0.5ML solution pen-injector; Inject 0.5 mL under the skin into the  appropriate area as directed Every 7 (Seven) Days.  Dispense: 2 mL; Refill: 3  -     Hemoglobin A1c; Future  -     Comprehensive Metabolic Panel; Future        Follow Up:   Next Medicare Wellness visit to be scheduled in 1 year.      An After Visit Summary and PPPS were made available to the patient.

## 2023-03-17 NOTE — PATIENT INSTRUCTIONS
Advance Care Planning and Advance Directives     You make decisions on a daily basis - decisions about where you want to live, your career, your home, your life. Perhaps one of the most important decisions you face is your choice for future medical care. Take time to talk with your family and your healthcare team and start planning today.  Advance Care Planning is a process that can help you:  · Understand possible future healthcare decisions in light of your own experiences  · Reflect on those decision in light of your goals and values  · Discuss your decisions with those closest to you and the healthcare professionals that care for you  · Make a plan by creating a document that reflects your wishes    Surrogate Decision Maker  In the event of a medical emergency, which has left you unable to communicate or to make your own decisions, you would need someone to make decisions for you.  It is important to discuss your preferences for medical treatment with this person while you are in good health.     Qualities of a surrogate decision maker:  • Willing to take on this role and responsibility  • Knows what you want for future medical care  • Willing to follow your wishes even if they don't agree with them  • Able to make difficult medical decisions under stressful circumstances    Advance Directives  These are legal documents you can create that will guide your healthcare team and decision maker(s) when needed. These documents can be stored in the electronic medical record.    · Living Will - a legal document to guide your care if you have a terminal condition or a serious illness and are unable to communicate. States vary by statute in document names/types, but most forms may include one or more of the following:        -  Directions regarding life-prolonging treatments        -  Directions regarding artificially provided nutrition/hydration        -  Choosing a healthcare decision maker        -  Direction  regarding organ/tissue donation    · Durable Power of  for Healthcare - this document names an -in-fact to make medical decisions for you, but it may also allow this person to make personal and financial decisions for you. Please seek the advice of an  if you need this type of document.    **Advance Directives are not required and no one may discriminate against you if you do not sign one.    Medical Orders  Many states allow specific forms/orders signed by your physician to record your wishes for medical treatment in your current state of health. This form, signed in personal communication with your physician, addresses resuscitation and other medical interventions that you may or may not want.      For more information or to schedule a time with a [facility] Advance Care Planning Facilitator contact: [facility] or call [phone] and someone will contact you directly.    Medicare Wellness  Personal Prevention Plan of Service     Date of Office Visit:    Encounter Provider:  RUBI Michael  Place of Service:  [facility]  Patient Name: Evie Shah  :  1945    As part of the Medicare Wellness portion of your visit today, we are providing you with this personalized preventive plan of services (PPPS). This plan is based upon recommendations of the [place] Preventive Services Task Force (USPSTF) and the Advisory Committee on Immunization Practices (ACIP).    This lists the preventive care services that should be considered, and provides dates of when you are due. Items listed as completed are up-to-date and do not require any further intervention.    Health Maintenance   Topic Date Due   • COVID-19 Vaccine (3 - Booster for Moderna series) 2021   • DIABETIC FOOT EXAM  2022   • ZOSTER VACCINE (1 of 2) 2023 (Originally 1997)   • INFLUENZA VACCINE  2023 (Originally 2022)   • URINE  MICROALBUMIN  06/07/2023   • HEMOGLOBIN A1C  09/14/2023   • DIABETIC EYE EXAM  12/21/2023   • MAMMOGRAM  03/10/2024   • LIPID PANEL  03/14/2024   • ANNUAL WELLNESS VISIT  03/17/2024   • DXA SCAN  02/14/2025   • TDAP/TD VACCINES (3 - Td or Tdap) 07/09/2028   • COLORECTAL CANCER SCREENING  03/14/2032   • Pneumococcal Vaccine 65+  Completed   • HEPATITIS C SCREENING  Addressed       No orders of the defined types were placed in this encounter.      No follow-ups on file.

## 2023-04-11 DIAGNOSIS — I10 ESSENTIAL HYPERTENSION: Chronic | ICD-10-CM

## 2023-04-11 RX ORDER — FUROSEMIDE 20 MG/1
TABLET ORAL
Qty: 90 TABLET | Refills: 0 | Status: SHIPPED | OUTPATIENT
Start: 2023-04-11

## 2023-04-12 DIAGNOSIS — E78.2 MIXED HYPERLIPIDEMIA: Chronic | ICD-10-CM

## 2023-04-13 RX ORDER — ATORVASTATIN CALCIUM 10 MG/1
TABLET, FILM COATED ORAL
Qty: 90 TABLET | Refills: 0 | Status: SHIPPED | OUTPATIENT
Start: 2023-04-13

## 2023-05-11 DIAGNOSIS — J30.1 NON-SEASONAL ALLERGIC RHINITIS DUE TO POLLEN: ICD-10-CM

## 2023-05-12 RX ORDER — MONTELUKAST SODIUM 10 MG/1
TABLET ORAL
Qty: 90 TABLET | Refills: 0 | Status: SHIPPED | OUTPATIENT
Start: 2023-05-12

## 2023-06-01 DIAGNOSIS — E55.9 VITAMIN D DEFICIENCY: Chronic | ICD-10-CM

## 2023-06-01 RX ORDER — ERGOCALCIFEROL 1.25 MG/1
50000 CAPSULE ORAL WEEKLY
Qty: 12 CAPSULE | Refills: 0 | Status: SHIPPED | OUTPATIENT
Start: 2023-06-01

## 2023-06-09 ENCOUNTER — LAB (OUTPATIENT)
Dept: FAMILY MEDICINE CLINIC | Facility: CLINIC | Age: 78
End: 2023-06-09
Payer: MEDICARE

## 2023-06-09 DIAGNOSIS — E11.65 TYPE 2 DIABETES MELLITUS WITH HYPERGLYCEMIA, WITHOUT LONG-TERM CURRENT USE OF INSULIN: Primary | Chronic | ICD-10-CM

## 2023-06-09 LAB
ALBUMIN SERPL-MCNC: 4.5 G/DL (ref 3.5–5.2)
ALBUMIN/GLOB SERPL: 2 G/DL
ALP SERPL-CCNC: 96 U/L (ref 39–117)
ALT SERPL-CCNC: 17 U/L (ref 1–33)
AST SERPL-CCNC: 14 U/L (ref 1–32)
BILIRUB SERPL-MCNC: 0.5 MG/DL (ref 0–1.2)
BUN SERPL-MCNC: 17 MG/DL (ref 8–23)
BUN/CREAT SERPL: 23.6 (ref 7–25)
CALCIUM SERPL-MCNC: 10.1 MG/DL (ref 8.6–10.5)
CHLORIDE SERPL-SCNC: 105 MMOL/L (ref 98–107)
CHOLEST SERPL-MCNC: 114 MG/DL (ref 0–200)
CO2 SERPL-SCNC: 24.4 MMOL/L (ref 22–29)
CREAT SERPL-MCNC: 0.72 MG/DL (ref 0.57–1)
EGFRCR SERPLBLD CKD-EPI 2021: 86.8 ML/MIN/1.73
GLOBULIN SER CALC-MCNC: 2.2 GM/DL
GLUCOSE SERPL-MCNC: 158 MG/DL (ref 65–99)
HBA1C MFR BLD: 7.5 % (ref 4.8–5.6)
HDLC SERPL-MCNC: 44 MG/DL (ref 40–60)
LDLC SERPL CALC-MCNC: 37 MG/DL (ref 0–100)
POTASSIUM SERPL-SCNC: 3.8 MMOL/L (ref 3.5–5.2)
PROT SERPL-MCNC: 6.7 G/DL (ref 6–8.5)
SODIUM SERPL-SCNC: 143 MMOL/L (ref 136–145)
TRIGL SERPL-MCNC: 210 MG/DL (ref 0–150)
VLDLC SERPL CALC-MCNC: 33 MG/DL (ref 5–40)

## 2023-06-19 ENCOUNTER — OFFICE VISIT (OUTPATIENT)
Dept: FAMILY MEDICINE CLINIC | Facility: CLINIC | Age: 78
End: 2023-06-19
Payer: MEDICARE

## 2023-06-19 VITALS
HEART RATE: 79 BPM | BODY MASS INDEX: 34.55 KG/M2 | DIASTOLIC BLOOD PRESSURE: 68 MMHG | TEMPERATURE: 97.9 F | OXYGEN SATURATION: 94 % | SYSTOLIC BLOOD PRESSURE: 130 MMHG | HEIGHT: 63 IN | WEIGHT: 195 LBS

## 2023-06-19 DIAGNOSIS — E78.2 DM TYPE 2 WITH DIABETIC MIXED HYPERLIPIDEMIA: Chronic | ICD-10-CM

## 2023-06-19 DIAGNOSIS — E55.9 VITAMIN D DEFICIENCY: Chronic | ICD-10-CM

## 2023-06-19 DIAGNOSIS — I73.9 PAD (PERIPHERAL ARTERY DISEASE): Chronic | ICD-10-CM

## 2023-06-19 DIAGNOSIS — F41.1 GAD (GENERALIZED ANXIETY DISORDER): ICD-10-CM

## 2023-06-19 DIAGNOSIS — I10 ESSENTIAL HYPERTENSION: Chronic | ICD-10-CM

## 2023-06-19 DIAGNOSIS — E78.2 MIXED HYPERLIPIDEMIA: Chronic | ICD-10-CM

## 2023-06-19 DIAGNOSIS — M85.80 OSTEOPENIA, UNSPECIFIED LOCATION: Chronic | ICD-10-CM

## 2023-06-19 DIAGNOSIS — E11.69 DM TYPE 2 WITH DIABETIC MIXED HYPERLIPIDEMIA: Chronic | ICD-10-CM

## 2023-06-19 DIAGNOSIS — J30.1 NON-SEASONAL ALLERGIC RHINITIS DUE TO POLLEN: ICD-10-CM

## 2023-06-19 DIAGNOSIS — J96.11 CHRONIC RESPIRATORY FAILURE WITH HYPOXIA: Primary | ICD-10-CM

## 2023-06-19 DIAGNOSIS — E11.65 TYPE 2 DIABETES MELLITUS WITH HYPERGLYCEMIA, WITHOUT LONG-TERM CURRENT USE OF INSULIN: Chronic | ICD-10-CM

## 2023-06-19 PROCEDURE — 99214 OFFICE O/P EST MOD 30 MIN: CPT | Performed by: PHYSICIAN ASSISTANT

## 2023-06-19 PROCEDURE — 1159F MED LIST DOCD IN RCRD: CPT | Performed by: PHYSICIAN ASSISTANT

## 2023-06-19 PROCEDURE — 3078F DIAST BP <80 MM HG: CPT | Performed by: PHYSICIAN ASSISTANT

## 2023-06-19 PROCEDURE — 1160F RVW MEDS BY RX/DR IN RCRD: CPT | Performed by: PHYSICIAN ASSISTANT

## 2023-06-19 PROCEDURE — 3075F SYST BP GE 130 - 139MM HG: CPT | Performed by: PHYSICIAN ASSISTANT

## 2023-06-19 RX ORDER — RALOXIFENE HYDROCHLORIDE 60 MG/1
60 TABLET, FILM COATED ORAL DAILY
Qty: 90 TABLET | Refills: 3 | Status: SHIPPED | OUTPATIENT
Start: 2023-06-19

## 2023-06-19 RX ORDER — METOPROLOL SUCCINATE 25 MG/1
25 TABLET, EXTENDED RELEASE ORAL DAILY
Qty: 90 TABLET | Refills: 3 | Status: SHIPPED | OUTPATIENT
Start: 2023-06-19

## 2023-06-19 RX ORDER — AMLODIPINE BESYLATE AND BENAZEPRIL HYDROCHLORIDE 10; 40 MG/1; MG/1
1 CAPSULE ORAL DAILY
Qty: 90 CAPSULE | Refills: 1 | Status: SHIPPED | OUTPATIENT
Start: 2023-06-19

## 2023-06-19 RX ORDER — ATORVASTATIN CALCIUM 10 MG/1
10 TABLET, FILM COATED ORAL NIGHTLY
Qty: 90 TABLET | Refills: 3 | Status: SHIPPED | OUTPATIENT
Start: 2023-06-19

## 2023-06-19 RX ORDER — BUPROPION HYDROCHLORIDE 150 MG/1
150 TABLET, EXTENDED RELEASE ORAL EVERY 12 HOURS
Qty: 180 TABLET | Refills: 3 | Status: SHIPPED | OUTPATIENT
Start: 2023-06-19

## 2023-06-19 RX ORDER — OMEGA-3-ACID ETHYL ESTERS 1 G/1
2 CAPSULE, LIQUID FILLED ORAL 2 TIMES DAILY
Qty: 360 CAPSULE | Refills: 1 | Status: SHIPPED | OUTPATIENT
Start: 2023-06-19

## 2023-06-19 RX ORDER — MONTELUKAST SODIUM 10 MG/1
10 TABLET ORAL NIGHTLY
Qty: 90 TABLET | Refills: 1 | Status: SHIPPED | OUTPATIENT
Start: 2023-06-19

## 2023-06-19 RX ORDER — FUROSEMIDE 20 MG/1
20 TABLET ORAL DAILY
Qty: 90 TABLET | Refills: 1 | Status: SHIPPED | OUTPATIENT
Start: 2023-06-19

## 2023-06-19 RX ORDER — TIRZEPATIDE 5 MG/.5ML
5 INJECTION, SOLUTION SUBCUTANEOUS
Qty: 2 ML | Refills: 3 | Status: SHIPPED | OUTPATIENT
Start: 2023-06-19

## 2023-06-19 RX ORDER — LORATADINE 10 MG/1
10 TABLET ORAL DAILY
Qty: 90 TABLET | Refills: 3 | Status: SHIPPED | OUTPATIENT
Start: 2023-06-19

## 2023-06-19 RX ORDER — CLOPIDOGREL BISULFATE 75 MG/1
75 TABLET ORAL DAILY
Qty: 90 TABLET | Refills: 3 | Status: SHIPPED | OUTPATIENT
Start: 2023-06-19

## 2023-06-19 RX ORDER — POTASSIUM CHLORIDE 750 MG/1
10 TABLET, FILM COATED, EXTENDED RELEASE ORAL DAILY
Qty: 90 TABLET | Refills: 3 | Status: SHIPPED | OUTPATIENT
Start: 2023-06-19

## 2023-06-19 RX ORDER — ERGOCALCIFEROL 1.25 MG/1
50000 CAPSULE ORAL WEEKLY
Qty: 12 CAPSULE | Refills: 1 | Status: SHIPPED | OUTPATIENT
Start: 2023-06-19

## 2023-06-19 NOTE — PROGRESS NOTES
"Subjective   Evie Shah is a 78 y.o. female.       Chief Complaint -shortness of breath    History of Present Illness -       Shortness of breath-  Patient has shortness of breath secondary to chronic respiratory failure with hypoxia.  She does have 2 L oxygen that she uses at home which significantly helps her breathing.  She is in need of portable tanks for better achievement of her ADLs and when leaving the home.    Hypertension-  Stable with Lotrel furosemide and metoprolol    Hyperlipidemia-stable with atorvastatin    Generalized anxiety disorder-stable with Wellbutrin.  She denies any hallucinations SI or HI    Peripheral artery disease-stable with Plavix    Diabetes mellitus-  Stable with Jardiance, semaglutide, and glyburide along with dietary modification    Osteopenia-calcium vitamin D.  Discussed with patient risk of fracture should she fall.    Vitamin D deficiency-stable with vitamin D supplementation    The following portions of the patient's history were reviewed and updated as appropriate: allergies, current medications, past family history, past medical history, past social history, past surgical history and problem list.        Objective  Vital signs:  /68   Pulse 79   Temp 97.9 °F (36.6 °C) (Temporal)   Ht 160 cm (63\")   Wt 88.5 kg (195 lb)   SpO2 94%   BMI 34.54 kg/m²     Physical Exam  Vitals and nursing note reviewed.   Constitutional:       Appearance: Normal appearance. She is well-developed.   Eyes:      Extraocular Movements: Extraocular movements intact.      Conjunctiva/sclera: Conjunctivae normal.   Cardiovascular:      Rate and Rhythm: Normal rate and regular rhythm.      Heart sounds: Normal heart sounds. No murmur heard.  Pulmonary:      Effort: Pulmonary effort is normal. No respiratory distress.      Breath sounds: Normal breath sounds. No wheezing.   Musculoskeletal:         General: No tenderness.   Skin:     General: Skin is warm and dry.      Findings: No " rash.   Neurological:      Mental Status: She is alert and oriented to person, place, and time.   Psychiatric:         Mood and Affect: Mood normal.         Behavior: Behavior normal.         Thought Content: Thought content normal.       The following data was reviewed by Camryn Mota PA-C:         Lab Results   Component Value Date    BUN 17 06/09/2023    CREATININE 0.72 06/09/2023    EGFR 83 02/26/2015    ALT 17 06/09/2023    AST 14 06/09/2023    WBC 10.40 04/28/2022    HGB 16.2 (H) 04/28/2022    HCT 50.6 (H) 04/28/2022     04/28/2022    TRIG 210 (H) 06/09/2023    HDL 44 06/09/2023    LDL 37 06/09/2023    TSH 3.080 03/11/2019    HGBA1C 7.50 (H) 06/09/2023           Assessment & Plan     Diagnoses and all orders for this visit:    1. Chronic respiratory failure with hypoxia (Primary)  Comments:  Prescription written for portable oxygen tanks and supplies    2. Essential hypertension  Comments:  Advise daily blood pressure and pulse log   Continue Lotrel, furosemide and metoprolol  Advise daily BP and pulse monitoring and report any consistent readings   Orders:  -     amLODIPine-benazepril (LOTREL) 10-40 MG per capsule; Take 1 capsule by mouth Daily.  Dispense: 90 capsule; Refill: 1  -     furosemide (LASIX) 20 MG tablet; Take 1 tablet by mouth Daily.  Dispense: 90 tablet; Refill: 1  -     metoprolol succinate XL (Toprol XL) 25 MG 24 hr tablet; Take 1 tablet by mouth Daily.  Dispense: 90 tablet; Refill: 3  -     potassium chloride 10 MEQ CR tablet; Take 1 tablet by mouth Daily.  Dispense: 90 tablet; Refill: 3    3. Mixed hyperlipidemia  Comments:  Continue atorvastatin  Advised low-cholesterol diet  Orders:  -     atorvastatin (LIPITOR) 10 MG tablet; Take 1 tablet by mouth Every Night.  Dispense: 90 tablet; Refill: 3  -     omega-3 acid ethyl esters (LOVAZA) 1 g capsule; Take 2 capsules by mouth 2 (Two) Times a Day.  Dispense: 360 capsule; Refill: 1    4. VISHAL (generalized anxiety  disorder)  Comments:  Continue Wellbutrin  Orders:  -     buPROPion SR (WELLBUTRIN SR) 150 MG 12 hr tablet; Take 1 tablet by mouth Every 12 (Twelve) Hours.  Dispense: 180 tablet; Refill: 3    5. PAD (peripheral artery disease)  Comments:  Continue Plavix and risk factor modification including atorvastatin  Status post stent placement  Orders:  -     clopidogrel (PLAVIX) 75 MG tablet; Take 1 tablet by mouth Daily.  Dispense: 90 tablet; Refill: 3    6. DM type 2 with diabetic mixed hyperlipidemia  Comments:  Continue Jardiance and semaglutide  Advised a more strict diabetic diet  Advised fasting and 2-hour PP blood glucose readings  Orders:  -     empagliflozin (Jardiance) 25 MG tablet tablet; Take 1 tablet by mouth Daily.  Dispense: 90 tablet; Refill: 3    7. Non-seasonal allergic rhinitis due to pollen  Comments:  Continue Singulair and Claritin  Orders:  -     loratadine (EQ Allergy Relief) 10 MG tablet; Take 1 tablet by mouth Daily.  Dispense: 90 tablet; Refill: 3  -     montelukast (SINGULAIR) 10 MG tablet; Take 1 tablet by mouth Every Night.  Dispense: 90 tablet; Refill: 1    8. Osteopenia, unspecified location  Comments:  Continue Evista calcium and vitamin D supplementation  Fall risk precautions advised  Orders:  -     raloxifene (EVISTA) 60 MG tablet; Take 1 tablet by mouth Daily.  Dispense: 90 tablet; Refill: 3    9. Type 2 diabetes mellitus with hyperglycemia, without long-term current use of insulin  Comments:  Continue Mounjaro 5 mg weekly  Advise daily blood glucose monitoring  Orders:  -     Tirzepatide (Mounjaro) 5 MG/0.5ML solution pen-injector; Inject 0.5 mL under the skin into the appropriate area as directed Every 7 (Seven) Days.  Dispense: 2 mL; Refill: 3    10. Vitamin D deficiency  Comments:  Continue vitamin D supplementation  Orders:  -     vitamin D (ERGOCALCIFEROL) 1.25 MG (29001 UT) capsule capsule; Take 1 capsule by mouth 1 (One) Time Per Week.  Dispense: 12 capsule; Refill: 1                    Patient was given instructions and counseling regarding his condition or for health maintenance advice. Please see specific information pulled into the AVS if appropriate      This document has been electronically signed by:  Camryn Mota PA-C

## 2023-06-19 NOTE — PATIENT INSTRUCTIONS
"Fat and Cholesterol Restricted Eating Plan  Getting too much fat and cholesterol in your diet may cause health problems. Choosing the right foods helps keep your fat and cholesterol at normal levels. This can keep you from getting certain diseases.  Your doctor may recommend an eating plan that includes:  Total fat: ______% or less of total calories a day. This is ______g of fat a day.  Saturated fat: ______% or less of total calories a day. This is ______g of saturated fat a day.  Cholesterol: less than _________mg a day.  Fiber: ______g a day.  What are tips for following this plan?  General tips  Work with your doctor to lose weight if you need to.  Avoid:  Foods with added sugar.  Fried foods.  Foods with trans fat or partially hydrogenated oils. This includes some margarines and baked goods.  If you drink alcohol:  Limit how much you have to:  0-1 drink a day for women who are not pregnant.  0-2 drinks a day for men.  Know how much alcohol is in a drink. In the [place] one drink equals one 12 oz bottle of beer (355 mL), one 5 oz glass of wine (148 mL), or one 1½ oz glass of hard liquor (44 mL).  Reading food labels  Check food labels for:  Trans fats.  Partially hydrogenated oils.  Saturated fat (g) in each serving.  Cholesterol (mg) in each serving.  Fiber (g) in each serving.  Choose foods with healthy fats, such as:  Monounsaturated fats and polyunsaturated fats. These include olive and canola oil, flaxseeds, walnuts, almonds, and seeds.  Omega-3 fats. These are found in certain fish, flaxseed oil, and ground flaxseeds.  Choose grain products that have whole grains. Look for the word \"whole\" as the first word in the ingredient list.  Cooking  Cook foods using low-fat methods. These include baking, boiling, grilling, and broiling.  Eat more home-cooked foods. Eat at restaurants and buffets less often. Eat less fast food.  Avoid cooking using saturated fats, such as butter, cream, palm oil, palm kernel oil, and " coconut oil.  Meal planning    At meals, divide your plate into four equal parts:  Fill one-half of your plate with vegetables, green salads, and fruit.  Fill one-fourth of your plate with whole grains.  Fill one-fourth of your plate with low-fat (lean) protein foods.  Eat fish that is high in omega-3 fats at least two times a week. This includes mackerel, tuna, sardines, and salmon.  Eat foods that are high in fiber, such as whole grains, beans, apples, pears, berries, broccoli, carrots, peas, and barley.  What foods should I eat?  Fruits  All fresh, canned (in natural juice), or frozen fruits.  Vegetables  Fresh or frozen vegetables (raw, steamed, roasted, or grilled). Green salads.  Grains  Whole grains, such as whole wheat or whole grain breads, crackers, cereals, and pasta. Unsweetened oatmeal, bulgur, barley, quinoa, or brown rice. Corn or whole wheat flour tortillas.  Meats and other protein foods  Ground beef (85% or leaner), grass-fed beef, or beef trimmed of fat. Skinless chicken or turkey. Ground chicken or turkey. Pork trimmed of fat. All fish and seafood. Egg whites. Dried beans, peas, or lentils. Unsalted nuts or seeds. Unsalted canned beans. Nut butters without added sugar or oil.  Dairy  Low-fat or nonfat dairy products, such as skim or 1% milk, 2% or reduced-fat cheeses, low-fat and fat-free ricotta or cottage cheese, or plain low-fat and nonfat yogurt.  Fats and oils  Tub margarine without trans fats. Light or reduced-fat mayonnaise and salad dressings. Avocado. Olive, canola, sesame, or safflower oils.  The items listed above may not be a complete list of foods and beverages you can eat. Contact a dietitian for more information.  What foods should I avoid?  Fruits  Canned fruit in heavy syrup. Fruit in cream or butter sauce. Fried fruit.  Vegetables  Vegetables cooked in cheese, cream, or butter sauce. Fried vegetables.  Grains  White bread. White pasta. White rice. Cornbread. Bagels, pastries,  and croissants. Crackers and snack foods that contain trans fat and hydrogenated oils.  Meats and other protein foods  Fatty cuts of meat. Ribs, chicken wings, palomo, sausage, bologna, salami, chitterlings, fatback, hot dogs, bratwurst, and packaged lunch meats. Liver and organ meats. Whole eggs and egg yolks. Chicken and turkey with skin. Fried meat.  Dairy  Whole or 2% milk, cream, half-and-half, and cream cheese. Whole milk cheeses. Whole-fat or sweetened yogurt. Full-fat cheeses. Nondairy creamers and whipped toppings. Processed cheese, cheese spreads, and cheese curds.  Fats and oils  Butter, stick margarine, lard, shortening, ghee, or palomo fat. Coconut, palm kernel, and palm oils.  Beverages  Alcohol. Sugar-sweetened drinks such as sodas, lemonade, and fruit drinks.  Sweets and desserts  Corn syrup, sugars, honey, and molasses. Candy. Jam and jelly. Syrup. Sweetened cereals. Cookies, pies, cakes, donuts, muffins, and ice cream.  The items listed above may not be a complete list of foods and beverages you should avoid. Contact a dietitian for more information.  Summary  Choosing the right foods helps keep your fat and cholesterol at normal levels. This can keep you from getting certain diseases.  At meals, fill one-half of your plate with vegetables, green salads, and fruits.  Eat high fiber foods, like whole grains, beans, apples, pears, berries, carrots, peas, and barley.  Limit added sugar, saturated fats, alcohol, and fried foods.  This information is not intended to replace advice given to you by your health care provider. Make sure you discuss any questions you have with your health care provider.  Document Revised: 04/29/2022 Document Reviewed: 04/29/2022  Elsevier Patient Education © 2022 [employer]  Fall Prevention in the Home, Adult  Falls can cause injuries and can happen to people of all ages. There are many things you can do to make your home safe and to help prevent falls. Ask for help when  making these changes.  What actions can I take to prevent falls?  General Instructions  Use good lighting in all rooms. Replace any light bulbs that burn out.  Turn on the lights in dark areas. Use night-lights.  Keep items that you use often in easy-to-reach places. Lower the shelves around your home if needed.  Set up your furniture so you have a clear path. Avoid moving your furniture around.  Do not have throw rugs or other things on the floor that can make you trip.  Avoid walking on wet floors.  If any of your floors are uneven, fix them.  Add color or contrast paint or tape to clearly bambi and help you see:  Grab bars or handrails.  First and last steps of staircases.  Where the edge of each step is.  If you use a stepladder:  Make sure that it is fully opened. Do not climb a closed stepladder.  Make sure the sides of the stepladder are locked in place.  Ask someone to hold the stepladder while you use it.  Know where your pets are when moving through your home.  What can I do in the bathroom?     Keep the floor dry. Clean up any water on the floor right away.  Remove soap buildup in the tub or shower.  Use nonskid mats or decals on the floor of the tub or shower.  Attach bath mats securely with double-sided, nonslip rug tape.  If you need to sit down in the shower, use a plastic, nonslip stool.  Install grab bars by the toilet and in the tub and shower. Do not use towel bars as grab bars.  What can I do in the bedroom?  Make sure that you have a light by your bed that is easy to reach.  Do not use any sheets or blankets for your bed that hang to the floor.  Have a firm chair with side arms that you can use for support when you get dressed.  What can I do in the kitchen?  Clean up any spills right away.  If you need to reach something above you, use a step stool with a grab bar.  Keep electrical cords out of the way.  Do not use floor polish or wax that makes floors slippery.  What can I do with my  stairs?  Do not leave any items on the stairs.  Make sure that you have a light switch at the top and the bottom of the stairs.  Make sure that there are handrails on both sides of the stairs. Fix handrails that are broken or loose.  Install nonslip stair treads on all your stairs.  Avoid having throw rugs at the top or bottom of the stairs.  Choose a carpet that does not hide the edge of the steps on the stairs.  Check carpeting to make sure that it is firmly attached to the stairs. Fix carpet that is loose or worn.  What can I do on the outside of my home?  Use bright outdoor lighting.  Fix the edges of walkways and driveways and fix any cracks.  Remove anything that might make you trip as you walk through a door, such as a raised step or threshold.  Trim any bushes or trees on paths to your home.  Check to see if handrails are loose or broken and that both sides of all steps have handrails.  Install guardrails along the edges of any raised decks and porches.  Clear paths of anything that can make you trip, such as tools or rocks.  Have leaves, snow, or ice cleared regularly.  Use sand or salt on paths during winter.  Clean up any spills in your garage right away. This includes grease or oil spills.  What other actions can I take?  Wear shoes that:  Have a low heel. Do not wear high heels.  Have rubber bottoms.  Feel good on your feet and fit well.  Are closed at the toe. Do not wear open-toe sandals.  Use tools that help you move around if needed. These include:  Canes.  Walkers.  Scooters.  Crutches.  Review your medicines with your doctor. Some medicines can make you feel dizzy. This can increase your chance of falling.  Ask your doctor what else you can do to help prevent falls.  Where to find more information  Centers for Disease Control and PreventionRAEANN: www.cdc.gov  [employer] [place] [employer] Aging: www.dionne.nih.gov  Contact a doctor if:  You are afraid of falling at home.  You feel weak, drowsy, or dizzy  at home.  You fall at home.  Summary  There are many simple things that you can do to make your home safe and to help prevent falls.  Ways to make your home safe include removing things that can make you trip and installing grab bars in the bathroom.  Ask for help when making these changes in your home.  This information is not intended to replace advice given to you by your health care provider. Make sure you discuss any questions you have with your health care provider.  Document Revised: 09/19/2022 Document Reviewed: 07/21/2021  Elsevier Patient Education © 2022 [employer]

## 2023-06-26 ENCOUNTER — TELEPHONE (OUTPATIENT)
Dept: FAMILY MEDICINE CLINIC | Facility: CLINIC | Age: 78
End: 2023-06-26

## 2023-06-26 NOTE — TELEPHONE ENCOUNTER
DELETE AFTER REVIEWING: Telephone encounter to be sent to the clinical pool     Caller: Evie Shah    Relationship: Self    Best call back number: loratadine (EQ Allergy Relief) 10 MG tablet    What medications are you currently taking:   Current Outpatient Medications on File Prior to Visit   Medication Sig Dispense Refill    albuterol sulfate  (90 Base) MCG/ACT inhaler Inhale 2 puffs Every 4 (Four) Hours As Needed for Shortness of Air. 18 g 5    amLODIPine-benazepril (LOTREL) 10-40 MG per capsule Take 1 capsule by mouth Daily. 90 capsule 1    atorvastatin (LIPITOR) 10 MG tablet Take 1 tablet by mouth Every Night. 90 tablet 3    Blood Glucose Monitoring Suppl misc 1 each 3 (Three) Times a Day. 1 each 0    buPROPion SR (WELLBUTRIN SR) 150 MG 12 hr tablet Take 1 tablet by mouth Every 12 (Twelve) Hours. 180 tablet 3    clopidogrel (PLAVIX) 75 MG tablet Take 1 tablet by mouth Daily. 90 tablet 3    empagliflozin (Jardiance) 25 MG tablet tablet Take 1 tablet by mouth Daily. 90 tablet 3    furosemide (LASIX) 20 MG tablet Take 1 tablet by mouth Daily. 90 tablet 1    glucose blood test strip 1 each by Other route 3 (Three) Times a Day. Use as instructed 100 each 11    glyburide (DIAbeta) 5 MG tablet Take 1 tablet by mouth once daily with breakfast 90 tablet 0    Lancets 30G misc 1 each 3 (Three) Times a Day. 100 each 11    loratadine (EQ Allergy Relief) 10 MG tablet Take 1 tablet by mouth Daily. 90 tablet 3    metoprolol succinate XL (Toprol XL) 25 MG 24 hr tablet Take 1 tablet by mouth Daily. 90 tablet 3    montelukast (SINGULAIR) 10 MG tablet Take 1 tablet by mouth Every Night. 90 tablet 1    O2 (OXYGEN) Inhale 2 L/min Every Night.      omega-3 acid ethyl esters (LOVAZA) 1 g capsule Take 2 capsules by mouth 2 (Two) Times a Day. 360 capsule 1    potassium chloride 10 MEQ CR tablet Take 1 tablet by mouth Daily. 90 tablet 3    raloxifene (EVISTA) 60 MG tablet Take 1 tablet by mouth Daily. 90 tablet 3     Tirzepatide (Mounjaro) 5 MG/0.5ML solution pen-injector Inject 0.5 mL under the skin into the appropriate area as directed Every 7 (Seven) Days. 2 mL 3    vitamin D (ERGOCALCIFEROL) 1.25 MG (27726 UT) capsule capsule Take 1 capsule by mouth 1 (One) Time Per Week. 12 capsule 1     Current Facility-Administered Medications on File Prior to Visit   Medication Dose Route Frequency Provider Last Rate Last Admin    cyanocobalamin injection 1,000 mcg  1,000 mcg Intramuscular Q28 Days Camryn Mota PA   1,000 mcg at 09/30/21 1200    triamcinolone acetonide (KENALOG-40) injection 40 mg  40 mg Intramuscular Once Darwin Lind MD                Which medication are you concerned about: Loratadine (EQ Allergy Relief) 10 MG tablet    Who prescribed you this medication: CAMRYN MOTA    What are your concerns: PATIENT STATES SHE THOUGHT THAT CAMRYN MOTA TOOK HER OFF THIS MEDICATION AND SHE WOULD LIKE TO KNOW IF SHE WANTS HER TO START IT BACK UP. PLEASE ADVISE.

## 2023-06-28 ENCOUNTER — TELEPHONE (OUTPATIENT)
Dept: FAMILY MEDICINE CLINIC | Facility: CLINIC | Age: 78
End: 2023-06-28

## 2023-06-28 NOTE — TELEPHONE ENCOUNTER
Caller: Evie Shah    Relationship to patient: Self    Best call back number: [phone]     Patient is needing: PATIENT STATED SHE HAS NOT RECEIVED THE PORTABLE OXYGEN REQUESTED THROUGH [facility].    PLEASE ADVISE

## 2023-09-19 ENCOUNTER — OFFICE VISIT (OUTPATIENT)
Dept: FAMILY MEDICINE CLINIC | Facility: CLINIC | Age: 78
End: 2023-09-19
Payer: MEDICARE

## 2023-09-19 VITALS
HEART RATE: 82 BPM | BODY MASS INDEX: 35.08 KG/M2 | HEIGHT: 63 IN | TEMPERATURE: 98.3 F | DIASTOLIC BLOOD PRESSURE: 72 MMHG | WEIGHT: 198 LBS | OXYGEN SATURATION: 90 % | SYSTOLIC BLOOD PRESSURE: 122 MMHG

## 2023-09-19 DIAGNOSIS — R19.00 RIGHT FLANK MASS: Primary | ICD-10-CM

## 2023-09-19 DIAGNOSIS — J30.1 NON-SEASONAL ALLERGIC RHINITIS DUE TO POLLEN: ICD-10-CM

## 2023-09-19 DIAGNOSIS — J96.11 CHRONIC RESPIRATORY FAILURE WITH HYPOXIA: Chronic | ICD-10-CM

## 2023-09-19 DIAGNOSIS — Z23 IMMUNIZATION DUE: ICD-10-CM

## 2023-09-19 DIAGNOSIS — E78.2 DM TYPE 2 WITH DIABETIC MIXED HYPERLIPIDEMIA: Chronic | ICD-10-CM

## 2023-09-19 DIAGNOSIS — I10 ESSENTIAL HYPERTENSION: Chronic | ICD-10-CM

## 2023-09-19 DIAGNOSIS — M85.80 OSTEOPENIA, UNSPECIFIED LOCATION: Chronic | ICD-10-CM

## 2023-09-19 DIAGNOSIS — F41.1 GAD (GENERALIZED ANXIETY DISORDER): ICD-10-CM

## 2023-09-19 DIAGNOSIS — I73.9 PAD (PERIPHERAL ARTERY DISEASE): Chronic | ICD-10-CM

## 2023-09-19 DIAGNOSIS — E55.9 VITAMIN D DEFICIENCY: Chronic | ICD-10-CM

## 2023-09-19 DIAGNOSIS — E11.65 TYPE 2 DIABETES MELLITUS WITH HYPERGLYCEMIA, WITHOUT LONG-TERM CURRENT USE OF INSULIN: Chronic | ICD-10-CM

## 2023-09-19 DIAGNOSIS — E78.2 MIXED HYPERLIPIDEMIA: Chronic | ICD-10-CM

## 2023-09-19 DIAGNOSIS — E11.69 DM TYPE 2 WITH DIABETIC MIXED HYPERLIPIDEMIA: Chronic | ICD-10-CM

## 2023-09-19 RX ORDER — CLOPIDOGREL BISULFATE 75 MG/1
75 TABLET ORAL DAILY
Qty: 90 TABLET | Refills: 3 | Status: SHIPPED | OUTPATIENT
Start: 2023-09-19

## 2023-09-19 RX ORDER — AMLODIPINE AND BENAZEPRIL HYDROCHLORIDE 10; 40 MG/1; MG/1
1 CAPSULE ORAL DAILY
Qty: 90 CAPSULE | Refills: 3 | Status: SHIPPED | OUTPATIENT
Start: 2023-09-19

## 2023-09-19 RX ORDER — RALOXIFENE HYDROCHLORIDE 60 MG/1
60 TABLET, FILM COATED ORAL DAILY
Qty: 90 TABLET | Refills: 3 | Status: SHIPPED | OUTPATIENT
Start: 2023-09-19

## 2023-09-19 RX ORDER — ERGOCALCIFEROL 1.25 MG/1
50000 CAPSULE ORAL WEEKLY
Qty: 12 CAPSULE | Refills: 3 | Status: SHIPPED | OUTPATIENT
Start: 2023-09-19

## 2023-09-19 RX ORDER — TIRZEPATIDE 5 MG/.5ML
5 INJECTION, SOLUTION SUBCUTANEOUS
Qty: 2 ML | Refills: 3 | Status: SHIPPED | OUTPATIENT
Start: 2023-09-19

## 2023-09-19 RX ORDER — ATORVASTATIN CALCIUM 10 MG/1
10 TABLET, FILM COATED ORAL NIGHTLY
Qty: 90 TABLET | Refills: 3 | Status: SHIPPED | OUTPATIENT
Start: 2023-09-19

## 2023-09-19 RX ORDER — POTASSIUM CHLORIDE 750 MG/1
10 TABLET, FILM COATED, EXTENDED RELEASE ORAL DAILY
Qty: 90 TABLET | Refills: 3 | Status: SHIPPED | OUTPATIENT
Start: 2023-09-19

## 2023-09-19 RX ORDER — FUROSEMIDE 20 MG/1
20 TABLET ORAL DAILY
Qty: 90 TABLET | Refills: 3 | Status: SHIPPED | OUTPATIENT
Start: 2023-09-19

## 2023-09-19 RX ORDER — BUPROPION HYDROCHLORIDE 150 MG/1
150 TABLET, EXTENDED RELEASE ORAL EVERY 12 HOURS
Qty: 180 TABLET | Refills: 3 | Status: SHIPPED | OUTPATIENT
Start: 2023-09-19

## 2023-09-19 RX ORDER — MONTELUKAST SODIUM 10 MG/1
10 TABLET ORAL NIGHTLY
Qty: 90 TABLET | Refills: 3 | Status: SHIPPED | OUTPATIENT
Start: 2023-09-19

## 2023-09-19 RX ORDER — ALBUTEROL SULFATE 2.5 MG/3ML
2.5 SOLUTION RESPIRATORY (INHALATION) EVERY 4 HOURS PRN
Qty: 120 ML | Refills: 11 | Status: SHIPPED | OUTPATIENT
Start: 2023-09-19

## 2023-09-19 RX ORDER — OMEGA-3-ACID ETHYL ESTERS 1 G/1
2 CAPSULE, LIQUID FILLED ORAL 2 TIMES DAILY
Qty: 360 CAPSULE | Refills: 3 | Status: SHIPPED | OUTPATIENT
Start: 2023-09-19

## 2023-09-19 RX ORDER — LORATADINE 10 MG/1
10 TABLET ORAL DAILY
Qty: 90 TABLET | Refills: 3 | Status: SHIPPED | OUTPATIENT
Start: 2023-09-19

## 2023-09-19 RX ORDER — METOPROLOL SUCCINATE 25 MG/1
25 TABLET, EXTENDED RELEASE ORAL DAILY
Qty: 90 TABLET | Refills: 3 | Status: SHIPPED | OUTPATIENT
Start: 2023-09-19

## 2023-09-19 RX ADMIN — CYANOCOBALAMIN 1000 MCG: 1000 INJECTION, SOLUTION INTRAMUSCULAR; SUBCUTANEOUS at 11:14

## 2023-09-19 NOTE — PATIENT INSTRUCTIONS
Carbohydrate Counting for Diabetes Mellitus, Adult  Carbohydrate counting is a method of keeping track of how many carbohydrates you eat. Eating carbohydrates increases the amount of sugar (glucose) in the blood. Counting how many carbohydrates you eat improves how well you manage your blood glucose. This, in turn, helps you manage your diabetes.  Carbohydrates are measured in grams (g) per serving. It is important to know how many carbohydrates (in grams or by serving size) you can have in each meal. This is different for every person. A dietitian can help you make a meal plan and calculate how many carbohydrates you should have at each meal and snack.  What foods contain carbohydrates?  Carbohydrates are found in the following foods:  Grains, such as breads and cereals.  Dried beans and soy products.  Starchy vegetables, such as potatoes, peas, and corn.  Fruit and fruit juices.  Milk and yogurt.  Sweets and snack foods, such as cake, cookies, candy, chips, and soft drinks.  How do I count carbohydrates in foods?  There are two ways to count carbohydrates in food. You can read food labels or learn standard serving sizes of foods. You can use either of these methods or a combination of both.  Using the Nutrition Facts label  The Nutrition Facts list is included on the labels of almost all packaged foods and beverages in the [place]. It includes:  The serving size.  Information about nutrients in each serving, including the grams of carbohydrate per serving.  To use the Nutrition Facts, decide how many servings you will have. Then, multiply the number of servings by the number of carbohydrates per serving. The resulting number is the total grams of carbohydrates that you will be having.  Learning the standard serving sizes of foods  When you eat carbohydrate foods that are not packaged or do not include Nutrition Facts on the label, you need to measure the servings in order to count the grams of  carbohydrates.  Measure the foods that you will eat with a food scale or measuring cup, if needed.  Decide how many standard-size servings you will eat.  Multiply the number of servings by 15. For foods that contain carbohydrates, one serving equals 15 g of carbohydrates.  For example, if you eat 2 cups or 10 oz (300 g) of strawberries, you will have eaten 2 servings and 30 g of carbohydrates (2 servings x 15 g = 30 g).  For foods that have more than one food mixed, such as soups and casseroles, you must count the carbohydrates in each food that is included.  The following list contains standard serving sizes of common carbohydrate-rich foods. Each of these servings has about 15 g of carbohydrates:  1 slice of bread.  1 six-inch (15 cm) tortilla.  ? cup or 2 oz (53 g) cooked rice or pasta.  ½ cup or 3 oz (85 g) cooked or canned, drained and rinsed beans or lentils.  ½ cup or 3 oz (85 g) starchy vegetable, such as peas, corn, or squash.  ½ cup or 4 oz (120 g) hot cereal.  ½ cup or 3 oz (85 g) boiled or mashed potatoes, or ¼ or 3 oz (85 g) of a large baked potato.  ½ cup or 4 fl oz (118 mL) fruit juice.  1 cup or 8 fl oz (237 mL) milk.  1 small or 4 oz (106 g) apple.  ½ or 2 oz (63 g) of a medium banana.  1 cup or 5 oz (150 g) strawberries.  3 cups or 1 oz (28.3 g) popped popcorn.  What is an example of carbohydrate counting?  To calculate the grams of carbohydrates in this sample meal, follow the steps shown below.  Sample meal  3 oz (85 g) chicken breast.  ? cup or 4 oz (106 g) brown rice.  ½ cup or 3 oz (85 g) corn.  1 cup or 8 fl oz (237 mL) milk.  1 cup or 5 oz (150 g) strawberries with sugar-free whipped topping.  Carbohydrate calculation  Identify the foods that contain carbohydrates:  Rice.  Corn.  Milk.  Strawberries.  Calculate how many servings you have of each food:  2 servings rice.  1 serving corn.  1 serving milk.  1 serving strawberries.  Multiply each number of servings by 15  servings rice x 15  g = 30 g.  1 serving corn x 15 g = 15 g.  1 serving milk x 15 g = 15 g.  1 serving strawberries x 15 g = 15 g.  Add together all of the amounts to find the total grams of carbohydrates eaten:  30 g + 15 g + 15 g + 15 g = 75 g of carbohydrates total.  What are tips for following this plan?  Shopping  Develop a meal plan and then make a shopping list.  Buy fresh and frozen vegetables, fresh and frozen fruit, dairy, eggs, beans, lentils, and whole grains.  Look at food labels. Choose foods that have more fiber and less sugar.  Avoid processed foods and foods with added sugars.  Meal planning  Aim to have the same number of grams of carbohydrates at each meal and for each snack time.  Plan to have regular, balanced meals and snacks.  Where to find more information  American Diabetes Association: diabetes.org  Centers for Disease Control and Prevention: cdc.gov  Academy of Nutrition and Dietetics: eatright.org  Association of Diabetes Care & Education Specialists: diabeteseducator.org  Summary  Carbohydrate counting is a method of keeping track of how many carbohydrates you eat.  Eating carbohydrates increases the amount of sugar (glucose) in your blood.  Counting how many carbohydrates you eat improves how well you manage your blood glucose. This helps you manage your diabetes.  A dietitian can help you make a meal plan and calculate how many carbohydrates you should have at each meal and snack.  This information is not intended to replace advice given to you by your health care provider. Make sure you discuss any questions you have with your health care provider.  Document Revised: 07/21/2021 Document Reviewed: 07/21/2021  Elsevier Patient Education © 2023 [employer] Inc.  Fat and Cholesterol Restricted Eating Plan  Getting too much fat and cholesterol in your diet may cause health problems. Choosing the right foods helps keep your fat and cholesterol at normal levels. This can keep you from getting certain  "diseases.  Your doctor may recommend an eating plan that includes:  Total fat: ______% or less of total calories a day. This is ______g of fat a day.  Saturated fat: ______% or less of total calories a day. This is ______g of saturated fat a day.  Cholesterol: less than _________mg a day.  Fiber: ______g a day.  What are tips for following this plan?  General tips  Work with your doctor to lose weight if you need to.  Avoid:  Foods with added sugar.  Fried foods.  Foods with trans fat or partially hydrogenated oils. This includes some margarines and baked goods.  If you drink alcohol:  Limit how much you have to:  0-1 drink a day for women who are not pregnant.  0-2 drinks a day for men.  Know how much alcohol is in a drink. In the [place] one drink equals one 12 oz bottle of beer (355 mL), one 5 oz glass of wine (148 mL), or one 1½ oz glass of hard liquor (44 mL).  Reading food labels  Check food labels for:  Trans fats.  Partially hydrogenated oils.  Saturated fat (g) in each serving.  Cholesterol (mg) in each serving.  Fiber (g) in each serving.  Choose foods with healthy fats, such as:  Monounsaturated fats and polyunsaturated fats. These include olive and canola oil, flaxseeds, walnuts, almonds, and seeds.  Omega-3 fats. These are found in certain fish, flaxseed oil, and ground flaxseeds.  Choose grain products that have whole grains. Look for the word \"whole\" as the first word in the ingredient list.  Cooking  Cook foods using low-fat methods. These include baking, boiling, grilling, and broiling.  Eat more home-cooked foods. Eat at restaurants and buffets less often. Eat less fast food.  Avoid cooking using saturated fats, such as butter, cream, palm oil, palm kernel oil, and coconut oil.  Meal planning    At meals, divide your plate into four equal parts:  Fill one-half of your plate with vegetables, green salads, and fruit.  Fill one-fourth of your plate with whole grains.  Fill one-fourth of your plate with " low-fat (lean) protein foods.  Eat fish that is high in omega-3 fats at least two times a week. This includes mackerel, tuna, sardines, and salmon.  Eat foods that are high in fiber, such as whole grains, beans, apples, pears, berries, broccoli, carrots, peas, and barley.  What foods should I eat?  Fruits  All fresh, canned (in natural juice), or frozen fruits.  Vegetables  Fresh or frozen vegetables (raw, steamed, roasted, or grilled). Green salads.  Grains  Whole grains, such as whole wheat or whole grain breads, crackers, cereals, and pasta. Unsweetened oatmeal, bulgur, barley, quinoa, or brown rice. Corn or whole wheat flour tortillas.  Meats and other protein foods  Ground beef (85% or leaner), grass-fed beef, or beef trimmed of fat. Skinless chicken or turkey. Ground chicken or turkey. Pork trimmed of fat. All fish and seafood. Egg whites. Dried beans, peas, or lentils. Unsalted nuts or seeds. Unsalted canned beans. Nut butters without added sugar or oil.  Dairy  Low-fat or nonfat dairy products, such as skim or 1% milk, 2% or reduced-fat cheeses, low-fat and fat-free ricotta or cottage cheese, or plain low-fat and nonfat yogurt.  Fats and oils  Tub margarine without trans fats. Light or reduced-fat mayonnaise and salad dressings. Avocado. Olive, canola, sesame, or safflower oils.  The items listed above may not be a complete list of foods and beverages you can eat. Contact a dietitian for more information.  What foods should I avoid?  Fruits  Canned fruit in heavy syrup. Fruit in cream or butter sauce. Fried fruit.  Vegetables  Vegetables cooked in cheese, cream, or butter sauce. Fried vegetables.  Grains  White bread. White pasta. White rice. Cornbread. Bagels, pastries, and croissants. Crackers and snack foods that contain trans fat and hydrogenated oils.  Meats and other protein foods  Fatty cuts of meat. Ribs, chicken wings, palomo, sausage, bologna, salami, chitterlings, fatback, hot dogs, bratwurst, and  packaged lunch meats. Liver and organ meats. Whole eggs and egg yolks. Chicken and turkey with skin. Fried meat.  Dairy  Whole or 2% milk, cream, half-and-half, and cream cheese. Whole milk cheeses. Whole-fat or sweetened yogurt. Full-fat cheeses. Nondairy creamers and whipped toppings. Processed cheese, cheese spreads, and cheese curds.  Fats and oils  Butter, stick margarine, lard, shortening, ghee, or palomo fat. Coconut, palm kernel, and palm oils.  Beverages  Alcohol. Sugar-sweetened drinks such as sodas, lemonade, and fruit drinks.  Sweets and desserts  Corn syrup, sugars, honey, and molasses. Candy. Jam and jelly. Syrup. Sweetened cereals. Cookies, pies, cakes, donuts, muffins, and ice cream.  The items listed above may not be a complete list of foods and beverages you should avoid. Contact a dietitian for more information.  Summary  Choosing the right foods helps keep your fat and cholesterol at normal levels. This can keep you from getting certain diseases.  At meals, fill one-half of your plate with vegetables, green salads, and fruits.  Eat high fiber foods, like whole grains, beans, apples, pears, berries, carrots, peas, and barley.  Limit added sugar, saturated fats, alcohol, and fried foods.  This information is not intended to replace advice given to you by your health care provider. Make sure you discuss any questions you have with your health care provider.  Document Revised: 04/29/2022 Document Reviewed: 04/29/2022  Elsevier Patient Education © 2023 Elsevier Inc.

## 2023-09-19 NOTE — PROGRESS NOTES
"Chief Complaint -lump on right flank    History of Present Illness -     Evie Shah is a 78 y.o. female.     Lump-  Patient complains of a lump on her right flank with some intermittent achy pain.  Onset 2 months.  The mass is getting larger.    Chronic respiratory failure-  Stable with oxygen supplementation.  O2 sat today was 90%.    Hypertension-  Controlled with amlodipine/benazepril, furosemide and metoprolol    Hyperlipidemia-  Stable with atorvastatin and dietary modification    Generalized anxiety disorder-stable with Wellbutrin    Peripheral artery disease-stable with Plavix.  She is status post stent placement.  She denies any abnormal bleeding or bruising    Diabetes mellitus type 2-  Stable with Jardiance and Mounjaro as well as dietary modification    Allergic rhinitis-  Stable with Singulair and loratadine    Osteopenia-stable with Evista, calcium and vitamin D supplementation      The following portions of the patient's history were reviewed and updated as appropriate: allergies, current medications, past family history, past medical history, past social history, past surgical history and problem list.        Objective  Vital signs:  /72   Pulse 82   Temp 98.3 °F (36.8 °C) (Temporal)   Ht 160 cm (63\")   Wt 89.8 kg (198 lb)   SpO2 90%   BMI 35.07 kg/m²     Physical Exam  Vitals and nursing note reviewed.   Constitutional:       Appearance: Normal appearance. She is well-developed.   Eyes:      Extraocular Movements: Extraocular movements intact.      Conjunctiva/sclera: Conjunctivae normal.   Cardiovascular:      Rate and Rhythm: Normal rate and regular rhythm.      Heart sounds: Normal heart sounds. No murmur heard.  Pulmonary:      Effort: Pulmonary effort is normal. No respiratory distress.      Breath sounds: Normal breath sounds. No wheezing.   Abdominal:      Palpations: Abdomen is soft. There is mass (approximately 5 x 5 cm nonmobile nontender subcutaneous mass noted right " flank).      Tenderness: There is no abdominal tenderness. There is no right CVA tenderness, left CVA tenderness, guarding or rebound.   Musculoskeletal:         General: No tenderness.   Skin:     General: Skin is warm and dry.      Findings: No rash.   Neurological:      Mental Status: She is alert and oriented to person, place, and time.   Psychiatric:         Mood and Affect: Mood normal.         Behavior: Behavior normal.         Thought Content: Thought content normal.       The following data was reviewed by Camryn Mota PA-C:         Lab Results   Component Value Date    BUN 17 06/09/2023    CREATININE 0.72 06/09/2023    EGFR 83 02/26/2015    ALT 17 06/09/2023    AST 14 06/09/2023    WBC 10.40 04/28/2022    HGB 16.2 (H) 04/28/2022    HCT 50.6 (H) 04/28/2022     04/28/2022    TRIG 210 (H) 06/09/2023    HDL 44 06/09/2023    LDL 37 06/09/2023    TSH 3.080 03/11/2019    HGBA1C 7.50 (H) 06/09/2023           Assessment & Plan     Diagnoses and all orders for this visit:    1. Right flank mass (Primary)  Comments:  Suspect lipoma  Refer to surgeon for further evaluation and possible biopsy/excision  Orders:  -     Ambulatory Referral to General Surgery    2. Chronic respiratory failure with hypoxia  Comments:  continue 2L oxygen qhs    3. Essential hypertension  Comments:  Advise daily blood pressure and pulse log   Continue Lotrel, furosemide and metoprolol  Advise daily BP and pulse monitoring and report any consistent readings   Orders:  -     amLODIPine-benazepril (LOTREL) 10-40 MG per capsule; Take 1 capsule by mouth Daily.  Dispense: 90 capsule; Refill: 3  -     furosemide (LASIX) 20 MG tablet; Take 1 tablet by mouth Daily.  Dispense: 90 tablet; Refill: 3  -     metoprolol succinate XL (Toprol XL) 25 MG 24 hr tablet; Take 1 tablet by mouth Daily.  Dispense: 90 tablet; Refill: 3  -     potassium chloride 10 MEQ CR tablet; Take 1 tablet by mouth Daily.  Dispense: 90 tablet; Refill: 3  -     Comprehensive  Metabolic Panel; Future  -     CBC & Differential; Future    4. Mixed hyperlipidemia  Comments:  Continue atorvastatin  Advised low-cholesterol diet  Orders:  -     atorvastatin (LIPITOR) 10 MG tablet; Take 1 tablet by mouth Every Night.  Dispense: 90 tablet; Refill: 3  -     omega-3 acid ethyl esters (LOVAZA) 1 g capsule; Take 2 capsules by mouth 2 (Two) Times a Day.  Dispense: 360 capsule; Refill: 3    5. VISHAL (generalized anxiety disorder)  Comments:  Continue Wellbutrin  Orders:  -     buPROPion SR (WELLBUTRIN SR) 150 MG 12 hr tablet; Take 1 tablet by mouth Every 12 (Twelve) Hours.  Dispense: 180 tablet; Refill: 3    6. PAD (peripheral artery disease)  Comments:  Continue Plavix and risk factor modification including atorvastatin  Status post stent placement  Orders:  -     clopidogrel (PLAVIX) 75 MG tablet; Take 1 tablet by mouth Daily.  Dispense: 90 tablet; Refill: 3    7. DM type 2 with diabetic mixed hyperlipidemia  Comments:  Continue Jardiance and semaglutide  Advised a more strict diabetic diet  Advised fasting and 2-hour PP blood glucose readings  Orders:  -     empagliflozin (Jardiance) 25 MG tablet tablet; Take 1 tablet by mouth Daily.  Dispense: 90 tablet; Refill: 3  -     Lipid Panel; Future    8. Type 2 diabetes mellitus with hyperglycemia, without long-term current use of insulin  Comments:  Discontinue glyburide  Continue Mounjaro and Jardiance  Orders:  -     Tirzepatide (Mounjaro) 5 MG/0.5ML solution pen-injector; Inject 0.5 mL under the skin into the appropriate area as directed Every 7 (Seven) Days.  Dispense: 2 mL; Refill: 3  -     Hemoglobin A1c; Future  -     MicroAlbumin, Urine, Random - Urine, Clean Catch; Future  -     Microalbumin / Creatinine Urine Ratio - Urine, Clean Catch; Future  -     Microalbumin / Creatinine Urine Ratio - Urine, Clean Catch  -     MicroAlbumin, Urine, Random - Urine, Clean Catch    9. Non-seasonal allergic rhinitis due to pollen  Comments:  Continue Singulair and  Claritin  Orders:  -     loratadine (EQ Allergy Relief) 10 MG tablet; Take 1 tablet by mouth Daily.  Dispense: 90 tablet; Refill: 3  -     montelukast (SINGULAIR) 10 MG tablet; Take 1 tablet by mouth Every Night.  Dispense: 90 tablet; Refill: 3    10. Osteopenia, unspecified location  Comments:  Continue Evista calcium and vitamin D supplementation  Fall risk precautions advised  Orders:  -     raloxifene (EVISTA) 60 MG tablet; Take 1 tablet by mouth Daily.  Dispense: 90 tablet; Refill: 3    11. Vitamin D deficiency  Comments:  Continue vitamin D supplementation  Orders:  -     vitamin D (ERGOCALCIFEROL) 1.25 MG (99392 UT) capsule capsule; Take 1 capsule by mouth 1 (One) Time Per Week.  Dispense: 12 capsule; Refill: 3  -     Vitamin D,25-Hydroxy; Future    12. Immunization due  -     Fluzone High-Dose 65+yrs (2906-2616)    Other orders  -     albuterol (PROVENTIL) (2.5 MG/3ML) 0.083% nebulizer solution; Take 2.5 mg by nebulization Every 4 (Four) Hours As Needed for Wheezing.  Dispense: 120 mL; Refill: 11                   Patient was given instructions and counseling regarding his condition or for health maintenance advice. Please see specific information pulled into the AVS if appropriate      This document has been electronically signed by:  Camryn Mota PA-C

## 2023-09-20 ENCOUNTER — TELEPHONE (OUTPATIENT)
Dept: FAMILY MEDICINE CLINIC | Facility: CLINIC | Age: 78
End: 2023-09-20

## 2023-09-20 LAB — MICROALBUMIN UR-MCNC: <3 UG/ML

## 2023-09-20 NOTE — TELEPHONE ENCOUNTER
Caller: Evie Shah    Relationship: Self    Best call back number: [phone]    What is the medical concern/diagnosis: CYST IN ABDOMINAL AREA    What specialty or service is being requested: DR DEE    What is the office location: IN [place]      Any additional details: PT CALLED TO CHECK STATUS.

## 2023-09-27 ENCOUNTER — TELEPHONE (OUTPATIENT)
Dept: FAMILY MEDICINE CLINIC | Facility: CLINIC | Age: 78
End: 2023-09-27

## 2023-09-27 NOTE — TELEPHONE ENCOUNTER
Caller: Evie Shah    Relationship: Self    Best call back number: [phone]     What is the medical concern/diagnosis: CYST IN ABDOMINAL AREA    What is the provider, practice or medical service name: PATIENTS DAUGHTER DOESN'T WANT PATIENT SEEING  AND WANTS A REFERRAL TO A NEW PROVIDER  IN [place]

## 2023-09-28 DIAGNOSIS — R19.00 RIGHT FLANK MASS: Primary | ICD-10-CM

## 2023-09-28 NOTE — TELEPHONE ENCOUNTER
Please see the attached message.  I did place a new referral internal for Faith with regards to the right flank mass.  Please cancel the external referral to surgery.

## 2023-10-09 ENCOUNTER — TRANSCRIBE ORDERS (OUTPATIENT)
Dept: ADMINISTRATIVE | Facility: HOSPITAL | Age: 78
End: 2023-10-09
Payer: MEDICARE

## 2023-10-09 DIAGNOSIS — R19.00 INTRA-ABDOMINAL AND PELVIC SWELLING, MASS AND LUMP, UNSPECIFIED SITE: Primary | ICD-10-CM

## 2023-10-25 ENCOUNTER — HOSPITAL ENCOUNTER (OUTPATIENT)
Dept: CT IMAGING | Facility: HOSPITAL | Age: 78
Discharge: HOME | End: 2023-10-25
Admitting: SURGERY
Payer: MEDICARE

## 2023-10-25 DIAGNOSIS — R19.00 INTRA-ABDOMINAL AND PELVIC SWELLING, MASS AND LUMP, UNSPECIFIED SITE: ICD-10-CM

## 2023-10-25 LAB — CREAT BLDA-MCNC: 0.7 MG/DL (ref 0.6–1.3)

## 2023-10-25 PROCEDURE — 25510000001 IOPAMIDOL 61 % SOLUTION: Performed by: SURGERY

## 2023-10-25 PROCEDURE — 74177 CT ABD & PELVIS W/CONTRAST: CPT | Performed by: RADIOLOGY

## 2023-10-25 PROCEDURE — 82565 ASSAY OF CREATININE: CPT

## 2023-10-25 PROCEDURE — 74177 CT ABD & PELVIS W/CONTRAST: CPT

## 2023-10-25 RX ADMIN — IOPAMIDOL 87 ML: 612 INJECTION, SOLUTION INTRAVENOUS at 13:20

## 2023-11-20 ENCOUNTER — CLINICAL SUPPORT (OUTPATIENT)
Dept: FAMILY MEDICINE CLINIC | Facility: CLINIC | Age: 78
End: 2023-11-20
Payer: MEDICARE

## 2023-11-20 ENCOUNTER — TELEPHONE (OUTPATIENT)
Dept: FAMILY MEDICINE CLINIC | Facility: CLINIC | Age: 78
End: 2023-11-20

## 2023-11-20 DIAGNOSIS — I25.10 CORONARY ARTERY CALCIFICATION SEEN ON CT SCAN: Primary | ICD-10-CM

## 2023-11-20 PROCEDURE — 93000 ELECTROCARDIOGRAM COMPLETE: CPT | Performed by: PHYSICIAN ASSISTANT

## 2023-11-20 NOTE — TELEPHONE ENCOUNTER
If she just needs an EKG then she can come in on the nurse schedule.  If she needs preop clearance then she needs an appointment.

## 2023-11-20 NOTE — TELEPHONE ENCOUNTER
Caller: Sudeep Shah    Relationship: Self    Best call back number:      What is the best time to reach you: ANYTIME     Who are you requesting to speak with (clinical staff, provider,  specific staff member): CLINICAL STAFF    Do you know the name of the person who called: SUDEEP    What was the call regarding: PATIENT IS HAVING SURGERY NEXT THUR [id number] AND NEEDS TO HAVE AN EKG BEFORE THE 27TH; PLEASE CALL TO ADVISE    Is it okay if the provider responds through MyChart: PHONE CALL    ALSO WARM TRANSFERRED TO OFFICE TO SEE IF SHE CAN MAKE AN APPT TO GET THIS DONE

## 2023-12-26 ENCOUNTER — OFFICE VISIT (OUTPATIENT)
Dept: FAMILY MEDICINE CLINIC | Facility: CLINIC | Age: 78
End: 2023-12-26
Payer: MEDICARE

## 2023-12-26 VITALS
HEIGHT: 63 IN | SYSTOLIC BLOOD PRESSURE: 138 MMHG | TEMPERATURE: 97.4 F | OXYGEN SATURATION: 92 % | WEIGHT: 203 LBS | HEART RATE: 87 BPM | BODY MASS INDEX: 35.97 KG/M2 | DIASTOLIC BLOOD PRESSURE: 88 MMHG

## 2023-12-26 DIAGNOSIS — E78.5 DYSLIPIDEMIA: Chronic | ICD-10-CM

## 2023-12-26 DIAGNOSIS — J44.1 COPD WITH ACUTE EXACERBATION: Primary | Chronic | ICD-10-CM

## 2023-12-26 DIAGNOSIS — J30.9 CHRONIC ALLERGIC RHINITIS: Chronic | ICD-10-CM

## 2023-12-26 DIAGNOSIS — E11.65 TYPE 2 DIABETES MELLITUS WITH HYPERGLYCEMIA, WITHOUT LONG-TERM CURRENT USE OF INSULIN: Chronic | ICD-10-CM

## 2023-12-26 DIAGNOSIS — I10 ESSENTIAL HYPERTENSION: Chronic | ICD-10-CM

## 2023-12-26 DIAGNOSIS — R05.9 COUGH, UNSPECIFIED TYPE: ICD-10-CM

## 2023-12-26 PROCEDURE — 3079F DIAST BP 80-89 MM HG: CPT | Performed by: NURSE PRACTITIONER

## 2023-12-26 PROCEDURE — 1159F MED LIST DOCD IN RCRD: CPT | Performed by: NURSE PRACTITIONER

## 2023-12-26 PROCEDURE — 3075F SYST BP GE 130 - 139MM HG: CPT | Performed by: NURSE PRACTITIONER

## 2023-12-26 PROCEDURE — 99214 OFFICE O/P EST MOD 30 MIN: CPT | Performed by: NURSE PRACTITIONER

## 2023-12-26 PROCEDURE — 1160F RVW MEDS BY RX/DR IN RCRD: CPT | Performed by: NURSE PRACTITIONER

## 2023-12-26 RX ORDER — DEXTROMETHORPHAN HYDROBROMIDE AND PROMETHAZINE HYDROCHLORIDE 15; 6.25 MG/5ML; MG/5ML
5 SYRUP ORAL 2 TIMES DAILY PRN
Qty: 180 ML | Refills: 0 | Status: SHIPPED | OUTPATIENT
Start: 2023-12-26

## 2023-12-26 RX ORDER — DOXYCYCLINE HYCLATE 100 MG/1
100 CAPSULE ORAL 2 TIMES DAILY
Qty: 20 CAPSULE | Refills: 0 | Status: SHIPPED | OUTPATIENT
Start: 2023-12-26 | End: 2024-01-05

## 2023-12-26 RX ORDER — IPRATROPIUM BROMIDE AND ALBUTEROL SULFATE 2.5; .5 MG/3ML; MG/3ML
3 SOLUTION RESPIRATORY (INHALATION) EVERY 4 HOURS PRN
Qty: 150 ML | Refills: 1 | Status: SHIPPED | OUTPATIENT
Start: 2023-12-26

## 2023-12-26 RX ORDER — FLUCONAZOLE 150 MG/1
150 TABLET ORAL DAILY
Qty: 2 TABLET | Refills: 0 | Status: SHIPPED | OUTPATIENT
Start: 2023-12-26

## 2023-12-26 RX ORDER — FLUTICASONE PROPIONATE 50 MCG
2 SPRAY, SUSPENSION (ML) NASAL DAILY
Qty: 16 G | Refills: 1 | Status: SHIPPED | OUTPATIENT
Start: 2023-12-26

## 2023-12-26 NOTE — PROGRESS NOTES
History of Present Illness    Evie Shah is a 78 y.o. female who presents to the clinic today complaining of upper respiratory symptoms which started approximately 5 days ago and worsening.  She has been diagnosed with diabetes type 2 hypertension and dyslipidemia,.    Upper Respiratory Symptoms  This is a recurrent problem. The current episode started in the past 7 days. The problem has been gradually worsening. Associated symptoms include congestion, coughing, a plugged ear sensation, rhinorrhea, sinus pain and wheezing.She has been diagnosed with COPD, chronic respiratory failure with hypoxia, and chronic allergic rhinitis.    Diabetes  She has type 2 diabetes mellitus.Risk factors for coronary artery disease include diabetes mellitus, dyslipidemia, hypertension, post-menopausal and sedentary lifestyle.  An ACE inhibitor/angiotensin II receptor blocker is being taken. Currently she does live by herself and does find meal planning difficult.  She has made some lifestyle changes especially in her food choices.  Currently she is taking Jardiance and Mounjaro.  She had previously been prescribed metformin which she could not tolerate due to diarrhea.    Lab Results   Component Value Date    HGBA1C 7.50 (H) 06/09/2023      Hypertension  Risk factors for coronary artery disease include diabetes mellitus, dyslipidemia, post-menopausal state and sedentary lifestyle. Current antihypertension treatment includes calcium channel blockers, diuretics and beta blockers.   Lab Results   Component Value Date    GLUCOSE 158 (H) 06/09/2023    BUN 17 06/09/2023    EGFRRESULT 86.8 06/09/2023    BCR 23.6 06/09/2023    K 3.8 06/09/2023    CO2 24.4 06/09/2023    CALCIUM 10.1 06/09/2023    PROTENTOTREF 6.7 06/09/2023    ALBUMIN 4.5 06/09/2023    BILITOT 0.5 06/09/2023    AST 14 06/09/2023    ALT 17 06/09/2023      Dyslipidemia  Pertinent negatives include no chest pain or shortness of breath. Current antihyperlipidemic  "treatment includes statins. Risk factors for coronary artery disease include dyslipidemia, diabetes mellitus, hypertension and post-menopausal.   Lab Results   Component Value Date    CHLPL 114 06/09/2023    CHLPL 116 03/14/2023    CHLPL 109 12/02/2022     Lab Results   Component Value Date    TRIG 210 (H) 06/09/2023    TRIG 244 (H) 03/14/2023    TRIG 189 (H) 12/02/2022     Lab Results   Component Value Date    HDL 44 06/09/2023    HDL 37 (L) 03/14/2023    HDL 42 12/02/2022     Lab Results   Component Value Date    LDL 37 06/09/2023    LDL 41 03/14/2023    LDL 37 12/02/2022        The following portions of the patient's history were reviewed and updated as appropriate: allergies, current medications, past family history, past medical history, past social history, past surgical history and problem list.    Review of Systems   Constitutional:  Positive for activity change, appetite change and fatigue. Negative for chills and fever.   HENT:  Positive for congestion, postnasal drip, rhinorrhea, sinus pressure and sinus pain. Negative for ear discharge, nosebleeds, sore throat, trouble swallowing and voice change.    Eyes:  Negative for photophobia, pain, discharge, redness, itching and visual disturbance.   Respiratory:  Positive for cough, chest tightness and wheezing.    Cardiovascular:  Negative for chest pain and leg swelling.   Gastrointestinal:  Negative for nausea and vomiting.   Endocrine: Negative.    Skin: Negative.    Allergic/Immunologic: Positive for environmental allergies.   Neurological:  Positive for dizziness. Negative for weakness and headaches.   Hematological:  Negative for adenopathy.   All other systems reviewed and are negative.    Vital signs:  /88   Pulse 87   Temp 97.4 °F (36.3 °C) (Temporal)   Ht 160 cm (63\")   Wt 92.1 kg (203 lb)   SpO2 92%   BMI 35.96 kg/m²     Physical Exam  Vitals reviewed.   Constitutional:       General: She is not in acute distress.     Appearance: She is " well-developed. She is not diaphoretic.   HENT:      Head: Normocephalic.      Right Ear: Ear canal normal. A middle ear effusion is present. Tympanic membrane is bulging. Tympanic membrane is not erythematous.      Left Ear: Ear canal normal. A middle ear effusion is present. Tympanic membrane is bulging. Tympanic membrane is not erythematous.      Nose: Nose normal. Congestion and rhinorrhea present. No nasal tenderness.      Right Turbinates: Swollen.      Left Turbinates: Swollen.      Right Sinus: Maxillary sinus tenderness and frontal sinus tenderness present.      Left Sinus: Maxillary sinus tenderness and frontal sinus tenderness present.      Mouth/Throat:      Lips: Pink.      Mouth: Mucous membranes are moist.      Pharynx: Oropharynx is clear. Uvula midline.   Eyes:      General: Lids are normal. No scleral icterus.        Right eye: No discharge.         Left eye: No discharge.      Extraocular Movements:      Right eye: Normal extraocular motion and no nystagmus.      Left eye: Normal extraocular motion and no nystagmus.      Conjunctiva/sclera: Conjunctivae normal.   Neck:      Thyroid: No thyromegaly or thyroid tenderness.      Vascular: No JVD.      Trachea: No tracheal tenderness.   Cardiovascular:      Rate and Rhythm: Normal rate and regular rhythm.      Heart sounds: Normal heart sounds, S1 normal and S2 normal. No murmur heard.  Pulmonary:      Effort: Pulmonary effort is normal. No accessory muscle usage, prolonged expiration or respiratory distress.      Breath sounds: Normal breath sounds. No wheezing or rales.   Chest:      Chest wall: No tenderness.   Abdominal:      General: Bowel sounds are normal.      Palpations: Abdomen is soft. There is no hepatomegaly or splenomegaly.   Musculoskeletal:      Cervical back: Neck supple.      Comments: No muscular atrophy or flaccidity.   Lymphadenopathy:      Head:      Right side of head: No submental or submandibular adenopathy.      Left side of  head: No submental or submandibular adenopathy.      Cervical:      Right cervical: No superficial cervical adenopathy.     Left cervical: No superficial cervical adenopathy.   Skin:     General: Skin is warm and dry.      Capillary Refill: Capillary refill takes less than 2 seconds.      Coloration: Skin is not jaundiced or pale.      Findings: No erythema.      Nails: There is no clubbing.   Neurological:      Mental Status: She is alert and oriented to person, place, and time.      Cranial Nerves: No cranial nerve deficit or facial asymmetry.      Sensory: No sensory deficit.      Motor: No weakness, tremor, atrophy or abnormal muscle tone.      Coordination: Coordination normal.      Deep Tendon Reflexes: Reflexes are normal and symmetric.   Psychiatric:         Attention and Perception: She is attentive.         Mood and Affect: Mood normal. Mood is not anxious or depressed.         Speech: Speech normal.         Behavior: Behavior is cooperative.         Thought Content: Thought content normal.         Cognition and Memory: Cognition normal.         Judgment: Judgment normal.       Class 2 Severe Obesity (BMI >=35 and <=39.9). Obesity-related health conditions include the following: obstructive sleep apnea, hypertension, diabetes mellitus, dyslipidemias, GERD, and osteoarthritis. Obesity is unchanged. BMI is above average; BMI management plan is completed. We discussed portion control and increasing exercise.     Assessment & Plan     Diagnoses and all orders for this visit:    1. COPD with acute exacerbation (Primary)  -     Cancel: Respiratory Panel PCR w/COVID-19(SARS-CoV-2) [facility] In-House, NP Swab in UTM/VTM, 2 HR TAT - Swab, Nasopharynx; Future  -     Respiratory Panel PCR w/COVID-19(SARS-CoV-2) [facility] In-House, NP Swab in UTM/VTM, 2 HR TAT - Swab, Nasopharynx  -     doxycycline (VIBRAMYCIN) 100 MG capsule; Take 1 capsule by mouth 2 (Two) Times a Day for 10 days.   Dispense: 20 capsule; Refill: 0  -     ipratropium-albuterol (DUO-NEB) 0.5-2.5 mg/3 ml nebulizer; Take 3 mL by nebulization Every 4 (Four) Hours As Needed for Shortness of Air.  Dispense: 150 mL; Refill: 1    2. Chronic allergic rhinitis  Comments:  Added Flonase nasal spray.  Continue loratadine and montelukast  Orders:  -     fluticasone (FLONASE) 50 MCG/ACT nasal spray; 2 sprays into the nostril(s) as directed by provider Daily.  Dispense: 16 g; Refill: 1    3. Type 2 diabetes mellitus with hyperglycemia, without long-term current use of insulin  Comments:  Sick day management education provided.Continue Kendall Sheehan    4. Essential hypertension  Comments:  Continue amlodipine-benazepril and metoprolol    5. Dyslipidemia  Comments:  Continue atorvastatin and Lovaza    6. Cough, unspecified type  Comments:  Counseled regarding supportive care measures.  Promethazine DM to be used primarily at night.  Orders:  -     promethazine-dextromethorphan (PROMETHAZINE-DM) 6.25-15 MG/5ML syrup; Take 5 mL by mouth 2 (Two) Times a Day As Needed for Cough.  Dispense: 180 mL; Refill: 0    Other orders  -     fluconazole (Diflucan) 150 MG tablet; Take 1 tablet by mouth Daily.  Dispense: 2 tablet; Refill: 0      Follow Up If symptoms worsen or do not improve     Findings and recommendations discussed with Evie. Reviewed treatment options.  Counseled regarding supportive care measures.  Signs and symptoms of concern reviewed and if occur to seek further evaluation or symptoms worsen or do not improve.  Lifestyle modifications reinforced including nutrition and activity recommendations.   She was given instructions and counseling regarding her condition or for health maintenance advice. Please see specific information pulled into the AVS if appropriate      This document has been electronically signed by:

## 2024-01-02 ENCOUNTER — HOSPITAL ENCOUNTER (INPATIENT)
Facility: HOSPITAL | Age: 79
LOS: 16 days | Discharge: HOME HEALTH | End: 2024-01-18
Attending: EMERGENCY MEDICINE | Admitting: HOSPITALIST
Payer: MEDICARE

## 2024-01-02 ENCOUNTER — OFFICE VISIT (OUTPATIENT)
Dept: FAMILY MEDICINE CLINIC | Facility: CLINIC | Age: 79
End: 2024-01-02
Payer: MEDICARE

## 2024-01-02 ENCOUNTER — APPOINTMENT (OUTPATIENT)
Dept: GENERAL RADIOLOGY | Facility: HOSPITAL | Age: 79
End: 2024-01-02
Payer: MEDICARE

## 2024-01-02 ENCOUNTER — APPOINTMENT (OUTPATIENT)
Dept: CT IMAGING | Facility: HOSPITAL | Age: 79
End: 2024-01-02
Payer: MEDICARE

## 2024-01-02 VITALS
HEIGHT: 63 IN | OXYGEN SATURATION: 87 % | DIASTOLIC BLOOD PRESSURE: 78 MMHG | WEIGHT: 199 LBS | HEART RATE: 86 BPM | BODY MASS INDEX: 35.26 KG/M2 | SYSTOLIC BLOOD PRESSURE: 130 MMHG | TEMPERATURE: 98.1 F

## 2024-01-02 DIAGNOSIS — J96.01 ACUTE RESPIRATORY FAILURE WITH HYPOXIA: ICD-10-CM

## 2024-01-02 DIAGNOSIS — R09.02 HYPOXEMIA: Primary | ICD-10-CM

## 2024-01-02 DIAGNOSIS — R09.02 HYPOXIA: ICD-10-CM

## 2024-01-02 DIAGNOSIS — J44.1 COPD WITH ACUTE EXACERBATION: Primary | Chronic | ICD-10-CM

## 2024-01-02 DIAGNOSIS — I10 ESSENTIAL HYPERTENSION: Chronic | ICD-10-CM

## 2024-01-02 DIAGNOSIS — R91.8 LUNG MASS: ICD-10-CM

## 2024-01-02 DIAGNOSIS — E11.65 TYPE 2 DIABETES MELLITUS WITH HYPERGLYCEMIA, WITHOUT LONG-TERM CURRENT USE OF INSULIN: Chronic | ICD-10-CM

## 2024-01-02 DIAGNOSIS — J44.1 COPD EXACERBATION: ICD-10-CM

## 2024-01-02 PROBLEM — J96.91 RESPIRATORY FAILURE WITH HYPOXIA: Status: ACTIVE | Noted: 2024-01-02

## 2024-01-02 LAB
A-A DO2: 149.2 MMHG (ref 0–300)
ALBUMIN SERPL-MCNC: 4.3 G/DL (ref 3.5–5.2)
ALBUMIN/GLOB SERPL: 1.5 G/DL
ALP SERPL-CCNC: 107 U/L (ref 39–117)
ALT SERPL W P-5'-P-CCNC: 12 U/L (ref 1–33)
ANION GAP SERPL CALCULATED.3IONS-SCNC: 13.1 MMOL/L (ref 5–15)
ARTERIAL PATENCY WRIST A: ABNORMAL
AST SERPL-CCNC: 18 U/L (ref 1–32)
ATMOSPHERIC PRESS: 732 MMHG
BASE EXCESS BLDA CALC-SCNC: -0.4 MMOL/L (ref 0–2)
BASOPHILS # BLD AUTO: 0.07 10*3/MM3 (ref 0–0.2)
BASOPHILS NFR BLD AUTO: 0.7 % (ref 0–1.5)
BDY SITE: ABNORMAL
BILIRUB SERPL-MCNC: 0.4 MG/DL (ref 0–1.2)
BILIRUB UR QL STRIP: NEGATIVE
BUN SERPL-MCNC: 23 MG/DL (ref 8–23)
BUN/CREAT SERPL: 21.1 (ref 7–25)
CALCIUM SPEC-SCNC: 9.6 MG/DL (ref 8.6–10.5)
CHLORIDE SERPL-SCNC: 105 MMOL/L (ref 98–107)
CLARITY UR: CLEAR
CO2 BLDA-SCNC: 25.4 MMOL/L (ref 22–33)
CO2 SERPL-SCNC: 21.9 MMOL/L (ref 22–29)
COHGB MFR BLD: 0.8 % (ref 0–5)
COLOR UR: YELLOW
CREAT SERPL-MCNC: 1.09 MG/DL (ref 0.57–1)
CRP SERPL-MCNC: <0.3 MG/DL (ref 0–0.5)
D-LACTATE SERPL-SCNC: 2 MMOL/L (ref 0.5–2)
DEPRECATED RDW RBC AUTO: 46.2 FL (ref 37–54)
EGFRCR SERPLBLD CKD-EPI 2021: 52.8 ML/MIN/1.73
EOSINOPHIL # BLD AUTO: 0.26 10*3/MM3 (ref 0–0.4)
EOSINOPHIL NFR BLD AUTO: 2.4 % (ref 0.3–6.2)
ERYTHROCYTE [DISTWIDTH] IN BLOOD BY AUTOMATED COUNT: 13.5 % (ref 12.3–15.4)
ERYTHROCYTE [SEDIMENTATION RATE] IN BLOOD: 8 MM/HR (ref 0–30)
FLUAV RNA RESP QL NAA+PROBE: NOT DETECTED
FLUBV RNA RESP QL NAA+PROBE: NOT DETECTED
GAS FLOW AIRWAY: 4 LPM
GLOBULIN UR ELPH-MCNC: 2.8 GM/DL
GLUCOSE BLDC GLUCOMTR-MCNC: 253 MG/DL (ref 70–130)
GLUCOSE BLDC GLUCOMTR-MCNC: 299 MG/DL (ref 70–130)
GLUCOSE SERPL-MCNC: 150 MG/DL (ref 65–99)
GLUCOSE UR STRIP-MCNC: ABNORMAL MG/DL
HCO3 BLDA-SCNC: 24.2 MMOL/L (ref 20–26)
HCT VFR BLD AUTO: 51.2 % (ref 34–46.6)
HCT VFR BLD CALC: 50.7 % (ref 38–51)
HGB BLD-MCNC: 16.5 G/DL (ref 12–15.9)
HGB BLDA-MCNC: 16.5 G/DL (ref 13.5–17.5)
HGB UR QL STRIP.AUTO: NEGATIVE
HOLD SPECIMEN: NORMAL
HOLD SPECIMEN: NORMAL
IMM GRANULOCYTES # BLD AUTO: 0.06 10*3/MM3 (ref 0–0.05)
IMM GRANULOCYTES NFR BLD AUTO: 0.6 % (ref 0–0.5)
INHALED O2 CONCENTRATION: 36 %
KETONES UR QL STRIP: NEGATIVE
LEUKOCYTE ESTERASE UR QL STRIP.AUTO: NEGATIVE
LYMPHOCYTES # BLD AUTO: 3.03 10*3/MM3 (ref 0.7–3.1)
LYMPHOCYTES NFR BLD AUTO: 28.2 % (ref 19.6–45.3)
Lab: ABNORMAL
Lab: ABNORMAL
MAGNESIUM SERPL-MCNC: 2 MG/DL (ref 1.6–2.4)
MCH RBC QN AUTO: 30.3 PG (ref 26.6–33)
MCHC RBC AUTO-ENTMCNC: 32.2 G/DL (ref 31.5–35.7)
MCV RBC AUTO: 94.1 FL (ref 79–97)
METHGB BLD QL: 0 % (ref 0–3)
MODALITY: ABNORMAL
MONOCYTES # BLD AUTO: 0.81 10*3/MM3 (ref 0.1–0.9)
MONOCYTES NFR BLD AUTO: 7.5 % (ref 5–12)
NEUTROPHILS NFR BLD AUTO: 6.53 10*3/MM3 (ref 1.7–7)
NEUTROPHILS NFR BLD AUTO: 60.6 % (ref 42.7–76)
NITRITE UR QL STRIP: NEGATIVE
NOTIFIED BY: ABNORMAL
NOTIFIED WHO: ABNORMAL
NRBC BLD AUTO-RTO: 0 /100 WBC (ref 0–0.2)
NT-PROBNP SERPL-MCNC: 103.9 PG/ML (ref 0–1800)
OXYHGB MFR BLDV: 85.6 % (ref 94–99)
PCO2 BLDA: 38.9 MM HG (ref 35–45)
PCO2 TEMP ADJ BLD: ABNORMAL MM[HG]
PH BLDA: 7.4 PH UNITS (ref 7.35–7.45)
PH UR STRIP.AUTO: 6 [PH] (ref 5–8)
PH, TEMP CORRECTED: ABNORMAL
PLATELET # BLD AUTO: 243 10*3/MM3 (ref 140–450)
PMV BLD AUTO: 9.6 FL (ref 6–12)
PO2 BLDA: 54.4 MM HG (ref 83–108)
PO2 TEMP ADJ BLD: ABNORMAL MM[HG]
POTASSIUM SERPL-SCNC: 4.5 MMOL/L (ref 3.5–5.2)
PROCALCITONIN SERPL-MCNC: 0.04 NG/ML (ref 0–0.25)
PROT SERPL-MCNC: 7.1 G/DL (ref 6–8.5)
PROT UR QL STRIP: NEGATIVE
RBC # BLD AUTO: 5.44 10*6/MM3 (ref 3.77–5.28)
RSV RNA NPH QL NAA+NON-PROBE: NOT DETECTED
SAO2 % BLDCOA: 86.3 % (ref 94–99)
SARS-COV-2 RNA RESP QL NAA+PROBE: NOT DETECTED
SODIUM SERPL-SCNC: 140 MMOL/L (ref 136–145)
SP GR UR STRIP: >1.03 (ref 1–1.03)
TROPONIN T SERPL HS-MCNC: 9 NG/L
UROBILINOGEN UR QL STRIP: ABNORMAL
VENTILATOR MODE: ABNORMAL
WBC NRBC COR # BLD AUTO: 10.76 10*3/MM3 (ref 3.4–10.8)
WHOLE BLOOD HOLD COAG: NORMAL
WHOLE BLOOD HOLD SPECIMEN: NORMAL

## 2024-01-02 PROCEDURE — 85025 COMPLETE CBC W/AUTO DIFF WBC: CPT | Performed by: PHYSICIAN ASSISTANT

## 2024-01-02 PROCEDURE — 82805 BLOOD GASES W/O2 SATURATION: CPT

## 2024-01-02 PROCEDURE — 86140 C-REACTIVE PROTEIN: CPT | Performed by: PHYSICIAN ASSISTANT

## 2024-01-02 PROCEDURE — 99214 OFFICE O/P EST MOD 30 MIN: CPT | Performed by: PHYSICIAN ASSISTANT

## 2024-01-02 PROCEDURE — 25010000002 METHYLPREDNISOLONE PER 125 MG: Performed by: PHYSICIAN ASSISTANT

## 2024-01-02 PROCEDURE — 71046 X-RAY EXAM CHEST 2 VIEWS: CPT

## 2024-01-02 PROCEDURE — 25010000002 ENOXAPARIN PER 10 MG: Performed by: HOSPITALIST

## 2024-01-02 PROCEDURE — 87637 SARSCOV2&INF A&B&RSV AMP PRB: CPT | Performed by: PHYSICIAN ASSISTANT

## 2024-01-02 PROCEDURE — 71275 CT ANGIOGRAPHY CHEST: CPT

## 2024-01-02 PROCEDURE — 94640 AIRWAY INHALATION TREATMENT: CPT

## 2024-01-02 PROCEDURE — 83050 HGB METHEMOGLOBIN QUAN: CPT

## 2024-01-02 PROCEDURE — 82948 REAGENT STRIP/BLOOD GLUCOSE: CPT

## 2024-01-02 PROCEDURE — 1159F MED LIST DOCD IN RCRD: CPT | Performed by: PHYSICIAN ASSISTANT

## 2024-01-02 PROCEDURE — 25510000001 IOPAMIDOL PER 1 ML: Performed by: EMERGENCY MEDICINE

## 2024-01-02 PROCEDURE — 83605 ASSAY OF LACTIC ACID: CPT | Performed by: PHYSICIAN ASSISTANT

## 2024-01-02 PROCEDURE — 87040 BLOOD CULTURE FOR BACTERIA: CPT | Performed by: PHYSICIAN ASSISTANT

## 2024-01-02 PROCEDURE — 94799 UNLISTED PULMONARY SVC/PX: CPT

## 2024-01-02 PROCEDURE — 71275 CT ANGIOGRAPHY CHEST: CPT | Performed by: RADIOLOGY

## 2024-01-02 PROCEDURE — 3078F DIAST BP <80 MM HG: CPT | Performed by: PHYSICIAN ASSISTANT

## 2024-01-02 PROCEDURE — 63710000001 INSULIN LISPRO (HUMAN) PER 5 UNITS: Performed by: HOSPITALIST

## 2024-01-02 PROCEDURE — 84484 ASSAY OF TROPONIN QUANT: CPT | Performed by: PHYSICIAN ASSISTANT

## 2024-01-02 PROCEDURE — 85652 RBC SED RATE AUTOMATED: CPT | Performed by: PHYSICIAN ASSISTANT

## 2024-01-02 PROCEDURE — 36415 COLL VENOUS BLD VENIPUNCTURE: CPT

## 2024-01-02 PROCEDURE — 83735 ASSAY OF MAGNESIUM: CPT | Performed by: HOSPITALIST

## 2024-01-02 PROCEDURE — 99285 EMERGENCY DEPT VISIT HI MDM: CPT

## 2024-01-02 PROCEDURE — 99223 1ST HOSP IP/OBS HIGH 75: CPT | Performed by: HOSPITALIST

## 2024-01-02 PROCEDURE — 83880 ASSAY OF NATRIURETIC PEPTIDE: CPT | Performed by: PHYSICIAN ASSISTANT

## 2024-01-02 PROCEDURE — 3075F SYST BP GE 130 - 139MM HG: CPT | Performed by: PHYSICIAN ASSISTANT

## 2024-01-02 PROCEDURE — 71046 X-RAY EXAM CHEST 2 VIEWS: CPT | Performed by: RADIOLOGY

## 2024-01-02 PROCEDURE — 93005 ELECTROCARDIOGRAM TRACING: CPT | Performed by: PHYSICIAN ASSISTANT

## 2024-01-02 PROCEDURE — 93010 ELECTROCARDIOGRAM REPORT: CPT | Performed by: INTERNAL MEDICINE

## 2024-01-02 PROCEDURE — 80053 COMPREHEN METABOLIC PANEL: CPT | Performed by: PHYSICIAN ASSISTANT

## 2024-01-02 PROCEDURE — 1160F RVW MEDS BY RX/DR IN RCRD: CPT | Performed by: PHYSICIAN ASSISTANT

## 2024-01-02 PROCEDURE — 36600 WITHDRAWAL OF ARTERIAL BLOOD: CPT

## 2024-01-02 PROCEDURE — 82375 ASSAY CARBOXYHB QUANT: CPT

## 2024-01-02 PROCEDURE — 81003 URINALYSIS AUTO W/O SCOPE: CPT | Performed by: PHYSICIAN ASSISTANT

## 2024-01-02 PROCEDURE — 84145 PROCALCITONIN (PCT): CPT | Performed by: PHYSICIAN ASSISTANT

## 2024-01-02 PROCEDURE — 25010000002 CEFTRIAXONE PER 250 MG: Performed by: PHYSICIAN ASSISTANT

## 2024-01-02 RX ORDER — INSULIN LISPRO 100 [IU]/ML
2-7 INJECTION, SOLUTION INTRAVENOUS; SUBCUTANEOUS
Status: DISCONTINUED | OUTPATIENT
Start: 2024-01-02 | End: 2024-01-10

## 2024-01-02 RX ORDER — ACETAMINOPHEN 500 MG
500 TABLET ORAL EVERY 6 HOURS PRN
Status: DISCONTINUED | OUTPATIENT
Start: 2024-01-02 | End: 2024-01-18 | Stop reason: HOSPADM

## 2024-01-02 RX ORDER — BISACODYL 5 MG/1
5 TABLET, DELAYED RELEASE ORAL DAILY PRN
Status: DISCONTINUED | OUTPATIENT
Start: 2024-01-02 | End: 2024-01-18 | Stop reason: HOSPADM

## 2024-01-02 RX ORDER — AMLODIPINE BESYLATE 10 MG/1
10 TABLET ORAL
Status: CANCELLED | OUTPATIENT
Start: 2024-01-03

## 2024-01-02 RX ORDER — DEXTROSE MONOHYDRATE 25 G/50ML
25 INJECTION, SOLUTION INTRAVENOUS
Status: DISCONTINUED | OUTPATIENT
Start: 2024-01-02 | End: 2024-01-18 | Stop reason: HOSPADM

## 2024-01-02 RX ORDER — LORATADINE 10 MG/1
10 TABLET ORAL NIGHTLY
COMMUNITY
End: 2024-01-18 | Stop reason: HOSPADM

## 2024-01-02 RX ORDER — LISINOPRIL 10 MG/1
40 TABLET ORAL
Status: CANCELLED | OUTPATIENT
Start: 2024-01-03

## 2024-01-02 RX ORDER — MONTELUKAST SODIUM 10 MG/1
10 TABLET ORAL NIGHTLY
Status: DISCONTINUED | OUTPATIENT
Start: 2024-01-02 | End: 2024-01-18 | Stop reason: HOSPADM

## 2024-01-02 RX ORDER — GLUCAGON 1 MG/ML
1 KIT INJECTION
Status: DISCONTINUED | OUTPATIENT
Start: 2024-01-02 | End: 2024-01-18 | Stop reason: HOSPADM

## 2024-01-02 RX ORDER — ACETYLCYSTEINE 200 MG/ML
3 SOLUTION ORAL; RESPIRATORY (INHALATION)
Status: DISCONTINUED | OUTPATIENT
Start: 2024-01-02 | End: 2024-01-04

## 2024-01-02 RX ORDER — FUROSEMIDE 20 MG/1
20 TABLET ORAL DAILY
Status: CANCELLED | OUTPATIENT
Start: 2024-01-03

## 2024-01-02 RX ORDER — METOPROLOL SUCCINATE 25 MG/1
25 TABLET, EXTENDED RELEASE ORAL DAILY
Status: DISCONTINUED | OUTPATIENT
Start: 2024-01-03 | End: 2024-01-10

## 2024-01-02 RX ORDER — ENOXAPARIN SODIUM 100 MG/ML
40 INJECTION SUBCUTANEOUS NIGHTLY
Status: DISCONTINUED | OUTPATIENT
Start: 2024-01-02 | End: 2024-01-03

## 2024-01-02 RX ORDER — CLOPIDOGREL BISULFATE 75 MG/1
75 TABLET ORAL DAILY
Status: DISCONTINUED | OUTPATIENT
Start: 2024-01-03 | End: 2024-01-18 | Stop reason: HOSPADM

## 2024-01-02 RX ORDER — NITROGLYCERIN 0.4 MG/1
0.4 TABLET SUBLINGUAL
Status: DISCONTINUED | OUTPATIENT
Start: 2024-01-02 | End: 2024-01-18 | Stop reason: HOSPADM

## 2024-01-02 RX ORDER — IPRATROPIUM BROMIDE AND ALBUTEROL SULFATE 2.5; .5 MG/3ML; MG/3ML
3 SOLUTION RESPIRATORY (INHALATION) ONCE
Status: COMPLETED | OUTPATIENT
Start: 2024-01-02 | End: 2024-01-02

## 2024-01-02 RX ORDER — CETIRIZINE HYDROCHLORIDE 10 MG/1
10 TABLET ORAL DAILY
Status: DISCONTINUED | OUTPATIENT
Start: 2024-01-03 | End: 2024-01-15

## 2024-01-02 RX ORDER — SODIUM CHLORIDE 9 MG/ML
40 INJECTION, SOLUTION INTRAVENOUS AS NEEDED
Status: DISCONTINUED | OUTPATIENT
Start: 2024-01-02 | End: 2024-01-18 | Stop reason: HOSPADM

## 2024-01-02 RX ORDER — POLYETHYLENE GLYCOL 3350 17 G/17G
17 POWDER, FOR SOLUTION ORAL DAILY PRN
Status: DISCONTINUED | OUTPATIENT
Start: 2024-01-02 | End: 2024-01-18 | Stop reason: HOSPADM

## 2024-01-02 RX ORDER — SODIUM CHLORIDE 0.9 % (FLUSH) 0.9 %
10 SYRINGE (ML) INJECTION EVERY 12 HOURS SCHEDULED
Status: DISCONTINUED | OUTPATIENT
Start: 2024-01-02 | End: 2024-01-18 | Stop reason: HOSPADM

## 2024-01-02 RX ORDER — BUPROPION HYDROCHLORIDE 150 MG/1
150 TABLET, EXTENDED RELEASE ORAL NIGHTLY
Status: DISCONTINUED | OUTPATIENT
Start: 2024-01-02 | End: 2024-01-18 | Stop reason: HOSPADM

## 2024-01-02 RX ORDER — ATORVASTATIN CALCIUM 10 MG/1
10 TABLET, FILM COATED ORAL NIGHTLY
Status: DISCONTINUED | OUTPATIENT
Start: 2024-01-02 | End: 2024-01-18 | Stop reason: HOSPADM

## 2024-01-02 RX ORDER — FLUTICASONE PROPIONATE 50 MCG
2 SPRAY, SUSPENSION (ML) NASAL DAILY
Status: DISCONTINUED | OUTPATIENT
Start: 2024-01-03 | End: 2024-01-18 | Stop reason: HOSPADM

## 2024-01-02 RX ORDER — SODIUM CHLORIDE 0.9 % (FLUSH) 0.9 %
10 SYRINGE (ML) INJECTION AS NEEDED
Status: DISCONTINUED | OUTPATIENT
Start: 2024-01-02 | End: 2024-01-18 | Stop reason: HOSPADM

## 2024-01-02 RX ORDER — METHYLPREDNISOLONE SODIUM SUCCINATE 125 MG/2ML
80 INJECTION, POWDER, LYOPHILIZED, FOR SOLUTION INTRAMUSCULAR; INTRAVENOUS ONCE
Status: COMPLETED | OUTPATIENT
Start: 2024-01-02 | End: 2024-01-02

## 2024-01-02 RX ORDER — POTASSIUM CHLORIDE 20 MEQ/1
10 TABLET, EXTENDED RELEASE ORAL DAILY
Status: CANCELLED | OUTPATIENT
Start: 2024-01-03

## 2024-01-02 RX ORDER — BISACODYL 10 MG
10 SUPPOSITORY, RECTAL RECTAL DAILY PRN
Status: DISCONTINUED | OUTPATIENT
Start: 2024-01-02 | End: 2024-01-18 | Stop reason: HOSPADM

## 2024-01-02 RX ORDER — AMOXICILLIN 250 MG
2 CAPSULE ORAL 2 TIMES DAILY
Status: DISCONTINUED | OUTPATIENT
Start: 2024-01-02 | End: 2024-01-18 | Stop reason: HOSPADM

## 2024-01-02 RX ORDER — ALBUTEROL SULFATE 2.5 MG/3ML
2.5 SOLUTION RESPIRATORY (INHALATION) EVERY 4 HOURS PRN
Status: CANCELLED | OUTPATIENT
Start: 2024-01-02

## 2024-01-02 RX ORDER — ACETAMINOPHEN 500 MG
500 TABLET ORAL EVERY 6 HOURS PRN
COMMUNITY

## 2024-01-02 RX ORDER — IPRATROPIUM BROMIDE AND ALBUTEROL SULFATE 2.5; .5 MG/3ML; MG/3ML
3 SOLUTION RESPIRATORY (INHALATION)
Status: DISCONTINUED | OUTPATIENT
Start: 2024-01-02 | End: 2024-01-18 | Stop reason: HOSPADM

## 2024-01-02 RX ORDER — BUPROPION HYDROCHLORIDE 150 MG/1
150 TABLET, EXTENDED RELEASE ORAL NIGHTLY
COMMUNITY

## 2024-01-02 RX ORDER — NICOTINE POLACRILEX 4 MG
15 LOZENGE BUCCAL
Status: DISCONTINUED | OUTPATIENT
Start: 2024-01-02 | End: 2024-01-18 | Stop reason: HOSPADM

## 2024-01-02 RX ORDER — IPRATROPIUM BROMIDE AND ALBUTEROL SULFATE 2.5; .5 MG/3ML; MG/3ML
3 SOLUTION RESPIRATORY (INHALATION) EVERY 4 HOURS PRN
Status: CANCELLED | OUTPATIENT
Start: 2024-01-02

## 2024-01-02 RX ADMIN — OFLOXACIN 50000 UNITS: 300 TABLET, COATED ORAL at 21:55

## 2024-01-02 RX ADMIN — METHYLPREDNISOLONE SODIUM SUCCINATE 80 MG: 125 INJECTION, POWDER, FOR SOLUTION INTRAMUSCULAR; INTRAVENOUS at 15:03

## 2024-01-02 RX ADMIN — SODIUM CHLORIDE 2000 MG: 9 INJECTION, SOLUTION INTRAVENOUS at 15:03

## 2024-01-02 RX ADMIN — BUPROPION HYDROCHLORIDE 150 MG: 150 TABLET, EXTENDED RELEASE ORAL at 21:55

## 2024-01-02 RX ADMIN — DOXYCYCLINE 100 MG: 100 INJECTION, POWDER, LYOPHILIZED, FOR SOLUTION INTRAVENOUS at 16:10

## 2024-01-02 RX ADMIN — IPRATROPIUM BROMIDE AND ALBUTEROL SULFATE 3 ML: 2.5; .5 SOLUTION RESPIRATORY (INHALATION) at 15:01

## 2024-01-02 RX ADMIN — IOPAMIDOL 80 ML: 755 INJECTION, SOLUTION INTRAVENOUS at 16:04

## 2024-01-02 RX ADMIN — Medication 10 ML: at 21:56

## 2024-01-02 RX ADMIN — MONTELUKAST SODIUM 10 MG: 10 TABLET, COATED ORAL at 21:55

## 2024-01-02 RX ADMIN — ATORVASTATIN CALCIUM 10 MG: 10 TABLET, FILM COATED ORAL at 21:55

## 2024-01-02 RX ADMIN — INSULIN LISPRO 4 UNITS: 100 INJECTION, SOLUTION INTRAVENOUS; SUBCUTANEOUS at 21:55

## 2024-01-02 NOTE — ED PROVIDER NOTES
Subjective   History of Present Illness  78-year-old female presents secondary to worsening shortness of breath.  Patient has a history of COPD however typically does not wear oxygen.  She wears 2 L only as needed.  She states that she has been feeling poorly for about a week.  She states this is progressively gotten worse.  She has had wheezing.  She has had a minimally productive cough.  She has been on some unknown antibiotics through primary care.  She was not previously swab.  She denies any nausea vomiting or diarrhea.  No increased lower extremity swelling or pain.  She denies any chest pain pressure tightness or squeezing other than discomfort with cough.  She has a past medical history of COPD, diabetes, hypertension, hyperlipidemia.  She presents by private vehicle.  She was wearing 4 L of oxygen upon presentation and was noted to be hypoxic on ABG.        Review of Systems   Constitutional: Negative.  Negative for fever.   HENT: Negative.     Respiratory:  Positive for cough, shortness of breath and wheezing.    Cardiovascular: Negative.  Negative for chest pain.   Gastrointestinal: Negative.  Negative for abdominal pain.   Endocrine: Negative.    Genitourinary: Negative.  Negative for dysuria.   Skin: Negative.    Neurological: Negative.    Psychiatric/Behavioral: Negative.     All other systems reviewed and are negative.      Past Medical History:   Diagnosis Date    Allergic rhinitis     Asthma     Atherosclerosis     COPD (chronic obstructive pulmonary disease)     Cough     Diabetes mellitus     Hyperlipidemia     Hypertension     Menopause     Nausea and vomiting     Obesity 3/7/2017    Osteoarthritis     Osteoporosis     Vertigo, benign paroxysmal        Allergies   Allergen Reactions    Codeine Shortness Of Breath       Past Surgical History:   Procedure Laterality Date    CATARACT EXTRACTION      COLONOSCOPY      EYE SURGERY      catarac    ILIAC ARTERY STENT  02/25/2015    SKIN CANCER EXCISION       nose    TONSILLECTOMY      and adenoidectomy    TUBAL ABDOMINAL LIGATION         Family History   Problem Relation Age of Onset    Arthritis Mother     Asthma Mother     Cancer Mother     Osteoarthritis Mother     Osteoporosis Mother     Diabetes Mother     Arthritis Father     Hypertension Father     Stroke Father     Osteoarthritis Father     Osteoporosis Father     Heart disease Father     Diabetes Father     Breast cancer Neg Hx        Social History     Socioeconomic History    Marital status:     Number of children: 3    Years of education: 12   Tobacco Use    Smoking status: Former     Types: Cigarettes     Quit date: 2015     Years since quittin.6    Smokeless tobacco: Never   Vaping Use    Vaping Use: Never used   Substance and Sexual Activity    Alcohol use: No    Drug use: No    Sexual activity: Defer           Objective   Physical Exam  Vitals and nursing note reviewed.   Constitutional:       General: She is not in acute distress.     Appearance: She is well-developed. She is not diaphoretic.   HENT:      Head: Normocephalic and atraumatic.      Right Ear: External ear normal.      Left Ear: External ear normal.      Nose: Nose normal.   Eyes:      Conjunctiva/sclera: Conjunctivae normal.      Pupils: Pupils are equal, round, and reactive to light.   Neck:      Vascular: No JVD.      Trachea: No tracheal deviation.   Cardiovascular:      Rate and Rhythm: Normal rate and regular rhythm.      Heart sounds: Normal heart sounds. No murmur heard.  Pulmonary:      Effort: Pulmonary effort is normal. No respiratory distress.      Breath sounds: Wheezing present.   Abdominal:      General: Bowel sounds are normal.      Palpations: Abdomen is soft.      Tenderness: There is no abdominal tenderness.   Musculoskeletal:         General: No deformity. Normal range of motion.      Cervical back: Normal range of motion and neck supple.   Skin:     General: Skin is warm and dry.      Coloration:  Skin is not pale.      Findings: No erythema or rash.   Neurological:      Mental Status: She is alert and oriented to person, place, and time.      Cranial Nerves: No cranial nerve deficit.   Psychiatric:         Behavior: Behavior normal.         Thought Content: Thought content normal.         Procedures           ED Course  ED Course as of 01/02/24 1536   Tue Jan 02, 2024   1359 ECG 12 Lead Dyspnea  Vent. Rate :  82 BPM     Atrial Rate :  82 BPM     P-R Int : 132 ms          QRS Dur :  80 ms      QT Int : 412 ms       P-R-T Axes :  -8 -18  27 degrees     QTc Int : 481 ms     Normal sinus rhythm  Possible Anterior infarct , age undetermined  Abnormal ECG  No previous ECGs available   [ES]   1508 Hemoglobin(!): 16.5 [JI]   1509  Patient was accepted by Dr. Hernandez who requested CT of the chest PE protocol. [JI]      ED Course User Index  [ES] Lucas Rg MD  [JI] Jonathan Wyatt PA                                             Medical Decision Making  78-year-old female presents secondary to worsening shortness of breath.  Patient has a history of COPD however typically does not wear oxygen.  She wears 2 L only as needed.  She states that she has been feeling poorly for about a week.  She states this is progressively gotten worse.  She has had wheezing.  She has had a minimally productive cough.  She has been on some unknown antibiotics through primary care.  She was not previously swab.  She denies any nausea vomiting or diarrhea.  No increased lower extremity swelling or pain.  She denies any chest pain pressure tightness or squeezing other than discomfort with cough.  She has a past medical history of COPD, diabetes, hypertension, hyperlipidemia.  She presents by private vehicle.  She was wearing 4 L of oxygen upon presentation and was noted to be hypoxic on ABG.    Amount and/or Complexity of Data Reviewed  Labs: ordered. Decision-making details documented in ED Course.  Radiology: ordered.  Decision-making details documented in ED Course.  ECG/medicine tests: ordered. Decision-making details documented in ED Course.    Risk  Prescription drug management.  Decision regarding hospitalization.        Final diagnoses:   Hypoxemia   COPD exacerbation       ED Disposition  ED Disposition       ED Disposition   Decision to Admit    Condition   --    Comment   --               No follow-up provider specified.       Medication List      No changes were made to your prescriptions during this visit.            Jonathan Wyatt, PA  01/02/24 1536

## 2024-01-02 NOTE — PROGRESS NOTES
"Chief Complaint -shortness of breath    History of Present Illness -     Evie Shah is a 78 y.o. female.     Shortness of breath-  Patient complains of shortness of breath, coughing, wheezing and chest congestion with associated fatigue for approximately 2 weeks.  She has been on doxycycline 100 mg twice daily for the last week with minimal improvement.  She has increased home oxygen 4 L 24/7 with minimal improvement in shortness of breath.    COPD-  Not at goal due to acute symptoms and exacerbation as listed above    Diabetes mellitus-  Stable with Mounjaro and Jardiance along with dietary modification.    Hypertension-  Stable with amlodipine/benazepril and metoprolol      The following portions of the patient's history were reviewed and updated as appropriate: allergies, current medications, past family history, past medical history, past social history, past surgical history and problem list.        Objective  Vital signs:  /78   Pulse 86   Temp 98.1 °F (36.7 °C) (Temporal)   Ht 160 cm (63\")   Wt 90.3 kg (199 lb)   SpO2 (!) 87%   BMI 35.25 kg/m²   O2 sat today is 87% resting with 4 L oxygen    Physical Exam  Vitals and nursing note reviewed.   Constitutional:       General: She is not in acute distress.     Appearance: She is well-developed. She is ill-appearing (lethargic). She is not diaphoretic.   HENT:      Head: Normocephalic and atraumatic.      Right Ear: Tympanic membrane, ear canal and external ear normal.      Left Ear: Tympanic membrane, ear canal and external ear normal.      Nose: Nose normal.      Mouth/Throat:      Mouth: Mucous membranes are moist.      Pharynx: No oropharyngeal exudate or posterior oropharyngeal erythema.   Neck:      Thyroid: No thyromegaly.   Cardiovascular:      Rate and Rhythm: Normal rate and regular rhythm.      Heart sounds: Normal heart sounds. No murmur heard.  Pulmonary:      Effort: Pulmonary effort is normal.      Comments: Bronchovesicular " breath sounds noted bilaterally  Musculoskeletal:      Cervical back: Normal range of motion and neck supple.   Lymphadenopathy:      Cervical: No cervical adenopathy.   Skin:     General: Skin is warm and dry.      Findings: No rash.      Comments: Blue tent to lips noted today   Neurological:      Mental Status: She is alert and oriented to person, place, and time.   Psychiatric:         Mood and Affect: Mood normal.         Behavior: Behavior normal.         Thought Content: Thought content normal.         The following data was reviewed by Camryn Mota PA-C:         Lab Results   Component Value Date    BUN 17 06/09/2023    CREATININE 0.70 10/25/2023    EGFR 83 02/26/2015    ALT 17 06/09/2023    AST 14 06/09/2023    WBC 10.40 04/28/2022    HGB 16.2 (H) 04/28/2022    HCT 50.6 (H) 04/28/2022     04/28/2022    TRIG 210 (H) 06/09/2023    HDL 44 06/09/2023    LDL 37 06/09/2023    TSH 3.080 03/11/2019    HGBA1C 7.50 (H) 06/09/2023           Assessment & Plan     Diagnoses and all orders for this visit:    1. COPD with acute exacerbation (Primary)  Comments:  Patient advised to go to [facility] ER in [place] for further evaluation and treatment    2. Type 2 diabetes mellitus with hyperglycemia, without long-term current use of insulin  Comments:  Continue Mounjaro and Jardiance  Advised a low carbohydrate diabetic diet  Discussed COPD treatment options including steroids that may elevate blood glucose    3. Hypoxia  Comments:  Continue 4 L O2 24/7 until further evaluation by emergency department today    4. Essential hypertension  Comments:  Continue amlodipine/benazepril and metoprolol  Advise daily BP and pulse monitoring                   Patient was given instructions and counseling regarding his condition or for health maintenance advice. Please see specific information pulled into the AVS if appropriate      This document has been electronically signed by:  Camryn Mota PA-C

## 2024-01-02 NOTE — H&P
[facility]    Patient Identification:  Name:  Evie Shah  Age:  78 y.o.  Sex:  female  :  1945  MRN:  [id number]   Visit Number:  [id number]  Admit Date: 2024   Room number:  108/08  Primary Care Physician:  Camryn Mota PA     Chief complaint:    Chief Complaint   Patient presents with    Shortness of Breath    Fever    Weakness - Generalized     Pt has been sob for a weak and has been sick with a cough.     Cough       History of presenting illness:  78 y.o. female with history of COPD, she has oxygen at home on a as needed basis, has not used it for quite some time, past 2 weeks been having shortness of breath cough nonproductive no fever, denies hemoptysis.  Developed shortness of breath, was seen by her primary care provider more than a week ago was treated with doxycycline with no improvement, she started requiring 2 L nasal cannula continuously few days prior to admission, today she requires 5 L to maintain O2 saturation above 90.    At the emergency room she was afebrile, CRP and procalcitonin level was normal, white count was normal, x-ray of the chest showed increased attenuation of the left hemothorax, CT scan of the chest PE protocol was ordered negative for PE she has completed atelectasis of the left upper lobe.  Because of this she was subsequent admitted.      ---------------------------------------------------------------------------------------------------------------------   Review of Systems   Constitutional:  Positive for activity change and fatigue. Negative for appetite change, chills, diaphoresis, fever and unexpected weight change.   HENT: Negative.     Eyes: Negative.    Respiratory:  Positive for cough and shortness of breath. Negative for apnea, choking, chest tightness, wheezing and stridor.    Cardiovascular: Negative.    Gastrointestinal: Negative.    Endocrine: Negative.    Genitourinary: Negative.     Musculoskeletal: Negative.    Skin: Negative.    Allergic/Immunologic: Negative.    Neurological: Negative.    Hematological: Negative.    Psychiatric/Behavioral: Negative.         ---------------------------------------------------------------------------------------------------------------------   Past Medical History:   Diagnosis Date    Allergic rhinitis     Asthma     Atherosclerosis     COPD (chronic obstructive pulmonary disease)     Cough     Diabetes mellitus     Hyperlipidemia     Hypertension     Menopause     Nausea and vomiting     Obesity 3/7/2017    Osteoarthritis     Osteoporosis     Vertigo, benign paroxysmal      Past Surgical History:   Procedure Laterality Date    CATARACT EXTRACTION      COLONOSCOPY      EYE SURGERY      catarac    ILIAC ARTERY STENT  2015    SKIN CANCER EXCISION      nose    TONSILLECTOMY      and adenoidectomy    TUBAL ABDOMINAL LIGATION       Family History   Problem Relation Age of Onset    Arthritis Mother     Asthma Mother     Cancer Mother     Osteoarthritis Mother     Osteoporosis Mother     Diabetes Mother     Arthritis Father     Hypertension Father     Stroke Father     Osteoarthritis Father     Osteoporosis Father     Heart disease Father     Diabetes Father     Breast cancer Neg Hx      Social History     Socioeconomic History    Marital status:     Number of children: 3    Years of education: 12   Tobacco Use    Smoking status: Former     Types: Cigarettes     Quit date: 2015     Years since quittin.6    Smokeless tobacco: Never   Vaping Use    Vaping Use: Never used   Substance and Sexual Activity    Alcohol use: No    Drug use: No    Sexual activity: Defer     ---------------------------------------------------------------------------------------------------------------------   Allergies:  Codeine  ---------------------------------------------------------------------------------------------------------------------   Prior to Admission  Medications       Prescriptions Last Dose Informant Patient Reported? Taking?    albuterol (PROVENTIL) (2.5 MG/3ML) 0.083% nebulizer solution   No No    Take 2.5 mg by nebulization Every 4 (Four) Hours As Needed for Wheezing.    albuterol sulfate  (90 Base) MCG/ACT inhaler   No No    Inhale 2 puffs Every 4 (Four) Hours As Needed for Shortness of Air.    amLODIPine-benazepril (LOTREL) 10-40 MG per capsule   No No    Take 1 capsule by mouth Daily.    atorvastatin (LIPITOR) 10 MG tablet   No No    Take 1 tablet by mouth Every Night.    Blood Glucose Monitoring Suppl misc   No No    1 each 3 (Three) Times a Day.    buPROPion SR (WELLBUTRIN SR) 150 MG 12 hr tablet   No No    Take 1 tablet by mouth Every 12 (Twelve) Hours.    clopidogrel (PLAVIX) 75 MG tablet   No No    Take 1 tablet by mouth Daily.    cyanocobalamin injection 1,000 mcg   No No    doxycycline (VIBRAMYCIN) 100 MG capsule   No No    Take 1 capsule by mouth 2 (Two) Times a Day for 10 days.    empagliflozin (Jardiance) 25 MG tablet tablet   No No    Take 1 tablet by mouth Daily.    fluticasone (FLONASE) 50 MCG/ACT nasal spray   No No    2 sprays into the nostril(s) as directed by provider Daily.    furosemide (LASIX) 20 MG tablet   No No    Take 1 tablet by mouth Daily.    glucose blood test strip   No No    1 each by Other route 3 (Three) Times a Day. Use as instructed    ipratropium-albuterol (DUO-NEB) 0.5-2.5 mg/3 ml nebulizer   No No    Take 3 mL by nebulization Every 4 (Four) Hours As Needed for Shortness of Air.    Lancets 30G misc   No No    1 each 3 (Three) Times a Day.    loratadine (EQ Allergy Relief) 10 MG tablet   No No    Take 1 tablet by mouth Daily.    metoprolol succinate XL (Toprol XL) 25 MG 24 hr tablet   No No    Take 1 tablet by mouth Daily.    montelukast (SINGULAIR) 10 MG tablet   No No    Take 1 tablet by mouth Every Night.    O2 (OXYGEN)   Yes No    Inhale 2 L/min Every Night.    omega-3 acid ethyl esters (LOVAZA) 1 g capsule    No No    Take 2 capsules by mouth 2 (Two) Times a Day.    potassium chloride 10 MEQ CR tablet   No No    Take 1 tablet by mouth Daily.    promethazine-dextromethorphan (PROMETHAZINE-DM) 6.25-15 MG/5ML syrup   No No    Take 5 mL by mouth 2 (Two) Times a Day As Needed for Cough.    raloxifene (EVISTA) 60 MG tablet   No No    Take 1 tablet by mouth Daily.    Tirzepatide (Mounjaro) 5 MG/0.5ML solution pen-injector   No No    Inject 0.5 mL under the skin into the appropriate area as directed Every 7 (Seven) Days.    vitamin D (ERGOCALCIFEROL) 1.25 MG (39080 UT) capsule capsule   No No    Take 1 capsule by mouth 1 (One) Time Per Week.          ---------------------------------------------------------------------------------------------------------------------   Vital Signs:  Temp:  [98.1 °F (36.7 °C)-98.2 °F (36.8 °C)] 98.2 °F (36.8 °C)  Heart Rate:  [77-86] 77  Resp:  [20] 20  BP: (130-154)/(78-79) 154/79    No data found.  SpO2:  [87 %-95 %] 95 %  on  Flow (L/min):  [5] 5;   Device (Oxygen Therapy): nasal cannula  Body mass index is 35.25 kg/m².    Wt Readings from Last 3 Encounters:   01/02/24 90.3 kg (199 lb)   01/02/24 90.3 kg (199 lb)   12/26/23 92.1 kg (203 lb)               ---------------------------------------------------------------------------------------------------------------------   Physical Exam:  Constitutional: Awake and alert oriented to self place and time very pleasant,  No respiratory distress.      HENT:  Head: Normocephalic and atraumatic.  Mouth:  Moist mucous membranes.    Eyes:  Conjunctivae and EOM are normal.  Pupils are equal, round, and reactive to light.  No scleral icterus.  Neck:  Neck supple.  No JVD present.  No lymphadenopathy  Cardiovascular:  Normal rate, regular rhythm and normal heart sounds with no murmur.  Pulmonary/Chest: Clear to auscultate equal chest expansion there is no splinting no wheezing no rales or crackles, she has decreased vocal fremitus on auscultation on the  "left,   Abdominal:  Soft.  Bowel sounds are normal.  No distension and no tenderness.   Musculoskeletal:  No edema, no tenderness, and no deformity.  No red or swollen joints anywhere.    Neurological:  Alert and oriented to person, place, and time.  No cranial nerve deficit.  No tongue deviation.  No facial droop.  No slurred speech.   Skin:  Skin is warm and dry.  No rash noted.  No pallor.   Peripheral vascular:  No edema and strong pulses on all 4 extremities.  Genitourinary: No Palomino catheter  ---------------------------------------------------------------------------------------------------------------------  EKG:        Telemetry: Sinus rhythm 86 bpm O2 saturation is 93% on 5 L nasal cannula  I have personally looked at both the EKG and the telemetry strips.  --------------------------------------------------------------------------------------------------------------------  Results from last 7 days   Lab Units 01/02/24  1409   HSTROP T ng/L 9     Results from last 7 days   Lab Units 01/02/24  1409   CRP mg/dL <0.30   LACTATE mmol/L 2.0   WBC 10*3/mm3 10.76   HEMOGLOBIN g/dL 16.5*   HEMATOCRIT % 51.2*   MCV fL 94.1   MCHC g/dL 32.2   PLATELETS 10*3/mm3 243     Results from last 7 days   Lab Units 01/02/24  1409   SODIUM mmol/L 140   POTASSIUM mmol/L 4.5   CHLORIDE mmol/L 105   CO2 mmol/L 21.9*   BUN mg/dL 23   CREATININE mg/dL 1.09*   CALCIUM mg/dL 9.6   GLUCOSE mg/dL 150*   ALBUMIN g/dL 4.3   BILIRUBIN mg/dL 0.4   ALK PHOS U/L 107   AST (SGOT) U/L 18   ALT (SGPT) U/L 12   Estimated Creatinine Clearance: 46.9 mL/min (A) (by C-G formula based on SCr of 1.09 mg/dL (H)).    No results found for: \"HGBA1C\", \"POCGLU\"  No results found for: \"AMMONIA\"    Results from last 7 days   Lab Units 01/02/24  1611   NITRITE UA  Negative     No results found for: \"BLOODCX\"No results found for: \"RESPCX\"No results found for: \"URINECX\"No results found for: \"WOUNDCX\"No results found for: \"BODYFLDCX\"No results found for: " "\"STOOLCX\"  pH pH, Arterial   Date Value Ref Range Status   01/02/2024 7.402 7.350 - 7.450 pH units Final      pO2 pO2, Arterial   Date Value Ref Range Status   01/02/2024 54.4 (C) 83.0 - 108.0 mm Hg Final     Comment:     85 Value below critical limit      pCO2 pCO2, Arterial   Date Value Ref Range Status   01/02/2024 38.9 35.0 - 45.0 mm Hg Final      HCO3 HCO3, Arterial   Date Value Ref Range Status   01/02/2024 24.2 20.0 - 26.0 mmol/L Final        I have personally looked at the labs and they are summarized above.  ----------------------------------------------------------------------------------------------------------------------  Imaging Results (Last 24 Hours)       Procedure Component Value Units Date/Time    CT Angiogram Chest Pulmonary Embolism [[id number]] Collected: 01/02/24 1654     Updated: 01/02/24 1700    Narrative:      EXAM:    CT Angiography Chest With Intravenous Contrast     EXAM DATE:    1/2/2024 3:42 PM     CLINICAL HISTORY:    Pulmonary embolism (PE) suspected, unknown D-dimer     TECHNIQUE:    Axial computed tomographic angiography images of the chest with  intravenous contrast.  This CT exam was performed using one or more of  the following dose reduction techniques:  automated exposure control,  adjustment of the mA and/or kV according to patient size, and/or use of  iterative reconstruction technique.    MIP reconstructed images were created and reviewed.     COMPARISON:    6/17/2020     FINDINGS:    Pulmonary arteries:  Mild pulmonary artery enlargement suggesting a  mild degree of pulmonary arterial hypertension.  No pulmonary embolism.    Aorta:  No acute findings.  No thoracic aortic aneurysm.    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    Lungs and pleural spaces:  Left upper lobe consolidation is noted with  areas of enhancement and nonenhancement compatible with lobar collapse  and possible superimposed pneumonia. Luminal " filling defects of the left  upper lobe bronchial airways suggesting obstructive etiology.  Bronchoscopy recommended.  Mild upper lung predominant emphysema is  noted.  Bronchial wall thickening is noted.  Luminal filling defects and  debris throughout the left lower lobe bronchial airways and to a lesser  extent the right lower lobe bronchial airways.  Minimal bibasilar  atelectasis.  No pleural effusions.    Heart:  Borderline cardiac enlargement.  Moderate coronary artery  calcifications.  No significant pericardial effusion.  No evidence of RV  dysfunction.    Mediastinum:  Small hiatal hernia.    Bones/joints:  Degenerative changes thoracic spine.  No acute  fracture.  No dislocation.    Soft tissues:  Unremarkable as visualized.    Lymph nodes:  Unremarkable as visualized.  No enlarged lymph nodes.    Gallbladder and bile ducts:  Cholelithiasis.    Kidneys and ureters:  Cyst left kidney.       Impression:      1.  No PE.  2.  Left upper lobe complete atelectasis with luminal filling defects of  the left upper lobe bronchial airways more favorable for mucous  plugging. Bronchoscopy recommended.  3.  Luminal debris in the left greater than right lower lobe bronchial  airways favoring mucous secretions.  4.  Bronchial inflammation compatible with bronchitis.  5.  Superimposed pneumonia left upper lobe likely given areas of  nonenhancement.  6.  Cardiomegaly with coronary artery calcifications.  7.  No effusion or pneumothorax.  8.  COPD.  9.  Other incidental/nonacute findings detailed above.        This report was finalized on 1/2/2024 4:58 PM by Dr. Theron Pedersen MD.       XR Chest 2 View [[id number]] Collected: 01/02/24 1439     Updated: 01/02/24 1449    Narrative:      EXAM:    XR Chest, 2 Views     EXAM DATE:    1/2/2024 2:01 PM     CLINICAL HISTORY:    sob     TECHNIQUE:    Frontal and lateral views of the chest.     COMPARISON:    No relevant prior studies available.     FINDINGS:    Lungs and pleural  spaces: Increased attenuation overlying the left  hemithorax may be due to superficial soft tissue process with no  convincing evidence of airspace disease.  Otherwise no acute  cardiopulmonary findings identified.  No pneumothorax.    Heart:  Unremarkable as visualized.  No cardiomegaly.    Mediastinum:  Unremarkable as visualized.    Bones/joints:  Unremarkable as visualized.       Impression:      1. Increased attenuation overlying the left hemithorax may be due to  superficial soft tissue attenuation artifact with no convincing evidence  of airspace disease.  2.  Otherwise no acute cardiopulmonary findings identified.        This report was finalized on 1/2/2024 2:40 PM by Dr. Theron Pedersen MD.             I have personally reviewed the radiology images and read the final radiology report.  ----------------------------------------------------------------------------------------------------------------------  Assessment and Plan:  -Left upper lobe atelectasis cannot rule out concomitant pneumonia  -Acute hypoxic respiratory failure secondary to above  -Obesity with a BMI of 35.2  -History of hyperlipidemia  -History of essential hypertension  -History of peripheral arterial disease status post PCI left external iliac with stent placement  -Apparent history of COPD  -History of diabetes  -QT prolongation    Admit the patient keep O2 saturation above 90, neb treatment, Mucomyst, sputum culture and blood culture empiric antibiotic for possible pneumonia, pulmonology consult will be consulted.  DVT and GI prophylaxis.  Accu-Chek and sliding scale.  Avoid QT prolonging medications, check electrolytes including magnesium.    Cora Carver MD  01/02/24  17:01 EST

## 2024-01-03 ENCOUNTER — APPOINTMENT (OUTPATIENT)
Dept: GENERAL RADIOLOGY | Facility: HOSPITAL | Age: 79
End: 2024-01-03
Payer: MEDICARE

## 2024-01-03 LAB
ANION GAP SERPL CALCULATED.3IONS-SCNC: 12.2 MMOL/L (ref 5–15)
BASOPHILS # BLD AUTO: 0.01 10*3/MM3 (ref 0–0.2)
BASOPHILS NFR BLD AUTO: 0.1 % (ref 0–1.5)
BUN SERPL-MCNC: 23 MG/DL (ref 8–23)
BUN/CREAT SERPL: 27.4 (ref 7–25)
CALCIUM SPEC-SCNC: 9.1 MG/DL (ref 8.6–10.5)
CHLORIDE SERPL-SCNC: 104 MMOL/L (ref 98–107)
CO2 SERPL-SCNC: 23.8 MMOL/L (ref 22–29)
CREAT SERPL-MCNC: 0.84 MG/DL (ref 0.57–1)
DEPRECATED RDW RBC AUTO: 46 FL (ref 37–54)
EGFRCR SERPLBLD CKD-EPI 2021: 72.1 ML/MIN/1.73
EOSINOPHIL # BLD AUTO: 0.01 10*3/MM3 (ref 0–0.4)
EOSINOPHIL NFR BLD AUTO: 0.1 % (ref 0.3–6.2)
ERYTHROCYTE [DISTWIDTH] IN BLOOD BY AUTOMATED COUNT: 13.3 % (ref 12.3–15.4)
GLUCOSE BLDC GLUCOMTR-MCNC: 147 MG/DL (ref 70–130)
GLUCOSE BLDC GLUCOMTR-MCNC: 166 MG/DL (ref 70–130)
GLUCOSE BLDC GLUCOMTR-MCNC: 203 MG/DL (ref 70–130)
GLUCOSE BLDC GLUCOMTR-MCNC: 216 MG/DL (ref 70–130)
GLUCOSE SERPL-MCNC: 232 MG/DL (ref 65–99)
HCT VFR BLD AUTO: 49.4 % (ref 34–46.6)
HGB BLD-MCNC: 15.7 G/DL (ref 12–15.9)
IMM GRANULOCYTES # BLD AUTO: 0.08 10*3/MM3 (ref 0–0.05)
IMM GRANULOCYTES NFR BLD AUTO: 0.8 % (ref 0–0.5)
LYMPHOCYTES # BLD AUTO: 1.11 10*3/MM3 (ref 0.7–3.1)
LYMPHOCYTES NFR BLD AUTO: 11.2 % (ref 19.6–45.3)
MCH RBC QN AUTO: 30 PG (ref 26.6–33)
MCHC RBC AUTO-ENTMCNC: 31.8 G/DL (ref 31.5–35.7)
MCV RBC AUTO: 94.3 FL (ref 79–97)
MONOCYTES # BLD AUTO: 0.13 10*3/MM3 (ref 0.1–0.9)
MONOCYTES NFR BLD AUTO: 1.3 % (ref 5–12)
NEUTROPHILS NFR BLD AUTO: 8.59 10*3/MM3 (ref 1.7–7)
NEUTROPHILS NFR BLD AUTO: 86.5 % (ref 42.7–76)
NRBC BLD AUTO-RTO: 0 /100 WBC (ref 0–0.2)
PLATELET # BLD AUTO: 223 10*3/MM3 (ref 140–450)
PMV BLD AUTO: 9.7 FL (ref 6–12)
POTASSIUM SERPL-SCNC: 4.3 MMOL/L (ref 3.5–5.2)
QT INTERVAL: 412 MS
QTC INTERVAL: 481 MS
RBC # BLD AUTO: 5.24 10*6/MM3 (ref 3.77–5.28)
SODIUM SERPL-SCNC: 140 MMOL/L (ref 136–145)
WBC NRBC COR # BLD AUTO: 9.93 10*3/MM3 (ref 3.4–10.8)

## 2024-01-03 PROCEDURE — 63710000001 INSULIN LISPRO (HUMAN) PER 5 UNITS: Performed by: HOSPITALIST

## 2024-01-03 PROCEDURE — 85025 COMPLETE CBC W/AUTO DIFF WBC: CPT | Performed by: HOSPITALIST

## 2024-01-03 PROCEDURE — 99232 SBSQ HOSP IP/OBS MODERATE 35: CPT | Performed by: HOSPITALIST

## 2024-01-03 PROCEDURE — 94667 MNPJ CHEST WALL 1ST: CPT

## 2024-01-03 PROCEDURE — 71045 X-RAY EXAM CHEST 1 VIEW: CPT

## 2024-01-03 PROCEDURE — 94669 MECHANICAL CHEST WALL OSCILL: CPT

## 2024-01-03 PROCEDURE — 80048 BASIC METABOLIC PNL TOTAL CA: CPT | Performed by: HOSPITALIST

## 2024-01-03 PROCEDURE — 94799 UNLISTED PULMONARY SVC/PX: CPT

## 2024-01-03 PROCEDURE — 99222 1ST HOSP IP/OBS MODERATE 55: CPT | Performed by: INTERNAL MEDICINE

## 2024-01-03 PROCEDURE — 71045 X-RAY EXAM CHEST 1 VIEW: CPT | Performed by: RADIOLOGY

## 2024-01-03 PROCEDURE — 82948 REAGENT STRIP/BLOOD GLUCOSE: CPT

## 2024-01-03 PROCEDURE — 94761 N-INVAS EAR/PLS OXIMETRY MLT: CPT

## 2024-01-03 PROCEDURE — 94664 DEMO&/EVAL PT USE INHALER: CPT

## 2024-01-03 PROCEDURE — 25010000002 CEFTRIAXONE PER 250 MG: Performed by: HOSPITALIST

## 2024-01-03 PROCEDURE — 25010000002 ENOXAPARIN PER 10 MG: Performed by: INTERNAL MEDICINE

## 2024-01-03 RX ORDER — CHOLECALCIFEROL (VITAMIN D3) 125 MCG
10 CAPSULE ORAL NIGHTLY PRN
Status: DISCONTINUED | OUTPATIENT
Start: 2024-01-03 | End: 2024-01-18 | Stop reason: HOSPADM

## 2024-01-03 RX ORDER — ENOXAPARIN SODIUM 100 MG/ML
1 INJECTION SUBCUTANEOUS EVERY 12 HOURS
Status: DISCONTINUED | OUTPATIENT
Start: 2024-01-03 | End: 2024-01-08

## 2024-01-03 RX ADMIN — DOXYCYCLINE 100 MG: 100 INJECTION, POWDER, LYOPHILIZED, FOR SOLUTION INTRAVENOUS at 06:15

## 2024-01-03 RX ADMIN — IPRATROPIUM BROMIDE AND ALBUTEROL SULFATE 3 ML: 2.5; .5 SOLUTION RESPIRATORY (INHALATION) at 06:37

## 2024-01-03 RX ADMIN — CEFTRIAXONE 1000 MG: 1 INJECTION, POWDER, FOR SOLUTION INTRAMUSCULAR; INTRAVENOUS at 16:00

## 2024-01-03 RX ADMIN — INSULIN LISPRO 3 UNITS: 100 INJECTION, SOLUTION INTRAVENOUS; SUBCUTANEOUS at 12:52

## 2024-01-03 RX ADMIN — IPRATROPIUM BROMIDE AND ALBUTEROL SULFATE 3 ML: 2.5; .5 SOLUTION RESPIRATORY (INHALATION) at 19:43

## 2024-01-03 RX ADMIN — Medication 10 ML: at 21:08

## 2024-01-03 RX ADMIN — METOPROLOL SUCCINATE 25 MG: 25 TABLET, EXTENDED RELEASE ORAL at 12:52

## 2024-01-03 RX ADMIN — Medication 10 MG: at 02:14

## 2024-01-03 RX ADMIN — INSULIN LISPRO 3 UNITS: 100 INJECTION, SOLUTION INTRAVENOUS; SUBCUTANEOUS at 21:08

## 2024-01-03 RX ADMIN — ENOXAPARIN SODIUM 90 MG: 100 INJECTION SUBCUTANEOUS at 12:53

## 2024-01-03 RX ADMIN — MONTELUKAST SODIUM 10 MG: 10 TABLET, COATED ORAL at 21:08

## 2024-01-03 RX ADMIN — IPRATROPIUM BROMIDE AND ALBUTEROL SULFATE 3 ML: 2.5; .5 SOLUTION RESPIRATORY (INHALATION) at 00:25

## 2024-01-03 RX ADMIN — CETIRIZINE HYDROCHLORIDE 10 MG: 10 TABLET, FILM COATED ORAL at 12:52

## 2024-01-03 RX ADMIN — ACETYLCYSTEINE 3 ML: 200 SOLUTION ORAL; RESPIRATORY (INHALATION) at 00:26

## 2024-01-03 RX ADMIN — FLUTICASONE PROPIONATE 2 SPRAY: 50 SPRAY, METERED NASAL at 12:53

## 2024-01-03 RX ADMIN — IPRATROPIUM BROMIDE AND ALBUTEROL SULFATE 3 ML: 2.5; .5 SOLUTION RESPIRATORY (INHALATION) at 13:12

## 2024-01-03 RX ADMIN — Medication 10 ML: at 12:53

## 2024-01-03 RX ADMIN — ATORVASTATIN CALCIUM 10 MG: 10 TABLET, FILM COATED ORAL at 21:08

## 2024-01-03 RX ADMIN — Medication 10 MG: at 21:08

## 2024-01-03 RX ADMIN — DOXYCYCLINE 100 MG: 100 INJECTION, POWDER, LYOPHILIZED, FOR SOLUTION INTRAVENOUS at 17:55

## 2024-01-03 RX ADMIN — BUPROPION HYDROCHLORIDE 150 MG: 150 TABLET, EXTENDED RELEASE ORAL at 21:08

## 2024-01-03 NOTE — PROGRESS NOTES
[facility] HOSPITALIST PROGRESS NOTE     Patient Identification:  Name:  Evie Shah  Age:  78 y.o.  Sex:  female  :  1945  MRN:  [id number]  Visit Number:  [id number]  Primary Care Provider:  Camryn Mota PA    Length of stay:  1    Subjective: Patient seen and examined, patient reports she is less dyspneic breathing better still unable to expectorate with her coughing, x-ray today did not show resolution of atelectasis.    Chief Complaint: Hypoxic respiratory failure  ----------------------------------------------------------------------------------------------------------------------  Current Hospital Meds:  acetylcysteine, 3 mL, Nebulization, Q6H - RT  atorvastatin, 10 mg, Oral, Nightly  buPROPion SR, 150 mg, Oral, Nightly  cefTRIAXone, 1,000 mg, Intravenous, Q24H  cetirizine, 10 mg, Oral, Daily  cholecalciferol, 50,000 Units, Oral, Weekly  [Held by provider] clopidogrel, 75 mg, Oral, Daily  doxycycline, 100 mg, Intravenous, Q12H  enoxaparin, 1 mg/kg, Subcutaneous, Q12H  fluticasone, 2 spray, Nasal, Daily  insulin lispro, 2-7 Units, Subcutaneous, 4x Daily AC & at Bedtime  ipratropium-albuterol, 3 mL, Nebulization, 4x Daily - RT  metoprolol succinate XL, 25 mg, Oral, Daily  montelukast, 10 mg, Oral, Nightly  senna-docusate sodium, 2 tablet, Oral, BID  sodium chloride, 10 mL, Intravenous, Q12H      Pharmacy to Dose enoxaparin (LOVENOX),       ----------------------------------------------------------------------------------------------------------------------  Vital Signs:  Temp:  [97.5 °F (36.4 °C)-97.8 °F (36.6 °C)] 97.7 °F (36.5 °C)  Heart Rate:  [77-98] 98  Resp:  [18-20] 18  BP: (104-186)/(57-97) 167/79       Tele: Sinus rhythm 80 bpm O2 saturation is 94% on room air      24  1408 24  1942 24  0500   Weight: 90.3 kg (199 lb) 90.4 kg (199 lb 4.8 oz) 90.4 kg (199 lb 4.8 oz) (admit weight, patient not on floor 24 hrs)     Body mass index is 35.3  kg/m².    Intake/Output Summary (Last 24 hours) at 1/3/2024 1436  Last data filed at 1/3/2024 0800  Gross per 24 hour   Intake 2080 ml   Output --   Net 2080 ml     Diet: Diabetic Diets; Consistent Carbohydrate; Texture: Regular Texture (IDDSI 7); Fluid Consistency: Thin (IDDSI 0)  ----------------------------------------------------------------------------------------------------------------------  Physical exam:  General: Comfortable,awake, alert, oriented to self, place, and time, well-developed and well-nourished.  No respiratory distress.    Skin:  Skin is warm and dry. No rash noted. No pallor.    HENT:  Head:  Normocephalic and atraumatic.  Mouth:  Moist mucous membranes.    Eyes:  Conjunctivae and EOM are normal.  Pupils are equal, round, and reactive to light.  No scleral icterus.    Neck:  Neck supple.  No JVD present.    Pulmonary/Chest: Scattered crackles and rhonchi left lung field there is better air way movement, breath sounds improved compared to yesterday no wheezing equal chest expansion no splinting   Cardiovascular:  Normal rate, regular rhythm and normal heart sounds with no murmur.  Abdominal:  Soft.  Bowel sounds are normal.  No distension and no tenderness.   Extremities:  No edema, no tenderness, and no deformity.  No red or swollen joints anywhere.  Strong pulses in all 4 extremities with no clubbing, no cyanosis, no edema.  Neurological:  Motor strength equal no obvious deficit, sensory grossly intact.   No cranial nerve deficit.  No tongue deviation.  No facial droop.  No slurred speech.    Genitourinary: No Palomino catheter  Back:  ----------------------------------------------------------------------------------------------------------------------  ----------------------------------------------------------------------------------------------------------------------  Results from last 7 days   Lab Units 01/02/24  1409   HSTROP T ng/L 9     Results from last 7 days   Lab Units  "01/03/24  0039 01/02/24  1409   CRP mg/dL  --  <0.30   LACTATE mmol/L  --  2.0   WBC 10*3/mm3 9.93 10.76   HEMOGLOBIN g/dL 15.7 16.5*   HEMATOCRIT % 49.4* 51.2*   MCV fL 94.3 94.1   MCHC g/dL 31.8 32.2   PLATELETS 10*3/mm3 223 243     Results from last 7 days   Lab Units 01/02/24  1403   PH, ARTERIAL pH units 7.402   PO2 ART mm Hg 54.4*   PCO2, ARTERIAL mm Hg 38.9   HCO3 ART mmol/L 24.2     Results from last 7 days   Lab Units 01/03/24  0039 01/02/24  1409   SODIUM mmol/L 140 140   POTASSIUM mmol/L 4.3 4.5   MAGNESIUM mg/dL  --  2.0   CHLORIDE mmol/L 104 105   CO2 mmol/L 23.8 21.9*   BUN mg/dL 23 23   CREATININE mg/dL 0.84 1.09*   CALCIUM mg/dL 9.1 9.6   GLUCOSE mg/dL 232* 150*   ALBUMIN g/dL  --  4.3   BILIRUBIN mg/dL  --  0.4   ALK PHOS U/L  --  107   AST (SGOT) U/L  --  18   ALT (SGPT) U/L  --  12   Estimated Creatinine Clearance: 60.8 mL/min (by C-G formula based on SCr of 0.84 mg/dL).    No results found for: \"AMMONIA\"      Blood Culture   Date Value Ref Range Status   01/02/2024 No growth at 24 hours  Preliminary   01/02/2024 No growth at 24 hours  Preliminary     No results found for: \"URINECX\"  No results found for: \"WOUNDCX\"  No results found for: \"STOOLCX\"    I have personally looked at the labs and they are summarized above.  ----------------------------------------------------------------------------------------------------------------------  Imaging Results (Last 24 Hours)       Procedure Component Value Units Date/Time    XR Chest 1 View [[id number]] Collected: 01/03/24 1046     Updated: 01/03/24 1049    Narrative:      EXAM:    XR Chest, 1 View     EXAM DATE:    1/3/2024 9:35 AM     CLINICAL HISTORY:    atelectasis; R09.02-Hypoxemia; J44.1-Chronic obstructive pulmonary  disease with (acute) exacerbation     TECHNIQUE:    Frontal view of the chest.     COMPARISON:    1/2/2024     FINDINGS:    Lungs and pleural spaces:  Bibasilar airspace disease noted and may  represent atelectasis.  Right lung appears " well aerated.  Radiographic  findings again compatible with left upper lobe atelectasis.  No  pneumothorax.    Heart:  Unremarkable as visualized.  No cardiomegaly.    Mediastinum:  Unremarkable as visualized.    Bones/joints:  Unremarkable as visualized.       Impression:      1.  Stable appearance of the chest with increased attenuation of the  left hemithorax which would correspond to left upper lobe atelectasis  noted on CT.  2.  No effusion or pneumothorax identified.        This report was finalized on 1/3/2024 10:47 AM by Dr. Theron Pedersen MD.       CT Angiogram Chest Pulmonary Embolism [[id number]] Collected: 01/02/24 1654     Updated: 01/02/24 1700    Narrative:      EXAM:    CT Angiography Chest With Intravenous Contrast     EXAM DATE:    1/2/2024 3:42 PM     CLINICAL HISTORY:    Pulmonary embolism (PE) suspected, unknown D-dimer     TECHNIQUE:    Axial computed tomographic angiography images of the chest with  intravenous contrast.  This CT exam was performed using one or more of  the following dose reduction techniques:  automated exposure control,  adjustment of the mA and/or kV according to patient size, and/or use of  iterative reconstruction technique.    MIP reconstructed images were created and reviewed.     COMPARISON:    6/17/2020     FINDINGS:    Pulmonary arteries:  Mild pulmonary artery enlargement suggesting a  mild degree of pulmonary arterial hypertension.  No pulmonary embolism.    Aorta:  No acute findings.  No thoracic aortic aneurysm.    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    Lungs and pleural spaces:  Left upper lobe consolidation is noted with  areas of enhancement and nonenhancement compatible with lobar collapse  and possible superimposed pneumonia. Luminal filling defects of the left  upper lobe bronchial airways suggesting obstructive etiology.  Bronchoscopy recommended.  Mild upper lung predominant emphysema  is  noted.  Bronchial wall thickening is noted.  Luminal filling defects and  debris throughout the left lower lobe bronchial airways and to a lesser  extent the right lower lobe bronchial airways.  Minimal bibasilar  atelectasis.  No pleural effusions.    Heart:  Borderline cardiac enlargement.  Moderate coronary artery  calcifications.  No significant pericardial effusion.  No evidence of RV  dysfunction.    Mediastinum:  Small hiatal hernia.    Bones/joints:  Degenerative changes thoracic spine.  No acute  fracture.  No dislocation.    Soft tissues:  Unremarkable as visualized.    Lymph nodes:  Unremarkable as visualized.  No enlarged lymph nodes.    Gallbladder and bile ducts:  Cholelithiasis.    Kidneys and ureters:  Cyst left kidney.       Impression:      1.  No PE.  2.  Left upper lobe complete atelectasis with luminal filling defects of  the left upper lobe bronchial airways more favorable for mucous  plugging. Bronchoscopy recommended.  3.  Luminal debris in the left greater than right lower lobe bronchial  airways favoring mucous secretions.  4.  Bronchial inflammation compatible with bronchitis.  5.  Superimposed pneumonia left upper lobe likely given areas of  nonenhancement.  6.  Cardiomegaly with coronary artery calcifications.  7.  No effusion or pneumothorax.  8.  COPD.  9.  Other incidental/nonacute findings detailed above.        This report was finalized on 1/2/2024 4:58 PM by Dr. Theron Pedersen MD.       XR Chest 2 View [[id number]] Collected: 01/02/24 1439     Updated: 01/02/24 1449    Narrative:      EXAM:    XR Chest, 2 Views     EXAM DATE:    1/2/2024 2:01 PM     CLINICAL HISTORY:    sob     TECHNIQUE:    Frontal and lateral views of the chest.     COMPARISON:    No relevant prior studies available.     FINDINGS:    Lungs and pleural spaces: Increased attenuation overlying the left  hemithorax may be due to superficial soft tissue process with no  convincing evidence of airspace disease.   Otherwise no acute  cardiopulmonary findings identified.  No pneumothorax.    Heart:  Unremarkable as visualized.  No cardiomegaly.    Mediastinum:  Unremarkable as visualized.    Bones/joints:  Unremarkable as visualized.       Impression:      1. Increased attenuation overlying the left hemithorax may be due to  superficial soft tissue attenuation artifact with no convincing evidence  of airspace disease.  2.  Otherwise no acute cardiopulmonary findings identified.        This report was finalized on 1/2/2024 2:40 PM by Dr. Theron Pedersen MD.             ----------------------------------------------------------------------------------------------------------------------  Assessment and Plan:  -Acute hypoxic respiratory failure  -Left upper lobe atelectasis?  So far no improvement with neb treatment and you commenced  -History of peripheral arterial disease status post left external iliac stent placement   -History of COPD -history of diabetes  -QT prolongation  -Obesity BMI of 35  -History of hyperlipidemia    Continue antibiotic follow-up cultures, Mucomyst, patient now changed to full dose Lovenox while off Plavix in anticipation for possible bronchoscopy.  Accu-Chek and sliding scale.  Pulmonology help appreciated.    Plan outlined to patient together with her son at bedside and agreed.    disposition Home pending      Cora Carver MD  01/03/24  14:36 EST

## 2024-01-03 NOTE — CASE MANAGEMENT/SOCIAL WORK
Discharge Planning Assessment  [facility]     Patient Name: Evie Shah  MRN: [id number]  Today's Date: 1/3/2024    Admit Date: 1/2/2024    Plan: Patient is an independent retired  who lives at home alone, where she plans to return at discharge.  Patient's PCP is Camryn Mota and she uses [facility] in [place] for her prescription needs.  Patient denies any insurance or financial issues at this time.  Patient does not utilize Home Health and denies the need at this time.  Patient has O2 @ 2L PRN & a Rollator from an unknown provider in [place].  Patient is going to have son look at her machine at home and let us know where it is from.  Patient has also requested a Glucometer & 4 Prong Cane at discharge.  Patient's family will provide transportation at discharge.  No other issues or concerns are noted at this time.  CM will continue to follow and assist with any discharge needs.   Discharge Needs Assessment       Row Name 01/03/24 1719       Living Environment    People in Home alone    Current Living Arrangements home    Duration at Residence 22 yrs.    Potentially Unsafe Housing Conditions none    Primary Care Provided by self    Provides Primary Care For no one    Family Caregiver if Needed none    Quality of Family Relationships helpful;involved;supportive    Able to Return to Prior Arrangements yes       Resource/Environmental Concerns    Resource/Environmental Concerns none    Transportation Concerns none       Transition Planning    Patient/Family Anticipates Transition to home    Patient/Family Anticipated Services at Transition none    Transportation Anticipated family or friend will provide       Discharge Needs Assessment    Readmission Within the Last 30 Days no previous admission in last 30 days    Equipment Currently Used at Home bp cuff;rollator;oxygen  Unknown Provider in [place]    Concerns to be Addressed no discharge needs identified;denies needs/concerns at this time     Anticipated Changes Related to Illness none    Equipment Needed After Discharge none    Current Discharge Risk lives alone                   Discharge Plan       Row Name 01/03/24 1726       Plan    Plan Patient is an independent retired  who lives at home alone, where she plans to return at discharge.  Patient's PCP is Camryn Mota and she uses [facility] in [place] for her prescription needs.  Patient denies any insurance or financial issues at this time.  Patient does not utilize Home Health and denies the need at this time.  Patient has O2 @ 2L PRN & a Rollator from an unknown provider in [place].  Patient is going to have son look at her machine at home and let us know where it is from.  Patient has also requested a Glucometer & 4 Prong Cane at discharge.  Patient's family will provide transportation at discharge.  No other issues or concerns are noted at this time.  CM will continue to follow and assist with any discharge needs.    Patient/Family in Agreement with Plan yes      Row Name 01/03/24 171       Plan    Plan Comments 1/3:  5L N/C, IV Rocephin, Doxy, pO2 54.4, CXR=increased attenuation of L hemithorax corresponding to WILLIAM atelectasis on CT, To ED for SOB, wheezing & productive cough X 1 wk, Home O2 @ 2L PRN,PO Doxy per PCP with no improvement, Pt hypoxic on 4L N/C, desats when up to BSC but recovers quickly, WILLIAM atelectasis concern for mucous plug vs endobronchial tumor, wait 5 days for bronch 2/2 Plavix on hold, Home @ D/C-K[facility]                  Continued Care and Services - Admitted Since 1/2/2024    Coordination has not been started for this encounter.       Expected Discharge Date and Time       Expected Discharge Date Expected Discharge Time    Jan 5, 2024            Demographic Summary       Row Name 01/03/24 1716       General Information    Admission Type inpatient    Arrived From home;physician office - external;emergency department    Referral Source admission list;high risk  screening    Reason for Consult discharge planning;other (see comments)  CM Trigger    Preferred Language English                   Functional Status       Row Name 01/03/24 1718       Functional Status    Usual Activity Tolerance good    Current Activity Tolerance good       Functional Status, IADL    Medications independent    Meal Preparation independent    Housekeeping independent    Laundry independent    Shopping independent       Mental Status Summary    Recent Changes in Mental Status/Cognitive Functioning no changes       Employment/    Employment Status retired    Current or Previous Occupation professional    Employment/ Comments [employer]                   Psychosocial    No documentation.                  Abuse/Neglect    No documentation.                  Legal    No documentation.                  Substance Abuse    No documentation.                  Patient Forms    No documentation.                     Hiral Erwin RN

## 2024-01-03 NOTE — PLAN OF CARE
Goal Outcome Evaluation:  Plan of Care Reviewed With: patient        Progress: no change  Outcome Evaluation: PT is sleeping in bed at this time. PT is A/Ox4 and currently on 5L NC. PT does desat when up to bedside commode, but recovers quickly. PT has had no complaints of pain or discomfort. VSS. Afebrile. Will Continue to Monitor PT.         Problem: Adult Inpatient Plan of Care  Goal: Plan of Care Review  Outcome: Ongoing, Progressing  Flowsheets (Taken 1/3/2024 0440)  Progress: no change  Plan of Care Reviewed With: patient  Outcome Evaluation: PT is sleeping in bed at this time. PT is A/Ox4 and currently on 5L NC. PT does desat when up to bedside commode, but recovers quickly. PT has had no complaints of pain or discomfort. VSS. Afebrile. Will Continue to Monitor PT.  Goal: Patient-Specific Goal (Individualized)  Outcome: Ongoing, Progressing  Goal: Absence of Hospital-Acquired Illness or Injury  Outcome: Ongoing, Progressing  Intervention: Identify and Manage Fall Risk  Recent Flowsheet Documentation  Taken 1/3/2024 0300 by Amina Muniz RN  Safety Promotion/Fall Prevention:   activity supervised   assistive device/personal items within reach   clutter free environment maintained   fall prevention program maintained   nonskid shoes/slippers when out of bed   safety round/check completed   room organization consistent  Taken 1/3/2024 0100 by Amina Muniz, RN  Safety Promotion/Fall Prevention:   activity supervised   assistive device/personal items within reach   clutter free environment maintained   fall prevention program maintained   nonskid shoes/slippers when out of bed   safety round/check completed   room organization consistent  Taken 1/2/2024 2300 by Amina Muniz, RN  Safety Promotion/Fall Prevention:   activity supervised   assistive device/personal items within reach   clutter free environment maintained   fall prevention program maintained   nonskid shoes/slippers when out of bed   safety  round/check completed   room organization consistent  Taken 1/2/2024 2100 by Amina Muniz RN  Safety Promotion/Fall Prevention:   activity supervised   assistive device/personal items within reach   clutter free environment maintained   fall prevention program maintained   nonskid shoes/slippers when out of bed   safety round/check completed   room organization consistent  Taken 1/2/2024 1930 by Amina Muniz RN  Safety Promotion/Fall Prevention:   activity supervised   assistive device/personal items within reach   clutter free environment maintained   fall prevention program maintained   nonskid shoes/slippers when out of bed   safety round/check completed   room organization consistent  Taken 1/2/2024 1900 by Amina Muniz RN  Safety Promotion/Fall Prevention:   activity supervised   assistive device/personal items within reach   clutter free environment maintained   fall prevention program maintained   nonskid shoes/slippers when out of bed   safety round/check completed   room organization consistent  Intervention: Prevent Skin Injury  Recent Flowsheet Documentation  Taken 1/2/2024 1930 by Amina Muniz RN  Body Position: position changed independently  Skin Protection:   adhesive use limited   drying agents applied   incontinence pads utilized  Intervention: Prevent and Manage VTE (Venous Thromboembolism) Risk  Recent Flowsheet Documentation  Taken 1/2/2024 1930 by Amina Muniz RN  Activity Management: up to bedside commode  Intervention: Prevent Infection  Recent Flowsheet Documentation  Taken 1/3/2024 0300 by Amina Muniz RN  Infection Prevention: rest/sleep promoted  Taken 1/3/2024 0100 by Amina Muniz, RN  Infection Prevention: rest/sleep promoted  Taken 1/2/2024 2300 by Amina Muniz RN  Infection Prevention: rest/sleep promoted  Taken 1/2/2024 2100 by Amina Muniz RN  Infection Prevention: rest/sleep promoted  Taken 1/2/2024 1930 by Amina Muniz RN  Infection  Prevention: rest/sleep promoted  Taken 1/2/2024 1900 by Amina Muniz, RN  Infection Prevention: rest/sleep promoted  Goal: Optimal Comfort and Wellbeing  Outcome: Ongoing, Progressing  Intervention: Provide Person-Centered Care  Recent Flowsheet Documentation  Taken 1/2/2024 1930 by Amina Muniz, RN  Trust Relationship/Rapport:   care explained   choices provided   empathic listening provided   questions answered   reassurance provided   thoughts/feelings acknowledged  Goal: Readiness for Transition of Care  Outcome: Ongoing, Progressing  Intervention: Mutually Develop Transition Plan  Recent Flowsheet Documentation  Taken 1/2/2024 1930 by Amina Muniz, RN  Equipment Currently Used at Home:   oxygen   glucometer   bp cuff  Transportation Anticipated: family or friend will provide  Patient/Family Anticipated Services at Transition: none  Patient/Family Anticipates Transition to: home     Problem: Fluid Imbalance (Pneumonia)  Goal: Fluid Balance  Outcome: Ongoing, Progressing     Problem: Infection (Pneumonia)  Goal: Resolution of Infection Signs and Symptoms  Outcome: Ongoing, Progressing     Problem: Respiratory Compromise (Pneumonia)  Goal: Effective Oxygenation and Ventilation  Outcome: Ongoing, Progressing  Intervention: Promote Airway Secretion Clearance  Recent Flowsheet Documentation  Taken 1/2/2024 1930 by Amina Muniz, RN  Cough And Deep Breathing: done independently per patient  Intervention: Optimize Oxygenation and Ventilation  Recent Flowsheet Documentation  Taken 1/2/2024 1930 by Amina Muniz, RN  Head of Bed (HOB) Positioning: HOB at 30-45 degrees

## 2024-01-03 NOTE — CONSULTS
Consult Note Pulmonary     Referring Provider: Dr. Carver   Reason for Consultation: Left upper lobe atelectasis concern mucous plug versus endobronchial tumor      Chief complaint   Shortness of breath    History of present illness:    Patient is a 78-year-old female with past medical history significant for COPD/asthma, diabetes, seasonal allergies, hypertension and hyperlipidemia.  She is admitted with complaint of worsening cough and some sputum production for last 2 week.  She was given antibiotic by her primary care physician without significant improvement.  She continues to have shortness of breath found to be hypoxic in the ER required 4 to 5 L oxygen via nasal cannula.    CRP and Pro-Sonido level were normal x-ray chest showed left-sided haziness.  CT chest was done with PE protocol which did not show any PE however it showed left upper lobe atelectasis, (my read) looks like lingula is also atelectatic.    Patient reported she is feeling better.    Been getting chest PT after nebulized Mucomyst and DuoNeb.    She has been on Plavix.  Last dose taken yesterday.    Strong family history of lung cancer.  Her mother had lung cancer.        Review of Systems  Rest of the review of systems unremarkable.      History  Past Medical History:   Diagnosis Date    Allergic rhinitis     Asthma     Atherosclerosis     COPD (chronic obstructive pulmonary disease)     Cough     Diabetes mellitus     Hyperlipidemia     Hypertension     Menopause     Nausea and vomiting     Obesity 3/7/2017    Osteoarthritis     Osteoporosis     Vertigo, benign paroxysmal    ,   Past Surgical History:   Procedure Laterality Date    CATARACT EXTRACTION      COLONOSCOPY      EYE SURGERY      catarac    ILIAC ARTERY STENT  02/25/2015    SKIN CANCER EXCISION      nose    TONSILLECTOMY      and adenoidectomy    TUBAL ABDOMINAL LIGATION     ,   Family History   Problem Relation Age of Onset    Arthritis Mother     Asthma Mother     Cancer Mother      Osteoarthritis Mother     Osteoporosis Mother     Diabetes Mother     Arthritis Father     Hypertension Father     Stroke Father     Osteoarthritis Father     Osteoporosis Father     Heart disease Father     Diabetes Father     Breast cancer Neg Hx    ,   Social History     Tobacco Use    Smoking status: Former     Types: Cigarettes     Quit date: 2015     Years since quittin.6    Smokeless tobacco: Never   Vaping Use    Vaping Use: Never used   Substance Use Topics    Alcohol use: No    Drug use: No   ,   Facility-Administered Medications Prior to Admission   Medication Dose Route Frequency Provider Last Rate Last Admin    cyanocobalamin injection 1,000 mcg  1,000 mcg Intramuscular Q28 Days Camryn Mota PA   1,000 mcg at 23 1114     Medications Prior to Admission   Medication Sig Dispense Refill Last Dose    acetaminophen (TYLENOL) 500 MG tablet Take 1 tablet by mouth Every 6 (Six) Hours As Needed for Mild Pain.   Past Week    albuterol sulfate  (90 Base) MCG/ACT inhaler Inhale 2 puffs Every 4 (Four) Hours As Needed for Shortness of Air. 18 g 5 Past Month    amLODIPine-benazepril (LOTREL) 10-40 MG per capsule Take 1 capsule by mouth Daily. 90 capsule 3 2024    atorvastatin (LIPITOR) 10 MG tablet Take 1 tablet by mouth Every Night. 90 tablet 3 2024    buPROPion SR (WELLBUTRIN SR) 150 MG 12 hr tablet Take 1 tablet by mouth Every Night.   2024    clopidogrel (PLAVIX) 75 MG tablet Take 1 tablet by mouth Daily. 90 tablet 3 2024    doxycycline (VIBRAMYCIN) 100 MG capsule Take 1 capsule by mouth 2 (Two) Times a Day for 10 days. 20 capsule 0 2024    empagliflozin (Jardiance) 25 MG tablet tablet Take 1 tablet by mouth Daily. 90 tablet 3 2024    fluticasone (FLONASE) 50 MCG/ACT nasal spray 2 sprays into the nostril(s) as directed by provider Daily. 16 g 1 2024    furosemide (LASIX) 20 MG tablet Take 1 tablet by mouth Daily. 90 tablet 3 2024    ipratropium-albuterol  (DUO-NEB) 0.5-2.5 mg/3 ml nebulizer Take 3 mL by nebulization Every 4 (Four) Hours As Needed for Shortness of Air. 150 mL 1 1/2/2024    loratadine (Claritin) 10 MG tablet Take 1 tablet by mouth Every Night.   1/1/2024    metoprolol succinate XL (Toprol XL) 25 MG 24 hr tablet Take 1 tablet by mouth Daily. 90 tablet 3 1/2/2024    montelukast (SINGULAIR) 10 MG tablet Take 1 tablet by mouth Every Night. 90 tablet 3 1/1/2024    omega-3 acid ethyl esters (LOVAZA) 1 g capsule Take 2 capsules by mouth 2 (Two) Times a Day. 360 capsule 3 1/2/2024    potassium chloride 10 MEQ CR tablet Take 1 tablet by mouth Daily. 90 tablet 3 1/2/2024    promethazine-dextromethorphan (PROMETHAZINE-DM) 6.25-15 MG/5ML syrup Take 5 mL by mouth 2 (Two) Times a Day As Needed for Cough. 180 mL 0 1/1/2024    raloxifene (EVISTA) 60 MG tablet Take 1 tablet by mouth Daily. 90 tablet 3 1/2/2024    Tirzepatide (Mounjaro) 5 MG/0.5ML solution pen-injector Inject 0.5 mL under the skin into the appropriate area as directed Every 7 (Seven) Days. 2 mL 3 12/29/2023    vitamin D (ERGOCALCIFEROL) 1.25 MG (29948 UT) capsule capsule Take 1 capsule by mouth 1 (One) Time Per Week. 12 capsule 3 12/28/2023   , Scheduled Meds:  acetylcysteine, 3 mL, Nebulization, Q6H - RT  atorvastatin, 10 mg, Oral, Nightly  buPROPion SR, 150 mg, Oral, Nightly  cefTRIAXone, 1,000 mg, Intravenous, Q24H  cetirizine, 10 mg, Oral, Daily  cholecalciferol, 50,000 Units, Oral, Weekly  clopidogrel, 75 mg, Oral, Daily  doxycycline, 100 mg, Intravenous, Q12H  enoxaparin, 40 mg, Subcutaneous, Nightly  fluticasone, 2 spray, Nasal, Daily  insulin lispro, 2-7 Units, Subcutaneous, 4x Daily AC & at Bedtime  ipratropium-albuterol, 3 mL, Nebulization, 4x Daily - RT  metoprolol succinate XL, 25 mg, Oral, Daily  montelukast, 10 mg, Oral, Nightly  senna-docusate sodium, 2 tablet, Oral, BID  sodium chloride, 10 mL, Intravenous, Q12H    , Continuous Infusions:  Pharmacy to Dose enoxaparin (LOVENOX),      and  "Allergies:  Codeine    Objective     Vital Signs   Temp:  [97.5 °F (36.4 °C)-98.2 °F (36.8 °C)] 97.5 °F (36.4 °C)  Heart Rate:  [77-98] 80  Resp:  [18-20] 20  BP: (104-186)/(57-97) 138/68    Physical Exam:          General- normal in appearance, not in any acute distress    HEENT- pupils equally reactive to light, normal in size, no scleral icterus    Neck-supple, No JVD, no carotid bruit    Respiratory-bilateral air entry, bilateral air entry, I noted occasional wheezing only on forced forced exhalation otherwise clear to auscultation.      Cardiovascular-  Normal S1 and S2. No S3, S4 or murmurs.      GI-nontender nondistended bowel sounds positive    CNS-alert oriented x3, grossly nonfocal      Extremities-no clubbing and edema    Psychiatric-mood good, good eye contact,     Skin- no visible rash             Results Review:    LABS:    Lab Results   Component Value Date    GLUCOSE 232 (H) 01/03/2024    BUN 23 01/03/2024    CREATININE 0.84 01/03/2024    EGFRIFNONA 58 (L) 09/30/2021    EGFRIFAFRI 67 09/30/2021    BCR 27.4 (H) 01/03/2024    CO2 23.8 01/03/2024    CALCIUM 9.1 01/03/2024    PROTENTOTREF 6.7 06/09/2023    ALBUMIN 4.3 01/02/2024    LABIL2 2.0 06/09/2023    AST 18 01/02/2024    ALT 12 01/02/2024    WBC 9.93 01/03/2024    HGB 15.7 01/03/2024    HCT 49.4 (H) 01/03/2024    MCV 94.3 01/03/2024     01/03/2024     01/03/2024    K 4.3 01/03/2024     01/03/2024    ANIONGAP 12.2 01/03/2024       No results found for: \"INR\", \"PROTIME\"             I reviewed the patient's new clinical results.  I reviewed the patient's new imaging results and agree with the interpretation.      Assessment & Plan     #1 acute hypoxic respiratory failure.    2.  Left upper lobe and possibly lingular atelectasis.  Concern mucous plug versus endobronchial tumor.    Strong family history of lung cancer.    3.  COPD: Occasional wheezing on my exam.    4.  Question of pneumonia.    Plavix is on hold.  I will wait for at " least 5 days before proceeding with bronchoscopy.  Given history of severe peripheral vascular disease and history of cyanosis in the left foot I would prefer to keep her on therapeutic dose Lovenox while she is off Plavix.    Continue broad-spectrum antibiotics and supportive care.        GI- PPI prophylaxis      Endrocrinology- Maintain Blood sugar 140 -180      I have personally reviewed x-ray chest, CAT scan, labs, medication list.  Total time spent 30 minutes.  I am really thankful to  for having me participate in the care of this patient.  Case was discussed with the medical resident team.    Victor Hugo Davis MD     01/03/24 11:12 EST

## 2024-01-04 LAB
BASOPHILS # BLD AUTO: 0.07 10*3/MM3 (ref 0–0.2)
BASOPHILS NFR BLD AUTO: 0.6 % (ref 0–1.5)
DEPRECATED RDW RBC AUTO: 47.4 FL (ref 37–54)
EOSINOPHIL # BLD AUTO: 0.11 10*3/MM3 (ref 0–0.4)
EOSINOPHIL NFR BLD AUTO: 1 % (ref 0.3–6.2)
ERYTHROCYTE [DISTWIDTH] IN BLOOD BY AUTOMATED COUNT: 13.6 % (ref 12.3–15.4)
GLUCOSE BLDC GLUCOMTR-MCNC: 120 MG/DL (ref 70–130)
GLUCOSE BLDC GLUCOMTR-MCNC: 147 MG/DL (ref 70–130)
GLUCOSE BLDC GLUCOMTR-MCNC: 163 MG/DL (ref 70–130)
GLUCOSE BLDC GLUCOMTR-MCNC: 172 MG/DL (ref 70–130)
HCT VFR BLD AUTO: 44.1 % (ref 34–46.6)
HGB BLD-MCNC: 14 G/DL (ref 12–15.9)
IMM GRANULOCYTES # BLD AUTO: 0.05 10*3/MM3 (ref 0–0.05)
IMM GRANULOCYTES NFR BLD AUTO: 0.5 % (ref 0–0.5)
LYMPHOCYTES # BLD AUTO: 3.47 10*3/MM3 (ref 0.7–3.1)
LYMPHOCYTES NFR BLD AUTO: 32.1 % (ref 19.6–45.3)
MCH RBC QN AUTO: 30.2 PG (ref 26.6–33)
MCHC RBC AUTO-ENTMCNC: 31.7 G/DL (ref 31.5–35.7)
MCV RBC AUTO: 95 FL (ref 79–97)
MONOCYTES # BLD AUTO: 0.84 10*3/MM3 (ref 0.1–0.9)
MONOCYTES NFR BLD AUTO: 7.8 % (ref 5–12)
NEUTROPHILS NFR BLD AUTO: 58 % (ref 42.7–76)
NEUTROPHILS NFR BLD AUTO: 6.27 10*3/MM3 (ref 1.7–7)
NRBC BLD AUTO-RTO: 0 /100 WBC (ref 0–0.2)
PLATELET # BLD AUTO: 201 10*3/MM3 (ref 140–450)
PMV BLD AUTO: 10 FL (ref 6–12)
RBC # BLD AUTO: 4.64 10*6/MM3 (ref 3.77–5.28)
WBC NRBC COR # BLD AUTO: 10.81 10*3/MM3 (ref 3.4–10.8)

## 2024-01-04 PROCEDURE — 94760 N-INVAS EAR/PLS OXIMETRY 1: CPT

## 2024-01-04 PROCEDURE — 25010000002 CEFTRIAXONE PER 250 MG: Performed by: HOSPITALIST

## 2024-01-04 PROCEDURE — 94799 UNLISTED PULMONARY SVC/PX: CPT

## 2024-01-04 PROCEDURE — 94669 MECHANICAL CHEST WALL OSCILL: CPT

## 2024-01-04 PROCEDURE — 94664 DEMO&/EVAL PT USE INHALER: CPT

## 2024-01-04 PROCEDURE — 99232 SBSQ HOSP IP/OBS MODERATE 35: CPT | Performed by: INTERNAL MEDICINE

## 2024-01-04 PROCEDURE — 82948 REAGENT STRIP/BLOOD GLUCOSE: CPT

## 2024-01-04 PROCEDURE — 85025 COMPLETE CBC W/AUTO DIFF WBC: CPT | Performed by: HOSPITALIST

## 2024-01-04 PROCEDURE — 99232 SBSQ HOSP IP/OBS MODERATE 35: CPT | Performed by: HOSPITALIST

## 2024-01-04 PROCEDURE — 25810000003 SODIUM CHLORIDE 0.9 % SOLUTION 250 ML FLEX CONT: Performed by: HOSPITALIST

## 2024-01-04 PROCEDURE — 63710000001 INSULIN LISPRO (HUMAN) PER 5 UNITS: Performed by: HOSPITALIST

## 2024-01-04 PROCEDURE — 97162 PT EVAL MOD COMPLEX 30 MIN: CPT

## 2024-01-04 PROCEDURE — 94668 MNPJ CHEST WALL SBSQ: CPT

## 2024-01-04 PROCEDURE — 94761 N-INVAS EAR/PLS OXIMETRY MLT: CPT

## 2024-01-04 PROCEDURE — 25010000002 ENOXAPARIN PER 10 MG: Performed by: INTERNAL MEDICINE

## 2024-01-04 RX ORDER — ACETYLCYSTEINE 200 MG/ML
3 SOLUTION ORAL; RESPIRATORY (INHALATION) 2 TIMES DAILY PRN
Status: DISCONTINUED | OUTPATIENT
Start: 2024-01-04 | End: 2024-01-18 | Stop reason: HOSPADM

## 2024-01-04 RX ADMIN — DOCUSATE SODIUM 50 MG AND SENNOSIDES 8.6 MG 2 TABLET: 8.6; 5 TABLET, FILM COATED ORAL at 10:03

## 2024-01-04 RX ADMIN — IPRATROPIUM BROMIDE AND ALBUTEROL SULFATE 3 ML: 2.5; .5 SOLUTION RESPIRATORY (INHALATION) at 06:54

## 2024-01-04 RX ADMIN — ENOXAPARIN SODIUM 90 MG: 100 INJECTION SUBCUTANEOUS at 13:18

## 2024-01-04 RX ADMIN — DOXYCYCLINE 100 MG: 100 INJECTION, POWDER, LYOPHILIZED, FOR SOLUTION INTRAVENOUS at 05:12

## 2024-01-04 RX ADMIN — METOPROLOL SUCCINATE 25 MG: 25 TABLET, EXTENDED RELEASE ORAL at 10:03

## 2024-01-04 RX ADMIN — CEFTRIAXONE 1000 MG: 1 INJECTION, POWDER, FOR SOLUTION INTRAMUSCULAR; INTRAVENOUS at 16:28

## 2024-01-04 RX ADMIN — FLUTICASONE PROPIONATE 2 SPRAY: 50 SPRAY, METERED NASAL at 10:03

## 2024-01-04 RX ADMIN — SODIUM CHLORIDE 40 ML: 9 INJECTION, SOLUTION INTRAVENOUS at 05:12

## 2024-01-04 RX ADMIN — CETIRIZINE HYDROCHLORIDE 10 MG: 10 TABLET, FILM COATED ORAL at 10:03

## 2024-01-04 RX ADMIN — ENOXAPARIN SODIUM 90 MG: 100 INJECTION SUBCUTANEOUS at 00:42

## 2024-01-04 RX ADMIN — DOCUSATE SODIUM 50 MG AND SENNOSIDES 8.6 MG 2 TABLET: 8.6; 5 TABLET, FILM COATED ORAL at 22:16

## 2024-01-04 RX ADMIN — BUPROPION HYDROCHLORIDE 150 MG: 150 TABLET, EXTENDED RELEASE ORAL at 22:16

## 2024-01-04 RX ADMIN — ATORVASTATIN CALCIUM 10 MG: 10 TABLET, FILM COATED ORAL at 22:16

## 2024-01-04 RX ADMIN — MONTELUKAST SODIUM 10 MG: 10 TABLET, COATED ORAL at 22:16

## 2024-01-04 RX ADMIN — IPRATROPIUM BROMIDE AND ALBUTEROL SULFATE 3 ML: 2.5; .5 SOLUTION RESPIRATORY (INHALATION) at 18:44

## 2024-01-04 RX ADMIN — DOXYCYCLINE 100 MG: 100 INJECTION, POWDER, LYOPHILIZED, FOR SOLUTION INTRAVENOUS at 18:13

## 2024-01-04 RX ADMIN — INSULIN LISPRO 2 UNITS: 100 INJECTION, SOLUTION INTRAVENOUS; SUBCUTANEOUS at 22:20

## 2024-01-04 RX ADMIN — INSULIN LISPRO 2 UNITS: 100 INJECTION, SOLUTION INTRAVENOUS; SUBCUTANEOUS at 18:13

## 2024-01-04 RX ADMIN — Medication 10 ML: at 10:03

## 2024-01-04 RX ADMIN — IPRATROPIUM BROMIDE AND ALBUTEROL SULFATE 3 ML: 2.5; .5 SOLUTION RESPIRATORY (INHALATION) at 12:58

## 2024-01-04 RX ADMIN — IPRATROPIUM BROMIDE AND ALBUTEROL SULFATE 3 ML: 2.5; .5 SOLUTION RESPIRATORY (INHALATION) at 00:39

## 2024-01-04 RX ADMIN — Medication 10 ML: at 22:16

## 2024-01-04 NOTE — PROGRESS NOTES
"Progress Note Pulmonary      Subjective no new complaints.  Complaining of cough and a small amount of sputum production.      Interval History:       As above  Review of Systems:    Reviewed ; unchanged       Vital Signs  Temp:  [97.6 °F (36.4 °C)-98.3 °F (36.8 °C)] 98.3 °F (36.8 °C)  Heart Rate:  [] 75  Resp:  [16-20] 20  BP: (120-146)/(64-86) 120/70  Body mass index is 35.48 kg/m².    Intake/Output Summary (Last 24 hours) at 1/4/2024 1403  Last data filed at 1/4/2024 0839  Gross per 24 hour   Intake 480 ml   Output --   Net 480 ml     I/O this shift:  In: 240 [P.O.:240]  Out: -     Physical Exam:  General- normal in appearance, not in any acute distress    HEENT- pupils equally reactive to light, normal in size, no scleral icterus    Neck- supple    No JVD, no carotid bruit    Respiratory-bilateral air entry, scattered rhonchi, no wheezing, no rales.      Cardiovascular-  Normal S1 and S2. No S3, S4 or murmurs.    GI-nontender nondistended bowel sounds positive    CNS-alert oriented x3, grossly nonfocal    Extremities- pulses normal bilaterally , no clubbing and edema        Results Review:      Results from last 7 days   Lab Units 01/04/24  0120 01/03/24  0039 01/02/24  1409   WBC 10*3/mm3 10.81* 9.93 10.76   HEMOGLOBIN g/dL 14.0 15.7 16.5*   PLATELETS 10*3/mm3 201 223 243     Results from last 7 days   Lab Units 01/03/24  0039 01/02/24  1409   SODIUM mmol/L 140 140   POTASSIUM mmol/L 4.3 4.5   CHLORIDE mmol/L 104 105   CO2 mmol/L 23.8 21.9*   BUN mg/dL 23 23   CREATININE mg/dL 0.84 1.09*   CALCIUM mg/dL 9.1 9.6   GLUCOSE mg/dL 232* 150*   MAGNESIUM mg/dL  --  2.0     No results found for: \"INR\", \"PROTIME\"  Results from last 7 days   Lab Units 01/02/24  1409   ALK PHOS U/L 107   BILIRUBIN mg/dL 0.4   ALT (SGPT) U/L 12   AST (SGOT) U/L 18     Results from last 7 days   Lab Units 01/02/24  1403   PH, ARTERIAL pH units 7.402   PO2 ART mm Hg 54.4*   PCO2, ARTERIAL mm Hg 38.9   HCO3 ART mmol/L 24.2     Imaging " Results (Last 24 Hours)       ** No results found for the last 24 hours. **                 atorvastatin, 10 mg, Oral, Nightly  buPROPion SR, 150 mg, Oral, Nightly  cefTRIAXone, 1,000 mg, Intravenous, Q24H  cetirizine, 10 mg, Oral, Daily  cholecalciferol, 50,000 Units, Oral, Weekly  [Held by provider] clopidogrel, 75 mg, Oral, Daily  doxycycline, 100 mg, Intravenous, Q12H  enoxaparin, 1 mg/kg, Subcutaneous, Q12H  fluticasone, 2 spray, Nasal, Daily  insulin lispro, 2-7 Units, Subcutaneous, 4x Daily AC & at Bedtime  ipratropium-albuterol, 3 mL, Nebulization, 4x Daily - RT  metoprolol succinate XL, 25 mg, Oral, Daily  montelukast, 10 mg, Oral, Nightly  senna-docusate sodium, 2 tablet, Oral, BID  sodium chloride, 10 mL, Intravenous, Q12H           Medication Review:     Assessment & Plan   #1 acute hypoxic respiratory failure.  Requiring 5 L oxygen to maintain O2 sat above 90.     2.  Left upper lobe and possibly lingular atelectasis.  Concern mucous plug versus endobronchial tumor.     Strong family history of lung cancer.  Plavix on hold.  Given history of severe peripheral vascular disease, status post stent placement, I have decided to keep her on Lovenox while she is off Plavix.  Will hold Lovenox on Sunday.  Bronchoscopy with possible biopsy on Monday or Tuesday     3.  COPD: Continue nebulizer4.  Question of pneumonia.        Continue broad-spectrum antibiotics and supportive care.           GI- PPI prophylaxis        Endrocrinology- Maintain Blood sugar 140 -180        I have personally reviewed x-ray chest, CAT scan, labs, medication list.  Total time spent 30 minutes.                Victor Hugo Davis MD  01/04/24  14:03 EST

## 2024-01-04 NOTE — PLAN OF CARE
Problem: Adult Inpatient Plan of Care  Goal: Absence of Hospital-Acquired Illness or Injury  Intervention: Prevent Skin Injury  Recent Flowsheet Documentation  Taken 1/3/2024 1931 by Mary Dudley RN  Body Position: position changed independently     Problem: Adult Inpatient Plan of Care  Goal: Absence of Hospital-Acquired Illness or Injury  Intervention: Prevent and Manage VTE (Venous Thromboembolism) Risk  Recent Flowsheet Documentation  Taken 1/3/2024 1931 by Mary Dudley, RN  Activity Management: activity encouraged  VTE Prevention/Management: (See MAR) other (see comments)   Goal Outcome Evaluation:

## 2024-01-04 NOTE — THERAPY EVALUATION
Acute Care - Physical Therapy Initial Evaluation   [facility]     Patient Name: Evie Shah  : 1945  MRN: [id number]  Today's Date: 2024      Visit Dx:     ICD-10-CM ICD-9-CM   1. Hypoxemia  R09.02 799.02   2. COPD exacerbation  J44.1 491.21     Patient Active Problem List   Diagnosis    DM type 2 with diabetic mixed hyperlipidemia    Dyslipidemia    Essential hypertension    Senile osteoporosis    Chronic allergic rhinitis    VISHAL (generalized anxiety disorder)    PAD (peripheral artery disease)    Severe obesity with body mass index (BMI) of 35.0 to 35.9 and comorbidity    COPD mixed type    Former smoker    Chronic respiratory failure with hypoxia    Type 2 diabetes mellitus with hyperglycemia, without long-term current use of insulin    Primary osteoarthritis of both knees    Coronary artery calcification seen on CT scan    Elevated hematocrit    Acute idiopathic gout of left hand    Vitamin D deficiency    Polycythemia vera    Respiratory failure with hypoxia     Past Medical History:   Diagnosis Date    Allergic rhinitis     Asthma     Atherosclerosis     COPD (chronic obstructive pulmonary disease)     Cough     Diabetes mellitus     Hyperlipidemia     Hypertension     Menopause     Nausea and vomiting     Obesity 3/7/2017    Osteoarthritis     Osteoporosis     Vertigo, benign paroxysmal      Past Surgical History:   Procedure Laterality Date    CATARACT EXTRACTION      COLONOSCOPY      EYE SURGERY      catarac    ILIAC ARTERY STENT  2015    SKIN CANCER EXCISION      nose    TONSILLECTOMY      and adenoidectomy    TUBAL ABDOMINAL LIGATION       PT Assessment (last 12 hours)       PT Evaluation and Treatment       Row Name 24 1444          Physical Therapy Time and Intention    Document Type evaluation  -KM     Mode of Treatment individual therapy;physical therapy  -KM     Patient Effort good  -KM     Symptoms Noted During/After Treatment fatigue  -KM       Row Name 24 1449           General Information    Patient Profile Reviewed yes  -KM     Patient Observations alert;cooperative;agree to therapy  -KM     Prior Level of Function independent:;all household mobility;ADL's  -KM     Existing Precautions/Restrictions fall;oxygen therapy device and L/min  -KM     Risks Reviewed patient:;LOB;nausea/vomiting;dizziness;increased discomfort  -KM     Benefits Reviewed patient:;improve function;increase independence;increase strength;increase balance  -KM     Barriers to Rehab none identified  -KM       Row Name 01/04/24 1444          Living Environment    Current Living Arrangements home  -KM     People in Home alone  -KM     Primary Care Provided by self  -KM       Row Name 01/04/24 1444          Home Use of Assistive/Adaptive Equipment    Equipment Currently Used at Home bp cuff;rollator;oxygen  -[facility] Name 01/04/24 1444          Cognition    Affect/Mental Status (Cognition) [facility]  -     Orientation Status (Cognition) oriented x 4  -KM     Follows Commands (Cognition) [facility] Name 01/04/24 1444          Range of Motion (ROM)    Range of Motion bilateral lower extremities;ROM is WFL  -KM       Row Name 01/04/24 1444          Strength (Manual Muscle Testing)    Strength (Manual Muscle Testing) bilateral lower extremities;strength is [facility] Name 01/04/24 1444          Bed Mobility    Bed Mobility bed mobility (all) activities  -KM     All Activities, [place] (Bed Mobility) standby assist  -     Assistive Device (Bed Mobility) bed rails;head of bed elevated  -[facility] Name 01/04/24 1444          Transfers    Transfers sit-stand transfer;stand-sit transfer;toilet transfer  -[facility] Name 01/04/24 1444          Sit-Stand Transfer    Sit-Stand [place] (Transfers) contact guard  -       Row Name 01/04/24 1444          Stand-Sit Transfer    Stand-Sit [place] (Transfers) contact guard  -[facility] Name 01/04/24 1444          Gait/Stairs  (Locomotion)    Comment, (Gait/Stairs) pt. marched in place d/t shortened O2 line on 5L  -KM       Row Name 01/04/24 1444          Safety Issues, Functional Mobility    Impairments Affecting Function (Mobility) balance;endurance/activity tolerance  -KM       Row Name 01/04/24 1444          Balance    Balance Assessment sitting static balance;standing dynamic balance  -KM     Static Sitting Balance independent  -KM     Position, Sitting Balance sitting edge of bed  -KM     Dynamic Standing Balance contact guard  -KM       Row Name 01/04/24 1444          Plan of Care Review    Plan of Care Reviewed With patient  -     Outcome Evaluation Pt. evaluation completed during PT session. She was able to perform functional mobility skills w/ SBA. She was able to march in place for short duration of time before becoming fatigued. Pt. would benefit from skilled PT services.  [facility]       Row Name 01/04/24 1444          Therapy Assessment/Plan (PT)    Patient/Family Therapy Goals Statement (PT) return home and take care of self  -KM     Functional Level at Time of Evaluation (PT) CGA  -KM     PT Diagnosis (PT) decreased mobility  -KM     Rehab Potential (PT) good, to achieve stated therapy goals  -     Criteria for Skilled Interventions Met (PT) yes;skilled treatment is necessary  -     Therapy Frequency (PT) 2 times/wk  2-5x/wk  -KM     Predicted Duration of Therapy Intervention (PT) until discharge  -     Problem List (PT) problems related to;balance;mobility  -KM     Activity Limitations Related to Problem List (PT) unable to ambulate safely;unable to transfer safely  -       Row Name 01/04/24 1444          Therapy Plan Review/Discharge Plan (PT)    Therapy Plan Review (PT) evaluation/treatment results reviewed;care plan/treatment goals reviewed;risks/benefits reviewed;patient  -KM       Row Name 01/04/24 1444          Physical Therapy Goals    Transfer Goal Selection (PT) transfer, PT goal 1  -KM     Gait Training Goal  Selection (PT) gait training, PT goal 1  -KM       Row Name 01/04/24 1444          Transfer Goal 1 (PT)    Activity/Assistive Device (Transfer Goal 1, PT) transfers, all  -KM     [place] Level/Cues Needed (Transfer Goal 1, PT) independent  -KM     Time Frame (Transfer Goal 1, PT) by discharge  -KM       Row Name 01/04/24 1444          Gait Training Goal 1 (PT)    Activity/Assistive Device (Gait Training Goal 1, PT) gait (walking locomotion);assistive device use;walker, rolling  -KM     [place] Level (Gait Training Goal 1, PT) modified independence  -KM     Distance (Gait Training Goal 1, PT) 100'  -KM     Time Frame (Gait Training Goal 1, PT) by discharge  -KM               User Key  (r) = Recorded By, (t) = Taken By, (c) = Cosigned By      Initials Name Provider Type    Chon Hopper, THANG Physical Therapist                    Physical Therapy Education       Title: PT OT SLP Therapies (Done)       Topic: Physical Therapy (Done)       Point: Mobility training (Done)       Learning Progress Summary             Patient Acceptance, E,TB, VU by  at 1/4/2024 1515                         Point: Home exercise program (Done)       Learning Progress Summary             Patient Acceptance, E,TB, VU by  at 1/4/2024 1515                         Point: Body mechanics (Done)       Learning Progress Summary             Patient Acceptance, E,TB, VU by  at 1/4/2024 1515                         Point: Precautions (Done)       Learning Progress Summary             Patient Acceptance, E,TB, VU by  at 1/4/2024 1515                                         User Key       Initials Effective Dates Name Provider Type Discipline     05/24/22 -  Chon Rowell, THANG Physical Therapist PT                  PT Recommendation and Plan  Anticipated Discharge Disposition (PT): home, home with assist  Planned Therapy Interventions (PT): balance training, bed mobility training, gait training, home exercise program, patient/family  education, postural re-education, ROM (range of motion), stair training, strengthening, stretching, transfer training  Therapy Frequency (PT): 2 times/wk (2-5x/wk)  Plan of Care Reviewed With: patient  Outcome Evaluation: Pt. evaluation completed during PT session. She was able to perform functional mobility skills w/ SBA. She was able to march in place for short duration of time before becoming fatigued. Pt. would benefit from skilled PT services.       Time Calculation:    PT Charges       Row Name 01/04/24 1442             Time Calculation    PT Received On 01/04/24  -KM      PT Goal Re-Cert Due Date 01/18/24  -KM                User Key  (r) = Recorded By, (t) = Taken By, (c) = Cosigned By      Initials Name Provider Type    Chon Hopper, PT Physical Therapist                  Therapy Charges for Today       Code Description Service Date Service Provider Modifiers Qty    53060247550 HC PT EVAL MOD COMPLEXITY 4 1/4/2024 Chon Rowell, PT GP 1            PT G-Codes  AM-PAC 6 Clicks Score (PT): 19    Chon Rowell PT  1/4/2024

## 2024-01-04 NOTE — PAYOR COMM NOTE
"CONTACT:  AVINASH MCGUIRE RN  UTILIZATION MANAGEMENT DEPT.   [facility]   [address] [facility], [address]   PHONE:  [phone]   FAX: [phone]       UPDATED CLINICALS    REF# [id number]        Sudeep Shah (78 y.o. Female)       Date of Birth   1945    Social Security Number       Address   [address]  [facility] [address]    Home Phone   [phone]    MRN   [id number]       [facility]    Marital Status                               Admission Date   1/2/24    Admission Type   Emergency    Admitting Provider   Cora Carver MD    Attending Provider   Cora Carver MD    Department, Room/Bed   [facility], 3303/2S       Discharge Date       Discharge Disposition       Discharge Destination                                 Attending Provider: Cora Carver MD    Allergies: Codeine    Isolation: None   Infection: None   Code Status: CPR    Ht: 160 cm (63\")   Wt: 90.9 kg (200 lb 4.8 oz)    Admission Cmt: None   Principal Problem: Respiratory failure with hypoxia [J96.91]                   Active Insurance as of 1/2/2024       Primary Coverage       Payor Plan Insurance Group Employer/Plan Group    HUMANA MEDICARE REPLACEMENT HUMANA MEDICARE REPLACEMENT [id number]       Payor Plan Address Payor Plan Phone Number Payor Plan Fax Number Effective Dates    [address] [address] [phone]  1/1/2019 - None Entered    [facility] [id number]         Subscriber Name Subscriber Birth Date Member ID       SUDEEP SHAH 1945 [id number]               Secondary Coverage       Payor Plan Insurance Group Employer/Plan Group    [place]        Payor Plan Address Payor Plan Phone Number Payor Plan Fax Number Effective Dates    [address] [phone]  10/10/2023 - None Entered    [facility] [id number]         Subscriber Name Subscriber Birth Date Member ID       SUDEEP SHAH 1945 [id number]               "       Emergency Contacts        (Rel.) Home Phone Work Phone Mobile Phone    May Yin Mills (Daughter) [phone] -- [phone]    lexy hernandez (Son) [phone] -- [phone]              Orders (last 48 hrs)        Start     Ordered    01/04/24 1130  acetylcysteine (MUCOMYST) 20 % nebulizer solution 3 mL  2 Times Daily PRN         01/04/24 1121    01/04/24 1106  POC Glucose Once  PROCEDURE ONCE        Comments: Complete no more than 45 minutes prior to patient eating      01/04/24 1054    01/04/24 0646  POC Glucose Once  PROCEDURE ONCE        Comments: Complete no more than 45 minutes prior to patient eating      01/04/24 0634    01/04/24 0600  CBC & Differential  Morning Draw         01/03/24 1453    01/04/24 0600  CBC Auto Differential  PROCEDURE ONCE         01/03/24 2202    01/03/24 1927  POC Glucose Once  PROCEDURE ONCE        Comments: Complete no more than 45 minutes prior to patient eating      01/03/24 1920    01/03/24 1722  POC Glucose Once  PROCEDURE ONCE        Comments: Complete no more than 45 minutes prior to patient eating      01/03/24 1654    01/03/24 1500  cefTRIAXone (ROCEPHIN) 1,000 mg in sodium chloride 0.9 % 100 mL IVPB-VTB  Every 24 Hours         01/02/24 1935    01/03/24 1300  Enoxaparin Sodium (LOVENOX) syringe 90 mg  Every 12 Hours         01/03/24 1122    01/03/24 1129  POC Glucose Once  PROCEDURE ONCE        Comments: Complete no more than 45 minutes prior to patient eating      01/03/24 1120    01/03/24 1111  Pharmacy to Dose enoxaparin (LOVENOX)  Continuous PRN         01/03/24 1112    01/03/24 1107  Inpatient COPD Education (EDU) Consult  Once        Provider:  (Not yet assigned)   See Hyperspace for full Linked Orders Report.    01/03/24 1106    01/03/24 1107  Inpatient COPD Education (RT) Consult  Once        Provider:  (Not yet assigned)   See Hyperspace for full Linked Orders Report.    01/03/24 1106    01/03/24 1037  PT Consult: Eval & Treat Functional  Mobility Below Baseline  Once         01/03/24 1036    01/03/24 0900  [Held by provider]  clopidogrel (PLAVIX) tablet 75 mg  Daily        (On hold since yesterday at 1406 until manually unheld; held by Cora Carver MDHold Reason: Hold For Procedure)    01/02/24 2132 01/03/24 0900  metoprolol succinate XL (TOPROL-XL) 24 hr tablet 25 mg  Daily         01/02/24 2132 01/03/24 0900  fluticasone (FLONASE) 50 MCG/ACT nasal spray 2 spray  Daily         01/02/24 2132 01/03/24 0900  cetirizine (zyrTEC) tablet 10 mg  Daily         01/02/24 2132 01/03/24 0806  XR Chest 1 View  1 Time Imaging         01/03/24 0805 01/03/24 0800  Oral Care  2 Times Daily       01/02/24 1935 01/03/24 0719  POC Glucose Once  PROCEDURE ONCE        Comments: Complete no more than 45 minutes prior to patient eating      01/03/24 0645 01/03/24 0600  doxycycline (VIBRAMYCIN) 100 mg in sodium chloride 0.9 % 100 mL IVPB-VTB  Every 12 Hours         01/02/24 1935 01/03/24 0600  CBC & Differential  Morning Draw         01/02/24 1935 01/03/24 0600  Basic Metabolic Panel  Morning Draw         01/02/24 1935 01/03/24 0600  CBC Auto Differential  PROCEDURE ONCE         01/02/24 2202 01/03/24 0204  melatonin tablet 10 mg  Nightly PRN         01/03/24 0204 01/02/24 2230  atorvastatin (LIPITOR) tablet 10 mg  Nightly         01/02/24 2132 01/02/24 2230  montelukast (SINGULAIR) tablet 10 mg  Nightly         01/02/24 2132 01/02/24 2230  buPROPion SR (WELLBUTRIN SR) 12 hr tablet 150 mg  Nightly         01/02/24 2132 01/02/24 2230  cholecalciferol (VITAMIN D3) capsule 50,000 Units  Weekly         01/02/24 2132 01/02/24 2200  Incentive Spirometry  Every 4 Hours While Awake       01/02/24 1935 01/02/24 2200  POC Glucose 4x Daily Before Meals & at Bedtime  4 Times Daily Before Meals & at Bedtime      Comments: Complete no more than 45 minutes prior to patient eating      01/02/24 1935    01/02/24 2143  POC Glucose  Once  PROCEDURE ONCE        Comments: Complete no more than 45 minutes prior to patient eating      01/02/24 2133 01/02/24 2130  acetaminophen (TYLENOL) tablet 500 mg  Every 6 Hours PRN         01/02/24 2132 01/02/24 2100  sodium chloride 0.9 % flush 10 mL  Every 12 Hours Scheduled         01/02/24 1935 01/02/24 2100  sennosides-docusate (PERICOLACE) 8.6-50 MG per tablet 2 tablet  2 Times Daily        See Eren for full Linked Orders Report.    01/02/24 1935 01/02/24 2100  Enoxaparin Sodium (LOVENOX) syringe 40 mg  Nightly,   Status:  Discontinued         01/02/24 1935 01/02/24 2100  Insulin Lispro (humaLOG) injection 2-7 Units  4 Times Daily Before Meals & Nightly         01/02/24 1935 01/02/24 2030  acetylcysteine (MUCOMYST) 20 % nebulizer solution 3 mL  Every 6 Hours - RT,   Status:  Discontinued         01/02/24 1935 01/02/24 2030  ipratropium-albuterol (DUO-NEB) nebulizer solution 3 mL  4 Times Daily - RT         01/02/24 1935 01/02/24 2000  Vital Signs  Every 4 Hours       01/02/24 1935 01/02/24 1945  POC Glucose Once  PROCEDURE ONCE        Comments: Complete no more than 45 minutes prior to patient eating      01/02/24 1938 01/02/24 1936  Intake & Output  Every Shift       01/02/24 1935 01/02/24 1936  Weigh Patient  Once         01/02/24 1935 01/02/24 1936  Insert Peripheral IV  Once         01/02/24 1935 01/02/24 1936  Saline Lock & Maintain IV Access  Continuous,   Status:  Canceled         01/02/24 1935 01/02/24 1936  Continuous Cardiac Monitoring  Continuous        Comments: Follow Standing Orders As Outlined in Process Instructions (Open Order Report to View Full Instructions)    01/02/24 1935 01/02/24 1936  Telemetry - Maintain IV Access  Continuous         01/02/24 1935 01/02/24 1936  Telemetry - Place Orders & Notify Provider of Results When Patient Experiences Acute Chest Pain, Dysrhythmia or Respiratory Distress  Until Discontinued          01/02/24 1935 01/02/24 1936  May Be Off Telemetry for Tests  Continuous         01/02/24 1935 01/02/24 1936  Respiratory communication  Once        Comments: Chest physiotherapy every 6    01/02/24 1935 01/02/24 1936  Respiratory Culture - Sputum, Cough  STAT         01/02/24 1935 01/02/24 1936  Inpatient Pulmonology Consult  Once        Specialty:  Pulmonary Disease  Provider:  Victor Hugo Davis MD    01/02/24 1935 01/02/24 1936  Diet: Diabetic Diets; Consistent Carbohydrate; Texture: Regular Texture (IDDSI 7); Fluid Consistency: Thin (IDDSI 0)  Diet Effective Now         01/02/24 1935 01/02/24 1936  Magnesium  STAT         01/02/24 1935 01/02/24 1935  polyethylene glycol (MIRALAX) packet 17 g  Daily PRN        See Hyperspace for full Linked Orders Report.    01/02/24 1935 01/02/24 1935  bisacodyl (DULCOLAX) EC tablet 5 mg  Daily PRN        See Hyperspace for full Linked Orders Report.    01/02/24 1935 01/02/24 1935  bisacodyl (DULCOLAX) suppository 10 mg  Daily PRN        See Hyperspace for full Linked Orders Report.    01/02/24 1935 01/02/24 1935  nitroglycerin (NITROSTAT) SL tablet 0.4 mg  Every 5 Minutes PRN         01/02/24 1935 01/02/24 1935  dextrose (GLUTOSE) oral gel 15 g  Every 15 Minutes PRN         01/02/24 1935 01/02/24 1935  dextrose (D50W) (25 g/50 mL) IV injection 25 g  Every 15 Minutes PRN         01/02/24 1935 01/02/24 1935  glucagon HCl (Diagnostic) injection 1 mg  Every 15 Minutes PRN         01/02/24 1935 01/02/24 1935  Potassium Replacement - Follow Nurse / BPA Driven Protocol  As Needed         01/02/24 1935 01/02/24 1935  Magnesium Standard Dose Replacement - Follow Nurse / BPA Driven Protocol  As Needed         01/02/24 1935 01/02/24 1935  Phosphorus Replacement - Follow Nurse / BPA Driven Protocol  As Needed         01/02/24 1935 01/02/24 1935  Calcium Replacement - Follow Nurse / BPA Driven Protocol  As Needed         01/02/24 7031     01/02/24 1935  sodium chloride 0.9 % flush 10 mL  As Needed         01/02/24 1935    01/02/24 1935  sodium chloride 0.9 % infusion 40 mL  As Needed         01/02/24 1935    01/02/24 1713  Code Status and Medical Interventions:  Continuous         01/02/24 1722    01/02/24 1712  Inpatient Admission  Once         01/02/24 1722    01/02/24 1620  iopamidol (ISOVUE-370) 76 % injection 100 mL  Once in Imaging         01/02/24 1604    01/02/24 1509  CT Angiogram Chest Pulmonary Embolism  1 Time Imaging         01/02/24 1508    01/02/24 1504  cefTRIAXone (ROCEPHIN) 2,000 mg in sodium chloride 0.9 % 100 mL IVPB-VTB  Once         01/02/24 1448    01/02/24 1504  doxycycline (VIBRAMYCIN) 100 mg in sodium chloride 0.9 % 100 mL IVPB-VTB  Once         01/02/24 1448    01/02/24 1504  methylPREDNISolone sodium succinate (SOLU-Medrol) injection 80 mg  Once         01/02/24 1448    01/02/24 1504  ipratropium-albuterol (DUO-NEB) nebulizer solution 3 mL  Once         01/02/24 1448    01/02/24 1432  Procalcitonin  Once         01/02/24 1431    01/02/24 1405  Blood Gas, Arterial With Co-Ox  PROCEDURE ONCE         01/02/24 1403    01/02/24 1356  ECG 12 Lead Dyspnea  Once         01/02/24 1356    01/02/24 1352  COVID-19, FLU A/B, RSV PCR 1 HR TAT - Swab, Nasopharynx  Once         01/02/24 1351    01/02/24 1352  CBC & Differential  Once         01/02/24 1351    01/02/24 1352  Comprehensive Metabolic Panel  Once         01/02/24 1351    01/02/24 1352  Urinalysis With Microscopic If Indicated (No Culture) - Urine, Clean Catch  Once         01/02/24 1351    01/02/24 1352  [place] Draw  Once         01/02/24 1351    01/02/24 1352  Blood Gas, Arterial -With Co-Ox Panel: Yes  Once         01/02/24 1351    01/02/24 1352  BNP  Once         01/02/24 1351    01/02/24 1352  Single High Sensitivity Troponin T  Once         01/02/24 1351    01/02/24 1352  Blood Culture - Blood, Arm, Right  Once         01/02/24 1351    01/02/24 1352  Blood Culture -  Blood, Arm, Left  Once         24 1351    24 1352  Lactic Acid, Plasma  Once         24 1351    24 1352  C-reactive Protein  Once         24 1351    24 1352  Sedimentation Rate  Once         24 1351    24 1352  ECG 12 Lead Dyspnea  Once         24 1351    24 1352  XR Chest 2 View  1 Time Imaging         24 1351    24 1352  CBC Auto Differential  PROCEDURE ONCE         24 1352    24 1352  Green Top (Gel)  PROCEDURE ONCE         24 1352    24 1352  Lavender Top  PROCEDURE ONCE         24 1352    24 1352  Gold Top - SST  PROCEDURE ONCE         24 1352    24 1352  Light Blue Top  PROCEDURE ONCE         24 1352    Unscheduled  Up With Assistance  As Needed       24 193    Unscheduled  Follow Hypoglycemia Standing Orders For Blood Glucose <70 & Notify Provider of Treatment  As Needed      Comments: Follow Hypoglycemia Orders As Outlined in Process Instructions (Open Order Report to View Full Instructions)  Notify Provider Any Time Hypoglycemia Treatment is Administered    24    --  acetaminophen (TYLENOL) 500 MG tablet  Every 6 Hours PRN         24    --  buPROPion SR (WELLBUTRIN SR) 150 MG 12 hr tablet  Nightly         24    --  loratadine (Claritin) 10 MG tablet  Nightly         24    --  SCANNED - TELEMETRY           24 0000    --  SCANNED - TELEMETRY           24 0000                     Physician Progress Notes (last 48 hours)        Cora Carver MD at 24 1436              [facility]     Patient Identification:  Name:  Evie Shah  Age:  78 y.o.  Sex:  female  :  1945  MRN:  [id number]  Visit Number:  [id number]  Primary Care Provider:  Camryn Mota PA    Length of stay:  1    Subjective: Patient seen and examined, patient reports she is less dyspneic  breathing better still unable to expectorate with her coughing, x-ray today did not show resolution of atelectasis.    Chief Complaint: Hypoxic respiratory failure  ----------------------------------------------------------------------------------------------------------------------  Current Hospital Meds:  acetylcysteine, 3 mL, Nebulization, Q6H - RT  atorvastatin, 10 mg, Oral, Nightly  buPROPion SR, 150 mg, Oral, Nightly  cefTRIAXone, 1,000 mg, Intravenous, Q24H  cetirizine, 10 mg, Oral, Daily  cholecalciferol, 50,000 Units, Oral, Weekly  [Held by provider] clopidogrel, 75 mg, Oral, Daily  doxycycline, 100 mg, Intravenous, Q12H  enoxaparin, 1 mg/kg, Subcutaneous, Q12H  fluticasone, 2 spray, Nasal, Daily  insulin lispro, 2-7 Units, Subcutaneous, 4x Daily AC & at Bedtime  ipratropium-albuterol, 3 mL, Nebulization, 4x Daily - RT  metoprolol succinate XL, 25 mg, Oral, Daily  montelukast, 10 mg, Oral, Nightly  senna-docusate sodium, 2 tablet, Oral, BID  sodium chloride, 10 mL, Intravenous, Q12H      Pharmacy to Dose enoxaparin (LOVENOX),       ----------------------------------------------------------------------------------------------------------------------  Vital Signs:  Temp:  [97.5 °F (36.4 °C)-97.8 °F (36.6 °C)] 97.7 °F (36.5 °C)  Heart Rate:  [77-98] 98  Resp:  [18-20] 18  BP: (104-186)/(57-97) 167/79       Tele: Sinus rhythm 80 bpm O2 saturation is 94% on room air      01/02/24  1408 01/02/24  1942 01/03/24  0500   Weight: 90.3 kg (199 lb) 90.4 kg (199 lb 4.8 oz) 90.4 kg (199 lb 4.8 oz) (admit weight, patient not on floor 24 hrs)     Body mass index is 35.3 kg/m².    Intake/Output Summary (Last 24 hours) at 1/3/2024 1436  Last data filed at 1/3/2024 0800  Gross per 24 hour   Intake 2080 ml   Output --   Net 2080 ml     Diet: Diabetic Diets; Consistent Carbohydrate; Texture: Regular Texture (IDDSI 7); Fluid Consistency: Thin (IDDSI  0)  ----------------------------------------------------------------------------------------------------------------------  Physical exam:  General: Comfortable,awake, alert, oriented to self, place, and time, well-developed and well-nourished.  No respiratory distress.    Skin:  Skin is warm and dry. No rash noted. No pallor.    HENT:  Head:  Normocephalic and atraumatic.  Mouth:  Moist mucous membranes.    Eyes:  Conjunctivae and EOM are normal.  Pupils are equal, round, and reactive to light.  No scleral icterus.    Neck:  Neck supple.  No JVD present.    Pulmonary/Chest: Scattered crackles and rhonchi left lung field there is better air way movement, breath sounds improved compared to yesterday no wheezing equal chest expansion no splinting   Cardiovascular:  Normal rate, regular rhythm and normal heart sounds with no murmur.  Abdominal:  Soft.  Bowel sounds are normal.  No distension and no tenderness.   Extremities:  No edema, no tenderness, and no deformity.  No red or swollen joints anywhere.  Strong pulses in all 4 extremities with no clubbing, no cyanosis, no edema.  Neurological:  Motor strength equal no obvious deficit, sensory grossly intact.   No cranial nerve deficit.  No tongue deviation.  No facial droop.  No slurred speech.    Genitourinary: No Palomino catheter  Back:  ----------------------------------------------------------------------------------------------------------------------  ----------------------------------------------------------------------------------------------------------------------  Results from last 7 days   Lab Units 01/02/24  1409   HSTROP T ng/L 9     Results from last 7 days   Lab Units 01/03/24  0039 01/02/24  1409   CRP mg/dL  --  <0.30   LACTATE mmol/L  --  2.0   WBC 10*3/mm3 9.93 10.76   HEMOGLOBIN g/dL 15.7 16.5*   HEMATOCRIT % 49.4* 51.2*   MCV fL 94.3 94.1   MCHC g/dL 31.8 32.2   PLATELETS 10*3/mm3 223 243     Results from last 7 days   Lab Units 01/02/24  1403   PH,  "ARTERIAL pH units 7.402   PO2 ART mm Hg 54.4*   PCO2, ARTERIAL mm Hg 38.9   HCO3 ART mmol/L 24.2     Results from last 7 days   Lab Units 01/03/24  0039 01/02/24  1409   SODIUM mmol/L 140 140   POTASSIUM mmol/L 4.3 4.5   MAGNESIUM mg/dL  --  2.0   CHLORIDE mmol/L 104 105   CO2 mmol/L 23.8 21.9*   BUN mg/dL 23 23   CREATININE mg/dL 0.84 1.09*   CALCIUM mg/dL 9.1 9.6   GLUCOSE mg/dL 232* 150*   ALBUMIN g/dL  --  4.3   BILIRUBIN mg/dL  --  0.4   ALK PHOS U/L  --  107   AST (SGOT) U/L  --  18   ALT (SGPT) U/L  --  12   Estimated Creatinine Clearance: 60.8 mL/min (by C-G formula based on SCr of 0.84 mg/dL).    No results found for: \"AMMONIA\"      Blood Culture   Date Value Ref Range Status   01/02/2024 No growth at 24 hours  Preliminary   01/02/2024 No growth at 24 hours  Preliminary     No results found for: \"URINECX\"  No results found for: \"WOUNDCX\"  No results found for: \"STOOLCX\"    I have personally looked at the labs and they are summarized above.  ----------------------------------------------------------------------------------------------------------------------  Imaging Results (Last 24 Hours)       Procedure Component Value Units Date/Time    XR Chest 1 View [[id number]] Collected: 01/03/24 1046     Updated: 01/03/24 1049    Narrative:      EXAM:    XR Chest, 1 View     EXAM DATE:    1/3/2024 9:35 AM     CLINICAL HISTORY:    atelectasis; R09.02-Hypoxemia; J44.1-Chronic obstructive pulmonary  disease with (acute) exacerbation     TECHNIQUE:    Frontal view of the chest.     COMPARISON:    1/2/2024     FINDINGS:    Lungs and pleural spaces:  Bibasilar airspace disease noted and may  represent atelectasis.  Right lung appears well aerated.  Radiographic  findings again compatible with left upper lobe atelectasis.  No  pneumothorax.    Heart:  Unremarkable as visualized.  No cardiomegaly.    Mediastinum:  Unremarkable as visualized.    Bones/joints:  Unremarkable as visualized.       Impression:      1.  Stable " appearance of the chest with increased attenuation of the  left hemithorax which would correspond to left upper lobe atelectasis  noted on CT.  2.  No effusion or pneumothorax identified.        This report was finalized on 1/3/2024 10:47 AM by Dr. Theron Pedersen MD.       CT Angiogram Chest Pulmonary Embolism [[id number]] Collected: 01/02/24 1654     Updated: 01/02/24 1700    Narrative:      EXAM:    CT Angiography Chest With Intravenous Contrast     EXAM DATE:    1/2/2024 3:42 PM     CLINICAL HISTORY:    Pulmonary embolism (PE) suspected, unknown D-dimer     TECHNIQUE:    Axial computed tomographic angiography images of the chest with  intravenous contrast.  This CT exam was performed using one or more of  the following dose reduction techniques:  automated exposure control,  adjustment of the mA and/or kV according to patient size, and/or use of  iterative reconstruction technique.    MIP reconstructed images were created and reviewed.     COMPARISON:    6/17/2020     FINDINGS:    Pulmonary arteries:  Mild pulmonary artery enlargement suggesting a  mild degree of pulmonary arterial hypertension.  No pulmonary embolism.    Aorta:  No acute findings.  No thoracic aortic aneurysm.    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    Lungs and pleural spaces:  Left upper lobe consolidation is noted with  areas of enhancement and nonenhancement compatible with lobar collapse  and possible superimposed pneumonia. Luminal filling defects of the left  upper lobe bronchial airways suggesting obstructive etiology.  Bronchoscopy recommended.  Mild upper lung predominant emphysema is  noted.  Bronchial wall thickening is noted.  Luminal filling defects and  debris throughout the left lower lobe bronchial airways and to a lesser  extent the right lower lobe bronchial airways.  Minimal bibasilar  atelectasis.  No pleural effusions.    Heart:  Borderline cardiac enlargement.   Moderate coronary artery  calcifications.  No significant pericardial effusion.  No evidence of RV  dysfunction.    Mediastinum:  Small hiatal hernia.    Bones/joints:  Degenerative changes thoracic spine.  No acute  fracture.  No dislocation.    Soft tissues:  Unremarkable as visualized.    Lymph nodes:  Unremarkable as visualized.  No enlarged lymph nodes.    Gallbladder and bile ducts:  Cholelithiasis.    Kidneys and ureters:  Cyst left kidney.       Impression:      1.  No PE.  2.  Left upper lobe complete atelectasis with luminal filling defects of  the left upper lobe bronchial airways more favorable for mucous  plugging. Bronchoscopy recommended.  3.  Luminal debris in the left greater than right lower lobe bronchial  airways favoring mucous secretions.  4.  Bronchial inflammation compatible with bronchitis.  5.  Superimposed pneumonia left upper lobe likely given areas of  nonenhancement.  6.  Cardiomegaly with coronary artery calcifications.  7.  No effusion or pneumothorax.  8.  COPD.  9.  Other incidental/nonacute findings detailed above.        This report was finalized on 1/2/2024 4:58 PM by Dr. Theron Pedersen MD.       XR Chest 2 View [[id number]] Collected: 01/02/24 1439     Updated: 01/02/24 1449    Narrative:      EXAM:    XR Chest, 2 Views     EXAM DATE:    1/2/2024 2:01 PM     CLINICAL HISTORY:    sob     TECHNIQUE:    Frontal and lateral views of the chest.     COMPARISON:    No relevant prior studies available.     FINDINGS:    Lungs and pleural spaces: Increased attenuation overlying the left  hemithorax may be due to superficial soft tissue process with no  convincing evidence of airspace disease.  Otherwise no acute  cardiopulmonary findings identified.  No pneumothorax.    Heart:  Unremarkable as visualized.  No cardiomegaly.    Mediastinum:  Unremarkable as visualized.    Bones/joints:  Unremarkable as visualized.       Impression:      1. Increased attenuation overlying the left hemithorax  may be due to  superficial soft tissue attenuation artifact with no convincing evidence  of airspace disease.  2.  Otherwise no acute cardiopulmonary findings identified.        This report was finalized on 1/2/2024 2:40 PM by Dr. Theron Pedersen MD.             ----------------------------------------------------------------------------------------------------------------------  Assessment and Plan:  -Acute hypoxic respiratory failure  -Left upper lobe atelectasis?  So far no improvement with neb treatment and you commenced  -History of peripheral arterial disease status post left external iliac stent placement   -History of COPD -history of diabetes  -QT prolongation  -Obesity BMI of 35  -History of hyperlipidemia    Continue antibiotic follow-up cultures, Mucomyst, patient now changed to full dose Lovenox while off Plavix in anticipation for possible bronchoscopy.  Accu-Chek and sliding scale.  Pulmonology help appreciated.    Plan outlined to patient together with her son at bedside and agreed.    disposition Home pending      Cora Carver MD  01/03/24  14:36 EST    Electronically signed by Cora Carver MD at 01/03/24 1453          Consult Notes (last 48 hours)        Victor Hugo Davis MD at 01/03/24 1112        Consult Orders    1. Inpatient Pulmonology Consult [[id number]] ordered by Juan Jade MD at 01/02/24 1722                 Consult Note Pulmonary     Referring Provider: Dr. Carver   Reason for Consultation: Left upper lobe atelectasis concern mucous plug versus endobronchial tumor      Chief complaint   Shortness of breath    History of present illness:    Patient is a 78-year-old female with past medical history significant for COPD/asthma, diabetes, seasonal allergies, hypertension and hyperlipidemia.  She is admitted with complaint of worsening cough and some sputum production for last 2 week.  She was given antibiotic by her primary care physician without significant  improvement.  She continues to have shortness of breath found to be hypoxic in the ER required 4 to 5 L oxygen via nasal cannula.    CRP and Pro-Sonido level were normal x-ray chest showed left-sided haziness.  CT chest was done with PE protocol which did not show any PE however it showed left upper lobe atelectasis, (my read) looks like lingula is also atelectatic.    Patient reported she is feeling better.    Been getting chest PT after nebulized Mucomyst and DuoNeb.    She has been on Plavix.  Last dose taken yesterday.    Strong family history of lung cancer.  Her mother had lung cancer.        Review of Systems  Rest of the review of systems unremarkable.      History  Past Medical History:   Diagnosis Date    Allergic rhinitis     Asthma     Atherosclerosis     COPD (chronic obstructive pulmonary disease)     Cough     Diabetes mellitus     Hyperlipidemia     Hypertension     Menopause     Nausea and vomiting     Obesity 3/7/2017    Osteoarthritis     Osteoporosis     Vertigo, benign paroxysmal    ,   Past Surgical History:   Procedure Laterality Date    CATARACT EXTRACTION      COLONOSCOPY      EYE SURGERY      catarac    ILIAC ARTERY STENT  2015    SKIN CANCER EXCISION      nose    TONSILLECTOMY      and adenoidectomy    TUBAL ABDOMINAL LIGATION     ,   Family History   Problem Relation Age of Onset    Arthritis Mother     Asthma Mother     Cancer Mother     Osteoarthritis Mother     Osteoporosis Mother     Diabetes Mother     Arthritis Father     Hypertension Father     Stroke Father     Osteoarthritis Father     Osteoporosis Father     Heart disease Father     Diabetes Father     Breast cancer Neg Hx    ,   Social History     Tobacco Use    Smoking status: Former     Types: Cigarettes     Quit date: 2015     Years since quittin.6    Smokeless tobacco: Never   Vaping Use    Vaping Use: Never used   Substance Use Topics    Alcohol use: No    Drug use: No   ,   Facility-Administered Medications  Prior to Admission   Medication Dose Route Frequency Provider Last Rate Last Admin    cyanocobalamin injection 1,000 mcg  1,000 mcg Intramuscular Q28 Days Camryn Mota PA   1,000 mcg at 09/19/23 1114     Medications Prior to Admission   Medication Sig Dispense Refill Last Dose    acetaminophen (TYLENOL) 500 MG tablet Take 1 tablet by mouth Every 6 (Six) Hours As Needed for Mild Pain.   Past Week    albuterol sulfate  (90 Base) MCG/ACT inhaler Inhale 2 puffs Every 4 (Four) Hours As Needed for Shortness of Air. 18 g 5 Past Month    amLODIPine-benazepril (LOTREL) 10-40 MG per capsule Take 1 capsule by mouth Daily. 90 capsule 3 1/2/2024    atorvastatin (LIPITOR) 10 MG tablet Take 1 tablet by mouth Every Night. 90 tablet 3 1/1/2024    buPROPion SR (WELLBUTRIN SR) 150 MG 12 hr tablet Take 1 tablet by mouth Every Night.   1/1/2024    clopidogrel (PLAVIX) 75 MG tablet Take 1 tablet by mouth Daily. 90 tablet 3 1/2/2024    doxycycline (VIBRAMYCIN) 100 MG capsule Take 1 capsule by mouth 2 (Two) Times a Day for 10 days. 20 capsule 0 1/2/2024    empagliflozin (Jardiance) 25 MG tablet tablet Take 1 tablet by mouth Daily. 90 tablet 3 1/2/2024    fluticasone (FLONASE) 50 MCG/ACT nasal spray 2 sprays into the nostril(s) as directed by provider Daily. 16 g 1 1/2/2024    furosemide (LASIX) 20 MG tablet Take 1 tablet by mouth Daily. 90 tablet 3 1/2/2024    ipratropium-albuterol (DUO-NEB) 0.5-2.5 mg/3 ml nebulizer Take 3 mL by nebulization Every 4 (Four) Hours As Needed for Shortness of Air. 150 mL 1 1/2/2024    loratadine (Claritin) 10 MG tablet Take 1 tablet by mouth Every Night.   1/1/2024    metoprolol succinate XL (Toprol XL) 25 MG 24 hr tablet Take 1 tablet by mouth Daily. 90 tablet 3 1/2/2024    montelukast (SINGULAIR) 10 MG tablet Take 1 tablet by mouth Every Night. 90 tablet 3 1/1/2024    omega-3 acid ethyl esters (LOVAZA) 1 g capsule Take 2 capsules by mouth 2 (Two) Times a Day. 360 capsule 3 1/2/2024     potassium chloride 10 MEQ CR tablet Take 1 tablet by mouth Daily. 90 tablet 3 1/2/2024    promethazine-dextromethorphan (PROMETHAZINE-DM) 6.25-15 MG/5ML syrup Take 5 mL by mouth 2 (Two) Times a Day As Needed for Cough. 180 mL 0 1/1/2024    raloxifene (EVISTA) 60 MG tablet Take 1 tablet by mouth Daily. 90 tablet 3 1/2/2024    Tirzepatide (Mounjaro) 5 MG/0.5ML solution pen-injector Inject 0.5 mL under the skin into the appropriate area as directed Every 7 (Seven) Days. 2 mL 3 12/29/2023    vitamin D (ERGOCALCIFEROL) 1.25 MG (96305 UT) capsule capsule Take 1 capsule by mouth 1 (One) Time Per Week. 12 capsule 3 12/28/2023   , Scheduled Meds:  acetylcysteine, 3 mL, Nebulization, Q6H - RT  atorvastatin, 10 mg, Oral, Nightly  buPROPion SR, 150 mg, Oral, Nightly  cefTRIAXone, 1,000 mg, Intravenous, Q24H  cetirizine, 10 mg, Oral, Daily  cholecalciferol, 50,000 Units, Oral, Weekly  clopidogrel, 75 mg, Oral, Daily  doxycycline, 100 mg, Intravenous, Q12H  enoxaparin, 40 mg, Subcutaneous, Nightly  fluticasone, 2 spray, Nasal, Daily  insulin lispro, 2-7 Units, Subcutaneous, 4x Daily AC & at Bedtime  ipratropium-albuterol, 3 mL, Nebulization, 4x Daily - RT  metoprolol succinate XL, 25 mg, Oral, Daily  montelukast, 10 mg, Oral, Nightly  senna-docusate sodium, 2 tablet, Oral, BID  sodium chloride, 10 mL, Intravenous, Q12H    , Continuous Infusions:  Pharmacy to Dose enoxaparin (LOVENOX),      and Allergies:  Codeine    Objective     Vital Signs   Temp:  [97.5 °F (36.4 °C)-98.2 °F (36.8 °C)] 97.5 °F (36.4 °C)  Heart Rate:  [77-98] 80  Resp:  [18-20] 20  BP: (104-186)/(57-97) 138/68    Physical Exam:          General- normal in appearance, not in any acute distress    HEENT- pupils equally reactive to light, normal in size, no scleral icterus    Neck-supple, No JVD, no carotid bruit    Respiratory-bilateral air entry, bilateral air entry, I noted occasional wheezing only on forced forced exhalation otherwise clear to auscultation.   "    Cardiovascular-  Normal S1 and S2. No S3, S4 or murmurs.      GI-nontender nondistended bowel sounds positive    CNS-alert oriented x3, grossly nonfocal      Extremities-no clubbing and edema    Psychiatric-mood good, good eye contact,     Skin- no visible rash             Results Review:    LABS:    Lab Results   Component Value Date    GLUCOSE 232 (H) 01/03/2024    BUN 23 01/03/2024    CREATININE 0.84 01/03/2024    EGFRIFNONA 58 (L) 09/30/2021    EGFRIFAFRI 67 09/30/2021    BCR 27.4 (H) 01/03/2024    CO2 23.8 01/03/2024    CALCIUM 9.1 01/03/2024    PROTENTOTREF 6.7 06/09/2023    ALBUMIN 4.3 01/02/2024    LABIL2 2.0 06/09/2023    AST 18 01/02/2024    ALT 12 01/02/2024    WBC 9.93 01/03/2024    HGB 15.7 01/03/2024    HCT 49.4 (H) 01/03/2024    MCV 94.3 01/03/2024     01/03/2024     01/03/2024    K 4.3 01/03/2024     01/03/2024    ANIONGAP 12.2 01/03/2024       No results found for: \"INR\", \"PROTIME\"             I reviewed the patient's new clinical results.  I reviewed the patient's new imaging results and agree with the interpretation.      Assessment & Plan     #1 acute hypoxic respiratory failure.    2.  Left upper lobe and possibly lingular atelectasis.  Concern mucous plug versus endobronchial tumor.    Strong family history of lung cancer.    3.  COPD: Occasional wheezing on my exam.    4.  Question of pneumonia.    Plavix is on hold.  I will wait for at least 5 days before proceeding with bronchoscopy.  Given history of severe peripheral vascular disease and history of cyanosis in the left foot I would prefer to keep her on therapeutic dose Lovenox while she is off Plavix.    Continue broad-spectrum antibiotics and supportive care.        GI- PPI prophylaxis      Endrocrinology- Maintain Blood sugar 140 -180      I have personally reviewed x-ray chest, CAT scan, labs, medication list.  Total time spent 30 minutes.  I am really thankful to  for having me participate in the care " of this patient.  Case was discussed with the medical resident team.    Victor Hugo Davis MD     01/03/24 11:12 EST     Electronically signed by Victor Hugo Davis MD at 01/03/24 1120

## 2024-01-04 NOTE — PAYOR COMM NOTE
"CONTACT:  AVINASH MCGUIRE RN  UTILIZATION MANAGEMENT DEPT.   [facility]   [address] [facility], [address]   PHONE:  [phone]   FAX: [phone]       UPDATED CLINICALS    REF # [id number]        Sudeep Shah (78 y.o. Female)       Date of Birth   1945    Social Security Number       Address   [address]  [facility] [address]    Home Phone   [phone]    MRN   [id number]       [facility]    Marital Status                               Admission Date   1/2/24    Admission Type   Emergency    Admitting Provider   Cora Carver MD    Attending Provider   Cora Carver MD    Department, Room/Bed   [facility], 3303/2S       Discharge Date       Discharge Disposition       Discharge Destination                                 Attending Provider: Cora Carver MD    Allergies: Codeine    Isolation: None   Infection: None   Code Status: CPR    Ht: 160 cm (63\")   Wt: 90.9 kg (200 lb 4.8 oz)    Admission Cmt: None   Principal Problem: Respiratory failure with hypoxia [J96.91]                   Active Insurance as of 1/2/2024       Primary Coverage       Payor Plan Insurance Group Employer/Plan Group    HUMANA MEDICARE REPLACEMENT HUMANA MEDICARE REPLACEMENT [id number]       Payor Plan Address Payor Plan Phone Number Payor Plan Fax Number Effective Dates    [address] [address] [phone]  1/1/2019 - None Entered    [facility] [address]         Subscriber Name Subscriber Birth Date Member ID       SUDEEP SHAH 1945 [id number]               Secondary Coverage       Payor Plan Insurance Group Employer/Plan Group    [place]        Payor Plan Address Payor Plan Phone Number Payor Plan Fax Number Effective Dates    [address] [phone]  10/10/2023 - None Entered    [facility] [id number]         Subscriber Name Subscriber Birth Date Member ID       SUDEEP SHAH 1945 [id number]               "       Emergency Contacts        (Rel.) Home Phone Work Phone Mobile Phone    May Yin Mills (Daughter) [phone] -- [phone]    lexy shah (Son) [phone] -- [phone]                 History & Physical        OculamCora MD at 24 1700              [facility]    Patient Identification:  Name:  Evie Shah  Age:  78 y.o.  Sex:  female  :  1945  MRN:  [id number]   Visit Number:  [id number]  Admit Date: 2024   Room number:  108/08  Primary Care Physician:  Camryn Mota PA     Chief complaint:    Chief Complaint   Patient presents with    Shortness of Breath    Fever    Weakness - Generalized     Pt has been sob for a weak and has been sick with a cough.     Cough       History of presenting illness:  78 y.o. female with history of COPD, she has oxygen at home on a as needed basis, has not used it for quite some time, past 2 weeks been having shortness of breath cough nonproductive no fever, denies hemoptysis.  Developed shortness of breath, was seen by her primary care provider more than a week ago was treated with doxycycline with no improvement, she started requiring 2 L nasal cannula continuously few days prior to admission, today she requires 5 L to maintain O2 saturation above 90.    At the emergency room she was afebrile, CRP and procalcitonin level was normal, white count was normal, x-ray of the chest showed increased attenuation of the left hemothorax, CT scan of the chest PE protocol was ordered negative for PE she has completed atelectasis of the left upper lobe.  Because of this she was subsequent admitted.      ---------------------------------------------------------------------------------------------------------------------   Review of Systems   Constitutional:  Positive for activity change and fatigue. Negative for appetite change, chills, diaphoresis, fever and unexpected weight  change.   HENT: Negative.     Eyes: Negative.    Respiratory:  Positive for cough and shortness of breath. Negative for apnea, choking, chest tightness, wheezing and stridor.    Cardiovascular: Negative.    Gastrointestinal: Negative.    Endocrine: Negative.    Genitourinary: Negative.    Musculoskeletal: Negative.    Skin: Negative.    Allergic/Immunologic: Negative.    Neurological: Negative.    Hematological: Negative.    Psychiatric/Behavioral: Negative.         ---------------------------------------------------------------------------------------------------------------------   Past Medical History:   Diagnosis Date    Allergic rhinitis     Asthma     Atherosclerosis     COPD (chronic obstructive pulmonary disease)     Cough     Diabetes mellitus     Hyperlipidemia     Hypertension     Menopause     Nausea and vomiting     Obesity 3/7/2017    Osteoarthritis     Osteoporosis     Vertigo, benign paroxysmal      Past Surgical History:   Procedure Laterality Date    CATARACT EXTRACTION      COLONOSCOPY      EYE SURGERY      catarac    ILIAC ARTERY STENT  2015    SKIN CANCER EXCISION      nose    TONSILLECTOMY      and adenoidectomy    TUBAL ABDOMINAL LIGATION       Family History   Problem Relation Age of Onset    Arthritis Mother     Asthma Mother     Cancer Mother     Osteoarthritis Mother     Osteoporosis Mother     Diabetes Mother     Arthritis Father     Hypertension Father     Stroke Father     Osteoarthritis Father     Osteoporosis Father     Heart disease Father     Diabetes Father     Breast cancer Neg Hx      Social History     Socioeconomic History    Marital status:     Number of children: 3    Years of education: 12   Tobacco Use    Smoking status: Former     Types: Cigarettes     Quit date: 2015     Years since quittin.6    Smokeless tobacco: Never   Vaping Use    Vaping Use: Never used   Substance and Sexual Activity    Alcohol use: No    Drug use: No    Sexual activity: Defer      ---------------------------------------------------------------------------------------------------------------------   Allergies:  Codeine  ---------------------------------------------------------------------------------------------------------------------   Prior to Admission Medications       Prescriptions Last Dose Informant Patient Reported? Taking?    albuterol (PROVENTIL) (2.5 MG/3ML) 0.083% nebulizer solution   No No    Take 2.5 mg by nebulization Every 4 (Four) Hours As Needed for Wheezing.    albuterol sulfate  (90 Base) MCG/ACT inhaler   No No    Inhale 2 puffs Every 4 (Four) Hours As Needed for Shortness of Air.    amLODIPine-benazepril (LOTREL) 10-40 MG per capsule   No No    Take 1 capsule by mouth Daily.    atorvastatin (LIPITOR) 10 MG tablet   No No    Take 1 tablet by mouth Every Night.    Blood Glucose Monitoring Suppl misc   No No    1 each 3 (Three) Times a Day.    buPROPion SR (WELLBUTRIN SR) 150 MG 12 hr tablet   No No    Take 1 tablet by mouth Every 12 (Twelve) Hours.    clopidogrel (PLAVIX) 75 MG tablet   No No    Take 1 tablet by mouth Daily.    cyanocobalamin injection 1,000 mcg   No No    doxycycline (VIBRAMYCIN) 100 MG capsule   No No    Take 1 capsule by mouth 2 (Two) Times a Day for 10 days.    empagliflozin (Jardiance) 25 MG tablet tablet   No No    Take 1 tablet by mouth Daily.    fluticasone (FLONASE) 50 MCG/ACT nasal spray   No No    2 sprays into the nostril(s) as directed by provider Daily.    furosemide (LASIX) 20 MG tablet   No No    Take 1 tablet by mouth Daily.    glucose blood test strip   No No    1 each by Other route 3 (Three) Times a Day. Use as instructed    ipratropium-albuterol (DUO-NEB) 0.5-2.5 mg/3 ml nebulizer   No No    Take 3 mL by nebulization Every 4 (Four) Hours As Needed for Shortness of Air.    Lancets 30G misc   No No    1 each 3 (Three) Times a Day.    loratadine (EQ Allergy Relief) 10 MG tablet   No No    Take 1 tablet by mouth Daily.     metoprolol succinate XL (Toprol XL) 25 MG 24 hr tablet   No No    Take 1 tablet by mouth Daily.    montelukast (SINGULAIR) 10 MG tablet   No No    Take 1 tablet by mouth Every Night.    O2 (OXYGEN)   Yes No    Inhale 2 L/min Every Night.    omega-3 acid ethyl esters (LOVAZA) 1 g capsule   No No    Take 2 capsules by mouth 2 (Two) Times a Day.    potassium chloride 10 MEQ CR tablet   No No    Take 1 tablet by mouth Daily.    promethazine-dextromethorphan (PROMETHAZINE-DM) 6.25-15 MG/5ML syrup   No No    Take 5 mL by mouth 2 (Two) Times a Day As Needed for Cough.    raloxifene (EVISTA) 60 MG tablet   No No    Take 1 tablet by mouth Daily.    Tirzepatide (Mounjaro) 5 MG/0.5ML solution pen-injector   No No    Inject 0.5 mL under the skin into the appropriate area as directed Every 7 (Seven) Days.    vitamin D (ERGOCALCIFEROL) 1.25 MG (83116 UT) capsule capsule   No No    Take 1 capsule by mouth 1 (One) Time Per Week.          ---------------------------------------------------------------------------------------------------------------------   Vital Signs:  Temp:  [98.1 °F (36.7 °C)-98.2 °F (36.8 °C)] 98.2 °F (36.8 °C)  Heart Rate:  [77-86] 77  Resp:  [20] 20  BP: (130-154)/(78-79) 154/79    No data found.  SpO2:  [87 %-95 %] 95 %  on  Flow (L/min):  [5] 5;   Device (Oxygen Therapy): nasal cannula  Body mass index is 35.25 kg/m².    Wt Readings from Last 3 Encounters:   01/02/24 90.3 kg (199 lb)   01/02/24 90.3 kg (199 lb)   12/26/23 92.1 kg (203 lb)               ---------------------------------------------------------------------------------------------------------------------   Physical Exam:  Constitutional: Awake and alert oriented to self place and time very pleasant,  No respiratory distress.      HENT:  Head: Normocephalic and atraumatic.  Mouth:  Moist mucous membranes.    Eyes:  Conjunctivae and EOM are normal.  Pupils are equal, round, and reactive to light.  No scleral icterus.  Neck:  Neck supple.  No JVD  "present.  No lymphadenopathy  Cardiovascular:  Normal rate, regular rhythm and normal heart sounds with no murmur.  Pulmonary/Chest: Clear to auscultate equal chest expansion there is no splinting no wheezing no rales or crackles, she has decreased vocal fremitus on auscultation on the left,   Abdominal:  Soft.  Bowel sounds are normal.  No distension and no tenderness.   Musculoskeletal:  No edema, no tenderness, and no deformity.  No red or swollen joints anywhere.    Neurological:  Alert and oriented to person, place, and time.  No cranial nerve deficit.  No tongue deviation.  No facial droop.  No slurred speech.   Skin:  Skin is warm and dry.  No rash noted.  No pallor.   Peripheral vascular:  No edema and strong pulses on all 4 extremities.  Genitourinary: No Palomino catheter  ---------------------------------------------------------------------------------------------------------------------  EKG:        Telemetry: Sinus rhythm 86 bpm O2 saturation is 93% on 5 L nasal cannula  I have personally looked at both the EKG and the telemetry strips.  --------------------------------------------------------------------------------------------------------------------  Results from last 7 days   Lab Units 01/02/24  1409   HSTROP T ng/L 9     Results from last 7 days   Lab Units 01/02/24  1409   CRP mg/dL <0.30   LACTATE mmol/L 2.0   WBC 10*3/mm3 10.76   HEMOGLOBIN g/dL 16.5*   HEMATOCRIT % 51.2*   MCV fL 94.1   MCHC g/dL 32.2   PLATELETS 10*3/mm3 243     Results from last 7 days   Lab Units 01/02/24  1409   SODIUM mmol/L 140   POTASSIUM mmol/L 4.5   CHLORIDE mmol/L 105   CO2 mmol/L 21.9*   BUN mg/dL 23   CREATININE mg/dL 1.09*   CALCIUM mg/dL 9.6   GLUCOSE mg/dL 150*   ALBUMIN g/dL 4.3   BILIRUBIN mg/dL 0.4   ALK PHOS U/L 107   AST (SGOT) U/L 18   ALT (SGPT) U/L 12   Estimated Creatinine Clearance: 46.9 mL/min (A) (by C-G formula based on SCr of 1.09 mg/dL (H)).    No results found for: \"HGBA1C\", \"POCGLU\"  No results " "found for: \"AMMONIA\"    Results from last 7 days   Lab Units 01/02/24  1611   NITRITE UA  Negative     No results found for: \"BLOODCX\"No results found for: \"RESPCX\"No results found for: \"URINECX\"No results found for: \"WOUNDCX\"No results found for: \"BODYFLDCX\"No results found for: \"STOOLCX\"  pH pH, Arterial   Date Value Ref Range Status   01/02/2024 7.402 7.350 - 7.450 pH units Final      pO2 pO2, Arterial   Date Value Ref Range Status   01/02/2024 54.4 (C) 83.0 - 108.0 mm Hg Final     Comment:     85 Value below critical limit      pCO2 pCO2, Arterial   Date Value Ref Range Status   01/02/2024 38.9 35.0 - 45.0 mm Hg Final      HCO3 HCO3, Arterial   Date Value Ref Range Status   01/02/2024 24.2 20.0 - 26.0 mmol/L Final        I have personally looked at the labs and they are summarized above.  ----------------------------------------------------------------------------------------------------------------------  Imaging Results (Last 24 Hours)       Procedure Component Value Units Date/Time    CT Angiogram Chest Pulmonary Embolism [[id number]] Collected: 01/02/24 1654     Updated: 01/02/24 1700    Narrative:      EXAM:    CT Angiography Chest With Intravenous Contrast     EXAM DATE:    1/2/2024 3:42 PM     CLINICAL HISTORY:    Pulmonary embolism (PE) suspected, unknown D-dimer     TECHNIQUE:    Axial computed tomographic angiography images of the chest with  intravenous contrast.  This CT exam was performed using one or more of  the following dose reduction techniques:  automated exposure control,  adjustment of the mA and/or kV according to patient size, and/or use of  iterative reconstruction technique.    MIP reconstructed images were created and reviewed.     COMPARISON:    6/17/2020     FINDINGS:    Pulmonary arteries:  Mild pulmonary artery enlargement suggesting a  mild degree of pulmonary arterial hypertension.  No pulmonary embolism.    Aorta:  No acute findings.  No thoracic aortic aneurysm.    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    Lungs and pleural spaces:  Left upper lobe consolidation is noted with  areas of enhancement and nonenhancement compatible with lobar collapse  and possible superimposed pneumonia. Luminal filling defects of the left  upper lobe bronchial airways suggesting obstructive etiology.  Bronchoscopy recommended.  Mild upper lung predominant emphysema is  noted.  Bronchial wall thickening is noted.  Luminal filling defects and  debris throughout the left lower lobe bronchial airways and to a lesser  extent the right lower lobe bronchial airways.  Minimal bibasilar  atelectasis.  No pleural effusions.    Heart:  Borderline cardiac enlargement.  Moderate coronary artery  calcifications.  No significant pericardial effusion.  No evidence of RV  dysfunction.    Mediastinum:  Small hiatal hernia.    Bones/joints:  Degenerative changes thoracic spine.  No acute  fracture.  No dislocation.    Soft tissues:  Unremarkable as visualized.    Lymph nodes:  Unremarkable as visualized.  No enlarged lymph nodes.    Gallbladder and bile ducts:  Cholelithiasis.    Kidneys and ureters:  Cyst left kidney.       Impression:      1.  No PE.  2.  Left upper lobe complete atelectasis with luminal filling defects of  the left upper lobe bronchial airways more favorable for mucous  plugging. Bronchoscopy recommended.  3.  Luminal debris in the left greater than right lower lobe bronchial  airways favoring mucous secretions.  4.  Bronchial inflammation compatible with bronchitis.  5.  Superimposed pneumonia left upper lobe likely given areas of  nonenhancement.  6.  Cardiomegaly with coronary artery calcifications.  7.  No effusion or pneumothorax.  8.  COPD.  9.  Other incidental/nonacute findings detailed above.        This report was finalized on 1/2/2024 4:58 PM by Dr. Theron Pedersen MD.       XR Chest 2 View [[id number]] Collected: 01/02/24 8439     Updated:  01/02/24 1449    Narrative:      EXAM:    XR Chest, 2 Views     EXAM DATE:    1/2/2024 2:01 PM     CLINICAL HISTORY:    sob     TECHNIQUE:    Frontal and lateral views of the chest.     COMPARISON:    No relevant prior studies available.     FINDINGS:    Lungs and pleural spaces: Increased attenuation overlying the left  hemithorax may be due to superficial soft tissue process with no  convincing evidence of airspace disease.  Otherwise no acute  cardiopulmonary findings identified.  No pneumothorax.    Heart:  Unremarkable as visualized.  No cardiomegaly.    Mediastinum:  Unremarkable as visualized.    Bones/joints:  Unremarkable as visualized.       Impression:      1. Increased attenuation overlying the left hemithorax may be due to  superficial soft tissue attenuation artifact with no convincing evidence  of airspace disease.  2.  Otherwise no acute cardiopulmonary findings identified.        This report was finalized on 1/2/2024 2:40 PM by Dr. Theron Pedersen MD.             I have personally reviewed the radiology images and read the final radiology report.  ----------------------------------------------------------------------------------------------------------------------  Assessment and Plan:  -Left upper lobe atelectasis cannot rule out concomitant pneumonia  -Acute hypoxic respiratory failure secondary to above  -Obesity with a BMI of 35.2  -History of hyperlipidemia  -History of essential hypertension  -History of peripheral arterial disease status post PCI left external iliac with stent placement  -Apparent history of COPD  -History of diabetes  -UT prolongation    Admit the patient keep O2 saturation above 90, neb treatment, Mucomyst, sputum culture and blood culture empiric antibiotic for possible pneumonia, pulmonology consult will be consulted.  DVT and GI prophylaxis.  Accu-Chek and sliding scale.  Avoid QT prolonging medications, check electrolytes including magnesium.    Cora Carver,  MD  01/02/24  17:01 EST    Electronically signed by Cora Carver MD at 01/02/24 1711          Emergency Department Notes        Jonathan Wyatt PA at 01/02/24 1535       Attestation signed by Lucas Rg MD at 01/02/24 1608        SUPERVISE: For this patient encounter, I reviewed the APC's documentation, treatment plan, and medical decision making.  Lucas Rg MD 1/2/2024 16:08 EST                         Subjective   History of Present Illness  78-year-old female presents secondary to worsening shortness of breath.  Patient has a history of COPD however typically does not wear oxygen.  She wears 2 L only as needed.  She states that she has been feeling poorly for about a week.  She states this is progressively gotten worse.  She has had wheezing.  She has had a minimally productive cough.  She has been on some unknown antibiotics through primary care.  She was not previously swab.  She denies any nausea vomiting or diarrhea.  No increased lower extremity swelling or pain.  She denies any chest pain pressure tightness or squeezing other than discomfort with cough.  She has a past medical history of COPD, diabetes, hypertension, hyperlipidemia.  She presents by private vehicle.  She was wearing 4 L of oxygen upon presentation and was noted to be hypoxic on ABG.        Review of Systems   Constitutional: Negative.  Negative for fever.   HENT: Negative.     Respiratory:  Positive for cough, shortness of breath and wheezing.    Cardiovascular: Negative.  Negative for chest pain.   Gastrointestinal: Negative.  Negative for abdominal pain.   Endocrine: Negative.    Genitourinary: Negative.  Negative for dysuria.   Skin: Negative.    Neurological: Negative.    Psychiatric/Behavioral: Negative.     All other systems reviewed and are negative.      Past Medical History:   Diagnosis Date    Allergic rhinitis     Asthma     Atherosclerosis     COPD (chronic obstructive pulmonary disease)      Cough     Diabetes mellitus     Hyperlipidemia     Hypertension     Menopause     Nausea and vomiting     Obesity 3/7/2017    Osteoarthritis     Osteoporosis     Vertigo, benign paroxysmal        Allergies   Allergen Reactions    Codeine Shortness Of Breath       Past Surgical History:   Procedure Laterality Date    CATARACT EXTRACTION      COLONOSCOPY      EYE SURGERY      catarac    ILIAC ARTERY STENT  2015    SKIN CANCER EXCISION      nose    TONSILLECTOMY      and adenoidectomy    TUBAL ABDOMINAL LIGATION         Family History   Problem Relation Age of Onset    Arthritis Mother     Asthma Mother     Cancer Mother     Osteoarthritis Mother     Osteoporosis Mother     Diabetes Mother     Arthritis Father     Hypertension Father     Stroke Father     Osteoarthritis Father     Osteoporosis Father     Heart disease Father     Diabetes Father     Breast cancer Neg Hx        Social History     Socioeconomic History    Marital status:     Number of children: 3    Years of education: 12   Tobacco Use    Smoking status: Former     Types: Cigarettes     Quit date: 2015     Years since quittin.6    Smokeless tobacco: Never   Vaping Use    Vaping Use: Never used   Substance and Sexual Activity    Alcohol use: No    Drug use: No    Sexual activity: Defer           Objective   Physical Exam  Vitals and nursing note reviewed.   Constitutional:       General: She is not in acute distress.     Appearance: She is well-developed. She is not diaphoretic.   HENT:      Head: Normocephalic and atraumatic.      Right Ear: External ear normal.      Left Ear: External ear normal.      Nose: Nose normal.   Eyes:      Conjunctiva/sclera: Conjunctivae normal.      Pupils: Pupils are equal, round, and reactive to light.   Neck:      Vascular: No JVD.      Trachea: No tracheal deviation.   Cardiovascular:      Rate and Rhythm: Normal rate and regular rhythm.      Heart sounds: Normal heart sounds. No murmur  heard.  Pulmonary:      Effort: Pulmonary effort is normal. No respiratory distress.      Breath sounds: Wheezing present.   Abdominal:      General: Bowel sounds are normal.      Palpations: Abdomen is soft.      Tenderness: There is no abdominal tenderness.   Musculoskeletal:         General: No deformity. Normal range of motion.      Cervical back: Normal range of motion and neck supple.   Skin:     General: Skin is warm and dry.      Coloration: Skin is not pale.      Findings: No erythema or rash.   Neurological:      Mental Status: She is alert and oriented to person, place, and time.      Cranial Nerves: No cranial nerve deficit.   Psychiatric:         Behavior: Behavior normal.         Thought Content: Thought content normal.         Procedures          ED Course  ED Course as of 01/02/24 1536   Tue Jan 02, 2024   1359 ECG 12 Lead Dyspnea  Vent. Rate :  82 BPM     Atrial Rate :  82 BPM     P-R Int : 132 ms          QRS Dur :  80 ms      QT Int : 412 ms       P-R-T Axes :  -8 -18  27 degrees     QTc Int : 481 ms     Normal sinus rhythm  Possible Anterior infarct , age undetermined  Abnormal ECG  No previous ECGs available   [ES]   1508 Hemoglobin(!): 16.5 [JI]   1509  Patient was accepted by Dr. Hernandez who requested CT of the chest PE protocol. [JI]      ED Course User Index  [ES] Lucas Rg MD  [JI] Jonathan Wyatt PA                                             Medical Decision Making  78-year-old female presents secondary to worsening shortness of breath.  Patient has a history of COPD however typically does not wear oxygen.  She wears 2 L only as needed.  She states that she has been feeling poorly for about a week.  She states this is progressively gotten worse.  She has had wheezing.  She has had a minimally productive cough.  She has been on some unknown antibiotics through primary care.  She was not previously swab.  She denies any nausea vomiting or diarrhea.  No increased lower  extremity swelling or pain.  She denies any chest pain pressure tightness or squeezing other than discomfort with cough.  She has a past medical history of COPD, diabetes, hypertension, hyperlipidemia.  She presents by private vehicle.  She was wearing 4 L of oxygen upon presentation and was noted to be hypoxic on ABG.    Amount and/or Complexity of Data Reviewed  Labs: ordered. Decision-making details documented in ED Course.  Radiology: ordered. Decision-making details documented in ED Course.  ECG/medicine tests: ordered. Decision-making details documented in ED Course.    Risk  Prescription drug management.  Decision regarding hospitalization.        Final diagnoses:   Hypoxemia   COPD exacerbation       ED Disposition  ED Disposition       ED Disposition   Decision to Admit    Condition   --    Comment   --               No follow-up provider specified.       Medication List      No changes were made to your prescriptions during this visit.            Jonathan Wyatt PA  01/02/24 1536      Electronically signed by Lucas Rg MD at 01/02/24 1608       Facility-Administered Medications as of 1/4/2024   Medication Dose Route Frequency Provider Last Rate Last Admin    acetaminophen (TYLENOL) tablet 500 mg  500 mg Oral Q6H PRN Juan Jade MD        acetylcysteine (MUCOMYST) 20 % nebulizer solution 3 mL  3 mL Nebulization BID PRN Cora Carver MD        atorvastatin (LIPITOR) tablet 10 mg  10 mg Oral Nightly Juan Jade MD   10 mg at 01/03/24 2108    sennosides-docusate (PERICOLACE) 8.6-50 MG per tablet 2 tablet  2 tablet Oral BID Juan Jade MD   2 tablet at 01/04/24 1003    And    polyethylene glycol (MIRALAX) packet 17 g  17 g Oral Daily PRN Juan Jade MD        And    bisacodyl (DULCOLAX) EC tablet 5 mg  5 mg Oral Daily PRN Juan Jade MD        And    bisacodyl (DULCOLAX) suppository 10 mg  10 mg Rectal Daily PRN Juan Jade  MD Enrico        buPROPion SR (WELLBUTRIN SR) 12 hr tablet 150 mg  150 mg Oral Nightly Juan Jade MD   150 mg at 01/03/24 2108    Calcium Replacement - Follow Nurse / BPA Driven Protocol   Does not apply PRN Juan Jade MD        cefTRIAXone (ROCEPHIN) 1,000 mg in sodium chloride 0.9 % 100 mL IVPB-VTB  1,000 mg Intravenous Q24H Juan Jade  mL/hr at 01/03/24 1600 1,000 mg at 01/03/24 1600    [COMPLETED] cefTRIAXone (ROCEPHIN) 2,000 mg in sodium chloride 0.9 % 100 mL IVPB-VTB  2,000 mg Intravenous Once Jonathan Wyatt PA   Stopped at 01/02/24 1610    cetirizine (zyrTEC) tablet 10 mg  10 mg Oral Daily Juan Jade MD   10 mg at 01/04/24 1003    cholecalciferol (VITAMIN D3) capsule 50,000 Units  50,000 Units Oral Weekly Juan Jade MD   50,000 Units at 01/02/24 2155    [Held by provider] clopidogrel (PLAVIX) tablet 75 mg  75 mg Oral Daily uJan Jade MD        dextrose (D50W) (25 g/50 mL) IV injection 25 g  25 g Intravenous Q15 Min PRN Juan Jade MD        dextrose (GLUTOSE) oral gel 15 g  15 g Oral Q15 Min PRN Juan Jade MD        [COMPLETED] doxycycline (VIBRAMYCIN) 100 mg in sodium chloride 0.9 % 100 mL IVPB-VTB  100 mg Intravenous Once Jonathan Wyatt PA   Stopped at 01/02/24 1710    doxycycline (VIBRAMYCIN) 100 mg in sodium chloride 0.9 % 100 mL IVPB-VTB  100 mg Intravenous Q12H Juan Jade MD   100 mg at 01/04/24 0512    Enoxaparin Sodium (LOVENOX) syringe 90 mg  1 mg/kg Subcutaneous Q12H Victor Hugo Davis MD   90 mg at 01/04/24 0042    fluticasone (FLONASE) 50 MCG/ACT nasal spray 2 spray  2 spray Nasal Daily Juan Jade MD   2 spray at 01/04/24 1003    glucagon HCl (Diagnostic) injection 1 mg  1 mg Intramuscular Q15 Min PRN Juan Jade MD        Insulin Lispro (humaLOG) injection 2-7 Units  2-7 Units Subcutaneous 4x Daily AC & at Bedtime Juan Jade MD   3 Units at 01/03/24  2108    [COMPLETED] iopamidol (ISOVUE-370) 76 % injection 100 mL  100 mL Intravenous Once in imaging [place]   80 mL at 01/02/24 1604    [COMPLETED] ipratropium-albuterol (DUO-NEB) nebulizer solution 3 mL  3 mL Nebulization Once Jonathan Wyatt PA   3 mL at 01/02/24 1501    ipratropium-albuterol (DUO-NEB) nebulizer solution 3 mL  3 mL Nebulization 4x Daily - RT Juan Jade MD   3 mL at 01/04/24 0654    Magnesium Standard Dose Replacement - Follow Nurse / BPA Driven Protocol   Does not apply PRN Juan Jade MD        melatonin tablet 10 mg  10 mg Oral Nightly PRN Juan Jade MD   10 mg at 01/03/24 2108    [COMPLETED] methylPREDNISolone sodium succinate (SOLU-Medrol) injection 80 mg  80 mg Intravenous Once Jonathan Wyatt PA   80 mg at 01/02/24 1503    metoprolol succinate XL (TOPROL-XL) 24 hr tablet 25 mg  25 mg Oral Daily Juan Jade MD   25 mg at 01/04/24 1003    montelukast (SINGULAIR) tablet 10 mg  10 mg Oral Nightly Juan Jade MD   10 mg at 01/03/24 2108    nitroglycerin (NITROSTAT) SL tablet 0.4 mg  0.4 mg Sublingual Q5 Min PRN Juan Jade MD        Pharmacy to Dose enoxaparin (LOVENOX)   Does not apply Continuous PRN Victor Hugo Davis MD        Phosphorus Replacement - Follow Nurse / BPA Driven Protocol   Does not apply PRN Juan Jade MD        Potassium Replacement - Follow Nurse / BPA Driven Protocol   Does not apply PRN Juan Jade MD        sodium chloride 0.9 % flush 10 mL  10 mL Intravenous Q12H Juan Jade MD   10 mL at 01/04/24 1003    sodium chloride 0.9 % flush 10 mL  10 mL Intravenous PRN Juan Jade MD        sodium chloride 0.9 % infusion 40 mL  40 mL Intravenous PRN Juan Jaed MD   40 mL at 01/04/24 0512     Orders (all)        Start     Ordered    01/04/24 1130  acetylcysteine (MUCOMYST) 20 % nebulizer solution 3 mL  2 Times Daily PRN         01/04/24  1121    01/04/24 1106  POC Glucose Once  PROCEDURE ONCE        Comments: Complete no more than 45 minutes prior to patient eating      01/04/24 1054    01/04/24 0646  POC Glucose Once  PROCEDURE ONCE        Comments: Complete no more than 45 minutes prior to patient eating      01/04/24 0634    01/04/24 0600  CBC & Differential  Morning Draw         01/03/24 1453    01/04/24 0600  CBC Auto Differential  PROCEDURE ONCE         01/03/24 2202    01/03/24 1927  POC Glucose Once  PROCEDURE ONCE        Comments: Complete no more than 45 minutes prior to patient eating      01/03/24 1920    01/03/24 1722  POC Glucose Once  PROCEDURE ONCE        Comments: Complete no more than 45 minutes prior to patient eating      01/03/24 1654    01/03/24 1500  cefTRIAXone (ROCEPHIN) 1,000 mg in sodium chloride 0.9 % 100 mL IVPB-VTB  Every 24 Hours         01/02/24 1935    01/03/24 1300  Enoxaparin Sodium (LOVENOX) syringe 90 mg  Every 12 Hours         01/03/24 1122    01/03/24 1129  POC Glucose Once  PROCEDURE ONCE        Comments: Complete no more than 45 minutes prior to patient eating      01/03/24 1120    01/03/24 1111  Pharmacy to Dose enoxaparin (LOVENOX)  Continuous PRN         01/03/24 1112    01/03/24 1107  Inpatient COPD Education (EDU) Consult  Once        Provider:  (Not yet assigned)   See Hyperspace for full Linked Orders Report.    01/03/24 1106    01/03/24 1107  Inpatient COPD Education (RT) Consult  Once        Provider:  (Not yet assigned)   See Hyperspace for full Linked Orders Report.    01/03/24 1106    01/03/24 1037  PT Consult: Eval & Treat Functional Mobility Below Baseline  Once         01/03/24 1036    01/03/24 0900  [Held by provider]  clopidogrel (PLAVIX) tablet 75 mg  Daily        (On hold since yesterday at 1406 until manually unheld; held by Cora Carver MDHold Reason: Hold For Procedure)    01/02/24 2132    01/03/24 0900  metoprolol succinate XL (TOPROL-XL) 24 hr tablet 25 mg  Daily          01/02/24 2132 01/03/24 0900  fluticasone (FLONASE) 50 MCG/ACT nasal spray 2 spray  Daily         01/02/24 2132 01/03/24 0900  cetirizine (zyrTEC) tablet 10 mg  Daily         01/02/24 2132 01/03/24 0806  XR Chest 1 View  1 Time Imaging         01/03/24 0805    01/03/24 0800  Oral Care  2 Times Daily       01/02/24 1935 01/03/24 0719  POC Glucose Once  PROCEDURE ONCE        Comments: Complete no more than 45 minutes prior to patient eating      01/03/24 0645 01/03/24 0600  doxycycline (VIBRAMYCIN) 100 mg in sodium chloride 0.9 % 100 mL IVPB-VTB  Every 12 Hours         01/02/24 1935 01/03/24 0600  CBC & Differential  Morning Draw         01/02/24 1935 01/03/24 0600  Basic Metabolic Panel  Morning Draw         01/02/24 1935 01/03/24 0600  CBC Auto Differential  PROCEDURE ONCE         01/02/24 2202 01/03/24 0204  melatonin tablet 10 mg  Nightly PRN         01/03/24 0204 01/02/24 2230  atorvastatin (LIPITOR) tablet 10 mg  Nightly         01/02/24 2132 01/02/24 2230  montelukast (SINGULAIR) tablet 10 mg  Nightly         01/02/24 2132 01/02/24 2230  buPROPion SR (WELLBUTRIN SR) 12 hr tablet 150 mg  Nightly         01/02/24 2132 01/02/24 2230  cholecalciferol (VITAMIN D3) capsule 50,000 Units  Weekly         01/02/24 2132 01/02/24 2200  Incentive Spirometry  Every 4 Hours While Awake       01/02/24 1935 01/02/24 2200  POC Glucose 4x Daily Before Meals & at Bedtime  4 Times Daily Before Meals & at Bedtime      Comments: Complete no more than 45 minutes prior to patient eating      01/02/24 1935 01/02/24 2140  POC Glucose Once  PROCEDURE ONCE        Comments: Complete no more than 45 minutes prior to patient eating      01/02/24 2133 01/02/24 2130  acetaminophen (TYLENOL) tablet 500 mg  Every 6 Hours PRN         01/02/24 2132 01/02/24 2100  sodium chloride 0.9 % flush 10 mL  Every 12 Hours Scheduled         01/02/24 1935    01/02/24 2100  sennosides-docusate (PERICOLACE)  8.6-50 MG per tablet 2 tablet  2 Times Daily        See Hyperspace for full Linked Orders Report.    01/02/24 1935 01/02/24 2100  Enoxaparin Sodium (LOVENOX) syringe 40 mg  Nightly,   Status:  Discontinued         01/02/24 1935 01/02/24 2100  Insulin Lispro (humaLOG) injection 2-7 Units  4 Times Daily Before Meals & Nightly         01/02/24 1935 01/02/24 2030  acetylcysteine (MUCOMYST) 20 % nebulizer solution 3 mL  Every 6 Hours - RT,   Status:  Discontinued         01/02/24 1935 01/02/24 2030  ipratropium-albuterol (DUO-NEB) nebulizer solution 3 mL  4 Times Daily - RT         01/02/24 1935 01/02/24 2000  Vital Signs  Every 4 Hours       01/02/24 1935 01/02/24 1945  POC Glucose Once  PROCEDURE ONCE        Comments: Complete no more than 45 minutes prior to patient eating      01/02/24 1938 01/02/24 1936  Intake & Output  Every Shift       01/02/24 1935 01/02/24 1936  Weigh Patient  Once         01/02/24 1935 01/02/24 1936  Insert Peripheral IV  Once         01/02/24 1935 01/02/24 1936  Saline Lock & Maintain IV Access  Continuous,   Status:  Canceled         01/02/24 1935 01/02/24 1936  Continuous Cardiac Monitoring  Continuous        Comments: Follow Standing Orders As Outlined in Process Instructions (Open Order Report to View Full Instructions)    01/02/24 1935 01/02/24 1936  Telemetry - Maintain IV Access  Continuous         01/02/24 1935 01/02/24 1936  Telemetry - Place Orders & Notify Provider of Results When Patient Experiences Acute Chest Pain, Dysrhythmia or Respiratory Distress  Until Discontinued         01/02/24 1935 01/02/24 1936  May Be Off Telemetry for Tests  Continuous         01/02/24 1935 01/02/24 1936  Respiratory communication  Once        Comments: Chest physiotherapy every 6    01/02/24 1935 01/02/24 1936  Respiratory Culture - Sputum, Cough  STAT         01/02/24 1935 01/02/24 1936  Inpatient Pulmonology Consult  Once        Specialty:   Pulmonary Disease  Provider:  Victor Hugo Davis MD    01/02/24 1935    01/02/24 1936  Diet: Diabetic Diets; Consistent Carbohydrate; Texture: Regular Texture (IDDSI 7); Fluid Consistency: Thin (IDDSI 0)  Diet Effective Now         01/02/24 1935    01/02/24 1936  Magnesium  STAT         01/02/24 1935    01/02/24 1935  polyethylene glycol (MIRALAX) packet 17 g  Daily PRN        See Hyperspace for full Linked Orders Report.    01/02/24 1935    01/02/24 1935  bisacodyl (DULCOLAX) EC tablet 5 mg  Daily PRN        See Hyperspace for full Linked Orders Report.    01/02/24 1935    01/02/24 1935  bisacodyl (DULCOLAX) suppository 10 mg  Daily PRN        See Hyperspace for full Linked Orders Report.    01/02/24 1935    01/02/24 1935  nitroglycerin (NITROSTAT) SL tablet 0.4 mg  Every 5 Minutes PRN         01/02/24 1935    01/02/24 1935  dextrose (GLUTOSE) oral gel 15 g  Every 15 Minutes PRN         01/02/24 1935    01/02/24 1935  dextrose (D50W) (25 g/50 mL) IV injection 25 g  Every 15 Minutes PRN         01/02/24 1935    01/02/24 1935  glucagon HCl (Diagnostic) injection 1 mg  Every 15 Minutes PRN         01/02/24 1935    01/02/24 1935  Potassium Replacement - Follow Nurse / BPA Driven Protocol  As Needed         01/02/24 1935 01/02/24 1935  Magnesium Standard Dose Replacement - Follow Nurse / BPA Driven Protocol  As Needed         01/02/24 1935    01/02/24 1935  Phosphorus Replacement - Follow Nurse / BPA Driven Protocol  As Needed         01/02/24 1935    01/02/24 1935  Calcium Replacement - Follow Nurse / BPA Driven Protocol  As Needed         01/02/24 1935    01/02/24 1935  sodium chloride 0.9 % flush 10 mL  As Needed         01/02/24 1935    01/02/24 1935  sodium chloride 0.9 % infusion 40 mL  As Needed         01/02/24 1935    01/02/24 1713  Code Status and Medical Interventions:  Continuous         01/02/24 1722    01/02/24 1712  Inpatient Admission  Once         01/02/24 1722    01/02/24 1620  iopamidol (ISOVUE-370) 76  % injection 100 mL  Once in Imaging         01/02/24 1604    01/02/24 1509  CT Angiogram Chest Pulmonary Embolism  1 Time Imaging         01/02/24 1508    01/02/24 1504  cefTRIAXone (ROCEPHIN) 2,000 mg in sodium chloride 0.9 % 100 mL IVPB-VTB  Once         01/02/24 1448    01/02/24 1504  doxycycline (VIBRAMYCIN) 100 mg in sodium chloride 0.9 % 100 mL IVPB-VTB  Once         01/02/24 1448    01/02/24 1504  methylPREDNISolone sodium succinate (SOLU-Medrol) injection 80 mg  Once         01/02/24 1448    01/02/24 1504  ipratropium-albuterol (DUO-NEB) nebulizer solution 3 mL  Once         01/02/24 1448    01/02/24 1432  Procalcitonin  Once         01/02/24 1431    01/02/24 1405  Blood Gas, Arterial With Co-Ox  PROCEDURE ONCE         01/02/24 1403    01/02/24 1356  ECG 12 Lead Dyspnea  Once         01/02/24 1356    01/02/24 1352  COVID-19, FLU A/B, RSV PCR 1 HR TAT - Swab, Nasopharynx  Once         01/02/24 1351    01/02/24 1352  CBC & Differential  Once         01/02/24 1351    01/02/24 1352  Comprehensive Metabolic Panel  Once         01/02/24 1351    01/02/24 1352  Urinalysis With Microscopic If Indicated (No Culture) - Urine, Clean Catch  Once         01/02/24 1351    01/02/24 1352  [place] Draw  Once         01/02/24 1351    01/02/24 1352  Blood Gas, Arterial -With Co-Ox Panel: Yes  Once         01/02/24 1351    01/02/24 1352  BNP  Once         01/02/24 1351    01/02/24 1352  Single High Sensitivity Troponin T  Once         01/02/24 1351    01/02/24 1352  Blood Culture - Blood, Arm, Right  Once         01/02/24 1351    01/02/24 1352  Blood Culture - Blood, Arm, Left  Once         01/02/24 1351    01/02/24 1352  Lactic Acid, Plasma  Once         01/02/24 1351    01/02/24 1352  C-reactive Protein  Once         01/02/24 1351    01/02/24 1352  Sedimentation Rate  Once         01/02/24 1351    01/02/24 1352  ECG 12 Lead Dyspnea  Once         01/02/24 1351    01/02/24 1352  XR Chest 2 View  1 Time Imaging         01/02/24  1351    24 1352  CBC Auto Differential  PROCEDURE ONCE         24 1352    24 1352  Green Top (Gel)  PROCEDURE ONCE         24 1352    24 1352  Lavender Top  PROCEDURE ONCE         24 1352    24 1352  Gold Top - SST  PROCEDURE ONCE         24 1352    24 1352  Light Blue Top  PROCEDURE ONCE         24 1352    Unscheduled  Up With Assistance  As Needed       24 193    Unscheduled  Follow Hypoglycemia Standing Orders For Blood Glucose <70 & Notify Provider of Treatment  As Needed      Comments: Follow Hypoglycemia Orders As Outlined in Process Instructions (Open Order Report to View Full Instructions)  Notify Provider Any Time Hypoglycemia Treatment is Administered    24    --  acetaminophen (TYLENOL) 500 MG tablet  Every 6 Hours PRN         24    --  buPROPion SR (WELLBUTRIN SR) 150 MG 12 hr tablet  Nightly         24    --  loratadine (Claritin) 10 MG tablet  Nightly         24    --  SCANNED - TELEMETRY           24 0000    --  SCANNED - TELEMETRY           24 0000                     Physician Progress Notes (all)        Cora Carver MD at 24 1436              [facility]     Patient Identification:  Name:  Evie Shah  Age:  78 y.o.  Sex:  female  :  1945  MRN:  [id number]  Visit Number:  [id number]  Primary Care Provider:  Camryn Mota PA    Length of stay:  1    Subjective: Patient seen and examined, patient reports she is less dyspneic breathing better still unable to expectorate with her coughing, x-ray today did not show resolution of atelectasis.    Chief Complaint: Hypoxic respiratory failure  ----------------------------------------------------------------------------------------------------------------------  Current [facility] Meds:  acetylcysteine, 3 mL, Nebulization, Q6H - RT  atorvastatin, 10 mg, Oral,  Nightly  buPROPion SR, 150 mg, Oral, Nightly  cefTRIAXone, 1,000 mg, Intravenous, Q24H  cetirizine, 10 mg, Oral, Daily  cholecalciferol, 50,000 Units, Oral, Weekly  [Held by provider] clopidogrel, 75 mg, Oral, Daily  doxycycline, 100 mg, Intravenous, Q12H  enoxaparin, 1 mg/kg, Subcutaneous, Q12H  fluticasone, 2 spray, Nasal, Daily  insulin lispro, 2-7 Units, Subcutaneous, 4x Daily AC & at Bedtime  ipratropium-albuterol, 3 mL, Nebulization, 4x Daily - RT  metoprolol succinate XL, 25 mg, Oral, Daily  montelukast, 10 mg, Oral, Nightly  senna-docusate sodium, 2 tablet, Oral, BID  sodium chloride, 10 mL, Intravenous, Q12H      Pharmacy to Dose enoxaparin (LOVENOX),       ----------------------------------------------------------------------------------------------------------------------  Vital Signs:  Temp:  [97.5 °F (36.4 °C)-97.8 °F (36.6 °C)] 97.7 °F (36.5 °C)  Heart Rate:  [77-98] 98  Resp:  [18-20] 18  BP: (104-186)/(57-97) 167/79       Tele: Sinus rhythm 80 bpm O2 saturation is 94% on room air      01/02/24  1408 01/02/24  1942 01/03/24  0500   Weight: 90.3 kg (199 lb) 90.4 kg (199 lb 4.8 oz) 90.4 kg (199 lb 4.8 oz) (admit weight, patient not on floor 24 hrs)     Body mass index is 35.3 kg/m².    Intake/Output Summary (Last 24 hours) at 1/3/2024 1436  Last data filed at 1/3/2024 0800  Gross per 24 hour   Intake 2080 ml   Output --   Net 2080 ml     Diet: Diabetic Diets; Consistent Carbohydrate; Texture: Regular Texture (IDDSI 7); Fluid Consistency: Thin (IDDSI 0)  ----------------------------------------------------------------------------------------------------------------------  Physical exam:  General: Comfortable,awake, alert, oriented to self, place, and time, well-developed and well-nourished.  No respiratory distress.    Skin:  Skin is warm and dry. No rash noted. No pallor.    HENT:  Head:  Normocephalic and atraumatic.  Mouth:  Moist mucous membranes.    Eyes:  Conjunctivae and EOM are normal.  Pupils  are equal, round, and reactive to light.  No scleral icterus.    Neck:  Neck supple.  No JVD present.    Pulmonary/Chest: Scattered crackles and rhonchi left lung field there is better air way movement, breath sounds improved compared to yesterday no wheezing equal chest expansion no splinting   Cardiovascular:  Normal rate, regular rhythm and normal heart sounds with no murmur.  Abdominal:  Soft.  Bowel sounds are normal.  No distension and no tenderness.   Extremities:  No edema, no tenderness, and no deformity.  No red or swollen joints anywhere.  Strong pulses in all 4 extremities with no clubbing, no cyanosis, no edema.  Neurological:  Motor strength equal no obvious deficit, sensory grossly intact.   No cranial nerve deficit.  No tongue deviation.  No facial droop.  No slurred speech.    Genitourinary: No Palomino catheter  Back:  ----------------------------------------------------------------------------------------------------------------------  ----------------------------------------------------------------------------------------------------------------------  Results from last 7 days   Lab Units 01/02/24  1409   HSTROP T ng/L 9     Results from last 7 days   Lab Units 01/03/24  0039 01/02/24  1409   CRP mg/dL  --  <0.30   LACTATE mmol/L  --  2.0   WBC 10*3/mm3 9.93 10.76   HEMOGLOBIN g/dL 15.7 16.5*   HEMATOCRIT % 49.4* 51.2*   MCV fL 94.3 94.1   MCHC g/dL 31.8 32.2   PLATELETS 10*3/mm3 223 243     Results from last 7 days   Lab Units 01/02/24  1403   PH, ARTERIAL pH units 7.402   PO2 ART mm Hg 54.4*   PCO2, ARTERIAL mm Hg 38.9   HCO3 ART mmol/L 24.2     Results from last 7 days   Lab Units 01/03/24  0039 01/02/24  1409   SODIUM mmol/L 140 140   POTASSIUM mmol/L 4.3 4.5   MAGNESIUM mg/dL  --  2.0   CHLORIDE mmol/L 104 105   CO2 mmol/L 23.8 21.9*   BUN mg/dL 23 23   CREATININE mg/dL 0.84 1.09*   CALCIUM mg/dL 9.1 9.6   GLUCOSE mg/dL 232* 150*   ALBUMIN g/dL  --  4.3   BILIRUBIN mg/dL  --  0.4   ALK PHOS U/L  " --  107   AST (SGOT) U/L  --  18   ALT (SGPT) U/L  --  12   Estimated Creatinine Clearance: 60.8 mL/min (by C-G formula based on SCr of 0.84 mg/dL).    No results found for: \"AMMONIA\"      Blood Culture   Date Value Ref Range Status   01/02/2024 No growth at 24 hours  Preliminary   01/02/2024 No growth at 24 hours  Preliminary     No results found for: \"URINECX\"  No results found for: \"WOUNDCX\"  No results found for: \"STOOLCX\"    I have personally looked at the labs and they are summarized above.  ----------------------------------------------------------------------------------------------------------------------  Imaging Results (Last 24 Hours)       Procedure Component Value Units Date/Time    XR Chest 1 View [[id number]] Collected: 01/03/24 1046     Updated: 01/03/24 1049    Narrative:      EXAM:    XR Chest, 1 View     EXAM DATE:    1/3/2024 9:35 AM     CLINICAL HISTORY:    atelectasis; R09.02-Hypoxemia; J44.1-Chronic obstructive pulmonary  disease with (acute) exacerbation     TECHNIQUE:    Frontal view of the chest.     COMPARISON:    1/2/2024     FINDINGS:    Lungs and pleural spaces:  Bibasilar airspace disease noted and may  represent atelectasis.  Right lung appears well aerated.  Radiographic  findings again compatible with left upper lobe atelectasis.  No  pneumothorax.    Heart:  Unremarkable as visualized.  No cardiomegaly.    Mediastinum:  Unremarkable as visualized.    Bones/joints:  Unremarkable as visualized.       Impression:      1.  Stable appearance of the chest with increased attenuation of the  left hemithorax which would correspond to left upper lobe atelectasis  noted on CT.  2.  No effusion or pneumothorax identified.        This report was finalized on 1/3/2024 10:47 AM by Dr. Theron Pedersen MD.       CT Angiogram Chest Pulmonary Embolism [[id number]] Collected: 01/02/24 1654     Updated: 01/02/24 1700    Narrative:      EXAM:    CT Angiography Chest With Intravenous Contrast     EXAM " DATE:    1/2/2024 3:42 PM     CLINICAL HISTORY:    Pulmonary embolism (PE) suspected, unknown D-dimer     TECHNIQUE:    Axial computed tomographic angiography images of the chest with  intravenous contrast.  This CT exam was performed using one or more of  the following dose reduction techniques:  automated exposure control,  adjustment of the mA and/or kV according to patient size, and/or use of  iterative reconstruction technique.    MIP reconstructed images were created and reviewed.     COMPARISON:    6/17/2020     FINDINGS:    Pulmonary arteries:  Mild pulmonary artery enlargement suggesting a  mild degree of pulmonary arterial hypertension.  No pulmonary embolism.    Aorta:  No acute findings.  No thoracic aortic aneurysm.    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    Lungs and pleural spaces:  Left upper lobe consolidation is noted with  areas of enhancement and nonenhancement compatible with lobar collapse  and possible superimposed pneumonia. Luminal filling defects of the left  upper lobe bronchial airways suggesting obstructive etiology.  Bronchoscopy recommended.  Mild upper lung predominant emphysema is  noted.  Bronchial wall thickening is noted.  Luminal filling defects and  debris throughout the left lower lobe bronchial airways and to a lesser  extent the right lower lobe bronchial airways.  Minimal bibasilar  atelectasis.  No pleural effusions.    Heart:  Borderline cardiac enlargement.  Moderate coronary artery  calcifications.  No significant pericardial effusion.  No evidence of RV  dysfunction.    Mediastinum:  Small hiatal hernia.    Bones/joints:  Degenerative changes thoracic spine.  No acute  fracture.  No dislocation.    Soft tissues:  Unremarkable as visualized.    Lymph nodes:  Unremarkable as visualized.  No enlarged lymph nodes.    Gallbladder and bile ducts:  Cholelithiasis.    Kidneys and ureters:  Cyst left kidney.        Impression:      1.  No PE.  2.  Left upper lobe complete atelectasis with luminal filling defects of  the left upper lobe bronchial airways more favorable for mucous  plugging. Bronchoscopy recommended.  3.  Luminal debris in the left greater than right lower lobe bronchial  airways favoring mucous secretions.  4.  Bronchial inflammation compatible with bronchitis.  5.  Superimposed pneumonia left upper lobe likely given areas of  nonenhancement.  6.  Cardiomegaly with coronary artery calcifications.  7.  No effusion or pneumothorax.  8.  COPD.  9.  Other incidental/nonacute findings detailed above.        This report was finalized on 1/2/2024 4:58 PM by Dr. Theron Pedersen MD.       XR Chest 2 View [[id number]] Collected: 01/02/24 1439     Updated: 01/02/24 1449    Narrative:      EXAM:    XR Chest, 2 Views     EXAM DATE:    1/2/2024 2:01 PM     CLINICAL HISTORY:    sob     TECHNIQUE:    Frontal and lateral views of the chest.     COMPARISON:    No relevant prior studies available.     FINDINGS:    Lungs and pleural spaces: Increased attenuation overlying the left  hemithorax may be due to superficial soft tissue process with no  convincing evidence of airspace disease.  Otherwise no acute  cardiopulmonary findings identified.  No pneumothorax.    Heart:  Unremarkable as visualized.  No cardiomegaly.    Mediastinum:  Unremarkable as visualized.    Bones/joints:  Unremarkable as visualized.       Impression:      1. Increased attenuation overlying the left hemithorax may be due to  superficial soft tissue attenuation artifact with no convincing evidence  of airspace disease.  2.  Otherwise no acute cardiopulmonary findings identified.        This report was finalized on 1/2/2024 2:40 PM by Dr. Theron Pedersen MD.             ----------------------------------------------------------------------------------------------------------------------  Assessment and Plan:  -Acute hypoxic respiratory failure  -Left upper lobe  atelectasis?  So far no improvement with neb treatment and you commenced  -History of peripheral arterial disease status post left external iliac stent placement   -History of COPD -history of diabetes  -QT prolongation  -Obesity BMI of 35  -History of hyperlipidemia    Continue antibiotic follow-up cultures, Mucomyst, patient now changed to full dose Lovenox while off Plavix in anticipation for possible bronchoscopy.  Accu-Chek and sliding scale.  Pulmonology help appreciated.    Plan outlined to patient together with her son at bedside and agreed.    disposition Home pending      Cora Carver MD  01/03/24  14:36 EST    Electronically signed by Cora Carver MD at 01/03/24 1453          Consult Notes (all)        Victor Hugo Davis MD at 01/03/24 1112        Consult Orders    1. Inpatient Pulmonology Consult [[id number]] ordered by Juan Jade MD at 01/02/24 1722                 Consult Note Pulmonary     Referring Provider: Dr. Carver   Reason for Consultation: Left upper lobe atelectasis concern mucous plug versus endobronchial tumor      Chief complaint   Shortness of breath    History of present illness:    Patient is a 78-year-old female with past medical history significant for COPD/asthma, diabetes, seasonal allergies, hypertension and hyperlipidemia.  She is admitted with complaint of worsening cough and some sputum production for last 2 week.  She was given antibiotic by her primary care physician without significant improvement.  She continues to have shortness of breath found to be hypoxic in the ER required 4 to 5 L oxygen via nasal cannula.    CRP and Pro-Sonido level were normal x-ray chest showed left-sided haziness.  CT chest was done with PE protocol which did not show any PE however it showed left upper lobe atelectasis, (my read) looks like lingula is also atelectatic.    Patient reported she is feeling better.    Been getting chest PT after nebulized Mucomyst and DuoNeb.    She  has been on Plavix.  Last dose taken yesterday.    Strong family history of lung cancer.  Her mother had lung cancer.        Review of Systems  Rest of the review of systems unremarkable.      History  Past Medical History:   Diagnosis Date    Allergic rhinitis     Asthma     Atherosclerosis     COPD (chronic obstructive pulmonary disease)     Cough     Diabetes mellitus     Hyperlipidemia     Hypertension     Menopause     Nausea and vomiting     Obesity 3/7/2017    Osteoarthritis     Osteoporosis     Vertigo, benign paroxysmal    ,   Past Surgical History:   Procedure Laterality Date    CATARACT EXTRACTION      COLONOSCOPY      EYE SURGERY      catarac    ILIAC ARTERY STENT  2015    SKIN CANCER EXCISION      nose    TONSILLECTOMY      and adenoidectomy    TUBAL ABDOMINAL LIGATION     ,   Family History   Problem Relation Age of Onset    Arthritis Mother     Asthma Mother     Cancer Mother     Osteoarthritis Mother     Osteoporosis Mother     Diabetes Mother     Arthritis Father     Hypertension Father     Stroke Father     Osteoarthritis Father     Osteoporosis Father     Heart disease Father     Diabetes Father     Breast cancer Neg Hx    ,   Social History     Tobacco Use    Smoking status: Former     Types: Cigarettes     Quit date: 2015     Years since quittin.6    Smokeless tobacco: Never   Vaping Use    Vaping Use: Never used   Substance Use Topics    Alcohol use: No    Drug use: No   ,   Facility-Administered Medications Prior to Admission   Medication Dose Route Frequency Provider Last Rate Last Admin    cyanocobalamin injection 1,000 mcg  1,000 mcg Intramuscular Q28 Days Camryn Mota PA   1,000 mcg at 23 1114     Medications Prior to Admission   Medication Sig Dispense Refill Last Dose    acetaminophen (TYLENOL) 500 MG tablet Take 1 tablet by mouth Every 6 (Six) Hours As Needed for Mild Pain.   Past Week    albuterol sulfate  (90 Base) MCG/ACT inhaler Inhale 2 puffs  Every 4 (Four) Hours As Needed for Shortness of Air. 18 g 5 Past Month    amLODIPine-benazepril (LOTREL) 10-40 MG per capsule Take 1 capsule by mouth Daily. 90 capsule 3 1/2/2024    atorvastatin (LIPITOR) 10 MG tablet Take 1 tablet by mouth Every Night. 90 tablet 3 1/1/2024    buPROPion SR (WELLBUTRIN SR) 150 MG 12 hr tablet Take 1 tablet by mouth Every Night.   1/1/2024    clopidogrel (PLAVIX) 75 MG tablet Take 1 tablet by mouth Daily. 90 tablet 3 1/2/2024    doxycycline (VIBRAMYCIN) 100 MG capsule Take 1 capsule by mouth 2 (Two) Times a Day for 10 days. 20 capsule 0 1/2/2024    empagliflozin (Jardiance) 25 MG tablet tablet Take 1 tablet by mouth Daily. 90 tablet 3 1/2/2024    fluticasone (FLONASE) 50 MCG/ACT nasal spray 2 sprays into the nostril(s) as directed by provider Daily. 16 g 1 1/2/2024    furosemide (LASIX) 20 MG tablet Take 1 tablet by mouth Daily. 90 tablet 3 1/2/2024    ipratropium-albuterol (DUO-NEB) 0.5-2.5 mg/3 ml nebulizer Take 3 mL by nebulization Every 4 (Four) Hours As Needed for Shortness of Air. 150 mL 1 1/2/2024    loratadine (Claritin) 10 MG tablet Take 1 tablet by mouth Every Night.   1/1/2024    metoprolol succinate XL (Toprol XL) 25 MG 24 hr tablet Take 1 tablet by mouth Daily. 90 tablet 3 1/2/2024    montelukast (SINGULAIR) 10 MG tablet Take 1 tablet by mouth Every Night. 90 tablet 3 1/1/2024    omega-3 acid ethyl esters (LOVAZA) 1 g capsule Take 2 capsules by mouth 2 (Two) Times a Day. 360 capsule 3 1/2/2024    potassium chloride 10 MEQ CR tablet Take 1 tablet by mouth Daily. 90 tablet 3 1/2/2024    promethazine-dextromethorphan (PROMETHAZINE-DM) 6.25-15 MG/5ML syrup Take 5 mL by mouth 2 (Two) Times a Day As Needed for Cough. 180 mL 0 1/1/2024    raloxifene (EVISTA) 60 MG tablet Take 1 tablet by mouth Daily. 90 tablet 3 1/2/2024    Tirzepatide (Mounjaro) 5 MG/0.5ML solution pen-injector Inject 0.5 mL under the skin into the appropriate area as directed Every 7 (Seven) Days. 2 mL 3  12/29/2023    vitamin D (ERGOCALCIFEROL) 1.25 MG (00057 UT) capsule capsule Take 1 capsule by mouth 1 (One) Time Per Week. 12 capsule 3 12/28/2023   , Scheduled Meds:  acetylcysteine, 3 mL, Nebulization, Q6H - RT  atorvastatin, 10 mg, Oral, Nightly  buPROPion SR, 150 mg, Oral, Nightly  cefTRIAXone, 1,000 mg, Intravenous, Q24H  cetirizine, 10 mg, Oral, Daily  cholecalciferol, 50,000 Units, Oral, Weekly  clopidogrel, 75 mg, Oral, Daily  doxycycline, 100 mg, Intravenous, Q12H  enoxaparin, 40 mg, Subcutaneous, Nightly  fluticasone, 2 spray, Nasal, Daily  insulin lispro, 2-7 Units, Subcutaneous, 4x Daily AC & at Bedtime  ipratropium-albuterol, 3 mL, Nebulization, 4x Daily - RT  metoprolol succinate XL, 25 mg, Oral, Daily  montelukast, 10 mg, Oral, Nightly  senna-docusate sodium, 2 tablet, Oral, BID  sodium chloride, 10 mL, Intravenous, Q12H    , Continuous Infusions:  Pharmacy to Dose enoxaparin (LOVENOX),      and Allergies:  Codeine    Objective     Vital Signs   Temp:  [97.5 °F (36.4 °C)-98.2 °F (36.8 °C)] 97.5 °F (36.4 °C)  Heart Rate:  [77-98] 80  Resp:  [18-20] 20  BP: (104-186)/(57-97) 138/68    Physical Exam:          General- normal in appearance, not in any acute distress    HEENT- pupils equally reactive to light, normal in size, no scleral icterus    Neck-supple, No JVD, no carotid bruit    Respiratory-bilateral air entry, bilateral air entry, I noted occasional wheezing only on forced forced exhalation otherwise clear to auscultation.      Cardiovascular-  Normal S1 and S2. No S3, S4 or murmurs.      GI-nontender nondistended bowel sounds positive    CNS-alert oriented x3, grossly nonfocal      Extremities-no clubbing and edema    Psychiatric-mood good, good eye contact,     Skin- no visible rash             Results Review:    LABS:    Lab Results   Component Value Date    GLUCOSE 232 (H) 01/03/2024    BUN 23 01/03/2024    CREATININE 0.84 01/03/2024    EGFRIFNONA 58 (L) 09/30/2021    EGFRIFAFRI 67 09/30/2021  "   BCR 27.4 (H) 01/03/2024    CO2 23.8 01/03/2024    CALCIUM 9.1 01/03/2024    PROTENTOTREF 6.7 06/09/2023    ALBUMIN 4.3 01/02/2024    LABIL2 2.0 06/09/2023    AST 18 01/02/2024    ALT 12 01/02/2024    WBC 9.93 01/03/2024    HGB 15.7 01/03/2024    HCT 49.4 (H) 01/03/2024    MCV 94.3 01/03/2024     01/03/2024     01/03/2024    K 4.3 01/03/2024     01/03/2024    ANIONGAP 12.2 01/03/2024       No results found for: \"INR\", \"PROTIME\"             I reviewed the patient's new clinical results.  I reviewed the patient's new imaging results and agree with the interpretation.      Assessment & Plan     #1 acute hypoxic respiratory failure.    2.  Left upper lobe and possibly lingular atelectasis.  Concern mucous plug versus endobronchial tumor.    Strong family history of lung cancer.    3.  COPD: Occasional wheezing on my exam.    4.  Question of pneumonia.    Plavix is on hold.  I will wait for at least 5 days before proceeding with bronchoscopy.  Given history of severe peripheral vascular disease and history of cyanosis in the left foot I would prefer to keep her on therapeutic dose Lovenox while she is off Plavix.    Continue broad-spectrum antibiotics and supportive care.        GI- PPI prophylaxis      Endrocrinology- Maintain Blood sugar 140 -180      I have personally reviewed x-ray chest, CAT scan, labs, medication list.  Total time spent 30 minutes.  I am really thankful to  for having me participate in the care of this patient.  Case was discussed with the medical resident team.    Victor Hugo Davis MD     01/03/24 11:12 EST     Electronically signed by Victor Hugo Davis MD at 01/03/24 1122       "

## 2024-01-04 NOTE — PROGRESS NOTES
[facility]     Patient Identification:  Name:  Evie Shah  Age:  78 y.o.  Sex:  female  :  1945  MRN:  [id number]  Visit Number:  [id number]  Primary Care Provider:  Camryn Mota PA    Length of stay:  2    Subjective: Seen and examined, she has appears to be less short of breath, still coughing nonproductive, afebrile, no nausea or vomiting.  No diarrhea.  So far platelet count stable on full dose anticoagulation with Lovenox.    Chief Complaint: Short of breath  ----------------------------------------------------------------------------------------------------------------------  Current Hospital Meds:  atorvastatin, 10 mg, Oral, Nightly  buPROPion SR, 150 mg, Oral, Nightly  cefTRIAXone, 1,000 mg, Intravenous, Q24H  cetirizine, 10 mg, Oral, Daily  cholecalciferol, 50,000 Units, Oral, Weekly  [Held by provider] clopidogrel, 75 mg, Oral, Daily  doxycycline, 100 mg, Intravenous, Q12H  enoxaparin, 1 mg/kg, Subcutaneous, Q12H  fluticasone, 2 spray, Nasal, Daily  insulin lispro, 2-7 Units, Subcutaneous, 4x Daily AC & at Bedtime  ipratropium-albuterol, 3 mL, Nebulization, 4x Daily - RT  metoprolol succinate XL, 25 mg, Oral, Daily  montelukast, 10 mg, Oral, Nightly  senna-docusate sodium, 2 tablet, Oral, BID  sodium chloride, 10 mL, Intravenous, Q12H         ----------------------------------------------------------------------------------------------------------------------  Vital Signs:  Temp:  [97.6 °F (36.4 °C)-98.3 °F (36.8 °C)] 98.3 °F (36.8 °C)  Heart Rate:  [] 75  Resp:  [16-20] 20  BP: (120-146)/(64-86) 120/70       Tele: Sinus rhythm 75 bpm      24  1940 24  0500 24  0500   Weight: 90.4 kg (199 lb 4.8 oz) 90.4 kg (199 lb 4.8 oz) (admit weight, patient not on floor 24 hrs) 90.9 kg (200 lb 4.8 oz)     Body mass index is 35.48 kg/m².    Intake/Output Summary (Last 24 hours) at 2024 1328  Last data filed at 2024  0839  Gross per 24 hour   Intake 480 ml   Output --   Net 480 ml     Diet: Diabetic Diets; Consistent Carbohydrate; Texture: Regular Texture (IDDSI 7); Fluid Consistency: Thin (IDDSI 0)  ----------------------------------------------------------------------------------------------------------------------  Physical exam:  General: Comfortable,awake, alert, oriented to self, place, and time, well-developed and well-nourished.  No respiratory distress.    Skin:  Skin is warm and dry. No rash noted. No pallor.    HENT:  Head:  Normocephalic and atraumatic.  Mouth:  Moist mucous membranes.    Eyes:  Conjunctivae and EOM are normal.  Pupils are equal, round, and reactive to light.  No scleral icterus.    Neck:  Neck supple.  No JVD present.    Pulmonary/Chest: Improvement of left breath sounds, coarse crackles noted on deep inspiration, no wheezing equal chest expansion   Cardiovascular:  Normal rate, regular rhythm and normal heart sounds with no murmur.  Abdominal:  Soft.  Bowel sounds are normal.  No distension and no tenderness.   Extremities:  No edema, no tenderness, and no deformity.  No red or swollen joints anywhere.  Strong pulses in all 4 extremities with no clubbing, no cyanosis, no edema.  Neurological:  Motor strength equal no obvious deficit, sensory grossly intact.   No cranial nerve deficit.  No tongue deviation.  No facial droop.  No slurred speech.    Genitourinary: No Palomino catheter  Back:  ----------------------------------------------------------------------------------------------------------------------  ----------------------------------------------------------------------------------------------------------------------  Results from last 7 days   Lab Units 01/02/24  1409   HSTROP T ng/L 9     Results from last 7 days   Lab Units 01/04/24  0120 01/03/24  0039 01/02/24  1409   CRP mg/dL  --   --  <0.30   LACTATE mmol/L  --   --  2.0   WBC 10*3/mm3 10.81* 9.93 10.76   HEMOGLOBIN g/dL 14.0 15.7 16.5*  "  HEMATOCRIT % 44.1 49.4* 51.2*   MCV fL 95.0 94.3 94.1   MCHC g/dL 31.7 31.8 32.2   PLATELETS 10*3/mm3 201 223 243     Results from last 7 days   Lab Units 01/02/24  1403   PH, ARTERIAL pH units 7.402   PO2 ART mm Hg 54.4*   PCO2, ARTERIAL mm Hg 38.9   HCO3 ART mmol/L 24.2     Results from last 7 days   Lab Units 01/03/24  0039 01/02/24  1409   SODIUM mmol/L 140 140   POTASSIUM mmol/L 4.3 4.5   MAGNESIUM mg/dL  --  2.0   CHLORIDE mmol/L 104 105   CO2 mmol/L 23.8 21.9*   BUN mg/dL 23 23   CREATININE mg/dL 0.84 1.09*   CALCIUM mg/dL 9.1 9.6   GLUCOSE mg/dL 232* 150*   ALBUMIN g/dL  --  4.3   BILIRUBIN mg/dL  --  0.4   ALK PHOS U/L  --  107   AST (SGOT) U/L  --  18   ALT (SGPT) U/L  --  12   Estimated Creatinine Clearance: 61 mL/min (by C-G formula based on SCr of 0.84 mg/dL).    No results found for: \"AMMONIA\"      Blood Culture   Date Value Ref Range Status   01/02/2024 No growth at 24 hours  Preliminary   01/02/2024 No growth at 24 hours  Preliminary     No results found for: \"URINECX\"  No results found for: \"WOUNDCX\"  No results found for: \"STOOLCX\"    I have personally looked at the labs and they are summarized above.  ----------------------------------------------------------------------------------------------------------------------  Imaging Results (Last 24 Hours)       ** No results found for the last 24 hours. **          ----------------------------------------------------------------------------------------------------------------------  Assessment and Plan:  -Left upper lobe atelectasis  -Acute hypoxic respiratory failure  -Probable pneumonia left upper lobe  -Obesity with BMI 35  -History of COPD without exacerbation  -History of diabetes so far fairly controlled  -History of hyperlipidemia  -History of peripheral arterial disease status post left external iliac stent placement    Continue off Plavix in anticipation for bronchoscopy, neb treatment, Mucomyst, monitor platelet count while on full dose " Lovenox and replacement of Plavix as per pulmonologist recommendation.  Ambulate out of bed to chair.    Disposition pending      Cora Carver MD  01/04/24  13:28 EST

## 2024-01-04 NOTE — PLAN OF CARE
Goal Outcome Evaluation: Patient has been very pleasant today. Compliant with all medications and care as ordered. She remains on 5L/NC, saturations maintaining 89-93%. She has been ambulating to the [facility] with nursing assistance, steady gait noted. Family has been at bedside during visiting hours. She is currently resting in bed. Will continue with plan of care.

## 2024-01-05 LAB
GLUCOSE BLDC GLUCOMTR-MCNC: 130 MG/DL (ref 70–130)
GLUCOSE BLDC GLUCOMTR-MCNC: 134 MG/DL (ref 70–130)
GLUCOSE BLDC GLUCOMTR-MCNC: 144 MG/DL (ref 70–130)
GLUCOSE BLDC GLUCOMTR-MCNC: 150 MG/DL (ref 70–130)

## 2024-01-05 PROCEDURE — 25010000002 ENOXAPARIN PER 10 MG: Performed by: INTERNAL MEDICINE

## 2024-01-05 PROCEDURE — 25010000002 CEFTRIAXONE PER 250 MG: Performed by: HOSPITALIST

## 2024-01-05 PROCEDURE — 94669 MECHANICAL CHEST WALL OSCILL: CPT

## 2024-01-05 PROCEDURE — 94664 DEMO&/EVAL PT USE INHALER: CPT

## 2024-01-05 PROCEDURE — 94799 UNLISTED PULMONARY SVC/PX: CPT

## 2024-01-05 PROCEDURE — 99232 SBSQ HOSP IP/OBS MODERATE 35: CPT | Performed by: HOSPITALIST

## 2024-01-05 PROCEDURE — 94761 N-INVAS EAR/PLS OXIMETRY MLT: CPT

## 2024-01-05 PROCEDURE — 94668 MNPJ CHEST WALL SBSQ: CPT

## 2024-01-05 PROCEDURE — 63710000001 INSULIN LISPRO (HUMAN) PER 5 UNITS: Performed by: HOSPITALIST

## 2024-01-05 PROCEDURE — 82948 REAGENT STRIP/BLOOD GLUCOSE: CPT

## 2024-01-05 RX ADMIN — IPRATROPIUM BROMIDE AND ALBUTEROL SULFATE 3 ML: 2.5; .5 SOLUTION RESPIRATORY (INHALATION) at 06:45

## 2024-01-05 RX ADMIN — MONTELUKAST SODIUM 10 MG: 10 TABLET, COATED ORAL at 21:29

## 2024-01-05 RX ADMIN — DOCUSATE SODIUM 50 MG AND SENNOSIDES 8.6 MG 2 TABLET: 8.6; 5 TABLET, FILM COATED ORAL at 08:35

## 2024-01-05 RX ADMIN — ENOXAPARIN SODIUM 90 MG: 100 INJECTION SUBCUTANEOUS at 14:37

## 2024-01-05 RX ADMIN — Medication 10 ML: at 21:29

## 2024-01-05 RX ADMIN — CEFTRIAXONE 1000 MG: 1 INJECTION, POWDER, FOR SOLUTION INTRAMUSCULAR; INTRAVENOUS at 16:10

## 2024-01-05 RX ADMIN — METOPROLOL SUCCINATE 25 MG: 25 TABLET, EXTENDED RELEASE ORAL at 08:34

## 2024-01-05 RX ADMIN — FLUTICASONE PROPIONATE 2 SPRAY: 50 SPRAY, METERED NASAL at 08:35

## 2024-01-05 RX ADMIN — IPRATROPIUM BROMIDE AND ALBUTEROL SULFATE 3 ML: 2.5; .5 SOLUTION RESPIRATORY (INHALATION) at 13:28

## 2024-01-05 RX ADMIN — ENOXAPARIN SODIUM 90 MG: 100 INJECTION SUBCUTANEOUS at 00:14

## 2024-01-05 RX ADMIN — IPRATROPIUM BROMIDE AND ALBUTEROL SULFATE 3 ML: 2.5; .5 SOLUTION RESPIRATORY (INHALATION) at 00:35

## 2024-01-05 RX ADMIN — IPRATROPIUM BROMIDE AND ALBUTEROL SULFATE 3 ML: 2.5; .5 SOLUTION RESPIRATORY (INHALATION) at 18:39

## 2024-01-05 RX ADMIN — DOXYCYCLINE 100 MG: 100 INJECTION, POWDER, LYOPHILIZED, FOR SOLUTION INTRAVENOUS at 18:15

## 2024-01-05 RX ADMIN — Medication 10 ML: at 08:35

## 2024-01-05 RX ADMIN — CETIRIZINE HYDROCHLORIDE 10 MG: 10 TABLET, FILM COATED ORAL at 08:35

## 2024-01-05 RX ADMIN — DOXYCYCLINE 100 MG: 100 INJECTION, POWDER, LYOPHILIZED, FOR SOLUTION INTRAVENOUS at 06:13

## 2024-01-05 RX ADMIN — INSULIN LISPRO 2 UNITS: 100 INJECTION, SOLUTION INTRAVENOUS; SUBCUTANEOUS at 16:30

## 2024-01-05 RX ADMIN — BUPROPION HYDROCHLORIDE 150 MG: 150 TABLET, EXTENDED RELEASE ORAL at 21:29

## 2024-01-05 RX ADMIN — ATORVASTATIN CALCIUM 10 MG: 10 TABLET, FILM COATED ORAL at 21:29

## 2024-01-05 NOTE — PROGRESS NOTES
[facility]     Patient Identification:  Name:  Evie Shah  Age:  78 y.o.  Sex:  female  :  1945  MRN:  [id number]  Visit Number:  [id number]  Primary Care Provider:  Camryn Mota PA    Length of stay:  3    Subjective: Patient seen and examined she has less shortness of breath, still unable to cough or expectorate afebrile, vital signs stable.  Still requires 5 L nasal cannula to maintain O2 saturation above 90.    Chief Complaint: Short of breath  ----------------------------------------------------------------------------------------------------------------------  Current Hospital Meds:  atorvastatin, 10 mg, Oral, Nightly  buPROPion SR, 150 mg, Oral, Nightly  cefTRIAXone, 1,000 mg, Intravenous, Q24H  cetirizine, 10 mg, Oral, Daily  cholecalciferol, 50,000 Units, Oral, Weekly  [Held by provider] clopidogrel, 75 mg, Oral, Daily  doxycycline, 100 mg, Intravenous, Q12H  enoxaparin, 1 mg/kg, Subcutaneous, Q12H  fluticasone, 2 spray, Nasal, Daily  insulin lispro, 2-7 Units, Subcutaneous, 4x Daily AC & at Bedtime  ipratropium-albuterol, 3 mL, Nebulization, 4x Daily - RT  metoprolol succinate XL, 25 mg, Oral, Daily  montelukast, 10 mg, Oral, Nightly  senna-docusate sodium, 2 tablet, Oral, BID  sodium chloride, 10 mL, Intravenous, Q12H         ----------------------------------------------------------------------------------------------------------------------  Vital Signs:  Temp:  [98.2 °F (36.8 °C)-98.6 °F (37 °C)] 98.3 °F (36.8 °C)  Heart Rate:  [67-92] 76  Resp:  [18-20] 18  BP: (124-154)/(67-90) 146/90       Tele: - Rhythm 73 bpm      24  0500 24  0500 24  0500   Weight: 90.4 kg (199 lb 4.8 oz) (admit weight, patient not on floor 24 hrs) 90.9 kg (200 lb 4.8 oz) 91.8 kg (202 lb 4.8 oz)     Body mass index is 35.84 kg/m².    Intake/Output Summary (Last 24 hours) at 2024 1223  Last data filed at 2024 0800  Gross per 24 hour    Intake 240 ml   Output 350 ml   Net -110 ml     Diet: Diabetic Diets; Consistent Carbohydrate; Texture: Regular Texture (IDDSI 7); Fluid Consistency: Thin (IDDSI 0)  ----------------------------------------------------------------------------------------------------------------------  Physical exam:  General: Comfortable,awake, alert, oriented to self, place, and time, well-developed and well-nourished.  No respiratory distress.    Skin:  Skin is warm and dry. No rash noted. No pallor.    HENT:  Head:  Normocephalic and atraumatic.  Mouth:  Moist mucous membranes.    Eyes:  Conjunctivae and EOM are normal.  Pupils are equal, round, and reactive to light.  No scleral icterus.    Neck:  Neck supple.  No JVD present.    Pulmonary/Chest: Improved air movement left lung, less coarse breath sounds, no wheezing equal chest expansion   Cardiovascular:  Normal rate, regular rhythm and normal heart sounds with no murmur.  Abdominal: Flabby and obese soft.  Bowel sounds are normal.  No distension and no tenderness.   Extremities:  No edema, no tenderness, and no deformity.  No red or swollen joints anywhere.  Strong pulses in all 4 extremities with no clubbing, no cyanosis, no edema.  Neurological:  Motor strength equal no obvious deficit, sensory grossly intact.   No cranial nerve deficit.  No tongue deviation.  No facial droop.  No slurred speech.    Genitourinary: No Palomino catheter  Back:  ----------------------------------------------------------------------------------------------------------------------  ----------------------------------------------------------------------------------------------------------------------  Results from last 7 days   Lab Units 01/02/24  1409   HSTROP T ng/L 9     Results from last 7 days   Lab Units 01/04/24  0120 01/03/24  0039 01/02/24  1409   CRP mg/dL  --   --  <0.30   LACTATE mmol/L  --   --  2.0   WBC 10*3/mm3 10.81* 9.93 10.76   HEMOGLOBIN g/dL 14.0 15.7 16.5*   HEMATOCRIT % 44.1  "49.4* 51.2*   MCV fL 95.0 94.3 94.1   MCHC g/dL 31.7 31.8 32.2   PLATELETS 10*3/mm3 201 223 243     Results from last 7 days   Lab Units 01/02/24  1403   PH, ARTERIAL pH units 7.402   PO2 ART mm Hg 54.4*   PCO2, ARTERIAL mm Hg 38.9   HCO3 ART mmol/L 24.2     Results from last 7 days   Lab Units 01/03/24  0039 01/02/24  1409   SODIUM mmol/L 140 140   POTASSIUM mmol/L 4.3 4.5   MAGNESIUM mg/dL  --  2.0   CHLORIDE mmol/L 104 105   CO2 mmol/L 23.8 21.9*   BUN mg/dL 23 23   CREATININE mg/dL 0.84 1.09*   CALCIUM mg/dL 9.1 9.6   GLUCOSE mg/dL 232* 150*   ALBUMIN g/dL  --  4.3   BILIRUBIN mg/dL  --  0.4   ALK PHOS U/L  --  107   AST (SGOT) U/L  --  18   ALT (SGPT) U/L  --  12   Estimated Creatinine Clearance: 61.3 mL/min (by C-G formula based on SCr of 0.84 mg/dL).    No results found for: \"AMMONIA\"      Blood Culture   Date Value Ref Range Status   01/02/2024 No growth at 2 days  Preliminary   01/02/2024 No growth at 2 days  Preliminary     No results found for: \"URINECX\"  No results found for: \"WOUNDCX\"  No results found for: \"STOOLCX\"    I have personally looked at the labs and they are summarized above.  ----------------------------------------------------------------------------------------------------------------------  Imaging Results (Last 24 Hours)       ** No results found for the last 24 hours. **          ----------------------------------------------------------------------------------------------------------------------  Assessment and Plan:  -Left upper lobe atelectasis mucous plug versus endobronchial mass  -History of peripheral arterial disease left external iliac stent placement in the past  -Obesity with BMI 35  -Acute hypoxic respiratory failure secondary to atelectasis of left lung  -History of COPD without exacerbation  -diabetes type 2 fairly controlled  -Probable pneumonia left upper lobe culture negative  -History of dyslipidemia    Continue Mucomyst neb treatment, ambulate out of bed to chair, " continue full dose anticoagulation while off Plavix.  Pulmonologist recommendation.  For bronchoscopy this coming Monday.    Disposition pending home      Cora Carver MD  01/05/24  12:23 EST

## 2024-01-05 NOTE — PLAN OF CARE
Goal Outcome Evaluation:  Plan of Care Reviewed With: patient        Progress: no change  Outcome Evaluation: PT is asleep in bed at this time. PT is A/Ox4 and currently on 5L NC. PT has had no complaints of pain, or discomfort. PT has been up to bedside commode throughout shift. VSS. Afebrile. Will Continue to Monitor PT.         Problem: Adult Inpatient Plan of Care  Goal: Plan of Care Review  Outcome: Ongoing, Progressing  Flowsheets (Taken 1/5/2024 0310)  Progress: no change  Plan of Care Reviewed With: patient  Outcome Evaluation: PT is asleep in bed at this time. PT is A/Ox4 and currently on 5L NC. PT has had no complaints of pain, or discomfort. PT has been up to bedside commode throughout shift. VSS. Afebrile. Will Continue to Monitor PT.  Goal: Patient-Specific Goal (Individualized)  Outcome: Ongoing, Progressing  Goal: Absence of Hospital-Acquired Illness or Injury  Outcome: Ongoing, Progressing  Intervention: Identify and Manage Fall Risk  Recent Flowsheet Documentation  Taken 1/5/2024 0300 by Amina Muniz RN  Safety Promotion/Fall Prevention:   activity supervised   assistive device/personal items within reach   clutter free environment maintained   fall prevention program maintained   nonskid shoes/slippers when out of bed   safety round/check completed   room organization consistent  Taken 1/4/2024 2300 by Amina Muniz, RN  Safety Promotion/Fall Prevention:   activity supervised   assistive device/personal items within reach   clutter free environment maintained   fall prevention program maintained   nonskid shoes/slippers when out of bed   safety round/check completed   room organization consistent  Taken 1/4/2024 2215 by Amina Muniz, RN  Safety Promotion/Fall Prevention:   activity supervised   assistive device/personal items within reach   clutter free environment maintained   fall prevention program maintained   nonskid shoes/slippers when out of bed   safety round/check completed    room organization consistent  Taken 1/4/2024 2100 by Amina Muniz RN  Safety Promotion/Fall Prevention:   activity supervised   assistive device/personal items within reach   clutter free environment maintained   fall prevention program maintained   nonskid shoes/slippers when out of bed   safety round/check completed   room organization consistent  Taken 1/4/2024 1900 by Amina Muniz RN  Safety Promotion/Fall Prevention:   activity supervised   assistive device/personal items within reach   clutter free environment maintained   fall prevention program maintained   nonskid shoes/slippers when out of bed   safety round/check completed   room organization consistent  Intervention: Prevent Skin Injury  Recent Flowsheet Documentation  Taken 1/4/2024 2215 by Amina Muniz RN  Body Position: position changed independently  Skin Protection:   adhesive use limited   drying agents applied   incontinence pads utilized  Intervention: Prevent and Manage VTE (Venous Thromboembolism) Risk  Recent Flowsheet Documentation  Taken 1/4/2024 2215 by Amina Muniz RN  Activity Management: up to bedside commode  VTE Prevention/Management: (See MAR) other (see comments)  Range of Motion: active ROM (range of motion) encouraged  Intervention: Prevent Infection  Recent Flowsheet Documentation  Taken 1/5/2024 0300 by Amina Muniz RN  Infection Prevention: rest/sleep promoted  Taken 1/4/2024 2300 by Amina Muniz RN  Infection Prevention: rest/sleep promoted  Taken 1/4/2024 2215 by Amina Muniz RN  Infection Prevention: rest/sleep promoted  Taken 1/4/2024 2100 by Amina Muniz RN  Infection Prevention: rest/sleep promoted  Taken 1/4/2024 1900 by Amina Muniz RN  Infection Prevention: rest/sleep promoted  Goal: Optimal Comfort and Wellbeing  Outcome: Ongoing, Progressing  Intervention: Provide Person-Centered Care  Recent Flowsheet Documentation  Taken 1/4/2024 2215 by Amina Muniz RN  Trust  Relationship/Rapport:   care explained   choices provided   questions answered   thoughts/feelings acknowledged   reassurance provided  Goal: Readiness for Transition of Care  Outcome: Ongoing, Progressing     Problem: Fluid Imbalance (Pneumonia)  Goal: Fluid Balance  Outcome: Ongoing, Progressing  Intervention: Monitor and Manage Fluid Balance  Recent Flowsheet Documentation  Taken 1/4/2024 2215 by Amina Muniz, RN  Fluid/Electrolyte Management: fluids provided     Problem: Infection (Pneumonia)  Goal: Resolution of Infection Signs and Symptoms  Outcome: Ongoing, Progressing     Problem: Respiratory Compromise (Pneumonia)  Goal: Effective Oxygenation and Ventilation  Outcome: Ongoing, Progressing  Intervention: Promote Airway Secretion Clearance  Recent Flowsheet Documentation  Taken 1/4/2024 2215 by Amina Muniz, RN  Cough And Deep Breathing: done independently per patient  Intervention: Optimize Oxygenation and Ventilation  Recent Flowsheet Documentation  Taken 1/4/2024 2215 by Amina Muniz RN  Head of Bed (HOB) Positioning: HOB at 30-45 degrees  Airway/Ventilation Management: airway patency maintained     Problem: Fall Injury Risk  Goal: Absence of Fall and Fall-Related Injury  Outcome: Ongoing, Progressing  Intervention: Identify and Manage Contributors  Recent Flowsheet Documentation  Taken 1/5/2024 0300 by Amina Muniz, RN  Medication Review/Management: medications reviewed  Taken 1/4/2024 2300 by Amina Muniz, RN  Medication Review/Management: medications reviewed  Taken 1/4/2024 2215 by Amina Muniz, RN  Medication Review/Management: medications reviewed  Self-Care Promotion:   independence encouraged   BADL personal objects within reach   BADL personal routines maintained  Taken 1/4/2024 2100 by Amina Muniz, RN  Medication Review/Management: medications reviewed  Taken 1/4/2024 1900 by Amina Muniz, RN  Medication Review/Management: medications reviewed  Intervention:  Promote Injury-Free Environment  Recent Flowsheet Documentation  Taken 1/5/2024 0300 by Amina Muniz, RN  Safety Promotion/Fall Prevention:   activity supervised   assistive device/personal items within reach   clutter free environment maintained   fall prevention program maintained   nonskid shoes/slippers when out of bed   safety round/check completed   room organization consistent  Taken 1/4/2024 2300 by Amina Muniz, RN  Safety Promotion/Fall Prevention:   activity supervised   assistive device/personal items within reach   clutter free environment maintained   fall prevention program maintained   nonskid shoes/slippers when out of bed   safety round/check completed   room organization consistent  Taken 1/4/2024 2215 by Amina Muniz, RN  Safety Promotion/Fall Prevention:   activity supervised   assistive device/personal items within reach   clutter free environment maintained   fall prevention program maintained   nonskid shoes/slippers when out of bed   safety round/check completed   room organization consistent  Taken 1/4/2024 2100 by Amina Muniz, RN  Safety Promotion/Fall Prevention:   activity supervised   assistive device/personal items within reach   clutter free environment maintained   fall prevention program maintained   nonskid shoes/slippers when out of bed   safety round/check completed   room organization consistent  Taken 1/4/2024 1900 by Amina Muniz, RN  Safety Promotion/Fall Prevention:   activity supervised   assistive device/personal items within reach   clutter free environment maintained   fall prevention program maintained   nonskid shoes/slippers when out of bed   safety round/check completed   room organization consistent

## 2024-01-05 NOTE — CASE MANAGEMENT/SOCIAL WORK
Continued Stay Note  [facility]     Patient Name: Evie Shah  MRN: [id number]  Today's Date: 1/5/2024    Admit Date: 1/2/2024    Plan: CM spoke with patient's daughter d/t patient in bathroom.  Plan is still to return home at discharge.  Patient gets DME from [facility] in [place].  Patient has requested 4 Prong Cane & Glucometer at discharge.  Family will provide transportation at discharge.  Patient is going for Bronch & possible biopsy on Monday or Tuesday and will be discharged when stable.  No other issues or concerns are noted at this time.  CM will cotinue to follow and assist with any discharge needs.   Discharge Plan       Row Name 01/05/24 1546       Plan    Plan CM spoke with patient's daughter d/t patient in bathroom.  Plan is still to return home at discharge.  Patient gets DME from [facility] in [place].  Patient has requested 4 Prong Cane & Glucometer at discharge.  Family will provide transportation at discharge.  Patient is going for Bronch & possible biopsy on Monday or Tuesday and will be discharged when stable.  No other issues or concerns are noted at this time.  CM will cotinue to follow and assist with any discharge needs.    Patient/Family in Agreement with Plan yes    Plan Comments 1/4:  5L N/C, IV Rocephin, Doxy, Pt is ambulating in room, Pt worked with PT yest & marched in place 2/2 short O2 line, continue mucomyst, Bronch scheduled for Monday or Tuesday 2/2 anticoagulation, Home @ [facility]                   Discharge Codes    No documentation.                 Expected Discharge Date and Time       Expected Discharge Date Expected Discharge Time    Jan 8, 2024               Hiral Erwin RN

## 2024-01-05 NOTE — CONSULTS
COPD Education        Referring Provider: Krista Erwin RN, Case Management  Hospitalist: Dr. Carver  Reason for Consultation: COPD Exacerbation  Pulmonologist: none  Last outpatient pulmonary visit: none  Length of Diagnosis: 4 years  Last Hospital stay for COPD? None    Subjective .     Age: 78 y.o.  Sex: female  What stage have you been told you are in? Unknown  Do you use a CPAP or BIPAP? no   Have you had any recent weight loss? no  How many pillows do you use? 2    Last PFT/when/where? none  FEV1: N/A    Classification of Airflow Limitation Severity in COPD (Based on Post-Bronchodilator FEV1)  Gold 1: Mild FEV1 ? 80% predicted   Gold 2:  Moderate 50% ? FEV1 < 80% predicted   Gold 3: Severe 30% ? FEV1 < 50% predicted   Gold 4: Very Severe FEV1 < 30% predicted     FEV1/FVC: N/A    Do you have dyspnea? yes Dyspnea on exertion  Home O2: yes, 2L NC PRN    Social History:  Social History     Socioeconomic History    Marital status:     Number of children: 3    Years of education: 12   Tobacco Use    Smoking status: Former, 48 pack years     Types: Cigarettes     Quit date: 2015     Years since quittin.6    Smokeless tobacco: Never   Vaping Use    Vaping Use: Never used   Substance and Sexual Activity    Alcohol use: No    Drug use: No    Sexual activity: Defer       Smoking Cessation: No    Airway Clearance methods utilized: no    History of Sleep Apnea: no  Patient's Last ABG:  Site   Date Value Ref Range Status   2024 Left Brachial  Final     Isaac's Test   Date Value Ref Range Status   2024 N/A  Final     pH, Arterial   Date Value Ref Range Status   2024 7.402 7.350 - 7.450 pH units Final     pCO2, Arterial   Date Value Ref Range Status   2024 38.9 35.0 - 45.0 mm Hg Final     pO2, Arterial   Date Value Ref Range Status   2024 54.4 (C) 83.0 - 108.0 mm Hg Final     Comment:     85 Value below critical limit     HCO3, Arterial   Date Value Ref Range Status    01/02/2024 24.2 20.0 - 26.0 mmol/L Final     Base Excess, Arterial   Date Value Ref Range Status   01/02/2024 -0.4 (L) 0.0 - 2.0 mmol/L Final     O2 Saturation, Arterial   Date Value Ref Range Status   01/02/2024 86.3 (L) 94.0 - 99.0 % Final     Comment:     84 Value below reference range     Hemoglobin, Blood Gas   Date Value Ref Range Status   01/02/2024 16.5 13.5 - 17.5 g/dL Final     Hematocrit, Blood Gas   Date Value Ref Range Status   01/02/2024 50.7 38.0 - 51.0 % Final     Oxyhemoglobin   Date Value Ref Range Status   01/02/2024 85.6 (L) 94 - 99 % Final     Comment:     84 Value below reference range     Methemoglobin   Date Value Ref Range Status   01/02/2024 0.00 0.00 - 3.00 % Final     Carboxyhemoglobin   Date Value Ref Range Status   01/02/2024 0.8 0 - 5 % Final     CO2 Content   Date Value Ref Range Status   01/02/2024 25.4 22 - 33 mmol/L Final     Barometric Pressure for Blood Gas   Date Value Ref Range Status   01/02/2024 732 mmHg Final     Modality   Date Value Ref Range Status   01/02/2024 Nasal Cannula  Final     FIO2   Date Value Ref Range Status   01/02/2024 36 % Final        Objective     SpO2 SpO2: 94 % (01/05/24 1106)  Device Device (Oxygen Therapy): nasal cannula (01/05/24 0800)  Flow Flow (L/min): 5 (01/05/24 0800)  Home Equipment Used: O2 Provided by: [facility]  Breath Sounds: diminished breath sounds- throughout  CAT Assessment: (COPD Assessment Test)   I never cough. 0[] 1[] 2[x] 3[] 4[] 5[] I cough all the time.  I have no phlegm (mucus) in my chest. 0[] 1[] 2[x] 3[] 4[] 5[] My chest is completely full of phlegm (mucus).  My chest does not feel tight at all. 0[x] 1[] 2[] 3[] 4[] 5[] My chest feels very tight.  When I walk up a hill or one flight of stairs I am not breathless. 0[] 1[] 2[] 3[] 4[x] 5[] When I walk up a hill or one flight of stairs I am very breathless.  I am not limited doing any activities at home 0[] 1[] 2[x] 3[] 4[] 5[] I am very limited doing activities  at home.  I am confident leaving my home despite my lung condition. 0[] 1[x] 2[] 3[] 4[] 5[] I am not at all confident leaving my home because of my lung condition.  I sleep soundly. 0[] 1[] 2[x] 3[] 4[] 5[] I don't sleep soundly because of my lung condition.  I have lots of energy. 0[] 1[] 2[] 3[x] 4[] 5[]  I have no energy at all.  CAT Score(COPD Assessment Test): 16  Score  Impact Guidance    0-9 Low If smoke, encourage to stop smoking  Flu, Pneumonia, and Covid need to be up to date  Avoid COPD Triggers    10-20 Medium Encouraged all guidance for low impact score  Consider additional medications    21-40 High Encouraged all medium impact scores  Recommend referral to pulmonologist      STOP BANG Screening Questionnaire:   Snoring?   Do you snore loudly (loud enough to be heard through closed doors or that your bed partner elbows you for snoring at night)? {yes     Tired?   Do you often feel tired, fatigued, or sleepy during the daytime (such as falling asleep during driving or talking to someone)? yes               Observed?   Has anyone observed you stop breathing or choking/gasping during your sleep? no             Pressure?   Do you have or are you being treated for high blood pressure? no             Body mass index (BMI) more than 35 kg/m2?  Body mass index is 35.84 kg/m². yes    Age older than 50? yes       Neck size large (measured around Michele's apple)? Is your shirt collar 16 inches (40 cm) or larger? yes  Gender (biologic sex): male? no    STOP BANG Score Total: 5    STOP BANG Interpretation    Risk category Risk factors   Low risk Yes to 0 to 2 of the questions   Intermediate risk Yes to 3 to 4 questions   High risk Yes to 5 to 8 questions   High risk Yes to 2 or more of 4 STOP questions and BMI >35 kg/m2   High risk Yes to 2 or more of 4 STOP questions and neck circumference ?16 inches (?40 cm)   High risk Yes to 2 or more of 4 STOP questions and male gender (biologic sex)              Home  Medications:  Facility-Administered Medications Prior to Admission   Medication Dose Route Frequency Provider Last Rate Last Admin    cyanocobalamin injection 1,000 mcg  1,000 mcg Intramuscular Q28 Days Camryn Mota PA   1,000 mcg at 09/19/23 1114     Medications Prior to Admission   Medication Sig Dispense Refill Last Dose    acetaminophen (TYLENOL) 500 MG tablet Take 1 tablet by mouth Every 6 (Six) Hours As Needed for Mild Pain.   Past Week    albuterol sulfate  (90 Base) MCG/ACT inhaler Inhale 2 puffs Every 4 (Four) Hours As Needed for Shortness of Air. 18 g 5 Past Month    amLODIPine-benazepril (LOTREL) 10-40 MG per capsule Take 1 capsule by mouth Daily. 90 capsule 3 1/2/2024    atorvastatin (LIPITOR) 10 MG tablet Take 1 tablet by mouth Every Night. 90 tablet 3 1/1/2024    buPROPion SR (WELLBUTRIN SR) 150 MG 12 hr tablet Take 1 tablet by mouth Every Night.   1/1/2024    clopidogrel (PLAVIX) 75 MG tablet Take 1 tablet by mouth Daily. 90 tablet 3 1/2/2024    doxycycline (VIBRAMYCIN) 100 MG capsule Take 1 capsule by mouth 2 (Two) Times a Day for 10 days. 20 capsule 0 1/2/2024    empagliflozin (Jardiance) 25 MG tablet tablet Take 1 tablet by mouth Daily. 90 tablet 3 1/2/2024    fluticasone (FLONASE) 50 MCG/ACT nasal spray 2 sprays into the nostril(s) as directed by provider Daily. 16 g 1 1/2/2024    furosemide (LASIX) 20 MG tablet Take 1 tablet by mouth Daily. 90 tablet 3 1/2/2024    ipratropium-albuterol (DUO-NEB) 0.5-2.5 mg/3 ml nebulizer Take 3 mL by nebulization Every 4 (Four) Hours As Needed for Shortness of Air. 150 mL 1 1/2/2024    loratadine (Claritin) 10 MG tablet Take 1 tablet by mouth Every Night.   1/1/2024    metoprolol succinate XL (Toprol XL) 25 MG 24 hr tablet Take 1 tablet by mouth Daily. 90 tablet 3 1/2/2024    montelukast (SINGULAIR) 10 MG tablet Take 1 tablet by mouth Every Night. 90 tablet 3 1/1/2024    omega-3 acid ethyl esters (LOVAZA) 1 g capsule Take 2 capsules by mouth 2 (Two)  "Times a Day. 360 capsule 3 1/2/2024    potassium chloride 10 MEQ CR tablet Take 1 tablet by mouth Daily. 90 tablet 3 1/2/2024    promethazine-dextromethorphan (PROMETHAZINE-DM) 6.25-15 MG/5ML syrup Take 5 mL by mouth 2 (Two) Times a Day As Needed for Cough. 180 mL 0 1/1/2024    raloxifene (EVISTA) 60 MG tablet Take 1 tablet by mouth Daily. 90 tablet 3 1/2/2024    Tirzepatide (Mounjaro) 5 MG/0.5ML solution pen-injector Inject 0.5 mL under the skin into the appropriate area as directed Every 7 (Seven) Days. 2 mL 3 12/29/2023    vitamin D (ERGOCALCIFEROL) 1.25 MG (25025 UT) capsule capsule Take 1 capsule by mouth 1 (One) Time Per Week. 12 capsule 3 12/28/2023         Barriers to Learning? No    COPD education given via the booklet \"A Patient's Guide to COPD\".     COPD Zones: (Green/yellow/Red): YES     Exacerbation or Flare up signs and symptoms: YES     Causes of COPD Exacerbation:      Lung Infection YES     Indoor and Outdoor Irratants YES     COPD Medication Non-Compliance YES     Healthy eating and drinking habbits: YES     Exercise and Activity: YES     Managing Medications: YES     Patient understands use of Rescue Medications: YES       Patient understands use of Maintenance Medications: YES       Proper MDI technique (w/wo Spacer): YES       How to use a nebulizer: YES     How to clean a nebulizer: YES     Breathing Techniques:       Purse-lipped breathing YES     Oxygen therapy SAFETY:  YES       Action Plan            COPD/[facility] follow up scheduled: NO     Education Minutes with patient: YES and 45 minutes       Goals Discussed with patient: Keep home smoke free., Take medications as ordered., GO to Dr. appointments., Increase activity., Eat healthier., Increase use of pursed lip breathing., Decrease flare-ups., and Increase COPD Knowledge.      Thanks for allowing me to participate in Ms. Shah's care,    Angel Fitzpatrick, CORWIN, RRT  COPD Navigator  01/05/24  11:50 EST        "

## 2024-01-06 LAB
GLUCOSE BLDC GLUCOMTR-MCNC: 131 MG/DL (ref 70–130)
GLUCOSE BLDC GLUCOMTR-MCNC: 141 MG/DL (ref 70–130)
GLUCOSE BLDC GLUCOMTR-MCNC: 154 MG/DL (ref 70–130)
GLUCOSE BLDC GLUCOMTR-MCNC: 189 MG/DL (ref 70–130)

## 2024-01-06 PROCEDURE — 94669 MECHANICAL CHEST WALL OSCILL: CPT

## 2024-01-06 PROCEDURE — 99232 SBSQ HOSP IP/OBS MODERATE 35: CPT | Performed by: INTERNAL MEDICINE

## 2024-01-06 PROCEDURE — 94761 N-INVAS EAR/PLS OXIMETRY MLT: CPT

## 2024-01-06 PROCEDURE — 82948 REAGENT STRIP/BLOOD GLUCOSE: CPT

## 2024-01-06 PROCEDURE — 94668 MNPJ CHEST WALL SBSQ: CPT

## 2024-01-06 PROCEDURE — 25010000002 ENOXAPARIN PER 10 MG: Performed by: INTERNAL MEDICINE

## 2024-01-06 PROCEDURE — 94799 UNLISTED PULMONARY SVC/PX: CPT

## 2024-01-06 PROCEDURE — 94664 DEMO&/EVAL PT USE INHALER: CPT

## 2024-01-06 PROCEDURE — 63710000001 INSULIN LISPRO (HUMAN) PER 5 UNITS: Performed by: HOSPITALIST

## 2024-01-06 PROCEDURE — 25010000002 CEFTRIAXONE PER 250 MG: Performed by: HOSPITALIST

## 2024-01-06 PROCEDURE — 99232 SBSQ HOSP IP/OBS MODERATE 35: CPT | Performed by: HOSPITALIST

## 2024-01-06 RX ORDER — LISINOPRIL 10 MG/1
10 TABLET ORAL
Status: DISCONTINUED | OUTPATIENT
Start: 2024-01-06 | End: 2024-01-10

## 2024-01-06 RX ORDER — AMLODIPINE BESYLATE 10 MG/1
10 TABLET ORAL
Status: DISCONTINUED | OUTPATIENT
Start: 2024-01-06 | End: 2024-01-10

## 2024-01-06 RX ADMIN — DOXYCYCLINE 100 MG: 100 INJECTION, POWDER, LYOPHILIZED, FOR SOLUTION INTRAVENOUS at 06:00

## 2024-01-06 RX ADMIN — AMLODIPINE BESYLATE 10 MG: 10 TABLET ORAL at 17:27

## 2024-01-06 RX ADMIN — ENOXAPARIN SODIUM 90 MG: 100 INJECTION SUBCUTANEOUS at 13:15

## 2024-01-06 RX ADMIN — BUPROPION HYDROCHLORIDE 150 MG: 150 TABLET, EXTENDED RELEASE ORAL at 21:20

## 2024-01-06 RX ADMIN — Medication 10 ML: at 08:09

## 2024-01-06 RX ADMIN — METOPROLOL SUCCINATE 25 MG: 25 TABLET, EXTENDED RELEASE ORAL at 08:09

## 2024-01-06 RX ADMIN — FLUTICASONE PROPIONATE 2 SPRAY: 50 SPRAY, METERED NASAL at 08:10

## 2024-01-06 RX ADMIN — Medication 10 ML: at 21:20

## 2024-01-06 RX ADMIN — MONTELUKAST SODIUM 10 MG: 10 TABLET, COATED ORAL at 21:20

## 2024-01-06 RX ADMIN — CETIRIZINE HYDROCHLORIDE 10 MG: 10 TABLET, FILM COATED ORAL at 08:09

## 2024-01-06 RX ADMIN — ATORVASTATIN CALCIUM 10 MG: 10 TABLET, FILM COATED ORAL at 21:20

## 2024-01-06 RX ADMIN — LISINOPRIL 10 MG: 10 TABLET ORAL at 17:27

## 2024-01-06 RX ADMIN — IPRATROPIUM BROMIDE AND ALBUTEROL SULFATE 3 ML: 2.5; .5 SOLUTION RESPIRATORY (INHALATION) at 19:01

## 2024-01-06 RX ADMIN — DOCUSATE SODIUM 50 MG AND SENNOSIDES 8.6 MG 2 TABLET: 8.6; 5 TABLET, FILM COATED ORAL at 08:09

## 2024-01-06 RX ADMIN — ENOXAPARIN SODIUM 90 MG: 100 INJECTION SUBCUTANEOUS at 01:04

## 2024-01-06 RX ADMIN — IPRATROPIUM BROMIDE AND ALBUTEROL SULFATE 3 ML: 2.5; .5 SOLUTION RESPIRATORY (INHALATION) at 06:19

## 2024-01-06 RX ADMIN — IPRATROPIUM BROMIDE AND ALBUTEROL SULFATE 3 ML: 2.5; .5 SOLUTION RESPIRATORY (INHALATION) at 12:58

## 2024-01-06 RX ADMIN — INSULIN LISPRO 2 UNITS: 100 INJECTION, SOLUTION INTRAVENOUS; SUBCUTANEOUS at 17:27

## 2024-01-06 RX ADMIN — CEFTRIAXONE 1000 MG: 1 INJECTION, POWDER, FOR SOLUTION INTRAMUSCULAR; INTRAVENOUS at 15:54

## 2024-01-06 RX ADMIN — DOXYCYCLINE 100 MG: 100 INJECTION, POWDER, LYOPHILIZED, FOR SOLUTION INTRAVENOUS at 17:28

## 2024-01-06 NOTE — PLAN OF CARE
Goal Outcome Evaluation:      Pt had a non eventful day. She rested in bed and ambulated in salmon without issues. Family was at bedside. Being tx with abx. No s/s of distress noted.

## 2024-01-06 NOTE — PLAN OF CARE
Goal Outcome Evaluation:         Pt has had a non eventful day. She has rested in bed and ambulated in room. Being treated with antibiotics. She is  A&O and pleasant. No s/s of distress noted. Will continue to follow plan of care.

## 2024-01-06 NOTE — PROGRESS NOTES
"Progress Note Pulmonary      Subjective no new complaints.       Interval History: Rested well last night      As above  Review of Systems:    Reviewed ; unchanged       Vital Signs  Temp:  [97.9 °F (36.6 °C)-98.6 °F (37 °C)] 98.5 °F (36.9 °C)  Heart Rate:  [70-90] 75  Resp:  [18-20] 20  BP: (133-172)/(74-90) 172/82  Body mass index is 35.85 kg/m².    Intake/Output Summary (Last 24 hours) at 1/6/2024 1030  Last data filed at 1/5/2024 1200  Gross per 24 hour   Intake 480 ml   Output --   Net 480 ml     No intake/output data recorded.    Physical Exam:  General- normal in appearance, not in any acute distress    HEENT- pupils equally reactive to light, normal in size, no scleral icterus    Neck- supple    No JVD, no carotid bruit    Respiratory-bilateral air entry, occasional rhonchi, no wheezing, no rales.      Cardiovascular-  Normal S1 and S2. No S3, S4 or murmurs.    GI-nontender nondistended bowel sounds positive    CNS-alert oriented x3, grossly nonfocal    Extremities- pulses normal bilaterally , no clubbing and edema        Results Review:      Results from last 7 days   Lab Units 01/04/24  0120 01/03/24  0039 01/02/24  1409   WBC 10*3/mm3 10.81* 9.93 10.76   HEMOGLOBIN g/dL 14.0 15.7 16.5*   PLATELETS 10*3/mm3 201 223 243     Results from last 7 days   Lab Units 01/03/24  0039 01/02/24  1409   SODIUM mmol/L 140 140   POTASSIUM mmol/L 4.3 4.5   CHLORIDE mmol/L 104 105   CO2 mmol/L 23.8 21.9*   BUN mg/dL 23 23   CREATININE mg/dL 0.84 1.09*   CALCIUM mg/dL 9.1 9.6   GLUCOSE mg/dL 232* 150*   MAGNESIUM mg/dL  --  2.0     No results found for: \"INR\", \"PROTIME\"  Results from last 7 days   Lab Units 01/02/24  1409   ALK PHOS U/L 107   BILIRUBIN mg/dL 0.4   ALT (SGPT) U/L 12   AST (SGOT) U/L 18     Results from last 7 days   Lab Units 01/02/24  1403   PH, ARTERIAL pH units 7.402   PO2 ART mm Hg 54.4*   PCO2, ARTERIAL mm Hg 38.9   HCO3 ART mmol/L 24.2     Imaging Results (Last 24 Hours)       ** No results found for " the last 24 hours. **                 atorvastatin, 10 mg, Oral, Nightly  buPROPion SR, 150 mg, Oral, Nightly  cefTRIAXone, 1,000 mg, Intravenous, Q24H  cetirizine, 10 mg, Oral, Daily  cholecalciferol, 50,000 Units, Oral, Weekly  [Held by provider] clopidogrel, 75 mg, Oral, Daily  doxycycline, 100 mg, Intravenous, Q12H  enoxaparin, 1 mg/kg, Subcutaneous, Q12H  fluticasone, 2 spray, Nasal, Daily  insulin lispro, 2-7 Units, Subcutaneous, 4x Daily AC & at Bedtime  ipratropium-albuterol, 3 mL, Nebulization, 4x Daily - RT  metoprolol succinate XL, 25 mg, Oral, Daily  montelukast, 10 mg, Oral, Nightly  senna-docusate sodium, 2 tablet, Oral, BID  sodium chloride, 10 mL, Intravenous, Q12H           Medication Review:     Assessment & Plan   #1 acute hypoxic respiratory failure.  Requiring 5 L oxygen to maintain O2 sat above 90.     2.  Left upper lobe and possibly lingular atelectasis.  Concern mucous plug versus endobronchial tumor.     Strong family history of lung cancer.  Plavix on hold.  Given history of severe peripheral vascular disease, status post stent placement, I have decided to keep her on Lovenox while she is off Plavix.  Will hold Lovenox on Sunday.  Bronchoscopy with possible biopsy on Monday or Tuesday.  Dr. Stafford will provide pulmonary dreary coverage from Monday onward     3.  COPD: Continue nebulizer4.  Question of pneumonia.        Continue broad-spectrum antibiotics and supportive care.           GI- PPI prophylaxis        Endrocrinology- Maintain Blood sugar 140 -180        I have personally reviewed x-ray chest, CAT scan, labs, medication list.  Total time spent 30 minutes.                Victor Hugo Davis MD  01/06/24  10:30 EST

## 2024-01-06 NOTE — PROGRESS NOTES
[facility] HOSPITALIST PROGRESS NOTE     Patient Identification:  Name:  Evie Shah  Age:  78 y.o.  Sex:  female  :  1945  MRN:  [id number]  Visit Number:  [id number]  Primary Care Provider:  Camryn Mota PA    Length of stay:  4    Subjective: Patient seen and examined currently she is in bed asleep but arousable, the whole day yesterday and early this morning patient was able to ambulate with walker, she reports shortness of breath is improved significantly but still requires 5 L nasal cannula to maintain O2 saturation above 90.  No melena or hematemesis,    Chief Complaint: Respiratory failure  ----------------------------------------------------------------------------------------------------------------------  Current Hospital Meds:  atorvastatin, 10 mg, Oral, Nightly  buPROPion SR, 150 mg, Oral, Nightly  cefTRIAXone, 1,000 mg, Intravenous, Q24H  cetirizine, 10 mg, Oral, Daily  cholecalciferol, 50,000 Units, Oral, Weekly  [Held by provider] clopidogrel, 75 mg, Oral, Daily  doxycycline, 100 mg, Intravenous, Q12H  enoxaparin, 1 mg/kg, Subcutaneous, Q12H  fluticasone, 2 spray, Nasal, Daily  insulin lispro, 2-7 Units, Subcutaneous, 4x Daily AC & at Bedtime  ipratropium-albuterol, 3 mL, Nebulization, 4x Daily - RT  metoprolol succinate XL, 25 mg, Oral, Daily  montelukast, 10 mg, Oral, Nightly  senna-docusate sodium, 2 tablet, Oral, BID  sodium chloride, 10 mL, Intravenous, Q12H         ----------------------------------------------------------------------------------------------------------------------  Vital Signs:  Temp:  [97.9 °F (36.6 °C)-98.6 °F (37 °C)] 98.6 °F (37 °C)  Heart Rate:  [70-95] 90  Resp:  [20] 20  BP: (132-172)/(74-92) 132/92       Tele: Sinus rhythm      24  0500 24  0500 24  0500   Weight: 90.9 kg (200 lb 4.8 oz) 91.8 kg (202 lb 4.8 oz) 91.8 kg (202 lb 6.4 oz)     Body mass index is 35.85 kg/m².    Intake/Output Summary (Last 24 hours) at  1/6/2024 1359  Last data filed at 1/6/2024 0800  Gross per 24 hour   Intake 360 ml   Output --   Net 360 ml     Diet: Diabetic Diets; Consistent Carbohydrate; Texture: Regular Texture (IDDSI 7); Fluid Consistency: Thin (IDDSI 0)  ----------------------------------------------------------------------------------------------------------------------  Physical exam:  General: She was in bed when I entered the room, arousable, conversant, she just had breakfast earlier, patient reports she is feeling better, coughing nonproductive occasional  No respiratory distress.    Skin:  Skin is warm and dry. No rash noted. No pallor.    HENT:  Head:  Normocephalic and atraumatic.  Mouth:  Moist mucous membranes.    Eyes:  Conjunctivae and EOM are normal.  Pupils are equal, round, and reactive to light.  No scleral icterus.    Neck:  Neck supple.  No JVD present.    Pulmonary/Chest: Coarse breath sounds with crackles inspiratory and expiratory on the left side, right is clear good air movement.  Cardiovascular:  Normal rate, regular rhythm and normal heart sounds with no murmur.  Abdominal:  Soft.  Bowel sounds are normal.  No distension and no tenderness.   Extremities:  No edema, no tenderness, and no deformity.  No red or swollen joints anywhere.  Strong pulses in all 4 extremities with no clubbing, no cyanosis, no edema.  Neurological:  Motor strength equal no obvious deficit, sensory grossly intact.   No cranial nerve deficit.  No tongue deviation.  No facial droop.  No slurred speech.    Genitourinary: No Palomino catheter  Back:  ----------------------------------------------------------------------------------------------------------------------  ----------------------------------------------------------------------------------------------------------------------  Results from last 7 days   Lab Units 01/02/24  1409   HSTROP T ng/L 9     Results from last 7 days   Lab Units 01/04/24  0120 01/03/24  0039 01/02/24  1409   CRP  "mg/dL  --   --  <0.30   LACTATE mmol/L  --   --  2.0   WBC 10*3/mm3 10.81* 9.93 10.76   HEMOGLOBIN g/dL 14.0 15.7 16.5*   HEMATOCRIT % 44.1 49.4* 51.2*   MCV fL 95.0 94.3 94.1   MCHC g/dL 31.7 31.8 32.2   PLATELETS 10*3/mm3 201 223 243     Results from last 7 days   Lab Units 01/02/24  1403   PH, ARTERIAL pH units 7.402   PO2 ART mm Hg 54.4*   PCO2, ARTERIAL mm Hg 38.9   HCO3 ART mmol/L 24.2     Results from last 7 days   Lab Units 01/03/24  0039 01/02/24  1409   SODIUM mmol/L 140 140   POTASSIUM mmol/L 4.3 4.5   MAGNESIUM mg/dL  --  2.0   CHLORIDE mmol/L 104 105   CO2 mmol/L 23.8 21.9*   BUN mg/dL 23 23   CREATININE mg/dL 0.84 1.09*   CALCIUM mg/dL 9.1 9.6   GLUCOSE mg/dL 232* 150*   ALBUMIN g/dL  --  4.3   BILIRUBIN mg/dL  --  0.4   ALK PHOS U/L  --  107   AST (SGOT) U/L  --  18   ALT (SGPT) U/L  --  12   Estimated Creatinine Clearance: 61.3 mL/min (by C-G formula based on SCr of 0.84 mg/dL).    No results found for: \"AMMONIA\"      Blood Culture   Date Value Ref Range Status   01/02/2024 No growth at 3 days  Preliminary   01/02/2024 No growth at 3 days  Preliminary     No results found for: \"URINECX\"  No results found for: \"WOUNDCX\"  No results found for: \"STOOLCX\"    I have personally looked at the labs and they are summarized above.  ----------------------------------------------------------------------------------------------------------------------  Imaging Results (Last 24 Hours)       ** No results found for the last 24 hours. **          ----------------------------------------------------------------------------------------------------------------------  Assessment and Plan:  -Acute hypoxic respiratory failure  -Left upper lobe atelectasis mucous plug versus endobronchial mass  -History of peripheral vascular disease left external iliac stent placement  -History of obesity  with a BMI of 35  -Dyslipidemia  -History of COPD without exacerbation  -No pneumonia  -Diabetes fairly controlled    Check CBC in the " morning, monitor platelet while on full dose anticoagulation in replacement for Plavix due to peripheral arterial disease for planned bronchoscopy next week.      Disposition Home pending      Cora Carver MD  01/06/24  13:59 EST

## 2024-01-06 NOTE — PLAN OF CARE
Problem: Adult Inpatient Plan of Care  Goal: Absence of Hospital-Acquired Illness or Injury  Intervention: Prevent Skin Injury  Recent Flowsheet Documentation  Taken 1/5/2024 1932 by Mary Dudley, RN  Body Position: position changed independently  Skin Protection:   adhesive use limited   incontinence pads utilized     Problem: Adult Inpatient Plan of Care  Goal: Absence of Hospital-Acquired Illness or Injury  Intervention: Prevent and Manage VTE (Venous Thromboembolism) Risk  Recent Flowsheet Documentation  Taken 1/5/2024 1932 by Mary Dudley, RN  Activity Management: up ad haley  VTE Prevention/Management: (See MAR) other (see comments)   Goal Outcome Evaluation:

## 2024-01-07 PROBLEM — R91.8 LUNG MASS: Status: ACTIVE | Noted: 2024-01-02

## 2024-01-07 LAB
BACTERIA SPEC AEROBE CULT: NORMAL
BACTERIA SPEC AEROBE CULT: NORMAL
GLUCOSE BLDC GLUCOMTR-MCNC: 127 MG/DL (ref 70–130)
GLUCOSE BLDC GLUCOMTR-MCNC: 150 MG/DL (ref 70–130)
GLUCOSE BLDC GLUCOMTR-MCNC: 157 MG/DL (ref 70–130)
GLUCOSE BLDC GLUCOMTR-MCNC: 174 MG/DL (ref 70–130)

## 2024-01-07 PROCEDURE — 94799 UNLISTED PULMONARY SVC/PX: CPT

## 2024-01-07 PROCEDURE — 94669 MECHANICAL CHEST WALL OSCILL: CPT

## 2024-01-07 PROCEDURE — 94761 N-INVAS EAR/PLS OXIMETRY MLT: CPT

## 2024-01-07 PROCEDURE — 94664 DEMO&/EVAL PT USE INHALER: CPT

## 2024-01-07 PROCEDURE — 99232 SBSQ HOSP IP/OBS MODERATE 35: CPT | Performed by: INTERNAL MEDICINE

## 2024-01-07 PROCEDURE — 63710000001 INSULIN LISPRO (HUMAN) PER 5 UNITS: Performed by: HOSPITALIST

## 2024-01-07 PROCEDURE — 99232 SBSQ HOSP IP/OBS MODERATE 35: CPT | Performed by: HOSPITALIST

## 2024-01-07 PROCEDURE — 94668 MNPJ CHEST WALL SBSQ: CPT

## 2024-01-07 PROCEDURE — 25010000002 CEFTRIAXONE PER 250 MG: Performed by: HOSPITALIST

## 2024-01-07 PROCEDURE — 82948 REAGENT STRIP/BLOOD GLUCOSE: CPT

## 2024-01-07 RX ADMIN — SODIUM CHLORIDE 40 ML: 9 INJECTION, SOLUTION INTRAVENOUS at 06:00

## 2024-01-07 RX ADMIN — DOXYCYCLINE 100 MG: 100 INJECTION, POWDER, LYOPHILIZED, FOR SOLUTION INTRAVENOUS at 06:00

## 2024-01-07 RX ADMIN — IPRATROPIUM BROMIDE AND ALBUTEROL SULFATE 3 ML: 2.5; .5 SOLUTION RESPIRATORY (INHALATION) at 12:20

## 2024-01-07 RX ADMIN — IPRATROPIUM BROMIDE AND ALBUTEROL SULFATE 3 ML: 2.5; .5 SOLUTION RESPIRATORY (INHALATION) at 06:17

## 2024-01-07 RX ADMIN — LISINOPRIL 10 MG: 10 TABLET ORAL at 08:01

## 2024-01-07 RX ADMIN — Medication 10 ML: at 22:09

## 2024-01-07 RX ADMIN — ATORVASTATIN CALCIUM 10 MG: 10 TABLET, FILM COATED ORAL at 22:08

## 2024-01-07 RX ADMIN — DOXYCYCLINE 100 MG: 100 INJECTION, POWDER, LYOPHILIZED, FOR SOLUTION INTRAVENOUS at 17:14

## 2024-01-07 RX ADMIN — FLUTICASONE PROPIONATE 2 SPRAY: 50 SPRAY, METERED NASAL at 11:41

## 2024-01-07 RX ADMIN — MONTELUKAST SODIUM 10 MG: 10 TABLET, COATED ORAL at 22:08

## 2024-01-07 RX ADMIN — CETIRIZINE HYDROCHLORIDE 10 MG: 10 TABLET, FILM COATED ORAL at 08:02

## 2024-01-07 RX ADMIN — CEFTRIAXONE 1000 MG: 1 INJECTION, POWDER, FOR SOLUTION INTRAMUSCULAR; INTRAVENOUS at 14:48

## 2024-01-07 RX ADMIN — METOPROLOL SUCCINATE 25 MG: 25 TABLET, EXTENDED RELEASE ORAL at 08:02

## 2024-01-07 RX ADMIN — IPRATROPIUM BROMIDE AND ALBUTEROL SULFATE 3 ML: 2.5; .5 SOLUTION RESPIRATORY (INHALATION) at 18:29

## 2024-01-07 RX ADMIN — INSULIN LISPRO 2 UNITS: 100 INJECTION, SOLUTION INTRAVENOUS; SUBCUTANEOUS at 07:57

## 2024-01-07 RX ADMIN — BUPROPION HYDROCHLORIDE 150 MG: 150 TABLET, EXTENDED RELEASE ORAL at 22:08

## 2024-01-07 RX ADMIN — AMLODIPINE BESYLATE 10 MG: 10 TABLET ORAL at 08:02

## 2024-01-07 RX ADMIN — INSULIN LISPRO 2 UNITS: 100 INJECTION, SOLUTION INTRAVENOUS; SUBCUTANEOUS at 11:42

## 2024-01-07 RX ADMIN — Medication 10 ML: at 08:02

## 2024-01-07 RX ADMIN — INSULIN LISPRO 2 UNITS: 100 INJECTION, SOLUTION INTRAVENOUS; SUBCUTANEOUS at 22:08

## 2024-01-07 NOTE — PLAN OF CARE
Problem: Adult Inpatient Plan of Care  Goal: Absence of Hospital-Acquired Illness or Injury  Intervention: Prevent Skin Injury  Recent Flowsheet Documentation  Taken 1/6/2024 1930 by Mary Dudley RN  Body Position: position changed independently  Skin Protection:   adhesive use limited   incontinence pads utilized     Problem: Adult Inpatient Plan of Care  Goal: Absence of Hospital-Acquired Illness or Injury  Intervention: Prevent and Manage VTE (Venous Thromboembolism) Risk  Recent Flowsheet Documentation  Taken 1/6/2024 1930 by Mary Dudley, RN  Activity Management: activity encouraged  VTE Prevention/Management: (See MAR) other (see comments)   Goal Outcome Evaluation:

## 2024-01-07 NOTE — PROGRESS NOTES
"Progress Note Pulmonary      Subjective no new complaints.  Patient is in good spirit      Interval History: Rested well last night      As above  Review of Systems:    Reviewed ; unchanged       Vital Signs  Temp:  [97.9 °F (36.6 °C)-98.1 °F (36.7 °C)] 98 °F (36.7 °C)  Heart Rate:  [] 71  Resp:  [18-20] 20  BP: (141-182)/() 168/81  Body mass index is 35.55 kg/m².    Intake/Output Summary (Last 24 hours) at 1/7/2024 1104  Last data filed at 1/7/2024 0200  Gross per 24 hour   Intake 480 ml   Output --   Net 480 ml     No intake/output data recorded.    Physical Exam:  General- normal in appearance, not in any acute distress    HEENT- pupils equally reactive to light, normal in size, no scleral icterus    Neck- supple    No JVD, no carotid bruit    Respiratory-bilateral air entry, occasional rhonchi, no wheezing, no rales.      Cardiovascular-  Normal S1 and S2. No S3, S4 or murmurs.    GI-nontender nondistended bowel sounds positive    CNS-alert oriented x3, grossly nonfocal    Extremities- pulses normal bilaterally , no clubbing and edema        Results Review:      Results from last 7 days   Lab Units 01/04/24  0120 01/03/24  0039 01/02/24  1409   WBC 10*3/mm3 10.81* 9.93 10.76   HEMOGLOBIN g/dL 14.0 15.7 16.5*   PLATELETS 10*3/mm3 201 223 243     Results from last 7 days   Lab Units 01/03/24  0039 01/02/24  1409   SODIUM mmol/L 140 140   POTASSIUM mmol/L 4.3 4.5   CHLORIDE mmol/L 104 105   CO2 mmol/L 23.8 21.9*   BUN mg/dL 23 23   CREATININE mg/dL 0.84 1.09*   CALCIUM mg/dL 9.1 9.6   GLUCOSE mg/dL 232* 150*   MAGNESIUM mg/dL  --  2.0     No results found for: \"INR\", \"PROTIME\"  Results from last 7 days   Lab Units 01/02/24  1409   ALK PHOS U/L 107   BILIRUBIN mg/dL 0.4   ALT (SGPT) U/L 12   AST (SGOT) U/L 18     Results from last 7 days   Lab Units 01/02/24  1403   PH, ARTERIAL pH units 7.402   PO2 ART mm Hg 54.4*   PCO2, ARTERIAL mm Hg 38.9   HCO3 ART mmol/L 24.2     Imaging Results (Last 24 Hours)  "      ** No results found for the last 24 hours. **                 amLODIPine, 10 mg, Oral, Q24H  atorvastatin, 10 mg, Oral, Nightly  buPROPion SR, 150 mg, Oral, Nightly  cefTRIAXone, 1,000 mg, Intravenous, Q24H  cetirizine, 10 mg, Oral, Daily  cholecalciferol, 50,000 Units, Oral, Weekly  [Held by provider] clopidogrel, 75 mg, Oral, Daily  doxycycline, 100 mg, Intravenous, Q12H  [Held by provider] enoxaparin, 1 mg/kg, Subcutaneous, Q12H  fluticasone, 2 spray, Nasal, Daily  insulin lispro, 2-7 Units, Subcutaneous, 4x Daily AC & at Bedtime  ipratropium-albuterol, 3 mL, Nebulization, 4x Daily - RT  lisinopril, 10 mg, Oral, Q24H  metoprolol succinate XL, 25 mg, Oral, Daily  montelukast, 10 mg, Oral, Nightly  senna-docusate sodium, 2 tablet, Oral, BID  sodium chloride, 10 mL, Intravenous, Q12H           Medication Review:     Assessment & Plan   #1 acute hypoxic respiratory failure.  Requiring 5-6 L oxygen to maintain O2 sat above 90.     2.  Left upper lobe and possibly lingular atelectasis.  Concern mucous plug versus endobronchial tumor.     Strong family history of lung cancer.  Plavix on hold.  Given history of severe peripheral vascular disease, status post stent placement, I have decided to keep her on Lovenox while she is off Plavix.  Will hold Lovenox today.    I spoke with Dr. Stafford, who will resume the pulmonary care, to proceed with bronchoscopy on Monday.        3.  COPD: Continue nebulizer4.  Question of pneumonia.        Continue broad-spectrum antibiotics and supportive care.           GI- PPI prophylaxis        Endrocrinology- Maintain Blood sugar 140 -180        I have personally reviewed x-ray chest, CAT scan, labs, medication list.  Total time spent 30 minutes.                Victor Hugo Davis MD  01/07/24  11:04 EST

## 2024-01-07 NOTE — PLAN OF CARE
Goal Outcome Evaluation:      Pt resting in bed No C/o pain and in no distres Pt ambulated in room and in hallway today. NPO after midnight. Will continue to monitor and follow plan of care

## 2024-01-07 NOTE — PROGRESS NOTES
[facility] HOSPITALIST PROGRESS NOTE     Patient Identification:  Name:  Evie Shah  Age:  78 y.o.  Sex:  female  :  1945  MRN:  [id number]  Visit Number:  [id number]  Primary Care Provider:  Camryn Mota PA    Length of stay:  5    Subjective: Patient ambulating well less short of breath, still coughing nonproductive, still requires 5 to 6 L nasal cannula for hypoxic respite failure with left upper lobe atelectasis, Plavix has been held at least 5 days for bronchoscopy in the meantime was placed on full dose Lovenox per pulmonology due to peripheral vascular disease till she can restart her Plavix.    Chief Complaint: Short of breath  ----------------------------------------------------------------------------------------------------------------------  Current [facility] Meds:  amLODIPine, 10 mg, Oral, Q24H  atorvastatin, 10 mg, Oral, Nightly  buPROPion SR, 150 mg, Oral, Nightly  cefTRIAXone, 1,000 mg, Intravenous, Q24H  cetirizine, 10 mg, Oral, Daily  cholecalciferol, 50,000 Units, Oral, Weekly  [Held by provider] clopidogrel, 75 mg, Oral, Daily  doxycycline, 100 mg, Intravenous, Q12H  [Held by provider] enoxaparin, 1 mg/kg, Subcutaneous, Q12H  fluticasone, 2 spray, Nasal, Daily  insulin lispro, 2-7 Units, Subcutaneous, 4x Daily AC & at Bedtime  ipratropium-albuterol, 3 mL, Nebulization, 4x Daily - RT  lisinopril, 10 mg, Oral, Q24H  metoprolol succinate XL, 25 mg, Oral, Daily  montelukast, 10 mg, Oral, Nightly  senna-docusate sodium, 2 tablet, Oral, BID  sodium chloride, 10 mL, Intravenous, Q12H         ----------------------------------------------------------------------------------------------------------------------  Vital Signs:  Temp:  [97.9 °F (36.6 °C)-98.5 °F (36.9 °C)] 98.5 °F (36.9 °C)  Heart Rate:  [] 114  Resp:  [18-20] 20  BP: (141-182)/() 158/74       Tele: Sinus rhythm 91 bpm      24  0500 24  0500 24  0500   Weight: 91.8 kg (202 lb  4.8 oz) 91.8 kg (202 lb 6.4 oz) 91 kg (200 lb 11.2 oz)     Body mass index is 35.55 kg/m².    Intake/Output Summary (Last 24 hours) at 1/7/2024 1332  Last data filed at 1/7/2024 0200  Gross per 24 hour   Intake 480 ml   Output --   Net 480 ml     Diet: Diabetic Diets; Consistent Carbohydrate; Texture: Regular Texture (IDDSI 7); Fluid Consistency: Thin (IDDSI 0)  NPO Diet NPO Type: Strict NPO  ----------------------------------------------------------------------------------------------------------------------  Physical exam:  General: Comfortable,awake, alert, oriented to self, place, and time, well-developed and well-nourished.  No respiratory distress.    Skin:  Skin is warm and dry. No rash noted. No pallor.    HENT:  Head:  Normocephalic and atraumatic.  Mouth:  Moist mucous membranes.    Eyes:  Conjunctivae and EOM are normal.  Pupils are equal, round, and reactive to light.  No scleral icterus.    Neck:  Neck supple.  No JVD present.    Pulmonary/Chest: Coarse breath sounds inspiratory and expiratory left side, no expiratory lag good air movement.  Cardiovascular:  Normal rate, regular rhythm and normal heart sounds with no murmur.  Abdominal:  Soft.  Bowel sounds are normal.  No distension and no tenderness.   Extremities:  No edema, no tenderness, and no deformity.  No red or swollen joints anywhere.  Strong pulses in all 4 extremities with no clubbing, no cyanosis, no edema.  Neurological:  Motor strength equal no obvious deficit, sensory grossly intact.   No cranial nerve deficit.  No tongue deviation.  No facial droop.  No slurred speech.    Genitourinary: No Palomino catheter  Back:  ----------------------------------------------------------------------------------------------------------------------  ----------------------------------------------------------------------------------------------------------------------  Results from last 7 days   Lab Units 01/02/24  1409   HSTROP T ng/L 9     Results from  "last 7 days   Lab Units 01/04/24  0120 01/03/24  0039 01/02/24  1409   CRP mg/dL  --   --  <0.30   LACTATE mmol/L  --   --  2.0   WBC 10*3/mm3 10.81* 9.93 10.76   HEMOGLOBIN g/dL 14.0 15.7 16.5*   HEMATOCRIT % 44.1 49.4* 51.2*   MCV fL 95.0 94.3 94.1   MCHC g/dL 31.7 31.8 32.2   PLATELETS 10*3/mm3 201 223 243     Results from last 7 days   Lab Units 01/02/24  1403   PH, ARTERIAL pH units 7.402   PO2 ART mm Hg 54.4*   PCO2, ARTERIAL mm Hg 38.9   HCO3 ART mmol/L 24.2     Results from last 7 days   Lab Units 01/03/24  0039 01/02/24  1409   SODIUM mmol/L 140 140   POTASSIUM mmol/L 4.3 4.5   MAGNESIUM mg/dL  --  2.0   CHLORIDE mmol/L 104 105   CO2 mmol/L 23.8 21.9*   BUN mg/dL 23 23   CREATININE mg/dL 0.84 1.09*   CALCIUM mg/dL 9.1 9.6   GLUCOSE mg/dL 232* 150*   ALBUMIN g/dL  --  4.3   BILIRUBIN mg/dL  --  0.4   ALK PHOS U/L  --  107   AST (SGOT) U/L  --  18   ALT (SGPT) U/L  --  12   Estimated Creatinine Clearance: 61 mL/min (by C-G formula based on SCr of 0.84 mg/dL).    No results found for: \"AMMONIA\"      Blood Culture   Date Value Ref Range Status   01/02/2024 No growth at 4 days  Preliminary   01/02/2024 No growth at 4 days  Preliminary     No results found for: \"URINECX\"  No results found for: \"WOUNDCX\"  No results found for: \"STOOLCX\"    I have personally looked at the labs and they are summarized above.  ----------------------------------------------------------------------------------------------------------------------  Imaging Results (Last 24 Hours)       ** No results found for the last 24 hours. **          ----------------------------------------------------------------------------------------------------------------------  Assessment and Plan:  -Acute hypoxic respiratory failure present on admission secondary to left upper lobe atelectasis  -Left upper lobe atelectasis?  Mucous plug versus endobronchial lesion for bronchoscopy tomorrow  -Probable pneumonia culture negative  -History of peripheral " arterial disease status post left external iliac stent placement currently off Plavix for planned procedure  -Obesity with a BMI of 36  -COPD without exacerbation  -Diabetes type 2 fairly controlled  -Essential hypertension  -QT prolongation    N.p.o. past midnight, DC Lovenox after tonight, restart Plavix after procedure, out of bed to chair, oxygen supplementation, continue nebs.      Disposition Home pending      Cora Carver MD  01/07/24  13:32 EST

## 2024-01-08 ENCOUNTER — ANESTHESIA EVENT (OUTPATIENT)
Dept: PERIOP | Facility: HOSPITAL | Age: 79
End: 2024-01-08
Payer: MEDICARE

## 2024-01-08 ENCOUNTER — ANESTHESIA (OUTPATIENT)
Dept: PERIOP | Facility: HOSPITAL | Age: 79
End: 2024-01-08
Payer: MEDICARE

## 2024-01-08 LAB
ANION GAP SERPL CALCULATED.3IONS-SCNC: 9.2 MMOL/L (ref 5–15)
BASOPHILS # BLD AUTO: 0.05 10*3/MM3 (ref 0–0.2)
BASOPHILS NFR BLD AUTO: 0.5 % (ref 0–1.5)
BUN SERPL-MCNC: 18 MG/DL (ref 8–23)
BUN/CREAT SERPL: 27.7 (ref 7–25)
CALCIUM SPEC-SCNC: 8.4 MG/DL (ref 8.6–10.5)
CHLORIDE SERPL-SCNC: 108 MMOL/L (ref 98–107)
CO2 SERPL-SCNC: 22.8 MMOL/L (ref 22–29)
CREAT SERPL-MCNC: 0.65 MG/DL (ref 0.57–1)
DEPRECATED RDW RBC AUTO: 47.3 FL (ref 37–54)
EGFRCR SERPLBLD CKD-EPI 2021: 91.4 ML/MIN/1.73
EOSINOPHIL # BLD AUTO: 0.23 10*3/MM3 (ref 0–0.4)
EOSINOPHIL NFR BLD AUTO: 2.4 % (ref 0.3–6.2)
ERYTHROCYTE [DISTWIDTH] IN BLOOD BY AUTOMATED COUNT: 13.4 % (ref 12.3–15.4)
GLUCOSE BLDC GLUCOMTR-MCNC: 137 MG/DL (ref 70–130)
GLUCOSE BLDC GLUCOMTR-MCNC: 138 MG/DL (ref 70–130)
GLUCOSE BLDC GLUCOMTR-MCNC: 181 MG/DL (ref 70–130)
GLUCOSE BLDC GLUCOMTR-MCNC: 183 MG/DL (ref 70–130)
GLUCOSE SERPL-MCNC: 167 MG/DL (ref 65–99)
HBA1C MFR BLD: 7.7 % (ref 4.8–5.6)
HCT VFR BLD AUTO: 43.1 % (ref 34–46.6)
HGB BLD-MCNC: 13.6 G/DL (ref 12–15.9)
IMM GRANULOCYTES # BLD AUTO: 0.05 10*3/MM3 (ref 0–0.05)
IMM GRANULOCYTES NFR BLD AUTO: 0.5 % (ref 0–0.5)
INR PPP: 0.94 (ref 0.9–1.1)
LYMPHOCYTES # BLD AUTO: 2.3 10*3/MM3 (ref 0.7–3.1)
LYMPHOCYTES NFR BLD AUTO: 23.9 % (ref 19.6–45.3)
MCH RBC QN AUTO: 30.2 PG (ref 26.6–33)
MCHC RBC AUTO-ENTMCNC: 31.6 G/DL (ref 31.5–35.7)
MCV RBC AUTO: 95.8 FL (ref 79–97)
MONOCYTES # BLD AUTO: 0.81 10*3/MM3 (ref 0.1–0.9)
MONOCYTES NFR BLD AUTO: 8.4 % (ref 5–12)
NEUTROPHILS NFR BLD AUTO: 6.18 10*3/MM3 (ref 1.7–7)
NEUTROPHILS NFR BLD AUTO: 64.3 % (ref 42.7–76)
NRBC BLD AUTO-RTO: 0 /100 WBC (ref 0–0.2)
PLATELET # BLD AUTO: 146 10*3/MM3 (ref 140–450)
PMV BLD AUTO: 10.1 FL (ref 6–12)
POTASSIUM SERPL-SCNC: 3.8 MMOL/L (ref 3.5–5.2)
PROTHROMBIN TIME: 13.1 SECONDS (ref 12.1–14.7)
RBC # BLD AUTO: 4.5 10*6/MM3 (ref 3.77–5.28)
SODIUM SERPL-SCNC: 140 MMOL/L (ref 136–145)
TSH SERPL DL<=0.05 MIU/L-ACNC: 3.26 UIU/ML (ref 0.27–4.2)
WBC NRBC COR # BLD AUTO: 9.62 10*3/MM3 (ref 3.4–10.8)

## 2024-01-08 PROCEDURE — 82948 REAGENT STRIP/BLOOD GLUCOSE: CPT

## 2024-01-08 PROCEDURE — 94799 UNLISTED PULMONARY SVC/PX: CPT

## 2024-01-08 PROCEDURE — 87070 CULTURE OTHR SPECIMN AEROBIC: CPT | Performed by: INTERNAL MEDICINE

## 2024-01-08 PROCEDURE — 87205 SMEAR GRAM STAIN: CPT | Performed by: INTERNAL MEDICINE

## 2024-01-08 PROCEDURE — 88305 TISSUE EXAM BY PATHOLOGIST: CPT

## 2024-01-08 PROCEDURE — 25010000002 PROCHLORPERAZINE 10 MG/2ML SOLUTION: Performed by: INTERNAL MEDICINE

## 2024-01-08 PROCEDURE — 25010000002 PROPOFOL 200 MG/20ML EMULSION: Performed by: NURSE ANESTHETIST, CERTIFIED REGISTERED

## 2024-01-08 PROCEDURE — 97116 GAIT TRAINING THERAPY: CPT

## 2024-01-08 PROCEDURE — 25010000002 ONDANSETRON PER 1 MG: Performed by: NURSE ANESTHETIST, CERTIFIED REGISTERED

## 2024-01-08 PROCEDURE — 87071 CULTURE AEROBIC QUANT OTHER: CPT | Performed by: INTERNAL MEDICINE

## 2024-01-08 PROCEDURE — 94669 MECHANICAL CHEST WALL OSCILL: CPT

## 2024-01-08 PROCEDURE — 99232 SBSQ HOSP IP/OBS MODERATE 35: CPT | Performed by: INTERNAL MEDICINE

## 2024-01-08 PROCEDURE — 87116 MYCOBACTERIA CULTURE: CPT | Performed by: INTERNAL MEDICINE

## 2024-01-08 PROCEDURE — 80048 BASIC METABOLIC PNL TOTAL CA: CPT | Performed by: HOSPITALIST

## 2024-01-08 PROCEDURE — 85025 COMPLETE CBC W/AUTO DIFF WBC: CPT | Performed by: HOSPITALIST

## 2024-01-08 PROCEDURE — 0B9G8ZX DRAINAGE OF LEFT UPPER LUNG LOBE, VIA NATURAL OR ARTIFICIAL OPENING ENDOSCOPIC, DIAGNOSTIC: ICD-10-PCS | Performed by: INTERNAL MEDICINE

## 2024-01-08 PROCEDURE — 99233 SBSQ HOSP IP/OBS HIGH 50: CPT | Performed by: INTERNAL MEDICINE

## 2024-01-08 PROCEDURE — 94668 MNPJ CHEST WALL SBSQ: CPT

## 2024-01-08 PROCEDURE — 94761 N-INVAS EAR/PLS OXIMETRY MLT: CPT

## 2024-01-08 PROCEDURE — 87206 SMEAR FLUORESCENT/ACID STAI: CPT | Performed by: INTERNAL MEDICINE

## 2024-01-08 PROCEDURE — 0BJ08ZZ INSPECTION OF TRACHEOBRONCHIAL TREE, VIA NATURAL OR ARTIFICIAL OPENING ENDOSCOPIC: ICD-10-PCS | Performed by: INTERNAL MEDICINE

## 2024-01-08 PROCEDURE — 88112 CYTOPATH CELL ENHANCE TECH: CPT

## 2024-01-08 PROCEDURE — 83036 HEMOGLOBIN GLYCOSYLATED A1C: CPT | Performed by: INTERNAL MEDICINE

## 2024-01-08 PROCEDURE — 97110 THERAPEUTIC EXERCISES: CPT

## 2024-01-08 PROCEDURE — 84443 ASSAY THYROID STIM HORMONE: CPT | Performed by: INTERNAL MEDICINE

## 2024-01-08 PROCEDURE — 25010000002 CEFTRIAXONE PER 250 MG: Performed by: HOSPITALIST

## 2024-01-08 PROCEDURE — 25010000002 HEPARIN (PORCINE) PER 1000 UNITS: Performed by: INTERNAL MEDICINE

## 2024-01-08 PROCEDURE — 25810000003 LACTATED RINGERS PER 1000 ML: Performed by: ANESTHESIOLOGY

## 2024-01-08 PROCEDURE — 87102 FUNGUS ISOLATION CULTURE: CPT | Performed by: INTERNAL MEDICINE

## 2024-01-08 PROCEDURE — 63710000001 INSULIN LISPRO (HUMAN) PER 5 UNITS: Performed by: INTERNAL MEDICINE

## 2024-01-08 PROCEDURE — 25010000002 LABETALOL 5 MG/ML SOLUTION: Performed by: ANESTHESIOLOGY

## 2024-01-08 PROCEDURE — 88173 CYTOPATH EVAL FNA REPORT: CPT

## 2024-01-08 PROCEDURE — 94664 DEMO&/EVAL PT USE INHALER: CPT

## 2024-01-08 PROCEDURE — 85610 PROTHROMBIN TIME: CPT | Performed by: INTERNAL MEDICINE

## 2024-01-08 PROCEDURE — 25010000002 FENTANYL CITRATE (PF) 50 MCG/ML SOLUTION: Performed by: NURSE ANESTHETIST, CERTIFIED REGISTERED

## 2024-01-08 RX ORDER — ALBUTEROL SULFATE 2.5 MG/3ML
2.5 SOLUTION RESPIRATORY (INHALATION) ONCE
Status: COMPLETED | OUTPATIENT
Start: 2024-01-08 | End: 2024-01-08

## 2024-01-08 RX ORDER — SODIUM CHLORIDE 9 MG/ML
40 INJECTION, SOLUTION INTRAVENOUS AS NEEDED
Status: DISCONTINUED | OUTPATIENT
Start: 2024-01-08 | End: 2024-01-08 | Stop reason: HOSPADM

## 2024-01-08 RX ORDER — SODIUM CHLORIDE, SODIUM LACTATE, POTASSIUM CHLORIDE, CALCIUM CHLORIDE 600; 310; 30; 20 MG/100ML; MG/100ML; MG/100ML; MG/100ML
100 INJECTION, SOLUTION INTRAVENOUS ONCE AS NEEDED
Status: DISCONTINUED | OUTPATIENT
Start: 2024-01-08 | End: 2024-01-08 | Stop reason: HOSPADM

## 2024-01-08 RX ORDER — OXYCODONE HYDROCHLORIDE AND ACETAMINOPHEN 5; 325 MG/1; MG/1
1 TABLET ORAL ONCE AS NEEDED
Status: DISCONTINUED | OUTPATIENT
Start: 2024-01-08 | End: 2024-01-08 | Stop reason: HOSPADM

## 2024-01-08 RX ORDER — ONDANSETRON 2 MG/ML
INJECTION INTRAMUSCULAR; INTRAVENOUS AS NEEDED
Status: DISCONTINUED | OUTPATIENT
Start: 2024-01-08 | End: 2024-01-08 | Stop reason: SURG

## 2024-01-08 RX ORDER — IPRATROPIUM BROMIDE AND ALBUTEROL SULFATE 2.5; .5 MG/3ML; MG/3ML
3 SOLUTION RESPIRATORY (INHALATION) ONCE AS NEEDED
Status: DISCONTINUED | OUTPATIENT
Start: 2024-01-08 | End: 2024-01-08 | Stop reason: HOSPADM

## 2024-01-08 RX ORDER — FAMOTIDINE 10 MG/ML
INJECTION, SOLUTION INTRAVENOUS AS NEEDED
Status: DISCONTINUED | OUTPATIENT
Start: 2024-01-08 | End: 2024-01-08 | Stop reason: SURG

## 2024-01-08 RX ORDER — PROPOFOL 10 MG/ML
INJECTION, EMULSION INTRAVENOUS AS NEEDED
Status: DISCONTINUED | OUTPATIENT
Start: 2024-01-08 | End: 2024-01-08 | Stop reason: SURG

## 2024-01-08 RX ORDER — LIDOCAINE HYDROCHLORIDE 10 MG/ML
INJECTION, SOLUTION EPIDURAL; INFILTRATION; INTRACAUDAL; PERINEURAL AS NEEDED
Status: DISCONTINUED | OUTPATIENT
Start: 2024-01-08 | End: 2024-01-08 | Stop reason: HOSPADM

## 2024-01-08 RX ORDER — MIDAZOLAM HYDROCHLORIDE 1 MG/ML
0.5 INJECTION INTRAMUSCULAR; INTRAVENOUS
Status: DISCONTINUED | OUTPATIENT
Start: 2024-01-08 | End: 2024-01-08 | Stop reason: HOSPADM

## 2024-01-08 RX ORDER — LIDOCAINE HYDROCHLORIDE AND EPINEPHRINE 10; 10 MG/ML; UG/ML
INJECTION, SOLUTION INFILTRATION; PERINEURAL AS NEEDED
Status: DISCONTINUED | OUTPATIENT
Start: 2024-01-08 | End: 2024-01-08 | Stop reason: HOSPADM

## 2024-01-08 RX ORDER — ONDANSETRON 2 MG/ML
4 INJECTION INTRAMUSCULAR; INTRAVENOUS AS NEEDED
Status: DISCONTINUED | OUTPATIENT
Start: 2024-01-08 | End: 2024-01-08 | Stop reason: HOSPADM

## 2024-01-08 RX ORDER — EPHEDRINE SULFATE 5 MG/ML
INJECTION INTRAVENOUS AS NEEDED
Status: DISCONTINUED | OUTPATIENT
Start: 2024-01-08 | End: 2024-01-08 | Stop reason: SURG

## 2024-01-08 RX ORDER — FENTANYL CITRATE 50 UG/ML
50 INJECTION, SOLUTION INTRAMUSCULAR; INTRAVENOUS
Status: DISCONTINUED | OUTPATIENT
Start: 2024-01-08 | End: 2024-01-08 | Stop reason: HOSPADM

## 2024-01-08 RX ORDER — HEPARIN SODIUM 5000 [USP'U]/ML
5000 INJECTION, SOLUTION INTRAVENOUS; SUBCUTANEOUS EVERY 12 HOURS SCHEDULED
Status: DISCONTINUED | OUTPATIENT
Start: 2024-01-08 | End: 2024-01-09

## 2024-01-08 RX ORDER — PROCHLORPERAZINE EDISYLATE 5 MG/ML
10 INJECTION INTRAMUSCULAR; INTRAVENOUS EVERY 6 HOURS PRN
Status: DISCONTINUED | OUTPATIENT
Start: 2024-01-08 | End: 2024-01-18 | Stop reason: HOSPADM

## 2024-01-08 RX ORDER — MEPERIDINE HYDROCHLORIDE 25 MG/ML
12.5 INJECTION INTRAMUSCULAR; INTRAVENOUS; SUBCUTANEOUS
Status: DISCONTINUED | OUTPATIENT
Start: 2024-01-08 | End: 2024-01-08 | Stop reason: HOSPADM

## 2024-01-08 RX ORDER — SODIUM CHLORIDE 0.9 % (FLUSH) 0.9 %
10 SYRINGE (ML) INJECTION EVERY 12 HOURS SCHEDULED
Status: DISCONTINUED | OUTPATIENT
Start: 2024-01-08 | End: 2024-01-08 | Stop reason: HOSPADM

## 2024-01-08 RX ORDER — LIDOCAINE HYDROCHLORIDE 40 MG/ML
3 INJECTION, SOLUTION RETROBULBAR; TOPICAL ONCE
Status: COMPLETED | OUTPATIENT
Start: 2024-01-08 | End: 2024-01-08

## 2024-01-08 RX ORDER — SODIUM CHLORIDE 0.9 % (FLUSH) 0.9 %
10 SYRINGE (ML) INJECTION AS NEEDED
Status: DISCONTINUED | OUTPATIENT
Start: 2024-01-08 | End: 2024-01-08 | Stop reason: HOSPADM

## 2024-01-08 RX ORDER — SODIUM CHLORIDE, SODIUM LACTATE, POTASSIUM CHLORIDE, CALCIUM CHLORIDE 600; 310; 30; 20 MG/100ML; MG/100ML; MG/100ML; MG/100ML
125 INJECTION, SOLUTION INTRAVENOUS ONCE
Status: COMPLETED | OUTPATIENT
Start: 2024-01-08 | End: 2024-01-08

## 2024-01-08 RX ORDER — FENTANYL CITRATE 50 UG/ML
INJECTION, SOLUTION INTRAMUSCULAR; INTRAVENOUS AS NEEDED
Status: DISCONTINUED | OUTPATIENT
Start: 2024-01-08 | End: 2024-01-08 | Stop reason: SURG

## 2024-01-08 RX ORDER — LIDOCAINE HYDROCHLORIDE 20 MG/ML
INJECTION, SOLUTION EPIDURAL; INFILTRATION; INTRACAUDAL; PERINEURAL AS NEEDED
Status: DISCONTINUED | OUTPATIENT
Start: 2024-01-08 | End: 2024-01-08 | Stop reason: SURG

## 2024-01-08 RX ORDER — SODIUM CHLORIDE 9 MG/ML
INJECTION, SOLUTION INTRAVENOUS AS NEEDED
Status: DISCONTINUED | OUTPATIENT
Start: 2024-01-08 | End: 2024-01-08 | Stop reason: HOSPADM

## 2024-01-08 RX ORDER — LABETALOL HYDROCHLORIDE 5 MG/ML
5 INJECTION, SOLUTION INTRAVENOUS AS NEEDED
Status: DISCONTINUED | OUTPATIENT
Start: 2024-01-08 | End: 2024-01-08 | Stop reason: HOSPADM

## 2024-01-08 RX ORDER — SCOLOPAMINE TRANSDERMAL SYSTEM 1 MG/1
1 PATCH, EXTENDED RELEASE TRANSDERMAL
Status: DISCONTINUED | OUTPATIENT
Start: 2024-01-08 | End: 2024-01-09

## 2024-01-08 RX ADMIN — DOXYCYCLINE 100 MG: 100 INJECTION, POWDER, LYOPHILIZED, FOR SOLUTION INTRAVENOUS at 21:52

## 2024-01-08 RX ADMIN — LABETALOL HYDROCHLORIDE 5 MG: 5 INJECTION, SOLUTION INTRAVENOUS at 09:11

## 2024-01-08 RX ADMIN — FAMOTIDINE 20 MG: 10 INJECTION, SOLUTION INTRAVENOUS at 08:13

## 2024-01-08 RX ADMIN — FENTANYL CITRATE 50 MCG: 50 INJECTION INTRAMUSCULAR; INTRAVENOUS at 08:13

## 2024-01-08 RX ADMIN — EPHEDRINE SULFATE 10 MG: 5 INJECTION INTRAVENOUS at 08:21

## 2024-01-08 RX ADMIN — MONTELUKAST SODIUM 10 MG: 10 TABLET, COATED ORAL at 21:54

## 2024-01-08 RX ADMIN — BUPROPION HYDROCHLORIDE 150 MG: 150 TABLET, EXTENDED RELEASE ORAL at 21:54

## 2024-01-08 RX ADMIN — PROCHLORPERAZINE EDISYLATE 10 MG: 5 INJECTION INTRAMUSCULAR; INTRAVENOUS at 10:19

## 2024-01-08 RX ADMIN — LIDOCAINE HYDROCHLORIDE 100 MG: 20 INJECTION, SOLUTION EPIDURAL; INFILTRATION; INTRACAUDAL; PERINEURAL at 08:18

## 2024-01-08 RX ADMIN — Medication 10 ML: at 10:19

## 2024-01-08 RX ADMIN — IPRATROPIUM BROMIDE AND ALBUTEROL SULFATE 3 ML: 2.5; .5 SOLUTION RESPIRATORY (INHALATION) at 18:37

## 2024-01-08 RX ADMIN — PROPOFOL 100 MG: 10 INJECTION, EMULSION INTRAVENOUS at 08:18

## 2024-01-08 RX ADMIN — IPRATROPIUM BROMIDE AND ALBUTEROL SULFATE 3 ML: 2.5; .5 SOLUTION RESPIRATORY (INHALATION) at 06:17

## 2024-01-08 RX ADMIN — ALBUTEROL SULFATE 2.5 MG: 0.83 SOLUTION RESPIRATORY (INHALATION) at 07:48

## 2024-01-08 RX ADMIN — LIDOCAINE HYDROCHLORIDE 3 ML: 40 INJECTION, SOLUTION RETROBULBAR; TOPICAL at 07:55

## 2024-01-08 RX ADMIN — IPRATROPIUM BROMIDE AND ALBUTEROL SULFATE 3 ML: 2.5; .5 SOLUTION RESPIRATORY (INHALATION) at 12:03

## 2024-01-08 RX ADMIN — SODIUM CHLORIDE, POTASSIUM CHLORIDE, SODIUM LACTATE AND CALCIUM CHLORIDE: 600; 310; 30; 20 INJECTION, SOLUTION INTRAVENOUS at 08:13

## 2024-01-08 RX ADMIN — DOCUSATE SODIUM 50 MG AND SENNOSIDES 8.6 MG 2 TABLET: 8.6; 5 TABLET, FILM COATED ORAL at 21:54

## 2024-01-08 RX ADMIN — INSULIN LISPRO 2 UNITS: 100 INJECTION, SOLUTION INTRAVENOUS; SUBCUTANEOUS at 21:54

## 2024-01-08 RX ADMIN — Medication 10 ML: at 21:55

## 2024-01-08 RX ADMIN — ATORVASTATIN CALCIUM 10 MG: 10 TABLET, FILM COATED ORAL at 21:54

## 2024-01-08 RX ADMIN — SCOPALAMINE 1 PATCH: 1 PATCH, EXTENDED RELEASE TRANSDERMAL at 12:20

## 2024-01-08 RX ADMIN — ONDANSETRON 4 MG: 2 INJECTION INTRAMUSCULAR; INTRAVENOUS at 08:13

## 2024-01-08 RX ADMIN — INSULIN LISPRO 2 UNITS: 100 INJECTION, SOLUTION INTRAVENOUS; SUBCUTANEOUS at 12:20

## 2024-01-08 RX ADMIN — FENTANYL CITRATE 50 MCG: 50 INJECTION INTRAMUSCULAR; INTRAVENOUS at 08:22

## 2024-01-08 RX ADMIN — CEFTRIAXONE 1000 MG: 1 INJECTION, POWDER, FOR SOLUTION INTRAMUSCULAR; INTRAVENOUS at 14:52

## 2024-01-08 RX ADMIN — HEPARIN SODIUM 5000 UNITS: 5000 INJECTION INTRAVENOUS; SUBCUTANEOUS at 21:54

## 2024-01-08 NOTE — CASE MANAGEMENT/SOCIAL WORK
Continued Stay Note   Gregory     Patient Name: Evie Shah  MRN: [id number]  Today's Date: 1/8/2024    Admit Date: 1/2/2024    Plan: This CM met with pt and family at bedside, verified discharge plan remains unchanged return home alone with family to transport; requesting 4 prong cane, and glucometer at discharge, attending notified via secure chat, no DME preference per pt and family; home oxygen is from [facility] in [place] will need new order if pt is unable to be weaned to baseline 2L prn.    Angelina Weinstein RN

## 2024-01-08 NOTE — OP NOTE
Bronchoscopy Procedure Note    Date of Operation: 1/8/2024    Pre-op Diagnosis: Abnormal CT chest with left upper lobe mucous plugging and atelectasis with possible endobronchial mass    Post-op Diagnosis: Left upper lobe mucous plugging and abnormal CT chest    Surgeon: Augustin Stafford MD    Assistants: None    Anesthesia: Please see anesthesia report for details    Operation: Flexible fiberoptic bronchoscopy, diagnostic     Findings: Left upper lobe mucous plugging    Specimen: Bronchoalveolar lavage of left upper lobe  Bronchial brushings  Endobronchial ultrasound-guided transbronchial needle aspiration of station 10 L    Estimated Blood Loss: Minimal    Complications: Patient had mild bleeding after cleaning the left upper lobe mucous which was controlled with local epinephrine and lidocaine spray.    Indications and History:  The patient is a 78 y.o. female with left upper lobe mucous plugging, atelectasis and abnormal CT chest.  The risks, benefits, complications, treatment options and expected outcomes were discussed with the patient.  The possibilities of reaction to medication, pulmonary aspiration, perforation of a viscus, bleeding, failure to diagnose a condition and creating a complication requiring transfusion or operation were discussed with the patient who freely signed the consent.      Description of Procedure:  The patient was seen in the Holding Room and the site of surgery properly noted/marked.  The patient was taken to endoscopy suite, identified as Evie Shah and the procedure verified as Flexible Fiberoptic Bronchoscopy.  A Time Out was held and the above information confirmed.     the patient was positioned  and the bronchoscope was passed through the LMA . The vocal cords were visualized and  2 ml 1 % lidocaine was topically placed onto the cords. The cords were normal. The scope was then passed into the trachea.      2 ml 1 % lidocaine was used topically on the chrissie.  Careful  inspection of the tracheal lumen was accomplished. The scope was sequentially passed into the left main and then left upper and lower bronchi and segmental bronchi.       The scope was then withdrawn and advanced into the right main bronchus and then into the RUL, RML, and RLL bronchi and segmental bronchi.   Left upper lobe mucous plugging was seen which was cleaned after giving saline washes.  Samples-  Bronchoscope was wedged into the left upper lobe for a bronchoalveolar lavage and close to 40 cc of saline was given twice and close to 22 cc was aspirated back.    Bronchial washings  Left upper lobe bronchial brushings    In the endobronchial ultrasound was introduced mediastinal survey was done.  Station 10 lymph node was enlarged.  Endobronchial ultrasound-guided transbronchial needle aspiration of station 10 L was done once.          Findings were discussed with patient's daughter.  Answered her questions to her satisfaction.    Samples were sent for cytology and microbiology.  Please follow up the results  The Patient was taken to the Endoscopy Recovery area in satisfactory condition.        Augustin Stafford MD

## 2024-01-08 NOTE — PROGRESS NOTES
[facility] HOSPITALIST PROGRESS NOTE     Patient Identification:  Name:  Evie Shah  Age:  78 y.o.  Sex:  female  :  1945  MRN:  [id number]  Visit Number:  [id number]  ROOM: [address] [place] [address]     Primary Care Provider:  Camryn Mota PA     Date of Admission: 2024    Length of stay in inpatient status:  6    Subjective     Chief Compliant:    Chief Complaint   Patient presents with    Shortness of Breath    Fever    Weakness - Generalized     Pt has been sob for a weak and has been sick with a cough.     Cough     History of Presenting Illness:  The patient had bronchoscopy today; she had some nausea afterwards.  I saw her after she worked with physical therapy; at bedside was her daughter and her son-in-law.  The patient states that she is feeling better.  However, she is still coughing but she is able to produce thick green sputum now since the bronchoscopy.  The daughter states that the patient's cough sounds about the same as it did on admission.  Patient denies chest pain.  Her shortness of air has improved but is still present    Objective     Current Hospital Meds:  amLODIPine, 10 mg, Oral, Q24H  atorvastatin, 10 mg, Oral, Nightly  buPROPion SR, 150 mg, Oral, Nightly  cefTRIAXone, 1,000 mg, Intravenous, Q24H  cetirizine, 10 mg, Oral, Daily  cholecalciferol, 50,000 Units, Oral, Weekly  clopidogrel, 75 mg, Oral, Daily  fluticasone, 2 spray, Nasal, Daily  heparin (porcine), 5,000 Units, Subcutaneous, Q12H  insulin lispro, 2-7 Units, Subcutaneous, 4x Daily AC & at Bedtime  ipratropium-albuterol, 3 mL, Nebulization, 4x Daily - RT  lisinopril, 10 mg, Oral, Q24H  metoprolol succinate XL, 25 mg, Oral, Daily  montelukast, 10 mg, Oral, Nightly  Scopolamine, 1 patch, Transdermal, Q72H  senna-docusate sodium, 2 tablet, Oral, BID  sodium chloride, 10 mL, Intravenous, Q12H       Current Antimicrobial Therapy:  Anti-Infectives (From admission, onward)      Ordered     Dose/Rate Route  Frequency Start Stop    01/02/24 1935  cefTRIAXone (ROCEPHIN) 1,000 mg in sodium chloride 0.9 % 100 mL IVPB-VTB        Ordering Provider: Juan Jade MD    1,000 mg  200 mL/hr over 30 Minutes Intravenous Every 24 Hours 01/03/24 1500 01/10/24 1459    01/02/24 1935  doxycycline (VIBRAMYCIN) 100 mg in sodium chloride 0.9 % 100 mL IVPB-VTB        Ordering Provider: Juan Jade MD    100 mg Intravenous Every 12 Hours 01/03/24 0600 01/07/24 1714    01/02/24 1448  cefTRIAXone (ROCEPHIN) 2,000 mg in sodium chloride 0.9 % 100 mL IVPB-VTB        Ordering Provider: Jonathan Wyatt PA    2,000 mg  200 mL/hr over 30 Minutes Intravenous Once 01/02/24 1504 01/02/24 1610    01/02/24 1448  doxycycline (VIBRAMYCIN) 100 mg in sodium chloride 0.9 % 100 mL IVPB-VTB        Ordering Provider: Jonathan Wyatt PA    100 mg  over 60 Minutes Intravenous Once 01/02/24 1504 01/02/24 1710          Current Diuretic Therapy:  Diuretics (From admission, onward)      None          ----------------------------------------------------------------------------------------------------------------------  Vital Signs:  Temp:  [97.2 °F (36.2 °C)-98.5 °F (36.9 °C)] 98.5 °F (36.9 °C)  Heart Rate:  [] 90  Resp:  [16-20] 18  BP: (119-184)/() 142/69  SpO2:  [90 %-99 %] 99 %  on  Flow (L/min):  [5-10] 6;   Device (Oxygen Therapy): humidified;nasal cannula  Body mass index is 35.62 kg/m².    Wt Readings from Last 3 Encounters:   01/08/24 91.2 kg (201 lb 1.6 oz)   01/02/24 90.3 kg (199 lb)   12/26/23 92.1 kg (203 lb)     Intake & Output (last 3 days)         01/06 0701  01/07 0700 01/07 0701  01/08 0700 01/08 0701  01/09 0700    P.O. 840 1100 240    I.V. (mL/kg)   400 (4.4)    Total Intake(mL/kg) 840 (9.2) 1100 (12.1) 640 (7)    Urine (mL/kg/hr)       Stool       Total Output       Net +840 +1100 +640           Urine Unmeasured Occurrence 7 x 4 x 1 x          Diet: Diabetic Diets; Consistent Carbohydrate; Texture: Regular Texture  (IDDSI 7); Fluid Consistency: Thin (IDDSI 0)  ----------------------------------------------------------------------------------------------------------------------  Physical Exam  Vitals reviewed.   Constitutional:       General: She is in acute distress.      Appearance: She is ill-appearing. She is not toxic-appearing or diaphoretic.   HENT:      Head: Normocephalic and atraumatic.      Right Ear: External ear normal.      Left Ear: External ear normal.      Nose: Nose normal.   Eyes:      General: No scleral icterus.        Right eye: No discharge.         Left eye: No discharge.      Pupils: Pupils are equal, round, and reactive to light.   Cardiovascular:      Rate and Rhythm: Normal rate and regular rhythm.      Pulses: Normal pulses.      Heart sounds: No murmur heard.  Pulmonary:      Effort: Accessory muscle usage and respiratory distress present. No prolonged expiration.      Breath sounds: Examination of the right-upper field reveals rales. Examination of the left-upper field reveals decreased breath sounds and rales. Decreased breath sounds and rales present. No wheezing.   Abdominal:      General: Bowel sounds are normal. There is no distension.      Palpations: Abdomen is soft.   Musculoskeletal:         General: No swelling, deformity or signs of injury.   Skin:     Capillary Refill: Capillary refill takes less than 2 seconds.      Coloration: Skin is not jaundiced or pale.   Neurological:      Mental Status: She is alert and oriented to person, place, and time. Mental status is at baseline.      Cranial Nerves: No cranial nerve deficit.   Psychiatric:         Mood and Affect: Mood normal.         Behavior: Behavior normal.         Thought Content: Thought content normal.         Judgment: Judgment normal.       ----------------------------------------------------------------------------------------------------------------------  Jim:  NS with heart rates in the 70's.  I have personally  reviewed/looked at the telemetry strips.    No ECHO in the chart.  ----------------------------------------------------------------------------------------------------------------------  LABS:    CBC and coagulation:  Results from last 7 days   Lab Units 01/08/24 0137 01/04/24  0120 01/03/24 0039 01/02/24  1409   PROCALCITONIN ng/mL  --   --   --  0.04   LACTATE mmol/L  --   --   --  2.0   SED RATE mm/hr  --   --   --  8   CRP mg/dL  --   --   --  <0.30   WBC 10*3/mm3 9.62 10.81* 9.93 10.76   HEMOGLOBIN g/dL 13.6 14.0 15.7 16.5*   HEMATOCRIT % 43.1 44.1 49.4* 51.2*   MCV fL 95.8 95.0 94.3 94.1   MCHC g/dL 31.6 31.7 31.8 32.2   PLATELETS 10*3/mm3 146 201 223 243   INR  0.94  --   --   --      Renal and electrolytes:  Results from last 7 days   Lab Units 01/08/24 0137 01/03/24 0039 01/02/24  1409   SODIUM mmol/L 140 140 140   POTASSIUM mmol/L 3.8 4.3 4.5   MAGNESIUM mg/dL  --   --  2.0   CHLORIDE mmol/L 108* 104 105   CO2 mmol/L 22.8 23.8 21.9*   BUN mg/dL 18 23 23   CREATININE mg/dL 0.65 0.84 1.09*   CALCIUM mg/dL 8.4* 9.1 9.6   GLUCOSE mg/dL 167* 232* 150*   ANION GAP mmol/L 9.2 12.2 13.1     Estimated Creatinine Clearance: 78.9 mL/min (by C-G formula based on SCr of 0.65 mg/dL).      Endocrine function:  Lab Results   Component Value Date    HGBA1C 7.70 (H) 01/08/2024     Point of care bedside glucose levels:  Results from last 7 days   Lab Units 01/08/24  1650 01/08/24  1125 01/08/24  0654 01/07/24 2024 01/07/24  1517 01/07/24  1118 01/07/24  0647 01/06/24  1954 01/06/24  1622 01/06/24  1118 01/06/24  0634 01/05/24  1921 01/05/24  1610 01/05/24  1059   GLUCOSE mg/dL 137* 183* 138* 174* 127 150* 157* 189* 154* 141* 131* 130 150* 144*     Glucose levels from the CMP:  Results from last 7 days   Lab Units 01/08/24  0137 01/03/24  0039 01/02/24  1409   GLUCOSE mg/dL 167* 232* 150*     Lab Results   Component Value Date    TSH 3.260 01/08/2024     Cardiac:  Results from last 7 days   Lab Units 01/02/24  1409    HSTROP T ng/L 9   PROBNP pg/mL 103.9       Cultures:  Lab Results   Component Value Date    COLORU Yellow 01/02/2024    CLARITYU Clear 01/02/2024    PHUR 6.0 01/02/2024    GLUCOSEU >=1000 mg/dL (3+) (A) 01/02/2024    KETONESU Negative 01/02/2024    BLOODU Negative 01/02/2024    NITRITEU Negative 01/02/2024    LEUKOCYTESUR Negative 01/02/2024    BILIRUBINUR Negative 01/02/2024    UROBILINOGEN 0.2 E.U./dL 01/02/2024     Microbiology Results (last 10 days)       Procedure Component Value - Date/Time    Respiratory Culture - Wash, Bronchus [[id number]] Collected: 01/08/24 0804    Lab Status: Preliminary result Specimen: Wash from Bronchus Updated: 01/08/24 1551     Gram Stain Moderate (3+) WBCs seen      Rare (1+) Mixed bacterial morphotypes seen on Gram Stain    BAL Culture, Quantitative - Lavage, Lung, Left Upper Lobe [[id number]] Collected: 01/08/24 0802    Lab Status: Preliminary result Specimen: Lavage from Lung, Left Upper Lobe Updated: 01/08/24 1548     Gram Stain WBCs seen      No organisms seen    COVID-19, FLU A/B, RSV PCR 1 HR TAT - Swab, Nasopharynx [[id number]]  (Normal) Collected: 01/02/24 1410    Lab Status: Final result Specimen: Swab from Nasopharynx Updated: 01/02/24 1501     COVID19 Not Detected     Influenza A PCR Not Detected     Influenza B PCR Not Detected     RSV, PCR Not Detected    Narrative:      Fact sheet for providers: https://www.fda.gov/media/417217/download    Fact sheet for patients: https://www.fda.gov/media/989364/download    Test performed by PCR.    Blood Culture - Blood, Arm, Left [[id number]]  (Normal) Collected: 01/02/24 1409    Lab Status: Final result Specimen: Blood from Arm, Left Updated: 01/07/24 1430     Blood Culture No growth at 5 days    Blood Culture - Blood, Arm, Right [[id number]]  (Normal) Collected: 01/02/24 1400    Lab Status: Final result Specimen: Blood from Arm, Right Updated: 01/07/24 1430     Blood Culture No growth at 5 days          I have personally looked at  the labs and they are summarized above.    Assessment & Plan      -Acute hypoxic respiratory failure that was present on admission secondary to left upper lobe atelectasis from a large mucous plug with suspected left upper lobe pneumonia as well  -EBUS performed 1/8/2024 with an incidental finding of an enlarged lymph node at station 10 L  -History of peripheral arterial disease status post left external iliac stent placement  -History of COPD, currently without any acute exacerbation  -History of type 2 diabetes mellitus  -History of essential hypertension    The patient is not on home oxygen; after the bronchoscopy, she was on 6 L/min of nasal cannula oxygen with her saturations slightly lower than prior to the bronchoscopy on the same amount of oxygen.  However, as patient mobilizes the rest of the sputum/mucous plug, I suspect that we will be able to wean her oxygen requirement down.  Therefore, I will start her on OPEP every 4 hours and we will encourage her to move around is much as possible so that she can mobilize even more mucus.  We will continue her on Rocephin for now; she did receive 4 days of doxycycline.  I have reviewed her cultures here and I do not see any any obvious bacterial sources of pneumonia that have been identified yet.  Therefore, I will continue with the doxycycline, especially since patient is still producing thick and yellow/green sputum.  If we are able to wean the oxygen demand overnight, then we may be able to switch the antibiotics to oral formulation.  We await pathology results from the bronchoscopy today.  Her glucose levels are at goal and stable and thus we will continue to monitor these.  The blood pressures are stable and for the most part at goal and we will continue to monitor these.  I will restart patient's Plavix tomorrow and I will start the patient on twice a day 5000 units of heparin for DVT prophylaxis.  We will repeat the blood work morning.  Please note that the  patient did walk 25 feet today when she was evaluated by physical therapy.    VTE Prophylaxis:  Mechanical Order History:       None          Pharmalogical Order History:        Ordered     Dose Route Frequency Stop    01/03/24 1122  [Held by provider]  Enoxaparin Sodium (LOVENOX) syringe 90 mg        (On hold since yesterday at 1104 until manually unheld; held by Victor Hugo Davis MDHold Reason: Hold For Procedure)   On hold since yesterday at 1104 until manually unheld   Hold reason: Hold For Procedure    1 mg/kg SC Every 12 Hours --    01/03/24 1112  Pharmacy to Dose enoxaparin (LOVENOX)  Status:  Discontinued         -- XX Continuous PRN 01/04/24 1313    01/02/24 1935  Enoxaparin Sodium (LOVENOX) syringe 40 mg  Status:  Discontinued         40 mg SC Nightly 01/03/24 1122                  The patient is high risk due to the following diagnoses/reasons: Acute hypoxic respiratory failure with left upper lobe atelectasis and pneumonia    Disposition: Anticipate home in the next few days.    Vandana Lee MD  [facility]  01/08/24  20:31 EST

## 2024-01-08 NOTE — ANESTHESIA PREPROCEDURE EVALUATION
Anesthesia Evaluation     Patient summary reviewed and Nursing notes reviewed   no history of anesthetic complications:   NPO Solid Status: > 8 hours  NPO Liquid Status: > 8 hours           Airway   Mallampati: II  TM distance: >3 FB  Neck ROM: full  No difficulty expected  Dental    (+) poor dentition and partials    Pulmonary - normal exam    breath sounds clear to auscultation  (+) COPD moderate, asthma,home oxygen (As needed)  Cardiovascular - normal exam    PT is on anticoagulation therapy  Patient on routine beta blocker  Rhythm: regular  Rate: normal    (+) hypertension, CAD, PVD, hyperlipidemia      Neuro/Psych  (+) psychiatric history Anxiety  GI/Hepatic/Renal/Endo    (+) obesity, diabetes mellitus type 2    Musculoskeletal     Abdominal  - normal exam   Substance History - negative use     OB/GYN negative ob/gyn ROS         Other   arthritis, blood dyscrasia,   history of cancer                Anesthesia Plan    ASA 3     general     intravenous induction     Anesthetic plan, risks, benefits, and alternatives have been provided, discussed and informed consent has been obtained with: patient.    Use of blood products discussed with patient  Consented to blood products.    Plan discussed with CRNA.    CODE STATUS:    Level Of Support Discussed With: Patient  Code Status (Patient has no pulse and is not breathing): CPR (Attempt to Resuscitate)  Medical Interventions (Patient has pulse or is breathing): Full Support

## 2024-01-08 NOTE — THERAPY TREATMENT NOTE
Acute Care - Physical Therapy Treatment Note   Gregory     Patient Name: Evie Shah  : 1945  MRN: [id number]  Today's Date: 2024      Visit Dx:     ICD-10-CM ICD-9-CM   1. Hypoxemia  R09.02 799.02   2. COPD exacerbation  J44.1 491.21   3. Acute respiratory failure with hypoxia  J96.01 518.81   4. Lung mass  R91.8 786.6     Patient Active Problem List   Diagnosis    DM type 2 with diabetic mixed hyperlipidemia    Dyslipidemia    Essential hypertension    Senile osteoporosis    Chronic allergic rhinitis    VISHAL (generalized anxiety disorder)    PAD (peripheral artery disease)    Severe obesity with body mass index (BMI) of 35.0 to 35.9 and comorbidity    COPD mixed type    Former smoker    Chronic respiratory failure with hypoxia    Type 2 diabetes mellitus with hyperglycemia, without long-term current use of insulin    Primary osteoarthritis of both knees    Coronary artery calcification seen on CT scan    Elevated hematocrit    Acute idiopathic gout of left hand    Vitamin D deficiency    Polycythemia vera    Respiratory failure with hypoxia    Lung mass     Past Medical History:   Diagnosis Date    Allergic rhinitis     Asthma     Atherosclerosis     COPD (chronic obstructive pulmonary disease)     Cough     Diabetes mellitus     Hyperlipidemia     Hypertension     Menopause     Nausea and vomiting     Obesity 3/7/2017    Osteoarthritis     Osteoporosis     Vertigo, benign paroxysmal      Past Surgical History:   Procedure Laterality Date    CATARACT EXTRACTION      COLONOSCOPY      EYE SURGERY      catarac    ILIAC ARTERY STENT  2015    SKIN CANCER EXCISION      nose    TONSILLECTOMY      and adenoidectomy    TUBAL ABDOMINAL LIGATION       PT Assessment (last 12 hours)       PT Evaluation and Treatment       Row Name 24 1458          Physical Therapy Time and Intention    Subjective Information complains of;dyspnea  -HC     Document Type therapy note (daily note)  -HC     Mode of  Treatment individual therapy;physical therapy  -HC     Patient Effort good  -HC     Comment Pt and RN in agreement for PT. Pt was sitting up in chair and completed sitting exercises until tolerance. Pt walked 25' with a BR transfer in middle of walk. O2 remained 87-93% on 6L of O2. O2 stayed up 89-93% until end of walk.  -       Row Name 01/08/24 1458          General Information    Patient Profile Reviewed yes  -HC     Existing Precautions/Restrictions fall;oxygen therapy device and L/min  -       Row Name 01/08/24 1458          Living Environment    Primary Care Provided by self  -       Row Name 01/08/24 1458          Home Use of Assistive/Adaptive Equipment    Equipment Currently Used at Home bp cuff;rollator;oxygen  -       Row Name 01/08/24 1458          Cognition    Affect/Mental Status (Cognition) WF  -     Orientation Status (Cognition) oriented x 4  -HC     Follows Commands (Cognition) WFL  -     Personal Safety Interventions fall prevention program maintained;gait belt;supervised activity;nonskid shoes/slippers when out of bed  -       Row Name 01/08/24 1458          Bed Mobility    Comment, (Bed Mobility) up in chair  -       Row Name 01/08/24 1458          Transfers    Transfers sit-stand transfer;stand-sit transfer;toilet transfer  -       Row Name 01/08/24 1458          Sit-Stand Transfer    Sit-Stand [place] (Transfers) contact guard  -       Row Name 01/08/24 1458          Stand-Sit Transfer    Stand-Sit [place] (Transfers) contact guard  -       Row Name 01/08/24 1458          Toilet Transfer    Type (Toilet Transfer) stand pivot/stand step  -     [place] Level (Toilet Transfer) contact guard  -       Row Name 01/08/24 1458          Gait/Stairs (Locomotion)    [place] Level (Gait) contact guard  -     Assistive Device (Gait) --  none  -     Patient was able to Ambulate yes  -     Distance in Feet (Gait) 25' with BR transfer  -HC     Pattern  (Gait) step-through  -       Row Name 01/08/24 1458          Safety Issues, Functional Mobility    Impairments Affecting Function (Mobility) balance;endurance/activity tolerance  -       Row Name 01/08/24 1458          Motor Skills    Therapeutic Exercise other (see comments)  Sitting: AP, LAQ, March, knees in/out  -       Row Name 01/08/24 1458          Positioning and Restraints    Pre-Treatment Position sitting in chair/recliner  -     Post Treatment Position chair  -HC     In Chair sitting;call light within reach;encouraged to call for assist;with family/caregiver;notified nsg  -       Row Name 01/08/24 1458          Therapy Assessment/Plan (PT)    Rehab Potential (PT) good, to achieve stated therapy goals  -     Criteria for Skilled Interventions Met (PT) yes;skilled treatment is necessary  -     Therapy Frequency (PT) 2 times/wk  2-5x/wk  -     Problem List (PT) problems related to;balance;mobility  -     Activity Limitations Related to Problem List (PT) unable to ambulate safely;unable to transfer safely  -       Row Name 01/08/24 1458          Physical Therapy Goals    Transfer Goal Selection (PT) transfer, PT goal 1  -     Gait Training Goal Selection (PT) gait training, PT goal 1  -       Row Name 01/08/24 1458          Transfer Goal 1 (PT)    Activity/Assistive Device (Transfer Goal 1, PT) transfers, all  -HC     [place] Level/Cues Needed (Transfer Goal 1, PT) independent  -HC     Time Frame (Transfer Goal 1, PT) by discharge  -       Row Name 01/08/24 1458          Gait Training Goal 1 (PT)    Activity/Assistive Device (Gait Training Goal 1, PT) gait (walking locomotion);assistive device use;walker, rolling  -     [place] Level (Gait Training Goal 1, PT) modified independence  -HC     Distance (Gait Training Goal 1, PT) 100'  -HC     Time Frame (Gait Training Goal 1, PT) by discharge  -               User Key  (r) = Recorded By, (t) = Taken By, (c) = Cosigned By       Initials Name Provider Type    HC Yeni Collins PTA Physical Therapist Assistant                    Physical Therapy Education       Title: PT OT SLP Therapies (In Progress)       Topic: Physical Therapy (In Progress)       Point: Mobility training (In Progress)       Learning Progress Summary             Patient Acceptance, E, NR by  at 1/7/2024 2301    Acceptance, E,TB, VU,NR by EB at 1/4/2024 2314    Acceptance, E,TB, VU by KM at 1/4/2024 1515                         Point: Home exercise program (In Progress)       Learning Progress Summary             Patient Acceptance, E, NR by  at 1/7/2024 2301    Acceptance, E,TB, VU,NR by EB at 1/4/2024 2314    Acceptance, E,TB, VU by KM at 1/4/2024 1515                         Point: Body mechanics (In Progress)       Learning Progress Summary             Patient Acceptance, E, NR by  at 1/7/2024 2301    Acceptance, E,TB, VU,NR by  at 1/4/2024 2314    Acceptance, E,TB, VU by KM at 1/4/2024 1515                         Point: Precautions (In Progress)       Learning Progress Summary             Patient Acceptance, E, NR by  at 1/7/2024 2301    Acceptance, E,TB, VU,NR by  at 1/4/2024 2314    Acceptance, E,TB, VU by KM at 1/4/2024 1515                                         User Key       Initials Effective Dates Name Provider Type Discipline     12/15/23 -  Amina Muniz, RN Registered Nurse Nurse     05/24/22 -  Chon Rowell, THANG Physical Therapist PT     06/21/23 -  Brooklynn Kruger, HELIO Registered Nurse Nurse                  PT Recommendation and Plan  Anticipated Discharge Disposition (PT): home, home with assist  Therapy Frequency (PT): 2 times/wk (2-5x/wk)          Time Calculation:    PT Charges       Row Name 01/08/24 1516             Time Calculation    PT Received On 01/08/24  -HC         Time Calculation- PT    Total Timed Code Minutes- PT 23 minute(s)  -HC                User Key  (r) = Recorded By, (t) = Taken By, (c) = Cosigned  By      Initials Name Provider Type    HC Yeni Collins, BRUCE Physical Therapist Assistant                  Therapy Charges for Today       Code Description Service Date Service Provider Modifiers Qty    71690982442 HC GAIT TRAINING EA 15 MIN 1/8/2024 Yeni Collins, BRUCE GP, CQ 1    41941634664 HC PT THER PROC EA 15 MIN 1/8/2024 Yeni Collins PTA GP, CQ 1            PT G-Codes  AM-PAC 6 Clicks Score (PT): 22    Yeni Collins PTA  1/8/2024

## 2024-01-08 NOTE — ANESTHESIA PROCEDURE NOTES
Airway  Urgency: elective    Date/Time: 1/8/2024 8:19 AM  Airway not difficult    General Information and Staff    Patient location during procedure: OR  CRNA/CAA: Rebecca Sanabria CRNA    Indications and Patient Condition  Indications for airway management: airway protection    Preoxygenated: yes  MILS maintained throughout  Mask difficulty assessment: 0 - not attempted    Final Airway Details  Final airway type: supraglottic airway      Successful airway: unique  Size 4     Number of attempts at approach: 1  Assessment: lips, teeth, and gum same as pre-op

## 2024-01-08 NOTE — ANESTHESIA POSTPROCEDURE EVALUATION
Patient: Evie Shah    Procedure Summary       Date: 01/08/24 Room / Location: [facility] OR [address] [place] [address] COR OR    Anesthesia Start: 0813 Anesthesia Stop: 0836    Procedure: BRONCHOSCOPY WITH ENDOBRONCHIAL ULTRASOUND (Bilateral: Bronchus) Diagnosis:       Acute respiratory failure with hypoxia      Lung mass      (Acute respiratory failure with hypoxia [J96.01])      (Lung mass [R91.8])    Surgeons: Augustin Stafford MD Provider: Tommy Benson MD    Anesthesia Type: general ASA Status: 3            Anesthesia Type: general    Vitals  Vitals Value Taken Time   /88 01/08/24 0950   Temp 97.2 °F (36.2 °C) 01/08/24 0837   Pulse 82 01/08/24 0952   Resp 20 01/08/24 0837   SpO2 90 % 01/08/24 0952   Vitals shown include unfiled device data.        Post Anesthesia Care and Evaluation    Patient location during evaluation: PHASE II  Patient participation: complete - patient participated  Level of consciousness: awake and alert  Pain score: 1  Pain management: adequate    Airway patency: patent  Anesthetic complications: No anesthetic complications  PONV Status: controlled  Cardiovascular status: acceptable  Respiratory status: acceptable  Hydration status: acceptable

## 2024-01-08 NOTE — PLAN OF CARE
Goal Outcome Evaluation:      Pt resting in bed Alert oriented and in no distress Will continue to monitor and follow plan of care

## 2024-01-08 NOTE — PROGRESS NOTES
Progress Note Pulmonary and critical care       Subjective     Getting breathing treatment for bronch.  Not in any distress.  Daughter at bedside          Review of Systems:    Reviewed and are negative       Vital Signs  Temp:  [97.4 °F (36.3 °C)-98.5 °F (36.9 °C)] 97.7 °F (36.5 °C)  Heart Rate:  [] 79  Resp:  [18-20] 20  BP: (119-162)/(57-74) 138/73  Body mass index is 35.62 kg/m².    Intake/Output Summary (Last 24 hours) at 1/8/2024 0803  Last data filed at 1/8/2024 0000  Gross per 24 hour   Intake 860 ml   Output --   Net 860 ml     No intake/output data recorded.    Physical Exam:  General- obese in appearance, not in any respiratory  distress    HEENT- PERLLA    Neck- supple    No JVD, no carotid bruit    Respiratory-CTA, not in any distress      Cardiovascular-  NSR    GI-NT ND    CNS-alert oriented x3, grossly nonfocal    Extremities- pulses normal bilaterally , no clubbing and edema        Results Review:      Results from last 7 days   Lab Units 01/08/24  0137 01/04/24  0120 01/03/24  0039   WBC 10*3/mm3 9.62 10.81* 9.93   HEMOGLOBIN g/dL 13.6 14.0 15.7   PLATELETS 10*3/mm3 146 201 223     Results from last 7 days   Lab Units 01/08/24  0137 01/03/24  0039 01/02/24  1409   SODIUM mmol/L 140 140 140   POTASSIUM mmol/L 3.8 4.3 4.5   CHLORIDE mmol/L 108* 104 105   CO2 mmol/L 22.8 23.8 21.9*   BUN mg/dL 18 23 23   CREATININE mg/dL 0.65 0.84 1.09*   CALCIUM mg/dL 8.4* 9.1 9.6   GLUCOSE mg/dL 167* 232* 150*   MAGNESIUM mg/dL  --   --  2.0     Lab Results   Component Value Date    INR 0.94 01/08/2024    PROTIME 13.1 01/08/2024     Results from last 7 days   Lab Units 01/02/24  1409   ALK PHOS U/L 107   BILIRUBIN mg/dL 0.4   ALT (SGPT) U/L 12   AST (SGOT) U/L 18     Results from last 7 days   Lab Units 01/02/24  1403   PH, ARTERIAL pH units 7.402   PO2 ART mm Hg 54.4*   PCO2, ARTERIAL mm Hg 38.9   HCO3 ART mmol/L 24.2     Imaging Results (Last 24 Hours)       ** No results found for the last 24 hours. **                  amLODIPine, 10 mg, Oral, Q24H  atorvastatin, 10 mg, Oral, Nightly  buPROPion SR, 150 mg, Oral, Nightly  cefTRIAXone, 1,000 mg, Intravenous, Q24H  cetirizine, 10 mg, Oral, Daily  cholecalciferol, 50,000 Units, Oral, Weekly  [Held by provider] clopidogrel, 75 mg, Oral, Daily  [Held by provider] enoxaparin, 1 mg/kg, Subcutaneous, Q12H  fluticasone, 2 spray, Nasal, Daily  insulin lispro, 2-7 Units, Subcutaneous, 4x Daily AC & at Bedtime  ipratropium-albuterol, 3 mL, Nebulization, 4x Daily - RT  lactated ringers, 125 mL/hr, Intravenous, Once  lisinopril, 10 mg, Oral, Q24H  metoprolol succinate XL, 25 mg, Oral, Daily  montelukast, 10 mg, Oral, Nightly  senna-docusate sodium, 2 tablet, Oral, BID  sodium chloride, 10 mL, Intravenous, Q12H  sodium chloride, 10 mL, Intravenous, Q12H           Medication Review:     Assessment & Plan      acute hypoxic respiratory failure-could be due to mucous plugging of the left upper lobe.  CT chest reviewed.    Continue nebs  Continue oxygen to maintain saturation 88 to 92%.  Will do bronchoscopy we will get cultures and if I see anything on the bronchoscopy or endobronchial ultrasound will do biopsies accordingly.    Left upper lobe and possibly lingular atelectasis.  Plan as mentioned above.  Will do bronchoscopy.  Plavix has been on hold from 5+ days.  PT/INR was obtained today morning and is within normal limit.    Procedure was explained to the patient and patient's daughter at bedside.  Answered their questions to their satisfaction.        COPD-patient has been smoker for a long time.  Continue nebs.  Continue oxygen to maintain saturation 88 to 92%.     Latest microbiology reviewed.  Continue current antibiotics.  Will de-escalate as per cultures.           GI- PPI prophylaxis.  Medications reviewed        Endrocrinology- Maintain Blood sugar 140 -180     I have reviewed patient's labs and images.                Augustin Stafford MD  01/08/24  08:03 EST

## 2024-01-08 NOTE — PLAN OF CARE
Goal Outcome Evaluation: Patient has been pleasant this shift, currently resting in bed on 6L NC, saturations maintaining above 90%. No acute s/s of distress at this time. VSS. Will continue plan of care.

## 2024-01-09 ENCOUNTER — APPOINTMENT (OUTPATIENT)
Dept: CT IMAGING | Facility: HOSPITAL | Age: 79
End: 2024-01-09
Payer: MEDICARE

## 2024-01-09 ENCOUNTER — APPOINTMENT (OUTPATIENT)
Dept: GENERAL RADIOLOGY | Facility: HOSPITAL | Age: 79
End: 2024-01-09
Payer: MEDICARE

## 2024-01-09 ENCOUNTER — APPOINTMENT (OUTPATIENT)
Dept: CARDIOLOGY | Facility: HOSPITAL | Age: 79
End: 2024-01-09
Payer: MEDICARE

## 2024-01-09 ENCOUNTER — APPOINTMENT (OUTPATIENT)
Dept: ULTRASOUND IMAGING | Facility: HOSPITAL | Age: 79
End: 2024-01-09
Payer: MEDICARE

## 2024-01-09 LAB
A-A DO2: 183.6 MMHG (ref 0–300)
A-A DO2: 542.8 MMHG (ref 0–300)
ACID FAST STN SPEC: NEGATIVE
ACID FAST STN SPEC: NEGATIVE
ALBUMIN SERPL-MCNC: 3 G/DL (ref 3.5–5.2)
ALBUMIN/GLOB SERPL: 1.3 G/DL
ALP SERPL-CCNC: 71 U/L (ref 39–117)
ALT SERPL W P-5'-P-CCNC: 57 U/L (ref 1–33)
ANION GAP SERPL CALCULATED.3IONS-SCNC: 7.1 MMOL/L (ref 5–15)
ARTERIAL PATENCY WRIST A: ABNORMAL
ARTERIAL PATENCY WRIST A: POSITIVE
AST SERPL-CCNC: 32 U/L (ref 1–32)
ATMOSPHERIC PRESS: 709 MMHG
ATMOSPHERIC PRESS: 709 MMHG
BASE EXCESS BLDA CALC-SCNC: 1 MMOL/L (ref 0–2)
BASE EXCESS BLDA CALC-SCNC: 1.7 MMOL/L (ref 0–2)
BASOPHILS # BLD AUTO: 0.05 10*3/MM3 (ref 0–0.2)
BASOPHILS NFR BLD AUTO: 0.4 % (ref 0–1.5)
BDY SITE: ABNORMAL
BDY SITE: ABNORMAL
BH CV ECHO MEAS - AO MAX PG: 10 MMHG
BH CV ECHO MEAS - AO MEAN PG: 5 MMHG
BH CV ECHO MEAS - AO ROOT DIAM: 3.4 CM
BH CV ECHO MEAS - AO V2 MAX: 158 CM/SEC
BH CV ECHO MEAS - AO V2 VTI: 30 CM
BH CV ECHO MEAS - EDV(CUBED): 33.1 ML
BH CV ECHO MEAS - EDV(MOD-SP4): 22.5 ML
BH CV ECHO MEAS - EF(MOD-SP4): 51.6 %
BH CV ECHO MEAS - ESV(CUBED): 27.5 ML
BH CV ECHO MEAS - ESV(MOD-SP4): 10.9 ML
BH CV ECHO MEAS - FS: 5.9 %
BH CV ECHO MEAS - IVS/LVPW: 1.12 CM
BH CV ECHO MEAS - IVSD: 1.34 CM
BH CV ECHO MEAS - LA DIMENSION: 3.6 CM
BH CV ECHO MEAS - LAT PEAK E' VEL: 7.4 CM/SEC
BH CV ECHO MEAS - LV DIASTOLIC VOL/BSA (35-75): 11.5 CM2
BH CV ECHO MEAS - LV MASS(C)D: 131.3 GRAMS
BH CV ECHO MEAS - LV SYSTOLIC VOL/BSA (12-30): 5.6 CM2
BH CV ECHO MEAS - LVIDD: 3.2 CM
BH CV ECHO MEAS - LVIDS: 3 CM
BH CV ECHO MEAS - LVOT AREA: 3.1 CM2
BH CV ECHO MEAS - LVOT DIAM: 2 CM
BH CV ECHO MEAS - LVPWD: 1.2 CM
BH CV ECHO MEAS - MED PEAK E' VEL: 6.9 CM/SEC
BH CV ECHO MEAS - MV A MAX VEL: 118 CM/SEC
BH CV ECHO MEAS - MV E MAX VEL: 90.6 CM/SEC
BH CV ECHO MEAS - MV E/A: 0.77
BH CV ECHO MEAS - PA ACC TIME: 0.09 SEC
BH CV ECHO MEAS - SI(MOD-SP4): 5.9 ML/M2
BH CV ECHO MEAS - SV(MOD-SP4): 11.6 ML
BH CV ECHO MEAS - TAPSE (>1.6): 2.8 CM
BH CV ECHO MEASUREMENTS AVERAGE E/E' RATIO: 12.67
BILIRUB SERPL-MCNC: 0.6 MG/DL (ref 0–1.2)
BUN SERPL-MCNC: 12 MG/DL (ref 8–23)
BUN/CREAT SERPL: 18.8 (ref 7–25)
CALCIUM SPEC-SCNC: 8.2 MG/DL (ref 8.6–10.5)
CHLORIDE SERPL-SCNC: 105 MMOL/L (ref 98–107)
CO2 BLDA-SCNC: 27.9 MMOL/L (ref 22–33)
CO2 BLDA-SCNC: 28.6 MMOL/L (ref 22–33)
CO2 SERPL-SCNC: 23.9 MMOL/L (ref 22–29)
COHGB MFR BLD: 1.1 % (ref 0–5)
COHGB MFR BLD: 1.6 % (ref 0–5)
CREAT SERPL-MCNC: 0.64 MG/DL (ref 0.57–1)
CRP SERPL-MCNC: 3.63 MG/DL (ref 0–0.5)
D-LACTATE SERPL-SCNC: 1.2 MMOL/L (ref 0.5–2)
DEPRECATED RDW RBC AUTO: 48.4 FL (ref 37–54)
EGFRCR SERPLBLD CKD-EPI 2021: 91.7 ML/MIN/1.73
EOSINOPHIL # BLD AUTO: 0.16 10*3/MM3 (ref 0–0.4)
EOSINOPHIL NFR BLD AUTO: 1.1 % (ref 0.3–6.2)
ERYTHROCYTE [DISTWIDTH] IN BLOOD BY AUTOMATED COUNT: 13.6 % (ref 12.3–15.4)
GAS FLOW AIRWAY: 15 LPM
GAS FLOW AIRWAY: 6 LPM
GLOBULIN UR ELPH-MCNC: 2.4 GM/DL
GLUCOSE BLDC GLUCOMTR-MCNC: 134 MG/DL (ref 70–130)
GLUCOSE BLDC GLUCOMTR-MCNC: 164 MG/DL (ref 70–130)
GLUCOSE BLDC GLUCOMTR-MCNC: 256 MG/DL (ref 70–130)
GLUCOSE BLDC GLUCOMTR-MCNC: 356 MG/DL (ref 70–130)
GLUCOSE SERPL-MCNC: 164 MG/DL (ref 65–99)
HCO3 BLDA-SCNC: 26.6 MMOL/L (ref 20–26)
HCO3 BLDA-SCNC: 27.2 MMOL/L (ref 20–26)
HCT VFR BLD AUTO: 41.9 % (ref 34–46.6)
HCT VFR BLD CALC: 42.1 % (ref 38–51)
HCT VFR BLD CALC: 46.5 % (ref 38–51)
HGB BLD-MCNC: 13.2 G/DL (ref 12–15.9)
HGB BLDA-MCNC: 13.8 G/DL (ref 13.5–17.5)
HGB BLDA-MCNC: 15.2 G/DL (ref 13.5–17.5)
IMM GRANULOCYTES # BLD AUTO: 0.06 10*3/MM3 (ref 0–0.05)
IMM GRANULOCYTES NFR BLD AUTO: 0.4 % (ref 0–0.5)
INHALED O2 CONCENTRATION: 100 %
INHALED O2 CONCENTRATION: 44 %
LEFT ATRIUM VOLUME INDEX: 10.9 ML/M2
LYMPHOCYTES # BLD AUTO: 2.34 10*3/MM3 (ref 0.7–3.1)
LYMPHOCYTES NFR BLD AUTO: 16.8 % (ref 19.6–45.3)
Lab: ABNORMAL
Lab: ABNORMAL
MAGNESIUM SERPL-MCNC: 1.8 MG/DL (ref 1.6–2.4)
MCH RBC QN AUTO: 30.4 PG (ref 26.6–33)
MCHC RBC AUTO-ENTMCNC: 31.5 G/DL (ref 31.5–35.7)
MCV RBC AUTO: 96.5 FL (ref 79–97)
METHGB BLD QL: 0.8 % (ref 0–3)
METHGB BLD QL: 0.9 % (ref 0–3)
MODALITY: ABNORMAL
MODALITY: ABNORMAL
MONOCYTES # BLD AUTO: 1.02 10*3/MM3 (ref 0.1–0.9)
MONOCYTES NFR BLD AUTO: 7.3 % (ref 5–12)
NEUTROPHILS NFR BLD AUTO: 10.31 10*3/MM3 (ref 1.7–7)
NEUTROPHILS NFR BLD AUTO: 74 % (ref 42.7–76)
NRBC BLD AUTO-RTO: 0 /100 WBC (ref 0–0.2)
OXYHGB MFR BLDV: 88.1 % (ref 94–99)
OXYHGB MFR BLDV: 93.2 % (ref 94–99)
PCO2 BLDA: 44.7 MM HG (ref 35–45)
PCO2 BLDA: 44.9 MM HG (ref 35–45)
PCO2 TEMP ADJ BLD: ABNORMAL MM[HG]
PCO2 TEMP ADJ BLD: ABNORMAL MM[HG]
PH BLDA: 7.38 PH UNITS (ref 7.35–7.45)
PH BLDA: 7.39 PH UNITS (ref 7.35–7.45)
PH, TEMP CORRECTED: ABNORMAL
PH, TEMP CORRECTED: ABNORMAL
PHOSPHATE SERPL-MCNC: 2.7 MG/DL (ref 2.5–4.5)
PLATELET # BLD AUTO: 160 10*3/MM3 (ref 140–450)
PMV BLD AUTO: 9.8 FL (ref 6–12)
PO2 BLDA: 59 MM HG (ref 83–108)
PO2 BLDA: 74.7 MM HG (ref 83–108)
PO2 TEMP ADJ BLD: ABNORMAL MM[HG]
PO2 TEMP ADJ BLD: ABNORMAL MM[HG]
POTASSIUM SERPL-SCNC: 3.8 MMOL/L (ref 3.5–5.2)
PROCALCITONIN SERPL-MCNC: 0.04 NG/ML (ref 0–0.25)
PROT SERPL-MCNC: 5.4 G/DL (ref 6–8.5)
RBC # BLD AUTO: 4.34 10*6/MM3 (ref 3.77–5.28)
REF LAB TEST METHOD: NORMAL
SAO2 % BLDCOA: 90.3 % (ref 94–99)
SAO2 % BLDCOA: 95.1 % (ref 94–99)
SODIUM SERPL-SCNC: 136 MMOL/L (ref 136–145)
SPECIMEN PREPARATION: NORMAL
SPECIMEN PREPARATION: NORMAL
VENTILATOR MODE: ABNORMAL
VENTILATOR MODE: ABNORMAL
WBC NRBC COR # BLD AUTO: 13.94 10*3/MM3 (ref 3.4–10.8)

## 2024-01-09 PROCEDURE — 71275 CT ANGIOGRAPHY CHEST: CPT | Performed by: RADIOLOGY

## 2024-01-09 PROCEDURE — 25510000001 IOPAMIDOL PER 1 ML: Performed by: INTERNAL MEDICINE

## 2024-01-09 PROCEDURE — 94799 UNLISTED PULMONARY SVC/PX: CPT

## 2024-01-09 PROCEDURE — 25010000002 ENOXAPARIN PER 10 MG: Performed by: INTERNAL MEDICINE

## 2024-01-09 PROCEDURE — 97116 GAIT TRAINING THERAPY: CPT

## 2024-01-09 PROCEDURE — 71045 X-RAY EXAM CHEST 1 VIEW: CPT

## 2024-01-09 PROCEDURE — 83735 ASSAY OF MAGNESIUM: CPT | Performed by: INTERNAL MEDICINE

## 2024-01-09 PROCEDURE — 71045 X-RAY EXAM CHEST 1 VIEW: CPT | Performed by: RADIOLOGY

## 2024-01-09 PROCEDURE — 25010000002 HEPARIN (PORCINE) PER 1000 UNITS: Performed by: INTERNAL MEDICINE

## 2024-01-09 PROCEDURE — 25010000002 FUROSEMIDE PER 20 MG: Performed by: INTERNAL MEDICINE

## 2024-01-09 PROCEDURE — 83050 HGB METHEMOGLOBIN QUAN: CPT

## 2024-01-09 PROCEDURE — 94761 N-INVAS EAR/PLS OXIMETRY MLT: CPT

## 2024-01-09 PROCEDURE — 84145 PROCALCITONIN (PCT): CPT | Performed by: INTERNAL MEDICINE

## 2024-01-09 PROCEDURE — 99233 SBSQ HOSP IP/OBS HIGH 50: CPT | Performed by: INTERNAL MEDICINE

## 2024-01-09 PROCEDURE — 84100 ASSAY OF PHOSPHORUS: CPT | Performed by: INTERNAL MEDICINE

## 2024-01-09 PROCEDURE — 93306 TTE W/DOPPLER COMPLETE: CPT

## 2024-01-09 PROCEDURE — 82375 ASSAY CARBOXYHB QUANT: CPT

## 2024-01-09 PROCEDURE — 25010000002 CEFTRIAXONE PER 250 MG: Performed by: INTERNAL MEDICINE

## 2024-01-09 PROCEDURE — 94664 DEMO&/EVAL PT USE INHALER: CPT

## 2024-01-09 PROCEDURE — 83605 ASSAY OF LACTIC ACID: CPT | Performed by: INTERNAL MEDICINE

## 2024-01-09 PROCEDURE — 93970 EXTREMITY STUDY: CPT

## 2024-01-09 PROCEDURE — 80053 COMPREHEN METABOLIC PANEL: CPT | Performed by: INTERNAL MEDICINE

## 2024-01-09 PROCEDURE — 93306 TTE W/DOPPLER COMPLETE: CPT | Performed by: SPECIALIST

## 2024-01-09 PROCEDURE — 82948 REAGENT STRIP/BLOOD GLUCOSE: CPT

## 2024-01-09 PROCEDURE — 63710000001 INSULIN LISPRO (HUMAN) PER 5 UNITS: Performed by: INTERNAL MEDICINE

## 2024-01-09 PROCEDURE — 94660 CPAP INITIATION&MGMT: CPT

## 2024-01-09 PROCEDURE — 93970 EXTREMITY STUDY: CPT | Performed by: RADIOLOGY

## 2024-01-09 PROCEDURE — 86140 C-REACTIVE PROTEIN: CPT | Performed by: INTERNAL MEDICINE

## 2024-01-09 PROCEDURE — 94669 MECHANICAL CHEST WALL OSCILL: CPT

## 2024-01-09 PROCEDURE — 82805 BLOOD GASES W/O2 SATURATION: CPT

## 2024-01-09 PROCEDURE — 25010000002 METHYLPREDNISOLONE PER 40 MG: Performed by: INTERNAL MEDICINE

## 2024-01-09 PROCEDURE — 97110 THERAPEUTIC EXERCISES: CPT

## 2024-01-09 PROCEDURE — 71275 CT ANGIOGRAPHY CHEST: CPT

## 2024-01-09 PROCEDURE — 85025 COMPLETE CBC W/AUTO DIFF WBC: CPT | Performed by: INTERNAL MEDICINE

## 2024-01-09 PROCEDURE — 36600 WITHDRAWAL OF ARTERIAL BLOOD: CPT

## 2024-01-09 RX ORDER — ACETYLCYSTEINE 200 MG/ML
3 SOLUTION ORAL; RESPIRATORY (INHALATION)
Status: DISCONTINUED | OUTPATIENT
Start: 2024-01-09 | End: 2024-01-18 | Stop reason: HOSPADM

## 2024-01-09 RX ORDER — FUROSEMIDE 10 MG/ML
40 INJECTION INTRAMUSCULAR; INTRAVENOUS ONCE
Status: COMPLETED | OUTPATIENT
Start: 2024-01-10 | End: 2024-01-10

## 2024-01-09 RX ORDER — METHYLPREDNISOLONE SODIUM SUCCINATE 40 MG/ML
40 INJECTION, POWDER, LYOPHILIZED, FOR SOLUTION INTRAMUSCULAR; INTRAVENOUS EVERY 12 HOURS
Status: DISCONTINUED | OUTPATIENT
Start: 2024-01-09 | End: 2024-01-12

## 2024-01-09 RX ORDER — GUAIFENESIN 600 MG/1
1200 TABLET, EXTENDED RELEASE ORAL EVERY 12 HOURS SCHEDULED
Status: DISCONTINUED | OUTPATIENT
Start: 2024-01-09 | End: 2024-01-09

## 2024-01-09 RX ORDER — FUROSEMIDE 10 MG/ML
20 INJECTION INTRAMUSCULAR; INTRAVENOUS ONCE
Status: COMPLETED | OUTPATIENT
Start: 2024-01-09 | End: 2024-01-09

## 2024-01-09 RX ORDER — FUROSEMIDE 10 MG/ML
60 INJECTION INTRAMUSCULAR; INTRAVENOUS ONCE
Status: COMPLETED | OUTPATIENT
Start: 2024-01-09 | End: 2024-01-09

## 2024-01-09 RX ORDER — IPRATROPIUM BROMIDE AND ALBUTEROL SULFATE 2.5; .5 MG/3ML; MG/3ML
3 SOLUTION RESPIRATORY (INHALATION)
Qty: 9 ML | Refills: 0 | Status: COMPLETED | OUTPATIENT
Start: 2024-01-09 | End: 2024-01-10

## 2024-01-09 RX ORDER — ENOXAPARIN SODIUM 100 MG/ML
1 INJECTION SUBCUTANEOUS EVERY 12 HOURS
Status: DISCONTINUED | OUTPATIENT
Start: 2024-01-09 | End: 2024-01-09

## 2024-01-09 RX ORDER — POTASSIUM CHLORIDE 1.5 G/1.58G
40 POWDER, FOR SOLUTION ORAL 3 TIMES DAILY
Status: DISPENSED | OUTPATIENT
Start: 2024-01-09 | End: 2024-01-10

## 2024-01-09 RX ADMIN — IPRATROPIUM BROMIDE AND ALBUTEROL SULFATE 3 ML: 2.5; .5 SOLUTION RESPIRATORY (INHALATION) at 12:10

## 2024-01-09 RX ADMIN — IOPAMIDOL 100 ML: 755 INJECTION, SOLUTION INTRAVENOUS at 20:58

## 2024-01-09 RX ADMIN — CLOPIDOGREL BISULFATE 75 MG: 75 TABLET, FILM COATED ORAL at 09:51

## 2024-01-09 RX ADMIN — POTASSIUM CHLORIDE 40 MEQ: 1.5 POWDER, FOR SOLUTION ORAL at 16:26

## 2024-01-09 RX ADMIN — GUAIFENESIN 1200 MG: 600 TABLET, EXTENDED RELEASE ORAL at 22:33

## 2024-01-09 RX ADMIN — IPRATROPIUM BROMIDE AND ALBUTEROL SULFATE 3 ML: 2.5; .5 SOLUTION RESPIRATORY (INHALATION) at 18:26

## 2024-01-09 RX ADMIN — IPRATROPIUM BROMIDE AND ALBUTEROL SULFATE 3 ML: 2.5; .5 SOLUTION RESPIRATORY (INHALATION) at 00:25

## 2024-01-09 RX ADMIN — METOPROLOL SUCCINATE 25 MG: 25 TABLET, EXTENDED RELEASE ORAL at 09:51

## 2024-01-09 RX ADMIN — IPRATROPIUM BROMIDE AND ALBUTEROL SULFATE 3 ML: 2.5; .5 SOLUTION RESPIRATORY (INHALATION) at 06:19

## 2024-01-09 RX ADMIN — INSULIN LISPRO 4 UNITS: 100 INJECTION, SOLUTION INTRAVENOUS; SUBCUTANEOUS at 16:42

## 2024-01-09 RX ADMIN — IPRATROPIUM BROMIDE AND ALBUTEROL SULFATE 3 ML: 2.5; .5 SOLUTION RESPIRATORY (INHALATION) at 23:12

## 2024-01-09 RX ADMIN — ACETYLCYSTEINE 3 ML: 200 SOLUTION ORAL; RESPIRATORY (INHALATION) at 23:12

## 2024-01-09 RX ADMIN — OFLOXACIN 50000 UNITS: 300 TABLET, COATED ORAL at 09:51

## 2024-01-09 RX ADMIN — LISINOPRIL 10 MG: 10 TABLET ORAL at 09:51

## 2024-01-09 RX ADMIN — ENOXAPARIN SODIUM 90 MG: 100 INJECTION SUBCUTANEOUS at 22:32

## 2024-01-09 RX ADMIN — DOXYCYCLINE 100 MG: 100 INJECTION, POWDER, LYOPHILIZED, FOR SOLUTION INTRAVENOUS at 22:28

## 2024-01-09 RX ADMIN — AMLODIPINE BESYLATE 10 MG: 10 TABLET ORAL at 09:51

## 2024-01-09 RX ADMIN — CEFTRIAXONE 1000 MG: 1 INJECTION, POWDER, FOR SOLUTION INTRAMUSCULAR; INTRAVENOUS at 16:19

## 2024-01-09 RX ADMIN — FLUTICASONE PROPIONATE 2 SPRAY: 50 SPRAY, METERED NASAL at 09:56

## 2024-01-09 RX ADMIN — Medication 10 ML: at 09:55

## 2024-01-09 RX ADMIN — FUROSEMIDE 20 MG: 10 INJECTION, SOLUTION INTRAMUSCULAR; INTRAVENOUS at 16:18

## 2024-01-09 RX ADMIN — Medication 10 ML: at 22:33

## 2024-01-09 RX ADMIN — INSULIN LISPRO 6 UNITS: 100 INJECTION, SOLUTION INTRAVENOUS; SUBCUTANEOUS at 21:41

## 2024-01-09 RX ADMIN — FUROSEMIDE 60 MG: 10 INJECTION, SOLUTION INTRAMUSCULAR; INTRAVENOUS at 13:07

## 2024-01-09 RX ADMIN — HEPARIN SODIUM 5000 UNITS: 5000 INJECTION INTRAVENOUS; SUBCUTANEOUS at 09:51

## 2024-01-09 RX ADMIN — METHYLPREDNISOLONE SODIUM SUCCINATE 40 MG: 40 INJECTION, POWDER, FOR SOLUTION INTRAMUSCULAR; INTRAVENOUS at 13:07

## 2024-01-09 RX ADMIN — DOXYCYCLINE 100 MG: 100 INJECTION, POWDER, LYOPHILIZED, FOR SOLUTION INTRAVENOUS at 09:51

## 2024-01-09 RX ADMIN — INSULIN LISPRO 2 UNITS: 100 INJECTION, SOLUTION INTRAVENOUS; SUBCUTANEOUS at 12:33

## 2024-01-09 RX ADMIN — POTASSIUM CHLORIDE 40 MEQ: 1.5 POWDER, FOR SOLUTION ORAL at 13:06

## 2024-01-09 RX ADMIN — CETIRIZINE HYDROCHLORIDE 10 MG: 10 TABLET, FILM COATED ORAL at 09:51

## 2024-01-09 NOTE — PROGRESS NOTES
[facility] HOSPITALIST PROGRESS NOTE     Patient Identification:  Name:  Evie Shah  Age:  78 y.o.  Sex:  female  :  1945  MRN:  [id number]  Visit Number:  [id number]  ROOM: [address] [place] [address]     Primary Care Provider:  Camryn Mota PA     Date of Admission: 2024    Length of stay in inpatient status:  7    Subjective     Chief Compliant:    Chief Complaint   Patient presents with    Shortness of Breath    Fever    Weakness - Generalized     Pt has been sob for a weak and has been sick with a cough.     Cough     History of Presenting Illness: I saw the patient while she was on the telemetry floor.  Please note that the patient's daughter and son-in-law were again at bedside.  The patient was sitting up in a chair, on 3 L of bubble high flow.  The patient was asking when she could go home.  She stated that she did not really feel that short of air, that she actually felt better when she tried to move around.  She denies chest pain, nausea, vomiting, and diarrhea.    Objective     Current Hospital Meds:  amLODIPine, 10 mg, Oral, Q24H  atorvastatin, 10 mg, Oral, Nightly  buPROPion SR, 150 mg, Oral, Nightly  cefTRIAXone, 1,000 mg, Intravenous, Q24H  cetirizine, 10 mg, Oral, Daily  cholecalciferol, 50,000 Units, Oral, Weekly  clopidogrel, 75 mg, Oral, Daily  doxycycline, 100 mg, Intravenous, Q12H  fluticasone, 2 spray, Nasal, Daily  furosemide, 60 mg, Intravenous, Once  heparin (porcine), 5,000 Units, Subcutaneous, Q12H  insulin lispro, 2-7 Units, Subcutaneous, 4x Daily AC & at Bedtime  ipratropium-albuterol, 3 mL, Nebulization, 4x Daily - RT  lisinopril, 10 mg, Oral, Q24H  methylPREDNISolone sodium succinate, 40 mg, Intravenous, Q12H  metoprolol succinate XL, 25 mg, Oral, Daily  montelukast, 10 mg, Oral, Nightly  potassium chloride, 40 mEq, Oral, TID  senna-docusate sodium, 2 tablet, Oral, BID  sodium chloride, 10 mL, Intravenous, Q12H       Current Antimicrobial  Therapy:  Anti-Infectives (From admission, onward)      Ordered     Dose/Rate Route Frequency Start Stop    01/08/24 2038  doxycycline (VIBRAMYCIN) 100 mg in sodium chloride 0.9 % 100 mL IVPB-VTB        Ordering Provider: Vandana Lee MD    100 mg Intravenous Every 12 Hours 01/08/24 2100 01/15/24 2059    01/02/24 1935  cefTRIAXone (ROCEPHIN) 1,000 mg in sodium chloride 0.9 % 100 mL IVPB-VTB        Ordering Provider: Vandana Lee MD    1,000 mg  200 mL/hr over 30 Minutes Intravenous Every 24 Hours 01/03/24 1500 01/15/24 2036    01/02/24 1935  doxycycline (VIBRAMYCIN) 100 mg in sodium chloride 0.9 % 100 mL IVPB-VTB        Ordering Provider: Juan Jade MD    100 mg Intravenous Every 12 Hours 01/03/24 0600 01/07/24 1714    01/02/24 1448  cefTRIAXone (ROCEPHIN) 2,000 mg in sodium chloride 0.9 % 100 mL IVPB-VTB        Ordering Provider: Jonathan Wyatt PA    2,000 mg  200 mL/hr over 30 Minutes Intravenous Once 01/02/24 1504 01/02/24 1610    01/02/24 1448  doxycycline (VIBRAMYCIN) 100 mg in sodium chloride 0.9 % 100 mL IVPB-VTB        Ordering Provider: Jonathan Wyatt PA    100 mg  over 60 Minutes Intravenous Once 01/02/24 1504 01/02/24 1710          Current Diuretic Therapy:  Diuretics (From admission, onward)      Ordered     Dose/Rate Route Frequency Start Stop    01/09/24 1105  furosemide (LASIX) injection 60 mg        Ordering Provider: Augustin Stafford MD    60 mg Intravenous Once 01/09/24 1200            ----------------------------------------------------------------------------------------------------------------------  Vital Signs:  Temp:  [98 °F (36.7 °C)-98.7 °F (37.1 °C)] 98 °F (36.7 °C)  Heart Rate:  [77-95] 87  Resp:  [16-20] 20  BP: (115-155)/(61-77) 129/67  SpO2:  [90 %-99 %] 90 %  on  Flow (L/min):  [6] 6;   Device (Oxygen Therapy): high-flow nasal cannula;bubble  Body mass index is 36.48 kg/m².    Wt Readings from Last 3 Encounters:   01/09/24 93.4 kg (205 lb 14.6 oz)    01/02/24 90.3 kg (199 lb)   12/26/23 92.1 kg (203 lb)     Intake and Output (last 24 hours)       Intake/Output       Row Name 01/09/24 0951 01/09/24 0830 01/09/24 0450 01/09/24 0353 01/09/24 0136       Weights    Weight -- -- 93.4 kg (205 lb 14.6 oz) -- --    Weight Method -- -- Bed scale -- --    BMI (Calculated) -- -- 36.5 -- --       Intake    P.O. -- 240 mL -- 120 mL --    Intake (%) -- Breakfast;50% -- -- --       doxycycline (VIBRAMYCIN) 100 mg in sodium chloride 0.9 % 100 mL IVPB-VTB - Peripheral IV 01/07/24 1735 Posterior;Right Forearm     Start: 01/08/24 2100    Dose *100 mg -- -- -- --       Magnesium Sulfate    Latest MgSO4 serum level -- -- -- -- 1.8       Urine Output    Urine Unmeasured Occurrence 1 2 -- 1 --    Urinary Incontinence No No -- -- --    Urine Amount Unable to assess Unable to assess -- -- --      Row Name 01/08/24 2152 01/08/24 2000 01/08/24 1452 01/08/24 1230          Intake    P.O. -- 240 mL -- --     Intake (%) -- -- -- Lunch;0%  npo        cefTRIAXone (ROCEPHIN) 1,000 mg in sodium chloride 0.9 % 100 mL IVPB-VTB - Peripheral IV 01/07/24 1735 Posterior;Right Forearm     Start: 01/03/24 1500    Dose -- -- *1000 mg --        doxycycline (VIBRAMYCIN) 100 mg in sodium chloride 0.9 % 100 mL IVPB-VTB - Peripheral IV 01/07/24 1735 Posterior;Right Forearm     Start: 01/08/24 2100    Dose *100 mg -- -- --        Urine Output    Urine Unmeasured Occurrence -- 1 -- --     Urinary Incontinence -- No -- --                   Diet: Diabetic Diets; Consistent Carbohydrate; Texture: Regular Texture (IDDSI 7); Fluid Consistency: Thin (IDDSI 0)  ----------------------------------------------------------------------------------------------------------------------  Physical Exam  Alert and oriented x 3.  No cyanosis.  No pallor to her skin.  Patient has improved breath sounds in the left upper lung.  I do not hear a prolonged expiratory phase and I do not hear wheezes.  However, overall, the patient has  decreased air movement than expected throughout all of her lungs.  She does not have any edema.  ----------------------------------------------------------------------------------------------------------------------  Tele:  NS with heart rates 80-90's.  I have personally reviewed/looked at the telemetry strips.  ----------------------------------------------------------------------------------------------------------------------  LABS:    CBC and coagulation:  Results from last 7 days   Lab Units 01/09/24  0136 01/08/24  0137 01/04/24  0120 01/03/24  0039 01/02/24  1409   PROCALCITONIN ng/mL 0.04  --   --   --  0.04   LACTATE mmol/L 1.2  --   --   --  2.0   SED RATE mm/hr  --   --   --   --  8   CRP mg/dL 3.63*  --   --   --  <0.30   WBC 10*3/mm3 13.94* 9.62 10.81* 9.93 10.76   HEMOGLOBIN g/dL 13.2 13.6 14.0 15.7 16.5*   HEMATOCRIT % 41.9 43.1 44.1 49.4* 51.2*   MCV fL 96.5 95.8 95.0 94.3 94.1   MCHC g/dL 31.5 31.6 31.7 31.8 32.2   PLATELETS 10*3/mm3 160 146 201 223 243   INR   --  0.94  --   --   --      Acid/base balance:  Results from last 7 days   Lab Units 01/09/24  1102 01/02/24  1403   PH, ARTERIAL pH units 7.382 7.402   PCO2, ARTERIAL mm Hg 44.7 38.9   PO2 ART mm Hg 59.0* 54.4*   HCO3 ART mmol/L 26.6* 24.2     Renal and electrolytes:  Results from last 7 days   Lab Units 01/09/24  0136 01/08/24  0137 01/03/24  0039 01/02/24  1409   SODIUM mmol/L 136 140 140 140   POTASSIUM mmol/L 3.8 3.8 4.3 4.5   MAGNESIUM mg/dL 1.8  --   --  2.0   CHLORIDE mmol/L 105 108* 104 105   CO2 mmol/L 23.9 22.8 23.8 21.9*   BUN mg/dL 12 18 23 23   CREATININE mg/dL 0.64 0.65 0.84 1.09*   CALCIUM mg/dL 8.2* 8.4* 9.1 9.6   PHOSPHORUS mg/dL 2.7  --   --   --    GLUCOSE mg/dL 164* 167* 232* 150*   ANION GAP mmol/L 7.1 9.2 12.2 13.1     Estimated Creatinine Clearance: 81.2 mL/min (by C-G formula based on SCr of 0.64 mg/dL).    Liver and pancreatic function:  Results from last 7 days   Lab Units 01/09/24  0136 01/02/24  1409   ALBUMIN  g/dL 3.0* 4.3   BILIRUBIN mg/dL 0.6 0.4   ALK PHOS U/L 71 107   AST (SGOT) U/L 32 18   ALT (SGPT) U/L 57* 12     Endocrine function:  Lab Results   Component Value Date    HGBA1C 7.70 (H) 01/08/2024     Point of care bedside glucose levels:  Results from last 7 days   Lab Units 01/09/24  1143 01/09/24  0650 01/08/24 2028 01/08/24  1650 01/08/24  1125 01/08/24  0654 01/07/24 2024 01/07/24  1517 01/07/24  1118 01/07/24  0647 01/06/24  1954 01/06/24  1622 01/06/24  1118 01/06/24  0634   GLUCOSE mg/dL 164* 134* 181* 137* 183* 138* 174* 127 150* 157* 189* 154* 141* 131*     Glucose levels from the CMP:  Results from last 7 days   Lab Units 01/09/24  0136 01/08/24  0137 01/03/24  0039 01/02/24  1409   GLUCOSE mg/dL 164* 167* 232* 150*     Lab Results   Component Value Date    TSH 3.260 01/08/2024     Cardiac:  Results from last 7 days   Lab Units 01/02/24  1409   HSTROP T ng/L 9   PROBNP pg/mL 103.9       Cultures:  Microbiology Results (last 10 days)       Procedure Component Value - Date/Time    Respiratory Culture - Wash, Bronchus [[id number]] Collected: 01/08/24 0804    Lab Status: Preliminary result Specimen: Wash from Bronchus Updated: 01/09/24 1142     Respiratory Culture Light growth (2+) The culture consists of normal respiratory [place]. This is a preliminary report; final report to follow.     Gram Stain Moderate (3+) WBCs seen      Rare (1+) Mixed bacterial morphotypes seen on Gram Stain    BAL Culture, Quantitative - Lavage, Lung, Left Upper Lobe [[id number]] Collected: 01/08/24 0802    Lab Status: Preliminary result Specimen: Lavage from Lung, Left Upper Lobe Updated: 01/09/24 1141     BAL Culture No growth     Gram Stain WBCs seen      No organisms seen    COVID-19, FLU A/B, RSV PCR 1 HR TAT - Swab, Nasopharynx [[id number]]  (Normal) Collected: 01/02/24 1410    Lab Status: Final result Specimen: Swab from Nasopharynx Updated: 01/02/24 1501     COVID19 Not Detected     Influenza A PCR Not Detected     Influenza  B PCR Not Detected     RSV, PCR Not Detected    Narrative:      Fact sheet for providers: https://www.fda.gov/media/236135/download    Fact sheet for patients: https://www.fda.gov/media/385242/download    Test performed by PCR.    Blood Culture - Blood, Arm, Left [[id number]]  (Normal) Collected: 01/02/24 1409    Lab Status: Final result Specimen: Blood from Arm, Left Updated: 01/07/24 1430     Blood Culture No growth at 5 days    Blood Culture - Blood, Arm, Right [[id number]]  (Normal) Collected: 01/02/24 1400    Lab Status: Final result Specimen: Blood from Arm, Right Updated: 01/07/24 1430     Blood Culture No growth at 5 days          I have personally looked at the labs and they are summarized above.  ----------------------------------------------------------------------------------------------------------------------  Detailed radiology reports for the last 24 hours:    Imaging Results (Last 24 Hours)       Procedure Component Value Units Date/Time    XR Chest 1 View [[id number]] Collected: 01/09/24 0949     Updated: 01/09/24 0952    Narrative:      EXAM:    XR Chest, 1 View     EXAM DATE:    1/9/2024 8:44 AM     CLINICAL HISTORY:    sob; R09.02-Hypoxemia; J44.1-Chronic obstructive pulmonary disease  with (acute) exacerbation; J96.01-Acute respiratory failure with  hypoxia; R91.8-Other nonspecific abnormal finding of lung field     TECHNIQUE:    Frontal view of the chest.     COMPARISON:    January 3, 2024     FINDINGS:    LUNGS AND PLEURAL SPACES:  Still residual minimal left lower lobe  pneumonia.  Tiny left pleural effusion persist.  No pneumothorax.    HEART:  Unremarkable as visualized.  No cardiomegaly.    MEDIASTINUM:  Unremarkable as visualized.    BONES/JOINTS:  Unremarkable as visualized.       Impression:      1.  Still residual minimal left lower lobe pneumonia.  2.  Tiny left pleural effusion persist.        This report was finalized on 1/9/2024 9:50 AM by Dr. Abhilash Mcnair MD.             I have  personally looked at the radiology images and I have read the available final report.    Assessment & Plan      -Acute hypoxic respiratory failure that was present on admission secondary to left upper lobe atelectasis from a large mucous plug with suspected left upper lobe pneumonia as well  -EBUS performed 1/8/2024 with an incidental finding of an enlarged lymph node at station 10 L  -History of peripheral arterial disease status post left external iliac stent placement  -History of COPD, without a suspected acute exacerbation on admission but as of 1/9/2020 for a mild to moderate exacerbation is suspected   -History of type 2 diabetes mellitus  -History of essential hypertension    The oxygen demand is not improving.  Thus, I will move her to the PCU just to watch her more closely.  Discussed with Dr. Stafford; he is going to give Lasix and steroids to dry out the lung tissue and to treat for a possible asthma attack.  After I was starting to write this note, the patient had worsening hypoxia; therefore, I am repeating her CT scan with PE protocol; the last 1 she had was 1 week ago and it was negative at that time but the patient has not really been as mobile as desired during her hospitalization.  I will also order ultrasounds of her bilateral legs to look for DVTs.  Will start her on therapeutic Lovenox.  Please note that I had already restarted her home Plavix for the peripheral arterial disease and left external iliac stent.  We may have to place the patient on BiPAP overnight.  We will continue with the current antibiotics Rocephin and doxycycline.  Please note that cultures have not yielded a specific bacterial agent yet.  Also, the pathology from the bronchoscopy is still pending.  I will order a sputum culture and send for an expanded viral respiratory panel.  Scopolamine was stopped as the patient's nausea is better and it can make secretions thicker.  The patient is already receiving scheduled DuoNeb  breathing treatments, which should help thin out the secretions.  I will start her on Mucinex pills to also help thin out the secretions.  So far, the glucose levels are at goal and stable and thus we will continue to monitor these, especially since we are starting her on steroids.  Her blood pressures are not quite at goal but they are adequately controlled and not worsening and thus we will continue to monitor these as well.    VTE Prophylaxis:  Mechanical Order History:       None          Pharmalogical Order History:        Ordered     Dose Route Frequency Stop    01/08/24 1508  heparin (porcine) 5000 UNIT/ML injection 5,000 Units         5,000 Units SC Every 12 Hours Scheduled --    01/03/24 1122  Enoxaparin Sodium (LOVENOX) syringe 90 mg  Status:  Discontinued         1 mg/kg SC Every 12 Hours 01/08/24 1508    01/03/24 1112  Pharmacy to Dose enoxaparin (LOVENOX)  Status:  Discontinued         -- XX Continuous PRN 01/04/24 1313    01/02/24 1935  Enoxaparin Sodium (LOVENOX) syringe 40 mg  Status:  Discontinued         40 mg SC Nightly 01/03/24 1122                  The patient is high risk due to the following diagnoses/reasons: Acute hypoxic respiratory failure with left upper lobe atelectasis and pneumonia     Disposition: Anticipate home in the next few days.    Vandana Lee MD  [facility] Hospitalist  01/09/24  12:15 EST

## 2024-01-09 NOTE — THERAPY TREATMENT NOTE
Acute Care - Physical Therapy Treatment Note   [place]     Patient Name: Evie Shah  : 1945  MRN: [id number]  Today's Date: 2024      Visit Dx:     ICD-10-CM ICD-9-CM   1. Hypoxemia  R09.02 799.02   2. COPD exacerbation  J44.1 491.21   3. Acute respiratory failure with hypoxia  J96.01 518.81   4. Lung mass  R91.8 786.6     Patient Active Problem List   Diagnosis    DM type 2 with diabetic mixed hyperlipidemia    Dyslipidemia    Essential hypertension    Senile osteoporosis    Chronic allergic rhinitis    VISHAL (generalized anxiety disorder)    PAD (peripheral artery disease)    Severe obesity with body mass index (BMI) of 35.0 to 35.9 and comorbidity    COPD mixed type    Former smoker    Chronic respiratory failure with hypoxia    Type 2 diabetes mellitus with hyperglycemia, without long-term current use of insulin    Primary osteoarthritis of both knees    Coronary artery calcification seen on CT scan    Elevated hematocrit    Acute idiopathic gout of left hand    Vitamin D deficiency    Polycythemia vera    Respiratory failure with hypoxia    Lung mass     Past Medical History:   Diagnosis Date    Allergic rhinitis     Asthma     Atherosclerosis     COPD (chronic obstructive pulmonary disease)     Cough     Diabetes mellitus     Hyperlipidemia     Hypertension     Menopause     Nausea and vomiting     Obesity 3/7/2017    Osteoarthritis     Osteoporosis     Vertigo, benign paroxysmal      Past Surgical History:   Procedure Laterality Date    BRONCHOSCOPY Bilateral 2024    Procedure: BRONCHOSCOPY WITH ENDOBRONCHIAL ULTRASOUND;  Surgeon: Augustin Stafford MD;  Location: [facility];  Service: Pulmonary;  Laterality: Bilateral;    CATARACT EXTRACTION      COLONOSCOPY      EYE SURGERY      catarac    ILIAC ARTERY STENT  2015    SKIN CANCER EXCISION      nose    TONSILLECTOMY      and adenoidectomy    TUBAL ABDOMINAL LIGATION       PT Assessment (last 12 hours)       PT Evaluation and  Treatment       Row Name 01/09/24 1311          Physical Therapy Time and Intention    Subjective Information no complaints  -     Document Type therapy note (daily note)  -     Mode of Treatment individual therapy;physical therapy  -     Patient Effort good  -     Comment Pt and HELIO Combs in agreement for PT. Pt completed sitting exercises up in chair until m m fatique. Pt walked aprox 20' in room with no AD SBA. O2 remained 87-93% on high flow O2.  -       Row Name 01/09/24 1311          General Information    Patient Profile Reviewed yes  -     Existing Precautions/Restrictions fall;oxygen therapy device and L/min  -       Row Name 01/09/24 1311          Living Environment    Primary Care Provided by self  -       Row Name 01/09/24 1311          Home Use of Assistive/Adaptive Equipment    Equipment Currently Used at Home bp cuff;rollator;oxygen  -       Row Name 01/09/24 1311          Cognition    Affect/Mental Status (Cognition) WFL  -     Orientation Status (Cognition) oriented x 4  -     Follows Commands (Cognition) WFL  -     Personal Safety Interventions fall prevention program maintained;nonskid shoes/slippers when out of bed;supervised activity  -       Row Name 01/09/24 1311          Bed Mobility    Comment, (Bed Mobility) up in room  -       Row Name 01/09/24 1311          Sit-Stand Transfer    Sit-Stand [place] (Transfers) standby assist  -       Row Name 01/09/24 1311          Stand-Sit Transfer    Stand-Sit [place] (Transfers) standby assist  -       Row Name 01/09/24 1311          Gait/Stairs (Locomotion)    [place] Level (Gait) contact guard;standby assist  -     Assistive Device (Gait) --  none  -     Distance in Feet (Gait) 20'  -     Pattern (Gait) step-through  -       Row Name 01/09/24 1311          Safety Issues, Functional Mobility    Impairments Affecting Function (Mobility) balance;endurance/activity tolerance  -       Row Name  01/09/24 1311          Motor Skills    Therapeutic Exercise other (see comments)  Sitting: AP, LAQ, March, knees in/out  -       Row Name 01/09/24 1311          Positioning and Restraints    Pre-Treatment Position standing in room  -HC     Post Treatment Position chair  -HC     In Chair sitting;call light within reach;encouraged to call for assist;notified nsg  -       Row Name 01/09/24 1311          Therapy Assessment/Plan (PT)    Rehab Potential (PT) good, to achieve stated therapy goals  -     Criteria for Skilled Interventions Met (PT) yes;skilled treatment is necessary  -     Therapy Frequency (PT) 2 times/wk  2-5x/wk  -     Problem List (PT) problems related to;balance;mobility  -     Activity Limitations Related to Problem List (PT) unable to ambulate safely;unable to transfer safely  -       Row Name 01/09/24 1311          Physical Therapy Goals    Transfer Goal Selection (PT) transfer, PT goal 1  -     Gait Training Goal Selection (PT) gait training, PT goal 1  -       Row Name 01/09/24 1311          Transfer Goal 1 (PT)    Activity/Assistive Device (Transfer Goal 1, PT) transfers, all  -HC     [place] Level/Cues Needed (Transfer Goal 1, PT) independent  -HC     Time Frame (Transfer Goal 1, PT) by discharge  -       Row Name 01/09/24 1311          Gait Training Goal 1 (PT)    Activity/Assistive Device (Gait Training Goal 1, PT) gait (walking locomotion);assistive device use;walker, rolling  -HC     [place] Level (Gait Training Goal 1, PT) modified independence  -HC     Distance (Gait Training Goal 1, PT) 100'  -HC     Time Frame (Gait Training Goal 1, PT) by discharge  -               User Key  (r) = Recorded By, (t) = Taken By, (c) = Cosigned By      Initials Name Provider Type    Yeni Del Rio PTA Physical Therapist Assistant                    Physical Therapy Education       Title: PT OT SLP Therapies (In Progress)       Topic: Physical Therapy (In Progress)        Point: Mobility training (In Progress)       Learning Progress Summary             Patient Acceptance, E, NR by MF at 1/9/2024 0357    Acceptance, E, NR by MF at 1/7/2024 2301    Acceptance, E,TB, VU,NR by EB at 1/4/2024 2314    Acceptance, E,TB, VU by KM at 1/4/2024 1515                         Point: Home exercise program (In Progress)       Learning Progress Summary             Patient Acceptance, E, NR by  at 1/9/2024 0357    Acceptance, E, NR by MF at 1/7/2024 2301    Acceptance, E,TB, VU,NR by EB at 1/4/2024 2314    Acceptance, E,TB, VU by KM at 1/4/2024 1515                         Point: Body mechanics (In Progress)       Learning Progress Summary             Patient Acceptance, E, NR by MF at 1/9/2024 0357    Acceptance, E, NR by MF at 1/7/2024 2301    Acceptance, E,TB, VU,NR by EB at 1/4/2024 2314    Acceptance, E,TB, VU by KM at 1/4/2024 1515                         Point: Precautions (In Progress)       Learning Progress Summary             Patient Acceptance, E, NR by MF at 1/9/2024 0357    Acceptance, E, NR by  at 1/7/2024 2301    Acceptance, E,TB, VU,NR by EB at 1/4/2024 2314    Acceptance, E,TB, VU by KM at 1/4/2024 1515                                         User Key       Initials Effective Dates Name Provider Type Discipline     12/15/23 -  Amina Muniz, RN Registered Nurse Nurse     05/24/22 -  Chon Rowell, PT Physical Therapist PT     06/21/23 -  Brooklynn Kruger, HELIO Registered Nurse Nurse                  PT Recommendation and Plan  Anticipated Discharge Disposition (PT): home, home with assist  Therapy Frequency (PT): 2 times/wk (2-5x/wk)          Time Calculation:    PT Charges       Row Name 01/09/24 1314             Time Calculation    PT Received On 01/09/24  -HC         Time Calculation- PT    Total Timed Code Minutes- PT 24 minute(s)  -HC                User Key  (r) = Recorded By, (t) = Taken By, (c) = Cosigned By      Initials Name Provider Type    HC Yeni Collins  BRUCE Scruggs Physical Therapist Assistant                  Therapy Charges for Today       Code Description Service Date Service Provider Modifiers Qty    69570812953 HC GAIT TRAINING EA 15 MIN 1/8/2024 Yeni Collins, BRUCE GP, CQ 1    81726047750 HC PT THER PROC EA 15 MIN 1/8/2024 Yeni Collins, BRUCE GP, CQ 1    77958294393 HC GAIT TRAINING EA 15 MIN 1/9/2024 Yeni Collins, BRUCE GP, CQ 1    42661931402 HC PT THER PROC EA 15 MIN 1/9/2024 Yeni Collins, BRUCE GP, CQ 1            PT G-Codes  AM-PAC 6 Clicks Score (PT): 22    Yeni Collins PTA  1/9/2024

## 2024-01-09 NOTE — PROGRESS NOTES
Progress Note Pulmonary and critical care       Subjective     Overnight events reviewed.  All the labs medications and the notes and vitals reviewed.  Resting in bed comfortably.  Not in any distress.    Requiring 8 L of nasal cannula oxygen.      Review of Systems:    Positive for mild shortness of breath otherwise negative      Vital Signs  Temp:  [97.2 °F (36.2 °C)-98.7 °F (37.1 °C)] 98.3 °F (36.8 °C)  Heart Rate:  [] 80  Resp:  [16-20] 20  BP: (115-184)/() 146/70  Body mass index is 36.48 kg/m².    Intake/Output Summary (Last 24 hours) at 1/9/2024 0810  Last data filed at 1/9/2024 0353  Gross per 24 hour   Intake 1000 ml   Output --   Net 1000 ml     No intake/output data recorded.    Physical Exam: No changes  General- obese in appearance, not in any respiratory  distress    HEENT- PERLLA    Neck- supple    No JVD, no carotid bruit    Respiratory-CTA, not in any distress      Cardiovascular-  NSR    GI-NT ND    CNS-alert oriented x3, grossly nonfocal    Extremities- pulses normal bilaterally , no clubbing and edema        Results Review:      Results from last 7 days   Lab Units 01/09/24  0136 01/08/24  0137 01/04/24  0120   WBC 10*3/mm3 13.94* 9.62 10.81*   HEMOGLOBIN g/dL 13.2 13.6 14.0   PLATELETS 10*3/mm3 160 146 201     Results from last 7 days   Lab Units 01/09/24  0136 01/08/24  0137 01/03/24  0039 01/02/24  1409   SODIUM mmol/L 136 140 140 140   POTASSIUM mmol/L 3.8 3.8 4.3 4.5   CHLORIDE mmol/L 105 108* 104 105   CO2 mmol/L 23.9 22.8 23.8 21.9*   BUN mg/dL 12 18 23 23   CREATININE mg/dL 0.64 0.65 0.84 1.09*   CALCIUM mg/dL 8.2* 8.4* 9.1 9.6   GLUCOSE mg/dL 164* 167* 232* 150*   MAGNESIUM mg/dL 1.8  --   --  2.0     Lab Results   Component Value Date    INR 0.94 01/08/2024    PROTIME 13.1 01/08/2024     Results from last 7 days   Lab Units 01/09/24  0136 01/02/24  1409   ALK PHOS U/L 71 107   BILIRUBIN mg/dL 0.6 0.4   ALT (SGPT) U/L 57* 12   AST (SGOT) U/L 32 18     Results from last 7 days    Lab Units 01/02/24  1403   PH, ARTERIAL pH units 7.402   PO2 ART mm Hg 54.4*   PCO2, ARTERIAL mm Hg 38.9   HCO3 ART mmol/L 24.2     Imaging Results (Last 24 Hours)       ** No results found for the last 24 hours. **             Microbiology Results (last 10 days)       Procedure Component Value - Date/Time    Respiratory Culture - Wash, Bronchus [[id number]] Collected: 01/08/24 0804    Lab Status: Preliminary result Specimen: Wash from Bronchus Updated: 01/08/24 1551     Gram Stain Moderate (3+) WBCs seen      Rare (1+) Mixed bacterial morphotypes seen on Gram Stain    BAL Culture, Quantitative - Lavage, Lung, Left Upper Lobe [[id number]] Collected: 01/08/24 0802    Lab Status: Preliminary result Specimen: Lavage from Lung, Left Upper Lobe Updated: 01/08/24 1548     Gram Stain WBCs seen      No organisms seen    COVID-19, FLU A/B, RSV PCR 1 HR TAT - Swab, Nasopharynx [[id number]]  (Normal) Collected: 01/02/24 1410    Lab Status: Final result Specimen: Swab from Nasopharynx Updated: 01/02/24 1501     COVID19 Not Detected     Influenza A PCR Not Detected     Influenza B PCR Not Detected     RSV, PCR Not Detected    Narrative:      Fact sheet for providers: https://www.fda.gov/media/344933/download    Fact sheet for patients: https://www.fda.gov/media/100568/download    Test performed by PCR.    Blood Culture - Blood, Arm, Left [[id number]]  (Normal) Collected: 01/02/24 1409    Lab Status: Final result Specimen: Blood from Arm, Left Updated: 01/07/24 1430     Blood Culture No growth at 5 days    Blood Culture - Blood, Arm, Right [[id number]]  (Normal) Collected: 01/02/24 1400    Lab Status: Final result Specimen: Blood from Arm, Right Updated: 01/07/24 1430     Blood Culture No growth at 5 days             amLODIPine, 10 mg, Oral, Q24H  atorvastatin, 10 mg, Oral, Nightly  buPROPion SR, 150 mg, Oral, Nightly  cefTRIAXone, 1,000 mg, Intravenous, Q24H  cetirizine, 10 mg, Oral, Daily  cholecalciferol, 50,000 Units, Oral,  Weekly  clopidogrel, 75 mg, Oral, Daily  doxycycline, 100 mg, Intravenous, Q12H  fluticasone, 2 spray, Nasal, Daily  heparin (porcine), 5,000 Units, Subcutaneous, Q12H  insulin lispro, 2-7 Units, Subcutaneous, 4x Daily AC & at Bedtime  ipratropium-albuterol, 3 mL, Nebulization, 4x Daily - RT  lisinopril, 10 mg, Oral, Q24H  metoprolol succinate XL, 25 mg, Oral, Daily  montelukast, 10 mg, Oral, Nightly  Scopolamine, 1 patch, Transdermal, Q72H  senna-docusate sodium, 2 tablet, Oral, BID  sodium chloride, 10 mL, Intravenous, Q12H           Medication Review:     Assessment & Plan      acute hypoxic respiratory failure-could be due to mucous plugging of the left upper lobe.  CT chest reviewed.  White count increased which could be reactive as patient had bronchoscopy.  Microbiology reviewed and negative so far.  Cytology pending.  Will give Lasix and potassium.  If blood pressure remains normal will repeat the Lasix in the afternoon.  If patient's oxygen requirement still remains high will consider echo with a bubble study to rule out shunt.  Will also start her on IV steroids.    Continue nebs  Continue oxygen to maintain saturation 88 to 92%.    Will get chest x-ray today.  Can restart Plavix      COPD-patient has been smoker for a long time.      Continue DuoNebs    Continue oxygen.  Titrated to maintain saturation 88 to 92%     Latest microbiology reviewed.    Continue current antibiotics.    Will de-escalate as per cultures.           GI- PPI prophylaxis.  Medications reviewed        Endrocrinology- Maintain Blood sugar 140 -180     I have reviewed patient's labs and images.                Augustin Stafford MD  01/09/24  08:10 EST     medical evaluation

## 2024-01-09 NOTE — PLAN OF CARE
Goal Outcome Evaluation:  Plan of Care Reviewed With: patient        Progress: improving  Outcome Evaluation: Patient a&ox4. She is on 9L bubble high flow nasal cannula now. Dyspnea with exertion, pt now has external catheter. Vital signs stable at this time. Family at bedside. Call light within reach.

## 2024-01-09 NOTE — NURSING NOTE
Patient care being transferred to PCU. Report called to HELIO Andersen. Patient family aware per patient.

## 2024-01-10 ENCOUNTER — APPOINTMENT (OUTPATIENT)
Dept: GENERAL RADIOLOGY | Facility: HOSPITAL | Age: 79
End: 2024-01-10
Payer: MEDICARE

## 2024-01-10 LAB
A-A DO2: 338.9 MMHG (ref 0–300)
ALBUMIN SERPL-MCNC: 3.7 G/DL (ref 3.5–5.2)
ALBUMIN/GLOB SERPL: 1.4 G/DL
ALP SERPL-CCNC: 95 U/L (ref 39–117)
ALT SERPL W P-5'-P-CCNC: 49 U/L (ref 1–33)
ANION GAP SERPL CALCULATED.3IONS-SCNC: 8.3 MMOL/L (ref 5–15)
ARTERIAL PATENCY WRIST A: ABNORMAL
AST SERPL-CCNC: 19 U/L (ref 1–32)
ATMOSPHERIC PRESS: 715 MMHG
B PARAPERT DNA SPEC QL NAA+PROBE: NOT DETECTED
B PERT DNA SPEC QL NAA+PROBE: NOT DETECTED
BACTERIA SPEC AEROBE CULT: NO GROWTH
BACTERIA SPEC RESP CULT: NORMAL
BASE EXCESS BLDA CALC-SCNC: 4.3 MMOL/L (ref 0–2)
BASOPHILS # BLD AUTO: 0.02 10*3/MM3 (ref 0–0.2)
BASOPHILS NFR BLD AUTO: 0.2 % (ref 0–1.5)
BDY SITE: ABNORMAL
BILIRUB SERPL-MCNC: 0.3 MG/DL (ref 0–1.2)
BUN SERPL-MCNC: 20 MG/DL (ref 8–23)
BUN/CREAT SERPL: 21.7 (ref 7–25)
C PNEUM DNA NPH QL NAA+NON-PROBE: NOT DETECTED
CALCIUM SPEC-SCNC: 9.5 MG/DL (ref 8.6–10.5)
CHLORIDE SERPL-SCNC: 104 MMOL/L (ref 98–107)
CO2 BLDA-SCNC: 32 MMOL/L (ref 22–33)
CO2 SERPL-SCNC: 28.7 MMOL/L (ref 22–29)
COHGB MFR BLD: 0.8 % (ref 0–5)
CREAT SERPL-MCNC: 0.92 MG/DL (ref 0.57–1)
DEPRECATED RDW RBC AUTO: 47.4 FL (ref 37–54)
EGFRCR SERPLBLD CKD-EPI 2021: 64.7 ML/MIN/1.73
EOSINOPHIL # BLD AUTO: 0 10*3/MM3 (ref 0–0.4)
EOSINOPHIL NFR BLD AUTO: 0 % (ref 0.3–6.2)
EPAP: 10
ERYTHROCYTE [DISTWIDTH] IN BLOOD BY AUTOMATED COUNT: 13.4 % (ref 12.3–15.4)
FLUAV SUBTYP SPEC NAA+PROBE: NOT DETECTED
FLUBV RNA ISLT QL NAA+PROBE: NOT DETECTED
GLOBULIN UR ELPH-MCNC: 2.7 GM/DL
GLUCOSE BLDC GLUCOMTR-MCNC: 212 MG/DL (ref 70–130)
GLUCOSE BLDC GLUCOMTR-MCNC: 258 MG/DL (ref 70–130)
GLUCOSE BLDC GLUCOMTR-MCNC: 259 MG/DL (ref 70–130)
GLUCOSE BLDC GLUCOMTR-MCNC: 294 MG/DL (ref 70–130)
GLUCOSE SERPL-MCNC: 266 MG/DL (ref 65–99)
GRAM STN SPEC: NORMAL
HADV DNA SPEC NAA+PROBE: NOT DETECTED
HCO3 BLDA-SCNC: 30.4 MMOL/L (ref 20–26)
HCOV 229E RNA SPEC QL NAA+PROBE: NOT DETECTED
HCOV HKU1 RNA SPEC QL NAA+PROBE: NOT DETECTED
HCOV NL63 RNA SPEC QL NAA+PROBE: NOT DETECTED
HCOV OC43 RNA SPEC QL NAA+PROBE: NOT DETECTED
HCT VFR BLD AUTO: 45.1 % (ref 34–46.6)
HCT VFR BLD CALC: 44.7 % (ref 38–51)
HGB BLD-MCNC: 14.1 G/DL (ref 12–15.9)
HGB BLDA-MCNC: 14.6 G/DL (ref 13.5–17.5)
HMPV RNA NPH QL NAA+NON-PROBE: NOT DETECTED
HPIV1 RNA ISLT QL NAA+PROBE: NOT DETECTED
HPIV2 RNA SPEC QL NAA+PROBE: NOT DETECTED
HPIV3 RNA NPH QL NAA+PROBE: NOT DETECTED
HPIV4 P GENE NPH QL NAA+PROBE: NOT DETECTED
IMM GRANULOCYTES # BLD AUTO: 0.07 10*3/MM3 (ref 0–0.05)
IMM GRANULOCYTES NFR BLD AUTO: 0.7 % (ref 0–0.5)
INHALED O2 CONCENTRATION: 70 %
IPAP: 20
LYMPHOCYTES # BLD AUTO: 1.3 10*3/MM3 (ref 0.7–3.1)
LYMPHOCYTES NFR BLD AUTO: 13.8 % (ref 19.6–45.3)
Lab: ABNORMAL
M PNEUMO IGG SER IA-ACNC: NOT DETECTED
MAGNESIUM SERPL-MCNC: 2 MG/DL (ref 1.6–2.4)
MCH RBC QN AUTO: 29.8 PG (ref 26.6–33)
MCHC RBC AUTO-ENTMCNC: 31.3 G/DL (ref 31.5–35.7)
MCV RBC AUTO: 95.3 FL (ref 79–97)
METHGB BLD QL: 0.6 % (ref 0–3)
MODALITY: ABNORMAL
MONOCYTES # BLD AUTO: 0.51 10*3/MM3 (ref 0.1–0.9)
MONOCYTES NFR BLD AUTO: 5.4 % (ref 5–12)
NEUTROPHILS NFR BLD AUTO: 7.49 10*3/MM3 (ref 1.7–7)
NEUTROPHILS NFR BLD AUTO: 79.9 % (ref 42.7–76)
NRBC BLD AUTO-RTO: 0 /100 WBC (ref 0–0.2)
OXYHGB MFR BLDV: 93.9 % (ref 94–99)
PCO2 BLDA: 50.4 MM HG (ref 35–45)
PCO2 TEMP ADJ BLD: ABNORMAL MM[HG]
PH BLDA: 7.39 PH UNITS (ref 7.35–7.45)
PH, TEMP CORRECTED: ABNORMAL
PHOSPHATE SERPL-MCNC: 2.6 MG/DL (ref 2.5–4.5)
PLATELET # BLD AUTO: 178 10*3/MM3 (ref 140–450)
PMV BLD AUTO: 10 FL (ref 6–12)
PO2 BLDA: 76 MM HG (ref 83–108)
PO2 TEMP ADJ BLD: ABNORMAL MM[HG]
POTASSIUM SERPL-SCNC: 4.4 MMOL/L (ref 3.5–5.2)
PROT SERPL-MCNC: 6.4 G/DL (ref 6–8.5)
RBC # BLD AUTO: 4.73 10*6/MM3 (ref 3.77–5.28)
RHINOVIRUS RNA SPEC NAA+PROBE: NOT DETECTED
RSV RNA NPH QL NAA+NON-PROBE: NOT DETECTED
SAO2 % BLDCOA: 95.2 % (ref 94–99)
SARS-COV-2 RNA NPH QL NAA+NON-PROBE: NOT DETECTED
SET MECH RESP RATE: 18
SODIUM SERPL-SCNC: 141 MMOL/L (ref 136–145)
VENTILATOR MODE: ABNORMAL
WBC NRBC COR # BLD AUTO: 9.39 10*3/MM3 (ref 3.4–10.8)

## 2024-01-10 PROCEDURE — 25010000002 FENTANYL CITRATE (PF) 50 MCG/ML SOLUTION: Performed by: INTERNAL MEDICINE

## 2024-01-10 PROCEDURE — 82948 REAGENT STRIP/BLOOD GLUCOSE: CPT

## 2024-01-10 PROCEDURE — 94799 UNLISTED PULMONARY SVC/PX: CPT

## 2024-01-10 PROCEDURE — 36600 WITHDRAWAL OF ARTERIAL BLOOD: CPT

## 2024-01-10 PROCEDURE — 87070 CULTURE OTHR SPECIMN AEROBIC: CPT | Performed by: INTERNAL MEDICINE

## 2024-01-10 PROCEDURE — 63710000001 INSULIN LISPRO (HUMAN) PER 5 UNITS: Performed by: INTERNAL MEDICINE

## 2024-01-10 PROCEDURE — 84100 ASSAY OF PHOSPHORUS: CPT | Performed by: INTERNAL MEDICINE

## 2024-01-10 PROCEDURE — 71045 X-RAY EXAM CHEST 1 VIEW: CPT

## 2024-01-10 PROCEDURE — 85025 COMPLETE CBC W/AUTO DIFF WBC: CPT | Performed by: INTERNAL MEDICINE

## 2024-01-10 PROCEDURE — 25010000002 LIDOCAINE 1 % SOLUTION

## 2024-01-10 PROCEDURE — 94664 DEMO&/EVAL PT USE INHALER: CPT

## 2024-01-10 PROCEDURE — 80053 COMPREHEN METABOLIC PANEL: CPT | Performed by: INTERNAL MEDICINE

## 2024-01-10 PROCEDURE — 83735 ASSAY OF MAGNESIUM: CPT | Performed by: INTERNAL MEDICINE

## 2024-01-10 PROCEDURE — 25010000002 MIDAZOLAM PER 1 MG: Performed by: INTERNAL MEDICINE

## 2024-01-10 PROCEDURE — 94761 N-INVAS EAR/PLS OXIMETRY MLT: CPT

## 2024-01-10 PROCEDURE — 94660 CPAP INITIATION&MGMT: CPT

## 2024-01-10 PROCEDURE — 87205 SMEAR GRAM STAIN: CPT | Performed by: INTERNAL MEDICINE

## 2024-01-10 PROCEDURE — 0202U NFCT DS 22 TRGT SARS-COV-2: CPT | Performed by: INTERNAL MEDICINE

## 2024-01-10 PROCEDURE — 82375 ASSAY CARBOXYHB QUANT: CPT

## 2024-01-10 PROCEDURE — 82805 BLOOD GASES W/O2 SATURATION: CPT

## 2024-01-10 PROCEDURE — 25010000002 FUROSEMIDE PER 20 MG: Performed by: INTERNAL MEDICINE

## 2024-01-10 PROCEDURE — 99233 SBSQ HOSP IP/OBS HIGH 50: CPT | Performed by: INTERNAL MEDICINE

## 2024-01-10 PROCEDURE — 25010000002 METHYLPREDNISOLONE PER 40 MG: Performed by: INTERNAL MEDICINE

## 2024-01-10 PROCEDURE — 83050 HGB METHEMOGLOBIN QUAN: CPT

## 2024-01-10 PROCEDURE — 25010000002 CEFTRIAXONE PER 250 MG: Performed by: INTERNAL MEDICINE

## 2024-01-10 PROCEDURE — 71045 X-RAY EXAM CHEST 1 VIEW: CPT | Performed by: RADIOLOGY

## 2024-01-10 RX ORDER — LISINOPRIL 10 MG/1
10 TABLET ORAL EVERY EVENING
Status: DISCONTINUED | OUTPATIENT
Start: 2024-01-10 | End: 2024-01-11

## 2024-01-10 RX ORDER — LIDOCAINE HYDROCHLORIDE 10 MG/ML
INJECTION, SOLUTION INFILTRATION; PERINEURAL
Status: COMPLETED
Start: 2024-01-10 | End: 2024-01-10

## 2024-01-10 RX ORDER — MIDAZOLAM HYDROCHLORIDE 1 MG/ML
2 INJECTION INTRAMUSCULAR; INTRAVENOUS ONCE
Status: COMPLETED | OUTPATIENT
Start: 2024-01-10 | End: 2024-01-10

## 2024-01-10 RX ORDER — AMLODIPINE BESYLATE 10 MG/1
10 TABLET ORAL EVERY EVENING
Status: DISCONTINUED | OUTPATIENT
Start: 2024-01-10 | End: 2024-01-11

## 2024-01-10 RX ORDER — FENTANYL CITRATE 50 UG/ML
100 INJECTION, SOLUTION INTRAMUSCULAR; INTRAVENOUS ONCE
Status: COMPLETED | OUTPATIENT
Start: 2024-01-10 | End: 2024-01-10

## 2024-01-10 RX ORDER — METOPROLOL SUCCINATE 25 MG/1
25 TABLET, EXTENDED RELEASE ORAL EVERY EVENING
Status: DISCONTINUED | OUTPATIENT
Start: 2024-01-10 | End: 2024-01-18 | Stop reason: HOSPADM

## 2024-01-10 RX ORDER — INSULIN LISPRO 100 [IU]/ML
2-9 INJECTION, SOLUTION INTRAVENOUS; SUBCUTANEOUS
Status: DISCONTINUED | OUTPATIENT
Start: 2024-01-10 | End: 2024-01-18 | Stop reason: HOSPADM

## 2024-01-10 RX ADMIN — DOCUSATE SODIUM 50 MG AND SENNOSIDES 8.6 MG 2 TABLET: 8.6; 5 TABLET, FILM COATED ORAL at 20:05

## 2024-01-10 RX ADMIN — INSULIN LISPRO 4 UNITS: 100 INJECTION, SOLUTION INTRAVENOUS; SUBCUTANEOUS at 16:42

## 2024-01-10 RX ADMIN — DOXYCYCLINE 100 MG: 100 INJECTION, POWDER, LYOPHILIZED, FOR SOLUTION INTRAVENOUS at 08:43

## 2024-01-10 RX ADMIN — METHYLPREDNISOLONE SODIUM SUCCINATE 40 MG: 40 INJECTION, POWDER, FOR SOLUTION INTRAMUSCULAR; INTRAVENOUS at 23:20

## 2024-01-10 RX ADMIN — AMLODIPINE BESYLATE 10 MG: 10 TABLET ORAL at 19:52

## 2024-01-10 RX ADMIN — BUPROPION HYDROCHLORIDE 150 MG: 150 TABLET, EXTENDED RELEASE ORAL at 20:05

## 2024-01-10 RX ADMIN — METHYLPREDNISOLONE SODIUM SUCCINATE 40 MG: 40 INJECTION, POWDER, FOR SOLUTION INTRAMUSCULAR; INTRAVENOUS at 11:01

## 2024-01-10 RX ADMIN — LISINOPRIL 10 MG: 10 TABLET ORAL at 19:52

## 2024-01-10 RX ADMIN — IPRATROPIUM BROMIDE AND ALBUTEROL SULFATE 3 ML: 2.5; .5 SOLUTION RESPIRATORY (INHALATION) at 19:28

## 2024-01-10 RX ADMIN — IPRATROPIUM BROMIDE AND ALBUTEROL SULFATE 3 ML: 2.5; .5 SOLUTION RESPIRATORY (INHALATION) at 13:41

## 2024-01-10 RX ADMIN — INSULIN LISPRO 4 UNITS: 100 INJECTION, SOLUTION INTRAVENOUS; SUBCUTANEOUS at 20:24

## 2024-01-10 RX ADMIN — ATORVASTATIN CALCIUM 10 MG: 10 TABLET, FILM COATED ORAL at 20:05

## 2024-01-10 RX ADMIN — Medication 10 ML: at 08:45

## 2024-01-10 RX ADMIN — MIDAZOLAM HYDROCHLORIDE 2 MG: 1 INJECTION, SOLUTION INTRAMUSCULAR; INTRAVENOUS at 13:15

## 2024-01-10 RX ADMIN — Medication 10 ML: at 20:07

## 2024-01-10 RX ADMIN — LIDOCAINE HYDROCHLORIDE 20 ML: 10 INJECTION, SOLUTION INFILTRATION; PERINEURAL at 13:21

## 2024-01-10 RX ADMIN — EMPAGLIFLOZIN 25 MG: 10 TABLET, FILM COATED ORAL at 19:51

## 2024-01-10 RX ADMIN — DOXYCYCLINE 100 MG: 100 INJECTION, POWDER, LYOPHILIZED, FOR SOLUTION INTRAVENOUS at 20:06

## 2024-01-10 RX ADMIN — INSULIN LISPRO 4 UNITS: 100 INJECTION, SOLUTION INTRAVENOUS; SUBCUTANEOUS at 11:03

## 2024-01-10 RX ADMIN — ACETYLCYSTEINE 3 ML: 200 SOLUTION ORAL; RESPIRATORY (INHALATION) at 13:41

## 2024-01-10 RX ADMIN — ACETYLCYSTEINE 3 ML: 200 SOLUTION ORAL; RESPIRATORY (INHALATION) at 19:28

## 2024-01-10 RX ADMIN — CEFTRIAXONE 1000 MG: 1 INJECTION, POWDER, FOR SOLUTION INTRAMUSCULAR; INTRAVENOUS at 16:27

## 2024-01-10 RX ADMIN — METOPROLOL SUCCINATE 25 MG: 25 TABLET, EXTENDED RELEASE ORAL at 19:52

## 2024-01-10 RX ADMIN — METHYLPREDNISOLONE SODIUM SUCCINATE 40 MG: 40 INJECTION, POWDER, FOR SOLUTION INTRAMUSCULAR; INTRAVENOUS at 01:09

## 2024-01-10 RX ADMIN — FENTANYL CITRATE 100 MCG: 50 INJECTION, SOLUTION INTRAMUSCULAR; INTRAVENOUS at 13:17

## 2024-01-10 RX ADMIN — ACETYLCYSTEINE 3 ML: 200 SOLUTION ORAL; RESPIRATORY (INHALATION) at 07:00

## 2024-01-10 RX ADMIN — IPRATROPIUM BROMIDE AND ALBUTEROL SULFATE 3 ML: 2.5; .5 SOLUTION RESPIRATORY (INHALATION) at 00:28

## 2024-01-10 RX ADMIN — FUROSEMIDE 40 MG: 10 INJECTION, SOLUTION INTRAMUSCULAR; INTRAVENOUS at 00:52

## 2024-01-10 RX ADMIN — IPRATROPIUM BROMIDE AND ALBUTEROL SULFATE 3 ML: 2.5; .5 SOLUTION RESPIRATORY (INHALATION) at 07:00

## 2024-01-10 RX ADMIN — INSULIN LISPRO 4 UNITS: 100 INJECTION, SOLUTION INTRAVENOUS; SUBCUTANEOUS at 06:46

## 2024-01-10 RX ADMIN — IPRATROPIUM BROMIDE AND ALBUTEROL SULFATE 3 ML: 2.5; .5 SOLUTION RESPIRATORY (INHALATION) at 00:08

## 2024-01-10 NOTE — PROGRESS NOTES
Bronchoscopy Procedure Note    Date of Operation: 1/10/2024    Pre-op Diagnosis: Abnormal chest x-ray, hypoxic respiratory failure requiring 100% oxygen    Post-op Diagnosis: Abnormal chest x-ray with mucous plugging requiring 100% oxygen on BiPAP    Surgeon: Augustin Stafford MD    Assistants: None    Anesthesia: Please see anesthesia report for details    Operation: Flexible fiberoptic bronchoscopy, diagnostic     Findings: Left lower lobe and right lower lobe mucopurulent secretions    Specimen:   Bronchial washings  Bronchoalveolar lavage of lingula  Bronchoalveolar lavage of left lower lobe  Bronchoalveolar lavage of right lower lobe    Estimated Blood Loss: None    Complications: None    Indications and History:  The patient is a 78 y.o. female with hypoxic respiratory failure with mucous plugging of left lower lobe and right lower lobe.  The risks, benefits, complications, treatment options and expected outcomes were discussed with the patient.  The possibilities of reaction to medication, pulmonary aspiration, perforation of a viscus, bleeding, failure to diagnose a condition and creating a complication requiring transfusion or operation were discussed with the patient who freely signed the consent.      Description of Procedure:  The patient was seen in the ICU room and the site of surgery properly noted/marked.  The patient was identified as Evie Shah and the procedure verified as Flexible Fiberoptic Bronchoscopy.  A Time Out was held and the above information confirmed.     the patient was positioned  and the bronchoscope was passed through t the mouth.  The vocal cords were visualized and  2 ml 1 % lidocaine was topically placed onto the cords. The cords were normal. The scope was then passed into the trachea.      2 ml 1 % lidocaine was used topically on the chrissie.  Careful inspection of the tracheal lumen was accomplished. The scope was sequentially passed into the left main and then left  upper and lower bronchi and segmental bronchi.   Patient was placed on nonrebreather during the procedure.  Vitals remained stable.    The scope was then withdrawn and advanced into the right main bronchus and then into the RUL, RML, and RLL bronchi and segmental bronchi.     Mucopurulent secretions were seen in the left lower lobe and right lower lobe    Samples-  Bronchial washings.    Then the bronchoscope was wedged into the lingula for a bronchoalveolar lavage and close to 60 cc of saline was given once in close to 18 cc of mucopurulent secretions were collected back.    Then the bronchoscope was wedged into the left lower lobe for a bronchoalveolar lavage and close to 60 cc of saline was given once and close to 16 cc of mucopurulent secretions were suctioned.    Then the bronchoscope was wedged into the right lower lobe for a bronchoalveolar lavage and close to 60 cc of saline was given once in close to 12 cc was collected back.    Patient was placed back on BiPAP.      Samples were sent for cytology and microbiology.  Please follow up the results  The Patient was taken to the Endoscopy Recovery area in satisfactory condition.        Augustin Stafford MD

## 2024-01-10 NOTE — PROGRESS NOTES
Progress Note Pulmonary and critical care       Subjective   Overnight events reviewed.  All the labs medications ins and outs and vitals reviewed.  Patient is on continuous BiPAP.  Latest chest x-ray shows mucous plugging of left lower and some of the right lungs.  Currently on 70% FiO2.  Latest ABG reviewed and shows hypoxia.    Ins and outs net negative    Review of Systems:    Cannot be obtained as patient is on BiPAP.      Vital Signs  Temp:  [98 °F (36.7 °C)-99.4 °F (37.4 °C)] 98.8 °F (37.1 °C)  Heart Rate:  [] 73  Resp:  [20-28] 23  BP: (117-176)/(64-91) 129/72  Body mass index is 37.61 kg/m².    Intake/Output Summary (Last 24 hours) at 1/10/2024 0756  Last data filed at 1/10/2024 0400  Gross per 24 hour   Intake 580.23 ml   Output 1800 ml   Net -1219.77 ml     No intake/output data recorded.    Physical Exam:   General- obese in appearance, on continuous BiPAP    HEENT- PERLLA    Neck- supple    No JVD, no carotid bruit    Respiratory-decreased breath sounds, on BiPAP      Cardiovascular-  NSR    GI-NT ND    CNS-alert oriented x3, grossly nonfocal    Extremities- pulses normal bilaterally , no clubbing and edema        Results Review:      Results from last 7 days   Lab Units 01/10/24  0046 01/09/24  0136 01/08/24  0137   WBC 10*3/mm3 9.39 13.94* 9.62   HEMOGLOBIN g/dL 14.1 13.2 13.6   PLATELETS 10*3/mm3 178 160 146     Results from last 7 days   Lab Units 01/10/24  0046 01/09/24  0136 01/08/24  0137   SODIUM mmol/L 141 136 140   POTASSIUM mmol/L 4.4 3.8 3.8   CHLORIDE mmol/L 104 105 108*   CO2 mmol/L 28.7 23.9 22.8   BUN mg/dL 20 12 18   CREATININE mg/dL 0.92 0.64 0.65   CALCIUM mg/dL 9.5 8.2* 8.4*   GLUCOSE mg/dL 266* 164* 167*   MAGNESIUM mg/dL 2.0 1.8  --      Lab Results   Component Value Date    INR 0.94 01/08/2024    PROTIME 13.1 01/08/2024     Results from last 7 days   Lab Units 01/10/24  0046 01/09/24  0136   ALK PHOS U/L 95 71   BILIRUBIN mg/dL 0.3 0.6   ALT (SGPT) U/L 49* 57*   AST (SGOT)  U/L 19 32     Results from last 7 days   Lab Units 01/10/24  0408   PH, ARTERIAL pH units 7.389   PO2 ART mm Hg 76.0*   PCO2, ARTERIAL mm Hg 50.4*   HCO3 ART mmol/L 30.4*     Imaging Results (Last 24 Hours)       Procedure Component Value Units Date/Time    CT Angiogram Chest [[id number]] Collected: 01/09/24 2154     Updated: 01/09/24 2157    Narrative:      CTA chest     Indications: Shortness of breath     Technique: Contrast-enhanced CTA images of the chest were obtained.  The  mid exposure control, adjustment of the MA and/or KV according to  patient size or use of an iterative reconstruction technique was  utilized.  3D reconstructions were done.     Findings CTA chest:     Comparison is made to the prior study dated 1/2/2024.     There is no evidence of filling defect within the main lobar or  segmental pulmonary arteries to suggest pulmonary embolism.     The heart is not enlarged.  There is no pericardial effusion.  There are  atherosclerotic calcifications of the thoracic aorta.  There is no hilar  or mediastinal lymphadenopathy.     Limited images of the upper abdomen are normal.     Complete collapse of the left upper lobe seen on the prior study has  resolved.  There has been interval complete collapse of the left lower  lobe due to occlusion of the left lower lobe bronchus likely due to  mucus plugging.  There also has been interval development of  consolidation within segments of the right lower lobe.  Emphysematous  changes are present.  There are no pleural effusions.     Degenerative changes involve the thoracic spine.       Impression:      Impression:  1.  No CTA evidence of pulmonary embolism.  2.  Since the prior study there has been reexpansion of the previously  collapsed left upper lobe  3.  Interval complete collapse of the left lower lobe secondary to mucus  plugging within left lower lobe bronchus.  4.  Atelectasis involving several segments of the right lower lobe also  likely do to  segmental bronchus mucus plugging, new since the prior  study.     This report was finalized on 1/9/2024 9:55 PM by Pavel Montes MD.       US Venous Doppler Lower Extremity Bilateral (duplex) [[id number]] Collected: 01/09/24 2051     Updated: 01/09/24 2053    Narrative:      COMPARISON:None     TECHNIQUE: Bilateral lower extremity venous ultrasound was performed  using color-flow,spectral analysis and compression ultrasound.     Findings: Bilateral common femoral vein,superficial femoral vein and  popliteal veins were well visualized. All segments are anechoic and  compressible. There is both spontaneous and phasic variation in the wave  forms. The visualized upper calf veins are patent.       Impression:         No evidence of deep venous thrombosis in the bilateral lower extremities     This report was finalized on 1/9/2024 8:51 PM by Bret Colorado MD.       XR Chest 1 View [[id number]] Collected: 01/09/24 0949     Updated: 01/09/24 0952    Narrative:      EXAM:    XR Chest, 1 View     EXAM DATE:    1/9/2024 8:44 AM     CLINICAL HISTORY:    sob; R09.02-Hypoxemia; J44.1-Chronic obstructive pulmonary disease  with (acute) exacerbation; J96.01-Acute respiratory failure with  hypoxia; R91.8-Other nonspecific abnormal finding of lung field     TECHNIQUE:    Frontal view of the chest.     COMPARISON:    January 3, 2024     FINDINGS:    LUNGS AND PLEURAL SPACES:  Still residual minimal left lower lobe  pneumonia.  Tiny left pleural effusion persist.  No pneumothorax.    HEART:  Unremarkable as visualized.  No cardiomegaly.    MEDIASTINUM:  Unremarkable as visualized.    BONES/JOINTS:  Unremarkable as visualized.       Impression:      1.  Still residual minimal left lower lobe pneumonia.  2.  Tiny left pleural effusion persist.        This report was finalized on 1/9/2024 9:50 AM by Dr. Abhilash Mcnair MD.                Microbiology Results (last 10 days)       Procedure Component Value - Date/Time    Respiratory  Panel PCR w/COVID-19(SARS-CoV-2) [facility] In-House, NP Swab in UTM/VTM, 2 HR TAT - Swab, Nasopharynx [[id number]]  (Normal) Collected: 01/10/24 0435    Lab Status: Final result Specimen: Swab from Nasopharynx Updated: 01/10/24 0529     ADENOVIRUS, PCR Not Detected     Coronavirus 229E Not Detected     Coronavirus HKU1 Not Detected     Coronavirus NL63 Not Detected     Coronavirus OC43 Not Detected     COVID19 Not Detected     Human Metapneumovirus Not Detected     Human Rhinovirus/Enterovirus Not Detected     Influenza A PCR Not Detected     Influenza B PCR Not Detected     Parainfluenza Virus 1 Not Detected     Parainfluenza Virus 2 Not Detected     Parainfluenza Virus 3 Not Detected     Parainfluenza Virus 4 Not Detected     RSV, PCR Not Detected     Bordetella pertussis pcr Not Detected     Bordetella parapertussis PCR Not Detected     Chlamydophila pneumoniae PCR Not Detected     Mycoplasma pneumo by PCR Not Detected    Narrative:      In the setting of a positive respiratory panel with a viral infection PLUS a negative procalcitonin without other underlying concern for bacterial infection, consider observing off antibiotics or discontinuation of antibiotics and continue supportive care. If the respiratory panel is positive for atypical bacterial infection (Bordetella pertussis, Chlamydophila pneumoniae, or Mycoplasma pneumoniae), consider antibiotic de-escalation to target atypical bacterial infection.    AFB Culture - Wash, Bronchus [[id number]] Collected: 01/08/24 0804    Lab Status: Preliminary result Specimen: Wash from Bronchus Updated: 01/09/24 3243     AFB Specimen Processing Concentration     Acid Fast Smear Negative    Narrative:      Performed at:  [address] [place] [address]  [id number]  : Isidoro Eastman PhD, Phone:  [phone]    Respiratory Culture - Wash, Bronchus [[id number]] Collected: 01/08/24 0804    Lab Status: Preliminary result Specimen: Wash  from Bronchus Updated: 01/09/24 1142     Respiratory Culture Light growth (2+) The culture consists of normal respiratory [place]. This is a preliminary report; final report to follow.     Gram Stain Moderate (3+) WBCs seen      Rare (1+) Mixed bacterial morphotypes seen on Gram Stain    AFB Culture - Lavage, Lung, Left Upper Lobe [[id number]] Collected: 01/08/24 0802    Lab Status: Preliminary result Specimen: Lavage from Lung, Left Upper Lobe Updated: 01/09/24 1709     AFB Specimen Processing Concentration     Acid Fast Smear Negative    Narrative:      Performed at:  [facility]  [id number]  : Isidoro Eastman PhD, Phone:  [phone]    BAL Culture, Quantitative - Lavage, Lung, Left Upper Lobe [[id number]] Collected: 01/08/24 0802    Lab Status: Preliminary result Specimen: Lavage from Lung, Left Upper Lobe Updated: 01/09/24 1141     BAL Culture No growth     Gram Stain WBCs seen      No organisms seen    COVID-19, FLU A/B, RSV PCR 1 HR TAT - Swab, Nasopharynx [[id number]]  (Normal) Collected: 01/02/24 1410    Lab Status: Final result Specimen: Swab from Nasopharynx Updated: 01/02/24 1501     COVID19 Not Detected     Influenza A PCR Not Detected     Influenza B PCR Not Detected     RSV, PCR Not Detected    Narrative:      Fact sheet for providers: https://www.fda.gov/media/832845/download    Fact sheet for patients: https://www.fda.gov/media/820252/download    Test performed by PCR.    Blood Culture - Blood, Arm, Left [[id number]]  (Normal) Collected: 01/02/24 1409    Lab Status: Final result Specimen: Blood from Arm, Left Updated: 01/07/24 1430     Blood Culture No growth at 5 days    Blood Culture - Blood, Arm, Right [[id number]]  (Normal) Collected: 01/02/24 1400    Lab Status: Final result Specimen: Blood from Arm, Right Updated: 01/07/24 1430     Blood Culture No growth at 5 days             acetylcysteine, 3 mL, Nebulization, 4x Daily - RT  amLODIPine, 10 mg, Oral,  Q24H  atorvastatin, 10 mg, Oral, Nightly  buPROPion SR, 150 mg, Oral, Nightly  cefTRIAXone, 1,000 mg, Intravenous, Q24H  cetirizine, 10 mg, Oral, Daily  cholecalciferol, 50,000 Units, Oral, Weekly  [Held by provider] clopidogrel, 75 mg, Oral, Daily  doxycycline, 100 mg, Intravenous, Q12H  fluticasone, 2 spray, Nasal, Daily  insulin lispro, 2-7 Units, Subcutaneous, 4x Daily AC & at Bedtime  ipratropium-albuterol, 3 mL, Nebulization, 4x Daily - RT  lisinopril, 10 mg, Oral, Q24H  methylPREDNISolone sodium succinate, 40 mg, Intravenous, Q12H  metoprolol succinate XL, 25 mg, Oral, Daily  montelukast, 10 mg, Oral, Nightly  potassium chloride, 40 mEq, Oral, TID  senna-docusate sodium, 2 tablet, Oral, BID  sodium chloride, 10 mL, Intravenous, Q12H           Medication Review:        Latest Reference Range & Units 01/10/24 04:08   pH, Arterial 7.350 - 7.450 pH units 7.389   pCO2, Arterial 35.0 - 45.0 mm Hg 50.4 (H)   pO2, Arterial 83.0 - 108.0 mm Hg 76.0 (L)   HCO3, Arterial 20.0 - 26.0 mmol/L 30.4 (H)   Base Excess 0.0 - 2.0 mmol/L 4.3 (H)   O2 Saturation, Arterial 94.0 - 99.0 % 95.2   CO2 Content 22 - 33 mmol/L 32.0   A-a DO2 0.0 - 300.0 mmHg 338.9 (H)   Carboxyhemoglobin 0 - 5 % 0.8   Methemoglobin 0.00 - 3.00 % 0.60   Oxyhemoglobin 94 - 99 % 93.9 (L)   Hematocrit, Blood Gas 38.0 - 51.0 % 44.7   Hemoglobin, Blood Gas 13.5 - 17.5 g/dL 14.6   Site  Left Radial   Isaac's Test  N/A   Modality  BiPap   FIO2 % 70   Ventilator Mode  NA   Set Mech Resp Rate  18.0   IPAP  20   EPAP  10   Barometric Pressure for Blood Gas mmHg 715   Collected by  [id number]   (H): Data is abnormally high  (L): Data is abnormally low  Assessment & Plan      acute hypoxic respiratory failure-could be due to mucous plugging of the left upper lobe.  CT chest from today morning reviewed.      White count better.    Microbiology remains negative.  Will move the patient to ICU and do a bronch.  Currently on 70% oxygen.    Will give Lasix to improve lung  compliance and diffusion capacity.    Continue steroids    Continue nebs  Continue oxygen to maintain saturation 88 to 92%.  Repeat chest x-ray and review discussed with patient and family members.  Can restart Plavix    Later patient was moved to the ICU was placed on 100% BiPAP.  Bronchoscopy was done.  Please go through the procedure note for details.    Mucous plugs were cleared.     Repeat a chest x-ray.  Will get ABG in the morning    COPD-patient has been smoker for a long time.      Continue DuoNebs    Continue oxygen.  Titrated to maintain saturation 88 to 92%     Latest microbiology reviewed.    Continue current antibiotics.    Will de-escalate as per cultures.           GI- PPI prophylaxis.  Medications reviewed   Endrocrinology- Maintain Blood sugar 140 -180  I have reviewed patient's labs and images.  Goals of care were discussed with the patient.  Currently full code   patient agreed for intubation post bronchoscopy only for a day or 2 if that helps she does not want to do dependent upon the ventilator.  Multiple family members were present around the bedside and they agreed with the plan.    Currently patient is critically ill due to acute hypoxic respiratory failure due to mucous plugging.   Cc 30 mins I adjusted patient's BiPAP settings.  Will do bronchoscopy to remove the mucous plugging and see if patient's FiO2 requirement improves.  Will move the patient to ICU.            Augustin Stafford MD  01/10/24  07:56 EST

## 2024-01-10 NOTE — PROGRESS NOTES
Patient's family requesting update regarding imaging. Patient seen and examined at bedside with HELIO Hernandez. Her son is present at bedside.     Updated patient and son of repeat CT findings with bi-basilar L > R mucous plugging. Explained to patient and son new orders I have written to include scheduled mucomyst and additional IV lasix. Will make patient NPO for now pending Pulmonary follow-up/consideration for repeat bronchoscopy.     Patient and son reported understanding; appreciative of the update.

## 2024-01-10 NOTE — PLAN OF CARE
Goal Outcome Evaluation:           Progress: improving  Outcome Evaluation: Pt AOx4. VSS on BIPAP/15L bubble high flow NC. Patient went down for STAT CT this shift. Patient kept NPO. Family at bedside. Patient has no complaints at this time. Bed low, locked, and call light in reach.

## 2024-01-10 NOTE — SIGNIFICANT NOTE
01/10/24 1339   OTHER   Discipline physical therapist   Rehab Time/Intention   Session Not Performed unable to treat, medical status change

## 2024-01-10 NOTE — PROGRESS NOTES
[facility] HOSPITALIST PROGRESS NOTE     Patient Identification:  Name:  Evie Shah  Age:  78 y.o.  Sex:  female  :  1945  MRN:  [id number]  Visit Number:  [id number]  ROOM: [address]     Primary Care Provider:  Camryn Mota PA     Date of Admission: 2024    Length of stay in inpatient status:  8    Subjective     Chief Compliant:    Chief Complaint   Patient presents with    Shortness of Breath    Fever    Weakness - Generalized     Pt has been sob for a weak and has been sick with a cough.     Cough     History of Presenting Illness: The patient was on BiPAP when I saw her today.  The patient had continued oxygen desaturations overnight and was placed on BiPAP all night long.  The patient is alert and oriented.  She states that she feels better.  She denies any chest pain.  Please note that a daughter and other family members were at bedside during my evaluation.  The patient thinks that her shortness of air has improved.  She denies chest pain.  She denies nausea, vomiting, and diarrhea.    Consults:  Dr. Stafford with pulmonology  Dr. Davis with cardiology    Procedures:  2024: Bronchoscopy with lavage of the left upper lobe, bronchial brushings, and endobronchial ultrasound-guided transbronchial needle aspiration of station 10 L  1/10/2024: Bronchoscopy with bronchial washings and lavage    Objective     Current Hospital Meds:  acetylcysteine, 3 mL, Nebulization, 4x Daily - RT  amLODIPine, 10 mg, Oral, Q24H  atorvastatin, 10 mg, Oral, Nightly  buPROPion SR, 150 mg, Oral, Nightly  cefTRIAXone, 1,000 mg, Intravenous, Q24H  cetirizine, 10 mg, Oral, Daily  cholecalciferol, 50,000 Units, Oral, Weekly  [Held by provider] clopidogrel, 75 mg, Oral, Daily  doxycycline, 100 mg, Intravenous, Q12H  fluticasone, 2 spray, Nasal, Daily  insulin lispro, 2-7 Units, Subcutaneous, 4x Daily AC & at Bedtime  ipratropium-albuterol, 3 mL, Nebulization, 4x Daily - RT  lisinopril, 10 mg, Oral,  Q24H  methylPREDNISolone sodium succinate, 40 mg, Intravenous, Q12H  metoprolol succinate XL, 25 mg, Oral, Daily  montelukast, 10 mg, Oral, Nightly  potassium chloride, 40 mEq, Oral, TID  senna-docusate sodium, 2 tablet, Oral, BID  sodium chloride, 10 mL, Intravenous, Q12H       Current Antimicrobial Therapy:  Anti-Infectives (From admission, onward)      Ordered     Dose/Rate Route Frequency Start Stop    01/08/24 2038  doxycycline (VIBRAMYCIN) 100 mg in sodium chloride 0.9 % 100 mL IVPB-VTB        Ordering Provider: Vandana Lee MD    100 mg Intravenous Every 12 Hours 01/08/24 2100 01/15/24 2059    01/02/24 1935  cefTRIAXone (ROCEPHIN) 1,000 mg in sodium chloride 0.9 % 100 mL IVPB-VTB        Ordering Provider: Vandana Lee MD    1,000 mg  200 mL/hr over 30 Minutes Intravenous Every 24 Hours 01/03/24 1500 01/15/24 2036    01/02/24 1935  doxycycline (VIBRAMYCIN) 100 mg in sodium chloride 0.9 % 100 mL IVPB-VTB        Ordering Provider: Juan Jade MD    100 mg Intravenous Every 12 Hours 01/03/24 0600 01/07/24 1714    01/02/24 1448  cefTRIAXone (ROCEPHIN) 2,000 mg in sodium chloride 0.9 % 100 mL IVPB-VTB        Ordering Provider: Jonathan Wyatt PA    2,000 mg  200 mL/hr over 30 Minutes Intravenous Once 01/02/24 1504 01/02/24 1610    01/02/24 1448  doxycycline (VIBRAMYCIN) 100 mg in sodium chloride 0.9 % 100 mL IVPB-VTB        Ordering Provider: Jonathan Wyatt PA    100 mg  over 60 Minutes Intravenous Once 01/02/24 1504 01/02/24 1710          Current Diuretic Therapy:  Diuretics (From admission, onward)      Ordered     Dose/Rate Route Frequency Start Stop    01/09/24 2300  furosemide (LASIX) injection 40 mg        Ordering Provider: Anaid Woodward DO    40 mg Intravenous Once 01/10/24 0000 01/10/24 0052    01/09/24 1358  furosemide (LASIX) injection 20 mg        Ordering Provider: Augustin Stafford MD    20 mg Intravenous Once 01/09/24 1600 01/09/24 1618    01/09/24 1105  furosemide  (LASIX) injection 60 mg        Ordering Provider: Augustin Stafford MD    60 mg Intravenous Once 01/09/24 1200 01/09/24 1307          ----------------------------------------------------------------------------------------------------------------------  Vital Signs:  Temp:  [98 °F (36.7 °C)-99.4 °F (37.4 °C)] 98.8 °F (37.1 °C)  Heart Rate:  [] 98  Resp:  [20-28] 24  BP: (117-176)/(64-91) 176/91  SpO2:  [81 %-97 %] 90 %  on  Flow (L/min):  [6-15] 15;   Device (Oxygen Therapy): bubble;high-flow nasal cannula  Body mass index is 37.61 kg/m².    Wt Readings from Last 3 Encounters:   01/10/24 96.3 kg (212 lb 4.9 oz)   01/02/24 90.3 kg (199 lb)   12/26/23 92.1 kg (203 lb)     Intake & Output (last 3 days)         01/07 0701  01/08 0700 01/08 0701  01/09 0700 01/09 0701  01/10 0700 01/10 0701 01/11 0700    P.O. 1100 600 480     I.V. (mL/kg)  400 (4.3) 100.2 (1)     Total Intake(mL/kg) 1100 (12.1) 1000 (10.7) 580.2 (6)     Urine (mL/kg/hr)   1800 (0.8)     Total Output   1800     Net +1100 +1000 -1219.8             Urine Unmeasured Occurrence 4 x 3 x 6 x           NPO Diet NPO Type: Strict NPO  ----------------------------------------------------------------------------------------------------------------------  Physical Exam  Vitals and nursing note reviewed. Exam conducted with a chaperone present.   Constitutional:       General: She is awake. She is in acute distress.      Appearance: Normal appearance. She is well-developed. She is obese. She is ill-appearing. She is not toxic-appearing or diaphoretic.      Interventions: Face mask in place.   HENT:      Right Ear: External ear normal.      Left Ear: External ear normal.      Nose: Nose normal.   Eyes:      General: No scleral icterus.        Right eye: No discharge.         Left eye: No discharge.      Pupils: Pupils are equal, round, and reactive to light.   Cardiovascular:      Rate and Rhythm: Normal rate and regular rhythm.      Heart sounds: No murmur  heard.  Pulmonary:      Effort: Tachypnea, accessory muscle usage and respiratory distress present. No prolonged expiration.      Breath sounds: Decreased air movement present. No stridor.      Comments: Breath sounds yesterday are slightly less on the right side but are similar to yesterday's exam on the left side.  Abdominal:      General: Bowel sounds are normal. There is no distension.      Palpations: Abdomen is soft.   Musculoskeletal:         General: No swelling or deformity.      Right lower leg: No edema.      Left lower leg: No edema.   Skin:     General: Skin is warm.      Capillary Refill: Capillary refill takes less than 2 seconds.      Coloration: Skin is not jaundiced or pale.   Neurological:      Mental Status: She is alert and oriented to person, place, and time. Mental status is at baseline.      Cranial Nerves: No cranial nerve deficit.   Psychiatric:         Mood and Affect: Mood normal.         Behavior: Behavior normal. Behavior is cooperative.         Thought Content: Thought content normal.         Judgment: Judgment normal.     ----------------------------------------------------------------------------------------------------------------------  Tele: Normal sinus rhythm with heart rate 70s to 90s.  Personally reviewed the telemetry strips.  ----------------------------------------------------------------------------------------------------------------------  LABS:  1/8/2024: Bronchoscopy pathology result  Specimen Information: A: Lung, Left Upper Lobe; Lavage    B: Lung, Left Upper Lobe; Brushing    C: Bronchus; Wash    D: Bronchus; Fine Needle Aspirate   0 Result Notes      Component 2 d ago   [facility]  [address] [place] [address]  Phone: [phone] or [phone]  Fax: [phone]  Tommy Dickerson M.D., Medical Director    PATIENT NAME                                 LABORATORY NO.  [id number]   SUDEEP CAMPBELL                         [id number]  [id number]                                     AGE                SEX     SSN            CLIENT REF #  [facility]                          78       1945   F       [id number]    [id number]  [address]                                 REQUESTING M.D.       ATTENDING M.[place]..        [place] [address]                               MARIAA GLEASON  DATE COLLECTED        DATE RECEIVED          DATE REPORTED  2024    DIAGNOSIS:  A.      BRONCHIAL LAVAGE, LEFT UPPER LOBE:  Negative for malignant cells.  B.      BRONCH BRUSH, LEFT UPPER LOBE:  Negative for malignant cells.  C.      BRONCH WASH:  Negative for malignant cells.  D.      LYMPH NODE, FNA, STATION 10L:  Negative for malignant cells.    MICROSCOPIC DESCRIPTION:  A.     Bronchial cells and pulmonary macrophages are present.  B.     Reactive bronchial cells and pulmonary macrophages are present.  C.     Bronchial cells and pulmonary macrophages are present.  D.     The specimen shows a polymorphic lymphoid population.  The findings  are most consistent with a benign reactive lymphadenopathy; however, if  the node is large, continues to increase in size or fails to respond to  antibiotics, excision to definitely exclude a low grade lymphoma or  Hodgkin's lymphoma is recommended.  Unfixed tissue should be  submitted in transport media for immunophenotyping by flow cytometry.    Professional interpretation rendered by Nichol Thomas D.O., F.C.A.P. at [facility], [employer], [address] [place] [address].          CBC and coagulation:  Results from last 7 days   Lab Units 01/10/24  0046 24  0136 24  0137 24  0120   PROCALCITONIN ng/mL  --  0.04  --   --    LACTATE mmol/L  --  1.2  --   --    CRP mg/dL  --  3.63*  --   --    WBC 10*3/mm3 9.39 13.94* 9.62 10.81*   HEMOGLOBIN g/dL 14.1 13.2 13.6 14.0   HEMATOCRIT % 45.1 41.9 43.1 44.1   MCV fL 95.3  96.5 95.8 95.0   MCHC g/dL 31.3* 31.5 31.6 31.7   PLATELETS 10*3/mm3 178 160 146 201   INR   --   --  0.94  --      Acid/base balance:  Results from last 7 days   Lab Units 01/10/24  0408 01/09/24  1943 01/09/24  1102   PH, ARTERIAL pH units 7.389 7.391 7.382   PCO2, ARTERIAL mm Hg 50.4* 44.9 44.7   PO2 ART mm Hg 76.0* 74.7* 59.0*   HCO3 ART mmol/L 30.4* 27.2* 26.6*     Renal and electrolytes:  Results from last 7 days   Lab Units 01/10/24  0046 01/09/24  0136 01/08/24  0137   SODIUM mmol/L 141 136 140   POTASSIUM mmol/L 4.4 3.8 3.8   MAGNESIUM mg/dL 2.0 1.8  --    CHLORIDE mmol/L 104 105 108*   CO2 mmol/L 28.7 23.9 22.8   BUN mg/dL 20 12 18   CREATININE mg/dL 0.92 0.64 0.65   CALCIUM mg/dL 9.5 8.2* 8.4*   PHOSPHORUS mg/dL 2.6 2.7  --    GLUCOSE mg/dL 266* 164* 167*   ANION GAP mmol/L 8.3 7.1 9.2     Estimated Creatinine Clearance: 57.5 mL/min (by C-G formula based on SCr of 0.92 mg/dL).    Liver and pancreatic function:  Results from last 7 days   Lab Units 01/10/24  0046 01/09/24  0136   ALBUMIN g/dL 3.7 3.0*   BILIRUBIN mg/dL 0.3 0.6   ALK PHOS U/L 95 71   AST (SGOT) U/L 19 32   ALT (SGPT) U/L 49* 57*     Endocrine function:  Lab Results   Component Value Date    HGBA1C 7.70 (H) 01/08/2024     Point of care bedside glucose levels:  Results from last 7 days   Lab Units 01/10/24  0624 01/09/24  2136 01/09/24  1639 01/09/24  1143 01/09/24  0650 01/08/24 2028 01/08/24  1650 01/08/24  1125 01/08/24  0654 01/07/24 2024 01/07/24  1517 01/07/24  1118 01/07/24  0647 01/06/24  1954   GLUCOSE mg/dL 259* 356* 256* 164* 134* 181* 137* 183* 138* 174* 127 150* 157* 189*     Glucose levels from the CMP:  Results from last 7 days   Lab Units 01/10/24  0046 01/09/24  0136 01/08/24  0137   GLUCOSE mg/dL 266* 164* 167*     Lab Results   Component Value Date    TSH 3.260 01/08/2024     Cultures:  Microbiology Results (last 10 days)       Procedure Component Value - Date/Time    Respiratory Panel PCR w/COVID-19(SARS-CoV-2)  RAIN/CHING/LILLIANA/PAD/COR/RENE In-House, NP Swab in UTM/VTM, 2 HR TAT - Swab, Nasopharynx [[id number]]  (Normal) Collected: 01/10/24 0435    Lab Status: Final result Specimen: Swab from Nasopharynx Updated: 01/10/24 0529     ADENOVIRUS, PCR Not Detected     Coronavirus 229E Not Detected     Coronavirus HKU1 Not Detected     Coronavirus NL63 Not Detected     Coronavirus OC43 Not Detected     COVID19 Not Detected     Human Metapneumovirus Not Detected     Human Rhinovirus/Enterovirus Not Detected     Influenza A PCR Not Detected     Influenza B PCR Not Detected     Parainfluenza Virus 1 Not Detected     Parainfluenza Virus 2 Not Detected     Parainfluenza Virus 3 Not Detected     Parainfluenza Virus 4 Not Detected     RSV, PCR Not Detected     Bordetella pertussis pcr Not Detected     Bordetella parapertussis PCR Not Detected     Chlamydophila pneumoniae PCR Not Detected     Mycoplasma pneumo by PCR Not Detected    Narrative:      In the setting of a positive respiratory panel with a viral infection PLUS a negative procalcitonin without other underlying concern for bacterial infection, consider observing off antibiotics or discontinuation of antibiotics and continue supportive care. If the respiratory panel is positive for atypical bacterial infection (Bordetella pertussis, Chlamydophila pneumoniae, or Mycoplasma pneumoniae), consider antibiotic de-escalation to target atypical bacterial infection.    AFB Culture - Wash, Bronchus [[id number]] Collected: 01/08/24 0804    Lab Status: Preliminary result Specimen: Wash from Bronchus Updated: 01/09/24 1709     AFB Specimen Processing Concentration     Acid Fast Smear Negative    Narrative:      Performed at:  [address] [place] [address]  [id number]  : Isidoro Eastman PhD, Phone:  [phone]    Respiratory Culture - Wash, Bronchus [[id number]] Collected: 01/08/24 0804    Lab Status: Preliminary result Specimen: Wash from Bronchus Updated: 01/09/24  1142     Respiratory Culture Light growth (2+) The culture consists of normal respiratory france. This is a preliminary report; final report to follow.     Gram Stain Moderate (3+) WBCs seen      Rare (1+) Mixed bacterial morphotypes seen on Gram Stain    AFB Culture - Lavage, Lung, Left Upper Lobe [[id number]] Collected: 01/08/24 0802    Lab Status: Preliminary result Specimen: Lavage from Lung, Left Upper Lobe Updated: 01/09/24 1709     AFB Specimen Processing Concentration     Acid Fast Smear Negative    Narrative:      Performed at:  [address] [place] [address]  [id number]  : Isidoro Eastman PhD, Phone:  [phone]    BAL Culture, Quantitative - Lavage, Lung, Left Upper Lobe [[id number]] Collected: 01/08/24 0802    Lab Status: Preliminary result Specimen: Lavage from Lung, Left Upper Lobe Updated: 01/09/24 1141     BAL Culture No growth     Gram Stain WBCs seen      No organisms seen    COVID-19, FLU A/B, RSV PCR 1 HR TAT - Swab, Nasopharynx [[id number]]  (Normal) Collected: 01/02/24 1410    Lab Status: Final result Specimen: Swab from Nasopharynx Updated: 01/02/24 1501     COVID19 Not Detected     Influenza A PCR Not Detected     Influenza B PCR Not Detected     RSV, PCR Not Detected    Narrative:      Fact sheet for providers: https://www.fda.gov/media/534755/download    Fact sheet for patients: https://www.fda.gov/media/473773/download    Test performed by PCR.    Blood Culture - Blood, Arm, Left [[id number]]  (Normal) Collected: 01/02/24 1409    Lab Status: Final result Specimen: Blood from Arm, Left Updated: 01/07/24 1430     Blood Culture No growth at 5 days    Blood Culture - Blood, Arm, Right [[id number]]  (Normal) Collected: 01/02/24 1400    Lab Status: Final result Specimen: Blood from Arm, Right Updated: 01/07/24 1430     Blood Culture No growth at 5 days          I have personally looked at the labs and they are summarized  above.  ----------------------------------------------------------------------------------------------------------------------  Detailed radiology reports for the last 24 hours:    Imaging Results (Last 24 Hours)       Procedure Component Value Units Date/Time    CT Angiogram Chest [[id number]] Collected: 01/09/24 2154     Updated: 01/09/24 2157    Narrative:      CTA chest     Indications: Shortness of breath     Technique: Contrast-enhanced CTA images of the chest were obtained.  The  mid exposure control, adjustment of the MA and/or KV according to  patient size or use of an iterative reconstruction technique was  utilized.  3D reconstructions were done.     Findings CTA chest:     Comparison is made to the prior study dated 1/2/2024.     There is no evidence of filling defect within the main lobar or  segmental pulmonary arteries to suggest pulmonary embolism.     The heart is not enlarged.  There is no pericardial effusion.  There are  atherosclerotic calcifications of the thoracic aorta.  There is no hilar  or mediastinal lymphadenopathy.     Limited images of the upper abdomen are normal.     Complete collapse of the left upper lobe seen on the prior study has  resolved.  There has been interval complete collapse of the left lower  lobe due to occlusion of the left lower lobe bronchus likely due to  mucus plugging.  There also has been interval development of  consolidation within segments of the right lower lobe.  Emphysematous  changes are present.  There are no pleural effusions.     Degenerative changes involve the thoracic spine.       Impression:      Impression:  1.  No CTA evidence of pulmonary embolism.  2.  Since the prior study there has been reexpansion of the previously  collapsed left upper lobe  3.  Interval complete collapse of the left lower lobe secondary to mucus  plugging within left lower lobe bronchus.  4.  Atelectasis involving several segments of the right lower lobe also  likely do to  segmental bronchus mucus plugging, new since the prior  study.     This report was finalized on 1/9/2024 9:55 PM by Pavel Montes MD.       US Venous Doppler Lower Extremity Bilateral (duplex) [[id number]] Collected: 01/09/24 2051     Updated: 01/09/24 2053    Narrative:      COMPARISON:None     TECHNIQUE: Bilateral lower extremity venous ultrasound was performed  using color-flow,spectral analysis and compression ultrasound.     Findings: Bilateral common femoral vein,superficial femoral vein and  popliteal veins were well visualized. All segments are anechoic and  compressible. There is both spontaneous and phasic variation in the wave  forms. The visualized upper calf veins are patent.       Impression:         No evidence of deep venous thrombosis in the bilateral lower extremities     This report was finalized on 1/9/2024 8:51 PM by Bret Colorado MD.       XR Chest 1 View [[id number]] Collected: 01/09/24 0949     Updated: 01/09/24 0952    Narrative:      EXAM:    XR Chest, 1 View     EXAM DATE:    1/9/2024 8:44 AM     CLINICAL HISTORY:    sob; R09.02-Hypoxemia; J44.1-Chronic obstructive pulmonary disease  with (acute) exacerbation; J96.01-Acute respiratory failure with  hypoxia; R91.8-Other nonspecific abnormal finding of lung field     TECHNIQUE:    Frontal view of the chest.     COMPARISON:    January 3, 2024     FINDINGS:    LUNGS AND PLEURAL SPACES:  Still residual minimal left lower lobe  pneumonia.  Tiny left pleural effusion persist.  No pneumothorax.    HEART:  Unremarkable as visualized.  No cardiomegaly.    MEDIASTINUM:  Unremarkable as visualized.    BONES/JOINTS:  Unremarkable as visualized.       Impression:      1.  Still residual minimal left lower lobe pneumonia.  2.  Tiny left pleural effusion persist.        This report was finalized on 1/9/2024 9:50 AM by Dr. Abhilash Mcnair MD.             I have personally looked at the radiology images and I have read the available final  report.    Assessment & Plan      -Acute hypoxic respiratory failure that was present on admission secondary to left upper lobe atelectasis from a large mucous plug with suspected left upper lobe pneumonia as well  -Worsening acute hypoxic respiratory failure on 1/9/2024, with CT scan showing new atelectasis of the left lower lobe and possibly the right upper lobe from suspected mucous plugs  -EBUS performed 1/8/2024 with an incidental finding of an enlarged lymph node at station 10 L, with both bronchial washings and fine-needle aspiration showing no malignant cells  -History of peripheral arterial disease status post left external iliac stent placement  -History of COPD, without a suspected acute exacerbation on admission but as of 1/9/2020 for a mild to moderate exacerbation is suspected   -History of type 2 diabetes mellitus  -History of essential hypertension    Patient underwent bronchoscopy today; Dr. Stafford did not see large mucous plugs like he did during the first bronchoscopy.  However, he is hopeful that the saline washes help mobilize some of the mucus.  We will continue to wean the patient off of the noninvasive ventilation for oxygen saturations of 88% or above.  If the oxygen demand starts increasing overnight, then we will allow the patient to sit in a chair.  Please note that prior to the second bronchoscopy, the patient was noticing that her oxygen saturations improved with movement, which is consistent with newly developed mucous plugs.  The patient's blood pressures have started increasing now; I have restarted her home Norvasc and lisinopril.  Bedside nurse Juan will perform a bedside nursing swallow evaluation and then give her these 2 antihypertensives.  We will continue to monitor the blood pressures closely.  Please note that the glucose levels are elevated and thus I have restarted the patient's home Januvia and increase the sliding scale Humalog to the low to moderate dose.  We will  continue to monitor the glucose levels 4 times a day.  Once the patient's respiratory status stabilizes, we will have PT, OT, and speech to evaluate her.    VTE Prophylaxis:  Mechanical Order History:        Ordered        01/09/24 2255  Place Sequential Compression Device  Once            01/09/24 2255  Maintain Sequential Compression Device  Continuous                          Pharmalogical Order History:        Ordered     Dose Route Frequency Stop    01/09/24 1945  Enoxaparin Sodium (LOVENOX) syringe 90 mg  Status:  Discontinued         1 mg/kg SC Every 12 Hours 01/09/24 2255 01/09/24 1941  Pharmacy to Dose enoxaparin (LOVENOX)  Status:  Discontinued         -- XX Continuous PRN 01/09/24 1946 01/08/24 1508  heparin (porcine) 5000 UNIT/ML injection 5,000 Units  Status:  Discontinued         5,000 Units SC Every 12 Hours Scheduled 01/09/24 1942 01/03/24 1122  Enoxaparin Sodium (LOVENOX) syringe 90 mg  Status:  Discontinued         1 mg/kg SC Every 12 Hours 01/08/24 1508    01/03/24 1112  Pharmacy to Dose enoxaparin (LOVENOX)  Status:  Discontinued         -- XX Continuous PRN 01/04/24 1313    01/02/24 1935  Enoxaparin Sodium (LOVENOX) syringe 40 mg  Status:  Discontinued         40 mg SC Nightly 01/03/24 1122                  The patient is high risk due to the following diagnoses/reasons: Acute hypoxic respiratory failure with left upper lobe atelectasis and pneumonia     Disposition: Anticipate home within the next week, depending on PT and OT evaluations once her respiratory status stabilizes    Vandana Lee MD  [facility]  01/10/24  07:18 EST

## 2024-01-10 NOTE — CASE MANAGEMENT/SOCIAL WORK
Continued Stay Note  [facility]     Patient Name: Evie Shah  MRN: [id number]  Today's Date: 1/10/2024    Admit Date: 1/2/2024    Plan: Patient was transferred to CCU today & is on continues BiPap.  CM phoned patient's daughter and spoke with her and she states that they still hope for patient to return home at discharge.  Patient has requested 4 Prong Cane and Glucometer and patient uses [facility] in [place] for her prescription needs.  Daughter & I discussed possible Home Health at discharge if patient has extended CCU stay or becomes debilitated before discharge.  Family will provide transportation at discharge.  No other issues or concerns are noted at this time.  CM will continue to follow and assist with any discharge needs.   Discharge Plan       Row Name 01/10/24 9871       Plan    Plan Patient was transferred to CCU today & is on continues BiPap.  CM phoned patient's daughter and spoke with her and she states that they still hope for patient to return home at discharge.  Patient has requested 4 Prong Cane and Glucometer and patient uses [facility] in [place] for her prescription needs.  Daughter & I discussed possible Home Health at discharge if patient has extended CCU stay or becomes debilitated before discharge.  Family will provide transportation at discharge.  No other issues or concerns are noted at this time.  CM will continue to follow and assist with any discharge needs.                   Discharge Codes    No documentation.                 Expected Discharge Date and Time       Expected Discharge Date Expected Discharge Time    Jan 12, 2024               Hiral Erwin RN

## 2024-01-10 NOTE — PLAN OF CARE
Problem: Adult Inpatient Plan of Care  Goal: Plan of Care Review  Outcome: Ongoing, Progressing  Flowsheets  Taken 1/10/2024 1840 by Juan Capone RN  Progress: no change  Outcome Evaluation: Pt. AOx4, VSS, currently HHF 60L, transferred to CCU thruoghout shift, call light within reach, bronche completed by Dr. Stafford. Bed in lowest position, alarms active and audible, no needs noted at this time.  Taken 1/9/2024 1747 by Nathaly Sandoval, RN  Plan of Care Reviewed With: patient  Goal: Patient-Specific Goal (Individualized)  Outcome: Ongoing, Progressing  Goal: Absence of Hospital-Acquired Illness or Injury  Outcome: Ongoing, Progressing  Intervention: Identify and Manage Fall Risk  Recent Flowsheet Documentation  Taken 1/10/2024 1800 by Juan Capone RN  Safety Promotion/Fall Prevention:   activity supervised   fall prevention program maintained   safety round/check completed  Taken 1/10/2024 1700 by Juan Capone RN  Safety Promotion/Fall Prevention:   activity supervised   fall prevention program maintained   safety round/check completed  Taken 1/10/2024 1600 by Juan Capone RN  Safety Promotion/Fall Prevention:   activity supervised   fall prevention program maintained   safety round/check completed  Taken 1/10/2024 1500 by Juan Capone RN  Safety Promotion/Fall Prevention:   activity supervised   fall prevention program maintained   safety round/check completed  Taken 1/10/2024 1400 by Juan Capone RN  Safety Promotion/Fall Prevention:   activity supervised   fall prevention program maintained   safety round/check completed  Taken 1/10/2024 1345 by Juan Capone RN  Safety Promotion/Fall Prevention:   activity supervised   fall prevention program maintained   safety round/check completed  Taken 1/10/2024 1300 by Juan Capone RN  Safety Promotion/Fall Prevention:   activity supervised   fall prevention program maintained   safety round/check completed  Intervention: Prevent Skin  Injury  Recent Flowsheet Documentation  Taken 1/10/2024 1800 by Juan Capone RN  Body Position:   turned   right  Taken 1/10/2024 1600 by Juan Capone RN  Body Position:   turned   left  Taken 1/10/2024 1345 by Juan Capone RN  Body Position:   turned   right  Skin Protection:   adhesive use limited   incontinence pads utilized   skin-to-device areas padded   transparent dressing maintained   tubing/devices free from skin contact  Intervention: Prevent and Manage VTE (Venous Thromboembolism) Risk  Recent Flowsheet Documentation  Taken 1/10/2024 1345 by Juan Capone RN  Activity Management: bedrest  VTE Prevention/Management:   bilateral   sequential compression devices on  Range of Motion: active ROM (range of motion) encouraged  Intervention: Prevent Infection  Recent Flowsheet Documentation  Taken 1/10/2024 1345 by Juan Capone RN  Infection Prevention:   single patient room provided   rest/sleep promoted  Goal: Optimal Comfort and Wellbeing  Outcome: Ongoing, Progressing  Intervention: Monitor Pain and Promote Comfort  Recent Flowsheet Documentation  Taken 1/10/2024 1345 by Juan Capone RN  Pain Management Interventions: see MAR  Intervention: Provide Person-Centered Care  Recent Flowsheet Documentation  Taken 1/10/2024 1345 by Juan Capone RN  Trust Relationship/Rapport:   care explained   emotional support provided   questions answered   reassurance provided   thoughts/feelings acknowledged  Goal: Readiness for Transition of Care  Outcome: Ongoing, Progressing     Problem: Fluid Imbalance (Pneumonia)  Goal: Fluid Balance  Outcome: Ongoing, Progressing     Problem: Infection (Pneumonia)  Goal: Resolution of Infection Signs and Symptoms  Outcome: Ongoing, Progressing     Problem: Respiratory Compromise (Pneumonia)  Goal: Effective Oxygenation and Ventilation  Outcome: Ongoing, Progressing  Intervention: Promote Airway Secretion Clearance  Recent Flowsheet Documentation  Taken 1/10/2024 1345  by Juan Capone RN  Cough And Deep Breathing: done independently per patient  Intervention: Optimize Oxygenation and Ventilation  Recent Flowsheet Documentation  Taken 1/10/2024 1800 by Juan Capone RN  Head of Bed (HOB) Positioning: HOB at 30-45 degrees  Taken 1/10/2024 1600 by Juan Capone RN  Head of Bed (HOB) Positioning: HOB elevated  Taken 1/10/2024 1400 by Juan Capone RN  Head of Bed (HOB) Positioning: HOB elevated  Taken 1/10/2024 1345 by Juan Capone RN  Head of Bed (HOB) Positioning: HOB elevated     Problem: Fall Injury Risk  Goal: Absence of Fall and Fall-Related Injury  Outcome: Ongoing, Progressing  Intervention: Identify and Manage Contributors  Recent Flowsheet Documentation  Taken 1/10/2024 1800 by Juan Capone RN  Medication Review/Management: medications reviewed  Taken 1/10/2024 1700 by Juan Capone RN  Medication Review/Management: medications reviewed  Taken 1/10/2024 1600 by Juan Capone RN  Medication Review/Management: medications reviewed  Taken 1/10/2024 1500 by Juan Capone RN  Medication Review/Management: medications reviewed  Taken 1/10/2024 1400 by Juan Capone RN  Medication Review/Management: medications reviewed  Taken 1/10/2024 1345 by Juan Capone RN  Medication Review/Management: medications reviewed  Taken 1/10/2024 1300 by Juan Capone RN  Medication Review/Management: medications reviewed  Intervention: Promote Injury-Free Environment  Recent Flowsheet Documentation  Taken 1/10/2024 1800 by Juan Capone RN  Safety Promotion/Fall Prevention:   activity supervised   fall prevention program maintained   safety round/check completed  Taken 1/10/2024 1700 by Juan Capone RN  Safety Promotion/Fall Prevention:   activity supervised   fall prevention program maintained   safety round/check completed  Taken 1/10/2024 1600 by Juan Capone RN  Safety Promotion/Fall Prevention:   activity supervised   fall prevention program maintained    safety round/check completed  Taken 1/10/2024 1500 by Juan Capone RN  Safety Promotion/Fall Prevention:   activity supervised   fall prevention program maintained   safety round/check completed  Taken 1/10/2024 1400 by Juan Capone RN  Safety Promotion/Fall Prevention:   activity supervised   fall prevention program maintained   safety round/check completed  Taken 1/10/2024 1345 by Juan Capone RN  Safety Promotion/Fall Prevention:   activity supervised   fall prevention program maintained   safety round/check completed  Taken 1/10/2024 1300 by Juan Capone RN  Safety Promotion/Fall Prevention:   activity supervised   fall prevention program maintained   safety round/check completed     Problem: Noninvasive Ventilation Acute  Goal: Effective Unassisted Ventilation and Oxygenation  Outcome: Ongoing, Progressing     Problem: Skin Injury Risk Increased  Goal: Skin Health and Integrity  Outcome: Ongoing, Progressing  Intervention: Optimize Skin Protection  Recent Flowsheet Documentation  Taken 1/10/2024 1800 by Juan Capone RN  Head of Bed (HOB) Positioning: HOB at 30-45 degrees  Taken 1/10/2024 1600 by Juan Capone RN  Head of Bed (HOB) Positioning: HOB elevated  Taken 1/10/2024 1400 by Juan Capone RN  Head of Bed (HOB) Positioning: HOB elevated  Taken 1/10/2024 1345 by Juan Capone RN  Head of Bed (HOB) Positioning: HOB elevated  Skin Protection:   adhesive use limited   incontinence pads utilized   skin-to-device areas padded   transparent dressing maintained   tubing/devices free from skin contact   Goal Outcome Evaluation:           Progress: no change  Outcome Evaluation: Pt. AOx4, VSS, currently HHF 60L, transferred to CCU thruoghout shift, call light within reach, bronche completed by Dr. Stafford. Bed in lowest position, alarms active and audible, no needs noted at this time.

## 2024-01-11 LAB
A-A DO2: 477.7 MMHG (ref 0–300)
A-A DO2: 492.6 MMHG (ref 0–300)
ALBUMIN SERPL-MCNC: 3.5 G/DL (ref 3.5–5.2)
ALBUMIN/GLOB SERPL: 1.3 G/DL
ALP SERPL-CCNC: 112 U/L (ref 39–117)
ALT SERPL W P-5'-P-CCNC: 33 U/L (ref 1–33)
ANION GAP SERPL CALCULATED.3IONS-SCNC: 5 MMOL/L (ref 5–15)
APTT PPP: 28.9 SECONDS (ref 26.5–34.5)
ARTERIAL PATENCY WRIST A: POSITIVE
ARTERIAL PATENCY WRIST A: POSITIVE
AST SERPL-CCNC: 14 U/L (ref 1–32)
ATMOSPHERIC PRESS: 721 MMHG
ATMOSPHERIC PRESS: 722 MMHG
BASE EXCESS BLDA CALC-SCNC: 2.2 MMOL/L (ref 0–2)
BASE EXCESS BLDA CALC-SCNC: 2.2 MMOL/L (ref 0–2)
BASOPHILS # BLD AUTO: 0.01 10*3/MM3 (ref 0–0.2)
BASOPHILS NFR BLD AUTO: 0.1 % (ref 0–1.5)
BDY SITE: ABNORMAL
BDY SITE: ABNORMAL
BILIRUB SERPL-MCNC: 0.3 MG/DL (ref 0–1.2)
BUN SERPL-MCNC: 24 MG/DL (ref 8–23)
BUN/CREAT SERPL: 32.9 (ref 7–25)
CALCIUM SPEC-SCNC: 9.4 MG/DL (ref 8.6–10.5)
CHLORIDE SERPL-SCNC: 101 MMOL/L (ref 98–107)
CO2 BLDA-SCNC: 29.5 MMOL/L (ref 22–33)
CO2 BLDA-SCNC: 29.6 MMOL/L (ref 22–33)
CO2 SERPL-SCNC: 30 MMOL/L (ref 22–29)
COHGB MFR BLD: 0.4 % (ref 0–5)
COHGB MFR BLD: 0.9 % (ref 0–5)
CREAT SERPL-MCNC: 0.73 MG/DL (ref 0.57–1)
CRP SERPL-MCNC: 2.82 MG/DL (ref 0–0.5)
D-LACTATE SERPL-SCNC: 1 MMOL/L (ref 0.5–2)
DEPRECATED RDW RBC AUTO: 48.5 FL (ref 37–54)
EGFRCR SERPLBLD CKD-EPI 2021: 85.4 ML/MIN/1.73
EOSINOPHIL # BLD AUTO: 0 10*3/MM3 (ref 0–0.4)
EOSINOPHIL NFR BLD AUTO: 0 % (ref 0.3–6.2)
ERYTHROCYTE [DISTWIDTH] IN BLOOD BY AUTOMATED COUNT: 13.4 % (ref 12.3–15.4)
FUNGUS SPEC CULT: NORMAL
FUNGUS SPEC FUNGUS STN: NORMAL
GAS FLOW AIRWAY: 60 LPM
GAS FLOW AIRWAY: 60 LPM
GLOBULIN UR ELPH-MCNC: 2.6 GM/DL
GLUCOSE BLDC GLUCOMTR-MCNC: 171 MG/DL (ref 70–130)
GLUCOSE BLDC GLUCOMTR-MCNC: 205 MG/DL (ref 70–130)
GLUCOSE BLDC GLUCOMTR-MCNC: 218 MG/DL (ref 70–130)
GLUCOSE BLDC GLUCOMTR-MCNC: 256 MG/DL (ref 70–130)
GLUCOSE SERPL-MCNC: 174 MG/DL (ref 65–99)
HCO3 BLDA-SCNC: 28.1 MMOL/L (ref 20–26)
HCO3 BLDA-SCNC: 28.1 MMOL/L (ref 20–26)
HCT VFR BLD AUTO: 44.4 % (ref 34–46.6)
HCT VFR BLD CALC: 44.2 % (ref 38–51)
HCT VFR BLD CALC: 44.5 % (ref 38–51)
HGB BLD-MCNC: 13.8 G/DL (ref 12–15.9)
HGB BLDA-MCNC: 14.4 G/DL (ref 13.5–17.5)
HGB BLDA-MCNC: 14.5 G/DL (ref 13.5–17.5)
IMM GRANULOCYTES # BLD AUTO: 0.07 10*3/MM3 (ref 0–0.05)
IMM GRANULOCYTES NFR BLD AUTO: 0.6 % (ref 0–0.5)
INHALED O2 CONCENTRATION: 90 %
INHALED O2 CONCENTRATION: 90 %
INR PPP: 1.02 (ref 0.9–1.1)
LYMPHOCYTES # BLD AUTO: 0.98 10*3/MM3 (ref 0.7–3.1)
LYMPHOCYTES NFR BLD AUTO: 8.9 % (ref 19.6–45.3)
Lab: ABNORMAL
Lab: ABNORMAL
MAGNESIUM SERPL-MCNC: 2.2 MG/DL (ref 1.6–2.4)
MCH RBC QN AUTO: 30.3 PG (ref 26.6–33)
MCHC RBC AUTO-ENTMCNC: 31.1 G/DL (ref 31.5–35.7)
MCV RBC AUTO: 97.4 FL (ref 79–97)
METHGB BLD QL: 0.6 % (ref 0–3)
METHGB BLD QL: 0.8 % (ref 0–3)
MODALITY: ABNORMAL
MODALITY: ABNORMAL
MONOCYTES # BLD AUTO: 0.38 10*3/MM3 (ref 0.1–0.9)
MONOCYTES NFR BLD AUTO: 3.4 % (ref 5–12)
NEUTROPHILS NFR BLD AUTO: 87 % (ref 42.7–76)
NEUTROPHILS NFR BLD AUTO: 9.61 10*3/MM3 (ref 1.7–7)
NRBC BLD AUTO-RTO: 0 /100 WBC (ref 0–0.2)
OXYHGB MFR BLDV: 91.2 % (ref 94–99)
OXYHGB MFR BLDV: 94.4 % (ref 94–99)
PCO2 BLDA: 47.2 MM HG (ref 35–45)
PCO2 BLDA: 47.9 MM HG (ref 35–45)
PCO2 TEMP ADJ BLD: ABNORMAL MM[HG]
PCO2 TEMP ADJ BLD: ABNORMAL MM[HG]
PH BLDA: 7.38 PH UNITS (ref 7.35–7.45)
PH BLDA: 7.38 PH UNITS (ref 7.35–7.45)
PH, TEMP CORRECTED: ABNORMAL
PH, TEMP CORRECTED: ABNORMAL
PHOSPHATE SERPL-MCNC: 3.8 MG/DL (ref 2.5–4.5)
PLATELET # BLD AUTO: 182 10*3/MM3 (ref 140–450)
PMV BLD AUTO: 10.2 FL (ref 6–12)
PO2 BLDA: 65.3 MM HG (ref 83–108)
PO2 BLDA: 81.6 MM HG (ref 83–108)
PO2 TEMP ADJ BLD: ABNORMAL MM[HG]
PO2 TEMP ADJ BLD: ABNORMAL MM[HG]
POTASSIUM SERPL-SCNC: 4.5 MMOL/L (ref 3.5–5.2)
PROT SERPL-MCNC: 6.1 G/DL (ref 6–8.5)
PROTHROMBIN TIME: 13.9 SECONDS (ref 12.1–14.7)
RBC # BLD AUTO: 4.56 10*6/MM3 (ref 3.77–5.28)
SAO2 % BLDCOA: 92.8 % (ref 94–99)
SAO2 % BLDCOA: 95.4 % (ref 94–99)
SODIUM SERPL-SCNC: 136 MMOL/L (ref 136–145)
VENTILATOR MODE: ABNORMAL
VENTILATOR MODE: ABNORMAL
WBC NRBC COR # BLD AUTO: 11.05 10*3/MM3 (ref 3.4–10.8)

## 2024-01-11 PROCEDURE — 94660 CPAP INITIATION&MGMT: CPT

## 2024-01-11 PROCEDURE — 86140 C-REACTIVE PROTEIN: CPT | Performed by: INTERNAL MEDICINE

## 2024-01-11 PROCEDURE — 85610 PROTHROMBIN TIME: CPT | Performed by: INTERNAL MEDICINE

## 2024-01-11 PROCEDURE — 36600 WITHDRAWAL OF ARTERIAL BLOOD: CPT

## 2024-01-11 PROCEDURE — 25010000002 METHYLPREDNISOLONE PER 40 MG: Performed by: INTERNAL MEDICINE

## 2024-01-11 PROCEDURE — 25010000002 CEFTRIAXONE PER 250 MG: Performed by: INTERNAL MEDICINE

## 2024-01-11 PROCEDURE — 82805 BLOOD GASES W/O2 SATURATION: CPT

## 2024-01-11 PROCEDURE — 85025 COMPLETE CBC W/AUTO DIFF WBC: CPT | Performed by: INTERNAL MEDICINE

## 2024-01-11 PROCEDURE — 94761 N-INVAS EAR/PLS OXIMETRY MLT: CPT

## 2024-01-11 PROCEDURE — 83735 ASSAY OF MAGNESIUM: CPT | Performed by: INTERNAL MEDICINE

## 2024-01-11 PROCEDURE — 83605 ASSAY OF LACTIC ACID: CPT | Performed by: INTERNAL MEDICINE

## 2024-01-11 PROCEDURE — 63710000001 INSULIN LISPRO (HUMAN) PER 5 UNITS: Performed by: INTERNAL MEDICINE

## 2024-01-11 PROCEDURE — 84100 ASSAY OF PHOSPHORUS: CPT | Performed by: INTERNAL MEDICINE

## 2024-01-11 PROCEDURE — 94664 DEMO&/EVAL PT USE INHALER: CPT

## 2024-01-11 PROCEDURE — 80053 COMPREHEN METABOLIC PANEL: CPT | Performed by: INTERNAL MEDICINE

## 2024-01-11 PROCEDURE — 94799 UNLISTED PULMONARY SVC/PX: CPT

## 2024-01-11 PROCEDURE — 82375 ASSAY CARBOXYHB QUANT: CPT

## 2024-01-11 PROCEDURE — 97116 GAIT TRAINING THERAPY: CPT

## 2024-01-11 PROCEDURE — 25010000002 FUROSEMIDE PER 20 MG: Performed by: INTERNAL MEDICINE

## 2024-01-11 PROCEDURE — 83050 HGB METHEMOGLOBIN QUAN: CPT

## 2024-01-11 PROCEDURE — 97110 THERAPEUTIC EXERCISES: CPT

## 2024-01-11 PROCEDURE — 99232 SBSQ HOSP IP/OBS MODERATE 35: CPT | Performed by: INTERNAL MEDICINE

## 2024-01-11 PROCEDURE — 85730 THROMBOPLASTIN TIME PARTIAL: CPT | Performed by: INTERNAL MEDICINE

## 2024-01-11 PROCEDURE — 82948 REAGENT STRIP/BLOOD GLUCOSE: CPT

## 2024-01-11 RX ORDER — SODIUM CHLORIDE 9 MG/ML
40 INJECTION, SOLUTION INTRAVENOUS AS NEEDED
Status: DISCONTINUED | OUTPATIENT
Start: 2024-01-11 | End: 2024-01-18 | Stop reason: HOSPADM

## 2024-01-11 RX ORDER — LISINOPRIL 2.5 MG/1
5 TABLET ORAL EVERY EVENING
Status: DISCONTINUED | OUTPATIENT
Start: 2024-01-12 | End: 2024-01-18 | Stop reason: HOSPADM

## 2024-01-11 RX ORDER — POTASSIUM CHLORIDE 1.5 G/1.58G
40 POWDER, FOR SOLUTION ORAL ONCE
Status: COMPLETED | OUTPATIENT
Start: 2024-01-11 | End: 2024-01-11

## 2024-01-11 RX ORDER — AMLODIPINE BESYLATE 5 MG/1
5 TABLET ORAL EVERY EVENING
Status: DISCONTINUED | OUTPATIENT
Start: 2024-01-12 | End: 2024-01-18 | Stop reason: HOSPADM

## 2024-01-11 RX ORDER — SODIUM CHLORIDE 0.9 % (FLUSH) 0.9 %
10 SYRINGE (ML) INJECTION AS NEEDED
Status: DISCONTINUED | OUTPATIENT
Start: 2024-01-11 | End: 2024-01-18 | Stop reason: HOSPADM

## 2024-01-11 RX ORDER — FUROSEMIDE 10 MG/ML
40 INJECTION INTRAMUSCULAR; INTRAVENOUS ONCE
Status: COMPLETED | OUTPATIENT
Start: 2024-01-11 | End: 2024-01-11

## 2024-01-11 RX ORDER — SODIUM CHLORIDE 0.9 % (FLUSH) 0.9 %
10 SYRINGE (ML) INJECTION EVERY 12 HOURS SCHEDULED
Status: DISCONTINUED | OUTPATIENT
Start: 2024-01-11 | End: 2024-01-18 | Stop reason: HOSPADM

## 2024-01-11 RX ADMIN — ACETYLCYSTEINE 3 ML: 200 SOLUTION ORAL; RESPIRATORY (INHALATION) at 19:43

## 2024-01-11 RX ADMIN — METOPROLOL SUCCINATE 25 MG: 25 TABLET, EXTENDED RELEASE ORAL at 16:40

## 2024-01-11 RX ADMIN — AMLODIPINE BESYLATE 10 MG: 10 TABLET ORAL at 16:40

## 2024-01-11 RX ADMIN — ACETYLCYSTEINE 3 ML: 200 SOLUTION ORAL; RESPIRATORY (INHALATION) at 12:35

## 2024-01-11 RX ADMIN — ATORVASTATIN CALCIUM 10 MG: 10 TABLET, FILM COATED ORAL at 21:08

## 2024-01-11 RX ADMIN — BUPROPION HYDROCHLORIDE 150 MG: 150 TABLET, EXTENDED RELEASE ORAL at 21:08

## 2024-01-11 RX ADMIN — IPRATROPIUM BROMIDE AND ALBUTEROL SULFATE 3 ML: 2.5; .5 SOLUTION RESPIRATORY (INHALATION) at 06:52

## 2024-01-11 RX ADMIN — IPRATROPIUM BROMIDE AND ALBUTEROL SULFATE 3 ML: 2.5; .5 SOLUTION RESPIRATORY (INHALATION) at 00:38

## 2024-01-11 RX ADMIN — ACETYLCYSTEINE 3 ML: 200 SOLUTION ORAL; RESPIRATORY (INHALATION) at 00:38

## 2024-01-11 RX ADMIN — ACETYLCYSTEINE 3 ML: 200 SOLUTION ORAL; RESPIRATORY (INHALATION) at 06:52

## 2024-01-11 RX ADMIN — DOXYCYCLINE 100 MG: 100 INJECTION, POWDER, LYOPHILIZED, FOR SOLUTION INTRAVENOUS at 21:09

## 2024-01-11 RX ADMIN — IPRATROPIUM BROMIDE AND ALBUTEROL SULFATE 3 ML: 2.5; .5 SOLUTION RESPIRATORY (INHALATION) at 19:43

## 2024-01-11 RX ADMIN — MONTELUKAST SODIUM 10 MG: 10 TABLET, COATED ORAL at 21:08

## 2024-01-11 RX ADMIN — EMPAGLIFLOZIN 25 MG: 10 TABLET, FILM COATED ORAL at 09:15

## 2024-01-11 RX ADMIN — Medication 10 ML: at 09:17

## 2024-01-11 RX ADMIN — IPRATROPIUM BROMIDE AND ALBUTEROL SULFATE 3 ML: 2.5; .5 SOLUTION RESPIRATORY (INHALATION) at 12:35

## 2024-01-11 RX ADMIN — CETIRIZINE HYDROCHLORIDE 10 MG: 10 TABLET, FILM COATED ORAL at 09:16

## 2024-01-11 RX ADMIN — CEFTRIAXONE 1000 MG: 1 INJECTION, POWDER, FOR SOLUTION INTRAMUSCULAR; INTRAVENOUS at 14:30

## 2024-01-11 RX ADMIN — FUROSEMIDE 40 MG: 10 INJECTION, SOLUTION INTRAMUSCULAR; INTRAVENOUS at 14:30

## 2024-01-11 RX ADMIN — METHYLPREDNISOLONE SODIUM SUCCINATE 40 MG: 40 INJECTION, POWDER, FOR SOLUTION INTRAMUSCULAR; INTRAVENOUS at 11:54

## 2024-01-11 RX ADMIN — Medication 10 ML: at 21:09

## 2024-01-11 RX ADMIN — LISINOPRIL 10 MG: 10 TABLET ORAL at 16:40

## 2024-01-11 RX ADMIN — DOCUSATE SODIUM 50 MG AND SENNOSIDES 8.6 MG 2 TABLET: 8.6; 5 TABLET, FILM COATED ORAL at 21:09

## 2024-01-11 RX ADMIN — INSULIN LISPRO 6 UNITS: 100 INJECTION, SOLUTION INTRAVENOUS; SUBCUTANEOUS at 21:14

## 2024-01-11 RX ADMIN — INSULIN LISPRO 4 UNITS: 100 INJECTION, SOLUTION INTRAVENOUS; SUBCUTANEOUS at 11:54

## 2024-01-11 RX ADMIN — DOXYCYCLINE 100 MG: 100 INJECTION, POWDER, LYOPHILIZED, FOR SOLUTION INTRAVENOUS at 09:16

## 2024-01-11 RX ADMIN — Medication 10 ML: at 02:00

## 2024-01-11 RX ADMIN — FLUTICASONE PROPIONATE 2 SPRAY: 50 SPRAY, METERED NASAL at 09:17

## 2024-01-11 RX ADMIN — POTASSIUM CHLORIDE 40 MEQ: 1.5 POWDER, FOR SOLUTION ORAL at 14:32

## 2024-01-11 RX ADMIN — INSULIN LISPRO 2 UNITS: 100 INJECTION, SOLUTION INTRAVENOUS; SUBCUTANEOUS at 09:15

## 2024-01-11 RX ADMIN — INSULIN LISPRO 4 UNITS: 100 INJECTION, SOLUTION INTRAVENOUS; SUBCUTANEOUS at 16:40

## 2024-01-11 NOTE — PROGRESS NOTES
Progress Note Pulmonary and critical care       Subjective     Overnight events reviewed.  All the labs medications ins and outs and vitals reviewed.  Used BiPAP overnight.  Currently on high flow needing 80% and 60 L of flow.  Saturating around 95%.      Ins and outs net negative    Review of Systems:    Generalized tiredness otherwise negative    Vital Signs  Temp:  [97.7 °F (36.5 °C)-98.7 °F (37.1 °C)] 98.3 °F (36.8 °C)  Heart Rate:  [60-89] 72  Resp:  [11-25] 18  BP: (119-171)/() 129/60  Body mass index is 38.59 kg/m².    Intake/Output Summary (Last 24 hours) at 1/11/2024 0736  Last data filed at 1/11/2024 0600  Gross per 24 hour   Intake 301.56 ml   Output 650 ml   Net -348.44 ml     No intake/output data recorded.    Physical Exam:   General- obese in appearance, on continuous BiPAP    HEENT- PERLLA    Neck- supple    No JVD, no carotid bruit    Respiratory-decreased breath sounds, on BiPAP      Cardiovascular-  NSR    GI-NT ND    CNS-alert oriented x3, grossly nonfocal    Extremities- pulses normal bilaterally , no clubbing and edema        Results Review:      Results from last 7 days   Lab Units 01/11/24  0201 01/10/24  0046 01/09/24  0136   WBC 10*3/mm3 11.05* 9.39 13.94*   HEMOGLOBIN g/dL 13.8 14.1 13.2   PLATELETS 10*3/mm3 182 178 160     Results from last 7 days   Lab Units 01/11/24  0201 01/10/24  0046 01/09/24  0136   SODIUM mmol/L 136 141 136   POTASSIUM mmol/L 4.5 4.4 3.8   CHLORIDE mmol/L 101 104 105   CO2 mmol/L 30.0* 28.7 23.9   BUN mg/dL 24* 20 12   CREATININE mg/dL 0.73 0.92 0.64   CALCIUM mg/dL 9.4 9.5 8.2*   GLUCOSE mg/dL 174* 266* 164*   MAGNESIUM mg/dL 2.2 2.0 1.8     Lab Results   Component Value Date    INR 1.02 01/11/2024    INR 0.94 01/08/2024    PROTIME 13.9 01/11/2024    PROTIME 13.1 01/08/2024     Results from last 7 days   Lab Units 01/11/24  0201 01/10/24  0046 01/09/24  0136   ALK PHOS U/L 112 95 71   BILIRUBIN mg/dL 0.3 0.3 0.6   ALT (SGPT) U/L 33 49* 57*   AST (SGOT) U/L  14 19 32     Results from last 7 days   Lab Units 01/11/24  0425   PH, ARTERIAL pH units 7.377   PO2 ART mm Hg 65.3*   PCO2, ARTERIAL mm Hg 47.9*   HCO3 ART mmol/L 28.1*     Imaging Results (Last 24 Hours)       Procedure Component Value Units Date/Time    XR Chest 1 View [[id number]] Collected: 01/10/24 2051     Updated: 01/10/24 2054    Narrative:      Procedure: Frontal view of chest obtained.     Comparison: None available     History: sob; R09.02-Hypoxemia; J44.1-Chronic obstructive pulmonary  disease with (acute) exacerbation; J96.01-Acute respiratory failure with  hypoxia; R91.8-Other nonspecific abnormal finding of lung field     Findings:     No pneumonia or acute process seen in the chest.  Normal heart size and mediastinal contours.  Trachea is in midline position.  No edema or effusion is seen.  There is no evidence of pneumothorax.       Impression:         No evidence of acute disease in the chest.     This report was finalized on 1/10/2024 8:52 PM by Bret Colorado MD.                Microbiology Results (last 10 days)       Procedure Component Value - Date/Time    Respiratory Culture - Wash, Lung, Lingula [[id number]] Collected: 01/10/24 1455    Lab Status: Preliminary result Specimen: Wash from Lung, Lingula Updated: 01/10/24 2118     Gram Stain Few (2+) WBCs seen      Rare (1+) Epithelial cells seen      Rare (1+) Mixed bacterial morphotypes seen on Gram Stain    Respiratory Culture - Lavage, Lung, Right Lower Lobe [[id number]] Collected: 01/10/24 1455    Lab Status: Preliminary result Specimen: Lavage from Lung, Right Lower Lobe Updated: 01/10/24 2129     Gram Stain Moderate (3+) WBCs seen      Rare (1+) Epithelial cells seen      Few (2+) Mixed bacterial morphotypes seen on Gram Stain    Respiratory Culture - Lavage, Lung, Right Upper Lobe [[id number]] Collected: 01/10/24 1455    Lab Status: Preliminary result Specimen: Lavage from Lung, Right Upper Lobe Updated: 01/10/24 2126     Gram Stain Few  (2+) WBCs seen      No epithelial cells seen      Rare (1+) Mixed bacterial morphotypes seen on Gram Stain    Respiratory Panel PCR w/COVID-19(SARS-CoV-2) RAIN/CHING/LILLIANA/[facility]ENE In-House, NP Swab in UTM/VTM, 2 HR TAT - Swab, Nasopharynx [[id number]]  (Normal) Collected: 01/10/24 0435    Lab Status: Final result Specimen: Swab from Nasopharynx Updated: 01/10/24 0529     ADENOVIRUS, PCR Not Detected     Coronavirus 229E Not Detected     Coronavirus HKU1 Not Detected     Coronavirus NL63 Not Detected     Coronavirus OC43 Not Detected     COVID19 Not Detected     Human Metapneumovirus Not Detected     Human Rhinovirus/Enterovirus Not Detected     Influenza A PCR Not Detected     Influenza B PCR Not Detected     Parainfluenza Virus 1 Not Detected     Parainfluenza Virus 2 Not Detected     Parainfluenza Virus 3 Not Detected     Parainfluenza Virus 4 Not Detected     RSV, PCR Not Detected     Bordetella pertussis pcr Not Detected     Bordetella parapertussis PCR Not Detected     Chlamydophila pneumoniae PCR Not Detected     Mycoplasma pneumo by PCR Not Detected    Narrative:      In the setting of a positive respiratory panel with a viral infection PLUS a negative procalcitonin without other underlying concern for bacterial infection, consider observing off antibiotics or discontinuation of antibiotics and continue supportive care. If the respiratory panel is positive for atypical bacterial infection (Bordetella pertussis, Chlamydophila pneumoniae, or Mycoplasma pneumoniae), consider antibiotic de-escalation to target atypical bacterial infection.    AFB Culture - Wash, Bronchus [[id number]] Collected: 01/08/24 0804    Lab Status: Preliminary result Specimen: Wash from Bronchus Updated: 01/09/24 1708     AFB Specimen Processing Concentration     Acid Fast Smear Negative    Narrative:      Performed at:  [address] [place] [address]  [id number]  : Isidoro Eastman PhD, Phone:  [phone]     Respiratory Culture - Wash, Bronchus [[id number]] Collected: 01/08/24 0804    Lab Status: Final result Specimen: Wash from Bronchus Updated: 01/10/24 1123     Respiratory Culture Light growth (2+) Normal respiratory fr[place]. No S. aureus or Pseudomonas aeruginosa detected. Final report.     Gram Stain Moderate (3+) WBCs seen      Rare (1+) Mixed bacterial morphotypes seen on Gram Stain    AFB Culture - Lavage, Lung, Left Upper Lobe [[id number]] Collected: 01/08/24 0802    Lab Status: Preliminary result Specimen: Lavage from Lung, Left Upper Lobe Updated: 01/09/24 1709     AFB Specimen Processing Concentration     Acid Fast Smear Negative    Narrative:      Performed at:  [address] [place] [address]  [id number]  : Isidoro Eastman PhD, Phone:  [phone]    BAL Culture, Quantitative - Lavage, Lung, Left Upper Lobe [[id number]] Collected: 01/08/24 0802    Lab Status: Final result Specimen: Lavage from Lung, Left Upper Lobe Updated: 01/10/24 1123     BAL Culture No growth     Gram Stain WBCs seen      No organisms seen    COVID-19, FLU A/B, RSV PCR 1 HR TAT - Swab, Nasopharynx [[id number]]  (Normal) Collected: 01/02/24 1410    Lab Status: Final result Specimen: Swab from Nasopharynx Updated: 01/02/24 1501     COVID19 Not Detected     Influenza A PCR Not Detected     Influenza B PCR Not Detected     RSV, PCR Not Detected    Narrative:      Fact sheet for providers: https://www.fda.gov/media/825116/download    Fact sheet for patients: https://www.fda.gov/media/645140/download    Test performed by PCR.    Blood Culture - Blood, Arm, Left [[id number]]  (Normal) Collected: 01/02/24 1409    Lab Status: Final result Specimen: Blood from Arm, Left Updated: 01/07/24 1430     Blood Culture No growth at 5 days    Blood Culture - Blood, Arm, Right [[id number]]  (Normal) Collected: 01/02/24 1400    Lab Status: Final result Specimen: Blood from Arm, Right Updated: 01/07/24 1430     Blood Culture No growth  at 5 days             acetylcysteine, 3 mL, Nebulization, 4x Daily - RT  amLODIPine, 10 mg, Oral, Q PM  atorvastatin, 10 mg, Oral, Nightly  buPROPion SR, 150 mg, Oral, Nightly  cefTRIAXone, 1,000 mg, Intravenous, Q24H  cetirizine, 10 mg, Oral, Daily  cholecalciferol, 50,000 Units, Oral, Weekly  [Held by provider] clopidogrel, 75 mg, Oral, Daily  doxycycline, 100 mg, Intravenous, Q12H  empagliflozin, 25 mg, Oral, Daily  fluticasone, 2 spray, Nasal, Daily  insulin lispro, 2-9 Units, Subcutaneous, 4x Daily AC & at Bedtime  ipratropium-albuterol, 3 mL, Nebulization, 4x Daily - RT  lisinopril, 10 mg, Oral, Q PM  methylPREDNISolone sodium succinate, 40 mg, Intravenous, Q12H  metoprolol succinate XL, 25 mg, Oral, Q PM  montelukast, 10 mg, Oral, Nightly  senna-docusate sodium, 2 tablet, Oral, BID  sodium chloride, 10 mL, Intravenous, Q12H  sodium chloride, 10 mL, Intravenous, Q12H           Medication Review:        Latest Reference Range & Units 01/11/24 04:25   pH, Arterial 7.350 - 7.450 pH units 7.377   pCO2, Arterial 35.0 - 45.0 mm Hg 47.9 (H)   pO2, Arterial 83.0 - 108.0 mm Hg 65.3 (L)   HCO3, Arterial 20.0 - 26.0 mmol/L 28.1 (H)   Base Excess 0.0 - 2.0 mmol/L 2.2 (H)   O2 Saturation, Arterial 94.0 - 99.0 % 92.8 (L)   CO2 Content 22 - 33 mmol/L 29.6   A-a DO2 0.0 - 300.0 mmHg 492.6 (H)   Carboxyhemoglobin 0 - 5 % 0.9   Methemoglobin 0.00 - 3.00 % 0.80   Oxyhemoglobin 94 - 99 % 91.2 (L)   Hematocrit, Blood Gas 38.0 - 51.0 % 44.2   Hemoglobin, Blood Gas 13.5 - 17.5 g/dL 14.4   Site  Left Radial   Isaac's Test  Positive   Modality  Heated HFNC   FIO2 % 90   Flow Rate lpm 60.0   Ventilator Mode  NA   Barometric Pressure for Blood Gas mmHg 721   Collected by  [id number]   (H): Data is abnormally high  (L): Data is abnormally low    Assessment & Plan      acute hypoxic respiratory failure-could be due to mucous plugging of the left upper lobe.  CT chest from today morning reviewed.    White count better.  Latest microbiology  reviewed.  BiPAP settings and graphics reviewed.  Minute ventilation was appropriate.  Currently on high flow nasal cannula-requiring very high amounts of flow and FiO2.  Bronchoscopy was done yesterday.  Chest x-ray shows improvement.  Will give Lasix to improve lung compliance and diffusion capacity.    Continue steroids-dosage reviewed.    Continue nebs  Continue oxygen to maintain saturation 88 to 92%.  All the labs medications ins and outs and vitals and patient's course were discussed with patient's daughter at bedside and answered her questions to her satisfaction.    Continue to monitor patient in the ICU.  Remains critically ill.      Repeat a chest x-ray.  ABG from today morning reviewed and FiO2 adjusted.    COPD-patient has been smoker for a long time.      Continue DuoNebs    Continue oxygen.  Titrated to maintain saturation 88 to 92%     Latest microbiology reviewed.    Continue current antibiotics  Will de-escalate as per cultures.           GI- PPI prophylaxis.  Medications reviewed   Endrocrinology- Maintain Blood sugar 140 -180  I have reviewed patient's labs and images.    Currently patient is critically ill due to acute hypoxic respiratory failure due to mucous plugging.   Cc 30 mins I adjusted patient's HFNC settings.             Augustin Stafford MD  01/11/24  07:36 EST

## 2024-01-11 NOTE — PROGRESS NOTES
[facility] HOSPITALIST PROGRESS NOTE     Patient Identification:  Name:  Evie Shah  Age:  78 y.o.  Sex:  female  :  1945  MRN:  [id number]  Visit Number:  [id number]  ROOM: [address]     Primary Care Provider:  Camryn Mota PA     Date of Admission: 2024    Length of stay in inpatient status:  9    Subjective     Chief Compliant:    Chief Complaint   Patient presents with    Shortness of Breath    Fever    Weakness - Generalized     Pt has been sob for a weak and has been sick with a cough.     Cough     History of Presenting Illness:   at bedside during my evaluation.  The patient states that he feels better.  She is coughing but it is now dry; she does not feel any rattling in her chest.  She denies chest pain, nausea, emesis, and diarrhea.    Objective     Current Hospital Meds:  acetylcysteine, 3 mL, Nebulization, 4x Daily - RT  amLODIPine, 10 mg, Oral, Q PM  atorvastatin, 10 mg, Oral, Nightly  buPROPion SR, 150 mg, Oral, Nightly  cefTRIAXone, 1,000 mg, Intravenous, Q24H  cetirizine, 10 mg, Oral, Daily  cholecalciferol, 50,000 Units, Oral, Weekly  [Held by provider] clopidogrel, 75 mg, Oral, Daily  doxycycline, 100 mg, Intravenous, Q12H  empagliflozin, 25 mg, Oral, Daily  fluticasone, 2 spray, Nasal, Daily  insulin lispro, 2-9 Units, Subcutaneous, 4x Daily AC & at Bedtime  ipratropium-albuterol, 3 mL, Nebulization, 4x Daily - RT  lisinopril, 10 mg, Oral, Q PM  methylPREDNISolone sodium succinate, 40 mg, Intravenous, Q12H  metoprolol succinate XL, 25 mg, Oral, Q PM  montelukast, 10 mg, Oral, Nightly  senna-docusate sodium, 2 tablet, Oral, BID  sodium chloride, 10 mL, Intravenous, Q12H  sodium chloride, 10 mL, Intravenous, Q12H       Current Antimicrobial Therapy:  Anti-Infectives (From admission, onward)      Ordered     Dose/Rate Route Frequency Start Stop    24  doxycycline (VIBRAMYCIN) 100 mg in sodium chloride 0.9 % 100 mL IVPB-VTB        Ordering  Provider: Vandana Lee MD    100 mg Intravenous Every 12 Hours 01/08/24 2100 01/15/24 2059    01/02/24 1935  cefTRIAXone (ROCEPHIN) 1,000 mg in sodium chloride 0.9 % 100 mL IVPB-VTB        Ordering Provider: Vandana Lee MD    1,000 mg  200 mL/hr over 30 Minutes Intravenous Every 24 Hours 01/03/24 1500 01/15/24 2036    01/02/24 1935  doxycycline (VIBRAMYCIN) 100 mg in sodium chloride 0.9 % 100 mL IVPB-VTB        Ordering Provider: Juan Jade MD    100 mg Intravenous Every 12 Hours 01/03/24 0600 01/07/24 1714    01/02/24 1448  cefTRIAXone (ROCEPHIN) 2,000 mg in sodium chloride 0.9 % 100 mL IVPB-VTB        Ordering Provider: Jonathan Wyatt PA    2,000 mg  200 mL/hr over 30 Minutes Intravenous Once 01/02/24 1504 01/02/24 1610    01/02/24 1448  doxycycline (VIBRAMYCIN) 100 mg in sodium chloride 0.9 % 100 mL IVPB-VTB        Ordering Provider: Jonathan Wyatt PA    100 mg  over 60 Minutes Intravenous Once 01/02/24 1504 01/02/24 1710          Current Diuretic Therapy:  Diuretics (From admission, onward)      Ordered     Dose/Rate Route Frequency Start Stop    01/09/24 2300  furosemide (LASIX) injection 40 mg        Ordering Provider: Anaid Woodward DO    40 mg Intravenous Once 01/10/24 0000 01/10/24 0052    01/09/24 1358  furosemide (LASIX) injection 20 mg        Ordering Provider: Augustin Stafford MD    20 mg Intravenous Once 01/09/24 1600 01/09/24 1618    01/09/24 1105  furosemide (LASIX) injection 60 mg        Ordering Provider: Augustin Stafford MD    60 mg Intravenous Once 01/09/24 1200 01/09/24 1307          ----------------------------------------------------------------------------------------------------------------------  Vital Signs:  Temp:  [97.7 °F (36.5 °C)-98.7 °F (37.1 °C)] 98.3 °F (36.8 °C)  Heart Rate:  [60-89] 72  Resp:  [11-25] 18  BP: (119-171)/() 129/60  SpO2:  [92 %-100 %] 96 %  on  Flow (L/min):  [60] 60;   Device (Oxygen Therapy): heated;high-flow nasal  cannula  Body mass index is 38.59 kg/m².    Wt Readings from Last 3 Encounters:   01/11/24 98.8 kg (217 lb 13 oz)   01/02/24 90.3 kg (199 lb)   12/26/23 92.1 kg (203 lb)     Intake & Output (last 3 days)         01/08 0701 01/09 0700 01/09 0701  01/10 0700 01/10 0701  01/11 0700 01/11 0701  01/12 0700    P.O. 600 480 240     I.V. (mL/kg) 400 (4.3) 100.2 (1) 101.6 (1) 40 (0.4)    IV Piggyback   200 100    Total Intake(mL/kg) 1000 (10.7) 580.2 (6) 541.6 (5.5) 140 (1.4)    Urine (mL/kg/hr)  1800 (0.8) 650 (0.3)     Total Output  1800 650     Net +1000 -1219.8 -108.4 +140            Urine Unmeasured Occurrence 3 x 6 x 1 x           Diet: Liquid Diets, Diabetic Diets; Full Liquid; Consistent Carbohydrate; Fluid Consistency: Thin (IDDSI 0)  ----------------------------------------------------------------------------------------------------------------------  Physical Exam  She is lying in bed with mild acute respiratory distress.  She is on bubble high flow.  She is able to talk in long phrases.  She still has bilateral crackles but they are faint.  I do not hear any wheezing.  She has fair to good air movement throughout all lung fields.  There is no pedal edema.  The patient is alert and oriented x 4 and can follow all commands.  The patient can also move her arms and legs equally on both sides.  ----------------------------------------------------------------------------------------------------------------------  Tele:  NS with heart rates 60-80s.  I have personally reviewed/looked at the telemetry strips.   ----------------------------------------------------------------------------------------------------------------------  LABS:    CBC and coagulation:  Results from last 7 days   Lab Units 01/11/24  0201 01/10/24  0046 01/09/24  0136 01/08/24  0137   PROCALCITONIN ng/mL  --   --  0.04  --    LACTATE mmol/L 1.0  --  1.2  --    CRP mg/dL 2.82*  --  3.63*  --    WBC 10*3/mm3 11.05* 9.39 13.94* 9.62   HEMOGLOBIN g/dL  13.8 14.1 13.2 13.6   HEMATOCRIT % 44.4 45.1 41.9 43.1   MCV fL 97.4* 95.3 96.5 95.8   MCHC g/dL 31.1* 31.3* 31.5 31.6   PLATELETS 10*3/mm3 182 178 160 146   INR  1.02  --   --  0.94     Acid/base balance:  Results from last 7 days   Lab Units 01/11/24  0425 01/10/24  0408 01/09/24  1943 01/09/24  1102   PH, ARTERIAL pH units 7.377 7.389 7.391 7.382   PCO2, ARTERIAL mm Hg 47.9* 50.4* 44.9 44.7   PO2 ART mm Hg 65.3* 76.0* 74.7* 59.0*   HCO3 ART mmol/L 28.1* 30.4* 27.2* 26.6*     Renal and electrolytes:  Results from last 7 days   Lab Units 01/11/24  0201 01/10/24  0046 01/09/24  0136 01/08/24  0137   SODIUM mmol/L 136 141 136 140   POTASSIUM mmol/L 4.5 4.4 3.8 3.8   MAGNESIUM mg/dL 2.2 2.0 1.8  --    CHLORIDE mmol/L 101 104 105 108*   CO2 mmol/L 30.0* 28.7 23.9 22.8   BUN mg/dL 24* 20 12 18   CREATININE mg/dL 0.73 0.92 0.64 0.65   CALCIUM mg/dL 9.4 9.5 8.2* 8.4*   PHOSPHORUS mg/dL 3.8 2.6 2.7  --    GLUCOSE mg/dL 174* 266* 164* 167*   ANION GAP mmol/L 5.0 8.3 7.1 9.2     Estimated Creatinine Clearance: 73.5 mL/min (by C-G formula based on SCr of 0.73 mg/dL).    Liver and pancreatic function:  Results from last 7 days   Lab Units 01/11/24  0201 01/10/24  0046 01/09/24  0136   ALBUMIN g/dL 3.5 3.7 3.0*   BILIRUBIN mg/dL 0.3 0.3 0.6   ALK PHOS U/L 112 95 71   AST (SGOT) U/L 14 19 32   ALT (SGPT) U/L 33 49* 57*     Endocrine function:  Lab Results   Component Value Date    HGBA1C 7.70 (H) 01/08/2024     Point of care bedside glucose levels:  Results from last 7 days   Lab Units 01/11/24  0740 01/10/24  2013 01/10/24  1632 01/10/24  1102 01/10/24  0624 01/09/24  2136 01/09/24  1639 01/09/24  1143 01/09/24  0650 01/08/24  2028 01/08/24  1650 01/08/24  1125 01/08/24  0654 01/07/24 2024   GLUCOSE mg/dL 171* 212* 258* 294* 259* 356* 256* 164* 134* 181* 137* 183* 138* 174*     Glucose levels from the [facility]:  Results from last 7 days   Lab Units 01/11/24  0201 01/10/24  0046 01/09/24  0136 01/08/24  0137   GLUCOSE mg/dL 174*  266* 164* 167*     Lab Results   Component Value Date    TSH 3.260 01/08/2024     Cultures:  Microbiology Results (last 10 days)       Procedure Component Value - Date/Time    Respiratory Culture - Wash, Lung, Lingula [[id number]] Collected: 01/10/24 1455    Lab Status: Preliminary result Specimen: Wash from Lung, Lingula Updated: 01/10/24 2118     Gram Stain Few (2+) WBCs seen      Rare (1+) Epithelial cells seen      Rare (1+) Mixed bacterial morphotypes seen on Gram Stain    Respiratory Culture - Lavage, Lung, Right Lower Lobe [[id number]] Collected: 01/10/24 1455    Lab Status: Preliminary result Specimen: Lavage from Lung, Right Lower Lobe Updated: 01/10/24 2129     Gram Stain Moderate (3+) WBCs seen      Rare (1+) Epithelial cells seen      Few (2+) Mixed bacterial morphotypes seen on Gram Stain    Respiratory Culture - Lavage, Lung, Right Upper Lobe [[id number]] Collected: 01/10/24 1455    Lab Status: Preliminary result Specimen: Lavage from Lung, Right Upper Lobe Updated: 01/10/24 2126     Gram Stain Few (2+) WBCs seen      No epithelial cells seen      Rare (1+) Mixed bacterial morphotypes seen on Gram Stain    Respiratory Panel PCR w/COVID-19(SARS-CoV-2) RAIN/CHING/LILLIANA/PAD/COR/RENE In-House, NP Swab in UTM/VTM, 2 HR TAT - Swab, Nasopharynx [[id number]]  (Normal) Collected: 01/10/24 0435    Lab Status: Final result Specimen: Swab from Nasopharynx Updated: 01/10/24 0529     ADENOVIRUS, PCR Not Detected     Coronavirus 229E Not Detected     Coronavirus HKU1 Not Detected     Coronavirus NL63 Not Detected     Coronavirus OC43 Not Detected     COVID19 Not Detected     Human Metapneumovirus Not Detected     Human Rhinovirus/Enterovirus Not Detected     Influenza A PCR Not Detected     Influenza B PCR Not Detected     Parainfluenza Virus 1 Not Detected     Parainfluenza Virus 2 Not Detected     Parainfluenza Virus 3 Not Detected     Parainfluenza Virus 4 Not Detected     RSV, PCR Not Detected     Bordetella pertussis pcr  Not Detected     Bordetella parapertussis PCR Not Detected     Chlamydophila pneumoniae PCR Not Detected     Mycoplasma pneumo by PCR Not Detected    AFB Culture - Wash, Bronchus [[id number]] Collected: 01/08/24 0804    Lab Status: Preliminary result Specimen: Wash from Bronchus Updated: 01/09/24 1709     AFB Specimen Processing Concentration     Acid Fast Smear Negative    Narrative:      Performed at:  [address] [place] [address]  [id number]  : Isidoro Eastman PhD, Phone:  [phone]    Respiratory Culture - Wash, Bronchus [[id number]] Collected: 01/08/24 0804    Lab Status: Final result Specimen: Wash from Bronchus Updated: 01/10/24 1123     Respiratory Culture Light growth (2+) Normal respiratory fr[place]. No S. aureus or Pseudomonas aeruginosa detected. Final report.     Gram Stain Moderate (3+) WBCs seen      Rare (1+) Mixed bacterial morphotypes seen on Gram Stain    AFB Culture - Lavage, Lung, Left Upper Lobe [[id number]] Collected: 01/08/24 0802    Lab Status: Preliminary result Specimen: Lavage from Lung, Left Upper Lobe Updated: 01/09/24 1709     AFB Specimen Processing Concentration     Acid Fast Smear Negative    BAL Culture, Quantitative - Lavage, Lung, Left Upper Lobe [[id number]] Collected: 01/08/24 0802    Lab Status: Final result Specimen: Lavage from Lung, Left Upper Lobe Updated: 01/10/24 1123     BAL Culture No growth     Gram Stain WBCs seen      No organisms seen       I have personally looked at the labs and they are summarized above.  ----------------------------------------------------------------------------------------------------------------------  Detailed radiology reports for the last 24 hours:    Imaging Results (Last 24 Hours)       Procedure Component Value Units Date/Time    XR Chest 1 View [[id number]] Collected: 01/10/24 2051     Updated: 01/10/24 2054    Narrative:      Procedure: Frontal view of chest obtained.     Comparison: None available      History: sob; R09.02-Hypoxemia; J44.1-Chronic obstructive pulmonary  disease with (acute) exacerbation; J96.01-Acute respiratory failure with  hypoxia; R91.8-Other nonspecific abnormal finding of lung field     Findings:     No pneumonia or acute process seen in the chest.  Normal heart size and mediastinal contours.  Trachea is in midline position.  No edema or effusion is seen.  There is no evidence of pneumothorax.       Impression:         No evidence of acute disease in the chest.     This report was finalized on 1/10/2024 8:52 PM by Bret Colorado MD.             I have personally looked at the radiology images and I have read the available final report.    Assessment & Plan      -Acute hypoxic respiratory failure that was present on admission secondary to left upper lobe atelectasis from a large mucous plug with suspected left upper lobe pneumonia as well  -Worsening acute hypoxic respiratory failure on 1/9/2024, with CT scan showing new atelectasis of the left lower lobe and possibly the right upper lobe from suspected mucous plugs  -EBUS performed 1/8/2024 with an incidental finding of an enlarged lymph node at station 10 L, with both bronchial washings and fine-needle aspiration showing no malignant cells  -History of peripheral arterial disease status post left external iliac stent placement  -History of COPD, without a suspected acute exacerbation on admission but as of 1/9/2020 for a mild to moderate exacerbation is suspected   -History of type 2 diabetes mellitus  -History of essential hypertension    The patient appears to be improving.  We will continue with the antibiotics, Lasix, and Solu-Medrol at the current doses.  We will continue with the scheduled breathing treatments.  The blood pressures are on the low end of normal.  I will cut the Norvasc and lisinopril in half and we will continue to monitor the blood pressures closely.  The glucose levels are slightly elevated but they are improving.   This elevation in glucose is expected with the Solu-Medrol.  We will continue to monitor the glucose levels closely.  Please note that the patient did participate with physical therapy and she walked 15 feet for them to date.  We will repeat blood work morning.    VTE Prophylaxis:  Mechanical Order History:        Ordered        01/09/24 2255  Place Sequential Compression Device  Once            01/09/24 2255  Maintain Sequential Compression Device  Continuous                          Pharmalogical Order History:        Ordered     Dose Route Frequency Stop    01/09/24 1945  Enoxaparin Sodium (LOVENOX) syringe 90 mg  Status:  Discontinued         1 mg/kg SC Every 12 Hours 01/09/24 2255    01/09/24 1941  Pharmacy to Dose enoxaparin (LOVENOX)  Status:  Discontinued         -- XX Continuous PRN 01/09/24 1946    01/08/24 1508  heparin (porcine) 5000 UNIT/ML injection 5,000 Units  Status:  Discontinued         5,000 Units SC Every 12 Hours Scheduled 01/09/24 1942    01/03/24 1122  Enoxaparin Sodium (LOVENOX) syringe 90 mg  Status:  Discontinued         1 mg/kg SC Every 12 Hours 01/08/24 1508    01/03/24 1112  Pharmacy to Dose enoxaparin (LOVENOX)  Status:  Discontinued         -- XX Continuous PRN 01/04/24 1313    01/02/24 1935  Enoxaparin Sodium (LOVENOX) syringe 40 mg  Status:  Discontinued         40 mg SC Nightly 01/03/24 1122                  The patient is high risk due to the following diagnoses/reasons: Acute hypoxic respiratory failure with left upper lobe atelectasis and pneumonia     Disposition: Anticipate home within the next week, depending on PT and OT evaluations once her respiratory status stabilizes    Vandana Lee MD  [facility] Hospitalist  01/11/24  09:21 EST

## 2024-01-11 NOTE — PLAN OF CARE
Problem: Adult Inpatient Plan of Care  Goal: Plan of Care Review  Outcome: Ongoing, Progressing  Flowsheets  Taken 1/11/2024 1719 by Juan Capone RN  Progress: no change  Outcome Evaluation: Pt. AOx4, VSS, HHF 60L, resting in bed throughuot shift, currently diuresing, ambulated throughout shift, bed in lowest position, call light within reach, alarms active and audible, no acute events noted.  Taken 1/11/2024 1350 by Jesenia Louis PT  Plan of Care Reviewed With: patient  Goal: Patient-Specific Goal (Individualized)  Outcome: Ongoing, Progressing  Goal: Absence of Hospital-Acquired Illness or Injury  Outcome: Ongoing, Progressing  Intervention: Identify and Manage Fall Risk  Recent Flowsheet Documentation  Taken 1/11/2024 1700 by Juan Capone RN  Safety Promotion/Fall Prevention:   activity supervised   fall prevention program maintained   safety round/check completed  Taken 1/11/2024 1600 by Juan Capone RN  Safety Promotion/Fall Prevention:   activity supervised   fall prevention program maintained   safety round/check completed  Taken 1/11/2024 1500 by Juan Capone RN  Safety Promotion/Fall Prevention:   activity supervised   fall prevention program maintained   safety round/check completed  Taken 1/11/2024 1400 by Juan Capone RN  Safety Promotion/Fall Prevention:   activity supervised   fall prevention program maintained   safety round/check completed  Taken 1/11/2024 1300 by Juan Capone RN  Safety Promotion/Fall Prevention:   activity supervised   fall prevention program maintained   safety round/check completed  Taken 1/11/2024 1200 by Juan Capone RN  Safety Promotion/Fall Prevention:   activity supervised   fall prevention program maintained   safety round/check completed  Taken 1/11/2024 1100 by Juan Capone RN  Safety Promotion/Fall Prevention:   activity supervised   fall prevention program maintained   safety round/check completed  Taken 1/11/2024 1000 by Juan Capone,  RN  Safety Promotion/Fall Prevention:   activity supervised   fall prevention program maintained   safety round/check completed  Taken 1/11/2024 0900 by Juan Capone RN  Safety Promotion/Fall Prevention:   activity supervised   fall prevention program maintained   safety round/check completed  Taken 1/11/2024 0800 by Juan Capone RN  Safety Promotion/Fall Prevention:   activity supervised   fall prevention program maintained   safety round/check completed  Taken 1/11/2024 0700 by Juan Capone RN  Safety Promotion/Fall Prevention:   activity supervised   fall prevention program maintained   safety round/check completed  Intervention: Prevent Skin Injury  Recent Flowsheet Documentation  Taken 1/11/2024 1600 by Juan Capone RN  Body Position:   turned   left  Taken 1/11/2024 1400 by Juan Capone RN  Body Position:   turned   right  Taken 1/11/2024 1200 by Juan Capone RN  Body Position: (pt. encouraged to turn while up in chair regularly) position changed independently  Taken 1/11/2024 1000 by Juan Capone RN  Body Position: (pt. encouraged to turn regularly while in chair) position changed independently  Taken 1/11/2024 0800 by Juan Capone RN  Body Position:   turned   left  Skin Protection:   adhesive use limited   incontinence pads utilized   skin-to-device areas padded   transparent dressing maintained   tubing/devices free from skin contact  Intervention: Prevent and Manage VTE (Venous Thromboembolism) Risk  Recent Flowsheet Documentation  Taken 1/11/2024 1400 by Juan Capone RN  Activity Management: back to bed  VTE Prevention/Management:   bilateral   sequential compression devices on  Taken 1/11/2024 0920 by Juan Capone RN  Activity Management: up in chair  Taken 1/11/2024 0800 by Juan Capone RN  Activity Management: activity encouraged  VTE Prevention/Management:   bilateral   sequential compression devices on  Range of Motion: active ROM (range of motion)  encouraged  Intervention: Prevent Infection  Recent Flowsheet Documentation  Taken 1/11/2024 1400 by Juan Capone RN  Infection Prevention:   single patient room provided   rest/sleep promoted  Taken 1/11/2024 0800 by Juan Capone RN  Infection Prevention:   single patient room provided   rest/sleep promoted  Goal: Optimal Comfort and Wellbeing  Outcome: Ongoing, Progressing  Intervention: Provide Person-Centered Care  Recent Flowsheet Documentation  Taken 1/11/2024 1400 by Juan Capone RN  Trust Relationship/Rapport:   care explained   emotional support provided   questions answered   reassurance provided   thoughts/feelings acknowledged  Taken 1/11/2024 0800 by Juan Capone RN  Trust Relationship/Rapport:   care explained   emotional support provided   questions answered   reassurance provided   thoughts/feelings acknowledged  Goal: Readiness for Transition of Care  Outcome: Ongoing, Progressing     Problem: Fluid Imbalance (Pneumonia)  Goal: Fluid Balance  Outcome: Ongoing, Progressing  Intervention: Monitor and Manage Fluid Balance  Recent Flowsheet Documentation  Taken 1/11/2024 0800 by Juan Capone RN  Fluid/Electrolyte Management: fluids provided     Problem: Infection (Pneumonia)  Goal: Resolution of Infection Signs and Symptoms  Outcome: Ongoing, Progressing     Problem: Respiratory Compromise (Pneumonia)  Goal: Effective Oxygenation and Ventilation  Outcome: Ongoing, Progressing  Intervention: Promote Airway Secretion Clearance  Recent Flowsheet Documentation  Taken 1/11/2024 0800 by Juan Capone RN  Cough And Deep Breathing: done independently per patient  Intervention: Optimize Oxygenation and Ventilation  Recent Flowsheet Documentation  Taken 1/11/2024 1600 by Juan Capone RN  Head of Bed (HOB) Positioning: HOB elevated  Taken 1/11/2024 1400 by Juan Capone RN  Head of Bed (HOB) Positioning: HOB elevated  Taken 1/11/2024 1200 by Juan Capone RN  Head of Bed (HOB) Positioning:  (up in chair) other (see comments)  Taken 1/11/2024 1000 by Juan Capone RN  Head of Bed (HOB) Positioning: (up in chair) other (see comments)  Taken 1/11/2024 0800 by Juan Capone RN  Head of Bed (HOB) Positioning: HOB elevated     Problem: Fall Injury Risk  Goal: Absence of Fall and Fall-Related Injury  Outcome: Ongoing, Progressing  Intervention: Identify and Manage Contributors  Recent Flowsheet Documentation  Taken 1/11/2024 1700 by Juan Capone RN  Medication Review/Management: medications reviewed  Taken 1/11/2024 1600 by Juan Capone RN  Medication Review/Management: medications reviewed  Taken 1/11/2024 1500 by Juan Capone RN  Medication Review/Management: medications reviewed  Taken 1/11/2024 1400 by Juan Capone RN  Medication Review/Management: medications reviewed  Taken 1/11/2024 1300 by Juan Capone RN  Medication Review/Management: medications reviewed  Taken 1/11/2024 1200 by Juan Capone RN  Medication Review/Management: medications reviewed  Taken 1/11/2024 1100 by Juan Capone RN  Medication Review/Management: medications reviewed  Taken 1/11/2024 1000 by Juan Capone RN  Medication Review/Management: medications reviewed  Taken 1/11/2024 0900 by Juan Capone RN  Medication Review/Management: medications reviewed  Taken 1/11/2024 0800 by Juan Capone RN  Medication Review/Management: medications reviewed  Taken 1/11/2024 0700 by Juan Capone RN  Medication Review/Management: medications reviewed  Intervention: Promote Injury-Free Environment  Recent Flowsheet Documentation  Taken 1/11/2024 1700 by Juan Capone RN  Safety Promotion/Fall Prevention:   activity supervised   fall prevention program maintained   safety round/check completed  Taken 1/11/2024 1600 by Juan Capone RN  Safety Promotion/Fall Prevention:   activity supervised   fall prevention program maintained   safety round/check completed  Taken 1/11/2024 1500 by Juan Capone, HELIO  Safety  Promotion/Fall Prevention:   activity supervised   fall prevention program maintained   safety round/check completed  Taken 1/11/2024 1400 by Juan Capone RN  Safety Promotion/Fall Prevention:   activity supervised   fall prevention program maintained   safety round/check completed  Taken 1/11/2024 1300 by Juan Capone RN  Safety Promotion/Fall Prevention:   activity supervised   fall prevention program maintained   safety round/check completed  Taken 1/11/2024 1200 by Juan Capone RN  Safety Promotion/Fall Prevention:   activity supervised   fall prevention program maintained   safety round/check completed  Taken 1/11/2024 1100 by Juan Capone RN  Safety Promotion/Fall Prevention:   activity supervised   fall prevention program maintained   safety round/check completed  Taken 1/11/2024 1000 by Juan Capone RN  Safety Promotion/Fall Prevention:   activity supervised   fall prevention program maintained   safety round/check completed  Taken 1/11/2024 0900 by Juan Capone RN  Safety Promotion/Fall Prevention:   activity supervised   fall prevention program maintained   safety round/check completed  Taken 1/11/2024 0800 by Juan Capone RN  Safety Promotion/Fall Prevention:   activity supervised   fall prevention program maintained   safety round/check completed  Taken 1/11/2024 0700 by Juan Capone RN  Safety Promotion/Fall Prevention:   activity supervised   fall prevention program maintained   safety round/check completed     Problem: Noninvasive Ventilation Acute  Goal: Effective Unassisted Ventilation and Oxygenation  Outcome: Ongoing, Progressing     Problem: Skin Injury Risk Increased  Goal: Skin Health and Integrity  Outcome: Ongoing, Progressing  Intervention: Optimize Skin Protection  Recent Flowsheet Documentation  Taken 1/11/2024 1600 by Juan Capone RN  Head of Bed (HOB) Positioning: HOB elevated  Taken 1/11/2024 1400 by Juan Capone RN  Head of Bed (HOB) Positioning: HOB  elevated  Taken 1/11/2024 1200 by Juan Capone, RN  Head of Bed ([facility] [place]) Positioning: (up in chair) other (see comments)  Taken 1/11/2024 1000 by Juan Capone, RN  Head of Bed ([facility] [place]) Positioning: (up in chair) other (see comments)  Taken 1/11/2024 0800 by Juan Capone, RN  Pressure Reduction Techniques:   frequent weight shift encouraged   heels elevated off bed   weight shift assistance provided  Head of Bed ([facility] [place]) Positioning: HOB elevated  Pressure Reduction Devices:   alternating pressure pump (ADD)   heel offloading device utilized   positioning supports utilized   specialty bed utilized  Skin Protection:   adhesive use limited   incontinence pads utilized   skin-to-device areas padded   transparent dressing maintained   tubing/devices free from skin contact   Goal Outcome Evaluation:           Progress: no change  Outcome Evaluation: Pt. AOx4, VSS, HHF 60L, resting in bed throughuot shift, currently diuresing, ambulated throughout shift, bed in lowest position, call light within reach, alarms active and audible, no acute events noted.

## 2024-01-11 NOTE — THERAPY TREATMENT NOTE
Acute Care - Physical Therapy Treatment Note   [place]     Patient Name: Evie Shah  : 1945  MRN: [id number]  Today's Date: 2024      Visit Dx:     ICD-10-CM ICD-9-CM   1. Hypoxemia  R09.02 799.02   2. COPD exacerbation  J44.1 491.21   3. Acute respiratory failure with hypoxia  J96.01 518.81   4. Lung mass  R91.8 786.6     Patient Active Problem List   Diagnosis    DM type 2 with diabetic mixed hyperlipidemia    Dyslipidemia    Essential hypertension    Senile osteoporosis    Chronic allergic rhinitis    VISHAL (generalized anxiety disorder)    PAD (peripheral artery disease)    Severe obesity with body mass index (BMI) of 35.0 to 35.9 and comorbidity    COPD mixed type    Former smoker    Chronic respiratory failure with hypoxia    Type 2 diabetes mellitus with hyperglycemia, without long-term current use of insulin    Primary osteoarthritis of both knees    Coronary artery calcification seen on CT scan    Elevated hematocrit    Acute idiopathic gout of left hand    Vitamin D deficiency    Polycythemia vera    Respiratory failure with hypoxia    Lung mass     Past Medical History:   Diagnosis Date    Allergic rhinitis     Asthma     Atherosclerosis     COPD (chronic obstructive pulmonary disease)     Cough     Diabetes mellitus     Hyperlipidemia     Hypertension     Menopause     Nausea and vomiting     Obesity 3/7/2017    Osteoarthritis     Osteoporosis     Vertigo, benign paroxysmal      Past Surgical History:   Procedure Laterality Date    BRONCHOSCOPY Bilateral 2024    Procedure: BRONCHOSCOPY WITH ENDOBRONCHIAL ULTRASOUND;  Surgeon: Augustin Stafford MD;  Location: [facility];  Service: Pulmonary;  Laterality: Bilateral;    CATARACT EXTRACTION      COLONOSCOPY      EYE SURGERY      catarac    ILIAC ARTERY STENT  2015    SKIN CANCER EXCISION      nose    TONSILLECTOMY      and adenoidectomy    TUBAL ABDOMINAL LIGATION       PT Assessment (last 12 hours)       PT Evaluation and  Treatment       Row Name 01/11/24 1350          Physical Therapy Time and Intention    Subjective Information no complaints  -CT     Document Type therapy note (daily note)  -CT     Mode of Treatment individual therapy;physical therapy  -CT     Patient Effort good  -CT     Comment Pt cleared by RN to participate with therapy this date. Pt on heated hiflow. Pt ambulated in room and performed seated and standing ther ex.  -CT       Row Name 01/11/24 1350          General Information    Patient Profile Reviewed yes  -CT     Existing Precautions/Restrictions fall;oxygen therapy device and L/min  -CT       Row Name 01/11/24 1350          Living Environment    Primary Care Provided by self  -CT       Row Name 01/11/24 1350          Home Use of Assistive/Adaptive Equipment    Equipment Currently Used at Home bp cuff;rollator;oxygen  -CT       Row Name 01/11/24 1350          Cognition    Affect/Mental Status (Cognition) WFL  -CT     Orientation Status (Cognition) oriented x 4  -CT     Follows Commands (Cognition) WFL  -CT       Row Name 01/11/24 1350          Bed Mobility    Comment, (Bed Mobility) pt up in chair this date  -CT       Row Name 01/11/24 1350          Sit-Stand Transfer    Sit-Stand [place] (Transfers) standby assist  -CT       Row Name 01/11/24 1350          Stand-Sit Transfer    Stand-Sit [place] (Transfers) standby assist  -CT       Row Name 01/11/24 1350          Gait/Stairs (Locomotion)    [place] Level (Gait) contact guard;standby assist  -CT     Assistive Device (Gait) --  R handheld assist  -CT     Patient was able to Ambulate yes  -CT     Distance in Feet (Gait) 15  limited by oxygen tubing  -CT     Pattern (Gait) step-through  -CT     Deviations/Abnormal Patterns (Gait) gait speed decreased;weight shifting decreased  -CT       Row Name 01/11/24 1350          Safety Issues, Functional Mobility    Impairments Affecting Function (Mobility) balance;endurance/activity tolerance  -CT        Row Name 01/11/24 1350          Motor Skills    Therapeutic Exercise --  standing ther ex; sitting ther ex  -CT       Row Name 01/11/24 1350          Coping    Observed Emotional State calm;cooperative  -CT       Row Name 01/11/24 1350          Plan of Care Review    Plan of Care Reviewed With patient  -CT       Row Name 01/11/24 1350          Positioning and Restraints    Pre-Treatment Position sitting in chair/recliner  -CT     Post Treatment Position chair  -CT     In Chair sitting;call light within reach;encouraged to call for assist;notified nsg  -CT       Row Name 01/11/24 1350          Therapy Assessment/Plan (PT)    Rehab Potential (PT) good, to achieve stated therapy goals  -CT     Criteria for Skilled Interventions Met (PT) yes;skilled treatment is necessary  -CT     Therapy Frequency (PT) 2 times/wk  2-5x/wk  -CT     Problem List (PT) problems related to;balance;mobility  -CT     Activity Limitations Related to Problem List (PT) unable to ambulate safely;unable to transfer safely  -CT               User Key  (r) = Recorded By, (t) = Taken By, (c) = Cosigned By      Initials Name Provider Type    CT Jesenia Louis, PT Physical Therapist                    Physical Therapy Education       Title: PT OT SLP Therapies (Done)       Topic: Physical Therapy (Done)       Point: Mobility training (Done)       Learning Progress Summary             Patient Acceptance, E, VU,NR by  at 1/11/2024 0223    Acceptance, E, NR by  at 1/9/2024 0357    Acceptance, E, NR by  at 1/7/2024 2301    Acceptance, E,TB, VU,NR by EB at 1/4/2024 2314    Acceptance, E,TB, VU by WESLEY at 1/4/2024 1515                         Point: Home exercise program (Done)       Learning Progress Summary             Patient Acceptance, E, VU,NR by  at 1/11/2024 0223    Acceptance, E, NR by  at 1/9/2024 0357    Acceptance, E, NR by  at 1/7/2024 2301    Acceptance, E,TB, VU,NR by  at 1/4/2024 2314    Acceptance, E,TB, VU by KM at 1/4/2024  1515                         Point: Body mechanics (Done)       Learning Progress Summary             Patient Acceptance, E, VU,NR by  at 1/11/2024 0223    Acceptance, E, NR by  at 1/9/2024 0357    Acceptance, E, NR by  at 1/7/2024 2301    Acceptance, E,TB, VU,NR by  at 1/4/2024 2314    Acceptance, E,TB, VU by  at 1/4/2024 1515                         Point: Precautions (Done)       Learning Progress Summary             Patient Acceptance, E, VU,NR by  at 1/11/2024 0223    Acceptance, E, NR by  at 1/9/2024 0357    Acceptance, E, NR by  at 1/7/2024 2301    Acceptance, E,TB, VU,NR by  at 1/4/2024 2314    Acceptance, E,TB, VU by  at 1/4/2024 1515                                         User Key       Initials Effective Dates Name Provider Type Discipline     12/15/23 -  Amina Muniz, RN Registered Nurse Nurse     06/16/21 -  Naye Lewis, HELIO Registered Nurse Nurse     05/24/22 -  Chon Rowell, THANG Physical Therapist PT     06/21/23 -  Brooklynn Kruger, HELIO Registered Nurse Nurse                  PT Recommendation and Plan  Anticipated Discharge Disposition (PT): home, home with assist  Therapy Frequency (PT): 2 times/wk (2-5x/wk)  Plan of Care Reviewed With: patient       Time Calculation:    PT Charges       Row Name 01/11/24 1359             Time Calculation    PT Received On 01/11/24  -CT         Time Calculation- PT    Total Timed Code Minutes- PT 33 minute(s)  -CT                User Key  (r) = Recorded By, (t) = Taken By, (c) = Cosigned By      Initials Name Provider Type    CT Jesenia Louis, PT Physical Therapist                  Therapy Charges for Today       Code Description Service Date Service Provider Modifiers Qty    29443749508 HC GAIT TRAINING EA 15 MIN 1/11/2024 Jesenia Louis, PT GP 1    07094029199 HC PT THER PROC EA 15 MIN 1/11/2024 Jesenia Louis, PT GP 1            PT G-Codes  AM-PAC 6 Clicks Score (PT): 18    Jesenia Louis PT  1/11/2024

## 2024-01-11 NOTE — PLAN OF CARE
Problem: Adult Inpatient Plan of Care  Goal: Plan of Care Review  Outcome: Ongoing, Progressing  Flowsheets (Taken 1/11/2024 0221)  Progress: no change  Plan of Care Reviewed With: patient  Goal: Patient-Specific Goal (Individualized)  Outcome: Ongoing, Progressing  Goal: Absence of Hospital-Acquired Illness or Injury  Outcome: Ongoing, Progressing  Intervention: Identify and Manage Fall Risk  Recent Flowsheet Documentation  Taken 1/11/2024 0200 by Naye Lewis RN  Safety Promotion/Fall Prevention: safety round/check completed  Taken 1/11/2024 0100 by Naye Lewis RN  Safety Promotion/Fall Prevention: safety round/check completed  Taken 1/11/2024 0000 by Naye Lewis RN  Safety Promotion/Fall Prevention: safety round/check completed  Taken 1/10/2024 2300 by Naye Lewis RN  Safety Promotion/Fall Prevention: safety round/check completed  Taken 1/10/2024 2200 by Naye Lewis RN  Safety Promotion/Fall Prevention: safety round/check completed  Taken 1/10/2024 2100 by Naye Lewis RN  Safety Promotion/Fall Prevention: safety round/check completed  Taken 1/10/2024 2000 by Naye Lewis RN  Safety Promotion/Fall Prevention: safety round/check completed  Taken 1/10/2024 1900 by Naye Lewis RN  Safety Promotion/Fall Prevention: safety round/check completed  Intervention: Prevent Skin Injury  Recent Flowsheet Documentation  Taken 1/11/2024 0200 by Naye Lewis RN  Body Position:   turned   right  Taken 1/11/2024 0000 by Naye Lewis RN  Body Position:   turned   left  Taken 1/10/2024 2200 by Naye Lewis RN  Body Position:   turned   right  Taken 1/10/2024 2000 by Naye Lewis RN  Body Position:   turned   left  Skin Protection: adhesive use limited  Intervention: Prevent and Manage VTE (Venous Thromboembolism) Risk  Recent Flowsheet Documentation  Taken 1/10/2024 2000 by Naye Lewis RN  Activity Management: bedrest  VTE Prevention/Management:   bilateral   sequential compression devices on  Range  of Motion: active ROM (range of motion) encouraged  Intervention: Prevent Infection  Recent Flowsheet Documentation  Taken 1/10/2024 2000 by Naye Lewis RN  Infection Prevention:   environmental surveillance performed   rest/sleep promoted   single patient room provided   hand hygiene promoted  Goal: Optimal Comfort and Wellbeing  Outcome: Ongoing, Progressing  Intervention: Provide Person-Centered Care  Recent Flowsheet Documentation  Taken 1/10/2024 2000 by Naye Lewis RN  Trust Relationship/Rapport:   care explained   thoughts/feelings acknowledged  Goal: Readiness for Transition of Care  Outcome: Ongoing, Progressing     Problem: Fluid Imbalance (Pneumonia)  Goal: Fluid Balance  Outcome: Ongoing, Progressing  Intervention: Monitor and Manage Fluid Balance  Recent Flowsheet Documentation  Taken 1/10/2024 2000 by Naye Lewis RN  Fluid/Electrolyte Management: fluids provided     Problem: Infection (Pneumonia)  Goal: Resolution of Infection Signs and Symptoms  Outcome: Ongoing, Progressing     Problem: Respiratory Compromise (Pneumonia)  Goal: Effective Oxygenation and Ventilation  Outcome: Ongoing, Progressing  Intervention: Promote Airway Secretion Clearance  Recent Flowsheet Documentation  Taken 1/10/2024 2000 by Naye Lewis RN  Breathing Techniques/Airway Clearance: deep/controlled cough encouraged  Cough And Deep Breathing: done independently per patient  Intervention: Optimize Oxygenation and Ventilation  Recent Flowsheet Documentation  Taken 1/11/2024 0200 by Naye Lewis RN  Head of Bed (HOB) Positioning: HOB at 20-30 degrees  Taken 1/11/2024 0000 by Naye Lewis RN  Head of Bed (HOB) Positioning: HOB at 20-30 degrees  Taken 1/10/2024 2200 by Naye Lewis RN  Head of Bed (HOB) Positioning: HOB at 20-30 degrees  Taken 1/10/2024 2000 by Naye Lewis RN  Head of Bed ([facility]) Positioning: HOB at 30-45 degrees  Airway/Ventilation Management: airway patency maintained     Problem: Fall Injury  Risk  Goal: Absence of Fall and Fall-Related Injury  Outcome: Ongoing, Progressing  Intervention: Identify and Manage Contributors  Recent Flowsheet Documentation  Taken 1/10/2024 2000 by Naye Lewis RN  Medication Review/Management: medications reviewed  Self-Care Promotion: independence encouraged  Intervention: Promote Injury-Free Environment  Recent Flowsheet Documentation  Taken 1/11/2024 0200 by Naye Lewis RN  Safety Promotion/Fall Prevention: safety round/check completed  Taken 1/11/2024 0100 by Naye Lewis RN  Safety Promotion/Fall Prevention: safety round/check completed  Taken 1/11/2024 0000 by Naye Lewis RN  Safety Promotion/Fall Prevention: safety round/check completed  Taken 1/10/2024 2300 by Naye Lewis RN  Safety Promotion/Fall Prevention: safety round/check completed  Taken 1/10/2024 2200 by Naye Lewis RN  Safety Promotion/Fall Prevention: safety round/check completed  Taken 1/10/2024 2100 by Naye Lewis RN  Safety Promotion/Fall Prevention: safety round/check completed  Taken 1/10/2024 2000 by Naye Lewis RN  Safety Promotion/Fall Prevention: safety round/check completed  Taken 1/10/2024 1900 by Naye Lewis RN  Safety Promotion/Fall Prevention: safety round/check completed     Problem: Noninvasive Ventilation Acute  Goal: Effective Unassisted Ventilation and Oxygenation  Outcome: Ongoing, Progressing  Intervention: Monitor and Manage Noninvasive Ventilation  Recent Flowsheet Documentation  Taken 1/10/2024 2000 by Naye Lewis RN  Airway/Ventilation Management: airway patency maintained     Problem: Skin Injury Risk Increased  Goal: Skin Health and Integrity  Outcome: Ongoing, Progressing  Intervention: Optimize Skin Protection  Recent Flowsheet Documentation  Taken 1/11/2024 0200 by Naye Lewis RN  Head of Bed (HOB) Positioning: HOB at 20-30 degrees  Taken 1/11/2024 0000 by Naye Lewis RN  Head of Bed (HOB) Positioning: HOB at 20-30 degrees  Taken 1/10/2024 2200  by Naye Lewis, RN  Head of Bed (HOB) Positioning: HOB at 20-30 degrees  Taken 1/10/2024 2000 by Naye Lewis, HELIO  Head of Bed ([facility]) Positioning: HOB at 30-45 degrees  Skin Protection: adhesive use limited   Goal Outcome Evaluation:  Plan of Care Reviewed With: patient        Progress: no change

## 2024-01-12 ENCOUNTER — APPOINTMENT (OUTPATIENT)
Dept: GENERAL RADIOLOGY | Facility: HOSPITAL | Age: 79
End: 2024-01-12
Payer: MEDICARE

## 2024-01-12 LAB
ALBUMIN SERPL-MCNC: 3.6 G/DL (ref 3.5–5.2)
ALBUMIN/GLOB SERPL: 1.5 G/DL
ALP SERPL-CCNC: 76 U/L (ref 39–117)
ALT SERPL W P-5'-P-CCNC: 26 U/L (ref 1–33)
ANION GAP SERPL CALCULATED.3IONS-SCNC: 8.3 MMOL/L (ref 5–15)
APTT PPP: 27.8 SECONDS (ref 26.5–34.5)
AST SERPL-CCNC: 11 U/L (ref 1–32)
ATMOSPHERIC PRESS: 722 MMHG
BACTERIA SPEC RESP CULT: NORMAL
BASE EXCESS BLDV CALC-SCNC: 5.4 MMOL/L (ref 0–2)
BASOPHILS # BLD AUTO: 0.01 10*3/MM3 (ref 0–0.2)
BASOPHILS NFR BLD AUTO: 0.1 % (ref 0–1.5)
BDY SITE: ABNORMAL
BILIRUB SERPL-MCNC: 0.4 MG/DL (ref 0–1.2)
BUN SERPL-MCNC: 30 MG/DL (ref 8–23)
BUN/CREAT SERPL: 33 (ref 7–25)
CALCIUM SPEC-SCNC: 9.5 MG/DL (ref 8.6–10.5)
CHLORIDE SERPL-SCNC: 100 MMOL/L (ref 98–107)
CO2 BLDA-SCNC: 32.5 MMOL/L (ref 22–33)
CO2 SERPL-SCNC: 30.7 MMOL/L (ref 22–29)
COHGB MFR BLD: 1 % (ref 0–5)
CREAT SERPL-MCNC: 0.91 MG/DL (ref 0.57–1)
DEPRECATED RDW RBC AUTO: 46.2 FL (ref 37–54)
EGFRCR SERPLBLD CKD-EPI 2021: 65.5 ML/MIN/1.73
EOSINOPHIL # BLD AUTO: 0.01 10*3/MM3 (ref 0–0.4)
EOSINOPHIL NFR BLD AUTO: 0.1 % (ref 0.3–6.2)
ERYTHROCYTE [DISTWIDTH] IN BLOOD BY AUTOMATED COUNT: 13.3 % (ref 12.3–15.4)
GAS FLOW AIRWAY: 60 LPM
GLOBULIN UR ELPH-MCNC: 2.4 GM/DL
GLUCOSE BLDC GLUCOMTR-MCNC: 205 MG/DL (ref 70–130)
GLUCOSE BLDC GLUCOMTR-MCNC: 233 MG/DL (ref 70–130)
GLUCOSE BLDC GLUCOMTR-MCNC: 243 MG/DL (ref 70–130)
GLUCOSE BLDC GLUCOMTR-MCNC: 247 MG/DL (ref 70–130)
GLUCOSE SERPL-MCNC: 229 MG/DL (ref 65–99)
GRAM STN SPEC: NORMAL
HCO3 BLDV-SCNC: 31 MMOL/L (ref 22–28)
HCT VFR BLD AUTO: 44.2 % (ref 34–46.6)
HGB BLD-MCNC: 14.2 G/DL (ref 12–15.9)
HGB BLDA-MCNC: 14.6 G/DL (ref 13.5–17.5)
IMM GRANULOCYTES # BLD AUTO: 0.08 10*3/MM3 (ref 0–0.05)
IMM GRANULOCYTES NFR BLD AUTO: 0.8 % (ref 0–0.5)
INHALED O2 CONCENTRATION: 75 %
INR PPP: 1.02 (ref 0.9–1.1)
LYMPHOCYTES # BLD AUTO: 1.01 10*3/MM3 (ref 0.7–3.1)
LYMPHOCYTES NFR BLD AUTO: 10.3 % (ref 19.6–45.3)
Lab: ABNORMAL
MAGNESIUM SERPL-MCNC: 2.1 MG/DL (ref 1.6–2.4)
MCH RBC QN AUTO: 30.2 PG (ref 26.6–33)
MCHC RBC AUTO-ENTMCNC: 32.1 G/DL (ref 31.5–35.7)
MCV RBC AUTO: 94 FL (ref 79–97)
METHGB BLD QL: 0.7 % (ref 0–3)
MODALITY: ABNORMAL
MONOCYTES # BLD AUTO: 0.43 10*3/MM3 (ref 0.1–0.9)
MONOCYTES NFR BLD AUTO: 4.4 % (ref 5–12)
NEUTROPHILS NFR BLD AUTO: 8.27 10*3/MM3 (ref 1.7–7)
NEUTROPHILS NFR BLD AUTO: 84.3 % (ref 42.7–76)
NRBC BLD AUTO-RTO: 0 /100 WBC (ref 0–0.2)
OXYHGB MFR BLDV: 82.3 % (ref 45–75)
PCO2 BLDV: 47.9 MM HG (ref 41–51)
PH BLDV: 7.42 PH UNITS (ref 7.32–7.42)
PHOSPHATE SERPL-MCNC: 5.5 MG/DL (ref 2.5–4.5)
PLATELET # BLD AUTO: 197 10*3/MM3 (ref 140–450)
PMV BLD AUTO: 10.2 FL (ref 6–12)
PO2 BLDV: 48 MM HG (ref 27–53)
POTASSIUM SERPL-SCNC: 4.6 MMOL/L (ref 3.5–5.2)
PROT SERPL-MCNC: 6 G/DL (ref 6–8.5)
PROTHROMBIN TIME: 13.9 SECONDS (ref 12.1–14.7)
RBC # BLD AUTO: 4.7 10*6/MM3 (ref 3.77–5.28)
SAO2 % BLDCOV: 83.7 % (ref 45–75)
SODIUM SERPL-SCNC: 139 MMOL/L (ref 136–145)
VENTILATOR MODE: ABNORMAL
WBC NRBC COR # BLD AUTO: 9.81 10*3/MM3 (ref 3.4–10.8)

## 2024-01-12 PROCEDURE — 94660 CPAP INITIATION&MGMT: CPT

## 2024-01-12 PROCEDURE — 25010000002 FUROSEMIDE PER 20 MG: Performed by: INTERNAL MEDICINE

## 2024-01-12 PROCEDURE — 85025 COMPLETE CBC W/AUTO DIFF WBC: CPT | Performed by: INTERNAL MEDICINE

## 2024-01-12 PROCEDURE — 94799 UNLISTED PULMONARY SVC/PX: CPT

## 2024-01-12 PROCEDURE — 82805 BLOOD GASES W/O2 SATURATION: CPT

## 2024-01-12 PROCEDURE — 71045 X-RAY EXAM CHEST 1 VIEW: CPT

## 2024-01-12 PROCEDURE — 63710000001 INSULIN LISPRO (HUMAN) PER 5 UNITS: Performed by: INTERNAL MEDICINE

## 2024-01-12 PROCEDURE — 99232 SBSQ HOSP IP/OBS MODERATE 35: CPT | Performed by: INTERNAL MEDICINE

## 2024-01-12 PROCEDURE — 82948 REAGENT STRIP/BLOOD GLUCOSE: CPT

## 2024-01-12 PROCEDURE — 94761 N-INVAS EAR/PLS OXIMETRY MLT: CPT

## 2024-01-12 PROCEDURE — 80053 COMPREHEN METABOLIC PANEL: CPT | Performed by: INTERNAL MEDICINE

## 2024-01-12 PROCEDURE — 25010000002 METHYLPREDNISOLONE PER 40 MG: Performed by: INTERNAL MEDICINE

## 2024-01-12 PROCEDURE — 94664 DEMO&/EVAL PT USE INHALER: CPT

## 2024-01-12 PROCEDURE — 82820 HEMOGLOBIN-OXYGEN AFFINITY: CPT

## 2024-01-12 PROCEDURE — 85730 THROMBOPLASTIN TIME PARTIAL: CPT | Performed by: INTERNAL MEDICINE

## 2024-01-12 PROCEDURE — 85610 PROTHROMBIN TIME: CPT | Performed by: INTERNAL MEDICINE

## 2024-01-12 PROCEDURE — 71045 X-RAY EXAM CHEST 1 VIEW: CPT | Performed by: RADIOLOGY

## 2024-01-12 PROCEDURE — 83735 ASSAY OF MAGNESIUM: CPT | Performed by: INTERNAL MEDICINE

## 2024-01-12 PROCEDURE — 25010000002 CEFTRIAXONE PER 250 MG: Performed by: INTERNAL MEDICINE

## 2024-01-12 PROCEDURE — 84100 ASSAY OF PHOSPHORUS: CPT | Performed by: INTERNAL MEDICINE

## 2024-01-12 RX ORDER — METHYLPREDNISOLONE SODIUM SUCCINATE 40 MG/ML
20 INJECTION, POWDER, LYOPHILIZED, FOR SOLUTION INTRAMUSCULAR; INTRAVENOUS EVERY 12 HOURS
Status: DISCONTINUED | OUTPATIENT
Start: 2024-01-12 | End: 2024-01-14

## 2024-01-12 RX ORDER — GUAIFENESIN 600 MG/1
600 TABLET, EXTENDED RELEASE ORAL EVERY 12 HOURS SCHEDULED
Status: DISCONTINUED | OUTPATIENT
Start: 2024-01-12 | End: 2024-01-18 | Stop reason: HOSPADM

## 2024-01-12 RX ORDER — FUROSEMIDE 10 MG/ML
40 INJECTION INTRAMUSCULAR; INTRAVENOUS ONCE
Qty: 4 ML | Refills: 0 | Status: COMPLETED | OUTPATIENT
Start: 2024-01-12 | End: 2024-01-12

## 2024-01-12 RX ADMIN — INSULIN LISPRO 4 UNITS: 100 INJECTION, SOLUTION INTRAVENOUS; SUBCUTANEOUS at 21:43

## 2024-01-12 RX ADMIN — FLUTICASONE PROPIONATE 2 SPRAY: 50 SPRAY, METERED NASAL at 08:10

## 2024-01-12 RX ADMIN — GUAIFENESIN 600 MG: 600 TABLET, EXTENDED RELEASE ORAL at 22:03

## 2024-01-12 RX ADMIN — DOXYCYCLINE 100 MG: 100 INJECTION, POWDER, LYOPHILIZED, FOR SOLUTION INTRAVENOUS at 08:09

## 2024-01-12 RX ADMIN — ACETYLCYSTEINE 3 ML: 200 SOLUTION ORAL; RESPIRATORY (INHALATION) at 13:09

## 2024-01-12 RX ADMIN — INSULIN LISPRO 4 UNITS: 100 INJECTION, SOLUTION INTRAVENOUS; SUBCUTANEOUS at 12:10

## 2024-01-12 RX ADMIN — METHYLPREDNISOLONE SODIUM SUCCINATE 20 MG: 40 INJECTION, POWDER, FOR SOLUTION INTRAMUSCULAR; INTRAVENOUS at 12:10

## 2024-01-12 RX ADMIN — ATORVASTATIN CALCIUM 10 MG: 10 TABLET, FILM COATED ORAL at 21:36

## 2024-01-12 RX ADMIN — METOPROLOL SUCCINATE 25 MG: 25 TABLET, EXTENDED RELEASE ORAL at 16:57

## 2024-01-12 RX ADMIN — DOCUSATE SODIUM 50 MG AND SENNOSIDES 8.6 MG 2 TABLET: 8.6; 5 TABLET, FILM COATED ORAL at 08:09

## 2024-01-12 RX ADMIN — Medication 10 ML: at 08:09

## 2024-01-12 RX ADMIN — EMPAGLIFLOZIN 25 MG: 10 TABLET, FILM COATED ORAL at 08:09

## 2024-01-12 RX ADMIN — Medication 10 ML: at 21:36

## 2024-01-12 RX ADMIN — AMLODIPINE BESYLATE 5 MG: 5 TABLET ORAL at 16:57

## 2024-01-12 RX ADMIN — IPRATROPIUM BROMIDE AND ALBUTEROL SULFATE 3 ML: 2.5; .5 SOLUTION RESPIRATORY (INHALATION) at 13:09

## 2024-01-12 RX ADMIN — IPRATROPIUM BROMIDE AND ALBUTEROL SULFATE 3 ML: 2.5; .5 SOLUTION RESPIRATORY (INHALATION) at 00:37

## 2024-01-12 RX ADMIN — INSULIN LISPRO 4 UNITS: 100 INJECTION, SOLUTION INTRAVENOUS; SUBCUTANEOUS at 16:58

## 2024-01-12 RX ADMIN — INSULIN LISPRO 4 UNITS: 100 INJECTION, SOLUTION INTRAVENOUS; SUBCUTANEOUS at 06:31

## 2024-01-12 RX ADMIN — ACETYLCYSTEINE 3 ML: 200 SOLUTION ORAL; RESPIRATORY (INHALATION) at 00:37

## 2024-01-12 RX ADMIN — IPRATROPIUM BROMIDE AND ALBUTEROL SULFATE 3 ML: 2.5; .5 SOLUTION RESPIRATORY (INHALATION) at 18:28

## 2024-01-12 RX ADMIN — BUPROPION HYDROCHLORIDE 150 MG: 150 TABLET, EXTENDED RELEASE ORAL at 21:36

## 2024-01-12 RX ADMIN — ACETYLCYSTEINE 3 ML: 200 SOLUTION ORAL; RESPIRATORY (INHALATION) at 06:17

## 2024-01-12 RX ADMIN — DOXYCYCLINE 100 MG: 100 INJECTION, POWDER, LYOPHILIZED, FOR SOLUTION INTRAVENOUS at 21:44

## 2024-01-12 RX ADMIN — CEFTRIAXONE 1000 MG: 1 INJECTION, POWDER, FOR SOLUTION INTRAMUSCULAR; INTRAVENOUS at 15:10

## 2024-01-12 RX ADMIN — ACETYLCYSTEINE 3 ML: 200 SOLUTION ORAL; RESPIRATORY (INHALATION) at 18:28

## 2024-01-12 RX ADMIN — MONTELUKAST SODIUM 10 MG: 10 TABLET, COATED ORAL at 21:36

## 2024-01-12 RX ADMIN — LISINOPRIL 5 MG: 2.5 TABLET ORAL at 16:57

## 2024-01-12 RX ADMIN — IPRATROPIUM BROMIDE AND ALBUTEROL SULFATE 3 ML: 2.5; .5 SOLUTION RESPIRATORY (INHALATION) at 06:17

## 2024-01-12 RX ADMIN — METHYLPREDNISOLONE SODIUM SUCCINATE 40 MG: 40 INJECTION, POWDER, FOR SOLUTION INTRAMUSCULAR; INTRAVENOUS at 00:33

## 2024-01-12 RX ADMIN — CETIRIZINE HYDROCHLORIDE 10 MG: 10 TABLET, FILM COATED ORAL at 08:09

## 2024-01-12 RX ADMIN — CLOPIDOGREL BISULFATE 75 MG: 75 TABLET, FILM COATED ORAL at 08:09

## 2024-01-12 RX ADMIN — FUROSEMIDE 40 MG: 10 INJECTION, SOLUTION INTRAMUSCULAR; INTRAVENOUS at 10:08

## 2024-01-12 NOTE — CONSULTS
"Diabetes Education  Assessment/Teaching    Patient Name:  Evie Shah  YOB: 1945  MRN: [id number]  Admit Date:  1/2/2024      Assessment Date:  1/12/2024  Flowsheet Row Most Recent Value   General Information     Height 160 cm (62.99\")   Height Method Stated   Weight 95.1 kg (209 lb 10.5 oz)   Weight Method Bed scale   Pregnancy Assessment    Diabetes History    Education Preferences    Nutrition Information    Assessment Topics    DM Goals             Flowsheet Row Most Recent Value   DM Education Needs    Meter Has own   Frequency of Testing PRN   Medication Insulin, Oral   Problem Solving Hypoglycemia, Hyperglycemia, Sick days, Signs, Symptoms, Treatment   Reducing Risks Cardiovascular, Immunizations, Dental exam, Foot care, Eye exam, Blood pressure, Lipids, A1C testing, Neuropathy, Retinopathy   Healthy Eating Basic meal plan provided   Physical Activity Walking   Physical Activity Frequency Occasionally   Healthy Coping Appropriate   Discharge Plan Follow-up with PCP   Motivation Moderate   Teaching Method Explanation, Discussion, Handouts   Patient Response Verbalized understanding              Other Comments:  A1C 7.7 Patient was educated and received AADE7 and ADA handouts on diet, activity, checking blood glucose, taking medication as prescribed, checking feet daily and S/S of hypo/hyperglycemia. Patient was educated on sick rule days. Patient had no questions or concerns. Please re-consult or call for concerns or questions. Thank you.        Electronically signed by:  Perla Jacob RN  01/12/24 14:32 EST  "

## 2024-01-12 NOTE — PLAN OF CARE
Goal Outcome Evaluation:  Plan of Care Reviewed With: patient        Progress: improving  Outcome Evaluation: AxOx4, pleasant. VSS. NSR. Pt remains on HHF NC. Has ambulated in room and to bedside commode multiple times this shift and tolerates well. Excellent UOP following diuresis this shift. Call light is within reach, bed in lowest position, bed alarm set. Care ongoing.

## 2024-01-12 NOTE — PROGRESS NOTES
Progress Note Pulmonary and critical care       Subjective   Overnight events reviewed   All the meds ins and out reviewed   MDR done at bed side    Lowered fio2    Review of Systems:    Generalized tiredness otherwise negative    Vital Signs  Temp:  [98 °F (36.7 °C)-98.7 °F (37.1 °C)] 98.6 °F (37 °C)  Heart Rate:  [59-81] 68  Resp:  [15-24] 24  BP: ()/(40-99) 124/69  Body mass index is 37.15 kg/m².    Intake/Output Summary (Last 24 hours) at 1/12/2024 0903  Last data filed at 1/12/2024 0809  Gross per 24 hour   Intake 620 ml   Output 1900 ml   Net -1280 ml     I/O this shift:  In: 100 [IV Piggyback:100]  Out: -     Physical Exam:   General- obese in appearance, on HFNC    HEENT- PERLLA    Neck- supple    No JVD, no carotid bruit    Respiratory-decreased breath sounds, on HFNC      Cardiovascular-  NSR    GI-NT ND    CNS-alert oriented x3, grossly nonfocal    Extremities- pulses normal bilaterally , no clubbing and edema        Results Review:      Results from last 7 days   Lab Units 01/12/24  0423 01/11/24  0201 01/10/24  0046   WBC 10*3/mm3 9.81 11.05* 9.39   HEMOGLOBIN g/dL 14.2 13.8 14.1   PLATELETS 10*3/mm3 197 182 178     Results from last 7 days   Lab Units 01/12/24  0423 01/11/24  0201 01/10/24  0046   SODIUM mmol/L 139 136 141   POTASSIUM mmol/L 4.6 4.5 4.4   CHLORIDE mmol/L 100 101 104   CO2 mmol/L 30.7* 30.0* 28.7   BUN mg/dL 30* 24* 20   CREATININE mg/dL 0.91 0.73 0.92   CALCIUM mg/dL 9.5 9.4 9.5   GLUCOSE mg/dL 229* 174* 266*   MAGNESIUM mg/dL 2.1 2.2 2.0     Lab Results   Component Value Date    INR 1.02 01/12/2024    INR 1.02 01/11/2024    INR 0.94 01/08/2024    PROTIME 13.9 01/12/2024    PROTIME 13.9 01/11/2024    PROTIME 13.1 01/08/2024     Results from last 7 days   Lab Units 01/12/24  0423 01/11/24  0201 01/10/24  0046   ALK PHOS U/L 76 112 95   BILIRUBIN mg/dL 0.4 0.3 0.3   ALT (SGPT) U/L 26 33 49*   AST (SGOT) U/L 11 14 19     Results from last 7 days   Lab Units 01/11/24  0425   PH,  ARTERIAL pH units 7.377   PO2 ART mm Hg 65.3*   PCO2, ARTERIAL mm Hg 47.9*   HCO3 ART mmol/L 28.1*     Imaging Results (Last 24 Hours)       ** No results found for the last 24 hours. **             Microbiology Results (last 10 days)       Procedure Component Value - Date/Time    Respiratory Culture - Lavage, Lung, Left Lower Lobe [[id number]] Collected: 01/10/24 1455    Lab Status: Preliminary result Specimen: Lavage from Lung, Left Lower Lobe Updated: 01/11/24 1104     Respiratory Culture No growth     Gram Stain Few (2+) WBCs seen      Rare (1+) Epithelial cells seen      Rare (1+) Mixed bacterial morphotypes seen on Gram Stain    Respiratory Culture - Wash, Lung, Lingula [[id number]] Collected: 01/10/24 1455    Lab Status: Preliminary result Specimen: Wash from Lung, Lingula Updated: 01/11/24 1104     Respiratory Culture Light growth (2+) The culture consists of normal respiratory [place]. This is a preliminary report; final report to follow.     Gram Stain Few (2+) WBCs seen      Rare (1+) Epithelial cells seen      Rare (1+) Mixed bacterial morphotypes seen on Gram Stain    Respiratory Culture - Lavage, Lung, Right Lower Lobe [[id number]] Collected: 01/10/24 1455    Lab Status: Preliminary result Specimen: Lavage from Lung, Right Lower Lobe Updated: 01/11/24 1105     Respiratory Culture No growth     Gram Stain Moderate (3+) WBCs seen      Rare (1+) Epithelial cells seen      Few (2+) Mixed bacterial morphotypes seen on Gram Stain    Respiratory Culture - Lavage, Lung, Right Upper Lobe [[id number]] Collected: 01/10/24 1455    Lab Status: Preliminary result Specimen: Lavage from Lung, Right Upper Lobe Updated: 01/11/24 1106     Respiratory Culture No growth     Gram Stain Few (2+) WBCs seen      No epithelial cells seen      Rare (1+) Mixed bacterial morphotypes seen on Gram Stain    Respiratory Panel PCR w/COVID-19(SARS-CoV-2) RAIN/CHING/LILLIANA/PAD/COR/RENE In-House, NP Swab in UTM/VTM, 2 HR TAT - Swab, Nasopharynx  [[id number]]  (Normal) Collected: 01/10/24 0435    Lab Status: Final result Specimen: Swab from Nasopharynx Updated: 01/10/24 0529     ADENOVIRUS, PCR Not Detected     Coronavirus 229E Not Detected     Coronavirus HKU1 Not Detected     Coronavirus NL63 Not Detected     Coronavirus OC43 Not Detected     COVID19 Not Detected     Human Metapneumovirus Not Detected     Human Rhinovirus/Enterovirus Not Detected     Influenza A PCR Not Detected     Influenza B PCR Not Detected     Parainfluenza Virus 1 Not Detected     Parainfluenza Virus 2 Not Detected     Parainfluenza Virus 3 Not Detected     Parainfluenza Virus 4 Not Detected     RSV, PCR Not Detected     Bordetella pertussis pcr Not Detected     Bordetella parapertussis PCR Not Detected     Chlamydophila pneumoniae PCR Not Detected     Mycoplasma pneumo by PCR Not Detected    Narrative:      In the setting of a positive respiratory panel with a viral infection PLUS a negative procalcitonin without other underlying concern for bacterial infection, consider observing off antibiotics or discontinuation of antibiotics and continue supportive care. If the respiratory panel is positive for atypical bacterial infection (Bordetella pertussis, Chlamydophila pneumoniae, or Mycoplasma pneumoniae), consider antibiotic de-escalation to target atypical bacterial infection.    Fungus Culture - Wash, Bronchus [[id number]] Collected: 01/08/24 0804    Lab Status: Preliminary result Specimen: Wash from Bronchus Updated: 01/11/24 1112     Fungus Stain Final report     KOH/Calcofluor preparation Comment     Comment: KOH/Calcofluor preparation:  no fungus observed.       Narrative:      Performed at:  [address] [place] [address]  [id number]  : Isidoro Eastman PhD, Phone:  [phone]    AFB Culture - Wash, Bronchus [[id number]] Collected: 01/08/24 0804    Lab Status: Preliminary result Specimen: Wash from Bronchus Updated: 01/09/24 1709     AFB Specimen  Processing Concentration     Acid Fast Smear Negative    Narrative:      Performed at:  [address] [place] [address]  [id number]  : Isidoro Eastman PhD, Phone:  [phone]    Respiratory Culture - Wash, Bronchus [[id number]] Collected: 01/08/24 0804    Lab Status: Final result Specimen: Wash from Bronchus Updated: 01/10/24 1123     Respiratory Culture Light growth (2+) Normal respiratory [place]. No S. aureus or Pseudomonas aeruginosa detected. Final report.     Gram Stain Moderate (3+) WBCs seen      Rare (1+) Mixed bacterial morphotypes seen on Gram Stain    Fungus Culture - Lavage, Lung, Left Upper Lobe [[id number]] Collected: 01/08/24 0802    Lab Status: Preliminary result Specimen: Lavage from Lung, Left Upper Lobe Updated: 01/11/24 1112     Fungus Stain Final report     KOH/Calcofluor preparation Comment     Comment: KOH/Calcofluor preparation:  no fungus observed.       Narrative:      Performed at:  [address] [place] [address]  [id number]  : Isidoro Eastman PhD, Phone:  [phone]    AFB Culture - Lavage, Lung, Left Upper Lobe [[id number]] Collected: 01/08/24 0802    Lab Status: Preliminary result Specimen: Lavage from Lung, Left Upper Lobe Updated: 01/09/24 1709     AFB Specimen Processing Concentration     Acid Fast Smear Negative    Narrative:      Performed at:  [address] [place] [address]  [id number]  : Isidoro Eastman PhD, Phone:  [phone]    BAL Culture, Quantitative - Lavage, Lung, Left Upper Lobe [[id number]] Collected: 01/08/24 0802    Lab Status: Final result Specimen: Lavage from Lung, Left Upper Lobe Updated: 01/10/24 1123     BAL Culture No growth     Gram Stain WBCs seen      No organisms seen    COVID-19, FLU A/B, RSV PCR 1 HR TAT - Swab, Nasopharynx [[id number]]  (Normal) Collected: 01/02/24 1410    Lab Status: Final result Specimen: Swab from Nasopharynx Updated: 01/02/24 1501     COVID19 Not  Detected     Influenza A PCR Not Detected     Influenza B PCR Not Detected     RSV, PCR Not Detected    Narrative:      Fact sheet for providers: https://www.fda.gov/media/925721/download    Fact sheet for patients: https://www.fda.gov/media/882855/download    Test performed by PCR.    Blood Culture - Blood, Arm, Left [[id number]]  (Normal) Collected: 01/02/24 1409    Lab Status: Final result Specimen: Blood from Arm, Left Updated: 01/07/24 1430     Blood Culture No growth at 5 days    Blood Culture - Blood, Arm, Right [[id number]]  (Normal) Collected: 01/02/24 1400    Lab Status: Final result Specimen: Blood from Arm, Right Updated: 01/07/24 1430     Blood Culture No growth at 5 days             acetylcysteine, 3 mL, Nebulization, 4x Daily - RT  amLODIPine, 5 mg, Oral, Q PM  atorvastatin, 10 mg, Oral, Nightly  buPROPion SR, 150 mg, Oral, Nightly  cefTRIAXone, 1,000 mg, Intravenous, Q24H  cetirizine, 10 mg, Oral, Daily  cholecalciferol, 50,000 Units, Oral, Weekly  clopidogrel, 75 mg, Oral, Daily  doxycycline, 100 mg, Intravenous, Q12H  empagliflozin, 25 mg, Oral, Daily  fluticasone, 2 spray, Nasal, Daily  furosemide, 40 mg, Intravenous, Once  insulin lispro, 2-9 Units, Subcutaneous, 4x Daily AC & at Bedtime  ipratropium-albuterol, 3 mL, Nebulization, 4x Daily - RT  lisinopril, 5 mg, Oral, Q PM  methylPREDNISolone sodium succinate, 20 mg, Intravenous, Q12H  metoprolol succinate XL, 25 mg, Oral, Q PM  montelukast, 10 mg, Oral, Nightly  senna-docusate sodium, 2 tablet, Oral, BID  sodium chloride, 10 mL, Intravenous, Q12H  sodium chloride, 10 mL, Intravenous, Q12H           Medication Review:        Latest Reference Range & Units 01/11/24 04:25   pH, Arterial 7.350 - 7.450 pH units 7.377   pCO2, Arterial 35.0 - 45.0 mm Hg 47.9 (H)   pO2, Arterial 83.0 - 108.0 mm Hg 65.3 (L)   HCO3, Arterial 20.0 - 26.0 mmol/L 28.1 (H)   Base Excess 0.0 - 2.0 mmol/L 2.2 (H)   O2 Saturation, Arterial 94.0 - 99.0 % 92.8 (L)   CO2 Content 22  - 33 mmol/L 29.6   A-a DO2 0.0 - 300.0 mmHg 492.6 (H)   Carboxyhemoglobin 0 - 5 % 0.9   Methemoglobin 0.00 - 3.00 % 0.80   Oxyhemoglobin 94 - 99 % 91.2 (L)   Hematocrit, Blood Gas 38.0 - 51.0 % 44.2   Hemoglobin, Blood Gas 13.5 - 17.5 g/dL 14.4   Site  Left Radial   Isaac's Test  Positive   Modality  Heated HFNC   FIO2 % 90   Flow Rate lpm 60.0   Ventilator Mode  NA   Barometric Pressure for Blood Gas mmHg 721   Collected by  [id number]   (H): Data is abnormally high  (L): Data is abnormally low  Site   Date Value Ref Range Status   01/12/2024 Lab  Final     Isaac's Test   Date Value Ref Range Status   01/11/2024 Positive  Final     pH, Arterial   Date Value Ref Range Status   01/11/2024 7.377 7.350 - 7.450 pH units Final     pCO2, Arterial   Date Value Ref Range Status   01/11/2024 47.9 (H) 35.0 - 45.0 mm Hg Final     pO2, Arterial   Date Value Ref Range Status   01/11/2024 65.3 (L) 83.0 - 108.0 mm Hg Final     Comment:     84 Value below reference range     HCO3, Arterial   Date Value Ref Range Status   01/11/2024 28.1 (H) 20.0 - 26.0 mmol/L Final     Comment:     83 Value above reference range     Base Excess, Arterial   Date Value Ref Range Status   01/11/2024 2.2 (H) 0.0 - 2.0 mmol/L Final     O2 Saturation, Arterial   Date Value Ref Range Status   01/11/2024 92.8 (L) 94.0 - 99.0 % Final     Comment:     84 Value below reference range     Hemoglobin, Blood Gas   Date Value Ref Range Status   01/12/2024 14.6 13.5 - 17.5 g/dL Final     Hematocrit, Blood Gas   Date Value Ref Range Status   01/11/2024 44.2 38.0 - 51.0 % Final     Oxyhemoglobin Venous   Date Value Ref Range Status   01/12/2024 82.3 (H) 45.0 - 75.0 % Final     Comment:     83 Value above reference range     Methemoglobin Venous   Date Value Ref Range Status   01/12/2024 0.7 0.0 - 3.0 % Final     Methemoglobin   Date Value Ref Range Status   01/11/2024 0.80 0.00 - 3.00 % Final     Carboxyhemoglobin   Date Value Ref Range Status   01/11/2024 0.9 0 - 5 %  Final     Carboxyhemoglobin Venous   Date Value Ref Range Status   01/12/2024 1.0 0.0 - 5.0 % Final     CO2 Content   Date Value Ref Range Status   01/12/2024 32.5 22 - 33 mmol/L Final     Barometric Pressure for Blood Gas   Date Value Ref Range Status   01/12/2024 722 mmHg Final     Modality   Date Value Ref Range Status   01/12/2024 Heated HFNC  Final     FIO2   Date Value Ref Range Status   01/12/2024 75 % Final       Assessment & Plan      acute hypoxic respiratory failure-could be due to mucous plugging of the left upper lobe.  CT chest - reviewed   White count better.  Latest microbiology reviewed.  BiPAP settings and graphics reviewed.  Minute ventilation was appropriate- reviewed     Currently on high flow nasal cannula-requiring very high amounts of flow and FiO2.  74 % 60 L flow     Will repeat Lasix to improve lung compliance and diffusion capacity.    Continue steroids.  Will taper from from tomorrow    Continue nebs  Continue oxygen to maintain saturation 88 to 92%.  All the labs medications ins and outs and vitals and patient's course were discussed with patient's daughter at bedside and answered her questions to her satisfaction.    Continue to monitor patient in the ICU.  Remains critically ill.    ABG from today morning reviewed and FiO2 adjusted accordingly    COPD-patient has been smoker for a long time.      Continue DuoNebs    Continue oxygen.  Titrated to maintain saturation 88 to 92%     Latest microbiology reviewed.    Continue current antibiotics  Will de-escalate as per cultures.  GI- PPI prophylaxis.  Medications reviewed   Endrocrinology- Maintain Blood sugar 140 -180  I have reviewed patient's labs and images.  LOWERED steroids. Currently patient is critically ill due to acute hypoxic respiratory failure due to mucous plugging.  I adjusted patient's HFNC settings.     Cc 31 mins        Augustin Stafford MD  01/12/24  09:03 EST

## 2024-01-12 NOTE — PLAN OF CARE
Problem: Adult Inpatient Plan of Care  Goal: Plan of Care Review  Outcome: Ongoing, Progressing  Goal: Patient-Specific Goal (Individualized)  Outcome: Ongoing, Progressing  Goal: Absence of Hospital-Acquired Illness or Injury  Outcome: Ongoing, Progressing  Intervention: Identify and Manage Fall Risk  Recent Flowsheet Documentation  Taken 1/12/2024 0400 by Kathi Rahman RN  Safety Promotion/Fall Prevention: safety round/check completed  Taken 1/12/2024 0319 by Kathi Rahman RN  Safety Promotion/Fall Prevention: safety round/check completed  Taken 1/12/2024 0200 by Kathi Rahman RN  Safety Promotion/Fall Prevention: safety round/check completed  Taken 1/12/2024 0104 by Kathi Rahman RN  Safety Promotion/Fall Prevention: safety round/check completed  Taken 1/12/2024 0040 by Kathi Rahman RN  Safety Promotion/Fall Prevention: safety round/check completed  Taken 1/11/2024 2353 by Kathi Rahman RN  Safety Promotion/Fall Prevention: safety round/check completed  Taken 1/11/2024 2244 by Kathi Rahman RN  Safety Promotion/Fall Prevention: safety round/check completed  Taken 1/11/2024 2112 by Kathi Rahman RN  Safety Promotion/Fall Prevention:   safety round/check completed   activity supervised  Taken 1/11/2024 2029 by Kathi Rahman RN  Safety Promotion/Fall Prevention: safety round/check completed  Taken 1/11/2024 1900 by Kathi Rahman RN  Safety Promotion/Fall Prevention: safety round/check completed  Intervention: Prevent Skin Injury  Recent Flowsheet Documentation  Taken 1/12/2024 0400 by Kathi Rahman RN  Body Position:   turned   left  Taken 1/12/2024 0200 by Kathi Rahman RN  Body Position:   turned   right  Skin Protection:   adhesive use limited   incontinence pads utilized   tubing/devices free from skin contact  Taken 1/12/2024 0040 by Kathi Rahman RN  Body Position:   turned   left  Taken 1/11/2024 2244 by Kathi Rahman RN  Body Position:   turned    Case Management Discharge Note      Final Note: Home     Provided post acute provider list?: Refused    Destination      No service has been selected for the patient.      Durable Medical Equipment      No service has been selected for the patient.      Dialysis/Infusion      No service has been selected for the patient.      Home Medical Care      No service has been selected for the patient.      Therapy      No service has been selected for the patient.      Community Resources      No service has been selected for the patient.        Transportation Services  Other: Other    Final Discharge Disposition Code: 01 - home or self-care   right  Taken 1/11/2024 2112 by Kathi Rahman RN  Skin Protection:   adhesive use limited   incontinence pads utilized   tubing/devices free from skin contact  Taken 1/11/2024 2029 by Kathi Rahman RN  Body Position:   turned   left  Intervention: Prevent and Manage VTE (Venous Thromboembolism) Risk  Recent Flowsheet Documentation  Taken 1/11/2024 2112 by Kathi Rahman RN  Activity Management: bedrest  VTE Prevention/Management: (pt requested)   bilateral   sequential compression devices off  Range of Motion: active ROM (range of motion) encouraged  Taken 1/11/2024 2029 by Kathi Rahman RN  Activity Management: bedrest  Goal: Optimal Comfort and Wellbeing  Outcome: Ongoing, Progressing  Intervention: Provide Person-Centered Care  Recent Flowsheet Documentation  Taken 1/12/2024 0200 by Kathi Rahman RN  Trust Relationship/Rapport:   care explained   choices provided   questions encouraged   questions answered  Taken 1/11/2024 2112 by Kathi Rahman RN  Trust Relationship/Rapport:   care explained   choices provided   questions answered   reassurance provided   thoughts/feelings acknowledged  Goal: Readiness for Transition of Care  Outcome: Ongoing, Progressing     Problem: Fluid Imbalance (Pneumonia)  Goal: Fluid Balance  Outcome: Ongoing, Progressing     Problem: Infection (Pneumonia)  Goal: Resolution of Infection Signs and Symptoms  Outcome: Ongoing, Progressing     Problem: Respiratory Compromise (Pneumonia)  Goal: Effective Oxygenation and Ventilation  Outcome: Ongoing, Progressing  Intervention: Promote Airway Secretion Clearance  Recent Flowsheet Documentation  Taken 1/11/2024 2112 by Kathi Rahman RN  Cough And Deep Breathing: done independently per patient  Intervention: Optimize Oxygenation and Ventilation  Recent Flowsheet Documentation  Taken 1/12/2024 0400 by Kathi Rahman RN  Head of Bed (HOB) Positioning: HOB at 20-30 degrees  Taken 1/12/2024 0200 by Kathi Rahman  RN  Head of Bed (HOB) Positioning: HOB at 20-30 degrees  Taken 1/12/2024 0040 by Kathi Rahman RN  Head of Bed (HOB) Positioning: HOB at 20-30 degrees  Taken 1/11/2024 2244 by Kathi Rahman RN  Head of Bed (HOB) Positioning: HOB at 30-45 degrees  Taken 1/11/2024 2029 by Kathi Rahman RN  Head of Bed (HOB) Positioning: HOB at 30-45 degrees     Problem: Fall Injury Risk  Goal: Absence of Fall and Fall-Related Injury  Outcome: Ongoing, Progressing  Intervention: Promote Injury-Free Environment  Recent Flowsheet Documentation  Taken 1/12/2024 0400 by Kathi Rahman RN  Safety Promotion/Fall Prevention: safety round/check completed  Taken 1/12/2024 0319 by Kathi Rahman RN  Safety Promotion/Fall Prevention: safety round/check completed  Taken 1/12/2024 0200 by Kathi Rahman RN  Safety Promotion/Fall Prevention: safety round/check completed  Taken 1/12/2024 0104 by Kathi Rahman RN  Safety Promotion/Fall Prevention: safety round/check completed  Taken 1/12/2024 0040 by Kathi Rahman RN  Safety Promotion/Fall Prevention: safety round/check completed  Taken 1/11/2024 2353 by Kathi Rahman RN  Safety Promotion/Fall Prevention: safety round/check completed  Taken 1/11/2024 2244 by Kathi Rahman RN  Safety Promotion/Fall Prevention: safety round/check completed  Taken 1/11/2024 2112 by Kathi Rahman RN  Safety Promotion/Fall Prevention:   safety round/check completed   activity supervised  Taken 1/11/2024 2029 by Kathi Rahman RN  Safety Promotion/Fall Prevention: safety round/check completed  Taken 1/11/2024 1900 by Kathi Rahman RN  Safety Promotion/Fall Prevention: safety round/check completed     Problem: Noninvasive Ventilation Acute  Goal: Effective Unassisted Ventilation and Oxygenation  Outcome: Ongoing, Progressing     Problem: Skin Injury Risk Increased  Goal: Skin Health and Integrity  Outcome: Ongoing, Progressing  Intervention: Optimize Skin Protection  Recent  Flowsheet Documentation  Taken 1/12/2024 0400 by Kathi Rahman RN  Head of Bed ([facility]) Positioning: HOB at 20-30 degrees  Taken 1/12/2024 0200 by Kathi Rahman RN  Pressure Reduction Techniques:   frequent weight shift encouraged   heels elevated off bed   pressure points protected   weight shift assistance provided   rest period provided between sit times  Head of Bed (HOB) Positioning: HOB at 20-30 degrees  Pressure Reduction Devices:   pressure-redistributing mattress utilized   positioning supports utilized   heel offloading device utilized  Skin Protection:   adhesive use limited   incontinence pads utilized   tubing/devices free from skin contact  Taken 1/12/2024 0040 by Kathi Rahman RN  Head of Bed ([facility]) Positioning: HOB at 20-30 degrees  Taken 1/11/2024 2244 by Kathi Rahman RN  Head of Bed ([facility]) Positioning: HOB at 30-45 degrees  Taken 1/11/2024 2112 by Kathi Rahman RN  Pressure Reduction Techniques:   frequent weight shift encouraged   heels elevated off bed   pressure points protected   rest period provided between sit times   weight shift assistance provided  Pressure Reduction Devices:   pressure-redistributing mattress utilized   positioning supports utilized   heel offloading device utilized  Skin Protection:   adhesive use limited   incontinence pads utilized   tubing/devices free from skin contact  Taken 1/11/2024 2029 by Kathi Rahman RN  Head of Bed ([facility]) Positioning: HOB at 30-45 degrees   Goal Outcome Evaluation:

## 2024-01-13 LAB
ANION GAP SERPL CALCULATED.3IONS-SCNC: 8.8 MMOL/L (ref 5–15)
ATMOSPHERIC PRESS: 717 MMHG
BASE EXCESS BLDV CALC-SCNC: 4.8 MMOL/L (ref 0–2)
BDY SITE: ABNORMAL
BUN SERPL-MCNC: 45 MG/DL (ref 8–23)
BUN/CREAT SERPL: 50 (ref 7–25)
CALCIUM SPEC-SCNC: 9.6 MG/DL (ref 8.6–10.5)
CHLORIDE SERPL-SCNC: 101 MMOL/L (ref 98–107)
CO2 BLDA-SCNC: 31.6 MMOL/L (ref 22–33)
CO2 SERPL-SCNC: 27.2 MMOL/L (ref 22–29)
COHGB MFR BLD: 1 % (ref 0–5)
CREAT SERPL-MCNC: 0.9 MG/DL (ref 0.57–1)
DEPRECATED RDW RBC AUTO: 46.7 FL (ref 37–54)
EGFRCR SERPLBLD CKD-EPI 2021: 66.4 ML/MIN/1.73
EPAP: 8
ERYTHROCYTE [DISTWIDTH] IN BLOOD BY AUTOMATED COUNT: 13.2 % (ref 12.3–15.4)
GLUCOSE BLDC GLUCOMTR-MCNC: 241 MG/DL (ref 70–130)
GLUCOSE BLDC GLUCOMTR-MCNC: 268 MG/DL (ref 70–130)
GLUCOSE BLDC GLUCOMTR-MCNC: 316 MG/DL (ref 70–130)
GLUCOSE BLDC GLUCOMTR-MCNC: 352 MG/DL (ref 70–130)
GLUCOSE SERPL-MCNC: 297 MG/DL (ref 65–99)
HCO3 BLDV-SCNC: 30.2 MMOL/L (ref 22–28)
HCT VFR BLD AUTO: 45.3 % (ref 34–46.6)
HGB BLD-MCNC: 14.3 G/DL (ref 12–15.9)
HGB BLDA-MCNC: 14.7 G/DL (ref 13.5–17.5)
INHALED O2 CONCENTRATION: 70 %
IPAP: 18
Lab: ABNORMAL
MAGNESIUM SERPL-MCNC: 2.3 MG/DL (ref 1.6–2.4)
MCH RBC QN AUTO: 30.1 PG (ref 26.6–33)
MCHC RBC AUTO-ENTMCNC: 31.6 G/DL (ref 31.5–35.7)
MCV RBC AUTO: 95.4 FL (ref 79–97)
METHGB BLD QL: 0.3 % (ref 0–3)
MODALITY: ABNORMAL
OXYHGB MFR BLDV: 85.5 % (ref 45–75)
PAW @ PEAK INSP FLOW SETTING VENT: 21 CMH2O
PCO2 BLDV: 46.4 MM HG (ref 41–51)
PH BLDV: 7.42 PH UNITS (ref 7.32–7.42)
PHOSPHATE SERPL-MCNC: 5.7 MG/DL (ref 2.5–4.5)
PLATELET # BLD AUTO: 177 10*3/MM3 (ref 140–450)
PMV BLD AUTO: 10.2 FL (ref 6–12)
PO2 BLDV: 53 MM HG (ref 27–53)
POTASSIUM SERPL-SCNC: 4.6 MMOL/L (ref 3.5–5.2)
PROCALCITONIN SERPL-MCNC: 0.03 NG/ML (ref 0–0.25)
RBC # BLD AUTO: 4.75 10*6/MM3 (ref 3.77–5.28)
SAO2 % BLDCOV: 86.6 % (ref 45–75)
SET MECH RESP RATE: 18
SODIUM SERPL-SCNC: 137 MMOL/L (ref 136–145)
TOTAL RATE: 26 BREATHS/MINUTE
VENTILATOR MODE: ABNORMAL
VT ON VENT VENT: 773 ML
WBC NRBC COR # BLD AUTO: 10.05 10*3/MM3 (ref 3.4–10.8)

## 2024-01-13 PROCEDURE — 82948 REAGENT STRIP/BLOOD GLUCOSE: CPT

## 2024-01-13 PROCEDURE — 94799 UNLISTED PULMONARY SVC/PX: CPT

## 2024-01-13 PROCEDURE — 25010000002 METHYLPREDNISOLONE PER 40 MG: Performed by: INTERNAL MEDICINE

## 2024-01-13 PROCEDURE — 25010000002 FUROSEMIDE PER 20 MG: Performed by: INTERNAL MEDICINE

## 2024-01-13 PROCEDURE — 83735 ASSAY OF MAGNESIUM: CPT | Performed by: INTERNAL MEDICINE

## 2024-01-13 PROCEDURE — 84145 PROCALCITONIN (PCT): CPT | Performed by: INTERNAL MEDICINE

## 2024-01-13 PROCEDURE — 85027 COMPLETE CBC AUTOMATED: CPT | Performed by: INTERNAL MEDICINE

## 2024-01-13 PROCEDURE — 82820 HEMOGLOBIN-OXYGEN AFFINITY: CPT

## 2024-01-13 PROCEDURE — 25010000002 CEFTRIAXONE PER 250 MG: Performed by: INTERNAL MEDICINE

## 2024-01-13 PROCEDURE — 63710000001 INSULIN LISPRO (HUMAN) PER 5 UNITS: Performed by: INTERNAL MEDICINE

## 2024-01-13 PROCEDURE — 80048 BASIC METABOLIC PNL TOTAL CA: CPT | Performed by: INTERNAL MEDICINE

## 2024-01-13 PROCEDURE — 99232 SBSQ HOSP IP/OBS MODERATE 35: CPT | Performed by: INTERNAL MEDICINE

## 2024-01-13 PROCEDURE — 94664 DEMO&/EVAL PT USE INHALER: CPT

## 2024-01-13 PROCEDURE — 94761 N-INVAS EAR/PLS OXIMETRY MLT: CPT

## 2024-01-13 PROCEDURE — 84100 ASSAY OF PHOSPHORUS: CPT | Performed by: INTERNAL MEDICINE

## 2024-01-13 PROCEDURE — 94668 MNPJ CHEST WALL SBSQ: CPT

## 2024-01-13 PROCEDURE — 82805 BLOOD GASES W/O2 SATURATION: CPT

## 2024-01-13 RX ORDER — POTASSIUM CHLORIDE 1.5 G/1.58G
40 POWDER, FOR SOLUTION ORAL 2 TIMES DAILY
Status: DISPENSED | OUTPATIENT
Start: 2024-01-13 | End: 2024-01-14

## 2024-01-13 RX ORDER — FUROSEMIDE 10 MG/ML
40 INJECTION INTRAMUSCULAR; INTRAVENOUS
Status: DISCONTINUED | OUTPATIENT
Start: 2024-01-13 | End: 2024-01-14

## 2024-01-13 RX ORDER — INSULIN LISPRO 100 [IU]/ML
2 INJECTION, SOLUTION INTRAVENOUS; SUBCUTANEOUS
Status: DISCONTINUED | OUTPATIENT
Start: 2024-01-14 | End: 2024-01-18 | Stop reason: HOSPADM

## 2024-01-13 RX ADMIN — METHYLPREDNISOLONE SODIUM SUCCINATE 20 MG: 40 INJECTION, POWDER, FOR SOLUTION INTRAMUSCULAR; INTRAVENOUS at 11:50

## 2024-01-13 RX ADMIN — EMPAGLIFLOZIN 25 MG: 10 TABLET, FILM COATED ORAL at 08:23

## 2024-01-13 RX ADMIN — CETIRIZINE HYDROCHLORIDE 10 MG: 10 TABLET, FILM COATED ORAL at 08:24

## 2024-01-13 RX ADMIN — GUAIFENESIN 600 MG: 600 TABLET, EXTENDED RELEASE ORAL at 08:23

## 2024-01-13 RX ADMIN — BUPROPION HYDROCHLORIDE 150 MG: 150 TABLET, EXTENDED RELEASE ORAL at 21:02

## 2024-01-13 RX ADMIN — DOXYCYCLINE 100 MG: 100 INJECTION, POWDER, LYOPHILIZED, FOR SOLUTION INTRAVENOUS at 08:24

## 2024-01-13 RX ADMIN — INSULIN LISPRO 4 UNITS: 100 INJECTION, SOLUTION INTRAVENOUS; SUBCUTANEOUS at 06:42

## 2024-01-13 RX ADMIN — Medication 10 ML: at 08:24

## 2024-01-13 RX ADMIN — METHYLPREDNISOLONE SODIUM SUCCINATE 20 MG: 40 INJECTION, POWDER, FOR SOLUTION INTRAMUSCULAR; INTRAVENOUS at 23:21

## 2024-01-13 RX ADMIN — INSULIN LISPRO 7 UNITS: 100 INJECTION, SOLUTION INTRAVENOUS; SUBCUTANEOUS at 21:13

## 2024-01-13 RX ADMIN — ACETYLCYSTEINE 3 ML: 200 SOLUTION ORAL; RESPIRATORY (INHALATION) at 00:49

## 2024-01-13 RX ADMIN — DOXYCYCLINE 100 MG: 100 INJECTION, POWDER, LYOPHILIZED, FOR SOLUTION INTRAVENOUS at 21:02

## 2024-01-13 RX ADMIN — ACETYLCYSTEINE 3 ML: 200 SOLUTION ORAL; RESPIRATORY (INHALATION) at 06:53

## 2024-01-13 RX ADMIN — IPRATROPIUM BROMIDE AND ALBUTEROL SULFATE 3 ML: 2.5; .5 SOLUTION RESPIRATORY (INHALATION) at 06:53

## 2024-01-13 RX ADMIN — FLUTICASONE PROPIONATE 2 SPRAY: 50 SPRAY, METERED NASAL at 08:24

## 2024-01-13 RX ADMIN — ACETYLCYSTEINE 3 ML: 200 SOLUTION ORAL; RESPIRATORY (INHALATION) at 19:14

## 2024-01-13 RX ADMIN — ATORVASTATIN CALCIUM 10 MG: 10 TABLET, FILM COATED ORAL at 21:02

## 2024-01-13 RX ADMIN — Medication 10 ML: at 21:03

## 2024-01-13 RX ADMIN — INSULIN LISPRO 8 UNITS: 100 INJECTION, SOLUTION INTRAVENOUS; SUBCUTANEOUS at 17:42

## 2024-01-13 RX ADMIN — CLOPIDOGREL BISULFATE 75 MG: 75 TABLET, FILM COATED ORAL at 08:23

## 2024-01-13 RX ADMIN — FUROSEMIDE 40 MG: 10 INJECTION, SOLUTION INTRAMUSCULAR; INTRAVENOUS at 09:44

## 2024-01-13 RX ADMIN — METHYLPREDNISOLONE SODIUM SUCCINATE 20 MG: 40 INJECTION, POWDER, FOR SOLUTION INTRAMUSCULAR; INTRAVENOUS at 00:52

## 2024-01-13 RX ADMIN — CEFTRIAXONE 1000 MG: 1 INJECTION, POWDER, FOR SOLUTION INTRAMUSCULAR; INTRAVENOUS at 14:29

## 2024-01-13 RX ADMIN — GUAIFENESIN 600 MG: 600 TABLET, EXTENDED RELEASE ORAL at 21:02

## 2024-01-13 RX ADMIN — POTASSIUM CHLORIDE 40 MEQ: 1.5 POWDER, FOR SOLUTION ORAL at 09:44

## 2024-01-13 RX ADMIN — IPRATROPIUM BROMIDE AND ALBUTEROL SULFATE 3 ML: 2.5; .5 SOLUTION RESPIRATORY (INHALATION) at 00:49

## 2024-01-13 RX ADMIN — LISINOPRIL 5 MG: 2.5 TABLET ORAL at 17:22

## 2024-01-13 RX ADMIN — METOPROLOL SUCCINATE 25 MG: 25 TABLET, EXTENDED RELEASE ORAL at 17:22

## 2024-01-13 RX ADMIN — INSULIN LISPRO 6 UNITS: 100 INJECTION, SOLUTION INTRAVENOUS; SUBCUTANEOUS at 11:50

## 2024-01-13 RX ADMIN — ACETYLCYSTEINE 3 ML: 200 SOLUTION ORAL; RESPIRATORY (INHALATION) at 13:09

## 2024-01-13 RX ADMIN — FUROSEMIDE 40 MG: 10 INJECTION, SOLUTION INTRAMUSCULAR; INTRAVENOUS at 17:22

## 2024-01-13 RX ADMIN — IPRATROPIUM BROMIDE AND ALBUTEROL SULFATE 3 ML: 2.5; .5 SOLUTION RESPIRATORY (INHALATION) at 13:09

## 2024-01-13 RX ADMIN — MONTELUKAST SODIUM 10 MG: 10 TABLET, COATED ORAL at 21:02

## 2024-01-13 RX ADMIN — AMLODIPINE BESYLATE 5 MG: 5 TABLET ORAL at 17:22

## 2024-01-13 RX ADMIN — IPRATROPIUM BROMIDE AND ALBUTEROL SULFATE 3 ML: 2.5; .5 SOLUTION RESPIRATORY (INHALATION) at 19:14

## 2024-01-13 NOTE — PROGRESS NOTES
[facility] HOSPITALIST PROGRESS NOTE     Patient Identification:  Name:  Evie Shah  Age:  78 y.o.  Sex:  female  :  1945  MRN:  [id number]  Visit Number:  [id number]  ROOM: [address]     Primary Care Provider:  Camryn Mota PA     Date of Admission: 2024    Length of stay in inpatient status:  10    Subjective     Chief Compliant:    Chief Complaint   Patient presents with    Shortness of Breath    Fever    Weakness - Generalized     Pt has been sob for a weak and has been sick with a cough.     Cough     History of Presenting Illness: The patient remains on heated high flow but it is slightly lower than yesterday.  The patient was standing up, finishing her bath, and then transitioning to a seated position at the bedside chair.  Patient states that she feels better and is asking when she can go home.  She is coughing some but not as much as previously and she is not producing sputum.  She denies chest pain, nausea, vomiting, and diarrhea.    Objective     Current Hospital Meds:  acetylcysteine, 3 mL, Nebulization, 4x Daily - RT  amLODIPine, 5 mg, Oral, Q PM  atorvastatin, 10 mg, Oral, Nightly  buPROPion SR, 150 mg, Oral, Nightly  cefTRIAXone, 1,000 mg, Intravenous, Q24H  cetirizine, 10 mg, Oral, Daily  cholecalciferol, 50,000 Units, Oral, Weekly  clopidogrel, 75 mg, Oral, Daily  doxycycline, 100 mg, Intravenous, Q12H  empagliflozin, 25 mg, Oral, Daily  fluticasone, 2 spray, Nasal, Daily  guaiFENesin, 600 mg, Oral, Q12H  insulin lispro, 2-9 Units, Subcutaneous, 4x Daily AC & at Bedtime  ipratropium-albuterol, 3 mL, Nebulization, 4x Daily - RT  lisinopril, 5 mg, Oral, Q PM  methylPREDNISolone sodium succinate, 20 mg, Intravenous, Q12H  metoprolol succinate XL, 25 mg, Oral, Q PM  montelukast, 10 mg, Oral, Nightly  senna-docusate sodium, 2 tablet, Oral, BID  sodium chloride, 10 mL, Intravenous, Q12H  sodium chloride, 10 mL, Intravenous, Q12H       Current Antimicrobial  Therapy:  Anti-Infectives (From admission, onward)      Ordered     Dose/Rate Route Frequency Start Stop    01/08/24 2038  doxycycline (VIBRAMYCIN) 100 mg in sodium chloride 0.9 % 100 mL IVPB-VTB        Ordering Provider: Vandana Lee MD    100 mg Intravenous Every 12 Hours 01/08/24 2100 01/15/24 2059 01/02/24 1935  cefTRIAXone (ROCEPHIN) 1,000 mg in sodium chloride 0.9 % 100 mL IVPB-VTB        Ordering Provider: Vandana Lee MD    1,000 mg  200 mL/hr over 30 Minutes Intravenous Every 24 Hours 01/03/24 1500 01/15/24 2036    01/02/24 1935  doxycycline (VIBRAMYCIN) 100 mg in sodium chloride 0.9 % 100 mL IVPB-VTB        Ordering Provider: Juan Jade MD    100 mg Intravenous Every 12 Hours 01/03/24 0600 01/07/24 1714    01/02/24 1448  cefTRIAXone (ROCEPHIN) 2,000 mg in sodium chloride 0.9 % 100 mL IVPB-VTB        Ordering Provider: Jonathan Wyatt PA    2,000 mg  200 mL/hr over 30 Minutes Intravenous Once 01/02/24 1504 01/02/24 1610    01/02/24 1448  doxycycline (VIBRAMYCIN) 100 mg in sodium chloride 0.9 % 100 mL IVPB-VTB        Ordering Provider: Jonathan Wyatt PA    100 mg  over 60 Minutes Intravenous Once 01/02/24 1504 01/02/24 1710          Current Diuretic Therapy:  Diuretics (From admission, onward)      Ordered     Dose/Rate Route Frequency Start Stop    01/12/24 0903  furosemide (LASIX) injection 40 mg        Ordering Provider: Augustin Stafford MD    40 mg Intravenous Once 01/12/24 0915 01/12/24 1008    01/11/24 1355  furosemide (LASIX) injection 40 mg        Ordering Provider: Augustin Stafford MD    40 mg Intravenous Once 01/11/24 1445 01/11/24 1430    01/09/24 2300  furosemide (LASIX) injection 40 mg        Ordering Provider: Anaid Woodward DO    40 mg Intravenous Once 01/10/24 0000 01/10/24 0052    01/09/24 1358  furosemide (LASIX) injection 20 mg        Ordering Provider: Augustin Stafford MD    20 mg Intravenous Once 01/09/24 1600 01/09/24 1618    01/09/24 110   furosemide (LASIX) injection 60 mg        Ordering Provider: Augustin Stafford MD    60 mg Intravenous Once 01/09/24 1200 01/09/24 1307          ----------------------------------------------------------------------------------------------------------------------  Vital Signs:  Temp:  [97.8 °F (36.6 °C)-99 °F (37.2 °C)] 98.7 °F (37.1 °C)  Heart Rate:  [59-77] 68  Resp:  [12-28] 20  BP: (101-144)/(51-99) 120/80  SpO2:  [87 %-100 %] 92 %  on  Flow (L/min):  [60] 60;   Device (Oxygen Therapy): heated;high-flow nasal cannula  Body mass index is 37.15 kg/m².    Wt Readings from Last 3 Encounters:   01/12/24 95.1 kg (209 lb 10.5 oz)   01/02/24 90.3 kg (199 lb)   12/26/23 92.1 kg (203 lb)     Intake & Output (last 3 days)         01/10 0701 01/11 0700 01/11 0701 01/12 0700 01/12 0701 01/13 0700    P.O. 240 220 486    I.V. (mL/kg) 101.6 (1) 40 (0.4)     IV Piggyback 200 300 200    Total Intake(mL/kg) 541.6 (5.5) 560 (5.9) 686 (7.2)    Urine (mL/kg/hr) 650 (0.3) 1900 (0.8) 2750 (2)    Emesis/NG output  0     Stool  0 0    Total Output 650 1900 2750    Net -108.4 -1340 -2064           Urine Unmeasured Occurrence 1 x 2 x 1 x    Stool Unmeasured Occurrence  0 x 1 x    Emesis Unmeasured Occurrence  0 x           Diet: Diabetic Diets, Cardiac Diets; Healthy Heart (2-3 Na+); Consistent Carbohydrate; Texture: Regular Texture (IDDSI 7); Fluid Consistency: Thin (IDDSI 0)  ----------------------------------------------------------------------------------------------------------------------  Physical Exam  Sitting up in a bedside chair.  She is in very little acute respiratory distress.  She is wearing the heated high flow nasal cannula.  She is alert and oriented x 3.  Her lungs have improved air movement throughout.  I do not hear any wheezing.  She still has some crackles bilaterally but they are much fading to her.  She does not have any leg  edema.  ----------------------------------------------------------------------------------------------------------------------  Tele: Normal sinus rhythm heart rates 50s to 70s.  Personally reviewed the telemetry strips.  ----------------------------------------------------------------------------------------------------------------------  LABS:    CBC and coagulation:  Results from last 7 days   Lab Units 01/12/24 0423 01/11/24  0201 01/10/24  0046 01/09/24  0136 01/08/24  0137   PROCALCITONIN ng/mL  --   --   --  0.04  --    LACTATE mmol/L  --  1.0  --  1.2  --    CRP mg/dL  --  2.82*  --  3.63*  --    WBC 10*3/mm3 9.81 11.05* 9.39 13.94* 9.62   HEMOGLOBIN g/dL 14.2 13.8 14.1 13.2 13.6   HEMATOCRIT % 44.2 44.4 45.1 41.9 43.1   MCV fL 94.0 97.4* 95.3 96.5 95.8   MCHC g/dL 32.1 31.1* 31.3* 31.5 31.6   PLATELETS 10*3/mm3 197 182 178 160 146   INR  1.02 1.02  --   --  0.94     Acid/base balance:  Results from last 7 days   Lab Units 01/11/24  0425 01/10/24  0408 01/09/24  1943 01/09/24  1102   PH, ARTERIAL pH units 7.383  7.377 7.389 7.391 7.382   PCO2, ARTERIAL mm Hg 47.2*  47.9* 50.4* 44.9 44.7   PO2 ART mm Hg 81.6*  65.3* 76.0* 74.7* 59.0*   HCO3 ART mmol/L 28.1*  28.1* 30.4* 27.2* 26.6*     Renal and electrolytes:  Results from last 7 days   Lab Units 01/12/24  0423 01/11/24  0201 01/10/24  0046 01/09/24  0136 01/08/24  0137   SODIUM mmol/L 139 136 141 136 140   POTASSIUM mmol/L 4.6 4.5 4.4 3.8 3.8   MAGNESIUM mg/dL 2.1 2.2 2.0 1.8  --    CHLORIDE mmol/L 100 101 104 105 108*   CO2 mmol/L 30.7* 30.0* 28.7 23.9 22.8   BUN mg/dL 30* 24* 20 12 18   CREATININE mg/dL 0.91 0.73 0.92 0.64 0.65   CALCIUM mg/dL 9.5 9.4 9.5 8.2* 8.4*   PHOSPHORUS mg/dL 5.5* 3.8 2.6 2.7  --    GLUCOSE mg/dL 229* 174* 266* 164* 167*   ANION GAP mmol/L 8.3 5.0 8.3 7.1 9.2     Estimated Creatinine Clearance: 57.7 mL/min (by C-G formula based on SCr of 0.91 mg/dL).    Liver and pancreatic function:  Results from last 7 days   Lab Units  01/12/24  0423 01/11/24  0201 01/10/24  0046 01/09/24  0136   ALBUMIN g/dL 3.6 3.5 3.7 3.0*   BILIRUBIN mg/dL 0.4 0.3 0.3 0.6   ALK PHOS U/L 76 112 95 71   AST (SGOT) U/L 11 14 19 32   ALT (SGPT) U/L 26 33 49* 57*     Endocrine function:  Lab Results   Component Value Date    HGBA1C 7.70 (H) 01/08/2024     Point of care bedside glucose levels:  Results from last 7 days   Lab Units 01/12/24  1617 01/12/24  1050 01/12/24  0628 01/11/24  2107 01/11/24  1620 01/11/24  1150 01/11/24  0740 01/10/24  2013 01/10/24  1632 01/10/24  1102 01/10/24  0624 01/09/24  2136 01/09/24  1639 01/09/24  1143   GLUCOSE mg/dL 247* 233* 243* 256* 218* 205* 171* 212* 258* 294* 259* 356* 256* 164*     Glucose levels from the [facility]:  Results from last 7 days   Lab Units 01/12/24  0423 01/11/24  0201 01/10/24  0046 01/09/24  0136 01/08/24  0137   GLUCOSE mg/dL 229* 174* 266* 164* 167*     Cultures:  Microbiology Results (last 10 days)       Procedure Component Value - Date/Time    Respiratory Culture - Lavage, Lung, Left Lower Lobe [[id number]] Collected: 01/10/24 1455    Lab Status: Final result Specimen: Lavage from Lung, Left Lower Lobe Updated: 01/12/24 1057     Respiratory Culture Scant growth (1+) Normal respiratory [place]. No S. aureus or Pseudomonas aeruginosa detected. Final report.     Gram Stain Few (2+) WBCs seen      Rare (1+) Epithelial cells seen      Rare (1+) Mixed bacterial morphotypes seen on Gram Stain    Respiratory Culture - Wash, Lung, Lingula [[id number]] Collected: 01/10/24 1455    Lab Status: Final result Specimen: Wash from Lung, Lingula Updated: 01/12/24 1043     Respiratory Culture Light growth (2+) Normal respiratory [place]. No S. aureus or Pseudomonas aeruginosa detected. Final report.     Gram Stain Few (2+) WBCs seen      Rare (1+) Epithelial cells seen      Rare (1+) Mixed bacterial morphotypes seen on Gram Stain    Respiratory Culture - Lavage, Lung, Right Lower Lobe [[id number]] Collected: 01/10/24 1455    Lab  Status: Final result Specimen: Lavage from Lung, Right Lower Lobe Updated: 01/12/24 1056     Respiratory Culture Scant growth (1+) Normal respiratory [place]. No S. aureus or Pseudomonas aeruginosa detected. Final report.     Gram Stain Moderate (3+) WBCs seen      Rare (1+) Epithelial cells seen      Few (2+) Mixed bacterial morphotypes seen on Gram Stain    Respiratory Culture - Lavage, Lung, Right Upper Lobe [[id number]] Collected: 01/10/24 1455    Lab Status: Final result Specimen: Lavage from Lung, Right Upper Lobe Updated: 01/12/24 1056     Respiratory Culture Rare Normal respiratory [place]. No S. aureus or Pseudomonas aeruginosa detected. Final report.     Gram Stain Few (2+) WBCs seen      No epithelial cells seen      Rare (1+) Mixed bacterial morphotypes seen on Gram Stain    Respiratory Panel PCR w/COVID-19(SARS-CoV-2) RAIN/[facility]ING/LILLIANA/PAD/[facility] In-House, NP Swab in UTM/VTM, 2 HR TAT - Swab, Nasopharynx [[id number]]  (Normal) Collected: 01/10/24 0435    Lab Status: Final result Specimen: Swab from Nasopharynx Updated: 01/10/24 0529     ADENOVIRUS, PCR Not Detected     Coronavirus 229E Not Detected     Coronavirus HKU1 Not Detected     Coronavirus NL63 Not Detected     Coronavirus OC43 Not Detected     COVID19 Not Detected     Human Metapneumovirus Not Detected     Human Rhinovirus/Enterovirus Not Detected     Influenza A PCR Not Detected     Influenza B PCR Not Detected     Parainfluenza Virus 1 Not Detected     Parainfluenza Virus 2 Not Detected     Parainfluenza Virus 3 Not Detected     Parainfluenza Virus 4 Not Detected     RSV, PCR Not Detected     Bordetella pertussis pcr Not Detected     Bordetella parapertussis PCR Not Detected     Chlamydophila pneumoniae PCR Not Detected     Mycoplasma pneumo by PCR Not Detected    Narrative:      In the setting of a positive respiratory panel with a viral infection PLUS a negative procalcitonin without other underlying concern for bacterial infection, consider observing  off antibiotics or discontinuation of antibiotics and continue supportive care. If the respiratory panel is positive for atypical bacterial infection (Bordetella pertussis, Chlamydophila pneumoniae, or Mycoplasma pneumoniae), consider antibiotic de-escalation to target atypical bacterial infection.    Fungus Culture - Wash, Bronchus [[id number]] Collected: 01/08/24 0804    Lab Status: Preliminary result Specimen: Wash from Bronchus Updated: 01/11/24 1112     Fungus Stain Final report     KOH/Calcofluor preparation Comment     Comment: KOH/Calcofluor preparation:  no fungus observed.       Narrative:      Performed at:  [address] [place] [address]  [id number]  : Isidoro Eastman PhD, Phone:  [phone]    AFB Culture - Wash, Bronchus [[id number]] Collected: 01/08/24 0804    Lab Status: Preliminary result Specimen: Wash from Bronchus Updated: 01/09/24 1709     AFB Specimen Processing Concentration     Acid Fast Smear Negative    Narrative:      Performed at:  [address] [place] [address]  [id number]  : Isidoro Eastman PhD, Phone:  [phone]    Respiratory Culture - Wash, Bronchus [[id number]] Collected: 01/08/24 0804    Lab Status: Final result Specimen: Wash from Bronchus Updated: 01/10/24 1123     Respiratory Culture Light growth (2+) Normal respiratory [place]. No S. aureus or Pseudomonas aeruginosa detected. Final report.     Gram Stain Moderate (3+) WBCs seen      Rare (1+) Mixed bacterial morphotypes seen on Gram Stain    Fungus Culture - Lavage, Lung, Left Upper Lobe [[id number]] Collected: 01/08/24 0802    Lab Status: Preliminary result Specimen: Lavage from Lung, Left Upper Lobe Updated: 01/11/24 1112     Fungus Stain Final report     KOH/Calcofluor preparation Comment     Comment: KOH/Calcofluor preparation:  no fungus observed.       Narrative:      Performed at:  [address] [place] [address]  [id number]  :  Isidoro Eastman PhD, Phone:  [phone]    AFB Culture - Lavage, Lung, Left Upper Lobe [[id number]] Collected: 01/08/24 0802    Lab Status: Preliminary result Specimen: Lavage from Lung, Left Upper Lobe Updated: 01/09/24 1709     AFB Specimen Processing Concentration     Acid Fast Smear Negative    Narrative:      Performed at:   - [address]  [id number]  : Isidoro Eastman PhD, Phone:  [phone]    BAL Culture, Quantitative - Lavage, Lung, Left Upper Lobe [[id number]] Collected: 01/08/24 0802    Lab Status: Final result Specimen: Lavage from Lung, Left Upper Lobe Updated: 01/10/24 1123     BAL Culture No growth     Gram Stain WBCs seen      No organisms seen          I have personally looked at the labs and they are summarized above.  ----------------------------------------------------------------------------------------------------------------------  Detailed radiology reports for the last 24 hours:    Imaging Results (Last 24 Hours)       Procedure Component Value Units Date/Time    XR Chest 1 View [[id number]] Collected: 01/12/24 1313     Updated: 01/12/24 1315    Narrative:      EXAM:    XR Chest, 1 View     EXAM DATE:    1/12/2024 12:12 PM     CLINICAL HISTORY:    sob; R09.02-Hypoxemia; J44.1-Chronic obstructive pulmonary disease  with (acute) exacerbation; J96.01-Acute respiratory failure with  hypoxia; R91.8-Other nonspecific abnormal finding of lung field     TECHNIQUE:    Frontal view of the chest.     COMPARISON:    January 10, 2020     FINDINGS:    LUNGS AND PLEURAL SPACES:  Minimal left lower lobe atelectasis.  No  pneumothorax.    HEART:  Mild cardiomegaly again noted.    MEDIASTINUM:  Unremarkable as visualized.    BONES/JOINTS:  Unremarkable as visualized.       Impression:        Mild cardiomegaly again noted.        This report was finalized on 1/12/2024 1:13 PM by Dr. Abhilash Mcnair MD.             I have personally looked at the radiology images and  I have read the available final report.    Assessment & Plan      -Acute hypoxic respiratory failure that was present on admission secondary to left upper lobe atelectasis from a large mucous plug with suspected superimposed left upper lobe bacterial pneumonia unspecified   -Worsening acute hypoxic respiratory failure on 1/9/2024, with CT scan showing new atelectasis of the left lower lobe and possibly the right upper lobe from suspected mucous plugs  -EBUS performed 1/8/2024 with an incidental finding of an enlarged lymph node at station 10 L, with both bronchial washings and fine-needle aspiration showing no malignant cells  -History of peripheral arterial disease status post left external iliac stent placement  -History of COPD, without a suspected acute exacerbation on admission but as of 1/9/2020 for a mild to moderate exacerbation is suspected   -History of type 2 diabetes mellitus  -History of essential hypertension    Patient will finish the Rocephin and doxycycline on 1/15/2024.  I have discussed patient with Dr. Stafford and he is going to start decreasing steroids.  Her glucose levels are elevated but they are stable.  With the decrease in the steroids, the glucose level should start decreasing.  We will continue to monitor the glucose levels closely.  We will continue to wean the oxygen for saturations of 88-92% or above.  Dr. Stafford is concerned that it will take a long time to wean the patient off the oxygen and thus he has written for a [facility] consult.  Please note that the blood pressures are stable and at goal and thus we will continue to monitor these.  We will repeat the blood work in the morning.    VTE Prophylaxis:  Mechanical Order History:        Ordered        01/09/24 2255  Place Sequential Compression Device  Once            01/09/24 2255  Maintain Sequential Compression Device  Continuous                          Pharmalogical Order History:        Ordered     Dose Route  Frequency Stop    01/09/24 1945  Enoxaparin Sodium (LOVENOX) syringe 90 mg  Status:  Discontinued         1 mg/kg SC Every 12 Hours 01/09/24 2255    01/09/24 1941  Pharmacy to Dose enoxaparin (LOVENOX)  Status:  Discontinued         -- XX Continuous PRN 01/09/24 1946 01/08/24 1508  heparin (porcine) 5000 UNIT/ML injection 5,000 Units  Status:  Discontinued         5,000 Units SC Every 12 Hours Scheduled 01/09/24 1942 01/03/24 1122  Enoxaparin Sodium (LOVENOX) syringe 90 mg  Status:  Discontinued         1 mg/kg SC Every 12 Hours 01/08/24 1508    01/03/24 1112  Pharmacy to Dose enoxaparin (LOVENOX)  Status:  Discontinued         -- XX Continuous PRN 01/04/24 1313    01/02/24 1935  Enoxaparin Sodium (LOVENOX) syringe 40 mg  Status:  Discontinued         40 mg SC Nightly 01/03/24 1122                  The patient is high risk due to the following diagnoses/reasons: Acute hypoxic respiratory failure with left upper lobe atelectasis and pneumonia     Disposition: Undetermined, depending on PT and OT evaluations once her respiratory status stabilizes    Vandana Lee MD  [facility]  01/12/24  21:33 EST

## 2024-01-13 NOTE — PLAN OF CARE
Problem: Fall Injury Risk  Goal: Absence of Fall and Fall-Related Injury  Outcome: Ongoing, Progressing  Intervention: Identify and Manage Contributors  Recent Flowsheet Documentation  Taken 1/12/2024 2027 by Kathi Rahman RN  Medication Review/Management: medications reviewed  Intervention: Promote Injury-Free Environment  Recent Flowsheet Documentation  Taken 1/13/2024 0407 by Kathi Rahman RN  Safety Promotion/Fall Prevention: safety round/check completed  Taken 1/13/2024 0322 by Kathi Rahman RN  Safety Promotion/Fall Prevention: safety round/check completed  Taken 1/13/2024 0242 by Kathi Rahman RN  Safety Promotion/Fall Prevention: safety round/check completed  Taken 1/13/2024 0100 by Kathi Rahman RN  Safety Promotion/Fall Prevention: safety round/check completed  Taken 1/13/2024 0043 by Kathi Rahman RN  Safety Promotion/Fall Prevention: safety round/check completed  Taken 1/12/2024 2314 by Kathi Rahman RN  Safety Promotion/Fall Prevention: safety round/check completed  Taken 1/12/2024 2208 by Kathi Rahman RN  Safety Promotion/Fall Prevention: safety round/check completed  Taken 1/12/2024 2130 by Kathi Rahman RN  Safety Promotion/Fall Prevention: safety round/check completed  Taken 1/12/2024 2027 by Kathi Rahman RN  Safety Promotion/Fall Prevention: safety round/check completed  Taken 1/12/2024 1918 by Kathi Rahman RN  Safety Promotion/Fall Prevention: safety round/check completed     Problem: Noninvasive Ventilation Acute  Goal: Effective Unassisted Ventilation and Oxygenation  Outcome: Ongoing, Progressing   Goal Outcome Evaluation:

## 2024-01-13 NOTE — PROGRESS NOTES
Progress Note Pulmonary and critical care       Subjective   All the labs medications and the notes and vitals reviewed.  Patient resting in bed comfortably.  FiO2 requirement reviewed and remains on the higher side.  Ins and outs net negative.  Review of Systems:    Generalized tiredness otherwise negative    Vital Signs  Temp:  [97.8 °F (36.6 °C)-98.7 °F (37.1 °C)] 97.9 °F (36.6 °C)  Heart Rate:  [61-77] 67  Resp:  [12-28] 20  BP: (100-143)/(60-99) 117/80  Body mass index is 35.61 kg/m².    Intake/Output Summary (Last 24 hours) at 1/13/2024 0846  Last data filed at 1/13/2024 0824  Gross per 24 hour   Intake 522 ml   Output 3450 ml   Net -2928 ml     I/O this shift:  In: 100 [IV Piggyback:100]  Out: -     Physical Exam:   General- obese in appearance, on HFNC    HEENT- PERLLA    Neck- supple    No JVD, no carotid bruit    Respiratory-on high flow nasal cannula oxygen not in any respiratory distress      Cardiovascular-  NSR    GI-NT ND    CNS-alert oriented x3, grossly nonfocal    Extremities- pulses normal bilaterally , no clubbing and edema        Results Review:      Results from last 7 days   Lab Units 01/13/24  0431 01/12/24  0423 01/11/24  0201   WBC 10*3/mm3 10.05 9.81 11.05*   HEMOGLOBIN g/dL 14.3 14.2 13.8   PLATELETS 10*3/mm3 177 197 182     Results from last 7 days   Lab Units 01/13/24  0431 01/12/24  0423 01/11/24  0201   SODIUM mmol/L 137 139 136   POTASSIUM mmol/L 4.6 4.6 4.5   CHLORIDE mmol/L 101 100 101   CO2 mmol/L 27.2 30.7* 30.0*   BUN mg/dL 45* 30* 24*   CREATININE mg/dL 0.90 0.91 0.73   CALCIUM mg/dL 9.6 9.5 9.4   GLUCOSE mg/dL 297* 229* 174*   MAGNESIUM mg/dL 2.3 2.1 2.2     Lab Results   Component Value Date    INR 1.02 01/12/2024    INR 1.02 01/11/2024    INR 0.94 01/08/2024    PROTIME 13.9 01/12/2024    PROTIME 13.9 01/11/2024    PROTIME 13.1 01/08/2024     Results from last 7 days   Lab Units 01/12/24  0423 01/11/24  0201 01/10/24  0046   ALK PHOS U/L 76 112 95   BILIRUBIN mg/dL 0.4 0.3  0.3   ALT (SGPT) U/L 26 33 49*   AST (SGOT) U/L 11 14 19     Results from last 7 days   Lab Units 01/11/24  0425   PH, ARTERIAL pH units 7.383  7.377   PO2 ART mm Hg 81.6*  65.3*   PCO2, ARTERIAL mm Hg 47.2*  47.9*   HCO3 ART mmol/L 28.1*  28.1*     Imaging Results (Last 24 Hours)       Procedure Component Value Units Date/Time    XR Chest 1 View [[id number]] Collected: 01/12/24 1313     Updated: 01/12/24 1315    Narrative:      EXAM:    XR Chest, 1 View     EXAM DATE:    1/12/2024 12:12 PM     CLINICAL HISTORY:    sob; R09.02-Hypoxemia; J44.1-Chronic obstructive pulmonary disease  with (acute) exacerbation; J96.01-Acute respiratory failure with  hypoxia; R91.8-Other nonspecific abnormal finding of lung field     TECHNIQUE:    Frontal view of the chest.     COMPARISON:    January 10, 2020     FINDINGS:    LUNGS AND PLEURAL SPACES:  Minimal left lower lobe atelectasis.  No  pneumothorax.    HEART:  Mild cardiomegaly again noted.    MEDIASTINUM:  Unremarkable as visualized.    BONES/JOINTS:  Unremarkable as visualized.       Impression:        Mild cardiomegaly again noted.        This report was finalized on 1/12/2024 1:13 PM by Dr. Abhilash Mcnair MD.                Microbiology Results (last 10 days)       Procedure Component Value - Date/Time    Respiratory Culture - Lavage, Lung, Left Lower Lobe [[id number]] Collected: 01/10/24 1455    Lab Status: Final result Specimen: Lavage from Lung, Left Lower Lobe Updated: 01/12/24 1057     Respiratory Culture Scant growth (1+) Normal respiratory [place]. No S. aureus or Pseudomonas aeruginosa detected. Final report.     Gram Stain Few (2+) WBCs seen      Rare (1+) Epithelial cells seen      Rare (1+) Mixed bacterial morphotypes seen on Gram Stain    Respiratory Culture - Wash, Lung, Lingula [[id number]] Collected: 01/10/24 1455    Lab Status: Final result Specimen: Wash from Lung, [place] Updated: 01/12/24 1043     Respiratory Culture Light growth (2+) Normal respiratory  [place]. No S. aureus or Pseudomonas aeruginosa detected. Final report.     Gram Stain Few (2+) WBCs seen      Rare (1+) Epithelial cells seen      Rare (1+) Mixed bacterial morphotypes seen on Gram Stain    Respiratory Culture - Lavage, Lung, Right Lower Lobe [[id number]] Collected: 01/10/24 1455    Lab Status: Final result Specimen: Lavage from Lung, Right Lower Lobe Updated: 01/12/24 1056     Respiratory Culture Scant growth (1+) Normal respiratory [place]. No S. aureus or Pseudomonas aeruginosa detected. Final report.     Gram Stain Moderate (3+) WBCs seen      Rare (1+) Epithelial cells seen      Few (2+) Mixed bacterial morphotypes seen on Gram Stain    Respiratory Culture - Lavage, Lung, Right Upper Lobe [[id number]] Collected: 01/10/24 1455    Lab Status: Final result Specimen: Lavage from Lung, Right Upper Lobe Updated: 01/12/24 1056     Respiratory Culture Rare Normal respiratory [place]. No S. aureus or Pseudomonas aeruginosa detected. Final report.     Gram Stain Few (2+) WBCs seen      No epithelial cells seen      Rare (1+) Mixed bacterial morphotypes seen on Gram Stain    Respiratory Panel PCR w/COVID-19(SARS-CoV-2) [facility] In-House, NP Swab in UTM/VTM, 2 HR TAT - Swab, Nasopharynx [[id number]]  (Normal) Collected: 01/10/24 0435    Lab Status: Final result Specimen: Swab from Nasopharynx Updated: 01/10/24 0529     ADENOVIRUS, PCR Not Detected     Coronavirus 229E Not Detected     Coronavirus HKU1 Not Detected     Coronavirus NL63 Not Detected     Coronavirus OC43 Not Detected     COVID19 Not Detected     Human Metapneumovirus Not Detected     Human Rhinovirus/Enterovirus Not Detected     Influenza A PCR Not Detected     Influenza B PCR Not Detected     Parainfluenza Virus 1 Not Detected     Parainfluenza Virus 2 Not Detected     Parainfluenza Virus 3 Not Detected     Parainfluenza Virus 4 Not Detected     RSV, PCR Not Detected     Bordetella pertussis pcr Not Detected     Bordetella  parapertussis PCR Not Detected     Chlamydophila pneumoniae PCR Not Detected     Mycoplasma pneumo by PCR Not Detected    Narrative:      In the setting of a positive respiratory panel with a viral infection PLUS a negative procalcitonin without other underlying concern for bacterial infection, consider observing off antibiotics or discontinuation of antibiotics and continue supportive care. If the respiratory panel is positive for atypical bacterial infection (Bordetella pertussis, Chlamydophila pneumoniae, or Mycoplasma pneumoniae), consider antibiotic de-escalation to target atypical bacterial infection.    Fungus Culture - Wash, Bronchus [[id number]] Collected: 01/08/24 0804    Lab Status: Preliminary result Specimen: Wash from Bronchus Updated: 01/11/24 1112     Fungus Stain Final report     KOH/Calcofluor preparation Comment     Comment: KOH/Calcofluor preparation:  no fungus observed.       Narrative:      Performed at:  [address] [place] [address]  [id number]  : Isidoro Eastman PhD, Phone:  [phone]    AFB Culture - Wash, Bronchus [[id number]] Collected: 01/08/24 0804    Lab Status: Preliminary result Specimen: Wash from Bronchus Updated: 01/09/24 1709     AFB Specimen Processing Concentration     Acid Fast Smear Negative    Narrative:      Performed at:  [address] [place] [address]  [id number]  : Isidoro Eastman PhD, Phone:  [phone]    Respiratory Culture - Wash, Bronchus [[id number]] Collected: 01/08/24 0804    Lab Status: Final result Specimen: Wash from Bronchus Updated: 01/10/24 1123     Respiratory Culture Light growth (2+) Normal respiratory [place]. No S. aureus or Pseudomonas aeruginosa detected. Final report.     Gram Stain Moderate (3+) WBCs seen      Rare (1+) Mixed bacterial morphotypes seen on Gram Stain    Fungus Culture - Lavage, Lung, Left Upper Lobe [[id number]] Collected: 01/08/24 0802    Lab Status: Preliminary result  Specimen: Lavage from Lung, Left Upper Lobe Updated: 01/11/24 1112     Fungus Stain Final report     KOH/Calcofluor preparation Comment     Comment: KOH/Calcofluor preparation:  no fungus observed.       Narrative:      Performed at:  [address]  [id number]  : Isidoro Eastman PhD, Phone:  [phone]    AFB Culture - Lavage, Lung, Left Upper Lobe [[id number]] Collected: 01/08/24 0802    Lab Status: Preliminary result Specimen: Lavage from Lung, Left Upper Lobe Updated: 01/09/24 1709     AFB Specimen Processing Concentration     Acid Fast Smear Negative    Narrative:      Performed at:  [address]  [id number]  : Isidoro Eastman PhD, Phone:  [phone]    BAL Culture, Quantitative - Lavage, Lung, Left Upper Lobe [[id number]] Collected: 01/08/24 0802    Lab Status: Final result Specimen: Lavage from Lung, Left Upper Lobe Updated: 01/10/24 1123     BAL Culture No growth     Gram Stain WBCs seen      No organisms seen             acetylcysteine, 3 mL, Nebulization, 4x Daily - RT  amLODIPine, 5 mg, Oral, Q PM  atorvastatin, 10 mg, Oral, Nightly  buPROPion SR, 150 mg, Oral, Nightly  cefTRIAXone, 1,000 mg, Intravenous, Q24H  cetirizine, 10 mg, Oral, Daily  cholecalciferol, 50,000 Units, Oral, Weekly  clopidogrel, 75 mg, Oral, Daily  doxycycline, 100 mg, Intravenous, Q12H  empagliflozin, 25 mg, Oral, Daily  fluticasone, 2 spray, Nasal, Daily  guaiFENesin, 600 mg, Oral, Q12H  insulin lispro, 2-9 Units, Subcutaneous, 4x Daily AC & at Bedtime  ipratropium-albuterol, 3 mL, Nebulization, 4x Daily - RT  lisinopril, 5 mg, Oral, Q PM  methylPREDNISolone sodium succinate, 20 mg, Intravenous, Q12H  metoprolol succinate XL, 25 mg, Oral, Q PM  montelukast, 10 mg, Oral, Nightly  senna-docusate sodium, 2 tablet, Oral, BID  sodium chloride, 10 mL, Intravenous, Q12H  sodium chloride, 10 mL, Intravenous, Q12H           Medication Review:       Latest Reference Range & Units 01/13/24 04:33   CO2 Content 22 - 33 mmol/L 31.6   Carboxyhemoglobin Venous 0.0 - 5.0 % 1.0   Methemoglobin Venous 0.0 - 3.0 % 0.3   Oxyhemoglobin Venous 45.0 - 75.0 % 85.5 (H)   Hemoglobin, Blood Gas 13.5 - 17.5 g/dL 14.7   Site  Lab   Modality  Room Air   FIO2 % 70   Ventilator Mode  BiPAP   Set Tidal Volume  773.00   Set Mech Resp Rate  18.0   Rate Breaths/minute 26   PIP cmH2O 21   IPAP  18   EPAP  8   Barometric Pressure for Blood Gas mmHg 717   pH, Venous 7.320 - 7.420 pH Units 7.422 (H)   pCO2, Venous 41.0 - 51.0 mm Hg 46.4   pO2, Venous 27.0 - 53.0 mm Hg 53.0   HCO3, Venous 22.0 - 28.0 mmol/L 30.2 (H)   Base Excess 0.0 - 2.0 mmol/L 4.8 (H)   O2 Saturation, Venous 45.0 - 75.0 % 86.6 (H)   Collected by  [id number]   (H): Data is abnormally high    Assessment & Plan      acute hypoxic respiratory failure-could be due to mucous plugging of the left upper lobe.  CT chest - reviewed   White count reviewed and stable.  Latest microbiology reviewed.  BiPAP settings and graphics reviewed.  Minute ventilation was appropriate- reviewed   Use BiPAP overnight.  Encouraged her to use it in the daytime on as needed basis.    Currently on high flow nasal cannula-requiring very high amounts of flow and FiO2.  70 % 60 L flow     Will repeat Lasix to improve lung compliance and diffusion capacity.    Continue steroids-dose adjusted  Ins and outs reviewed.  Net negative.    Continue nebs  Continue oxygen to maintain saturation 88 to 92%.  All the labs medications ins and outs and vitals and patient's course were discussed with patient's daughter at bedside and answered her questions to her satisfaction.    Remains critically ill with respiratory failure    ABG from today morning reviewed and FiO2 adjusted accordingly    COPD-patient has been smoker for a long time.    Adjusted to maintain saturation 88 to 92%.  Continue DuoNebs    Continue oxygen.  Titrated to maintain saturation 88 to 92%     Latest  microbiology reviewed.    Continue current antibiotics  Will de-escalate as per cultures.  GI- PPI prophylaxis.  Medications reviewed   Endrocrinology- Maintain Blood sugar 140 -180  I have reviewed patient's labs and images.  LOWERED steroids. Currently patient is critically ill due to acute hypoxic respiratory failure due to mucous plugging.  I adjusted patient's HFNC settings.   Cc 51 mins  Case d/w nurse and team.  This service is provided via audio video tele medicine   I am in [facility] [facility] and patient is in [facility]          Augustin Stafford MD  01/13/24  08:46 EST

## 2024-01-13 NOTE — PROGRESS NOTES
[facility] HOSPITALIST PROGRESS NOTE     Patient Identification:  Name:  Evie Shah  Age:  78 y.o.  Sex:  female  :  1945  MRN:  [id number]  Visit Number:  [id number]  ROOM: [address]     Primary Care Provider:  Camryn Mota PA     Date of Admission: 2024    Length of stay in inpatient status:  11    Subjective     Chief Compliant:    Chief Complaint   Patient presents with    Shortness of Breath    Fever    Weakness - Generalized     Pt has been sob for a weak and has been sick with a cough.     Cough     History of Presenting Illness:  The patient remains on heated high flow and she is still getting up and down to sit in the chair.  She states that she feels better again today.  She denies chest pain, shortness of air.  She is coughing but not producing sputum.  She denies any edema.    Objective     Current Hospital Meds:  acetylcysteine, 3 mL, Nebulization, 4x Daily - RT  amLODIPine, 5 mg, Oral, Q PM  atorvastatin, 10 mg, Oral, Nightly  buPROPion SR, 150 mg, Oral, Nightly  cefTRIAXone, 1,000 mg, Intravenous, Q24H  cetirizine, 10 mg, Oral, Daily  cholecalciferol, 50,000 Units, Oral, Weekly  clopidogrel, 75 mg, Oral, Daily  doxycycline, 100 mg, Intravenous, Q12H  empagliflozin, 25 mg, Oral, Daily  fluticasone, 2 spray, Nasal, Daily  furosemide, 40 mg, Intravenous, BID  guaiFENesin, 600 mg, Oral, Q12H  insulin lispro, 2-9 Units, Subcutaneous, 4x Daily AC & at Bedtime  ipratropium-albuterol, 3 mL, Nebulization, 4x Daily - RT  lisinopril, 5 mg, Oral, Q PM  methylPREDNISolone sodium succinate, 20 mg, Intravenous, Q12H  metoprolol succinate XL, 25 mg, Oral, Q PM  montelukast, 10 mg, Oral, Nightly  potassium chloride, 40 mEq, Oral, BID  senna-docusate sodium, 2 tablet, Oral, BID  sodium chloride, 10 mL, Intravenous, Q12H  sodium chloride, 10 mL, Intravenous, Q12H       Current Antimicrobial Therapy:  Anti-Infectives (From admission, onward)      Ordered     Dose/Rate Route  Frequency Start Stop    01/08/24 2038  doxycycline (VIBRAMYCIN) 100 mg in sodium chloride 0.9 % 100 mL IVPB-VTB        Ordering Provider: Vandana Lee MD    100 mg Intravenous Every 12 Hours 01/08/24 2100 01/15/24 2059 01/02/24 1935  cefTRIAXone (ROCEPHIN) 1,000 mg in sodium chloride 0.9 % 100 mL IVPB-VTB        Ordering Provider: Vandana Lee MD    1,000 mg  200 mL/hr over 30 Minutes Intravenous Every 24 Hours 01/03/24 1500 01/15/24 2036    01/02/24 1935  doxycycline (VIBRAMYCIN) 100 mg in sodium chloride 0.9 % 100 mL IVPB-VTB        Ordering Provider: Juan Jade MD    100 mg Intravenous Every 12 Hours 01/03/24 0600 01/07/24 1714    01/02/24 1448  cefTRIAXone (ROCEPHIN) 2,000 mg in sodium chloride 0.9 % 100 mL IVPB-VTB        Ordering Provider: Jonathan Wyatt PA    2,000 mg  200 mL/hr over 30 Minutes Intravenous Once 01/02/24 1504 01/02/24 1610    01/02/24 1448  doxycycline (VIBRAMYCIN) 100 mg in sodium chloride 0.9 % 100 mL IVPB-VTB        Ordering Provider: Jonathan Wyatt PA    100 mg  over 60 Minutes Intravenous Once 01/02/24 1504 01/02/24 1710          Current Diuretic Therapy:  Diuretics (From admission, onward)      Ordered     Dose/Rate Route Frequency Start Stop    01/13/24 0849  furosemide (LASIX) injection 40 mg        Ordering Provider: Augustin Stafford MD    40 mg Intravenous 2 Times Daily (Diuretics) 01/13/24 0945      01/12/24 0903  furosemide (LASIX) injection 40 mg        Ordering Provider: Augustin Stafford MD    40 mg Intravenous Once 01/12/24 0915 01/12/24 1008    01/11/24 1355  furosemide (LASIX) injection 40 mg        Ordering Provider: Augustin Stafford MD    40 mg Intravenous Once 01/11/24 1445 01/11/24 1430    01/09/24 2300  furosemide (LASIX) injection 40 mg        Ordering Provider: Anaid Woodward DO    40 mg Intravenous Once 01/10/24 0000 01/10/24 0052    01/09/24 1358  furosemide (LASIX) injection 20 mg        Ordering Provider: Augustin Stafford MD     20 mg Intravenous Once 01/09/24 1600 01/09/24 1618    01/09/24 1105  furosemide (LASIX) injection 60 mg        Ordering Provider: Augustin Stafford MD    60 mg Intravenous Once 01/09/24 1200 01/09/24 1307          ----------------------------------------------------------------------------------------------------------------------  Vital Signs:  Temp:  [97.8 °F (36.6 °C)-98.7 °F (37.1 °C)] 97.9 °F (36.6 °C)  Heart Rate:  [61-77] 68  Resp:  [16-37] 17  BP: (100-145)/(60-96) 130/83  SpO2:  [88 %-100 %] 94 %  on  Flow (L/min):  [60] 60;   Device (Oxygen Therapy): heated;high-flow nasal cannula  Body mass index is 35.61 kg/m².    Wt Readings from Last 3 Encounters:   01/13/24 91.2 kg (201 lb)   01/02/24 90.3 kg (199 lb)   12/26/23 92.1 kg (203 lb)     Intake & Output (last 3 days)         01/10 0701  01/11 0700 01/11 0701  01/12 0700 01/12 0701  01/13 0700 01/13 0701  01/14 0700    P.O. 240 220 486 760    I.V. (mL/kg) 101.6 (1) 40 (0.4)      IV Piggyback 200 300 300 100    Total Intake(mL/kg) 541.6 (5.5) 560 (5.9) 786 (8.6) 860 (9.4)    Urine (mL/kg/hr) 650 (0.3) 1900 (0.8) 3450 (1.6) 2500 (3.8)    Emesis/NG output  0 0     Stool  0 0 0    Total Output 650 1900 3450 2500    Net -108.2 -4728 -8578 -4725            Urine Unmeasured Occurrence 1 x 2 x 1 x 1 x    Stool Unmeasured Occurrence  0 x 2 x 2 x    Emesis Unmeasured Occurrence  0 x 0 x           Diet: Diabetic Diets, Cardiac Diets; Healthy Heart (2-3 Na+); Consistent Carbohydrate; Texture: Regular Texture (IDDSI 7); Fluid Consistency: Thin (IDDSI 0)  ----------------------------------------------------------------------------------------------------------------------  Physical Exam  She had just finished sitting up and she was getting into bed when I saw her.  She is in very little acute respiratory distress again today.  She is wearing the heated high flow nasal cannula.  She is alert and oriented x 3.  Her lungs have good air movement throughout without wheezing  and crackles.  She does not have any leg edema.   ----------------------------------------------------------------------------------------------------------------------  Tele:  NS with heart rates 60-70's.  I have personally reviewed/looked at the telemetry strips.   ----------------------------------------------------------------------------------------------------------------------  LABS:    CBC and coagulation:  Results from last 7 days   Lab Units 01/13/24  0431 01/12/24  0423 01/11/24  0201 01/10/24  0046 01/09/24  0136 01/08/24  0137   PROCALCITONIN ng/mL 0.03  --   --   --  0.04  --    LACTATE mmol/L  --   --  1.0  --  1.2  --    CRP mg/dL  --   --  2.82*  --  3.63*  --    WBC 10*3/mm3 10.05 9.81 11.05* 9.39 13.94* 9.62   HEMOGLOBIN g/dL 14.3 14.2 13.8 14.1 13.2 13.6   HEMATOCRIT % 45.3 44.2 44.4 45.1 41.9 43.1   MCV fL 95.4 94.0 97.4* 95.3 96.5 95.8   MCHC g/dL 31.6 32.1 31.1* 31.3* 31.5 31.6   PLATELETS 10*3/mm3 177 197 182 178 160 146   INR   --  1.02 1.02  --   --  0.94     Acid/base balance:  Results from last 7 days   Lab Units 01/11/24  0425 01/10/24  0408 01/09/24  1943 01/09/24  1102   PH, ARTERIAL pH units 7.383  7.377 7.389 7.391 7.382   PCO2, ARTERIAL mm Hg 47.2*  47.9* 50.4* 44.9 44.7   PO2 ART mm Hg 81.6*  65.3* 76.0* 74.7* 59.0*   HCO3 ART mmol/L 28.1*  28.1* 30.4* 27.2* 26.6*      Latest Reference Range & Units 01/13/24 04:33   pH, Venous 7.320 - 7.420 pH Units 7.422 (H)   pCO2, Venous 41.0 - 51.0 mm Hg 46.4   pO2, Venous 27.0 - 53.0 mm Hg 53.0   HCO3, Venous 22.0 - 28.0 mmol/L 30.2 (H)   Base Excess 0.0 - 2.0 mmol/L 4.8 (H)   O2 Saturation, Venous 45.0 - 75.0 % 86.6 (H)     Renal and electrolytes:  Results from last 7 days   Lab Units 01/13/24  0431 01/12/24  0423 01/11/24  0201 01/10/24  0046 01/09/24  0136 01/08/24  0137   SODIUM mmol/L 137 139 136 141 136 140   POTASSIUM mmol/L 4.6 4.6 4.5 4.4 3.8 3.8   MAGNESIUM mg/dL 2.3 2.1 2.2 2.0 1.8  --    CHLORIDE mmol/L 101 100 101 104 105 108*    CO2 mmol/L 27.2 30.7* 30.0* 28.7 23.9 22.8   BUN mg/dL 45* 30* 24* 20 12 18   CREATININE mg/dL 0.90 0.91 0.73 0.92 0.64 0.65   CALCIUM mg/dL 9.6 9.5 9.4 9.5 8.2* 8.4*   PHOSPHORUS mg/dL 5.7* 5.5* 3.8 2.6 2.7  --    GLUCOSE mg/dL 297* 229* 174* 266* 164* 167*   ANION GAP mmol/L 8.8 8.3 5.0 8.3 7.1 9.2     Estimated Creatinine Clearance: 57 mL/min (by C-G formula based on SCr of 0.9 mg/dL).    Liver and pancreatic function:  Results from last 7 days   Lab Units 01/12/24  0423 01/11/24  0201 01/10/24  0046 01/09/24  0136   ALBUMIN g/dL 3.6 3.5 3.7 3.0*   BILIRUBIN mg/dL 0.4 0.3 0.3 0.6   ALK PHOS U/L 76 112 95 71   AST (SGOT) U/L 11 14 19 32   ALT (SGPT) U/L 26 33 49* 57*     Endocrine function:  Lab Results   Component Value Date    HGBA1C 7.70 (H) 01/08/2024     Point of care bedside glucose levels:  Results from last 7 days   Lab Units 01/13/24  1145 01/13/24  0640 01/12/24  2134 01/12/24  1617 01/12/24  1050 01/12/24  0628 01/11/24  2107 01/11/24  1620 01/11/24  1150 01/11/24  0740 01/10/24  2013 01/10/24  1632 01/10/24  1102 01/10/24  0624   GLUCOSE mg/dL 268* 241* 205* 247* 233* 243* 256* 218* 205* 171* 212* 258* 294* 259*     Glucose levels from the [facility]:  Results from last 7 days   Lab Units 01/13/24  0431 01/12/24  0423 01/11/24  0201 01/10/24  0046 01/09/24  0136 01/08/24  0137   GLUCOSE mg/dL 297* 229* 174* 266* 164* 167*     Lab Results   Component Value Date    TSH 3.260 01/08/2024     Cultures:  Microbiology Results (last 10 days)       Procedure Component Value - Date/Time    Respiratory Culture - Lavage, Lung, Left Lower Lobe [[id number]] Collected: 01/10/24 1455    Lab Status: Final result Specimen: Lavage from Lung, Left Lower Lobe Updated: 01/12/24 1057     Respiratory Culture Scant growth (1+) Normal respiratory [place]. No S. aureus or Pseudomonas aeruginosa detected. Final report.     Gram Stain Few (2+) WBCs seen      Rare (1+) Epithelial cells seen      Rare (1+) Mixed bacterial morphotypes seen  on Gram Stain    Respiratory Culture - Wash, Lung, Lingula [[id number]] Collected: 01/10/24 1455    Lab Status: Final result Specimen: Wash from Lung, Lingula Updated: 01/12/24 1043     Respiratory Culture Light growth (2+) Normal respiratory [place]. No S. aureus or Pseudomonas aeruginosa detected. Final report.     Gram Stain Few (2+) WBCs seen      Rare (1+) Epithelial cells seen      Rare (1+) Mixed bacterial morphotypes seen on Gram Stain    Respiratory Culture - Lavage, Lung, Right Lower Lobe [[id number]] Collected: 01/10/24 1455    Lab Status: Final result Specimen: Lavage from Lung, Right Lower Lobe Updated: 01/12/24 1056     Respiratory Culture Scant growth (1+) Normal respiratory [place]. No S. aureus or Pseudomonas aeruginosa detected. Final report.     Gram Stain Moderate (3+) WBCs seen      Rare (1+) Epithelial cells seen      Few (2+) Mixed bacterial morphotypes seen on Gram Stain    Respiratory Culture - Lavage, Lung, Right Upper Lobe [[id number]] Collected: 01/10/24 1455    Lab Status: Final result Specimen: Lavage from Lung, Right Upper Lobe Updated: 01/12/24 1056     Respiratory Culture Rare Normal respiratory [place]. No S. aureus or Pseudomonas aeruginosa detected. Final report.     Gram Stain Few (2+) WBCs seen      No epithelial cells seen      Rare (1+) Mixed bacterial morphotypes seen on Gram Stain    Fungus Culture - Wash, Bronchus [[id number]] Collected: 01/08/24 0804    Lab Status: Preliminary result Specimen: Wash from Bronchus Updated: 01/11/24 1112     Fungus Stain Final report     KOH/Calcofluor preparation Comment     Comment: KOH/Calcofluor preparation:  no fungus observed.       AFB Culture - Wash, Bronchus [[id number]] Collected: 01/08/24 0804    Lab Status: Preliminary result Specimen: Wash from Bronchus Updated: 01/09/24 1709     AFB Specimen Processing Concentration     Acid Fast Smear Negative    Respiratory Culture - Wash, Bronchus [[id number]] Collected: 01/08/24 0804    Lab Status:  Final result Specimen: Wash from Bronchus Updated: 01/10/24 1123     Respiratory Culture Light growth (2+) Normal respiratory [place]. No S. aureus or Pseudomonas aeruginosa detected. Final report.     Gram Stain Moderate (3+) WBCs seen      Rare (1+) Mixed bacterial morphotypes seen on Gram Stain    Fungus Culture - Lavage, Lung, Left Upper Lobe [[id number]] Collected: 01/08/24 0802    Lab Status: Preliminary result Specimen: Lavage from Lung, Left Upper Lobe Updated: 01/11/24 1112     Fungus Stain Final report     KOH/Calcofluor preparation Comment     Comment: KOH/Calcofluor preparation:  no fungus observed.       AFB Culture - Lavage, Lung, Left Upper Lobe [[id number]] Collected: 01/08/24 0802    Lab Status: Preliminary result Specimen: Lavage from Lung, Left Upper Lobe Updated: 01/09/24 1709     AFB Specimen Processing Concentration     Acid Fast Smear Negative    BAL Culture, Quantitative - Lavage, Lung, Left Upper Lobe [[id number]] Collected: 01/08/24 0802    Lab Status: Final result Specimen: Lavage from Lung, Left Upper Lobe Updated: 01/10/24 1123     BAL Culture No growth     Gram Stain WBCs seen      No organisms seen          I have personally looked at the labs and they are summarized above.    Assessment & Plan      -Acute hypoxic respiratory failure that was present on admission secondary to left upper lobe atelectasis from a large mucous plug with suspected superimposed left upper lobe bacterial pneumonia unspecified   -Worsening acute hypoxic respiratory failure on 1/9/2024, with CT scan showing new atelectasis of the left lower lobe and possibly the right upper lobe from suspected mucous plugs  -EBUS performed 1/8/2024 with an incidental finding of an enlarged lymph node at station 10 L, with both bronchial washings and fine-needle aspiration showing no malignant cells  -History of peripheral arterial disease status post left external iliac stent placement  -History of COPD, without a suspected acute  exacerbation on admission but as of 1/9/2020 for a mild to moderate exacerbation is suspected   -History of type 2 diabetes mellitus  -History of essential hypertension    Will continue to wean the oxygen for saturations of 88% or above.  Dr. Stafford continues to wean off the steroids.  The blood pressures are at goal and stable and thus will continue the half dose of the Norvasc.  The glucose levels are elevated, which might indicate that the patient is feeling well enough to eat more.  Thus, will add 2 units of Humalog with meals and add a sliding scale Humalog.  Will repeat the labs in the morning.  Will move to the PCU as she is improving.  Will encourage her to move around as much as possible.    VTE Prophylaxis:  Mechanical Order History:        Ordered        01/09/24 2255  Place Sequential Compression Device  Once            01/09/24 2255  Maintain Sequential Compression Device  Continuous                          Pharmalogical Order History:        Ordered     Dose Route Frequency Stop    01/09/24 1945  Enoxaparin Sodium (LOVENOX) syringe 90 mg  Status:  Discontinued         1 mg/kg SC Every 12 Hours 01/09/24 2255 01/09/24 1941  Pharmacy to Dose enoxaparin (LOVENOX)  Status:  Discontinued         -- XX Continuous PRN 01/09/24 1946    01/08/24 1508  heparin (porcine) 5000 UNIT/ML injection 5,000 Units  Status:  Discontinued         5,000 Units SC Every 12 Hours Scheduled 01/09/24 1942    01/03/24 1122  Enoxaparin Sodium (LOVENOX) syringe 90 mg  Status:  Discontinued         1 mg/kg SC Every 12 Hours 01/08/24 1508    01/03/24 1112  Pharmacy to Dose enoxaparin (LOVENOX)  Status:  Discontinued         -- XX Continuous PRN 01/04/24 1313    01/02/24 1935  Enoxaparin Sodium (LOVENOX) syringe 40 mg  Status:  Discontinued         40 mg SC Nightly 01/03/24 1122                  The patient is high risk due to the following diagnoses/reasons: Acute hypoxic respiratory failure with left upper lobe atelectasis and  pneumonia     Disposition: Undetermined, depending on PT and OT evaluations once her respiratory status stabilizes    Vandana Lee MD  [facility] Hospitalist  01/13/24  14:09 EST

## 2024-01-13 NOTE — PLAN OF CARE
Goal Outcome Evaluation:  Plan of Care Reviewed With: patient        Progress: improving  VSS. AxOx4. Ambulated in room this shift. Remains on HHF NC, tolerating well. Adequate UOP following lasix admin this shift. No complaints at this time, no acute distress noted. Call bell within reach, bed alarm set. Care ongoing.

## 2024-01-14 LAB
ALBUMIN SERPL-MCNC: 3.6 G/DL (ref 3.5–5.2)
ALBUMIN/GLOB SERPL: 1.3 G/DL
ALP SERPL-CCNC: 98 U/L (ref 39–117)
ALT SERPL W P-5'-P-CCNC: 21 U/L (ref 1–33)
ANION GAP SERPL CALCULATED.3IONS-SCNC: 13 MMOL/L (ref 5–15)
AST SERPL-CCNC: 10 U/L (ref 1–32)
BASOPHILS # BLD AUTO: 0.04 10*3/MM3 (ref 0–0.2)
BASOPHILS NFR BLD AUTO: 0.3 % (ref 0–1.5)
BILIRUB SERPL-MCNC: 0.3 MG/DL (ref 0–1.2)
BUN SERPL-MCNC: 46 MG/DL (ref 8–23)
BUN/CREAT SERPL: 51.1 (ref 7–25)
CALCIUM SPEC-SCNC: 9.6 MG/DL (ref 8.6–10.5)
CHLORIDE SERPL-SCNC: 97 MMOL/L (ref 98–107)
CO2 SERPL-SCNC: 26 MMOL/L (ref 22–29)
CREAT SERPL-MCNC: 0.9 MG/DL (ref 0.57–1)
DEPRECATED RDW RBC AUTO: 45.6 FL (ref 37–54)
EGFRCR SERPLBLD CKD-EPI 2021: 66.4 ML/MIN/1.73
EOSINOPHIL # BLD AUTO: 0.02 10*3/MM3 (ref 0–0.4)
EOSINOPHIL NFR BLD AUTO: 0.1 % (ref 0.3–6.2)
ERYTHROCYTE [DISTWIDTH] IN BLOOD BY AUTOMATED COUNT: 13.2 % (ref 12.3–15.4)
GLOBULIN UR ELPH-MCNC: 2.7 GM/DL
GLUCOSE BLDC GLUCOMTR-MCNC: 167 MG/DL (ref 70–130)
GLUCOSE BLDC GLUCOMTR-MCNC: 187 MG/DL (ref 70–130)
GLUCOSE BLDC GLUCOMTR-MCNC: 211 MG/DL (ref 70–130)
GLUCOSE BLDC GLUCOMTR-MCNC: 258 MG/DL (ref 70–130)
GLUCOSE SERPL-MCNC: 227 MG/DL (ref 65–99)
HCT VFR BLD AUTO: 48.3 % (ref 34–46.6)
HGB BLD-MCNC: 15.5 G/DL (ref 12–15.9)
IMM GRANULOCYTES # BLD AUTO: 0.33 10*3/MM3 (ref 0–0.05)
IMM GRANULOCYTES NFR BLD AUTO: 2.4 % (ref 0–0.5)
LYMPHOCYTES # BLD AUTO: 1.44 10*3/MM3 (ref 0.7–3.1)
LYMPHOCYTES NFR BLD AUTO: 10.3 % (ref 19.6–45.3)
MAGNESIUM SERPL-MCNC: 2.3 MG/DL (ref 1.6–2.4)
MCH RBC QN AUTO: 30.2 PG (ref 26.6–33)
MCHC RBC AUTO-ENTMCNC: 32.1 G/DL (ref 31.5–35.7)
MCV RBC AUTO: 94.2 FL (ref 79–97)
MONOCYTES # BLD AUTO: 0.81 10*3/MM3 (ref 0.1–0.9)
MONOCYTES NFR BLD AUTO: 5.8 % (ref 5–12)
NEUTROPHILS NFR BLD AUTO: 11.32 10*3/MM3 (ref 1.7–7)
NEUTROPHILS NFR BLD AUTO: 81.1 % (ref 42.7–76)
NRBC BLD AUTO-RTO: 0 /100 WBC (ref 0–0.2)
PHOSPHATE SERPL-MCNC: 3.8 MG/DL (ref 2.5–4.5)
PLATELET # BLD AUTO: 202 10*3/MM3 (ref 140–450)
PMV BLD AUTO: 10.1 FL (ref 6–12)
POTASSIUM SERPL-SCNC: 4.1 MMOL/L (ref 3.5–5.2)
PROT SERPL-MCNC: 6.3 G/DL (ref 6–8.5)
RBC # BLD AUTO: 5.13 10*6/MM3 (ref 3.77–5.28)
SODIUM SERPL-SCNC: 136 MMOL/L (ref 136–145)
WBC NRBC COR # BLD AUTO: 13.96 10*3/MM3 (ref 3.4–10.8)

## 2024-01-14 PROCEDURE — 94799 UNLISTED PULMONARY SVC/PX: CPT

## 2024-01-14 PROCEDURE — 25010000002 CEFTRIAXONE PER 250 MG: Performed by: INTERNAL MEDICINE

## 2024-01-14 PROCEDURE — 84100 ASSAY OF PHOSPHORUS: CPT | Performed by: INTERNAL MEDICINE

## 2024-01-14 PROCEDURE — 94664 DEMO&/EVAL PT USE INHALER: CPT

## 2024-01-14 PROCEDURE — 25010000002 FUROSEMIDE PER 20 MG: Performed by: INTERNAL MEDICINE

## 2024-01-14 PROCEDURE — 94660 CPAP INITIATION&MGMT: CPT

## 2024-01-14 PROCEDURE — 85025 COMPLETE CBC W/AUTO DIFF WBC: CPT | Performed by: INTERNAL MEDICINE

## 2024-01-14 PROCEDURE — 99232 SBSQ HOSP IP/OBS MODERATE 35: CPT | Performed by: INTERNAL MEDICINE

## 2024-01-14 PROCEDURE — 82948 REAGENT STRIP/BLOOD GLUCOSE: CPT

## 2024-01-14 PROCEDURE — 94761 N-INVAS EAR/PLS OXIMETRY MLT: CPT

## 2024-01-14 PROCEDURE — 80053 COMPREHEN METABOLIC PANEL: CPT | Performed by: INTERNAL MEDICINE

## 2024-01-14 PROCEDURE — 83735 ASSAY OF MAGNESIUM: CPT | Performed by: INTERNAL MEDICINE

## 2024-01-14 PROCEDURE — 63710000001 INSULIN LISPRO (HUMAN) PER 5 UNITS: Performed by: INTERNAL MEDICINE

## 2024-01-14 RX ORDER — FUROSEMIDE 10 MG/ML
40 INJECTION INTRAMUSCULAR; INTRAVENOUS ONCE
Status: COMPLETED | OUTPATIENT
Start: 2024-01-14 | End: 2024-01-14

## 2024-01-14 RX ORDER — POTASSIUM CHLORIDE 1.5 G/1.58G
40 POWDER, FOR SOLUTION ORAL ONCE
Status: COMPLETED | OUTPATIENT
Start: 2024-01-14 | End: 2024-01-14

## 2024-01-14 RX ADMIN — CETIRIZINE HYDROCHLORIDE 10 MG: 10 TABLET, FILM COATED ORAL at 10:36

## 2024-01-14 RX ADMIN — Medication 10 ML: at 08:51

## 2024-01-14 RX ADMIN — INSULIN LISPRO 2 UNITS: 100 INJECTION, SOLUTION INTRAVENOUS; SUBCUTANEOUS at 11:45

## 2024-01-14 RX ADMIN — ACETYLCYSTEINE 3 ML: 200 SOLUTION ORAL; RESPIRATORY (INHALATION) at 18:52

## 2024-01-14 RX ADMIN — GUAIFENESIN 600 MG: 600 TABLET, EXTENDED RELEASE ORAL at 10:37

## 2024-01-14 RX ADMIN — DOXYCYCLINE 100 MG: 100 INJECTION, POWDER, LYOPHILIZED, FOR SOLUTION INTRAVENOUS at 20:53

## 2024-01-14 RX ADMIN — INSULIN LISPRO 2 UNITS: 100 INJECTION, SOLUTION INTRAVENOUS; SUBCUTANEOUS at 11:46

## 2024-01-14 RX ADMIN — METOPROLOL SUCCINATE 25 MG: 25 TABLET, EXTENDED RELEASE ORAL at 17:59

## 2024-01-14 RX ADMIN — GUAIFENESIN 600 MG: 600 TABLET, EXTENDED RELEASE ORAL at 20:53

## 2024-01-14 RX ADMIN — Medication 10 ML: at 20:53

## 2024-01-14 RX ADMIN — CEFTRIAXONE 1000 MG: 1 INJECTION, POWDER, FOR SOLUTION INTRAMUSCULAR; INTRAVENOUS at 14:18

## 2024-01-14 RX ADMIN — DOXYCYCLINE 100 MG: 100 INJECTION, POWDER, LYOPHILIZED, FOR SOLUTION INTRAVENOUS at 08:49

## 2024-01-14 RX ADMIN — INSULIN LISPRO 6 UNITS: 100 INJECTION, SOLUTION INTRAVENOUS; SUBCUTANEOUS at 08:50

## 2024-01-14 RX ADMIN — EMPAGLIFLOZIN 25 MG: 10 TABLET, FILM COATED ORAL at 10:36

## 2024-01-14 RX ADMIN — INSULIN LISPRO 2 UNITS: 100 INJECTION, SOLUTION INTRAVENOUS; SUBCUTANEOUS at 20:53

## 2024-01-14 RX ADMIN — FUROSEMIDE 40 MG: 10 INJECTION, SOLUTION INTRAMUSCULAR; INTRAVENOUS at 08:51

## 2024-01-14 RX ADMIN — MONTELUKAST SODIUM 10 MG: 10 TABLET, COATED ORAL at 20:53

## 2024-01-14 RX ADMIN — IPRATROPIUM BROMIDE AND ALBUTEROL SULFATE 3 ML: 2.5; .5 SOLUTION RESPIRATORY (INHALATION) at 07:22

## 2024-01-14 RX ADMIN — Medication 10 ML: at 20:54

## 2024-01-14 RX ADMIN — AMLODIPINE BESYLATE 5 MG: 5 TABLET ORAL at 17:59

## 2024-01-14 RX ADMIN — FLUTICASONE PROPIONATE 2 SPRAY: 50 SPRAY, METERED NASAL at 10:37

## 2024-01-14 RX ADMIN — BUPROPION HYDROCHLORIDE 150 MG: 150 TABLET, EXTENDED RELEASE ORAL at 20:53

## 2024-01-14 RX ADMIN — POTASSIUM CHLORIDE 40 MEQ: 1.5 POWDER, FOR SOLUTION ORAL at 08:50

## 2024-01-14 RX ADMIN — ATORVASTATIN CALCIUM 10 MG: 10 TABLET, FILM COATED ORAL at 20:53

## 2024-01-14 RX ADMIN — CLOPIDOGREL BISULFATE 75 MG: 75 TABLET, FILM COATED ORAL at 10:37

## 2024-01-14 RX ADMIN — LISINOPRIL 5 MG: 2.5 TABLET ORAL at 17:59

## 2024-01-14 RX ADMIN — ACETYLCYSTEINE 3 ML: 200 SOLUTION ORAL; RESPIRATORY (INHALATION) at 07:22

## 2024-01-14 RX ADMIN — INSULIN LISPRO 2 UNITS: 100 INJECTION, SOLUTION INTRAVENOUS; SUBCUTANEOUS at 18:01

## 2024-01-14 RX ADMIN — ACETYLCYSTEINE 3 ML: 200 SOLUTION ORAL; RESPIRATORY (INHALATION) at 13:17

## 2024-01-14 RX ADMIN — IPRATROPIUM BROMIDE AND ALBUTEROL SULFATE 3 ML: 2.5; .5 SOLUTION RESPIRATORY (INHALATION) at 18:52

## 2024-01-14 RX ADMIN — ACETYLCYSTEINE 3 ML: 200 SOLUTION ORAL; RESPIRATORY (INHALATION) at 00:27

## 2024-01-14 RX ADMIN — IPRATROPIUM BROMIDE AND ALBUTEROL SULFATE 3 ML: 2.5; .5 SOLUTION RESPIRATORY (INHALATION) at 13:17

## 2024-01-14 RX ADMIN — IPRATROPIUM BROMIDE AND ALBUTEROL SULFATE 3 ML: 2.5; .5 SOLUTION RESPIRATORY (INHALATION) at 00:27

## 2024-01-14 RX ADMIN — INSULIN LISPRO 2 UNITS: 100 INJECTION, SOLUTION INTRAVENOUS; SUBCUTANEOUS at 08:52

## 2024-01-14 RX ADMIN — INSULIN LISPRO 4 UNITS: 100 INJECTION, SOLUTION INTRAVENOUS; SUBCUTANEOUS at 17:59

## 2024-01-14 NOTE — PLAN OF CARE
Goal Outcome Evaluation:  Plan of Care Reviewed With: patient        Progress: improving  Outcome Evaluation: Pt. alert and oriented times 4. SR. 50L HHF 50%. Blood glucose monitored. Lasix given, purewick in place, good uop. IS encouraged, self-administered No needs noted at this time. Bed alarm set, call light within reach.         Problem: Fall Injury Risk  Goal: Absence of Fall and Fall-Related Injury  1/14/2024 1320 by Maria D Spicer RN  Outcome: Ongoing, Progressing  1/14/2024 1319 by Maria D Spicer RN  Outcome: Ongoing, Progressing  Intervention: Identify and Manage Contributors  Recent Flowsheet Documentation  Taken 1/14/2024 1315 by Maria D Spicer RN  Medication Review/Management: medications reviewed  Self-Care Promotion: independence encouraged  Taken 1/14/2024 1300 by Maria D Spicer RN  Medication Review/Management: medications reviewed  Taken 1/14/2024 1100 by Maria D Spicer RN  Medication Review/Management: medications reviewed  Taken 1/14/2024 0900 by Maria D Spicer RN  Medication Review/Management: medications reviewed  Taken 1/14/2024 0845 by Maria D Spicer RN  Medication Review/Management: medications reviewed  Self-Care Promotion: independence encouraged  Taken 1/14/2024 0700 by Maria D Spicer RN  Medication Review/Management: medications reviewed  Intervention: Promote Injury-Free Environment  Recent Flowsheet Documentation  Taken 1/14/2024 1315 by Maria D Spicer RN  Safety Promotion/Fall Prevention:   activity supervised   fall prevention program maintained   safety round/check completed  Taken 1/14/2024 1300 by Maria D Spicer RN  Safety Promotion/Fall Prevention:   nonskid shoes/slippers when out of bed   safety round/check completed  Taken 1/14/2024 1100 by Maria D Spicer RN  Safety Promotion/Fall Prevention:   nonskid shoes/slippers when out of bed   safety round/check completed  Taken 1/14/2024 0900 by Maria D Spicer RN  Safety Promotion/Fall Prevention:   nonskid shoes/slippers when out of  bed   safety round/check completed  Taken 1/14/2024 0845 by Maria D Spicer RN  Safety Promotion/Fall Prevention:   activity supervised   fall prevention program maintained   safety round/check completed  Taken 1/14/2024 0700 by Maria D Spicer RN  Safety Promotion/Fall Prevention:   nonskid shoes/slippers when out of bed   safety round/check completed     Problem: Noninvasive Ventilation Acute  Goal: Effective Unassisted Ventilation and Oxygenation  1/14/2024 1320 by Maria D Spicer RN  Outcome: Ongoing, Progressing  1/14/2024 1319 by Maria D Spicer RN  Outcome: Ongoing, Progressing     Problem: Adult Inpatient Plan of Care  Goal: Plan of Care Review  Outcome: Ongoing, Progressing  Flowsheets (Taken 1/14/2024 1320)  Progress: improving  Plan of Care Reviewed With: patient  Outcome Evaluation: Pt. alert and oriented times 4. SR. 50L HHF 50%. Blood glucose monitored. Lasix given, purewick in place, good uop. IS encouraged, self-administered No needs noted at this time. Bed alarm set, call light within reach.  Goal: Patient-Specific Goal (Individualized)  Outcome: Ongoing, Progressing  Goal: Absence of Hospital-Acquired Illness or Injury  Outcome: Ongoing, Progressing  Intervention: Identify and Manage Fall Risk  Recent Flowsheet Documentation  Taken 1/14/2024 1315 by Maria D Spicer RN  Safety Promotion/Fall Prevention:   activity supervised   fall prevention program maintained   safety round/check completed  Taken 1/14/2024 1300 by Maria D Spicer RN  Safety Promotion/Fall Prevention:   nonskid shoes/slippers when out of bed   safety round/check completed  Taken 1/14/2024 1100 by Maria D Spicer RN  Safety Promotion/Fall Prevention:   nonskid shoes/slippers when out of bed   safety round/check completed  Taken 1/14/2024 0900 by Maria D Spicer RN  Safety Promotion/Fall Prevention:   nonskid shoes/slippers when out of bed   safety round/check completed  Taken 1/14/2024 0845 by Maria D Spicer RN  Safety Promotion/Fall  Prevention:   activity supervised   fall prevention program maintained   safety round/check completed  Taken 1/14/2024 0700 by Maria D Spicer RN  Safety Promotion/Fall Prevention:   nonskid shoes/slippers when out of bed   safety round/check completed  Intervention: Prevent Skin Injury  Recent Flowsheet Documentation  Taken 1/14/2024 1315 by Maria D Spicer RN  Body Position: position changed independently  Skin Protection:   adhesive use limited   incontinence pads utilized   transparent dressing maintained   tubing/devices free from skin contact  Taken 1/14/2024 0845 by Maria D Spicer RN  Body Position: position changed independently  Skin Protection:   adhesive use limited   incontinence pads utilized   pulse oximeter probe site changed   transparent dressing maintained   tubing/devices free from skin contact  Intervention: Prevent and Manage VTE (Venous Thromboembolism) Risk  Recent Flowsheet Documentation  Taken 1/14/2024 1315 by Maria D Spicer RN  Activity Management:   activity encouraged   ambulated to bathroom  VTE Prevention/Management:   bilateral   sequential compression devices off  Taken 1/14/2024 0845 by Maria D Spicer RN  Activity Management:   activity encouraged   ambulated to bathroom   up to bedside commode  VTE Prevention/Management:   bilateral   sequential compression devices off  Goal: Optimal Comfort and Wellbeing  Outcome: Ongoing, Progressing  Intervention: Provide Person-Centered Care  Recent Flowsheet Documentation  Taken 1/14/2024 1315 by Maria D Spicer RN  Trust Relationship/Rapport:   care explained   choices provided   emotional support provided   thoughts/feelings acknowledged  Taken 1/14/2024 0845 by Maria D Spicer RN  Trust Relationship/Rapport:   care explained   choices provided   emotional support provided   thoughts/feelings acknowledged  Goal: Readiness for Transition of Care  Outcome: Ongoing, Progressing     Problem: Skin Injury Risk Increased  Goal: Skin Health and  Integrity  Outcome: Ongoing, Progressing  Intervention: Optimize Skin Protection  Recent Flowsheet Documentation  Taken 1/14/2024 1315 by Maria D Spicer RN  Pressure Reduction Techniques:   frequent weight shift encouraged   heels elevated off bed   pressure points protected   weight shift assistance provided  Head of Bed (HOB) Positioning: HOB elevated  Pressure Reduction Devices:   pressure-redistributing mattress utilized   positioning supports utilized  Skin Protection:   adhesive use limited   incontinence pads utilized   transparent dressing maintained   tubing/devices free from skin contact  Taken 1/14/2024 0845 by Maria D Spicer RN  Pressure Reduction Techniques:   frequent weight shift encouraged   heels elevated off bed   pressure points protected   weight shift assistance provided  Head of Bed (HOB) Positioning: HOB elevated  Pressure Reduction Devices:   pressure-redistributing mattress utilized   positioning supports utilized  Skin Protection:   adhesive use limited   incontinence pads utilized   pulse oximeter probe site changed   transparent dressing maintained   tubing/devices free from skin contact     Problem: Fluid Imbalance (Pneumonia)  Goal: Fluid Balance  Outcome: Ongoing, Progressing  Intervention: Monitor and Manage Fluid Balance  Recent Flowsheet Documentation  Taken 1/14/2024 1315 by Maria D Spicer RN  Fluid/Electrolyte Management: fluids provided  Taken 1/14/2024 0845 by Maria D Spicer RN  Fluid/Electrolyte Management: fluids provided     Problem: Infection (Pneumonia)  Goal: Resolution of Infection Signs and Symptoms  Outcome: Ongoing, Progressing     Problem: Respiratory Compromise (Pneumonia)  Goal: Effective Oxygenation and Ventilation  Outcome: Ongoing, Progressing  Intervention: Promote Airway Secretion Clearance  Recent Flowsheet Documentation  Taken 1/14/2024 1315 by Maria D Spicer RN  Cough And Deep Breathing: done independently per patient  Taken 1/14/2024 0845 by Maira D Spicer  RN  Breathing Techniques/Airway Clearance: deep/controlled cough encouraged  Cough And Deep Breathing: done independently per patient  Intervention: Optimize Oxygenation and Ventilation  Recent Flowsheet Documentation  Taken 1/14/2024 1315 by Maria D Spicer RN  Head of Bed ([facility]) Positioning: [facility] elevated  Taken 1/14/2024 0845 by Maria D Spicer RN  Head of Bed ([facility]) Positioning: [facility] elevated

## 2024-01-14 NOTE — PROGRESS NOTES
[facility] HOSPITALIST PROGRESS NOTE     Patient Identification:  Name:  Evie Shah  Age:  78 y.o.  Sex:  female  :  1945  MRN:  [id number]  Visit Number:  [id number]  ROOM: [address]     Primary Care Provider:  Camryn Mota PA     Date of Admission: 2024    Length of stay in inpatient status:  12    Subjective     Chief Compliant:    Chief Complaint   Patient presents with    Shortness of Breath    Fever    Weakness - Generalized     Pt has been sob for a weak and has been sick with a cough.     Cough     History of Presenting Illness:  The patient is doing better today.  She is eating well, sitting in a chair.  She denies chest pain.  The cough is less and she is producing a little sputum.  She denies chest pain, nausea, emesis, diarrhea.  The shortness of air has improved.    Objective     Current Hospital Meds:  acetylcysteine, 3 mL, Nebulization, 4x Daily - RT  amLODIPine, 5 mg, Oral, Q PM  atorvastatin, 10 mg, Oral, Nightly  buPROPion SR, 150 mg, Oral, Nightly  cefTRIAXone, 1,000 mg, Intravenous, Q24H  cetirizine, 10 mg, Oral, Daily  cholecalciferol, 50,000 Units, Oral, Weekly  clopidogrel, 75 mg, Oral, Daily  doxycycline, 100 mg, Intravenous, Q12H  empagliflozin, 25 mg, Oral, Daily  fluticasone, 2 spray, Nasal, Daily  guaiFENesin, 600 mg, Oral, Q12H  insulin lispro, 2 Units, Subcutaneous, TID With Meals  insulin lispro, 2-9 Units, Subcutaneous, 4x Daily AC & at Bedtime  ipratropium-albuterol, 3 mL, Nebulization, 4x Daily - RT  lisinopril, 5 mg, Oral, Q PM  metoprolol succinate XL, 25 mg, Oral, Q PM  montelukast, 10 mg, Oral, Nightly  senna-docusate sodium, 2 tablet, Oral, BID  sodium chloride, 10 mL, Intravenous, Q12H  sodium chloride, 10 mL, Intravenous, Q12H       Current Antimicrobial Therapy:  Anti-Infectives (From admission, onward)      Ordered     Dose/Rate Route Frequency Start Stop    24  doxycycline (VIBRAMYCIN) 100 mg in sodium chloride 0.9 % 100  mL IVPB-VTB        Ordering Provider: Vandana Lee MD    100 mg Intravenous Every 12 Hours 01/08/24 2100 01/15/24 2059    01/02/24 1935  cefTRIAXone (ROCEPHIN) 1,000 mg in sodium chloride 0.9 % 100 mL IVPB-VTB        Ordering Provider: Vandana Lee MD    1,000 mg  200 mL/hr over 30 Minutes Intravenous Every 24 Hours 01/03/24 1500 01/15/24 2036    01/02/24 1935  doxycycline (VIBRAMYCIN) 100 mg in sodium chloride 0.9 % 100 mL IVPB-VTB        Ordering Provider: Juan Jade MD    100 mg Intravenous Every 12 Hours 01/03/24 0600 01/07/24 1714    01/02/24 1448  cefTRIAXone (ROCEPHIN) 2,000 mg in sodium chloride 0.9 % 100 mL IVPB-VTB        Ordering Provider: Jonathan Wyatt PA    2,000 mg  200 mL/hr over 30 Minutes Intravenous Once 01/02/24 1504 01/02/24 1610    01/02/24 1448  doxycycline (VIBRAMYCIN) 100 mg in sodium chloride 0.9 % 100 mL IVPB-VTB        Ordering Provider: Jonathan Wyatt PA    100 mg  over 60 Minutes Intravenous Once 01/02/24 1504 01/02/24 1710          Current Diuretic Therapy:  Diuretics (From admission, onward)      Ordered     Dose/Rate Route Frequency Start Stop    01/14/24 0521  furosemide (LASIX) injection 40 mg        Ordering Provider: Augustin Stafford MD    40 mg Intravenous Once 01/14/24 0815 01/14/24 0851    01/12/24 0903  furosemide (LASIX) injection 40 mg        Ordering Provider: Augustin Stafford MD    40 mg Intravenous Once 01/12/24 0915 01/12/24 1008    01/11/24 1355  furosemide (LASIX) injection 40 mg        Ordering Provider: Augustin Stafford MD    40 mg Intravenous Once 01/11/24 1445 01/11/24 1430    01/09/24 2300  furosemide (LASIX) injection 40 mg        Ordering Provider: Anaid Woodward DO    40 mg Intravenous Once 01/10/24 0000 01/10/24 0052    01/09/24 1358  furosemide (LASIX) injection 20 mg        Ordering Provider: Augustin Stafford MD    20 mg Intravenous Once 01/09/24 1600 01/09/24 1618    01/09/24 1105  furosemide (LASIX) injection 60 mg         Ordering Provider: Augustin Stafford MD    60 mg Intravenous Once 01/09/24 1200 01/09/24 1307          ----------------------------------------------------------------------------------------------------------------------  Vital Signs:  Temp:  [97 °F (36.1 °C)-98.5 °F (36.9 °C)] 97.6 °F (36.4 °C)  Heart Rate:  [63-90] 67  Resp:  [16-28] 17  BP: (100-153)/(62-99) 116/87  SpO2:  [86 %-99 %] 90 %  on  Flow (L/min):  [50-60] 50;   Device (Oxygen Therapy): heated;high-flow nasal cannula  Body mass index is 35.44 kg/m².    Wt Readings from Last 3 Encounters:   01/14/24 90.7 kg (200 lb)   01/02/24 90.3 kg (199 lb)   12/26/23 92.1 kg (203 lb)     Intake & Output (last 3 days)         01/11 0701 01/12 0700 01/12 0701 01/13 0700 01/13 0701 01/14 0700 01/14 0701  01/15 0700    P.O. 220 486 760 590    I.V. (mL/kg) 40 (0.4)   93.1 (1)    IV Piggyback 300 300 300     Total Intake(mL/kg) 560 (5.9) 786 (8.6) 1060 (11.7) 683.1 (7.5)    Urine (mL/kg/hr) 1900 (0.8) 3450 (1.6) 4600 (2.1) 650 (1.5)    Emesis/NG output 0 0 0     Stool 0 0 0     Total Output 1900 3450 4600 650    Net -1340 -2664 -3540 +33.1            Urine Unmeasured Occurrence 2 x 1 x 2 x     Stool Unmeasured Occurrence 0 x 2 x 2 x     Emesis Unmeasured Occurrence 0 x 0 x 0 x           Diet: Diabetic Diets, Cardiac Diets; Healthy Heart (2-3 Na+); Consistent Carbohydrate; Texture: Regular Texture (IDDSI 7); Fluid Consistency: Thin (IDDSI 0)  ----------------------------------------------------------------------------------------------------------------------  Physical Exam; the exam is the same as yesterday.  She had just finished sitting up and she was getting into bed when I saw her.  She is in very little acute respiratory distress again today.  She is wearing the heated high flow nasal cannula.  She is alert and oriented x 3.  Her lungs have good air movement throughout without wheezing and crackles.  She does not have any leg edema.     ----------------------------------------------------------------------------------------------------------------------  Tele:  NS with heart rates in the 60s.  I have personally reviewed/looked at the telemetry strips.   ----------------------------------------------------------------------------------------------------------------------  LABS:    CBC and coagulation:  Results from last 7 days   Lab Units 01/14/24  0030 01/13/24  0431 01/12/24  0423 01/11/24  0201 01/10/24  0046 01/09/24  0136 01/08/24  0137   PROCALCITONIN ng/mL  --  0.03  --   --   --  0.04  --    LACTATE mmol/L  --   --   --  1.0  --  1.2  --    CRP mg/dL  --   --   --  2.82*  --  3.63*  --    WBC 10*3/mm3 13.96* 10.05 9.81 11.05* 9.39 13.94* 9.62   HEMOGLOBIN g/dL 15.5 14.3 14.2 13.8 14.1 13.2 13.6   HEMATOCRIT % 48.3* 45.3 44.2 44.4 45.1 41.9 43.1   MCV fL 94.2 95.4 94.0 97.4* 95.3 96.5 95.8   MCHC g/dL 32.1 31.6 32.1 31.1* 31.3* 31.5 31.6   PLATELETS 10*3/mm3 202 177 197 182 178 160 146   INR   --   --  1.02 1.02  --   --  0.94     Acid/base balance:  Results from last 7 days   Lab Units 01/11/24  0425 01/10/24  0408 01/09/24  1943 01/09/24  1102   PH, ARTERIAL pH units 7.383  7.377 7.389 7.391 7.382   PCO2, ARTERIAL mm Hg 47.2*  47.9* 50.4* 44.9 44.7   PO2 ART mm Hg 81.6*  65.3* 76.0* 74.7* 59.0*   HCO3 ART mmol/L 28.1*  28.1* 30.4* 27.2* 26.6*     Renal and electrolytes:  Results from last 7 days   Lab Units 01/14/24  0030 01/13/24  0431 01/12/24  0423 01/11/24  0201 01/10/24  0046 01/09/24  0136 01/08/24  0137   SODIUM mmol/L 136 137 139 136 141 136 140   POTASSIUM mmol/L 4.1 4.6 4.6 4.5 4.4 3.8 3.8   MAGNESIUM mg/dL 2.3 2.3 2.1 2.2 2.0 1.8  --    CHLORIDE mmol/L 97* 101 100 101 104 105 108*   CO2 mmol/L 26.0 27.2 30.7* 30.0* 28.7 23.9 22.8   BUN mg/dL 46* 45* 30* 24* 20 12 18   CREATININE mg/dL 0.90 0.90 0.91 0.73 0.92 0.64 0.65   CALCIUM mg/dL 9.6 9.6 9.5 9.4 9.5 8.2* 8.4*   PHOSPHORUS mg/dL 3.8 5.7* 5.5* 3.8 2.6 2.7  --    GLUCOSE  mg/dL 227* 297* 229* 174* 266* 164* 167*   ANION GAP mmol/L 13.0 8.8 8.3 5.0 8.3 7.1 9.2     Estimated Creatinine Clearance: 56.8 mL/min (by C-G formula based on SCr of 0.9 mg/dL).    Liver and pancreatic function:  Results from last 7 days   Lab Units 01/14/24  0030 01/12/24  0423 01/11/24  0201 01/10/24  0046 01/09/24  0136   ALBUMIN g/dL 3.6 3.6 3.5 3.7 3.0*   BILIRUBIN mg/dL 0.3 0.4 0.3 0.3 0.6   ALK PHOS U/L 98 76 112 95 71   AST (SGOT) U/L 10 11 14 19 32   ALT (SGPT) U/L 21 26 33 49* 57*     Endocrine function:  Point of care bedside glucose levels:  Results from last 7 days   Lab Units 01/14/24  0654 01/13/24  2109 01/13/24  1615 01/13/24  1145 01/13/24  0640 01/12/24  2134 01/12/24  1617 01/12/24  1050 01/12/24  0628 01/11/24  2107 01/11/24  1620 01/11/24  1150 01/11/24  0740 01/10/24  2013   GLUCOSE mg/dL 258* 316* 352* 268* 241* 205* 247* 233* 243* 256* 218* 205* 171* 212*     Glucose levels from the [facility]:  Results from last 7 days   Lab Units 01/14/24  0030 01/13/24  0431 01/12/24  0423 01/11/24  0201 01/10/24  0046 01/09/24  0136 01/08/24  0137   GLUCOSE mg/dL 227* 297* 229* 174* 266* 164* 167*       I have personally looked at the labs and they are summarized above.    Assessment & Plan      -Acute hypoxic respiratory failure that was present on admission secondary to left upper lobe atelectasis from a large mucous plug with suspected superimposed left upper lobe bacterial pneumonia unspecified   -Worsening acute hypoxic respiratory failure on 1/9/2024, with CT scan showing new atelectasis of the left lower lobe and possibly the right upper lobe from suspected mucous plugs  -EBUS performed 1/8/2024 with an incidental finding of an enlarged lymph node at station 10 L, with both bronchial washings and fine-needle aspiration showing no malignant cells  -History of peripheral arterial disease status post left external iliac stent placement  -History of COPD, without a suspected acute exacerbation on  admission but as of 1/9/2020 for a mild to moderate exacerbation is suspected   -History of type 2 diabetes mellitus  -History of essential hypertension    The patient is to continue to work with PT and OT.  We will wean the oxygen levels for saturations of 88% or above.  The oxygen requirement decreases daily.  The patient is doing well with the mealtime insulin added yesterday; will not make changes to the insulin today and will continue to monitor the glucose levels closely.  Will repeat labs in the am.  The blood pressures are at goal and stable.  Dr. Stafford has now stopped the steroids and this should also help with any hyperglycemia.    VTE Prophylaxis:  Mechanical Order History:        Ordered        01/09/24 2255  Place Sequential Compression Device  Once            01/09/24 2255  Maintain Sequential Compression Device  Continuous                          Pharmalogical Order History:        Ordered     Dose Route Frequency Stop    01/09/24 1945  Enoxaparin Sodium (LOVENOX) syringe 90 mg  Status:  Discontinued         1 mg/kg SC Every 12 Hours 01/09/24 2255    01/09/24 1941  Pharmacy to Dose enoxaparin (LOVENOX)  Status:  Discontinued         -- XX Continuous PRN 01/09/24 1946    01/08/24 1508  heparin (porcine) 5000 UNIT/ML injection 5,000 Units  Status:  Discontinued         5,000 Units SC Every 12 Hours Scheduled 01/09/24 1942    01/03/24 1122  Enoxaparin Sodium (LOVENOX) syringe 90 mg  Status:  Discontinued         1 mg/kg SC Every 12 Hours 01/08/24 1508    01/03/24 1112  Pharmacy to Dose enoxaparin (LOVENOX)  Status:  Discontinued         -- XX Continuous PRN 01/04/24 1313    01/02/24 1935  Enoxaparin Sodium (LOVENOX) syringe 40 mg  Status:  Discontinued         40 mg SC Nightly 01/03/24 1122                  The patient is high risk due to the following diagnoses/reasons:  Acute hypoxic respiratory failure that was present on admission s    Disposition: Unsure disposition, depends on the PT and OT  evaluations.    Vandana Lee MD  [facility]  01/14/24  11:38 EST

## 2024-01-14 NOTE — PROGRESS NOTES
Progress Note Pulmonary and critical care       Subjective   All the labs medications and the notes and vitals reviewed.  Patient resting in bed comfortably.  Used BiPAP overnight.  None of the family members at bedside.  FiO2 requirements remains on the higher side.  It is chest x-ray reviewed.  Ins and outs reviewed.  Review of Systems:    Generalized tiredness otherwise negative    Vital Signs  Temp:  [97 °F (36.1 °C)-98.5 °F (36.9 °C)] 97.6 °F (36.4 °C)  Heart Rate:  [63-90] 66  Resp:  [16-28] 20  BP: (100-153)/(62-99) 128/78  Body mass index is 35.44 kg/m².    Intake/Output Summary (Last 24 hours) at 1/14/2024 0815  Last data filed at 1/13/2024 2200  Gross per 24 hour   Intake 560 ml   Output 4200 ml   Net -3640 ml     No intake/output data recorded.    Physical Exam: No major changes  General- obese in appearance, on HFNC    HEENT- PERLLA    Neck- supple    No JVD, no carotid bruit    Respiratory-on high flow nasal cannula oxygen not in any respiratory distress      Cardiovascular-  NSR    GI-NT ND    CNS-alert oriented x3, grossly nonfocal    Extremities- no visible clubbing and edema        Results Review:      Results from last 7 days   Lab Units 01/14/24  0030 01/13/24  0431 01/12/24  0423   WBC 10*3/mm3 13.96* 10.05 9.81   HEMOGLOBIN g/dL 15.5 14.3 14.2   PLATELETS 10*3/mm3 202 177 197     Results from last 7 days   Lab Units 01/14/24  0030 01/13/24  0431 01/12/24  0423   SODIUM mmol/L 136 137 139   POTASSIUM mmol/L 4.1 4.6 4.6   CHLORIDE mmol/L 97* 101 100   CO2 mmol/L 26.0 27.2 30.7*   BUN mg/dL 46* 45* 30*   CREATININE mg/dL 0.90 0.90 0.91   CALCIUM mg/dL 9.6 9.6 9.5   GLUCOSE mg/dL 227* 297* 229*   MAGNESIUM mg/dL 2.3 2.3 2.1     Lab Results   Component Value Date    INR 1.02 01/12/2024    INR 1.02 01/11/2024    INR 0.94 01/08/2024    PROTIME 13.9 01/12/2024    PROTIME 13.9 01/11/2024    PROTIME 13.1 01/08/2024     Results from last 7 days   Lab Units 01/14/24  0030 01/12/24  0423 01/11/24  0201   ALK  PHOS U/L 98 76 112   BILIRUBIN mg/dL 0.3 0.4 0.3   ALT (SGPT) U/L 21 26 33   AST (SGOT) U/L 10 11 14     Results from last 7 days   Lab Units 01/11/24  0425   PH, ARTERIAL pH units 7.383  7.377   PO2 ART mm Hg 81.6*  65.3*   PCO2, ARTERIAL mm Hg 47.2*  47.9*   HCO3 ART mmol/L 28.1*  28.1*     Imaging Results (Last 24 Hours)       ** No results found for the last 24 hours. **             Microbiology Results (last 10 days)       Procedure Component Value - Date/Time    Respiratory Culture - Lavage, Lung, Left Lower Lobe [[id number]] Collected: 01/10/24 1455    Lab Status: Final result Specimen: Lavage from Lung, Left Lower Lobe Updated: 01/12/24 1057     Respiratory Culture Scant growth (1+) Normal respiratory [place]. No S. aureus or Pseudomonas aeruginosa detected. Final report.     Gram Stain Few (2+) WBCs seen      Rare (1+) Epithelial cells seen      Rare (1+) Mixed bacterial morphotypes seen on Gram Stain    Respiratory Culture - Wash, Lung, Lingula [[id number]] Collected: 01/10/24 1455    Lab Status: Final result Specimen: Wash from Lung, Lingula Updated: 01/12/24 1043     Respiratory Culture Light growth (2+) Normal respiratory [place]. No S. aureus or Pseudomonas aeruginosa detected. Final report.     Gram Stain Few (2+) WBCs seen      Rare (1+) Epithelial cells seen      Rare (1+) Mixed bacterial morphotypes seen on Gram Stain    Respiratory Culture - Lavage, Lung, Right Lower Lobe [[id number]] Collected: 01/10/24 1455    Lab Status: Final result Specimen: Lavage from Lung, Right Lower Lobe Updated: 01/12/24 1056     Respiratory Culture Scant growth (1+) Normal respiratory [place]. No S. aureus or Pseudomonas aeruginosa detected. Final report.     Gram Stain Moderate (3+) WBCs seen      Rare (1+) Epithelial cells seen      Few (2+) Mixed bacterial morphotypes seen on Gram Stain    Respiratory Culture - Lavage, Lung, Right Upper Lobe [[id number]] Collected: 01/10/24 1455    Lab Status: Final result Specimen:  Lavage from Lung, Right Upper Lobe Updated: 01/12/24 1056     Respiratory Culture Rare Normal respiratory [place]. No S. aureus or Pseudomonas aeruginosa detected. Final report.     Gram Stain Few (2+) WBCs seen      No epithelial cells seen      Rare (1+) Mixed bacterial morphotypes seen on Gram Stain    Respiratory Panel PCR w/COVID-19(SARS-CoV-2) [facility]ILLIANA/[facility] In-House, NP Swab in UTM/VTM, 2 HR TAT - Swab, Nasopharynx [[id number]]  (Normal) Collected: 01/10/24 0435    Lab Status: Final result Specimen: Swab from Nasopharynx Updated: 01/10/24 0529     ADENOVIRUS, PCR Not Detected     Coronavirus 229E Not Detected     Coronavirus HKU1 Not Detected     Coronavirus NL63 Not Detected     Coronavirus OC43 Not Detected     COVID19 Not Detected     Human Metapneumovirus Not Detected     Human Rhinovirus/Enterovirus Not Detected     Influenza A PCR Not Detected     Influenza B PCR Not Detected     Parainfluenza Virus 1 Not Detected     Parainfluenza Virus 2 Not Detected     Parainfluenza Virus 3 Not Detected     Parainfluenza Virus 4 Not Detected     RSV, PCR Not Detected     Bordetella pertussis pcr Not Detected     Bordetella parapertussis PCR Not Detected     Chlamydophila pneumoniae PCR Not Detected     Mycoplasma pneumo by PCR Not Detected    Narrative:      In the setting of a positive respiratory panel with a viral infection PLUS a negative procalcitonin without other underlying concern for bacterial infection, consider observing off antibiotics or discontinuation of antibiotics and continue supportive care. If the respiratory panel is positive for atypical bacterial infection (Bordetella pertussis, Chlamydophila pneumoniae, or Mycoplasma pneumoniae), consider antibiotic de-escalation to target atypical bacterial infection.    Fungus Culture - Wash, Bronchus [[id number]] Collected: 01/08/24 0804    Lab Status: Preliminary result Specimen: Wash from Bronchus Updated: 01/11/24 1112     Fungus Stain Final report      CHRISTIAN/Calcofluor preparation Comment     Comment: KOH/Calcofluor preparation:  no fungus observed.       Narrative:      Performed at:  [address] [place] [address]  [id number]  : Isidoro Eastman PhD, Phone:  [phone]    AFB Culture - Wash, Bronchus [[id number]] Collected: 01/08/24 0804    Lab Status: Preliminary result Specimen: Wash from Bronchus Updated: 01/09/24 1709     AFB Specimen Processing Concentration     Acid Fast Smear Negative    Narrative:      Performed at:  [address] [place] [address]  [id number]  : Isidoro Eastman PhD, Phone:  [phone]    Respiratory Culture - Wash, Bronchus [[id number]] Collected: 01/08/24 0804    Lab Status: Final result Specimen: Wash from Bronchus Updated: 01/10/24 1123     Respiratory Culture Light growth (2+) Normal respiratory [place]. No S. aureus or Pseudomonas aeruginosa detected. Final report.     Gram Stain Moderate (3+) WBCs seen      Rare (1+) Mixed bacterial morphotypes seen on Gram Stain    Fungus Culture - Lavage, Lung, Left Upper Lobe [[id number]] Collected: 01/08/24 0802    Lab Status: Preliminary result Specimen: Lavage from Lung, Left Upper Lobe Updated: 01/11/24 1112     Fungus Stain Final report     KOH/Calcofluor preparation Comment     Comment: KOH/Calcofluor preparation:  no fungus observed.       Narrative:      Performed at:  [address] [place] [address]  [id number]  : Isidoro Eastman PhD, Phone:  [phone]    AFB Culture - Lavage, Lung, Left Upper Lobe [[id number]] Collected: 01/08/24 0802    Lab Status: Preliminary result Specimen: Lavage from Lung, Left Upper Lobe Updated: 01/09/24 1709     AFB Specimen Processing Concentration     Acid Fast Smear Negative    Narrative:      Performed at:  [address] [place] [address]  [id number]  : Isidoro Eastman PhD, Phone:  [phone]    BAL Culture, Quantitative - Lavage, Lung,  Left Upper Lobe [[id number]] Collected: 01/08/24 0802    Lab Status: Final result Specimen: Lavage from Lung, Left Upper Lobe Updated: 01/10/24 1123     BAL Culture No growth     Gram Stain WBCs seen      No organisms seen             acetylcysteine, 3 mL, Nebulization, 4x Daily - RT  amLODIPine, 5 mg, Oral, Q PM  atorvastatin, 10 mg, Oral, Nightly  buPROPion SR, 150 mg, Oral, Nightly  cefTRIAXone, 1,000 mg, Intravenous, Q24H  cetirizine, 10 mg, Oral, Daily  cholecalciferol, 50,000 Units, Oral, Weekly  clopidogrel, 75 mg, Oral, Daily  doxycycline, 100 mg, Intravenous, Q12H  empagliflozin, 25 mg, Oral, Daily  fluticasone, 2 spray, Nasal, Daily  furosemide, 40 mg, Intravenous, Once  guaiFENesin, 600 mg, Oral, Q12H  insulin lispro, 2 Units, Subcutaneous, TID With Meals  insulin lispro, 2-9 Units, Subcutaneous, 4x Daily AC & at Bedtime  ipratropium-albuterol, 3 mL, Nebulization, 4x Daily - RT  lisinopril, 5 mg, Oral, Q PM  metoprolol succinate XL, 25 mg, Oral, Q PM  montelukast, 10 mg, Oral, Nightly  potassium chloride, 40 mEq, Oral, BID  potassium chloride, 40 mEq, Oral, Once  senna-docusate sodium, 2 tablet, Oral, BID  sodium chloride, 10 mL, Intravenous, Q12H  sodium chloride, 10 mL, Intravenous, Q12H           Medication Review:      Latest Reference Range & Units 01/13/24 04:33   CO2 Content 22 - 33 mmol/L 31.6   Carboxyhemoglobin Venous 0.0 - 5.0 % 1.0   Methemoglobin Venous 0.0 - 3.0 % 0.3   Oxyhemoglobin Venous 45.0 - 75.0 % 85.5 (H)   Hemoglobin, Blood Gas 13.5 - 17.5 g/dL 14.7   Site  Lab   Modality  Room Air   FIO2 % 70   Ventilator Mode  BiPAP   Set Tidal Volume  773.00   Set Mech Resp Rate  18.0   Rate Breaths/minute 26   PIP cmH2O 21   IPAP  18   EPAP  8   Barometric Pressure for Blood Gas mmHg 717   pH, Venous 7.320 - 7.420 pH Units 7.422 (H)   pCO2, Venous 41.0 - 51.0 mm Hg 46.4   pO2, Venous 27.0 - 53.0 mm Hg 53.0   HCO3, Venous 22.0 - 28.0 mmol/L 30.2 (H)   Base Excess 0.0 - 2.0 mmol/L 4.8 (H)   O2  Saturation, Venous 45.0 - 75.0 % 86.6 (H)   Collected by  [id number]   (H): Data is abnormally high    Assessment & Plan      acute hypoxic respiratory failure-could be due to mucous plugging of the left upper lobe.  CT chest - reviewed   White count reviewed and stable.  Latest microbiology reviewed.  BiPAP settings and graphics reviewed.  Used BiPAP for 6 hours overnight.    Currently on high flow nasal cannula oxygen needing 60 L and 65% of FiO2.  Not tolerating loading on the FiO2.      Repeat Lasix.  Ins and outs reviewed.  Net negative.    Completed steroids.    Continue nebs  Continue oxygen to maintain saturation 88 to 92%.      Remains critically ill with respiratory failure      COPD-patient has been smoker for a long time.    Adjusted to maintain saturation 88 to 92%.  Continue DuoNebs    Continue oxygen.  Titrated to maintain saturation 88 to 92%     Latest microbiology reviewed.    Continue current antibiotics  Will de-escalate as per cultures.  GI- PPI prophylaxis.  Medications reviewed   Endrocrinology- Maintain Blood sugar 140 -180  I have reviewed patient's labs and images.   Currently patient is critically ill due to acute hypoxic respiratory failure due to mucous plugging.  I adjusted patient's HFNC settings.     Case d/w nurse and team.  This service is provided via audio video tele medicine   I am in [place] and patient is in [place] YULISSA Stafford MD  01/14/24  08:15 EST

## 2024-01-14 NOTE — PLAN OF CARE
Problem: Adult Inpatient Plan of Care  Goal: Absence of Hospital-Acquired Illness or Injury  Intervention: Identify and Manage Fall Risk  Recent Flowsheet Documentation  Taken 1/14/2024 0200 by Naye Lewis RN  Safety Promotion/Fall Prevention:   activity supervised   assistive device/personal items within reach   clutter free environment maintained   fall prevention program maintained   room organization consistent   safety round/check completed  Taken 1/14/2024 0100 by Naye Lewis RN  Safety Promotion/Fall Prevention: safety round/check completed  Taken 1/14/2024 0000 by Naye Lewis RN  Safety Promotion/Fall Prevention: safety round/check completed  Intervention: Prevent Skin Injury  Recent Flowsheet Documentation  Taken 1/14/2024 0200 by Naye Lewis RN  Body Position: position changed independently  Skin Protection:   adhesive use limited   incontinence pads utilized   pulse oximeter probe site changed   skin sealant/moisture barrier applied   skin-to-device areas padded   transparent dressing maintained  Taken 1/14/2024 0100 by Naye Lewis RN  Body Position: position changed independently  Intervention: Prevent and Manage VTE (Venous Thromboembolism) Risk  Recent Flowsheet Documentation  Taken 1/14/2024 0200 by Naye Lewis RN  Activity Management: bedrest  VTE Prevention/Management:   bilateral   sequential compression devices on  Range of Motion: active ROM (range of motion) encouraged  Intervention: Prevent Infection  Recent Flowsheet Documentation  Taken 1/14/2024 0200 by Naye Lewis RN  Infection Prevention:   equipment surfaces disinfected   hand hygiene promoted   rest/sleep promoted   single patient room provided  Goal: Optimal Comfort and Wellbeing  Intervention: Provide Person-Centered Care  Recent Flowsheet Documentation  Taken 1/14/2024 0200 by Naye Lewis RN  Trust Relationship/Rapport:   care explained   choices provided   emotional support provided   empathic listening  provided   questions answered   questions encouraged   reassurance provided   thoughts/feelings acknowledged     Problem: Fluid Imbalance (Pneumonia)  Goal: Fluid Balance  Intervention: Monitor and Manage Fluid Balance  Recent Flowsheet Documentation  Taken 1/14/2024 0200 by Naye Lewis RN  Fluid/Electrolyte Management: fluids provided     Problem: Respiratory Compromise (Pneumonia)  Goal: Effective Oxygenation and Ventilation  Intervention: Promote Airway Secretion Clearance  Recent Flowsheet Documentation  Taken 1/14/2024 0200 by Naye Lewis RN  Breathing Techniques/Airway Clearance: deep/controlled cough encouraged  Cough And Deep Breathing: done independently per patient  Intervention: Optimize Oxygenation and Ventilation  Recent Flowsheet Documentation  Taken 1/14/2024 0200 by Naye Lewis RN  Head of Bed ([facility]) Positioning: [facility] elevated  Airway/Ventilation Management: airway patency maintained  Taken 1/14/2024 0100 by Naye Lewis RN  Head of Bed ([facility]) Positioning: [facility] elevated     Problem: Fluid Imbalance (Pneumonia)  Goal: Fluid Balance  Intervention: Monitor and Manage Fluid Balance  Recent Flowsheet Documentation  Taken 1/14/2024 0200 by Naye Lewis RN  Fluid/Electrolyte Management: fluids provided     Problem: Respiratory Compromise (Pneumonia)  Goal: Effective Oxygenation and Ventilation  Intervention: Promote Airway Secretion Clearance  Recent Flowsheet Documentation  Taken 1/14/2024 0200 by Naye Lewis RN  Breathing Techniques/Airway Clearance: deep/controlled cough encouraged  Cough And Deep Breathing: done independently per patient  Intervention: Optimize Oxygenation and Ventilation  Recent Flowsheet Documentation  Taken 1/14/2024 0200 by Naye Lewis RN  Head of Bed ([facility]) Positioning: [facility] elevated  Airway/Ventilation Management: airway patency maintained  Taken 1/14/2024 0100 by Naye Lewis RN  Head of Bed ([facility]) Positioning: [facility] elevated     Problem: Fall Injury Risk  Goal:  Absence of Fall and Fall-Related Injury  Intervention: Identify and Manage Contributors  Recent Flowsheet Documentation  Taken 1/14/2024 0200 by Naye Lewis RN  Medication Review/Management: medications reviewed  Self-Care Promotion: independence encouraged  Intervention: Promote Injury-Free Environment  Recent Flowsheet Documentation  Taken 1/14/2024 0200 by Naye Lewis RN  Safety Promotion/Fall Prevention:   activity supervised   assistive device/personal items within reach   clutter free environment maintained   fall prevention program maintained   room organization consistent   safety round/check completed  Taken 1/14/2024 0100 by Naye Lewis RN  Safety Promotion/Fall Prevention: safety round/check completed  Taken 1/14/2024 0000 by Naye Lewis RN  Safety Promotion/Fall Prevention: safety round/check completed     Problem: Fall Injury Risk  Goal: Absence of Fall and Fall-Related Injury  Outcome: Ongoing, Progressing  Intervention: Identify and Manage Contributors  Recent Flowsheet Documentation  Taken 1/14/2024 0200 by Naye Lewis RN  Medication Review/Management: medications reviewed  Self-Care Promotion: independence encouraged  Intervention: Promote Injury-Free Environment  Recent Flowsheet Documentation  Taken 1/14/2024 0200 by Naye Lewis RN  Safety Promotion/Fall Prevention:   activity supervised   assistive device/personal items within reach   clutter free environment maintained   fall prevention program maintained   room organization consistent   safety round/check completed  Taken 1/14/2024 0100 by Naye Lewis RN  Safety Promotion/Fall Prevention: safety round/check completed  Taken 1/14/2024 0000 by Naye Lewis RN  Safety Promotion/Fall Prevention: safety round/check completed     Problem: Noninvasive Ventilation Acute  Goal: Effective Unassisted Ventilation and Oxygenation  Intervention: Monitor and Manage Noninvasive Ventilation  Recent Flowsheet Documentation  Taken 1/14/2024  0200 by Naye Lewis RN  Airway/Ventilation Management: airway patency maintained  NPPV/CPAP Maintenance: mask adjusted     Problem: Noninvasive Ventilation Acute  Goal: Effective Unassisted Ventilation and Oxygenation  Outcome: Ongoing, Progressing  Intervention: Monitor and Manage Noninvasive Ventilation  Recent Flowsheet Documentation  Taken 1/14/2024 0200 by Naye Lewis RN  Airway/Ventilation Management: airway patency maintained  NPPV/CPAP Maintenance: mask adjusted     Problem: Skin Injury Risk Increased  Goal: Skin Health and Integrity  Intervention: Optimize Skin Protection  Recent Flowsheet Documentation  Taken 1/14/2024 0200 by Naye Lewis RN  Pressure Reduction Techniques:   frequent weight shift encouraged   heels elevated off bed   positioned off wounds   pressure points protected   weight shift assistance provided  Head of Bed (HOB) Positioning: \[facility]\"" elevated  Pressure Reduction Devices:   specialty bed utilized   pressure-redistributing mattress utilized   positioning supports utilized   heel offloading device utilized  Skin Protection:   adhesive use limited   incontinence pads utilized   pulse oximeter probe site changed   skin sealant/moisture barrier applied   skin-to-device areas padded   transparent dressing maintained  Taken 1/14/2024 0100 by Naye Lewis RN  Head of Bed (HOB) Positioning: HOB elevated   Goal Outcome Evaluation:  Plan of Care Reviewed With: patient        Progress: no change

## 2024-01-15 LAB
ALBUMIN SERPL-MCNC: 3.4 G/DL (ref 3.5–5.2)
ALBUMIN/GLOB SERPL: 1.3 G/DL
ALP SERPL-CCNC: 93 U/L (ref 39–117)
ALT SERPL W P-5'-P-CCNC: 18 U/L (ref 1–33)
ANION GAP SERPL CALCULATED.3IONS-SCNC: 9.5 MMOL/L (ref 5–15)
AST SERPL-CCNC: 15 U/L (ref 1–32)
BASOPHILS # BLD AUTO: 0.06 10*3/MM3 (ref 0–0.2)
BASOPHILS NFR BLD AUTO: 0.4 % (ref 0–1.5)
BILIRUB SERPL-MCNC: 0.3 MG/DL (ref 0–1.2)
BUN SERPL-MCNC: 53 MG/DL (ref 8–23)
BUN/CREAT SERPL: 49.5 (ref 7–25)
CALCIUM SPEC-SCNC: 9.6 MG/DL (ref 8.6–10.5)
CHLORIDE SERPL-SCNC: 100 MMOL/L (ref 98–107)
CO2 SERPL-SCNC: 24.5 MMOL/L (ref 22–29)
CREAT SERPL-MCNC: 1.07 MG/DL (ref 0.57–1)
DEPRECATED RDW RBC AUTO: 46.5 FL (ref 37–54)
EGFRCR SERPLBLD CKD-EPI 2021: 53.9 ML/MIN/1.73
EOSINOPHIL # BLD AUTO: 0.29 10*3/MM3 (ref 0–0.4)
EOSINOPHIL NFR BLD AUTO: 2 % (ref 0.3–6.2)
ERYTHROCYTE [DISTWIDTH] IN BLOOD BY AUTOMATED COUNT: 13.2 % (ref 12.3–15.4)
GLOBULIN UR ELPH-MCNC: 2.6 GM/DL
GLUCOSE BLDC GLUCOMTR-MCNC: 178 MG/DL (ref 70–130)
GLUCOSE BLDC GLUCOMTR-MCNC: 199 MG/DL (ref 70–130)
GLUCOSE BLDC GLUCOMTR-MCNC: 202 MG/DL (ref 70–130)
GLUCOSE BLDC GLUCOMTR-MCNC: 247 MG/DL (ref 70–130)
GLUCOSE SERPL-MCNC: 209 MG/DL (ref 65–99)
HCT VFR BLD AUTO: 48.6 % (ref 34–46.6)
HGB BLD-MCNC: 15.3 G/DL (ref 12–15.9)
IMM GRANULOCYTES # BLD AUTO: 0.3 10*3/MM3 (ref 0–0.05)
IMM GRANULOCYTES NFR BLD AUTO: 2.1 % (ref 0–0.5)
LYMPHOCYTES # BLD AUTO: 3.97 10*3/MM3 (ref 0.7–3.1)
LYMPHOCYTES NFR BLD AUTO: 27.9 % (ref 19.6–45.3)
MAGNESIUM SERPL-MCNC: 2.2 MG/DL (ref 1.6–2.4)
MCH RBC QN AUTO: 29.9 PG (ref 26.6–33)
MCHC RBC AUTO-ENTMCNC: 31.5 G/DL (ref 31.5–35.7)
MCV RBC AUTO: 95.1 FL (ref 79–97)
MONOCYTES # BLD AUTO: 1.11 10*3/MM3 (ref 0.1–0.9)
MONOCYTES NFR BLD AUTO: 7.8 % (ref 5–12)
NEUTROPHILS NFR BLD AUTO: 59.8 % (ref 42.7–76)
NEUTROPHILS NFR BLD AUTO: 8.49 10*3/MM3 (ref 1.7–7)
NRBC BLD AUTO-RTO: 0 /100 WBC (ref 0–0.2)
PHOSPHATE SERPL-MCNC: 4.8 MG/DL (ref 2.5–4.5)
PLATELET # BLD AUTO: 207 10*3/MM3 (ref 140–450)
PMV BLD AUTO: 10.2 FL (ref 6–12)
POTASSIUM SERPL-SCNC: 3.8 MMOL/L (ref 3.5–5.2)
PROT SERPL-MCNC: 6 G/DL (ref 6–8.5)
RBC # BLD AUTO: 5.11 10*6/MM3 (ref 3.77–5.28)
SODIUM SERPL-SCNC: 134 MMOL/L (ref 136–145)
WBC NRBC COR # BLD AUTO: 14.22 10*3/MM3 (ref 3.4–10.8)

## 2024-01-15 PROCEDURE — 94799 UNLISTED PULMONARY SVC/PX: CPT

## 2024-01-15 PROCEDURE — 84100 ASSAY OF PHOSPHORUS: CPT | Performed by: INTERNAL MEDICINE

## 2024-01-15 PROCEDURE — 83735 ASSAY OF MAGNESIUM: CPT | Performed by: INTERNAL MEDICINE

## 2024-01-15 PROCEDURE — 82948 REAGENT STRIP/BLOOD GLUCOSE: CPT

## 2024-01-15 PROCEDURE — 94761 N-INVAS EAR/PLS OXIMETRY MLT: CPT

## 2024-01-15 PROCEDURE — 97110 THERAPEUTIC EXERCISES: CPT

## 2024-01-15 PROCEDURE — 63710000001 INSULIN LISPRO (HUMAN) PER 5 UNITS: Performed by: INTERNAL MEDICINE

## 2024-01-15 PROCEDURE — 94664 DEMO&/EVAL PT USE INHALER: CPT

## 2024-01-15 PROCEDURE — 85025 COMPLETE CBC W/AUTO DIFF WBC: CPT | Performed by: INTERNAL MEDICINE

## 2024-01-15 PROCEDURE — 80053 COMPREHEN METABOLIC PANEL: CPT | Performed by: INTERNAL MEDICINE

## 2024-01-15 PROCEDURE — 94660 CPAP INITIATION&MGMT: CPT

## 2024-01-15 PROCEDURE — 97116 GAIT TRAINING THERAPY: CPT

## 2024-01-15 RX ORDER — CETIRIZINE HYDROCHLORIDE 10 MG/1
5 TABLET ORAL DAILY
Status: DISCONTINUED | OUTPATIENT
Start: 2024-01-16 | End: 2024-01-18 | Stop reason: HOSPADM

## 2024-01-15 RX ADMIN — GUAIFENESIN 600 MG: 600 TABLET, EXTENDED RELEASE ORAL at 20:23

## 2024-01-15 RX ADMIN — EMPAGLIFLOZIN 25 MG: 10 TABLET, FILM COATED ORAL at 09:05

## 2024-01-15 RX ADMIN — ACETYLCYSTEINE 3 ML: 200 SOLUTION ORAL; RESPIRATORY (INHALATION) at 07:37

## 2024-01-15 RX ADMIN — IPRATROPIUM BROMIDE AND ALBUTEROL SULFATE 3 ML: 2.5; .5 SOLUTION RESPIRATORY (INHALATION) at 07:37

## 2024-01-15 RX ADMIN — DOXYCYCLINE 100 MG: 100 INJECTION, POWDER, LYOPHILIZED, FOR SOLUTION INTRAVENOUS at 09:06

## 2024-01-15 RX ADMIN — Medication 10 ML: at 20:25

## 2024-01-15 RX ADMIN — INSULIN LISPRO 4 UNITS: 100 INJECTION, SOLUTION INTRAVENOUS; SUBCUTANEOUS at 17:28

## 2024-01-15 RX ADMIN — INSULIN LISPRO 2 UNITS: 100 INJECTION, SOLUTION INTRAVENOUS; SUBCUTANEOUS at 20:31

## 2024-01-15 RX ADMIN — INSULIN LISPRO 4 UNITS: 100 INJECTION, SOLUTION INTRAVENOUS; SUBCUTANEOUS at 09:05

## 2024-01-15 RX ADMIN — MONTELUKAST SODIUM 10 MG: 10 TABLET, COATED ORAL at 20:23

## 2024-01-15 RX ADMIN — Medication 10 ML: at 20:24

## 2024-01-15 RX ADMIN — Medication 10 ML: at 09:06

## 2024-01-15 RX ADMIN — CETIRIZINE HYDROCHLORIDE 10 MG: 10 TABLET, FILM COATED ORAL at 09:05

## 2024-01-15 RX ADMIN — AMLODIPINE BESYLATE 5 MG: 5 TABLET ORAL at 17:28

## 2024-01-15 RX ADMIN — INSULIN LISPRO 2 UNITS: 100 INJECTION, SOLUTION INTRAVENOUS; SUBCUTANEOUS at 11:20

## 2024-01-15 RX ADMIN — IPRATROPIUM BROMIDE AND ALBUTEROL SULFATE 3 ML: 2.5; .5 SOLUTION RESPIRATORY (INHALATION) at 13:34

## 2024-01-15 RX ADMIN — METOPROLOL SUCCINATE 25 MG: 25 TABLET, EXTENDED RELEASE ORAL at 17:28

## 2024-01-15 RX ADMIN — GUAIFENESIN 600 MG: 600 TABLET, EXTENDED RELEASE ORAL at 09:05

## 2024-01-15 RX ADMIN — ACETYLCYSTEINE 3 ML: 200 SOLUTION ORAL; RESPIRATORY (INHALATION) at 18:24

## 2024-01-15 RX ADMIN — INSULIN LISPRO 2 UNITS: 100 INJECTION, SOLUTION INTRAVENOUS; SUBCUTANEOUS at 09:05

## 2024-01-15 RX ADMIN — CLOPIDOGREL BISULFATE 75 MG: 75 TABLET, FILM COATED ORAL at 09:05

## 2024-01-15 RX ADMIN — IPRATROPIUM BROMIDE AND ALBUTEROL SULFATE 3 ML: 2.5; .5 SOLUTION RESPIRATORY (INHALATION) at 00:27

## 2024-01-15 RX ADMIN — ACETYLCYSTEINE 3 ML: 200 SOLUTION ORAL; RESPIRATORY (INHALATION) at 00:27

## 2024-01-15 RX ADMIN — INSULIN LISPRO 2 UNITS: 100 INJECTION, SOLUTION INTRAVENOUS; SUBCUTANEOUS at 11:19

## 2024-01-15 RX ADMIN — INSULIN LISPRO 2 UNITS: 100 INJECTION, SOLUTION INTRAVENOUS; SUBCUTANEOUS at 17:28

## 2024-01-15 RX ADMIN — ATORVASTATIN CALCIUM 10 MG: 10 TABLET, FILM COATED ORAL at 20:23

## 2024-01-15 RX ADMIN — ACETYLCYSTEINE 3 ML: 200 SOLUTION ORAL; RESPIRATORY (INHALATION) at 13:34

## 2024-01-15 RX ADMIN — LISINOPRIL 5 MG: 2.5 TABLET ORAL at 17:28

## 2024-01-15 RX ADMIN — FLUTICASONE PROPIONATE 2 SPRAY: 50 SPRAY, METERED NASAL at 09:06

## 2024-01-15 RX ADMIN — IPRATROPIUM BROMIDE AND ALBUTEROL SULFATE 3 ML: 2.5; .5 SOLUTION RESPIRATORY (INHALATION) at 18:24

## 2024-01-15 RX ADMIN — BUPROPION HYDROCHLORIDE 150 MG: 150 TABLET, EXTENDED RELEASE ORAL at 20:23

## 2024-01-15 NOTE — PLAN OF CARE
Goal Outcome Evaluation:  Plan of Care Reviewed With: patient           Outcome Evaluation: VSS on HHF NC. Pt ambulated in the room this shift, and tolerated well. Safety maintained, call light in reach.

## 2024-01-15 NOTE — PROGRESS NOTES
Progress Note Pulmonary      Subjective     Interval History:         Review of Systems:    Reviewed ; unchanged       Vital Signs  Temp:  [97.6 °F (36.4 °C)-98.4 °F (36.9 °C)] 97.7 °F (36.5 °C)  Heart Rate:  [68-82] 77  Resp:  [13-28] 20  BP: (104-155)/() 121/110  Body mass index is 35.01 kg/m².    Intake/Output Summary (Last 24 hours) at 1/15/2024 1441  Last data filed at 1/15/2024 1200  Gross per 24 hour   Intake 1009.15 ml   Output 1050 ml   Net -40.85 ml     I/O this shift:  In: 585 [P.O.:585]  Out: -     Physical Exam:  General- normal in appearance, not in any acute distress    HEENT- pupils equally reactive to light, normal in size, no scleral icterus    Neck- supple    No JVD, no carotid bruit    Respiratory-bilateral air entry, diffuse wheezing with prolonged expiratory phase, few basilar rales, no egophony.    Cardiovascular-  Normal S1 and S2. No S3, S4 or murmurs.    GI-nontender nondistended bowel sounds positive    CNS-alert oriented x3, grossly nonfocal    Extremities- pulses normal bilaterally , no clubbing and edema        Results Review:      Results from last 7 days   Lab Units 01/15/24  0045 01/14/24  0030 01/13/24  0431   WBC 10*3/mm3 14.22* 13.96* 10.05   HEMOGLOBIN g/dL 15.3 15.5 14.3   PLATELETS 10*3/mm3 207 202 177     Results from last 7 days   Lab Units 01/15/24  0045 01/14/24  0030 01/13/24  0431   SODIUM mmol/L 134* 136 137   POTASSIUM mmol/L 3.8 4.1 4.6   CHLORIDE mmol/L 100 97* 101   CO2 mmol/L 24.5 26.0 27.2   BUN mg/dL 53* 46* 45*   CREATININE mg/dL 1.07* 0.90 0.90   CALCIUM mg/dL 9.6 9.6 9.6   GLUCOSE mg/dL 209* 227* 297*   MAGNESIUM mg/dL 2.2 2.3 2.3     Lab Results   Component Value Date    INR 1.02 01/12/2024    INR 1.02 01/11/2024    INR 0.94 01/08/2024    PROTIME 13.9 01/12/2024    PROTIME 13.9 01/11/2024    PROTIME 13.1 01/08/2024     Results from last 7 days   Lab Units 01/15/24  0045 01/14/24  0030 01/12/24  0423   ALK PHOS U/L 93 98 76   BILIRUBIN mg/dL 0.3 0.3 0.4    ALT (SGPT) U/L 18 21 26   AST (SGOT) U/L 15 10 11     Results from last 7 days   Lab Units 01/11/24  0425   PH, ARTERIAL pH units 7.383  7.377   PO2 ART mm Hg 81.6*  65.3*   PCO2, ARTERIAL mm Hg 47.2*  47.9*   HCO3 ART mmol/L 28.1*  28.1*     Imaging Results (Last 24 Hours)       ** No results found for the last 24 hours. **                 acetylcysteine, 3 mL, Nebulization, 4x Daily - RT  amLODIPine, 5 mg, Oral, Q PM  atorvastatin, 10 mg, Oral, Nightly  buPROPion SR, 150 mg, Oral, Nightly  cefTRIAXone, 1,000 mg, Intravenous, Q24H  [START ON 1/16/2024] cetirizine, 5 mg, Oral, Daily  cholecalciferol, 50,000 Units, Oral, Weekly  clopidogrel, 75 mg, Oral, Daily  empagliflozin, 25 mg, Oral, Daily  fluticasone, 2 spray, Nasal, Daily  guaiFENesin, 600 mg, Oral, Q12H  insulin lispro, 2 Units, Subcutaneous, TID With Meals  insulin lispro, 2-9 Units, Subcutaneous, 4x Daily AC & at Bedtime  ipratropium-albuterol, 3 mL, Nebulization, 4x Daily - RT  lisinopril, 5 mg, Oral, Q PM  metoprolol succinate XL, 25 mg, Oral, Q PM  montelukast, 10 mg, Oral, Nightly  senna-docusate sodium, 2 tablet, Oral, BID  sodium chloride, 10 mL, Intravenous, Q12H  sodium chloride, 10 mL, Intravenous, Q12H           Medication Review:     Assessment & Plan     #1 acute hypoxic respiratory failure.  Mucous plugging, status post bronchoscopy.        2.  Pneumonia.    3.  COPD.    Continue supportive care.  Titrate oxygen to a saturation of 92%.  Incentive spirometry, chest PT with flutter valve.    I have personally reviewed x-rays, labs, medication list.    Total time spent 30 minutes              Victor Hugo Davis MD  01/15/24  14:41 EST

## 2024-01-15 NOTE — PLAN OF CARE
Goal Outcome Evaluation:              Outcome Evaluation: Patient resting in bed at this time. Patient was unable to tolerate bi-pap throughout the night. Patient is on heated high flow nasal cannula at 60L 70%. Patient denies complaints. Call light within reach.

## 2024-01-15 NOTE — THERAPY TREATMENT NOTE
Acute Care - Physical Therapy Treatment Note   [facility]     Patient Name: Evie Shah  : 1945  MRN: [id number]  Today's Date: 1/15/2024      Visit Dx:     ICD-10-CM ICD-9-CM   1. Hypoxemia  R09.02 799.02   2. COPD exacerbation  J44.1 491.21   3. Acute respiratory failure with hypoxia  J96.01 518.81   4. Lung mass  R91.8 786.6     Patient Active Problem List   Diagnosis    DM type 2 with diabetic mixed hyperlipidemia    Dyslipidemia    Essential hypertension    Senile osteoporosis    Chronic allergic rhinitis    VISHAL (generalized anxiety disorder)    PAD (peripheral artery disease)    Severe obesity with body mass index (BMI) of 35.0 to 35.9 and comorbidity    COPD mixed type    Former smoker    Chronic respiratory failure with hypoxia    Type 2 diabetes mellitus with hyperglycemia, without long-term current use of insulin    Primary osteoarthritis of both knees    Coronary artery calcification seen on CT scan    Elevated hematocrit    Acute idiopathic gout of left hand    Vitamin D deficiency    Polycythemia vera    Respiratory failure with hypoxia    Lung mass     Past Medical History:   Diagnosis Date    Allergic rhinitis     Asthma     Atherosclerosis     COPD (chronic obstructive pulmonary disease)     Cough     Diabetes mellitus     Hyperlipidemia     Hypertension     Menopause     Nausea and vomiting     Obesity 3/7/2017    Osteoarthritis     Osteoporosis     Vertigo, benign paroxysmal      Past Surgical History:   Procedure Laterality Date    BRONCHOSCOPY Bilateral 2024    Procedure: BRONCHOSCOPY WITH ENDOBRONCHIAL ULTRASOUND;  Surgeon: Augustin Stafford MD;  Location: [facility];  Service: Pulmonary;  Laterality: Bilateral;    CATARACT EXTRACTION      COLONOSCOPY      EYE SURGERY      catarac    ILIAC ARTERY STENT  2015    SKIN CANCER EXCISION      nose    TONSILLECTOMY      and adenoidectomy    TUBAL ABDOMINAL LIGATION       PT Assessment (last 12 hours)       PT Evaluation and  Treatment       Row Name 01/15/24 1447          Physical Therapy Time and Intention    Subjective Information complains of;weakness;fatigue  -CT     Document Type therapy note (daily note)  -CT     Mode of Treatment individual therapy;physical therapy  -CT     Patient Effort good  -CT       Row Name 01/15/24 1445          General Information    Patient Profile Reviewed yes  -CT     Existing Precautions/Restrictions fall;oxygen therapy device and L/min  -CT       Row Name 01/15/24 1445          Living Environment    Primary Care Provided by self  -CT       Row Name 01/15/24 1445          Home Use of Assistive/Adaptive Equipment    Equipment Currently Used at Home bp cuff;rollator;oxygen  -CT       Row Name 01/15/24 1445          Cognition    Affect/Mental Status (Cognition) WFL  -CT     Orientation Status (Cognition) oriented x 4  -CT     Follows Commands (Cognition) WFL  -CT       Row Name 01/15/24 1445          Sit-Stand Transfer    Sit-Stand [place] (Transfers) standby assist  -CT       Row Name 01/15/24 1445          Stand-Sit Transfer    Stand-Sit [place] (Transfers) standby assist  -CT       Row Name 01/15/24 1442          Gait/Stairs (Locomotion)    [place] Level (Gait) contact guard  -CT     Assistive Device (Gait) --  R handheld assist  -CT     Patient was able to Ambulate yes  -CT     Distance in Feet (Gait) 15  limited by hiflow oxygen tubing  -CT     Pattern (Gait) step-through  -CT     Deviations/Abnormal Patterns (Gait) gait speed decreased;weight shifting decreased  -CT       Row Name 01/15/24 1447          Safety Issues, Functional Mobility    Impairments Affecting Function (Mobility) balance;endurance/activity tolerance  -CT       Row Name 01/15/24 1443          Motor Skills    Therapeutic Exercise --  BLE standing ther ex; BLE sitting ther ex  -CT       Row Name 01/15/24 1444          Coping    Observed Emotional State calm;cooperative  -CT       Row Name 01/15/24 1448          Plan  of Care Review    Plan of Care Reviewed With patient  -CT       Row Name 01/15/24 1445          Therapy Assessment/Plan (PT)    Rehab Potential (PT) good, to achieve stated therapy goals  -CT     Criteria for Skilled Interventions Met (PT) yes;skilled treatment is necessary  -CT     Therapy Frequency (PT) 2 times/wk  2-5x/wk  -CT     Problem List (PT) problems related to;balance;mobility  -CT     Activity Limitations Related to Problem List (PT) unable to ambulate safely;unable to transfer safely  -CT               User Key  (r) = Recorded By, (t) = Taken By, (c) = Cosigned By      Initials Name Provider Type    CT Jesenia Louis PT Physical Therapist                    Physical Therapy Education       Title: PT OT SLP Therapies (Done)       Topic: Physical Therapy (Done)       Point: Mobility training (Done)       Learning Progress Summary             Patient Acceptance, E,TB, VU by  at 1/15/2024 0002    Acceptance, E, NR,VU by  at 1/14/2024 0303    Acceptance, E, VU,NR by  at 1/11/2024 0223    Acceptance, E, NR by  at 1/9/2024 0357    Acceptance, E, NR by  at 1/7/2024 2301    Acceptance, E,TB, VU,NR by EB at 1/4/2024 2314    Acceptance, E,TB, VU by WESLEY at 1/4/2024 1515                         Point: Home exercise program (Done)       Learning Progress Summary             Patient Acceptance, E,TB, VU by  at 1/15/2024 0002    Acceptance, E, NR,VU by  at 1/14/2024 0303    Acceptance, E, VU,NR by  at 1/11/2024 0223    Acceptance, E, NR by  at 1/9/2024 0357    Acceptance, E, NR by  at 1/7/2024 2301    Acceptance, E,TB, VU,NR by EB at 1/4/2024 2314    Acceptance, E,TB, VU by KM at 1/4/2024 1515                         Point: Body mechanics (Done)       Learning Progress Summary             Patient Acceptance, E,TB, VU by  at 1/15/2024 0002    Acceptance, E, NR,VU by  at 1/14/2024 0303    Acceptance, E, VU,NR by  at 1/11/2024 0223    Acceptance, E, NR by  at 1/9/2024 0357    Acceptance, E, NR  by  at 1/7/2024 2301    Acceptance, E,TB, VU,NR by  at 1/4/2024 2314    Acceptance, E,TB, VU by  at 1/4/2024 1515                         Point: Precautions (Done)       Learning Progress Summary             Patient Acceptance, E,TB, VU by  at 1/15/2024 0002    Acceptance, E, NR,VU by  at 1/14/2024 0303    Acceptance, E, VU,NR by  at 1/11/2024 0223    Acceptance, E, NR by  at 1/9/2024 0357    Acceptance, E, NR by  at 1/7/2024 2301    Acceptance, E,TB, VU,NR by  at 1/4/2024 2314    Acceptance, E,TB, VU by  at 1/4/2024 1515                                         User Key       Initials Effective Dates Name Provider Type Discipline     06/16/21 -  Rebecca Berman, RN Registered Nurse Nurse     12/15/23 -  Amina Muniz RN Registered Nurse Nurse     06/16/21 -  Naye Lewis, HELIO Registered Nurse Nurse     05/24/22 -  Chon Rowell, PT Physical Therapist PT     06/21/23 -  Brooklynn Kruger, RN Registered Nurse Nurse                  PT Recommendation and Plan  Anticipated Discharge Disposition (PT): home, home with assist  Therapy Frequency (PT): 2 times/wk (2-5x/wk)  Plan of Care Reviewed With: patient       Time Calculation:    PT Charges       Row Name 01/15/24 1446             Time Calculation    PT Received On 01/15/24  -CT         Time Calculation- PT    Total Timed Code Minutes- PT 33 minute(s)  -CT                User Key  (r) = Recorded By, (t) = Taken By, (c) = Cosigned By      Initials Name Provider Type    CT Jesenia Louis, PT Physical Therapist                  Therapy Charges for Today       Code Description Service Date Service Provider Modifiers Qty    43115528895 HC GAIT TRAINING EA 15 MIN 1/15/2024 Jesenia Louis, PT GP 1    34449301814 HC PT THER PROC EA 15 MIN 1/15/2024 Jesenia Louis, PT GP 1            PT G-Codes  AM-PAC 6 Clicks Score (PT): 18    Jesenia Louis PT  1/15/2024

## 2024-01-16 LAB
GLUCOSE BLDC GLUCOMTR-MCNC: 168 MG/DL (ref 70–130)
GLUCOSE BLDC GLUCOMTR-MCNC: 172 MG/DL (ref 70–130)
GLUCOSE BLDC GLUCOMTR-MCNC: 177 MG/DL (ref 70–130)
GLUCOSE BLDC GLUCOMTR-MCNC: 199 MG/DL (ref 70–130)

## 2024-01-16 PROCEDURE — 94799 UNLISTED PULMONARY SVC/PX: CPT

## 2024-01-16 PROCEDURE — 97530 THERAPEUTIC ACTIVITIES: CPT

## 2024-01-16 PROCEDURE — 94761 N-INVAS EAR/PLS OXIMETRY MLT: CPT

## 2024-01-16 PROCEDURE — 99232 SBSQ HOSP IP/OBS MODERATE 35: CPT | Performed by: HOSPITALIST

## 2024-01-16 PROCEDURE — 94668 MNPJ CHEST WALL SBSQ: CPT

## 2024-01-16 PROCEDURE — 94664 DEMO&/EVAL PT USE INHALER: CPT

## 2024-01-16 PROCEDURE — 63710000001 INSULIN LISPRO (HUMAN) PER 5 UNITS: Performed by: INTERNAL MEDICINE

## 2024-01-16 PROCEDURE — 82948 REAGENT STRIP/BLOOD GLUCOSE: CPT

## 2024-01-16 PROCEDURE — 97116 GAIT TRAINING THERAPY: CPT

## 2024-01-16 RX ADMIN — FLUTICASONE PROPIONATE 2 SPRAY: 50 SPRAY, METERED NASAL at 08:31

## 2024-01-16 RX ADMIN — OFLOXACIN 50000 UNITS: 300 TABLET, COATED ORAL at 08:30

## 2024-01-16 RX ADMIN — ACETYLCYSTEINE 3 ML: 200 SOLUTION ORAL; RESPIRATORY (INHALATION) at 06:59

## 2024-01-16 RX ADMIN — LISINOPRIL 5 MG: 2.5 TABLET ORAL at 17:17

## 2024-01-16 RX ADMIN — INSULIN LISPRO 2 UNITS: 100 INJECTION, SOLUTION INTRAVENOUS; SUBCUTANEOUS at 11:22

## 2024-01-16 RX ADMIN — CETIRIZINE HYDROCHLORIDE 5 MG: 10 TABLET, FILM COATED ORAL at 08:30

## 2024-01-16 RX ADMIN — ACETYLCYSTEINE 3 ML: 200 SOLUTION ORAL; RESPIRATORY (INHALATION) at 13:16

## 2024-01-16 RX ADMIN — Medication 10 ML: at 20:59

## 2024-01-16 RX ADMIN — BUPROPION HYDROCHLORIDE 150 MG: 150 TABLET, EXTENDED RELEASE ORAL at 20:59

## 2024-01-16 RX ADMIN — Medication 10 ML: at 08:32

## 2024-01-16 RX ADMIN — IPRATROPIUM BROMIDE AND ALBUTEROL SULFATE 3 ML: 2.5; .5 SOLUTION RESPIRATORY (INHALATION) at 18:38

## 2024-01-16 RX ADMIN — INSULIN LISPRO 2 UNITS: 100 INJECTION, SOLUTION INTRAVENOUS; SUBCUTANEOUS at 20:59

## 2024-01-16 RX ADMIN — ACETYLCYSTEINE 3 ML: 200 SOLUTION ORAL; RESPIRATORY (INHALATION) at 18:38

## 2024-01-16 RX ADMIN — GUAIFENESIN 600 MG: 600 TABLET, EXTENDED RELEASE ORAL at 08:30

## 2024-01-16 RX ADMIN — AMLODIPINE BESYLATE 5 MG: 5 TABLET ORAL at 17:13

## 2024-01-16 RX ADMIN — MONTELUKAST SODIUM 10 MG: 10 TABLET, COATED ORAL at 20:59

## 2024-01-16 RX ADMIN — IPRATROPIUM BROMIDE AND ALBUTEROL SULFATE 3 ML: 2.5; .5 SOLUTION RESPIRATORY (INHALATION) at 00:25

## 2024-01-16 RX ADMIN — ATORVASTATIN CALCIUM 10 MG: 10 TABLET, FILM COATED ORAL at 20:59

## 2024-01-16 RX ADMIN — INSULIN LISPRO 2 UNITS: 100 INJECTION, SOLUTION INTRAVENOUS; SUBCUTANEOUS at 08:32

## 2024-01-16 RX ADMIN — IPRATROPIUM BROMIDE AND ALBUTEROL SULFATE 3 ML: 2.5; .5 SOLUTION RESPIRATORY (INHALATION) at 13:16

## 2024-01-16 RX ADMIN — INSULIN LISPRO 2 UNITS: 100 INJECTION, SOLUTION INTRAVENOUS; SUBCUTANEOUS at 17:14

## 2024-01-16 RX ADMIN — IPRATROPIUM BROMIDE AND ALBUTEROL SULFATE 3 ML: 2.5; .5 SOLUTION RESPIRATORY (INHALATION) at 06:59

## 2024-01-16 RX ADMIN — INSULIN LISPRO 2 UNITS: 100 INJECTION, SOLUTION INTRAVENOUS; SUBCUTANEOUS at 08:31

## 2024-01-16 RX ADMIN — Medication 10 ML: at 21:00

## 2024-01-16 RX ADMIN — EMPAGLIFLOZIN 25 MG: 10 TABLET, FILM COATED ORAL at 08:31

## 2024-01-16 RX ADMIN — METOPROLOL SUCCINATE 25 MG: 25 TABLET, EXTENDED RELEASE ORAL at 17:13

## 2024-01-16 RX ADMIN — GUAIFENESIN 600 MG: 600 TABLET, EXTENDED RELEASE ORAL at 20:59

## 2024-01-16 RX ADMIN — CLOPIDOGREL BISULFATE 75 MG: 75 TABLET, FILM COATED ORAL at 08:31

## 2024-01-16 RX ADMIN — ACETYLCYSTEINE 3 ML: 200 SOLUTION ORAL; RESPIRATORY (INHALATION) at 00:25

## 2024-01-16 NOTE — PROGRESS NOTES
[facility]     Patient Identification:  Name:  Evie Shah  Age:  78 y.o.  Sex:  female  :  1945  MRN:  [id number]  Visit Number:  [id number]  Primary Care Provider:  Camryn Mota PA    Length of stay:  14    Subjective: Patient seen and examined assisted by HELIO Love.  Patient reports she is better, she still on 60 L heated high flow, after fixing her pulse ox sensors, she is maintaining 96%, she is able to tolerate down to 40 L heated high flow maintaining O2 saturation of 90%    Chief Complaint: Short of breath  ----------------------------------------------------------------------------------------------------------------------  Current Hospital Meds:  acetylcysteine, 3 mL, Nebulization, 4x Daily - RT  amLODIPine, 5 mg, Oral, Q PM  atorvastatin, 10 mg, Oral, Nightly  buPROPion SR, 150 mg, Oral, Nightly  cetirizine, 5 mg, Oral, Daily  cholecalciferol, 50,000 Units, Oral, Weekly  clopidogrel, 75 mg, Oral, Daily  empagliflozin, 25 mg, Oral, Daily  fluticasone, 2 spray, Nasal, Daily  guaiFENesin, 600 mg, Oral, Q12H  insulin lispro, 2 Units, Subcutaneous, TID With Meals  insulin lispro, 2-9 Units, Subcutaneous, 4x Daily AC & at Bedtime  ipratropium-albuterol, 3 mL, Nebulization, 4x Daily - RT  lisinopril, 5 mg, Oral, Q PM  metoprolol succinate XL, 25 mg, Oral, Q PM  montelukast, 10 mg, Oral, Nightly  senna-docusate sodium, 2 tablet, Oral, BID  sodium chloride, 10 mL, Intravenous, Q12H  sodium chloride, 10 mL, Intravenous, Q12H         ----------------------------------------------------------------------------------------------------------------------  Vital Signs:  Temp:  [97.3 °F (36.3 °C)-98.5 °F (36.9 °C)] 98 °F (36.7 °C)  Heart Rate:  [62-89] 77  Resp:  [18-26] 21  BP: ()/(50-94) 104/51       Tele: Sinus rhythm 77 bpm      24  0500 01/15/24  0500 24  0400   Weight: 90.7 kg (200 lb) 89.6 kg (197 lb 9.6 oz) 90.6 kg (199 lb 11.8 oz)      Body mass index is 35.39 kg/m².    Intake/Output Summary (Last 24 hours) at 1/16/2024 1414  Last data filed at 1/16/2024 1200  Gross per 24 hour   Intake 940 ml   Output 1050 ml   Net -110 ml     Diet: Diabetic Diets, Cardiac Diets; Healthy Heart (2-3 Na+); Consistent Carbohydrate; Texture: Regular Texture (IDDSI 7); Fluid Consistency: Thin (IDDSI 0)  ----------------------------------------------------------------------------------------------------------------------  Physical exam:  General: Comfortable,awake, alert, oriented to self, place, and time, well-developed and well-nourished.  No respiratory distress.    Skin:  Skin is warm and dry. No rash noted. No pallor.    HENT:  Head:  Normocephalic and atraumatic.  Mouth:  Moist mucous membranes.    Eyes:  Conjunctivae and EOM are normal.  Pupils are equal, round, and reactive to light.  No scleral icterus.    Neck:  Neck supple.  No JVD present.    Pulmonary/Chest:  No respiratory distress, no wheezes, no crackles, with normal breath sounds and good air movement.  Cardiovascular:  Normal rate, regular rhythm and normal heart sounds with no murmur.  Abdominal:  Soft.  Bowel sounds are normal.  No distension and no tenderness.   Extremities:  No edema, no tenderness, and no deformity.  No red or swollen joints anywhere.  Strong pulses in all 4 extremities with no clubbing, no cyanosis, no edema.  Neurological:  Motor strength equal no obvious deficit, sensory grossly intact.   No cranial nerve deficit.  No tongue deviation.  No facial droop.  No slurred speech.    Genitourinary: No Palomino catheter  Back:  ----------------------------------------------------------------------------------------------------------------------  ----------------------------------------------------------------------------------------------------------------------      Results from last 7 days   Lab Units 01/15/24  0045 01/14/24  0030 01/13/24  0431 01/12/24  0423 01/11/24  0201   CRP  "mg/dL  --   --   --   --  2.82*   LACTATE mmol/L  --   --   --   --  1.0   WBC 10*3/mm3 14.22* 13.96* 10.05 9.81 11.05*   HEMOGLOBIN g/dL 15.3 15.5 14.3 14.2 13.8   HEMATOCRIT % 48.6* 48.3* 45.3 44.2 44.4   MCV fL 95.1 94.2 95.4 94.0 97.4*   MCHC g/dL 31.5 32.1 31.6 32.1 31.1*   PLATELETS 10*3/mm3 207 202 177 197 182   INR   --   --   --  1.02 1.02     Results from last 7 days   Lab Units 01/11/24  0425   PH, ARTERIAL pH units 7.383  7.377   PO2 ART mm Hg 81.6*  65.3*   PCO2, ARTERIAL mm Hg 47.2*  47.9*   HCO3 ART mmol/L 28.1*  28.1*     Results from last 7 days   Lab Units 01/15/24  0045 01/14/24  0030 01/13/24  0431 01/12/24  0423   SODIUM mmol/L 134* 136 137 139   POTASSIUM mmol/L 3.8 4.1 4.6 4.6   MAGNESIUM mg/dL 2.2 2.3 2.3 2.1   CHLORIDE mmol/L 100 97* 101 100   CO2 mmol/L 24.5 26.0 27.2 30.7*   BUN mg/dL 53* 46* 45* 30*   CREATININE mg/dL 1.07* 0.90 0.90 0.91   CALCIUM mg/dL 9.6 9.6 9.6 9.5   GLUCOSE mg/dL 209* 227* 297* 229*   ALBUMIN g/dL 3.4* 3.6  --  3.6   BILIRUBIN mg/dL 0.3 0.3  --  0.4   ALK PHOS U/L 93 98  --  76   AST (SGOT) U/L 15 10  --  11   ALT (SGPT) U/L 18 21  --  26   Estimated Creatinine Clearance: 47.8 mL/min (A) (by C-G formula based on SCr of 1.07 mg/dL (H)).    No results found for: \"AMMONIA\"      No results found for: \"BLOODCX\"  No results found for: \"URINECX\"  No results found for: \"WOUNDCX\"  No results found for: \"STOOLCX\"    I have personally looked at the labs and they are summarized above.  ----------------------------------------------------------------------------------------------------------------------  Imaging Results (Last 24 Hours)       ** No results found for the last 24 hours. **          ----------------------------------------------------------------------------------------------------------------------  Assessment and Plan:  -Acute hypoxic respiratory failure  -Left lung atelectasis initially left upper lobe status post bronchoscopy, subsequent left lower lobe " atelectasis status post bronchoscopy secondary to mucous plug  -Diabetes type 2 with hyperglycemia  -Obesity with a BMI of 35  -COPD without exacerbation  -History of peripheral arterial disease that is post stent placement left external iliac    Accu-Chek and sliding scale, continue titrate oxygen supplementation as tolerated maintain O2 sat 88 to 90% continue ambulation out of bed to chair.    Disposition Home pending      Cora Carver MD  01/16/24  14:14 EST

## 2024-01-16 NOTE — THERAPY TREATMENT NOTE
Acute Care - Physical Therapy Treatment Note   [facility]     Patient Name: Evie Shah  : 1945  MRN: [id number]  Today's Date: 2024      Visit Dx:     ICD-10-CM ICD-9-CM   1. Hypoxemia  R09.02 799.02   2. COPD exacerbation  J44.1 491.21   3. Acute respiratory failure with hypoxia  J96.01 518.81   4. Lung mass  R91.8 786.6     Patient Active Problem List   Diagnosis    DM type 2 with diabetic mixed hyperlipidemia    Dyslipidemia    Essential hypertension    Senile osteoporosis    Chronic allergic rhinitis    VISHAL (generalized anxiety disorder)    PAD (peripheral artery disease)    Severe obesity with body mass index (BMI) of 35.0 to 35.9 and comorbidity    COPD mixed type    Former smoker    Chronic respiratory failure with hypoxia    Type 2 diabetes mellitus with hyperglycemia, without long-term current use of insulin    Primary osteoarthritis of both knees    Coronary artery calcification seen on CT scan    Elevated hematocrit    Acute idiopathic gout of left hand    Vitamin D deficiency    Polycythemia vera    Respiratory failure with hypoxia    Lung mass     Past Medical History:   Diagnosis Date    Allergic rhinitis     Asthma     Atherosclerosis     COPD (chronic obstructive pulmonary disease)     Cough     Diabetes mellitus     Hyperlipidemia     Hypertension     Menopause     Nausea and vomiting     Obesity 3/7/2017    Osteoarthritis     Osteoporosis     Vertigo, benign paroxysmal      Past Surgical History:   Procedure Laterality Date    BRONCHOSCOPY Bilateral 2024    Procedure: BRONCHOSCOPY WITH ENDOBRONCHIAL ULTRASOUND;  Surgeon: Augustin Stafford MD;  Location: [facility];  Service: Pulmonary;  Laterality: Bilateral;    CATARACT EXTRACTION      COLONOSCOPY      EYE SURGERY      catarac    ILIAC ARTERY STENT  2015    SKIN CANCER EXCISION      nose    TONSILLECTOMY      and adenoidectomy    TUBAL ABDOMINAL LIGATION       PT Assessment (last 12 hours)       PT Evaluation and  Treatment       Row Name 01/16/24 1431          Physical Therapy Time and Intention    Subjective Information complains of;weakness;fatigue  -CT     Document Type therapy note (daily note)  -CT     Mode of Treatment individual therapy;physical therapy  -CT     Patient Effort good  -CT       Row Name 01/16/24 1431          General Information    Patient Profile Reviewed yes  -CT     Existing Precautions/Restrictions fall;oxygen therapy device and L/min  -CT       Row Name 01/16/24 1431          Living Environment    Primary Care Provided by self  -CT       Row Name 01/16/24 1431          Home Use of Assistive/Adaptive Equipment    Equipment Currently Used at Home bp cuff;rollator;oxygen  -CT       Row Name 01/16/24 1431          Cognition    Affect/Mental Status (Cognition) WFL  -CT     Orientation Status (Cognition) oriented x 4  -CT     Follows Commands (Cognition) WFL  -CT       Row Name 01/16/24 1431          Sit-Stand Transfer    Sit-Stand [place] (Transfers) standby assist  -CT       Row Name 01/16/24 1431          Stand-Sit Transfer    Stand-Sit [place] (Transfers) standby assist  -CT       Row Name 01/16/24 1431          Gait/Stairs (Locomotion)    [place] Level (Gait) contact guard  -CT     Assistive Device (Gait) --  R handheld assist  -CT     Patient was able to Ambulate yes  -CT     Distance in Feet (Gait) 15  -CT     Pattern (Gait) step-through  -CT     Deviations/Abnormal Patterns (Gait) gait speed decreased;weight shifting decreased  -CT       Row Name 01/16/24 1431          Safety Issues, Functional Mobility    Impairments Affecting Function (Mobility) balance;endurance/activity tolerance  -CT       Row Name 01/16/24 1431          Motor Skills    Therapeutic Exercise --  BLE standing ther ex; BLE sitting ther ex  -CT       Row Name 01/16/24 1431          Coping    Observed Emotional State calm;cooperative  -CT     Verbalized Emotional State acceptance  -CT       Row Name 01/16/24 1431           Plan of Care Review    Plan of Care Reviewed With patient  -CT       Row Name 01/16/24 1431          Positioning and Restraints    Pre-Treatment Position in bed  -CT     Post Treatment Position chair  -CT     In Chair sitting;call light within reach;encouraged to call for assist;notified nsg  -CT       Row Name 01/16/24 1431          Therapy Assessment/Plan (PT)    Rehab Potential (PT) good, to achieve stated therapy goals  -CT     Criteria for Skilled Interventions Met (PT) yes;skilled treatment is necessary  -CT     Therapy Frequency (PT) 2 times/wk  2-5x/wk  -CT     Problem List (PT) problems related to;balance;mobility  -CT     Activity Limitations Related to Problem List (PT) unable to ambulate safely;unable to transfer safely  -CT               User Key  (r) = Recorded By, (t) = Taken By, (c) = Cosigned By      Initials Name Provider Type    CT Jesenia Louis, PT Physical Therapist                    Physical Therapy Education       Title: PT OT SLP Therapies (Done)       Topic: Physical Therapy (Done)       Point: Mobility training (Done)       Learning Progress Summary             Patient Acceptance, E,TB, VU by  at 1/15/2024 2040    Acceptance, E,TB, VU by  at 1/15/2024 0002    Acceptance, E, NR,VU by  at 1/14/2024 0303    Acceptance, E, VU,NR by  at 1/11/2024 0223    Acceptance, E, NR by  at 1/9/2024 0357    Acceptance, E, NR by  at 1/7/2024 2301    Acceptance, E,TB, VU,NR by EB at 1/4/2024 2314    Acceptance, E,TB, VU by  at 1/4/2024 1515                         Point: Home exercise program (Done)       Learning Progress Summary             Patient Acceptance, E,TB, VU by  at 1/15/2024 2040    Acceptance, E,TB, VU by  at 1/15/2024 0002    Acceptance, E, NR,VU by  at 1/14/2024 0303    Acceptance, E, VU,NR by  at 1/11/2024 0223    Acceptance, E, NR by  at 1/9/2024 0357    Acceptance, E, NR by  at 1/7/2024 2301    Acceptance, E,TB, VU,NR by EB at 1/4/2024 2314     Acceptance, E,TB, VU by KM at 1/4/2024 1515                         Point: Body mechanics (Done)       Learning Progress Summary             Patient Acceptance, E,TB, VU by  at 1/15/2024 2040    Acceptance, E,TB, VU by  at 1/15/2024 0002    Acceptance, E, NR,VU by  at 1/14/2024 0303    Acceptance, E, VU,NR by  at 1/11/2024 0223    Acceptance, E, NR by  at 1/9/2024 0357    Acceptance, E, NR by  at 1/7/2024 2301    Acceptance, E,TB, VU,NR by  at 1/4/2024 2314    Acceptance, E,TB, VU by KM at 1/4/2024 1515                         Point: Precautions (Done)       Learning Progress Summary             Patient Acceptance, E,TB, VU by  at 1/15/2024 2040    Acceptance, E,TB, VU by  at 1/15/2024 0002    Acceptance, E, NR,VU by  at 1/14/2024 0303    Acceptance, E, VU,NR by  at 1/11/2024 0223    Acceptance, E, NR by  at 1/9/2024 0357    Acceptance, E, NR by  at 1/7/2024 2301    Acceptance, E,TB, VU,NR by  at 1/4/2024 2314    Acceptance, E,TB, VU by  at 1/4/2024 1515                                         User Key       Initials Effective Dates Name Provider Type Discipline     06/16/21 -  Rebecca Berman, RN Registered Nurse Nurse     12/15/23 -  Amina Muniz, RN Registered Nurse Nurse     06/16/21 -  Naye Lewis, HELIO Registered Nurse Nurse     05/24/22 -  Chon Rowell, PT Physical Therapist PT     06/21/23 -  Brooklynn Kruger, RN Registered Nurse Nurse                  PT Recommendation and Plan  Anticipated Discharge Disposition (PT): home, home with assist  Therapy Frequency (PT): 2 times/wk (2-5x/wk)  Plan of Care Reviewed With: patient       Time Calculation:    PT Charges       Row Name 01/16/24 1434             Time Calculation    PT Received On 01/16/24  -CT         Time Calculation- PT    Total Timed Code Minutes- PT 34 minute(s)  -CT                User Key  (r) = Recorded By, (t) = Taken By, (c) = Cosigned By      Initials Name Provider Type    CT Jesenia Louis, PT  Physical Therapist                  Therapy Charges for Today       Code Description Service Date Service Provider Modifiers Qty    83900685528  GAIT TRAINING EA 15 MIN 1/15/2024 Jesenia Louis, PT GP 1    [id number]  PT THER PROC EA 15 MIN 1/15/2024 Jesenia Louis, PT GP 1    [id number]  GAIT TRAINING EA 15 MIN 1/16/2024 Jesenia Louis, PT GP 1    [id number]  PT THERAPEUTIC ACT EA 15 MIN 1/16/2024 Jesenia Louis, PT GP 1            PT G-Codes  AM-PAC 6 Clicks Score (PT): 18    Jesenia Louis, PT  1/16/2024

## 2024-01-16 NOTE — PLAN OF CARE
Goal Outcome Evaluation:  Plan of Care Reviewed With: patient        Progress: improving  Outcome Evaluation: Patient resting calmly in bed. Ambulated inside room today. VSS on HHF NC. Safety maintained, call light in reach. No new needs noted at this time.         Problem: Fall Injury Risk  Goal: Absence of Fall and Fall-Related Injury  Outcome: Ongoing, Progressing  Intervention: Promote Injury-Free Environment  Recent Flowsheet Documentation  Taken 1/16/2024 1500 by Rebecca Nolen RN  Safety Promotion/Fall Prevention:   safety round/check completed   room organization consistent   nonskid shoes/slippers when out of bed   lighting adjusted   fall prevention program maintained   clutter free environment maintained   assistive device/personal items within reach   activity supervised  Taken 1/16/2024 1300 by Rebecca Nolen, RN  Safety Promotion/Fall Prevention:   safety round/check completed   room organization consistent   nonskid shoes/slippers when out of bed   lighting adjusted   fall prevention program maintained   clutter free environment maintained   assistive device/personal items within reach   activity supervised  Taken 1/16/2024 1100 by Rebecca Nolen, RN  Safety Promotion/Fall Prevention:   safety round/check completed   room organization consistent   nonskid shoes/slippers when out of bed   lighting adjusted   fall prevention program maintained   clutter free environment maintained   assistive device/personal items within reach   activity supervised  Taken 1/16/2024 0900 by Rebecca Nolen, RN  Safety Promotion/Fall Prevention:   safety round/check completed   room organization consistent   nonskid shoes/slippers when out of bed   lighting adjusted   fall prevention program maintained   clutter free environment maintained   assistive device/personal items within reach   activity supervised  Taken 1/16/2024 0830 by Rebecca Nolen, RN  Safety Promotion/Fall Prevention:   safety round/check  completed   room organization consistent   nonskid shoes/slippers when out of bed   lighting adjusted   fall prevention program maintained   clutter free environment maintained   assistive device/personal items within reach   activity supervised  Taken 1/16/2024 0700 by Rebecca Nolen, RN  Safety Promotion/Fall Prevention:   safety round/check completed   room organization consistent   nonskid shoes/slippers when out of bed   lighting adjusted   fall prevention program maintained   clutter free environment maintained   assistive device/personal items within reach   activity supervised     Problem: Noninvasive Ventilation Acute  Goal: Effective Unassisted Ventilation and Oxygenation  Outcome: Ongoing, Progressing     Problem: Adult Inpatient Plan of Care  Goal: Plan of Care Review  Outcome: Ongoing, Progressing  Flowsheets (Taken 1/16/2024 1627)  Progress: improving  Plan of Care Reviewed With: patient  Outcome Evaluation: Patient resting calmly in bed. Ambulated inside room today. VSS on HHF NC. Safety maintained, call light in reach. No new needs noted at this time.  Goal: Patient-Specific Goal (Individualized)  Outcome: Ongoing, Progressing  Goal: Absence of Hospital-Acquired Illness or Injury  Outcome: Ongoing, Progressing  Intervention: Identify and Manage Fall Risk  Recent Flowsheet Documentation  Taken 1/16/2024 1500 by Rebecca Nolen, RN  Safety Promotion/Fall Prevention:   safety round/check completed   room organization consistent   nonskid shoes/slippers when out of bed   lighting adjusted   fall prevention program maintained   clutter free environment maintained   assistive device/personal items within reach   activity supervised  Taken 1/16/2024 1300 by Rebecca Nolen, RN  Safety Promotion/Fall Prevention:   safety round/check completed   room organization consistent   nonskid shoes/slippers when out of bed   lighting adjusted   fall prevention program maintained   clutter free environment  maintained   assistive device/personal items within reach   activity supervised  Taken 1/16/2024 1100 by Rebecca Nolen RN  Safety Promotion/Fall Prevention:   safety round/check completed   room organization consistent   nonskid shoes/slippers when out of bed   lighting adjusted   fall prevention program maintained   clutter free environment maintained   assistive device/personal items within reach   activity supervised  Taken 1/16/2024 0900 by Rebecca Nolen RN  Safety Promotion/Fall Prevention:   safety round/check completed   room organization consistent   nonskid shoes/slippers when out of bed   lighting adjusted   fall prevention program maintained   clutter free environment maintained   assistive device/personal items within reach   activity supervised  Taken 1/16/2024 0830 by Rebecca Nolen RN  Safety Promotion/Fall Prevention:   safety round/check completed   room organization consistent   nonskid shoes/slippers when out of bed   lighting adjusted   fall prevention program maintained   clutter free environment maintained   assistive device/personal items within reach   activity supervised  Taken 1/16/2024 0700 by Rebecca Nolen RN  Safety Promotion/Fall Prevention:   safety round/check completed   room organization consistent   nonskid shoes/slippers when out of bed   lighting adjusted   fall prevention program maintained   clutter free environment maintained   assistive device/personal items within reach   activity supervised  Intervention: Prevent Skin Injury  Recent Flowsheet Documentation  Taken 1/16/2024 1400 by Rebecca Nolen RN  Skin Protection:   adhesive use limited   tubing/devices free from skin contact   transparent dressing maintained   skin-to-skin areas padded  Taken 1/16/2024 0830 by Rebecca Nolen RN  Skin Protection:   adhesive use limited   tubing/devices free from skin contact   transparent dressing maintained   skin-to-skin areas padded   pulse oximeter probe site  changed  Intervention: Prevent and Manage VTE (Venous Thromboembolism) Risk  Recent Flowsheet Documentation  Taken 1/16/2024 1400 by Rebecca Nolen RN  Range of Motion: active ROM (range of motion) encouraged  Taken 1/16/2024 0830 by Rebecca Nolen RN  VTE Prevention/Management:   bilateral   sequential compression devices off  Range of Motion: active ROM (range of motion) encouraged  Intervention: Prevent Infection  Recent Flowsheet Documentation  Taken 1/16/2024 1500 by Rebecca Nolen RN  Infection Prevention:   hand hygiene promoted   rest/sleep promoted   single patient room provided  Taken 1/16/2024 1300 by Rebecca Nolen RN  Infection Prevention:   hand hygiene promoted   rest/sleep promoted   single patient room provided  Taken 1/16/2024 1100 by Rebecca Nolen RN  Infection Prevention:   hand hygiene promoted   rest/sleep promoted   single patient room provided  Taken 1/16/2024 0900 by Rebecca Nolen RN  Infection Prevention:   hand hygiene promoted   rest/sleep promoted   single patient room provided  Taken 1/16/2024 0830 by Rebecca Nolen RN  Infection Prevention:   hand hygiene promoted   rest/sleep promoted   single patient room provided  Taken 1/16/2024 0700 by Rebecca Nolen RN  Infection Prevention:   hand hygiene promoted   rest/sleep promoted   single patient room provided  Goal: Optimal Comfort and Wellbeing  Outcome: Ongoing, Progressing  Intervention: Provide Person-Centered Care  Recent Flowsheet Documentation  Taken 1/16/2024 1400 by Rebecca Nolen RN  Trust Relationship/Rapport:   care explained   choices provided   thoughts/feelings acknowledged   reassurance provided  Taken 1/16/2024 0830 by Rebecca Nolen RN  Trust Relationship/Rapport:   care explained   choices provided   thoughts/feelings acknowledged   reassurance provided  Goal: Readiness for Transition of Care  Outcome: Ongoing, Progressing     Problem: Skin Injury Risk Increased  Goal: Skin Health and  Integrity  Outcome: Ongoing, Progressing  Intervention: Optimize Skin Protection  Recent Flowsheet Documentation  Taken 1/16/2024 1400 by Rebecca Nolen RN  Pressure Reduction Techniques:   frequent weight shift encouraged   pressure points protected  Pressure Reduction Devices: pressure-redistributing mattress utilized  Skin Protection:   adhesive use limited   tubing/devices free from skin contact   transparent dressing maintained   skin-to-skin areas padded  Taken 1/16/2024 0830 by Rebecca Nolen RN  Pressure Reduction Techniques:   frequent weight shift encouraged   pressure points protected  Pressure Reduction Devices: pressure-redistributing mattress utilized  Skin Protection:   adhesive use limited   tubing/devices free from skin contact   transparent dressing maintained   skin-to-skin areas padded   pulse oximeter probe site changed     Problem: Fluid Imbalance (Pneumonia)  Goal: Fluid Balance  Outcome: Ongoing, Progressing     Problem: Infection (Pneumonia)  Goal: Resolution of Infection Signs and Symptoms  Outcome: Ongoing, Progressing     Problem: Respiratory Compromise (Pneumonia)  Goal: Effective Oxygenation and Ventilation  Outcome: Ongoing, Progressing  Intervention: Promote Airway Secretion Clearance  Recent Flowsheet Documentation  Taken 1/16/2024 1400 by Rebecca Nolen RN  Cough And Deep Breathing: done independently per patient  Taken 1/16/2024 0830 by Rebecca Nolen RN  Cough And Deep Breathing: done independently per patient

## 2024-01-16 NOTE — PROGRESS NOTES
Progress Note Pulmonary      Subjective     Interval History:   Pt seen on follow up for acute hypoxic respiratory failure. No acute overnight events reported. Pt states she feels better today and is hoping to come off supplemental O2 soon. She was able to walk some with PT.      Review of Systems:    Reviewed ; unchanged       Vital Signs  Temp:  [97.3 °F (36.3 °C)-98.5 °F (36.9 °C)] 98 °F (36.7 °C)  Heart Rate:  [62-89] 76  Resp:  [14-26] 14  BP: ()/(50-94) 130/70  Body mass index is 35.39 kg/m².    Intake/Output Summary (Last 24 hours) at 1/16/2024 1435  Last data filed at 1/16/2024 1200  Gross per 24 hour   Intake 940 ml   Output 1050 ml   Net -110 ml     I/O this shift:  In: 840 [P.O.:840]  Out: -     Physical Exam:  General- normal in appearance, not in any acute distress    HEENT- pupils equally reactive to light, normal in size, no scleral icterus    Respiratory- mild wheezing bilaterally, no increased work of breathing    Cardiovascular-  Normal S1 and S2. No S3, S4 or murmurs.    GI-nontender nondistended bowel sounds positive    CNS-alert oriented x3, grossly nonfocal    Extremities- pulses normal bilaterally , no clubbing and edema        Results Review:      Results from last 7 days   Lab Units 01/15/24  0045 01/14/24  0030 01/13/24  0431   WBC 10*3/mm3 14.22* 13.96* 10.05   HEMOGLOBIN g/dL 15.3 15.5 14.3   PLATELETS 10*3/mm3 207 202 177     Results from last 7 days   Lab Units 01/15/24  0045 01/14/24  0030 01/13/24  0431   SODIUM mmol/L 134* 136 137   POTASSIUM mmol/L 3.8 4.1 4.6   CHLORIDE mmol/L 100 97* 101   CO2 mmol/L 24.5 26.0 27.2   BUN mg/dL 53* 46* 45*   CREATININE mg/dL 1.07* 0.90 0.90   CALCIUM mg/dL 9.6 9.6 9.6   GLUCOSE mg/dL 209* 227* 297*   MAGNESIUM mg/dL 2.2 2.3 2.3     Lab Results   Component Value Date    INR 1.02 01/12/2024    INR 1.02 01/11/2024    INR 0.94 01/08/2024    PROTIME 13.9 01/12/2024    PROTIME 13.9 01/11/2024    PROTIME 13.1 01/08/2024     Results from last 7 days    Lab Units 01/15/24  0045 01/14/24  0030 01/12/24  0423   ALK PHOS U/L 93 98 76   BILIRUBIN mg/dL 0.3 0.3 0.4   ALT (SGPT) U/L 18 21 26   AST (SGOT) U/L 15 10 11     Results from last 7 days   Lab Units 01/11/24  0425   PH, ARTERIAL pH units 7.383  7.377   PO2 ART mm Hg 81.6*  65.3*   PCO2, ARTERIAL mm Hg 47.2*  47.9*   HCO3 ART mmol/L 28.1*  28.1*     Imaging Results (Last 24 Hours)       ** No results found for the last 24 hours. **                 acetylcysteine, 3 mL, Nebulization, 4x Daily - RT  amLODIPine, 5 mg, Oral, Q PM  atorvastatin, 10 mg, Oral, Nightly  buPROPion SR, 150 mg, Oral, Nightly  cetirizine, 5 mg, Oral, Daily  cholecalciferol, 50,000 Units, Oral, Weekly  clopidogrel, 75 mg, Oral, Daily  empagliflozin, 25 mg, Oral, Daily  fluticasone, 2 spray, Nasal, Daily  guaiFENesin, 600 mg, Oral, Q12H  insulin lispro, 2 Units, Subcutaneous, TID With Meals  insulin lispro, 2-9 Units, Subcutaneous, 4x Daily AC & at Bedtime  ipratropium-albuterol, 3 mL, Nebulization, 4x Daily - RT  lisinopril, 5 mg, Oral, Q PM  metoprolol succinate XL, 25 mg, Oral, Q PM  montelukast, 10 mg, Oral, Nightly  senna-docusate sodium, 2 tablet, Oral, BID  sodium chloride, 10 mL, Intravenous, Q12H  sodium chloride, 10 mL, Intravenous, Q12H           Medication Review:     Assessment & Plan   #WILLIAM atelectasis with mucous plugging s/p bronchoscopy   #Acute hypoxic respiratory failure 2/2 above  -Pt overall improving and feeling better today on decreasing O2 requirements  -Continue mucomyst, guaifenesin, duonebs, chest PT, incentive spirometer and flutter valve  -Continue weaning supplemental O2 for goal saturation 88-92%  -Additional recommendations per Dr. Susan Kahn, DO  01/16/24  14:35 EST

## 2024-01-16 NOTE — PLAN OF CARE
Goal Outcome Evaluation:              Outcome Evaluation: Patient resting in bed. Patient denies complaints. Patient is currently utilizing the bi pap. Call light within reach.

## 2024-01-17 LAB
GLUCOSE BLDC GLUCOMTR-MCNC: 118 MG/DL (ref 70–130)
GLUCOSE BLDC GLUCOMTR-MCNC: 150 MG/DL (ref 70–130)
GLUCOSE BLDC GLUCOMTR-MCNC: 186 MG/DL (ref 70–130)
GLUCOSE BLDC GLUCOMTR-MCNC: 202 MG/DL (ref 70–130)
GLUCOSE BLDC GLUCOMTR-MCNC: 215 MG/DL (ref 70–130)

## 2024-01-17 PROCEDURE — 94799 UNLISTED PULMONARY SVC/PX: CPT

## 2024-01-17 PROCEDURE — 97116 GAIT TRAINING THERAPY: CPT

## 2024-01-17 PROCEDURE — 82948 REAGENT STRIP/BLOOD GLUCOSE: CPT

## 2024-01-17 PROCEDURE — 94761 N-INVAS EAR/PLS OXIMETRY MLT: CPT

## 2024-01-17 PROCEDURE — 94660 CPAP INITIATION&MGMT: CPT

## 2024-01-17 PROCEDURE — 99232 SBSQ HOSP IP/OBS MODERATE 35: CPT | Performed by: HOSPITALIST

## 2024-01-17 PROCEDURE — 94664 DEMO&/EVAL PT USE INHALER: CPT

## 2024-01-17 PROCEDURE — 97530 THERAPEUTIC ACTIVITIES: CPT

## 2024-01-17 PROCEDURE — 63710000001 INSULIN LISPRO (HUMAN) PER 5 UNITS: Performed by: INTERNAL MEDICINE

## 2024-01-17 RX ADMIN — CETIRIZINE HYDROCHLORIDE 5 MG: 10 TABLET, FILM COATED ORAL at 09:38

## 2024-01-17 RX ADMIN — EMPAGLIFLOZIN 25 MG: 10 TABLET, FILM COATED ORAL at 09:38

## 2024-01-17 RX ADMIN — CLOPIDOGREL BISULFATE 75 MG: 75 TABLET, FILM COATED ORAL at 09:38

## 2024-01-17 RX ADMIN — METOPROLOL SUCCINATE 25 MG: 25 TABLET, EXTENDED RELEASE ORAL at 17:27

## 2024-01-17 RX ADMIN — ACETYLCYSTEINE 3 ML: 200 SOLUTION ORAL; RESPIRATORY (INHALATION) at 00:19

## 2024-01-17 RX ADMIN — Medication 10 ML: at 09:39

## 2024-01-17 RX ADMIN — INSULIN LISPRO 4 UNITS: 100 INJECTION, SOLUTION INTRAVENOUS; SUBCUTANEOUS at 20:26

## 2024-01-17 RX ADMIN — INSULIN LISPRO 2 UNITS: 100 INJECTION, SOLUTION INTRAVENOUS; SUBCUTANEOUS at 18:05

## 2024-01-17 RX ADMIN — GUAIFENESIN 600 MG: 600 TABLET, EXTENDED RELEASE ORAL at 09:38

## 2024-01-17 RX ADMIN — ACETYLCYSTEINE 3 ML: 200 SOLUTION ORAL; RESPIRATORY (INHALATION) at 06:57

## 2024-01-17 RX ADMIN — MONTELUKAST SODIUM 10 MG: 10 TABLET, COATED ORAL at 20:27

## 2024-01-17 RX ADMIN — ACETYLCYSTEINE 3 ML: 200 SOLUTION ORAL; RESPIRATORY (INHALATION) at 18:46

## 2024-01-17 RX ADMIN — INSULIN LISPRO 4 UNITS: 100 INJECTION, SOLUTION INTRAVENOUS; SUBCUTANEOUS at 18:05

## 2024-01-17 RX ADMIN — INSULIN LISPRO 2 UNITS: 100 INJECTION, SOLUTION INTRAVENOUS; SUBCUTANEOUS at 09:38

## 2024-01-17 RX ADMIN — IPRATROPIUM BROMIDE AND ALBUTEROL SULFATE 3 ML: 2.5; .5 SOLUTION RESPIRATORY (INHALATION) at 12:47

## 2024-01-17 RX ADMIN — Medication 10 ML: at 20:28

## 2024-01-17 RX ADMIN — ACETYLCYSTEINE 3 ML: 200 SOLUTION ORAL; RESPIRATORY (INHALATION) at 12:47

## 2024-01-17 RX ADMIN — ATORVASTATIN CALCIUM 10 MG: 10 TABLET, FILM COATED ORAL at 20:27

## 2024-01-17 RX ADMIN — IPRATROPIUM BROMIDE AND ALBUTEROL SULFATE 3 ML: 2.5; .5 SOLUTION RESPIRATORY (INHALATION) at 06:57

## 2024-01-17 RX ADMIN — BUPROPION HYDROCHLORIDE 150 MG: 150 TABLET, EXTENDED RELEASE ORAL at 20:27

## 2024-01-17 RX ADMIN — Medication 10 ML: at 20:27

## 2024-01-17 RX ADMIN — IPRATROPIUM BROMIDE AND ALBUTEROL SULFATE 3 ML: 2.5; .5 SOLUTION RESPIRATORY (INHALATION) at 00:19

## 2024-01-17 RX ADMIN — LISINOPRIL 5 MG: 2.5 TABLET ORAL at 17:28

## 2024-01-17 RX ADMIN — DOCUSATE SODIUM 50 MG AND SENNOSIDES 8.6 MG 2 TABLET: 8.6; 5 TABLET, FILM COATED ORAL at 09:39

## 2024-01-17 RX ADMIN — INSULIN LISPRO 2 UNITS: 100 INJECTION, SOLUTION INTRAVENOUS; SUBCUTANEOUS at 09:37

## 2024-01-17 RX ADMIN — INSULIN LISPRO 2 UNITS: 100 INJECTION, SOLUTION INTRAVENOUS; SUBCUTANEOUS at 12:15

## 2024-01-17 RX ADMIN — AMLODIPINE BESYLATE 5 MG: 5 TABLET ORAL at 17:28

## 2024-01-17 RX ADMIN — IPRATROPIUM BROMIDE AND ALBUTEROL SULFATE 3 ML: 2.5; .5 SOLUTION RESPIRATORY (INHALATION) at 18:46

## 2024-01-17 RX ADMIN — GUAIFENESIN 600 MG: 600 TABLET, EXTENDED RELEASE ORAL at 20:27

## 2024-01-17 RX ADMIN — FLUTICASONE PROPIONATE 2 SPRAY: 50 SPRAY, METERED NASAL at 09:39

## 2024-01-17 NOTE — CASE MANAGEMENT/SOCIAL WORK
Spoke to [facility] Yulisa. Insurance company called today and asked for more updates which were sent. Pre-auth remains pending.

## 2024-01-17 NOTE — THERAPY TREATMENT NOTE
Acute Care - Physical Therapy Treatment Note   [place]     Patient Name: Evie Shah  : 1945  MRN: [id number]  Today's Date: 2024      Visit Dx:     ICD-10-CM ICD-9-CM   1. Hypoxemia  R09.02 799.02   2. COPD exacerbation  J44.1 491.21   3. Acute respiratory failure with hypoxia  J96.01 518.81   4. Lung mass  R91.8 786.6     Patient Active Problem List   Diagnosis    DM type 2 with diabetic mixed hyperlipidemia    Dyslipidemia    Essential hypertension    Senile osteoporosis    Chronic allergic rhinitis    VISHAL (generalized anxiety disorder)    PAD (peripheral artery disease)    Severe obesity with body mass index (BMI) of 35.0 to 35.9 and comorbidity    COPD mixed type    Former smoker    Chronic respiratory failure with hypoxia    Type 2 diabetes mellitus with hyperglycemia, without long-term current use of insulin    Primary osteoarthritis of both knees    Coronary artery calcification seen on CT scan    Elevated hematocrit    Acute idiopathic gout of left hand    Vitamin D deficiency    Polycythemia vera    Respiratory failure with hypoxia    Lung mass     Past Medical History:   Diagnosis Date    Allergic rhinitis     Asthma     Atherosclerosis     COPD (chronic obstructive pulmonary disease)     Cough     Diabetes mellitus     Hyperlipidemia     Hypertension     Menopause     Nausea and vomiting     Obesity 3/7/2017    Osteoarthritis     Osteoporosis     Vertigo, benign paroxysmal      Past Surgical History:   Procedure Laterality Date    BRONCHOSCOPY Bilateral 2024    Procedure: BRONCHOSCOPY WITH ENDOBRONCHIAL ULTRASOUND;  Surgeon: Augustin Stafford MD;  Location: [facility];  Service: Pulmonary;  Laterality: Bilateral;    CATARACT EXTRACTION      COLONOSCOPY      EYE SURGERY      catarac    ILIAC ARTERY STENT  2015    SKIN CANCER EXCISION      nose    TONSILLECTOMY      and adenoidectomy    TUBAL ABDOMINAL LIGATION       PT Assessment (last 12 hours)       PT Evaluation and  Treatment       Row Name 01/17/24 1340          Physical Therapy Time and Intention    Subjective Information complains of;weakness;fatigue  -CT     Document Type therapy note (daily note)  -CT     Mode of Treatment individual therapy;physical therapy  -CT     Patient Effort good  -CT     Comment Pt ambulated in hallway this date  -CT       Row Name 01/17/24 1340          General Information    Patient Profile Reviewed yes  -CT     Existing Precautions/Restrictions fall;oxygen therapy device and L/min  -CT       Row Name 01/17/24 1340          Living Environment    Primary Care Provided by self  -CT       Row Name 01/17/24 1340          Home Use of Assistive/Adaptive Equipment    Equipment Currently Used at Home bp cuff;rollator;oxygen  -CT       Row Name 01/17/24 1340          Cognition    Affect/Mental Status (Cognition) WFL  -CT     Orientation Status (Cognition) oriented x 4  -CT     Follows Commands (Cognition) WFL  -CT       Row Name 01/17/24 1340          Sit-Stand Transfer    Sit-Stand [place] (Transfers) standby assist  -CT       Row Name 01/17/24 1340          Stand-Sit Transfer    Stand-Sit [place] (Transfers) standby assist  -CT       Row Name 01/17/24 1340          Gait/Stairs (Locomotion)    [place] Level (Gait) contact guard  -CT     Patient was able to Ambulate yes  -CT     Distance in Feet (Gait) 90  -CT     Pattern (Gait) step-through  -CT     Deviations/Abnormal Patterns (Gait) gait speed decreased;weight shifting decreased  -CT       Row Name 01/17/24 1340          Safety Issues, Functional Mobility    Impairments Affecting Function (Mobility) balance;endurance/activity tolerance  -CT       Row Name 01/17/24 1340          Motor Skills    Therapeutic Exercise --  BLE sitting ther ex, BLE standing ther ex  -CT       Row Name 01/17/24 1340          Coping    Observed Emotional State calm;cooperative  -CT     Verbalized Emotional State acceptance  -CT       Row Name 01/17/24 1340           Plan of Care Review    Plan of Care Reviewed With patient  -CT       Row Name 01/17/24 1340          Positioning and Restraints    Pre-Treatment Position in bed  -CT     Post Treatment Position chair  -CT     In Chair sitting;call light within reach;encouraged to call for assist  -CT       Row Name 01/17/24 1340          Therapy Assessment/Plan (PT)    Rehab Potential (PT) good, to achieve stated therapy goals  -CT     Criteria for Skilled Interventions Met (PT) yes;skilled treatment is necessary  -CT     Therapy Frequency (PT) 2 times/wk  2-5x/wk  -CT     Problem List (PT) problems related to;balance;mobility  -CT     Activity Limitations Related to Problem List (PT) unable to ambulate safely;unable to transfer safely  -CT               User Key  (r) = Recorded By, (t) = Taken By, (c) = Cosigned By      Initials Name Provider Type    CT Jesenia Louis, THANG Physical Therapist                    Physical Therapy Education       Title: PT OT SLP Therapies (Done)       Topic: Physical Therapy (Done)       Point: Mobility training (Done)       Learning Progress Summary             Patient Acceptance, E,TB, VU by  at 1/15/2024 2040    Acceptance, E,TB, VU by  at 1/15/2024 0002    Acceptance, E, NR,VU by  at 1/14/2024 0303    Acceptance, E, VU,NR by  at 1/11/2024 0223    Acceptance, E, NR by  at 1/9/2024 0357    Acceptance, E, NR by  at 1/7/2024 2301    Acceptance, E,TB, VU,NR by  at 1/4/2024 2314    Acceptance, E,TB, VU by WESLEY at 1/4/2024 1515                         Point: Home exercise program (Done)       Learning Progress Summary             Patient Acceptance, E,TB, VU by  at 1/15/2024 2040    Acceptance, E,TB, VU by  at 1/15/2024 0002    Acceptance, E, NR,VU by  at 1/14/2024 0303    Acceptance, E, VU,NR by  at 1/11/2024 0223    Acceptance, E, NR by  at 1/9/2024 0357    Acceptance, E, NR by  at 1/7/2024 2301    Acceptance, E,TB, VU,NR by  at 1/4/2024 2314    Acceptance, E,TB, VU by KM  at 1/4/2024 1515                         Point: Body mechanics (Done)       Learning Progress Summary             Patient Acceptance, E,TB, VU by  at 1/15/2024 2040    Acceptance, E,TB, VU by  at 1/15/2024 0002    Acceptance, E, NR,VU by  at 1/14/2024 0303    Acceptance, E, VU,NR by  at 1/11/2024 0223    Acceptance, E, NR by  at 1/9/2024 0357    Acceptance, E, NR by  at 1/7/2024 2301    Acceptance, E,TB, VU,NR by  at 1/4/2024 2314    Acceptance, E,TB, VU by  at 1/4/2024 1515                         Point: Precautions (Done)       Learning Progress Summary             Patient Acceptance, E,TB, VU by  at 1/15/2024 2040    Acceptance, E,TB, VU by  at 1/15/2024 0002    Acceptance, E, NR,VU by  at 1/14/2024 0303    Acceptance, E, VU,NR by  at 1/11/2024 0223    Acceptance, E, NR by  at 1/9/2024 0357    Acceptance, E, NR by  at 1/7/2024 2301    Acceptance, E,TB, VU,NR by  at 1/4/2024 2314    Acceptance, E,TB, VU by  at 1/4/2024 1515                                         User Key       Initials Effective Dates Name Provider Type Discipline     06/16/21 -  Rebecca Berman, RN Registered Nurse Nurse     12/15/23 -  Amina Muniz, RN Registered Nurse Nurse     06/16/21 -  Naye Lewis, HELIO Registered Nurse Nurse     05/24/22 -  Chon Rowell, PT Physical Therapist PT     06/21/23 -  Brooklynn Kruger, RN Registered Nurse Nurse                  PT Recommendation and Plan  Anticipated Discharge Disposition (PT): home, home with assist  Therapy Frequency (PT): 2 times/wk (2-5x/wk)  Plan of Care Reviewed With: patient       Time Calculation:    PT Charges       Row Name 01/17/24 3107             Time Calculation    PT Received On 01/17/24  -CT         Time Calculation- PT    Total Timed Code Minutes- PT 40 minute(s)  -CT                User Key  (r) = Recorded By, (t) = Taken By, (c) = Cosigned By      Initials Name Provider Type    Jesenia Cardoza, PT Physical Therapist                   Therapy Charges for Today       Code Description Service Date Service Provider Modifiers Qty    69757051263 HC GAIT TRAINING EA 15 MIN 1/16/2024 Jesenia Louis, PT GP 1    25679674797 HC PT THERAPEUTIC ACT EA 15 MIN 1/16/2024 Jesenia Louis, PT GP 1    65390738346 HC GAIT TRAINING EA 15 MIN 1/17/2024 Jesenia Louis, PT GP 2    [id number]  PT THERAPEUTIC ACT EA 15 MIN 1/17/2024 Jesenia Louis, PT GP 1            PT G-Codes  AM-PAC 6 Clicks Score (PT): 20    Jesenia Louis, PT  1/17/2024

## 2024-01-17 NOTE — PLAN OF CARE
Goal Outcome Evaluation:              Outcome Evaluation: Patient resting in bed. Patient is alert and oriented. Patient is currently on bi pap. Patient denies complaints. Call light within reach.

## 2024-01-17 NOTE — PROGRESS NOTES
[facility] HOSPITALIST PROGRESS NOTE     Patient Identification:  Name:  Evie Shah  Age:  78 y.o.  Sex:  female  :  1945  MRN:  [id number]  Visit Number:  [id number]  Primary Care Provider:  Camryn Mota PA    Length of stay:  15    Subjective: Patient seen and examined reports coughing but less is whitish in color, her O2 sat at rest on 40 L heated high flow is 91% it goes up to 95-96 when he talks and set up.  Tolerated well decreasing oxygen supplementation yesterday.    Chief Complaint: Short of breath  ----------------------------------------------------------------------------------------------------------------------  Current Hospital Meds:  acetylcysteine, 3 mL, Nebulization, 4x Daily - RT  amLODIPine, 5 mg, Oral, Q PM  atorvastatin, 10 mg, Oral, Nightly  buPROPion SR, 150 mg, Oral, Nightly  cetirizine, 5 mg, Oral, Daily  cholecalciferol, 50,000 Units, Oral, Weekly  clopidogrel, 75 mg, Oral, Daily  empagliflozin, 25 mg, Oral, Daily  fluticasone, 2 spray, Nasal, Daily  guaiFENesin, 600 mg, Oral, Q12H  insulin lispro, 2 Units, Subcutaneous, TID With Meals  insulin lispro, 2-9 Units, Subcutaneous, 4x Daily AC & at Bedtime  ipratropium-albuterol, 3 mL, Nebulization, 4x Daily - RT  lisinopril, 5 mg, Oral, Q PM  metoprolol succinate XL, 25 mg, Oral, Q PM  montelukast, 10 mg, Oral, Nightly  senna-docusate sodium, 2 tablet, Oral, BID  sodium chloride, 10 mL, Intravenous, Q12H  sodium chloride, 10 mL, Intravenous, Q12H         ----------------------------------------------------------------------------------------------------------------------  Vital Signs:  Temp:  [97.4 °F (36.3 °C)-98.8 °F (37.1 °C)] 98.4 °F (36.9 °C)  Heart Rate:  [64-81] 72  Resp:  [13-28] 13  BP: (104-134)/(51-82) 116/73       Tele: Sinus rhythm 72 beats per      01/15/24  0500 24  0400 24  0400   Weight: 89.6 kg (197 lb 9.6 oz) 90.6 kg (199 lb 11.8 oz) 90.5 kg (199 lb 8.3 oz)     Body mass  index is 35.35 kg/m².    Intake/Output Summary (Last 24 hours) at 1/17/2024 1015  Last data filed at 1/17/2024 0800  Gross per 24 hour   Intake 720 ml   Output 300 ml   Net 420 ml     Diet: Diabetic Diets, Cardiac Diets; Healthy Heart (2-3 Na+); Consistent Carbohydrate; Texture: Regular Texture (IDDSI 7); Fluid Consistency: Thin (IDDSI 0)  ----------------------------------------------------------------------------------------------------------------------  Physical exam:  General: Comfortable,awake, alert, oriented to self, place, and time, well-developed and well-nourished.  No respiratory distress.    Skin:  Skin is warm and dry. No rash noted. No pallor.    HENT:  Head:  Normocephalic and atraumatic.  Mouth:  Moist mucous membranes.    Eyes:  Conjunctivae and EOM are normal.  Pupils are equal, round, and reactive to light.  No scleral icterus.    Neck:  Neck supple.  No JVD present.    Pulmonary/Chest:  No respiratory distress, no wheezes, no crackles, with normal breath sounds and good air movement.  Significantly improved compared to previous physical exam.  Cardiovascular:  Normal rate, regular rhythm and normal heart sounds with no murmur.  Abdominal:  Soft.  Bowel sounds are normal.  No distension and no tenderness.   Extremities:  No edema, no tenderness, and no deformity.  No red or swollen joints anywhere.  Strong pulses in all 4 extremities with no clubbing, no cyanosis, no edema.  Neurological:  Motor strength equal no obvious deficit, sensory grossly intact.   No cranial nerve deficit.  No tongue deviation.  No facial droop.  No slurred speech.    Genitourinary: No Palomino catheter  Back:  ----------------------------------------------------------------------------------------------------------------------  ----------------------------------------------------------------------------------------------------------------------      Results from last 7 days   Lab Units 01/15/24  0045 01/14/24  0030  "01/13/24  0431 01/12/24  0423 01/11/24  0201   CRP mg/dL  --   --   --   --  2.82*   LACTATE mmol/L  --   --   --   --  1.0   WBC 10*3/mm3 14.22* 13.96* 10.05 9.81 11.05*   HEMOGLOBIN g/dL 15.3 15.5 14.3 14.2 13.8   HEMATOCRIT % 48.6* 48.3* 45.3 44.2 44.4   MCV fL 95.1 94.2 95.4 94.0 97.4*   MCHC g/dL 31.5 32.1 31.6 32.1 31.1*   PLATELETS 10*3/mm3 207 202 177 197 182   INR   --   --   --  1.02 1.02     Results from last 7 days   Lab Units 01/11/24  0425   PH, ARTERIAL pH units 7.383  7.377   PO2 ART mm Hg 81.6*  65.3*   PCO2, ARTERIAL mm Hg 47.2*  47.9*   HCO3 ART mmol/L 28.1*  28.1*     Results from last 7 days   Lab Units 01/15/24  0045 01/14/24  0030 01/13/24 0431 01/12/24  0423   SODIUM mmol/L 134* 136 137 139   POTASSIUM mmol/L 3.8 4.1 4.6 4.6   MAGNESIUM mg/dL 2.2 2.3 2.3 2.1   CHLORIDE mmol/L 100 97* 101 100   CO2 mmol/L 24.5 26.0 27.2 30.7*   BUN mg/dL 53* 46* 45* 30*   CREATININE mg/dL 1.07* 0.90 0.90 0.91   CALCIUM mg/dL 9.6 9.6 9.6 9.5   GLUCOSE mg/dL 209* 227* 297* 229*   ALBUMIN g/dL 3.4* 3.6  --  3.6   BILIRUBIN mg/dL 0.3 0.3  --  0.4   ALK PHOS U/L 93 98  --  76   AST (SGOT) U/L 15 10  --  11   ALT (SGPT) U/L 18 21  --  26   Estimated Creatinine Clearance: 47.7 mL/min (A) (by C-G formula based on SCr of 1.07 mg/dL (H)).    No results found for: \"AMMONIA\"      No results found for: \"BLOODCX\"  No results found for: \"URINECX\"  No results found for: \"WOUNDCX\"  No results found for: \"STOOLCX\"    I have personally looked at the labs and they are summarized above.  ----------------------------------------------------------------------------------------------------------------------  Imaging Results (Last 24 Hours)       ** No results found for the last 24 hours. **          ----------------------------------------------------------------------------------------------------------------------  Assessment and Plan:  -Acute hypoxic respiratory failure secondary to left lung atelectasis  -Left lung " atelectasis initially upper lobe and subsequent lower lobe secondary to mucous plug  -Diabetes  II with hyperglycemia  -Obesity with a BMI of 35  -History of peripheral arterial disease status post stent placement of left external iliac  -COPD with exacerbation resolved    Continue Accu-Chek, patient's oxygen requirements is slowly improving, will continue to taper heated high flow today down from 40% FiO2 40 L/min care of respiratory.  Ambulate, increase activity.  Diuretics as needed, check BMP.    Plan discussed with patient and her nurse HELIO Hunter.      Disposition Home pending      Cora Carver MD  01/17/24  10:15 EST

## 2024-01-17 NOTE — CONSULTS
Progress Note Pulmonary      Subjective     Interval History:   Pt seen on follow up for acute hypoxic respiratory failure. No acute overnight events reported. This morning she says she feels that her breathing is much improved, is hoping to be able to be discharged soon.      Review of Systems:    Reviewed ; unchanged       Vital Signs  Temp:  [97.4 °F (36.3 °C)-98.8 °F (37.1 °C)] 98.4 °F (36.9 °C)  Heart Rate:  [64-81] 72  Resp:  [13-28] 13  BP: (104-134)/(51-82) 116/73  Body mass index is 35.35 kg/m².    Intake/Output Summary (Last 24 hours) at 1/17/2024 1138  Last data filed at 1/17/2024 0800  Gross per 24 hour   Intake 720 ml   Output 300 ml   Net 420 ml     I/O this shift:  In: 360 [P.O.:360]  Out: -     Physical Exam:  General- normal in appearance, not in any acute distress    HEENT- pupils equally reactive to light, normal in size, no scleral icterus    Neck- supple    No JVD, no carotid bruit    Respiratory- clear and equal bilateral bs without wheeze or rhonchi    Cardiovascular-  Normal S1 and S2. No S3, S4 or murmurs.    GI-nontender nondistended bowel sounds positive    CNS-alert oriented x3, grossly nonfocal    Extremities- pulses normal bilaterally , no clubbing and edema        Results Review:      Results from last 7 days   Lab Units 01/15/24  0045 01/14/24  0030 01/13/24  0431   WBC 10*3/mm3 14.22* 13.96* 10.05   HEMOGLOBIN g/dL 15.3 15.5 14.3   PLATELETS 10*3/mm3 207 202 177     Results from last 7 days   Lab Units 01/15/24  0045 01/14/24  0030 01/13/24  0431   SODIUM mmol/L 134* 136 137   POTASSIUM mmol/L 3.8 4.1 4.6   CHLORIDE mmol/L 100 97* 101   CO2 mmol/L 24.5 26.0 27.2   BUN mg/dL 53* 46* 45*   CREATININE mg/dL 1.07* 0.90 0.90   CALCIUM mg/dL 9.6 9.6 9.6   GLUCOSE mg/dL 209* 227* 297*   MAGNESIUM mg/dL 2.2 2.3 2.3     Lab Results   Component Value Date    INR 1.02 01/12/2024    INR 1.02 01/11/2024    INR 0.94 01/08/2024    PROTIME 13.9 01/12/2024    PROTIME 13.9 01/11/2024    PROTIME 13.1  01/08/2024     Results from last 7 days   Lab Units 01/15/24  0045 01/14/24  0030 01/12/24  0423   ALK PHOS U/L 93 98 76   BILIRUBIN mg/dL 0.3 0.3 0.4   ALT (SGPT) U/L 18 21 26   AST (SGOT) U/L 15 10 11     Results from last 7 days   Lab Units 01/11/24  0425   PH, ARTERIAL pH units 7.383  7.377   PO2 ART mm Hg 81.6*  65.3*   PCO2, ARTERIAL mm Hg 47.2*  47.9*   HCO3 ART mmol/L 28.1*  28.1*     Imaging Results (Last 24 Hours)       ** No results found for the last 24 hours. **                 acetylcysteine, 3 mL, Nebulization, 4x Daily - RT  amLODIPine, 5 mg, Oral, Q PM  atorvastatin, 10 mg, Oral, Nightly  buPROPion SR, 150 mg, Oral, Nightly  cetirizine, 5 mg, Oral, Daily  cholecalciferol, 50,000 Units, Oral, Weekly  clopidogrel, 75 mg, Oral, Daily  empagliflozin, 25 mg, Oral, Daily  fluticasone, 2 spray, Nasal, Daily  guaiFENesin, 600 mg, Oral, Q12H  insulin lispro, 2 Units, Subcutaneous, TID With Meals  insulin lispro, 2-9 Units, Subcutaneous, 4x Daily AC & at Bedtime  ipratropium-albuterol, 3 mL, Nebulization, 4x Daily - RT  lisinopril, 5 mg, Oral, Q PM  metoprolol succinate XL, 25 mg, Oral, Q PM  montelukast, 10 mg, Oral, Nightly  senna-docusate sodium, 2 tablet, Oral, BID  sodium chloride, 10 mL, Intravenous, Q12H  sodium chloride, 10 mL, Intravenous, Q12H           Medication Review:     Assessment & Plan   # WILLIAM atelectasis with mucous plugging s/p bronchoscopy   # Acute hypoxic respiratory failure 2/2 above    -Pt overall improving and feeling well, is likely close to discharge    -recommend trial of NC today with titration of O2 for goal saturation 88-92%    -Continue mucomyst, guaifenesin, duonebs, chest PT, incentive spirometer and flutter valve    -Additional recommendations per Dr. Susan Brumfield, DO  01/17/24  11:38 EST

## 2024-01-17 NOTE — PLAN OF CARE
Goal Outcome Evaluation:  Plan of Care Reviewed With: patient        Progress: improving  Outcome Evaluation: Pt is AOx4 on 3 L NC. Transfer orders for [facility] and MD discharging in AM. Pt is now able to ambulate to bathroom and tolerating well. Afebrile throughout shift. Adequate UOP. NSR. VSS. Call light in reach. Bed alarm set. Care ongoing.

## 2024-01-18 ENCOUNTER — READMISSION MANAGEMENT (OUTPATIENT)
Dept: CALL CENTER | Facility: HOSPITAL | Age: 79
End: 2024-01-18
Payer: MEDICARE

## 2024-01-18 VITALS
HEART RATE: 75 BPM | BODY MASS INDEX: 35.39 KG/M2 | WEIGHT: 199.74 LBS | TEMPERATURE: 97.7 F | OXYGEN SATURATION: 94 % | DIASTOLIC BLOOD PRESSURE: 80 MMHG | SYSTOLIC BLOOD PRESSURE: 127 MMHG | HEIGHT: 63 IN | RESPIRATION RATE: 18 BRPM

## 2024-01-18 LAB
ANION GAP SERPL CALCULATED.3IONS-SCNC: 7.4 MMOL/L (ref 5–15)
BUN SERPL-MCNC: 18 MG/DL (ref 8–23)
BUN/CREAT SERPL: 21.7 (ref 7–25)
CALCIUM SPEC-SCNC: 8.2 MG/DL (ref 8.6–10.5)
CHLORIDE SERPL-SCNC: 109 MMOL/L (ref 98–107)
CO2 SERPL-SCNC: 26.6 MMOL/L (ref 22–29)
CREAT SERPL-MCNC: 0.83 MG/DL (ref 0.57–1)
EGFRCR SERPLBLD CKD-EPI 2021: 73.2 ML/MIN/1.73
GLUCOSE BLDC GLUCOMTR-MCNC: 130 MG/DL (ref 70–130)
GLUCOSE SERPL-MCNC: 147 MG/DL (ref 65–99)
POTASSIUM SERPL-SCNC: 4 MMOL/L (ref 3.5–5.2)
SODIUM SERPL-SCNC: 143 MMOL/L (ref 136–145)

## 2024-01-18 PROCEDURE — 80048 BASIC METABOLIC PNL TOTAL CA: CPT | Performed by: HOSPITALIST

## 2024-01-18 PROCEDURE — 63710000001 INSULIN LISPRO (HUMAN) PER 5 UNITS: Performed by: INTERNAL MEDICINE

## 2024-01-18 PROCEDURE — 94799 UNLISTED PULMONARY SVC/PX: CPT

## 2024-01-18 PROCEDURE — 94664 DEMO&/EVAL PT USE INHALER: CPT

## 2024-01-18 PROCEDURE — 99239 HOSP IP/OBS DSCHRG MGMT >30: CPT | Performed by: HOSPITALIST

## 2024-01-18 PROCEDURE — 94660 CPAP INITIATION&MGMT: CPT

## 2024-01-18 PROCEDURE — 94761 N-INVAS EAR/PLS OXIMETRY MLT: CPT

## 2024-01-18 PROCEDURE — 82948 REAGENT STRIP/BLOOD GLUCOSE: CPT

## 2024-01-18 RX ORDER — AMLODIPINE BESYLATE 5 MG/1
5 TABLET ORAL EVERY EVENING
Qty: 30 TABLET | Refills: 3 | Status: SHIPPED | OUTPATIENT
Start: 2024-01-18

## 2024-01-18 RX ORDER — LISINOPRIL 5 MG/1
5 TABLET ORAL EVERY EVENING
Qty: 30 TABLET | Refills: 3 | Status: SHIPPED | OUTPATIENT
Start: 2024-01-18

## 2024-01-18 RX ADMIN — IPRATROPIUM BROMIDE AND ALBUTEROL SULFATE 3 ML: 2.5; .5 SOLUTION RESPIRATORY (INHALATION) at 06:58

## 2024-01-18 RX ADMIN — FLUTICASONE PROPIONATE 2 SPRAY: 50 SPRAY, METERED NASAL at 08:25

## 2024-01-18 RX ADMIN — INSULIN LISPRO 2 UNITS: 100 INJECTION, SOLUTION INTRAVENOUS; SUBCUTANEOUS at 08:23

## 2024-01-18 RX ADMIN — ACETYLCYSTEINE 3 ML: 200 SOLUTION ORAL; RESPIRATORY (INHALATION) at 06:58

## 2024-01-18 RX ADMIN — Medication 10 ML: at 08:25

## 2024-01-18 RX ADMIN — GUAIFENESIN 600 MG: 600 TABLET, EXTENDED RELEASE ORAL at 08:23

## 2024-01-18 RX ADMIN — EMPAGLIFLOZIN 25 MG: 10 TABLET, FILM COATED ORAL at 08:21

## 2024-01-18 RX ADMIN — CLOPIDOGREL BISULFATE 75 MG: 75 TABLET, FILM COATED ORAL at 08:23

## 2024-01-18 RX ADMIN — CETIRIZINE HYDROCHLORIDE 5 MG: 10 TABLET, FILM COATED ORAL at 08:20

## 2024-01-18 NOTE — DISCHARGE PLACEMENT REQUEST
"Evie Shah (78 y.o. Female)       Date of Birth   1945    Social Security Number       Address   [address]  [facility] [address]    Home Phone   [phone]    MRN   [id number]       [facility]    Marital Status                               Admission Date   1/2/24    Admission Type   Emergency    Admitting Provider   Cora Carver MD    Attending Provider   Cora Carver MD    Department, Room/Bed   [facility], P218/1P       Discharge Date       Discharge Disposition   [place]-Health [place] [facility]    Discharge Destination                                 Attending Provider: Cora Carver MD    Allergies: Codeine    Isolation: None   Infection: None   Code Status: CPR    Ht: 160 cm (62.99\")   Wt: 90.6 kg (199 lb 11.8 oz)    Admission Cmt: None   Principal Problem: Respiratory failure with hypoxia [J96.91]                   Active Insurance as of 1/2/2024       Primary Coverage       Payor Plan Insurance Group Employer/Plan Group    HUMANA MEDICARE REPLACEMENT HUMANA MEDICARE REPLACEMENT [id number]       Payor Plan Address Payor Plan Phone Number Payor Plan Fax Number Effective Dates    [address] [address] [phone]  1/1/2019 - None Entered    [facility] [address]         Subscriber Name Subscriber Birth Date Member ID       EVIE SHAH 1945 [id number]               Secondary Coverage       Payor Plan Insurance Group Employer/Plan Group    [place]        Payor Plan Address Payor Plan Phone Number Payor Plan Fax Number Effective Dates    [address] [phone]  10/10/2023 - None Entered    [facility] [id number]         Subscriber Name Subscriber Birth Date Member ID       EVIE SHAH 1945 [id number]                     Emergency Contacts        (Rel.) Home Phone Work Phone Mobile Phone    May GeneYin (Daughter) [phone] -- [phone]    lexy shah (Son) [phone] " C/O cough with body aches since last PM. Pt states that she is 27 weeks pregnant.   "-- [phone]          [facility] CARE  [address] [facility] [address]  Dept. Phone:  [phone]  Dept. Fax:  [phone] Date Ordered: 2024         Patient:  Evie Shah MRN:  [id number]   [address]   [place] [address] :  1945  SSN:    Phone: [phone] Sex:  F     Weight: 90.6 kg (199 lb 11.8 oz)         Ht Readings from Last 1 Encounters:   24 160 cm (62.99\")         Home Oxygen Therapy          (Order ID: [id number])    Diagnosis:  Hypoxemia (R09.02 [ICD-10-CM] 799.02 [ICD-9-CM])  Acute respiratory failure with hypoxia (J96.01 [ICD-10-CM] 518.81 [ICD-9-CM])   Quantity:  1  Comments: Further needs to be reevaluated by primary care provider     Delivery Modality: Nasal Cannula  Liters Per Minute: 3  Duration: Continuous  Duration: PRN  Duration: During Sleep  Duration: With Exertion  Equipment:  Oxygen Concentrator &  &  Portable Gaseous Oxygen System & Portable Oxygen Contents Gaseous &  Conserving Regulator  Length of Need: 3 Months        Authorizing Provider's Phone: [phone]  Authorizing Provider:Cora Carvre MD  Authorizing Provider's NPI: [id number]  Order Entered By: Cora Carver MD 2024  9:16 AM     Electronically signed by: Cora Carver MD 2024  9:16 AM       Oxygen Therapy (last day)       Date/Time SpO2 Device (Oxygen Therapy) Flow (L/min) Oxygen Concentration (%) ETCO2 (mmHg)    24 0800 -- nasal cannula 3 -- --    24 0708 98 -- -- -- --    24 0700 96 -- -- -- --    24 0658 97 humidified;nasal cannula 3 -- --    24 0600 92 -- -- -- --    01/18/24 0500 93 -- -- -- --    24 0400 91 -- -- -- --    24 0300 95 humidified;nasal cannula 3 -- --    24 0200 94 -- -- -- --    240 95 -- -- -- --    242 -- humidified;nasal cannula 3 -- --    24 0000 95 -- -- -- --    24 93 -- -- -- --    24 " 93 -- -- -- --    01/17/24 2230 92 -- -- -- --    01/17/24 2200 93 -- -- -- --    01/17/24 2130 89 -- -- -- --    01/17/24 2100 92 -- -- -- --    01/17/24 2030 94 -- -- -- --    01/17/24 2000 92 -- -- -- --    01/17/24 1930 91 -- -- -- --    01/17/24 1900 95 nasal cannula 3 -- --    01/17/24 1856 -- nasal cannula;humidified 3 -- --    01/17/24 1846 92 nasal cannula;humidified 3 -- --    01/17/24 1727 89 -- -- -- --    01/17/24 1700 93 -- -- -- --    01/17/24 1600 94 -- -- -- --    01/17/24 1400 94 -- -- -- --    01/17/24 1301 95 humidified;nasal cannula 3 -- --    01/17/24 1247 93 humidified;nasal cannula 3 -- --    01/17/24 1100 95 -- -- -- --    01/17/24 1018 92 nasal cannula;humidified 3 -- --    01/17/24 1000 92 -- -- -- --    01/17/24 0900 88 -- -- -- --    01/17/24 0800 93 -- -- -- --    01/17/24 0711 96 heated;high-flow nasal cannula 40 40 --    01/17/24 0657 87 room air -- -- --    01/17/24 0600 99 heated;high-flow nasal cannula -- -- --    01/17/24 0530 98 NPPV/NIV -- 70 --    01/17/24 0500 97 NPPV/NIV -- 70 --    01/17/24 0430 99 NPPV/NIV -- 70 --    01/17/24 0400 98 NPPV/NIV -- 70 --    01/17/24 0344 98 NPPV/NIV -- 70 --    01/17/24 0300 97 NPPV/NIV -- 70 --    01/17/24 0208 99 NPPV/NIV -- 70 --    01/17/24 0200 98 NPPV/NIV -- 70 --    01/17/24 0100 99 NPPV/NIV -- 70 --    01/17/24 0019 94 NPPV/NIV -- 70 --    01/17/24 0000 95 heated;high-flow nasal cannula 50 40 --          Lab Results (most recent)       Procedure Component Value Units Date/Time    POC Glucose Once [[id number]]  (Normal) Collected: 01/18/24 0608    Specimen: Blood Updated: 01/18/24 0615     Glucose 130 mg/dL     Basic Metabolic Panel [[id number]]  (Abnormal) Collected: 01/18/24 0017    Specimen: Blood Updated: 01/18/24 0111     Glucose 147 mg/dL      BUN 18 mg/dL      Creatinine 0.83 mg/dL      Sodium 143 mmol/L      Potassium 4.0 mmol/L      Comment: Slight hemolysis detected by analyzer. Result may be falsely elevated.        Chloride  109 mmol/L      CO2 26.6 mmol/L      Calcium 8.2 mg/dL      BUN/Creatinine Ratio 21.7     Anion Gap 7.4 mmol/L      eGFR 73.2 mL/min/1.73     Narrative:      GFR Normal >60  Chronic Kidney Disease <60  Kidney Failure <15    The GFR formula is only valid for adults with stable renal function between ages 18 and 70.    POC Glucose Once [[id number]]  (Abnormal) Collected: 01/17/24 2012    Specimen: Blood Updated: 01/17/24 2018     Glucose 202 mg/dL     Phosphorus [[id number]]  (Abnormal) Collected: 01/15/24 0045    Specimen: Blood Updated: 01/15/24 0144     Phosphorus 4.8 mg/dL     Magnesium [[id number]]  (Normal) Collected: 01/15/24 0045    Specimen: Blood Updated: 01/15/24 0141     Magnesium 2.2 mg/dL     Comprehensive Metabolic Panel [[id number]]  (Abnormal) Collected: 01/15/24 0045    Specimen: Blood Updated: 01/15/24 0141     Glucose 209 mg/dL      BUN 53 mg/dL      Creatinine 1.07 mg/dL      Sodium 134 mmol/L      Potassium 3.8 mmol/L      Comment: Slight hemolysis detected by analyzer. Result may be falsely elevated.        Chloride 100 mmol/L      CO2 24.5 mmol/L      Calcium 9.6 mg/dL      Total Protein 6.0 g/dL      Albumin 3.4 g/dL      ALT (SGPT) 18 U/L      AST (SGOT) 15 U/L      Alkaline Phosphatase 93 U/L      Total Bilirubin 0.3 mg/dL      Globulin 2.6 gm/dL      A/G Ratio 1.3 g/dL      BUN/Creatinine Ratio 49.5     Anion Gap 9.5 mmol/L      eGFR 53.9 mL/min/1.73     Narrative:      GFR Normal >60  Chronic Kidney Disease <60  Kidney Failure <15    The GFR formula is only valid for adults with stable renal function between ages 18 and 70.    CBC & Differential [[id number]]  (Abnormal) Collected: 01/15/24 0045    Specimen: Blood Updated: 01/15/24 0116    Narrative:      The following orders were created for panel order CBC & Differential.  Procedure                               Abnormality         Status                     ---------                               -----------         ------                      CBC Auto Differential[[id number]]        Abnormal            Final result                 Please view results for these tests on the individual orders.    CBC Auto Differential [[id number]]  (Abnormal) Collected: 01/15/24 0045    Specimen: Blood Updated: 01/15/24 0116     WBC 14.22 10*3/mm3      RBC 5.11 10*6/mm3      Hemoglobin 15.3 g/dL      Hematocrit 48.6 %      MCV 95.1 fL      MCH 29.9 pg      MCHC 31.5 g/dL      RDW 13.2 %      RDW-SD 46.5 fl      MPV 10.2 fL      Platelets 207 10*3/mm3      Neutrophil % 59.8 %      Lymphocyte % 27.9 %      Monocyte % 7.8 %      Eosinophil % 2.0 %      Basophil % 0.4 %      Immature Grans % 2.1 %      Neutrophils, Absolute 8.49 10*3/mm3      Lymphocytes, Absolute 3.97 10*3/mm3      Monocytes, Absolute 1.11 10*3/mm3      Eosinophils, Absolute 0.29 10*3/mm3      Basophils, Absolute 0.06 10*3/mm3      Immature Grans, Absolute 0.30 10*3/mm3      nRBC 0.0 /100 WBC     Phosphorus [[id number]]  (Normal) Collected: 01/14/24 0030    Specimen: Blood Updated: 01/14/24 0130     Phosphorus 3.8 mg/dL     Magnesium [[id number]]  (Normal) Collected: 01/14/24 0030    Specimen: Blood Updated: 01/14/24 0129     Magnesium 2.3 mg/dL     Comprehensive Metabolic Panel [[id number]]  (Abnormal) Collected: 01/14/24 0030    Specimen: Blood Updated: 01/14/24 0129     Glucose 227 mg/dL      BUN 46 mg/dL      Creatinine 0.90 mg/dL      Sodium 136 mmol/L      Potassium 4.1 mmol/L      Comment: Slight hemolysis detected by analyzer. Result may be falsely elevated.        Chloride 97 mmol/L      CO2 26.0 mmol/L      Calcium 9.6 mg/dL      Total Protein 6.3 g/dL      Albumin 3.6 g/dL      ALT (SGPT) 21 U/L      AST (SGOT) 10 U/L      Alkaline Phosphatase 98 U/L      Total Bilirubin 0.3 mg/dL      Globulin 2.7 gm/dL      A/G Ratio 1.3 g/dL      BUN/Creatinine Ratio 51.1     Anion Gap 13.0 mmol/L      eGFR 66.4 mL/min/1.73     Narrative:      GFR Normal >60  Chronic Kidney Disease <60  Kidney Failure <15    The  GFR formula is only valid for adults with stable renal function between ages 18 and 70.    CBC & Differential [[id number]]  (Abnormal) Collected: 01/14/24 0030    Specimen: Blood Updated: 01/14/24 0056    Narrative:      The following orders were created for panel order CBC & Differential.  Procedure                               Abnormality         Status                     ---------                               -----------         ------                     CBC Auto Differential[[id number]]        Abnormal            Final result                 Please view results for these tests on the individual orders.    CBC Auto Differential [[id number]]  (Abnormal) Collected: 01/14/24 0030    Specimen: Blood Updated: 01/14/24 0056     WBC 13.96 10*3/mm3      RBC 5.13 10*6/mm3      Hemoglobin 15.5 g/dL      Hematocrit 48.3 %      MCV 94.2 fL      MCH 30.2 pg      MCHC 32.1 g/dL      RDW 13.2 %      RDW-SD 45.6 fl      MPV 10.1 fL      Platelets 202 10*3/mm3      Neutrophil % 81.1 %      Lymphocyte % 10.3 %      Monocyte % 5.8 %      Eosinophil % 0.1 %      Basophil % 0.3 %      Immature Grans % 2.4 %      Neutrophils, Absolute 11.32 10*3/mm3      Lymphocytes, Absolute 1.44 10*3/mm3      Monocytes, Absolute 0.81 10*3/mm3      Eosinophils, Absolute 0.02 10*3/mm3      Basophils, Absolute 0.04 10*3/mm3      Immature Grans, Absolute 0.33 10*3/mm3      nRBC 0.0 /100 WBC     Procalcitonin [[id number]]  (Normal) Collected: 01/13/24 0431    Specimen: Blood Updated: 01/13/24 0503     Procalcitonin 0.03 ng/mL     Narrative:      As a Marker for Sepsis (Non-Neonates):    1. <0.5 ng/mL represents a low risk of severe sepsis and/or septic shock.  2. >2 ng/mL represents a high risk of severe sepsis and/or septic shock.    As a Marker for Lower Respiratory Tract Infections that require antibiotic therapy:    PCT on Admission    Antibiotic Therapy       6-12 Hrs later    >0.5                Strongly Recommended  >0.25 - <0.5        Recommended  "  0.1 - 0.25          Discouraged              Remeasure/reassess PCT  <0.1                Strongly Discouraged     Remeasure/reassess PCT    As 28 day mortality risk marker: \"Change in Procalcitonin Result\" (>80% or <=80%) if Day 0 (or Day 1) and Day 4 values are available. Refer to [email or web address]    Change in PCT <=80%  A decrease of PCT levels below or equal to 80% defines a positive change in PCT test result representing a higher risk for 28-day all-cause mortality of patients diagnosed with severe sepsis for septic shock.    Change in PCT >80%  A decrease of PCT levels of more than 80% defines a negative change in PCT result representing a lower risk for 28-day all-cause mortality of patients diagnosed with severe sepsis or septic shock.       Basic Metabolic Panel [[id number]]  (Abnormal) Collected: 01/13/24 0431    Specimen: Blood Updated: 01/13/24 0500     Glucose 297 mg/dL      BUN 45 mg/dL      Creatinine 0.90 mg/dL      Sodium 137 mmol/L      Potassium 4.6 mmol/L      Comment: Slight hemolysis detected by analyzer. Result may be falsely elevated.        Chloride 101 mmol/L      CO2 27.2 mmol/L      Calcium 9.6 mg/dL      BUN/Creatinine Ratio 50.0     Anion Gap 8.8 mmol/L      eGFR 66.4 mL/min/1.73     Narrative:      GFR Normal >60  Chronic Kidney Disease <60  Kidney Failure <15    The GFR formula is only valid for adults with stable renal function between ages 18 and 70.    CBC (No Diff) [[id number]]  (Normal) Collected: 01/13/24 0431    Specimen: Blood Updated: 01/13/24 0438     WBC 10.05 10*3/mm3      RBC 4.75 10*6/mm3      Hemoglobin 14.3 g/dL      Hematocrit 45.3 %      MCV 95.4 fL      MCH 30.1 pg      MCHC 31.6 g/dL      RDW 13.2 %      RDW-SD 46.7 fl      MPV 10.2 fL      Platelets 177 10*3/mm3     Blood Gas, Venous With Co-Ox [[id number]]  (Abnormal) Collected: 01/13/24 0433    Specimen: Venous Blood Updated: 01/13/24 0438     Site Lab     pH, Venous 7.422 pH Units      pCO2, " Venous 46.4 mm Hg      pO2, Venous 53.0 mm Hg      HCO3, Venous 30.2 mmol/L      Comment: 83 Value above reference range        Base Excess, Venous 4.8 mmol/L      O2 Saturation, Venous 86.6 %      Comment: 83 Value above reference range        Hemoglobin, Blood Gas 14.7 g/dL      CO2 Content 31.6 mmol/L      Barometric Pressure for Blood Gas 717 mmHg      Modality Room Air     FIO2 70 %      Ventilator Mode BiPAP     Set Tidal Volume 773.00     Set Mech Resp Rate 18.0     Rate 26 Breaths/minute      IPAP 18     EPAP 8     PIP 21 cmH2O      Comment: Meter: [id number]     :  [id number]        Collected by [id number]     Oxyhemoglobin Venous 85.5 %      Comment: 83 Value above reference range        Carboxyhemoglobin Venous 1.0 %      Methemoglobin Venous 0.3 %     Blood Gas, Arterial With Co-Ox [[id number]]  (Abnormal) Collected: 01/11/24 0425    Specimen: Arterial Blood Updated: 01/12/24 1826     Site Left Radial     Isaac's Test Positive     pH, Arterial 7.383 pH units      pCO2, Arterial 47.2 mm Hg      pO2, Arterial 81.6 mm Hg      HCO3, Arterial 28.1 mmol/L      Comment: 83 Value above reference range        Base Excess, Arterial 2.2 mmol/L      O2 Saturation, Arterial 95.4 %      Hemoglobin, Blood Gas 14.5 g/dL      Hematocrit, Blood Gas 44.5 %      Oxyhemoglobin 94.4 %      Methemoglobin 0.60 %      Carboxyhemoglobin 0.4 %      A-a DO2 477.7 mmHg      CO2 Content 29.5 mmol/L      Barometric Pressure for Blood Gas 722 mmHg      Modality Heated HFNC     FIO2 90 %      Flow Rate 60.0 lpm      Ventilator Mode NA     Collected by [id number]     Comment: Meter: [id number]     :  [id number]        pH, Temp Corrected --     pCO2, Temperature Corrected --     pO2, Temperature Corrected --    Respiratory Culture - Lavage, Lung, Left Lower Lobe [[id number]] Collected: 01/10/24 1456    Specimen: Lavage from Lung, Left Lower Lobe Updated: 01/12/24 1057     Respiratory Culture Scant growth (1+) Normal  respiratory [place]. No S. aureus or Pseudomonas aeruginosa detected. Final report.     Gram Stain Few (2+) WBCs seen      Rare (1+) Epithelial cells seen      Rare (1+) Mixed bacterial morphotypes seen on Gram Stain    Respiratory Culture - Lavage, Lung, Right Lower Lobe [[id number]] Collected: 01/10/24 1455    Specimen: Lavage from Lung, Right Lower Lobe Updated: 01/12/24 1056     Respiratory Culture Scant growth (1+) Normal respiratory [place]. No S. aureus or Pseudomonas aeruginosa detected. Final report.     Gram Stain Moderate (3+) WBCs seen      Rare (1+) Epithelial cells seen      Few (2+) Mixed bacterial morphotypes seen on Gram Stain    aPTT [[id number]]  (Normal) Collected: 01/12/24 0423    Specimen: Blood Updated: 01/12/24 0454     PTT 27.8 seconds     Narrative:      PTT Heparin Therapeutic Range:  59 - 95 seconds      Protime-INR [[id number]]  (Normal) Collected: 01/12/24 0423    Specimen: Blood Updated: 01/12/24 0454     Protime 13.9 Seconds      INR 1.02    Narrative:      Suggested INR therapeutic range for stable oral anticoagulant therapy:    Low Intensity therapy:   1.5-2.0  Moderate Intensity therapy:   2.0-3.0  High Intensity therapy:   2.5-4.0    Blood Gas, Venous With Co-Ox [[id number]]  (Abnormal) Collected: 01/12/24 0425    Specimen: Venous Blood Updated: 01/12/24 0427     Site Lab     pH, Venous 7.419 pH Units      pCO2, Venous 47.9 mm Hg      pO2, Venous 48.0 mm Hg      HCO3, Venous 31.0 mmol/L      Comment: 83 Value above reference range        Base Excess, Venous 5.4 mmol/L      O2 Saturation, Venous 83.7 %      Comment: 83 Value above reference range        Hemoglobin, Blood Gas 14.6 g/dL      CO2 Content 32.5 mmol/L      Barometric Pressure for Blood Gas 722 mmHg      Modality Heated HFNC     FIO2 75 %      Flow Rate 60.0 lpm      Ventilator Mode NA     Collected by [id number]     Comment: Meter: [id number]     :  [id number]        Oxyhemoglobin Venous 82.3 %      Comment: 83 Value  above reference range        Carboxyhemoglobin Venous 1.0 %      Methemoglobin Venous 0.7 %     Fungus Culture - Lavage, Lung, Left Upper Lobe [[id number]] Collected: 01/08/24 0802    Specimen: Lavage from Lung, Left Upper Lobe Updated: 01/11/24 1112     Fungus Stain Final report     KOH/Calcofluor preparation Comment     Comment: KOH/Calcofluor preparation:  no fungus observed.       Narrative:      Performed at:  [address] [place] [address]  [id number]  : Isidoro Eastman PhD, Phone:  [phone]    Fungus Culture - Wash, Bronchus [[id number]] Collected: 01/08/24 0804    Specimen: Wash from Bronchus Updated: 01/11/24 1112     Fungus Stain Final report     KOH/Calcofluor preparation Comment     Comment: KOH/Calcofluor preparation:  no fungus observed.       Narrative:      Performed at:  [address] [place] [address]  [id number]  : Isidoro Eastman PhD, Phone:  [phone]    Blood Gas, Arterial With Co-Ox [[id number]]  (Abnormal) Collected: 01/11/24 0425    Specimen: Arterial Blood Updated: 01/11/24 0426     Site Left Radial     Isaac's Test Positive     pH, Arterial 7.377 pH units      pCO2, Arterial 47.9 mm Hg      pO2, Arterial 65.3 mm Hg      Comment: 84 Value below reference range        HCO3, Arterial 28.1 mmol/L      Comment: 83 Value above reference range        Base Excess, Arterial 2.2 mmol/L      O2 Saturation, Arterial 92.8 %      Comment: 84 Value below reference range        Hemoglobin, Blood Gas 14.4 g/dL      Hematocrit, Blood Gas 44.2 %      Oxyhemoglobin 91.2 %      Comment: 84 Value below reference range        Methemoglobin 0.80 %      Carboxyhemoglobin 0.9 %      A-a DO2 492.6 mmHg      CO2 Content 29.6 mmol/L      Barometric Pressure for Blood Gas 721 mmHg      Modality Heated HFNC     FIO2 90 %      Flow Rate 60.0 lpm      Ventilator Mode NA     Collected by [id number]     Comment: Meter: [id number]     :  [id number]         pH, Temp Corrected --     pCO2, Temperature Corrected --     pO2, Temperature Corrected --    C-reactive Protein [[id number]]  (Abnormal) Collected: 01/11/24 0201    Specimen: Blood Updated: 01/11/24 0310     C-Reactive Protein 2.82 mg/dL     Lactic Acid, Plasma [[id number]]  (Normal) Collected: 01/11/24 0201    Specimen: Blood Updated: 01/11/24 0310     Lactate 1.0 mmol/L     Protime-INR [[id number]]  (Normal) Collected: 01/11/24 0201    Specimen: Blood Updated: 01/11/24 0254     Protime 13.9 Seconds      INR 1.02    Narrative:      Suggested INR therapeutic range for stable oral anticoagulant therapy:    Low Intensity therapy:   1.5-2.0  Moderate Intensity therapy:   2.0-3.0  High Intensity therapy:   2.5-4.0    aPTT [[id number]]  (Normal) Collected: 01/11/24 0201    Specimen: Blood Updated: 01/11/24 0254     PTT 28.9 seconds     Narrative:      PTT Heparin Therapeutic Range:  59 - 95 seconds      BAL Culture, Quantitative - Lavage, Lung, Left Upper Lobe [[id number]] Collected: 01/08/24 0802    Specimen: Lavage from Lung, Left Upper Lobe Updated: 01/10/24 1123     BAL Culture No growth     Gram Stain WBCs seen      No organisms seen    Respiratory Panel PCR w/COVID-19(SARS-CoV-2) RAIN/CHING/LILLIANA/PAD/COR/RENE In-House, NP Swab in UTM/VTM, 2 HR TAT - Swab, Nasopharynx [[id number]]  (Normal) Collected: 01/10/24 0435    Specimen: Swab from Nasopharynx Updated: 01/10/24 0529     ADENOVIRUS, PCR Not Detected     Coronavirus 229E Not Detected     Coronavirus HKU1 Not Detected     Coronavirus NL63 Not Detected     Coronavirus OC43 Not Detected     COVID19 Not Detected     Human Metapneumovirus Not Detected     Human Rhinovirus/Enterovirus Not Detected     Influenza A PCR Not Detected     Influenza B PCR Not Detected     Parainfluenza Virus 1 Not Detected     Parainfluenza Virus 2 Not Detected     Parainfluenza Virus 3 Not Detected     Parainfluenza Virus 4 Not Detected     RSV, PCR Not Detected     Bordetella pertussis pcr Not  Detected     Bordetella parapertussis PCR Not Detected     Chlamydophila pneumoniae PCR Not Detected     Mycoplasma pneumo by PCR Not Detected    Narrative:      In the setting of a positive respiratory panel with a viral infection PLUS a negative procalcitonin without other underlying concern for bacterial infection, consider observing off antibiotics or discontinuation of antibiotics and continue supportive care. If the respiratory panel is positive for atypical bacterial infection (Bordetella pertussis, Chlamydophila pneumoniae, or Mycoplasma pneumoniae), consider antibiotic de-escalation to target atypical bacterial infection.    AFB Culture - Lavage, Lung, Left Upper Lobe [[id number]] Collected: 01/08/24 0802    Specimen: Lavage from Lung, Left Upper Lobe Updated: 01/09/24 1709     AFB Specimen Processing Concentration     Acid Fast Smear Negative    Narrative:      Performed at:  [address] [place] [address]  [id number]  : Isidoro Eastman PhD, Phone:  [phone]    AFB Culture - Wash, Bronchus [[id number]] Collected: 01/08/24 0804    Specimen: Wash from Bronchus Updated: 01/09/24 1709     AFB Specimen Processing Concentration     Acid Fast Smear Negative    Narrative:      Performed at:  [address] [place] [address]  [id number]  : Isidoro Eastman PhD, Phone:  [phone]    [facility]N CYTOLOGY, [facility] (RENE,COR,MAD,[facility]) [[id number]] Collected: 01/08/24 0802    Specimen: Lavage from Lung, Left Upper Lobe; Brushing from Lung, Left Upper Lobe; Wash from Bronchus; Fine Needle Aspirate from Bronchus Updated: 01/09/24 1216     Reference Lab Report --     [facility]  [address] [place] [address]  Phone: [phone] or [phone]  Fax: [phone]  Tommy Dickerson M.D., Medical Director    PATIENT NAME                                 LABORATORY NO.  [id number]   SUDEEP CAMPBELL                         [id number]  [id number]                                     AGE                SEX     SSN            CLIENT REF #  [facility]                          78       1945   F       [id number]    [id number]  [address]                                 REQUESTING M.D.       ATTENDING M[place]..        [place] [address]                               MARIAA GLEASON  DATE COLLECTED        DATE RECEIVED          DATE REPORTED  2024    DIAGNOSIS:  A.      BRONCHIAL LAVAGE, LEFT UPPER LOBE:  Negative for malignant cells.  B.      BRONCH BRUSH, LEFT UPPER LOBE:  Negative for malignant cells.  C.      BRONCH WASH:  Negative for malignant cells.  D.      LYMPH NODE, FNA, STATION 10L:  Negative for malignant cells.    MICROSCOPIC DESCRIPTION:  A.     Bronchial cells and pulmonary macrophages are present.  B.     Reactive bronchial cells and pulmonary macrophages are present.  C.     Bronchial cells and pulmonary macrophages are present.  D.     The specimen shows a polymorphic lymphoid population.  The findings  are most consistent with a benign reactive lymphadenopathy; however, if  the node is large, continues to increase in size or fails to respond to  antibiotics, excision to definitely exclude a low grade lymphoma or  Hodgkin's lymphoma is recommended.  Unfixed tissue should be  submitted in transport media for immunophenotyping by flow cytometry.    Professional interpretation rendered by Nichol Thomas D.O., F.C.A.P. at [facility], [facility], [address] [place] [address].    CLINICAL HISTORY:  Acute respiratory failure with hypoxia  Lung mass  Hypoxemia  COPD exacerbation    SPECIMENS SUBMITTED:  A.    BRONCHIAL LAVAGE, LEFT UPPER LOBE  B.    BRONCH BRUSH, LEFT UPPER LOBE  C.    BRONCH WASH  D.    LYMPH NODE, FNA, STATION 10L    GROSS SPECIMEN DESCRIPTION:  A.     35ccs of clear colorless fluid with some visible sediment received in  "fixative  Automated paraffin embedded cell block has been examined.  B.     1 brush received in alcohol  1 premade alcohol smear  Automated paraffin embedded cell block has been examined.  C.     35ccs of very cloudy white fluid with visible mucoid sediment received in  fixative  Cell block has been examined.  D.     35ccs of clear pink fluid with extremely scant visible sediment received in  fixative  Automated paraffin embedded cell block has been examined.    REVIEWED, DIAGNOSED AND ELECTRONICALLY  SIGNED BY:    Nichol Thomas D.O., JAS  CPT CODES:  88112x3, 88305x4, 94010      Procalcitonin [[id number]]  (Normal) Collected: 01/09/24 0136    Specimen: Blood Updated: 01/09/24 0221     Procalcitonin 0.04 ng/mL     Narrative:      As a Marker for Sepsis (Non-Neonates):    1. <0.5 ng/mL represents a low risk of severe sepsis and/or septic shock.  2. >2 ng/mL represents a high risk of severe sepsis and/or septic shock.    As a Marker for Lower Respiratory Tract Infections that require antibiotic therapy:    PCT on Admission    Antibiotic Therapy       6-12 Hrs later    >0.5                Strongly Recommended  >0.25 - <0.5        Recommended   0.1 - 0.25          Discouraged              Remeasure/reassess PCT  <0.1                Strongly Discouraged     Remeasure/reassess PCT    As 28 day mortality risk marker: \"Change in Procalcitonin Result\" (>80% or <=80%) if Day 0 (or Day 1) and Day 4 values are available. Refer to http://www.[employer]-pct-calculator.com    Change in PCT <=80%  A decrease of PCT levels below or equal to 80% defines a positive change in PCT test result representing a higher risk for 28-day all-cause mortality of patients diagnosed with severe sepsis for septic shock.    Change in PCT >80%  A decrease of PCT levels of more than 80% defines a negative change in PCT result representing a lower risk for 28-day all-cause mortality of patients diagnosed with severe sepsis or septic shock.       " C-reactive Protein [[id number]]  (Abnormal) Collected: 01/09/24 0136    Specimen: Blood Updated: 01/09/24 0213     C-Reactive Protein 3.63 mg/dL     Lactic Acid, Plasma [[id number]]  (Normal) Collected: 01/09/24 0136    Specimen: Blood Updated: 01/09/24 0209     Lactate 1.2 mmol/L     Hemoglobin A1c [[id number]]  (Abnormal) Collected: 01/08/24 0137    Specimen: Blood Updated: 01/08/24 1408     Hemoglobin A1C 7.70 %     Narrative:      Hemoglobin A1C Ranges:    Increased Risk for Diabetes  5.7% to 6.4%  Diabetes                     >= 6.5%  Diabetic Goal                < 7.0%    TSH [[id number]]  (Normal) Collected: 01/08/24 0137    Specimen: Blood Updated: 01/08/24 1403     TSH 3.260 uIU/mL     Blood Culture - Blood, Arm, Right [[id number]]  (Normal) Collected: 01/02/24 1400    Specimen: Blood from Arm, Right Updated: 01/07/24 1430     Blood Culture No growth at 5 days    Blood Culture - Blood, Arm, Left [[id number]]  (Normal) Collected: 01/02/24 1409    Specimen: Blood from Arm, Left Updated: 01/07/24 1430     Blood Culture No growth at 5 days    Urinalysis With Microscopic If Indicated (No Culture) - Urine, Clean Catch [[id number]]  (Abnormal) Collected: 01/02/24 1611    Specimen: Urine, Clean Catch Updated: 01/02/24 1620     Color, UA Yellow     Appearance, UA Clear     pH, UA 6.0     Specific Gravity, UA >1.030     Glucose, UA >=1000 mg/dL (3+)     Ketones, UA Negative     Bilirubin, UA Negative     Blood, UA Negative     Protein, UA Negative     Leuk Esterase, UA Negative     Nitrite, UA Negative     Urobilinogen, UA 0.2 E.U./dL    Narrative:      Urine microscopic not indicated.    [place] [facility] [[id number]] Collected: 01/02/24 1409    Specimen: Blood Updated: 01/02/24 1515    Narrative:      The following orders were created for panel order [place] [facility].  Procedure                               Abnormality         Status                     ---------                               -----------         ------                      Green Top (Gel)[[id number]]                                  Final result               Lavender Top[[id number]]                                     Final result               Gold Top - SST[[id number]]                                   Final result               Light Blue Top[[id number]]                                   Final result                 Please view results for these tests on the individual orders.    Green Top (Gel) [[id number]] Collected: 01/02/24 1409    Specimen: Blood Updated: 01/02/24 1515     Extra Tube Hold for add-ons.     Comment: Auto resulted.       Lavender Top [[id number]] Collected: 01/02/24 1409    Specimen: Blood Updated: 01/02/24 1515     Extra Tube hold for add-on     Comment: Auto resulted       Gold Top - SST [[id number]] Collected: 01/02/24 1409    Specimen: Blood Updated: 01/02/24 1515     Extra Tube Hold for add-ons.     Comment: Auto resulted.       Light Blue Top [[id number]] Collected: 01/02/24 1409    Specimen: Blood Updated: 01/02/24 1515     Extra Tube Hold for add-ons.     Comment: Auto resulted       COVID-19, FLU A/B, RSV PCR 1 HR TAT - Swab, Nasopharynx [[id number]]  (Normal) Collected: 01/02/24 1410    Specimen: Swab from Nasopharynx Updated: 01/02/24 1501     COVID19 Not Detected     Influenza A PCR Not Detected     Influenza B PCR Not Detected     RSV, PCR Not Detected    Narrative:      Fact sheet for providers: https://www.fda.gov/media/999469/download    Fact sheet for patients: https://www.fda.gov/media/460643/download    Test performed by PCR.    Single High Sensitivity Troponin T [[id number]]  (Normal) Collected: 01/02/24 1409    Specimen: Blood Updated: 01/02/24 1447     HS Troponin T 9 ng/L     Narrative:      High Sensitive Troponin T Reference Range:  <14.0 ng/L- Negative Female for AMI  <22.0 ng/L- Negative Male for AMI  >=14 - Abnormal Female indicating possible myocardial injury.  >=22 - Abnormal Male indicating possible myocardial injury.   Clinicians  would have to utilize clinical acumen, EKG, Troponin, and serial changes to determine if it is an Acute Myocardial Infarction or myocardial injury due to an underlying chronic condition.         BNP [[id number]]  (Normal) Collected: 01/02/24 1409    Specimen: Blood Updated: 01/02/24 1444     proBNP 103.9 pg/mL     Narrative:      This assay is used as an aid in the diagnosis of individuals suspected of having heart failure. It can be used as an aid in the diagnosis of acute decompensated heart failure (ADHF) in patients presenting with signs and symptoms of ADHF to the emergency department (ED). In addition, NT-proBNP of <300 pg/mL indicates ADHF is not likely.    Age Range Result Interpretation  NT-proBNP Concentration (pg/mL:      <50             Positive            >450                   Gray                 300-450                    Negative             <300    50-75           Positive            >900                  Gray                300-900                  Negative            <300      >75             Positive            >1800                  Gray                300-1800                  Negative            <300    Sedimentation Rate [[id number]]  (Normal) Collected: 01/02/24 1409    Specimen: Blood Updated: 01/02/24 1420     Sed Rate 8 mm/hr           Ventilator/Non-Invasive Ventilation Settings (From admission, onward)       Start     Ordered    01/09/24 1941  NIPPV-IPPV / BIPAP / CPAP  At Bedtime & As Needed-RT,   Status:  Canceled        Question Answer Comment   Indication Acute Respiratory Failure    Type BIPAP    IPAP 18    EPAP 8    Breath Rate 14    Titrate Oxygen for SpO2 88 - 92%        01/09/24 1940                  Respiratory Therapy (last 24 hours)       Respiratory Therapy Flowsheet       Row Name 01/18/24 0800 01/18/24 0708 01/18/24 0700 01/18/24 0658 01/18/24 0604       $ Oximetry Charges    $ O2 Pt/System Assessment -- -- -- yes --    $ Pulse Oximeter, Continuous (RT) -- -- -- yes --        Oxygen Therapy    SpO2 -- 98 % 96 % 97 % --    Device (Oxygen Therapy) nasal cannula -- -- humidified;nasal cannula --    Flow (L/min) 3 -- -- 3 --    SF Ratio -- -- -- -- 170       Vital Signs    Temp 98.2 °F (36.8 °C) -- -- -- --    Temp src Oral -- -- -- --    Pulse -- 68 65 69 --    Resp -- 18 -- 18 --    BP -- -- 127/80 -- --    Noninvasive MAP (mmHg) -- -- 104 -- --       Assessment    Respiratory WDL WDL -- -- -- --    Rhythm/Pattern, Respiratory no shortness of breath reported -- -- -- --    Expansion/Accessory Muscles/Retractions no use of accessory muscles -- -- -- --    Skin Integrity intact;bruised (ecchymotic) -- -- -- --       Breath Sounds    Breath Sounds All Fields -- -- All Fields --    All Lung Fields Breath Sounds diminished -- -- diminished --       Breath Sounds Post-Respiratory Treatment    Breath Sounds Posttreatment All Fields -- All Fields -- -- --    Breath Sounds Posttreatment All Fields -- no change -- -- --       Treatment/Therapy    Cough And Deep Breathing done independently per patient -- -- -- --    Oral Care lip/mouth moisturizer applied;oral rinse provided -- -- -- --       Aerosol Therapy (SVN)    $ Mini Neb Subsequent -- -- -- yes --    Daily Review of Necessity (SVN) -- -- -- completed --    Respiratory Treatment Status (SVN) -- -- -- given --    Treatment Route (SVN) -- -- -- mouthpiece utilized;oxygen;PEP device, vibratory --    Patient Position -- -- -- HOB elevated --    Signs of Intolerance (SVN) -- none -- -- --       Care Plan Interventions    Airway/Ventilation Management airway patency maintained -- -- -- --    Skin Protection tubing/devices free from skin contact -- -- -- --    Device Skin Pressure Protection absorbent pad utilized/changed -- -- -- --       Other RT Charges    $ Demo/Eval SVN/Inhaler/Metaneb -- -- -- yes --      Row Name 01/18/24 0600 01/18/24 0539 01/18/24 0500 01/18/24 0400 01/18/24 0300       Oxygen Therapy    SpO2 92 % -- 93 % 91 % 95 %     Device (Oxygen Therapy) -- -- -- -- humidified;nasal cannula    Flow (L/min) -- -- -- -- 3       Vital Signs    Temp -- -- -- 98.4 °F (36.9 °C) --    Pulse 63 -- 67 67 65    Resp 17 -- -- 18 17    /61 -- 126/61 117/57 98/45    Noninvasive MAP (mmHg) 80 -- 93 83 60       Treatment/Therapy    Administration (IS) -- self-administered -- -- --      Row Name 01/18/24 0203 01/18/24 0200 01/18/24 0100 01/18/24 0052 01/18/24 0000       $ Oximetry Charges    $ O2 Pt/System Assessment -- -- -- yes --    $ Pulse Oximeter, Continuous (RT) -- -- -- yes --       Oxygen Therapy    SpO2 -- 94 % 95 % -- 95 %    Pulse Oximetry Type -- -- -- Continuous --    Device (Oxygen Therapy) -- -- -- humidified;nasal cannula --    Flow (L/min) -- -- -- 3 --    SF Ratio 200 -- -- -- --       Vital Signs    Temp -- -- -- -- 98.1 °F (36.7 °C)    Temp src -- -- -- -- Oral    Pulse -- 64 69 -- 71    Heart Rate Source -- -- -- Monitor --    Resp -- 20 -- 22 17    Resp Rate Source -- -- -- Visual --    BP -- 98/47 99/54 -- 114/55    Noninvasive MAP (mmHg) -- 69 70 -- 77       Assessment    Respiratory WDL -- .WDL except;breath sounds -- -- --    Rhythm/Pattern, Respiratory -- no shortness of breath reported;unlabored;pattern regular;depth regular -- -- --    Skin Integrity -- intact;bruised (ecchymotic);other (see comments) -- -- --       Breath Sounds    Breath Sounds -- All Fields -- All Fields --    All Lung Fields Breath Sounds -- diminished -- diminished --       Aerosol Therapy (SVN)    Respiratory Treatment Status (SVN) -- -- -- refused --      Row Name 01/17/24 2330 01/17/24 2300 01/17/24 2230 01/17/24 2203 01/17/24 2200       Oxygen Therapy    SpO2 93 % 93 % 92 % -- 93 %    SF Ratio -- -- -- 160 --       Vital Signs    Pulse 71 71 71 -- 75    Resp -- 15 -- -- 14    /49 96/46 112/59 -- 108/64    Noninvasive MAP (mmHg) 76 68 73 -- 77      Row Name 01/17/24 2133 01/17/24 2130 01/17/24 2100 01/17/24 2030 01/17/24 2005       Oxygen  Therapy    SpO2 -- 89 % 92 % 94 % --       Vital Signs    Pulse -- 78 76 76 --    Resp -- -- 16 -- --    BP -- 111/68 115/68 94/75 --    Noninvasive MAP (mmHg) -- 77 83 79 --       Assessment    Respiratory WDL -- -- -- -- .WDL except;breath sounds    Rhythm/Pattern, Respiratory -- -- -- -- no shortness of breath reported;unlabored;pattern regular;depth regular    Skin Integrity -- -- -- -- intact;bruised (ecchymotic);other (see comments)       Breath Sounds    Breath Sounds -- -- -- -- All Fields    All Lung Fields Breath Sounds -- -- -- -- diminished       Treatment/Therapy    Administration (IS) self-administered -- -- -- --      Row Name 01/17/24 2000 01/17/24 1930 01/17/24 1900 01/17/24 1856 01/17/24 0335       $ Oximetry Charges    $ O2 Pt/System Assessment -- -- -- -- yes    $ Pulse Oximeter, Continuous (RT) -- -- -- -- yes       Oxygen Therapy    SpO2 92 % 91 % 95 % -- 92 %    Pulse Oximetry Type -- -- -- Continuous Continuous    Device (Oxygen Therapy) -- -- nasal cannula nasal cannula;humidified nasal cannula;humidified    Flow (L/min) -- -- 3 3 3       Vital Signs    Temp 98.1 °F (36.7 °C) -- -- -- --    Temp src Oral -- -- -- --    Pulse 77 75 74 -- 80    Heart Rate Source -- -- -- Monitor Monitor    Resp 17 -- 16 22 20    Resp Rate Source -- -- -- Visual Visual    /76 133/86 121/74 -- --    Noninvasive MAP (mmHg) 108 110 88 -- --       Breath Sounds    Breath Sounds -- -- -- -- All Fields    All Lung Fields Breath Sounds -- -- -- -- diminished       Breath Sounds Post-Respiratory Treatment    Breath Sounds Posttreatment All Fields -- -- -- All Fields --    Breath Sounds Posttreatment All Fields -- -- -- no change --       Aerosol Therapy (SVN)    $ Mini Neb Subsequent -- -- -- -- yes    Respiratory Treatment Status (SVN) -- -- -- -- given    Treatment Route (SVN) -- -- -- -- mouthpiece utilized;oxygen;PEP device, vibratory    Patient Position -- -- -- -- HOB elevated    Posttreatment Assessment  (SVN) -- -- -- breath sounds unchanged --    Signs of Intolerance (SVN) -- -- -- none --       Other RT Charges    $ Demo/Eval SVN/Inhaler/Metaneb -- -- -- -- yes      Row Name 01/17/24 1803 01/17/24 1727 01/17/24 1700 01/17/24 1600 01/17/24 1404       Oxygen Therapy    SpO2 -- 89 % 93 % 94 % --    SF Ratio 140 -- -- -- 200       Vital Signs    Temp -- -- -- 98.5 °F (36.9 °C) --    Temp src -- -- -- Oral --    Pulse -- 90 82 75 --    Heart Rate Source -- -- Monitor -- --    Resp -- -- 18 -- --    BP -- 125/90 145/78 127/115 --    Noninvasive MAP (mmHg) -- 96 101 120 --      Row Name 01/17/24 1400 01/17/24 1301 01/17/24 1247 01/17/24 1100 01/17/24 1018       Oxygen Therapy    SpO2 94 % 95 % 93 % 95 % 92 %    Device (Oxygen Therapy) -- humidified;nasal cannula humidified;nasal cannula -- nasal cannula;humidified  taken off [facility] at this time    Flow (L/min) -- 3 3 -- 3       Vital Signs    Pulse 85 76 75 75 --    Heart Rate Source Monitor -- -- Monitor --    Resp 20 -- 21 18 --    Resp Rate Source Visual -- -- Visual --    /88 -- -- 132/76 --    Noninvasive MAP (mmHg) 103 -- -- 99 --       Assessment    Respiratory WDL .WDL except;breath sounds -- -- -- --    Rhythm/Pattern, Respiratory depth regular;pattern regular;unlabored -- -- -- --    Expansion/Accessory Muscles/Retractions no use of accessory muscles -- -- -- --    Nailbeds pale -- -- -- --    Mucous Membranes pink;moist;intact -- -- -- --    Cough Frequency frequent -- -- -- --    Cough Type congested -- -- -- --    Skin Integrity intact;bruised (ecchymotic) -- -- -- --       Breath Sounds    Breath Sounds All Fields -- -- -- --    All Lung Fields Breath Sounds clear;equal bilaterally -- -- -- --    WILLIAM Breath Sounds -- -- clear;Anterior: -- --    RUL Breath Sounds -- -- clear;Anterior: -- --       Breath Sounds Post-Respiratory Treatment    Breath Sounds Posttreatment WILLIAM -- no change -- -- --    Breath Sounds Posttreatment RUL -- no change -- -- --        Treatment/Therapy    Cough And Deep Breathing done independently per patient -- -- -- --    Oral Care oral rinse provided -- -- -- --       Aerosol Therapy (SVN)    $ Mini Neb Subsequent -- -- yes -- --    Respiratory Treatment Status (SVN) -- -- given -- --    Treatment Route (SVN) -- -- mouthpiece utilized;oxygen;PEP device, vibratory -- --    Patient Position -- -- HOB elevated -- --    Posttreatment Assessment (SVN) -- breath sounds unchanged -- -- --    Signs of Intolerance (SVN) -- none -- -- --       Positive Expiratory Pressure (PEP)    $ Flutter/Acapella -- -- yes -- --    Type (PEP Therapy) -- -- vibratory/oscillatory -- --    Device (PEP Therapy) -- -- Aerobika -- --    Route (PEP Therapy) -- -- aerosol therapy;proper technique demonstrated -- --    Settings (PEP Therapy) -- -- PEP 3 -- --    Patient Position -- -- HOB elevated -- --    Posttreatment Assessment (PEP) -- breath sounds unchanged -- -- --    Signs of Intolerance (PEP Therapy) -- none -- -- --       Care Plan Interventions    Skin Protection adhesive use limited;skin-to-device areas padded;skin-to-skin areas padded;tubing/devices free from skin contact -- -- -- --    Device Skin Pressure Protection absorbent pad utilized/changed;adhesive use limited;pressure points protected;skin-to-device areas padded;skin-to-skin areas padded -- -- -- --       Other RT Charges    $ Demo/Eval SVN/Inhaler/Metaneb -- -- yes -- --                     Physician Progress Notes (most recent note)        Cora Carver MD at [address] [place], [place] [address]IST PROGRESS NOTE     Patient Identification:  Name:  Evie Shah  Age:  78 y.o.  Sex:  female  :  1945  MRN:  [id number]  Visit Number:  [id number]  Primary Care Provider:  Camryn Mota PA    Length of stay:  15    Subjective: Patient seen and examined reports coughing but less is whitish in color, her O2 sat at rest on 40 L heated high flow is 91% it goes  up to 95-96 when he talks and set up.  Tolerated well decreasing oxygen supplementation yesterday.    Chief Complaint: Short of breath  ----------------------------------------------------------------------------------------------------------------------  Current Hospital Meds:  acetylcysteine, 3 mL, Nebulization, 4x Daily - RT  amLODIPine, 5 mg, Oral, Q PM  atorvastatin, 10 mg, Oral, Nightly  buPROPion SR, 150 mg, Oral, Nightly  cetirizine, 5 mg, Oral, Daily  cholecalciferol, 50,000 Units, Oral, Weekly  clopidogrel, 75 mg, Oral, Daily  empagliflozin, 25 mg, Oral, Daily  fluticasone, 2 spray, Nasal, Daily  guaiFENesin, 600 mg, Oral, Q12H  insulin lispro, 2 Units, Subcutaneous, TID With Meals  insulin lispro, 2-9 Units, Subcutaneous, 4x Daily AC & at Bedtime  ipratropium-albuterol, 3 mL, Nebulization, 4x Daily - RT  lisinopril, 5 mg, Oral, Q PM  metoprolol succinate XL, 25 mg, Oral, Q PM  montelukast, 10 mg, Oral, Nightly  senna-docusate sodium, 2 tablet, Oral, BID  sodium chloride, 10 mL, Intravenous, Q12H  sodium chloride, 10 mL, Intravenous, Q12H         ----------------------------------------------------------------------------------------------------------------------  Vital Signs:  Temp:  [97.4 °F (36.3 °C)-98.8 °F (37.1 °C)] 98.4 °F (36.9 °C)  Heart Rate:  [64-81] 72  Resp:  [13-28] 13  BP: (104-134)/(51-82) 116/73       Tele: Sinus rhythm 72 beats per      01/15/24  0500 01/16/24  0400 01/17/24  0400   Weight: 89.6 kg (197 lb 9.6 oz) 90.6 kg (199 lb 11.8 oz) 90.5 kg (199 lb 8.3 oz)     Body mass index is 35.35 kg/m².    Intake/Output Summary (Last 24 hours) at 1/17/2024 1015  Last data filed at 1/17/2024 0800  Gross per 24 hour   Intake 720 ml   Output 300 ml   Net 420 ml     Diet: Diabetic Diets, Cardiac Diets; Healthy Heart (2-3 Na+); Consistent Carbohydrate; Texture: Regular Texture (IDDSI 7); Fluid Consistency: Thin (IDDSI  0)  ----------------------------------------------------------------------------------------------------------------------  Physical exam:  General: Comfortable,awake, alert, oriented to self, place, and time, well-developed and well-nourished.  No respiratory distress.    Skin:  Skin is warm and dry. No rash noted. No pallor.    HENT:  Head:  Normocephalic and atraumatic.  Mouth:  Moist mucous membranes.    Eyes:  Conjunctivae and EOM are normal.  Pupils are equal, round, and reactive to light.  No scleral icterus.    Neck:  Neck supple.  No JVD present.    Pulmonary/Chest:  No respiratory distress, no wheezes, no crackles, with normal breath sounds and good air movement.  Significantly improved compared to previous physical exam.  Cardiovascular:  Normal rate, regular rhythm and normal heart sounds with no murmur.  Abdominal:  Soft.  Bowel sounds are normal.  No distension and no tenderness.   Extremities:  No edema, no tenderness, and no deformity.  No red or swollen joints anywhere.  Strong pulses in all 4 extremities with no clubbing, no cyanosis, no edema.  Neurological:  Motor strength equal no obvious deficit, sensory grossly intact.   No cranial nerve deficit.  No tongue deviation.  No facial droop.  No slurred speech.    Genitourinary: No Palomino catheter  Back:  ----------------------------------------------------------------------------------------------------------------------  ----------------------------------------------------------------------------------------------------------------------      Results from last 7 days   Lab Units 01/15/24  0045 01/14/24  0030 01/13/24  0431 01/12/24  0423 01/11/24  0201   CRP mg/dL  --   --   --   --  2.82*   LACTATE mmol/L  --   --   --   --  1.0   WBC 10*3/mm3 14.22* 13.96* 10.05 9.81 11.05*   HEMOGLOBIN g/dL 15.3 15.5 14.3 14.2 13.8   HEMATOCRIT % 48.6* 48.3* 45.3 44.2 44.4   MCV fL 95.1 94.2 95.4 94.0 97.4*   MCHC g/dL 31.5 32.1 31.6 32.1 31.1*   PLATELETS  "10*3/mm3 207 202 177 197 182   INR   --   --   --  1.02 1.02     Results from last 7 days   Lab Units 01/11/24  0425   PH, ARTERIAL pH units 7.383  7.377   PO2 ART mm Hg 81.6*  65.3*   PCO2, ARTERIAL mm Hg 47.2*  47.9*   HCO3 ART mmol/L 28.1*  28.1*     Results from last 7 days   Lab Units 01/15/24  0045 01/14/24  0030 01/13/24  0431 01/12/24  0423   SODIUM mmol/L 134* 136 137 139   POTASSIUM mmol/L 3.8 4.1 4.6 4.6   MAGNESIUM mg/dL 2.2 2.3 2.3 2.1   CHLORIDE mmol/L 100 97* 101 100   CO2 mmol/L 24.5 26.0 27.2 30.7*   BUN mg/dL 53* 46* 45* 30*   CREATININE mg/dL 1.07* 0.90 0.90 0.91   CALCIUM mg/dL 9.6 9.6 9.6 9.5   GLUCOSE mg/dL 209* 227* 297* 229*   ALBUMIN g/dL 3.4* 3.6  --  3.6   BILIRUBIN mg/dL 0.3 0.3  --  0.4   ALK PHOS U/L 93 98  --  76   AST (SGOT) U/L 15 10  --  11   ALT (SGPT) U/L 18 21  --  26   Estimated Creatinine Clearance: 47.7 mL/min (A) (by C-G formula based on SCr of 1.07 mg/dL (H)).    No results found for: \"AMMONIA\"      No results found for: \"BLOODCX\"  No results found for: \"URINECX\"  No results found for: \"WOUNDCX\"  No results found for: \"STOOLCX\"    I have personally looked at the labs and they are summarized above.  ----------------------------------------------------------------------------------------------------------------------  Imaging Results (Last 24 Hours)       ** No results found for the last 24 hours. **          ----------------------------------------------------------------------------------------------------------------------  Assessment and Plan:  -Acute hypoxic respiratory failure secondary to left lung atelectasis  -Left lung atelectasis initially upper lobe and subsequent lower lobe secondary to mucous plug  -Diabetes  II with hyperglycemia  -Obesity with a BMI of 35  -History of peripheral arterial disease status post stent placement of left external iliac  -COPD with exacerbation resolved    Continue Accu-Chek, patient's oxygen requirements is slowly improving, will " continue to taper heated high flow today down from 40% FiO2 40 L/min care of respiratory.  Ambulate, increase activity.  Diuretics as needed, check BMP.    Plan discussed with patient and her nurse HELIO Hunter.      Disposition Home pending      Cora Carver MD  01/17/24  10:15 EST    Electronically signed by Cora Carver MD at 01/17/24 1025          Consult Notes (most recent note)        Victor Hugo Davis MD at 01/17/24 1135          Progress Note Pulmonary      Subjective     Interval History:   Pt seen on follow up for acute hypoxic respiratory failure. No acute overnight events reported. This morning she says she feels that her breathing is much improved, is hoping to be able to be discharged soon.      Review of Systems:    Reviewed ; unchanged       Vital Signs  Temp:  [97.4 °F (36.3 °C)-98.8 °F (37.1 °C)] 98.4 °F (36.9 °C)  Heart Rate:  [64-81] 72  Resp:  [13-28] 13  BP: (104-134)/(51-82) 116/73  Body mass index is 35.35 kg/m².    Intake/Output Summary (Last 24 hours) at 1/17/2024 1138  Last data filed at 1/17/2024 0800  Gross per 24 hour   Intake 720 ml   Output 300 ml   Net 420 ml     I/O this shift:  In: 360 [P.O.:360]  Out: -     Physical Exam:  General- normal in appearance, not in any acute distress    HEENT- pupils equally reactive to light, normal in size, no scleral icterus    Neck- supple    No JVD, no carotid bruit    Respiratory- clear and equal bilateral bs without wheeze or rhonchi    Cardiovascular-  Normal S1 and S2. No S3, S4 or murmurs.    GI-nontender nondistended bowel sounds positive    CNS-alert oriented x3, grossly nonfocal    Extremities- pulses normal bilaterally , no clubbing and edema        Results Review:      Results from last 7 days   Lab Units 01/15/24  0045 01/14/24  0030 01/13/24  0431   WBC 10*3/mm3 14.22* 13.96* 10.05   HEMOGLOBIN g/dL 15.3 15.5 14.3   PLATELETS 10*3/mm3 207 202 177     Results from last 7 days   Lab Units 01/15/24  0045 01/14/24  0030  01/13/24  0431   SODIUM mmol/L 134* 136 137   POTASSIUM mmol/L 3.8 4.1 4.6   CHLORIDE mmol/L 100 97* 101   CO2 mmol/L 24.5 26.0 27.2   BUN mg/dL 53* 46* 45*   CREATININE mg/dL 1.07* 0.90 0.90   CALCIUM mg/dL 9.6 9.6 9.6   GLUCOSE mg/dL 209* 227* 297*   MAGNESIUM mg/dL 2.2 2.3 2.3     Lab Results   Component Value Date    INR 1.02 01/12/2024    INR 1.02 01/11/2024    INR 0.94 01/08/2024    PROTIME 13.9 01/12/2024    PROTIME 13.9 01/11/2024    PROTIME 13.1 01/08/2024     Results from last 7 days   Lab Units 01/15/24  0045 01/14/24  0030 01/12/24  0423   ALK PHOS U/L 93 98 76   BILIRUBIN mg/dL 0.3 0.3 0.4   ALT (SGPT) U/L 18 21 26   AST (SGOT) U/L 15 10 11     Results from last 7 days   Lab Units 01/11/24  0425   PH, ARTERIAL pH units 7.383  7.377   PO2 ART mm Hg 81.6*  65.3*   PCO2, ARTERIAL mm Hg 47.2*  47.9*   HCO3 ART mmol/L 28.1*  28.1*     Imaging Results (Last 24 Hours)       ** No results found for the last 24 hours. **                 acetylcysteine, 3 mL, Nebulization, 4x Daily - RT  amLODIPine, 5 mg, Oral, Q PM  atorvastatin, 10 mg, Oral, Nightly  buPROPion SR, 150 mg, Oral, Nightly  cetirizine, 5 mg, Oral, Daily  cholecalciferol, 50,000 Units, Oral, Weekly  clopidogrel, 75 mg, Oral, Daily  empagliflozin, 25 mg, Oral, Daily  fluticasone, 2 spray, Nasal, Daily  guaiFENesin, 600 mg, Oral, Q12H  insulin lispro, 2 Units, Subcutaneous, TID With Meals  insulin lispro, 2-9 Units, Subcutaneous, 4x Daily AC & at Bedtime  ipratropium-albuterol, 3 mL, Nebulization, 4x Daily - RT  lisinopril, 5 mg, Oral, Q PM  metoprolol succinate XL, 25 mg, Oral, Q PM  montelukast, 10 mg, Oral, Nightly  senna-docusate sodium, 2 tablet, Oral, BID  sodium chloride, 10 mL, Intravenous, Q12H  sodium chloride, 10 mL, Intravenous, Q12H           Medication Review:     Assessment & Plan   # WILLIAM atelectasis with mucous plugging s/p bronchoscopy   # Acute hypoxic respiratory failure 2/2 above    -Pt overall improving and feeling well, is  likely close to discharge    -recommend trial of NC today with titration of O2 for goal saturation 88-92%    -Continue mucomyst, guaifenesin, duonebs, chest PT, incentive spirometer and flutter valve    -Additional recommendations per Dr. Susan Brumfield, DO  24  11:38 EST      Electronically signed by Victor Hugo Davis MD at 24 1251          Respiratory Therapy Notes (most recent note)        Angel Fitzpatrick, RRT at 24 1150        Consult Orders    1. Inpatient COPD Education (EDU) Consult [[id number]] ordered by Cora Carver MD at 24 1106    2. Inpatient COPD Education (RT) Consult [[id number]] ordered by Cora Carver MD at 24 1106                   COPD Education        Referring Provider: Krista Erwin RN, Case Management  Hospitalist: Dr. Carver  Reason for Consultation: COPD Exacerbation  Pulmonologist: none  Last outpatient pulmonary visit: none  Length of Diagnosis: 4 years  Last Hospital stay for COPD? None    Subjective .     Age: 78 y.o.  Sex: female  What stage have you been told you are in? Unknown  Do you use a CPAP or BIPAP? no   Have you had any recent weight loss? no  How many pillows do you use? 2    Last PFT/when/where? none  FEV1: N/A    Classification of Airflow Limitation Severity in COPD (Based on Post-Bronchodilator FEV1)  Gold 1: Mild FEV1 ? 80% predicted   Gold 2:  Moderate 50% ? FEV1 < 80% predicted   Gold 3: Severe 30% ? FEV1 < 50% predicted   Gold 4: Very Severe FEV1 < 30% predicted     FEV1/FVC: N/A    Do you have dyspnea? yes Dyspnea on exertion  Home O2: yes, 2L NC PRN    Social History:  Social History     Socioeconomic History    Marital status:     Number of children: 3    Years of education: 12   Tobacco Use    Smoking status: Former, 48 pack years     Types: Cigarettes     Quit date: 2015     Years since quittin.6    Smokeless tobacco: Never   Vaping Use    Vaping Use: Never used   Substance and Sexual  Activity    Alcohol use: No    Drug use: No    Sexual activity: Defer       Smoking Cessation: No    Airway Clearance methods utilized: no    History of Sleep Apnea: no  Patient's Last ABG:  Site   Date Value Ref Range Status   01/02/2024 Left Brachial  Final     Isaac's Test   Date Value Ref Range Status   01/02/2024 N/A  Final     pH, Arterial   Date Value Ref Range Status   01/02/2024 7.402 7.350 - 7.450 pH units Final     pCO2, Arterial   Date Value Ref Range Status   01/02/2024 38.9 35.0 - 45.0 mm Hg Final     pO2, Arterial   Date Value Ref Range Status   01/02/2024 54.4 (C) 83.0 - 108.0 mm Hg Final     Comment:     85 Value below critical limit     HCO3, Arterial   Date Value Ref Range Status   01/02/2024 24.2 20.0 - 26.0 mmol/L Final     Base Excess, Arterial   Date Value Ref Range Status   01/02/2024 -0.4 (L) 0.0 - 2.0 mmol/L Final     O2 Saturation, Arterial   Date Value Ref Range Status   01/02/2024 86.3 (L) 94.0 - 99.0 % Final     Comment:     84 Value below reference range     Hemoglobin, Blood Gas   Date Value Ref Range Status   01/02/2024 16.5 13.5 - 17.5 g/dL Final     Hematocrit, Blood Gas   Date Value Ref Range Status   01/02/2024 50.7 38.0 - 51.0 % Final     Oxyhemoglobin   Date Value Ref Range Status   01/02/2024 85.6 (L) 94 - 99 % Final     Comment:     84 Value below reference range     Methemoglobin   Date Value Ref Range Status   01/02/2024 0.00 0.00 - 3.00 % Final     Carboxyhemoglobin   Date Value Ref Range Status   01/02/2024 0.8 0 - 5 % Final     CO2 Content   Date Value Ref Range Status   01/02/2024 25.4 22 - 33 mmol/L Final     Barometric Pressure for Blood Gas   Date Value Ref Range Status   01/02/2024 732 mmHg Final     Modality   Date Value Ref Range Status   01/02/2024 Nasal Cannula  Final     FIO2   Date Value Ref Range Status   01/02/2024 36 % Final        Objective     SpO2 SpO2: 94 % (01/05/24 1106)  Device Device (Oxygen Therapy): nasal cannula (01/05/24 0800)  Flow Flow  (L/min): 5 (01/05/24 0800)  Home Equipment Used: O2 Provided by: [facility]  Breath Sounds: diminished breath sounds- throughout  CAT Assessment: (COPD Assessment Test)   I never cough. 0[] 1[] 2[x] 3[] 4[] 5[] I cough all the time.  I have no phlegm (mucus) in my chest. 0[] 1[] 2[x] 3[] 4[] 5[] My chest is completely full of phlegm (mucus).  My chest does not feel tight at all. 0[x] 1[] 2[] 3[] 4[] 5[] My chest feels very tight.  When I walk up a hill or one flight of stairs I am not breathless. 0[] 1[] 2[] 3[] 4[x] 5[] When I walk up a hill or one flight of stairs I am very breathless.  I am not limited doing any activities at home 0[] 1[] 2[x] 3[] 4[] 5[] I am very limited doing activities at home.  I am confident leaving my home despite my lung condition. 0[] 1[x] 2[] 3[] 4[] 5[] I am not at all confident leaving my home because of my lung condition.  I sleep soundly. 0[] 1[] 2[x] 3[] 4[] 5[] I don't sleep soundly because of my lung condition.  I have lots of energy. 0[] 1[] 2[] 3[x] 4[] 5[]  I have no energy at all.  CAT Score(COPD Assessment Test): 16  Score  Impact Guidance    0-9 Low If smoke, encourage to stop smoking  Flu, Pneumonia, and Covid need to be up to date  Avoid COPD Triggers    10-20 Medium Encouraged all guidance for low impact score  Consider additional medications    21-40 High Encouraged all medium impact scores  Recommend referral to pulmonologist      STOP BANG Screening Questionnaire:   Snoring?   Do you snore loudly (loud enough to be heard through closed doors or that your bed partner elbows you for snoring at night)? {yes     Tired?   Do you often feel tired, fatigued, or sleepy during the daytime (such as falling asleep during driving or talking to someone)? yes               Observed?   Has anyone observed you stop breathing or choking/gasping during your sleep? no             Pressure?   Do you have or are you being treated for high blood pressure? no             Body  mass index (BMI) more than 35 kg/m2?  Body mass index is 35.84 kg/m². yes    Age older than 50? yes       Neck size large (measured around Michele's apple)? Is your shirt collar 16 inches (40 cm) or larger? yes  Gender (biologic sex): male? no    STOP BANG Score Total: 5    STOP BANG Interpretation    Risk category Risk factors   Low risk Yes to 0 to 2 of the questions   Intermediate risk Yes to 3 to 4 questions   High risk Yes to 5 to 8 questions   High risk Yes to 2 or more of 4 STOP questions and BMI >35 kg/m2   High risk Yes to 2 or more of 4 STOP questions and neck circumference ?16 inches (?40 cm)   High risk Yes to 2 or more of 4 STOP questions and male gender (biologic sex)              Home Medications:  Facility-Administered Medications Prior to Admission   Medication Dose Route Frequency Provider Last Rate Last Admin    cyanocobalamin injection 1,000 mcg  1,000 mcg Intramuscular Q28 Days Camryn Mota PA   1,000 mcg at 09/19/23 1114     Medications Prior to Admission   Medication Sig Dispense Refill Last Dose    acetaminophen (TYLENOL) 500 MG tablet Take 1 tablet by mouth Every 6 (Six) Hours As Needed for Mild Pain.   Past Week    albuterol sulfate  (90 Base) MCG/ACT inhaler Inhale 2 puffs Every 4 (Four) Hours As Needed for Shortness of Air. 18 g 5 Past Month    amLODIPine-benazepril (LOTREL) 10-40 MG per capsule Take 1 capsule by mouth Daily. 90 capsule 3 1/2/2024    atorvastatin (LIPITOR) 10 MG tablet Take 1 tablet by mouth Every Night. 90 tablet 3 1/1/2024    buPROPion SR (WELLBUTRIN SR) 150 MG 12 hr tablet Take 1 tablet by mouth Every Night.   1/1/2024    clopidogrel (PLAVIX) 75 MG tablet Take 1 tablet by mouth Daily. 90 tablet 3 1/2/2024    doxycycline (VIBRAMYCIN) 100 MG capsule Take 1 capsule by mouth 2 (Two) Times a Day for 10 days. 20 capsule 0 1/2/2024    empagliflozin (Jardiance) 25 MG tablet tablet Take 1 tablet by mouth Daily. 90 tablet 3 1/2/2024    fluticasone (FLONASE) 50 MCG/ACT  "nasal spray 2 sprays into the nostril(s) as directed by provider Daily. 16 g 1 1/2/2024    furosemide (LASIX) 20 MG tablet Take 1 tablet by mouth Daily. 90 tablet 3 1/2/2024    ipratropium-albuterol (DUO-NEB) 0.5-2.5 mg/3 ml nebulizer Take 3 mL by nebulization Every 4 (Four) Hours As Needed for Shortness of Air. 150 mL 1 1/2/2024    loratadine (Claritin) 10 MG tablet Take 1 tablet by mouth Every Night.   1/1/2024    metoprolol succinate XL (Toprol XL) 25 MG 24 hr tablet Take 1 tablet by mouth Daily. 90 tablet 3 1/2/2024    montelukast (SINGULAIR) 10 MG tablet Take 1 tablet by mouth Every Night. 90 tablet 3 1/1/2024    omega-3 acid ethyl esters (LOVAZA) 1 g capsule Take 2 capsules by mouth 2 (Two) Times a Day. 360 capsule 3 1/2/2024    potassium chloride 10 MEQ CR tablet Take 1 tablet by mouth Daily. 90 tablet 3 1/2/2024    promethazine-dextromethorphan (PROMETHAZINE-DM) 6.25-15 MG/5ML syrup Take 5 mL by mouth 2 (Two) Times a Day As Needed for Cough. 180 mL 0 1/1/2024    raloxifene (EVISTA) 60 MG tablet Take 1 tablet by mouth Daily. 90 tablet 3 1/2/2024    Tirzepatide (Mounjaro) 5 MG/0.5ML solution pen-injector Inject 0.5 mL under the skin into the appropriate area as directed Every 7 (Seven) Days. 2 mL 3 12/29/2023    vitamin D (ERGOCALCIFEROL) 1.25 MG (99474 UT) capsule capsule Take 1 capsule by mouth 1 (One) Time Per Week. 12 capsule 3 12/28/2023         Barriers to Learning? No    COPD education given via the booklet \"A Patient's Guide to COPD\".     COPD Zones: (Green/yellow/Red): YES     Exacerbation or Flare up signs and symptoms: YES     Causes of COPD Exacerbation:      Lung Infection YES     Indoor and Outdoor Irratants YES     COPD Medication Non-Compliance YES     Healthy eating and drinking habbits: YES     Exercise and Activity: YES     Managing Medications: YES     Patient understands use of Rescue Medications: YES       Patient understands use of Maintenance Medications: YES       Proper MDI technique " (w/wo Spacer): YES       How to use a nebulizer: YES     How to clean a nebulizer: YES     Breathing Techniques:       Purse-lipped breathing YES     Oxygen therapy SAFETY:  YES       Action Plan            COPD/[facility] follow up scheduled: NO     Education Minutes with patient: YES and 45 minutes       Goals Discussed with patient: Keep home smoke free., Take medications as ordered., GO to Dr. appointments., Increase activity., Eat healthier., Increase use of pursed lip breathing., Decrease flare-ups., and Increase COPD Knowledge.      Thanks for allowing me to participate in Ms. Shah's care,    CORWIN Coleman, RRT  COPD Navigator  01/05/24  11:50 EST          Electronically signed by Angel Fitzpatrick, RRT at 01/05/24 8505

## 2024-01-18 NOTE — DISCHARGE SUMMARY
[facility] HOSPITALIST MEDICINE DISCHARGE SUMMARY    Patient Identification:  Name:  Evie Shah  Age:  78 y.o.  Sex:  female  :  1945  MRN:  [id number]  Visit Number:  [id number]    Date of Admission: 2024  Date of Discharge: 2024  DISCHARGE DISPOSITION   Stable  PCP: Camryn Mota PA    DISCHARGE DIAGNOSIS : Left lung atelectasis secondary to mucous plug, acute hypoxic respiratory failure, COPD without exacerbation, probable left lung pneumonia cultures negative completed treatment, QTc prolongation, obesity with a BMI of 35.2, history of essential hypertension, history of hyperlipidemia, history of peripheral arterial disease post PCI left external iliac with stent placement,    HOSPITAL COURSE  Patient is a 78 y.o. female presented to [facility] complaining of shortness of breath dry cough for almost a week at home, normally she has oxygen supplementation on as-needed basis but patient has been using it continuously and despite of this she is hypoxic at the emergency room she was noted to have left upper lobe atelectasis, discussed with pulmonology and admitted the patient, trial of neb treatment and Mucomyst did not improve, she was also empirically placed on antibiotic for possible pneumonia.  Pulmonology recommended bronchoscopy to rule out endobronchial mass, procedure was delayed due to patient on Plavix for peripheral arterial disease.and patient underwent bronchoscopy findings noted thick mucus, mass, cultures and cytology was negative.initially she felt much better only developed severe hypoxic respiratory failure requiring heated high flow supplementation.repeat radiologic study revealed element of atelectasis on the left lower lobe.  Patient again underwent bronchoscopy and mucous was noted.  Post bronchoscopy for the second time patient continued to require heated high flow oxygen supplementation, she was diuresed as needed, continue neb,  eventually patient O2 requirements improved.  Initial plan after second bronchoscopy was to transfer the patient to [facility] for further treatment until she improves, fortunately prior to transfer patient already had significant improvement, she is now tolerating well, O2 saturation at 3 L nasal cannula ranges from 93 to 96%.  She has been ambulating, plan is to discharge home today and continue her oxygen supplementation to keep her O2 sat 90 to 92%.  She will follow-up with pulmonology and primary care provider as scheduled.  Patient advised to seek medical help should she develop fever hemoptysis or recurrence of shortness of breath.      VITAL SIGNS:      01/16/24  0400 01/17/24  0400 01/18/24  0400   Weight: 90.6 kg (199 lb 11.8 oz) 90.5 kg (199 lb 8.3 oz) 90.6 kg (199 lb 11.8 oz)     Body mass index is 35.39 kg/m².  Vitals:    01/18/24 0800   BP:    Pulse:    Resp:    Temp: 98.2 °F (36.8 °C)   SpO2:      PHYSICAL EXAM:  General: Out of bed to chair she just took a shower, comfortable,awake, alert, oriented to self, place, and time,   No respiratory distress.    Skin:  Skin is warm and dry. No rash noted. No pallor.    HENT:  Head:  Normocephalic and atraumatic.  Mouth:  Moist mucous membranes.    Eyes:  Conjunctivae and EOM are normal.  Pupils are equal, round, and reactive to light.  No scleral icterus.    Neck:  Neck supple.  No JVD present.    Pulmonary/Chest:  No respiratory distress, no wheezes, no crackles, with normal breath sounds and good air movement.  Complete resolution of coarse breath sounds on the left lung field  Cardiovascular:  Normal rate, regular rhythm and normal heart sounds with no murmur.  Abdominal:  Soft.  Bowel sounds are normal.  No distension and no tenderness.   Extremities:  No edema, no tenderness, and no deformity.  No red or swollen joints anywhere.  Strong pulses in all 4 extremities with no clubbing, no cyanosis, no edema.  Neurological:  Motor strength equal no obvious deficit,  sensory grossly intact.   No cranial nerve deficit.  No tongue deviation.  No facial droop.  No slurred speech.    Genitourinary: No Palomino catheter  Back:  ----------    DISCHARGE MEDICATIONS:     Discharge Medications        New Medications        Instructions Start Date   amLODIPine 5 MG tablet  Commonly known as: NORVASC   5 mg, Oral, Every Evening      lisinopril 5 MG tablet  Commonly known as: PRINIVIL,ZESTRIL   5 mg, Oral, Every Evening             Continue These Medications        Instructions Start Date   acetaminophen 500 MG tablet  Commonly known as: TYLENOL   500 mg, Oral, Every 6 Hours PRN      albuterol sulfate  (90 Base) MCG/ACT inhaler  Commonly known as: PROVENTIL HFA;VENTOLIN HFA;PROAIR HFA   2 puffs, Inhalation, Every 4 Hours PRN      atorvastatin 10 MG tablet  Commonly known as: LIPITOR   10 mg, Oral, Nightly      buPROPion  MG 12 hr tablet  Commonly known as: WELLBUTRIN SR   150 mg, Oral, Nightly      clopidogrel 75 MG tablet  Commonly known as: PLAVIX   75 mg, Oral, Daily      empagliflozin 25 MG tablet tablet  Commonly known as: Jardiance   25 mg, Oral, Daily      fluticasone 50 MCG/ACT nasal spray  Commonly known as: FLONASE   2 sprays, Nasal, Daily      furosemide 20 MG tablet  Commonly known as: LASIX   20 mg, Oral, Daily      ipratropium-albuterol 0.5-2.5 mg/3 ml nebulizer  Commonly known as: DUO-NEB   3 mL, Nebulization, Every 4 Hours PRN      metoprolol succinate XL 25 MG 24 hr tablet  Commonly known as: Toprol XL   25 mg, Oral, Daily      montelukast 10 MG tablet  Commonly known as: SINGULAIR   10 mg, Oral, Nightly      Mounjaro 5 MG/0.5ML solution pen-injector pen  Generic drug: Tirzepatide   5 mg, Subcutaneous, Every 7 Days      omega-3 acid ethyl esters 1 g capsule  Commonly known as: LOVAZA   2 g, Oral, 2 Times Daily      raloxifene 60 MG tablet  Commonly known as: EVISTA   60 mg, Oral, Daily      vitamin D 1.25 MG (97367 UT) capsule capsule  Commonly known as:  ERGOCALCIFEROL   50,000 Units, Oral, Weekly             Stop These Medications      amLODIPine-benazepril 10-40 MG per capsule  Commonly known as: LOTREL     Claritin 10 MG tablet  Generic drug: loratadine     doxycycline 100 MG capsule  Commonly known as: VIBRAMYCIN     potassium chloride 10 MEQ CR tablet     promethazine-dextromethorphan 6.25-15 MG/5ML syrup  Commonly known as: PROMETHAZINE-DM              Diet Instructions    Heart healthy, consistent carb diet         Your Scheduled Appointments      Jan 29, 2024  2:00 PM  Hospital Follow Up with RUBI Michael  [facility] ([place]) [address] [address]  [phone]        Feb 01, 2024 11:15 AM  Follow Up with Funmilayo Monae PA-C  [facility] PULMON[facility] & CRITICAL CARE MEDICINE ([facility] [address]  [facility] [id number] [phone]   Established: Please bring outside images or reports.         Mar 19, 2024 10:30 AM  Office Visit with RUBI Michael  [facility] ([facility] [address] [address]  [phone]   Arrive 15 minutes prior to appointment.  If you are in need of a language or hearing  please call the Department.              Activity Instructions    As tolerated         Additional Instructions for the Follow-ups that You Need to Schedule       Ambulatory Referral to Home Health   As directed      Face to Face Visit Date: 1/18/2024   Follow-up provider for Plan of Care?: I treated the patient in an acute care facility and will not continue treatment after discharge.   Follow-up provider: SHAY YANEZ [[id number]]   Reason/Clinical Findings: Acute hypoxic respiratory failure, hypercapnia from left lung atelectasis   Describe mobility limitations that make leaving home difficult: Patient is respiratory failure, will require company to leave the house   Nursing/Therapeutic Services Requested:  Skilled Nursing Physical Therapy Occupational Therapy   Skilled nursing orders: COPD management O2 instruction   PT orders: Gait Training Therapeutic exercise Home safety assessment Strengthening   Weight Bearing Status: As Tolerated   Occupational orders: Activities of daily living Strengthening Energy conservation Home safety assessment   Frequency: 1 Week 1        Discharge Follow-up with PCP   As directed       Currently Documented PCP:    Camryn Mota PA    PCP Phone Number:    [phone]     Follow Up Details: RUBI Barker (posthospitalization follow-up next week)        Discharge Follow-up with Specified Provider: Pulmonology; 2 Weeks   As directed      To: Pulmonology   Follow Up: 2 Weeks   Follow Up Details: Post bronchoscopy respiratory failure               Contact information for follow-up providers       Camryn Mota PA .    Specialty: Family Medicine  Why: RUBI Barker (posthospitalization follow-up next week)  Contact information:  [address] [address]  [phone]                       Contact information for after-discharge care       Durable Medical Equipment       [facility] .    Service: Durable Medical Equipment  Contact information:  [address] [address] [phone]                                   Pending Labs       Order Current Status    AFB Culture - Lavage, Lung, Left Upper Lobe Preliminary result    AFB Culture - Wash, Bronchus Preliminary result    Fungus Culture - Lavage, Lung, Left Upper Lobe Preliminary result    Fungus Culture - Wash, Bronchus Preliminary result             Cora Carver MD  01/18/24  10:12 EST    Please note that this discharge summary required more than 30 minutes to complete.

## 2024-01-18 NOTE — CASE MANAGEMENT/SOCIAL WORK
Continued Stay Note   [facility]     Patient Name: Evie Shah  MRN: [id number]  Today's Date: 1/18/2024    Admit Date: 1/2/2024    Plan: CM was notified by Candie that patient's family did not bring portable tank for transport home.  CM faxed and phone [facility] and spoke with Noel who states they will deliver portable tank to patient's room before discharge.   has arranged Home Health.  No other issues or concerns noted at this time.  Patient is being discharged today.       Discharge Codes    No documentation.                 Expected Discharge Date and Time       Expected Discharge Date Expected Discharge Time    Jan 18, 2024               Hiral Erwin RN

## 2024-01-18 NOTE — OUTREACH NOTE
Prep Survey      Flowsheet Row Responses   [facility] patient discharged from? [facility]   Is LACE score < 7 ? No   Eligibility [facility]   Date of Admission 01/02/24   Date of Discharge 01/18/24   Discharge Disposition Home-Health Care Svc   Discharge diagnosis Left lung atelectasis secondary to mucous plug, acute hypoxic respiratory failure, PNA   Does the patient have one of the following disease processes/diagnoses(primary or secondary)? Pneumonia   Does the patient have Home health ordered? Yes   What is the [facility] health agency?  [facility]   Is there a DME ordered? Yes   What DME was ordered? [employer]   Prep survey completed? Yes            Marybeth GANN - Registered Nurse

## 2024-01-18 NOTE — DISCHARGE PLACEMENT REQUEST
"Evie Shah (78 y.o. Female)       Date of Birth   1945    Social Security Number       Address   [address]  [facility] [address]    Home Phone   [phone]    MRN   [id number]       [facility]    Marital Status                               Admission Date   1/2/24    Admission Type   Emergency    Admitting Provider   Cora Carver MD    Attending Provider   Cora Carver MD    Department, Room/Bed   [facility], P218/1P       Discharge Date       Discharge Disposition   Home-Health Care [facility]    Discharge Destination                                 Attending Provider: Cora Carver MD    Allergies: Codeine    Isolation: None   Infection: None   Code Status: CPR    Ht: 160 cm (62.99\")   Wt: 90.6 kg (199 lb 11.8 oz)    Admission Cmt: None   Principal Problem: Respiratory failure with hypoxia [J96.91]                   Active Insurance as of 1/2/2024       Primary Coverage       Payor Plan Insurance Group Employer/Plan Group    HUMANA MEDICARE REPLACEMENT HUMANA MEDICARE REPLACEMENT [id number]       Payor Plan Address Payor Plan Phone Number Payor Plan Fax Number Effective Dates    [address] [address] [phone]  1/1/2019 - None Entered    [facility] [id number]         Subscriber Name Subscriber Birth Date Member ID       EVIE SHAH 1945 [id number]               Secondary Coverage       Payor Plan Insurance Group Employer/Plan Group    [place]        Payor Plan Address Payor Plan Phone Number Payor Plan Fax Number Effective Dates    [address] [phone]  10/10/2023 - None Entered    [facility] [id number]         Subscriber Name Subscriber Birth Date Member ID       EVIE SHAH 1945 [id number]                     Emergency Contacts        (Rel.) Home Phone Work Phone Mobile Phone    May GeneYin (Daughter) [phone] -- [phone]    lexy shah (Son) [phone] " -- [phone]              Insurance Information                  HUMANA MEDICARE REPLACEMENT/HUMANA MEDICARE REPLACEMENT Phone: [phone]    Subscriber: Evie Shah Subscriber#: [id number]    Group#: [id number] Precert#: [id number]        [place] Phone: [phone]    Subscriber: Evie Shah Subscriber#: [id number]    Group#: -- Precert#: [id number]             History & Physical        Cora Carver MD at 24 1700              [facility]    Patient Identification:  Name:  Evie Shah  Age:  78 y.o.  Sex:  female  :  1945  MRN:  [id number]   Visit Number:  [id number]  Admit Date: 2024   Room number:  108/08  Primary Care Physician:  Camryn Mota PA     Chief complaint:    Chief Complaint   Patient presents with    Shortness of Breath    Fever    Weakness - Generalized     Pt has been sob for a weak and has been sick with a cough.     Cough       History of presenting illness:  78 y.o. female with history of COPD, she has oxygen at home on a as needed basis, has not used it for quite some time, past 2 weeks been having shortness of breath cough nonproductive no fever, denies hemoptysis.  Developed shortness of breath, was seen by her primary care provider more than a week ago was treated with doxycycline with no improvement, she started requiring 2 L nasal cannula continuously few days prior to admission, today she requires 5 L to maintain O2 saturation above 90.    At the emergency room she was afebrile, CRP and procalcitonin level was normal, white count was normal, x-ray of the chest showed increased attenuation of the left hemothorax, CT scan of the chest PE protocol was ordered negative for PE she has completed atelectasis of the left upper lobe.  Because of this she was subsequent  admitted.      ---------------------------------------------------------------------------------------------------------------------   Review of Systems   Constitutional:  Positive for activity change and fatigue. Negative for appetite change, chills, diaphoresis, fever and unexpected weight change.   HENT: Negative.     Eyes: Negative.    Respiratory:  Positive for cough and shortness of breath. Negative for apnea, choking, chest tightness, wheezing and stridor.    Cardiovascular: Negative.    Gastrointestinal: Negative.    Endocrine: Negative.    Genitourinary: Negative.    Musculoskeletal: Negative.    Skin: Negative.    Allergic/Immunologic: Negative.    Neurological: Negative.    Hematological: Negative.    Psychiatric/Behavioral: Negative.         ---------------------------------------------------------------------------------------------------------------------   Past Medical History:   Diagnosis Date    Allergic rhinitis     Asthma     Atherosclerosis     COPD (chronic obstructive pulmonary disease)     Cough     Diabetes mellitus     Hyperlipidemia     Hypertension     Menopause     Nausea and vomiting     Obesity 3/7/2017    Osteoarthritis     Osteoporosis     Vertigo, benign paroxysmal      Past Surgical History:   Procedure Laterality Date    CATARACT EXTRACTION      COLONOSCOPY      EYE SURGERY      catarac    ILIAC ARTERY STENT  02/25/2015    SKIN CANCER EXCISION      nose    TONSILLECTOMY      and adenoidectomy    TUBAL ABDOMINAL LIGATION       Family History   Problem Relation Age of Onset    Arthritis Mother     Asthma Mother     Cancer Mother     Osteoarthritis Mother     Osteoporosis Mother     Diabetes Mother     Arthritis Father     Hypertension Father     Stroke Father     Osteoarthritis Father     Osteoporosis Father     Heart disease Father     Diabetes Father     Breast cancer Neg Hx      Social History     Socioeconomic History    Marital status:     Number of children: 3    Years  of education: 12   Tobacco Use    Smoking status: Former     Types: Cigarettes     Quit date: 2015     Years since quittin.6    Smokeless tobacco: Never   Vaping Use    Vaping Use: Never used   Substance and Sexual Activity    Alcohol use: No    Drug use: No    Sexual activity: Defer     ---------------------------------------------------------------------------------------------------------------------   Allergies:  Codeine  ---------------------------------------------------------------------------------------------------------------------   Prior to Admission Medications       Prescriptions Last Dose Informant Patient Reported? Taking?    albuterol (PROVENTIL) (2.5 MG/3ML) 0.083% nebulizer solution   No No    Take 2.5 mg by nebulization Every 4 (Four) Hours As Needed for Wheezing.    albuterol sulfate  (90 Base) MCG/ACT inhaler   No No    Inhale 2 puffs Every 4 (Four) Hours As Needed for Shortness of Air.    amLODIPine-benazepril (LOTREL) 10-40 MG per capsule   No No    Take 1 capsule by mouth Daily.    atorvastatin (LIPITOR) 10 MG tablet   No No    Take 1 tablet by mouth Every Night.    Blood Glucose Monitoring Suppl misc   No No    1 each 3 (Three) Times a Day.    buPROPion SR (WELLBUTRIN SR) 150 MG 12 hr tablet   No No    Take 1 tablet by mouth Every 12 (Twelve) Hours.    clopidogrel (PLAVIX) 75 MG tablet   No No    Take 1 tablet by mouth Daily.    cyanocobalamin injection 1,000 mcg   No No    doxycycline (VIBRAMYCIN) 100 MG capsule   No No    Take 1 capsule by mouth 2 (Two) Times a Day for 10 days.    empagliflozin (Jardiance) 25 MG tablet tablet   No No    Take 1 tablet by mouth Daily.    fluticasone (FLONASE) 50 MCG/ACT nasal spray   No No    2 sprays into the nostril(s) as directed by provider Daily.    furosemide (LASIX) 20 MG tablet   No No    Take 1 tablet by mouth Daily.    glucose blood test strip   No No    1 each by Other route 3 (Three) Times a Day. Use as instructed     ipratropium-albuterol (DUO-NEB) 0.5-2.5 mg/3 ml nebulizer   No No    Take 3 mL by nebulization Every 4 (Four) Hours As Needed for Shortness of Air.    Lancets 30G misc   No No    1 each 3 (Three) Times a Day.    loratadine (EQ Allergy Relief) 10 MG tablet   No No    Take 1 tablet by mouth Daily.    metoprolol succinate XL (Toprol XL) 25 MG 24 hr tablet   No No    Take 1 tablet by mouth Daily.    montelukast (SINGULAIR) 10 MG tablet   No No    Take 1 tablet by mouth Every Night.    O2 (OXYGEN)   Yes No    Inhale 2 L/min Every Night.    omega-3 acid ethyl esters (LOVAZA) 1 g capsule   No No    Take 2 capsules by mouth 2 (Two) Times a Day.    potassium chloride 10 MEQ CR tablet   No No    Take 1 tablet by mouth Daily.    promethazine-dextromethorphan (PROMETHAZINE-DM) 6.25-15 MG/5ML syrup   No No    Take 5 mL by mouth 2 (Two) Times a Day As Needed for Cough.    raloxifene (EVISTA) 60 MG tablet   No No    Take 1 tablet by mouth Daily.    Tirzepatide (Mounjaro) 5 MG/0.5ML solution pen-injector   No No    Inject 0.5 mL under the skin into the appropriate area as directed Every 7 (Seven) Days.    vitamin D (ERGOCALCIFEROL) 1.25 MG (81076 UT) capsule capsule   No No    Take 1 capsule by mouth 1 (One) Time Per Week.          ---------------------------------------------------------------------------------------------------------------------   Vital Signs:  Temp:  [98.1 °F (36.7 °C)-98.2 °F (36.8 °C)] 98.2 °F (36.8 °C)  Heart Rate:  [77-86] 77  Resp:  [20] 20  BP: (130-154)/(78-79) 154/79    No data found.  SpO2:  [87 %-95 %] 95 %  on  Flow (L/min):  [5] 5;   Device (Oxygen Therapy): nasal cannula  Body mass index is 35.25 kg/m².    Wt Readings from Last 3 Encounters:   01/02/24 90.3 kg (199 lb)   01/02/24 90.3 kg (199 lb)   12/26/23 92.1 kg (203 lb)               ---------------------------------------------------------------------------------------------------------------------   Physical Exam:  Constitutional: Awake and  alert oriented to self place and time very pleasant,  No respiratory distress.      HENT:  Head: Normocephalic and atraumatic.  Mouth:  Moist mucous membranes.    Eyes:  Conjunctivae and EOM are normal.  Pupils are equal, round, and reactive to light.  No scleral icterus.  Neck:  Neck supple.  No JVD present.  No lymphadenopathy  Cardiovascular:  Normal rate, regular rhythm and normal heart sounds with no murmur.  Pulmonary/Chest: Clear to auscultate equal chest expansion there is no splinting no wheezing no rales or crackles, she has decreased vocal fremitus on auscultation on the left,   Abdominal:  Soft.  Bowel sounds are normal.  No distension and no tenderness.   Musculoskeletal:  No edema, no tenderness, and no deformity.  No red or swollen joints anywhere.    Neurological:  Alert and oriented to person, place, and time.  No cranial nerve deficit.  No tongue deviation.  No facial droop.  No slurred speech.   Skin:  Skin is warm and dry.  No rash noted.  No pallor.   Peripheral vascular:  No edema and strong pulses on all 4 extremities.  Genitourinary: No Palomino catheter  ---------------------------------------------------------------------------------------------------------------------  EKG:        Telemetry: Sinus rhythm 86 bpm O2 saturation is 93% on 5 L nasal cannula  I have personally looked at both the EKG and the telemetry strips.  --------------------------------------------------------------------------------------------------------------------  Results from last 7 days   Lab Units 01/02/24  1409   HSTROP T ng/L 9     Results from last 7 days   Lab Units 01/02/24  1409   CRP mg/dL <0.30   LACTATE mmol/L 2.0   WBC 10*3/mm3 10.76   HEMOGLOBIN g/dL 16.5*   HEMATOCRIT % 51.2*   MCV fL 94.1   MCHC g/dL 32.2   PLATELETS 10*3/mm3 243     Results from last 7 days   Lab Units 01/02/24  1409   SODIUM mmol/L 140   POTASSIUM mmol/L 4.5   CHLORIDE mmol/L 105   CO2 mmol/L 21.9*   BUN mg/dL 23   CREATININE mg/dL  "1.09*   CALCIUM mg/dL 9.6   GLUCOSE mg/dL 150*   ALBUMIN g/dL 4.3   BILIRUBIN mg/dL 0.4   ALK PHOS U/L 107   AST (SGOT) U/L 18   ALT (SGPT) U/L 12   Estimated Creatinine Clearance: 46.9 mL/min (A) (by C-G formula based on SCr of 1.09 mg/dL (H)).    No results found for: \"HGBA1C\", \"POCGLU\"  No results found for: \"AMMONIA\"    Results from last 7 days   Lab Units 01/02/24  1611   NITRITE UA  Negative     No results found for: \"BLOODCX\"No results found for: \"RESPCX\"No results found for: \"URINECX\"No results found for: \"WOUNDCX\"No results found for: \"BODYFLDCX\"No results found for: \"STOOLCX\"  pH pH, Arterial   Date Value Ref Range Status   01/02/2024 7.402 7.350 - 7.450 pH units Final      pO2 pO2, Arterial   Date Value Ref Range Status   01/02/2024 54.4 (C) 83.0 - 108.0 mm Hg Final     Comment:     85 Value below critical limit      pCO2 pCO2, Arterial   Date Value Ref Range Status   01/02/2024 38.9 35.0 - 45.0 mm Hg Final      HCO3 HCO3, Arterial   Date Value Ref Range Status   01/02/2024 24.2 20.0 - 26.0 mmol/L Final        I have personally looked at the labs and they are summarized above.  ----------------------------------------------------------------------------------------------------------------------  Imaging Results (Last 24 Hours)       Procedure Component Value Units Date/Time    CT Angiogram Chest Pulmonary Embolism [[id number]] Collected: 01/02/24 1654     Updated: 01/02/24 1700    Narrative:      EXAM:    CT Angiography Chest With Intravenous Contrast     EXAM DATE:    1/2/2024 3:42 PM     CLINICAL HISTORY:    Pulmonary embolism (PE) suspected, unknown D-dimer     TECHNIQUE:    Axial computed tomographic angiography images of the chest with  intravenous contrast.  This CT exam was performed using one or more of  the following dose reduction techniques:  automated exposure control,  adjustment of the mA and/or kV according to patient size, and/or use of  iterative reconstruction technique.    MIP " reconstructed images were created and reviewed.     COMPARISON:    6/17/2020     FINDINGS:    Pulmonary arteries:  Mild pulmonary artery enlargement suggesting a  mild degree of pulmonary arterial hypertension.  No pulmonary embolism.    Aorta:  No acute findings.  No thoracic aortic aneurysm.    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    Lungs and pleural spaces:  Left upper lobe consolidation is noted with  areas of enhancement and nonenhancement compatible with lobar collapse  and possible superimposed pneumonia. Luminal filling defects of the left  upper lobe bronchial airways suggesting obstructive etiology.  Bronchoscopy recommended.  Mild upper lung predominant emphysema is  noted.  Bronchial wall thickening is noted.  Luminal filling defects and  debris throughout the left lower lobe bronchial airways and to a lesser  extent the right lower lobe bronchial airways.  Minimal bibasilar  atelectasis.  No pleural effusions.    Heart:  Borderline cardiac enlargement.  Moderate coronary artery  calcifications.  No significant pericardial effusion.  No evidence of RV  dysfunction.    Mediastinum:  Small hiatal hernia.    Bones/joints:  Degenerative changes thoracic spine.  No acute  fracture.  No dislocation.    Soft tissues:  Unremarkable as visualized.    Lymph nodes:  Unremarkable as visualized.  No enlarged lymph nodes.    Gallbladder and bile ducts:  Cholelithiasis.    Kidneys and ureters:  Cyst left kidney.       Impression:      1.  No PE.  2.  Left upper lobe complete atelectasis with luminal filling defects of  the left upper lobe bronchial airways more favorable for mucous  plugging. Bronchoscopy recommended.  3.  Luminal debris in the left greater than right lower lobe bronchial  airways favoring mucous secretions.  4.  Bronchial inflammation compatible with bronchitis.  5.  Superimposed pneumonia left upper lobe likely given areas  of  nonenhancement.  6.  Cardiomegaly with coronary artery calcifications.  7.  No effusion or pneumothorax.  8.  COPD.  9.  Other incidental/nonacute findings detailed above.        This report was finalized on 1/2/2024 4:58 PM by Dr. Theron Pedersen MD.       XR Chest 2 View [[id number]] Collected: 01/02/24 1439     Updated: 01/02/24 1449    Narrative:      EXAM:    XR Chest, 2 Views     EXAM DATE:    1/2/2024 2:01 PM     CLINICAL HISTORY:    sob     TECHNIQUE:    Frontal and lateral views of the chest.     COMPARISON:    No relevant prior studies available.     FINDINGS:    Lungs and pleural spaces: Increased attenuation overlying the left  hemithorax may be due to superficial soft tissue process with no  convincing evidence of airspace disease.  Otherwise no acute  cardiopulmonary findings identified.  No pneumothorax.    Heart:  Unremarkable as visualized.  No cardiomegaly.    Mediastinum:  Unremarkable as visualized.    Bones/joints:  Unremarkable as visualized.       Impression:      1. Increased attenuation overlying the left hemithorax may be due to  superficial soft tissue attenuation artifact with no convincing evidence  of airspace disease.  2.  Otherwise no acute cardiopulmonary findings identified.        This report was finalized on 1/2/2024 2:40 PM by Dr. Theron Pedersen MD.             I have personally reviewed the radiology images and read the final radiology report.  ----------------------------------------------------------------------------------------------------------------------  Assessment and Plan:  -Left upper lobe atelectasis cannot rule out concomitant pneumonia  -Acute hypoxic respiratory failure secondary to above  -Obesity with a BMI of 35.2  -History of hyperlipidemia  -History of essential hypertension  -History of peripheral arterial disease status post PCI left external iliac with stent placement  -Apparent history of COPD  -History of diabetes  -QT prolongation    Admit the patient  keep O2 saturation above 90, neb treatment, Mucomyst, sputum culture and blood culture empiric antibiotic for possible pneumonia, pulmonology consult will be consulted.  DVT and GI prophylaxis.  Accu-Chek and sliding scale.  Avoid QT prolonging medications, check electrolytes including magnesium.    Cora Carver MD  01/02/24  17:01 EST    Electronically signed by Cora Carver MD at 01/07/24 7896       Vital Signs (last day)       Date/Time Temp Temp src Pulse Resp BP Patient Position SpO2    01/18/24 0800 98.2 (36.8) Oral -- -- -- -- --    01/18/24 0708 -- -- 68 18 -- -- 98    01/18/24 0700 -- -- 65 -- 127/80 -- 96    01/18/24 0658 -- -- 69 18 -- -- 97    01/18/24 0600 -- -- 63 17 112/61 -- 92    01/18/24 0500 -- -- 67 -- 126/61 -- 93    01/18/24 0400 98.4 (36.9) -- 67 18 117/57 -- 91    01/18/24 0300 -- -- 65 17 98/45 -- 95    01/18/24 0200 -- -- 64 20 98/47 -- 94    01/18/24 0100 -- -- 69 -- 99/54 -- 95    01/18/24 0052 -- -- -- 22 -- -- --    01/18/24 0000 98.1 (36.7) Oral 71 17 114/55 -- 95    01/17/24 2330 -- -- 71 -- 105/49 -- 93    01/17/24 2300 -- -- 71 15 96/46 -- 93    01/17/24 2230 -- -- 71 -- 112/59 -- 92    01/17/24 2200 -- -- 75 14 108/64 -- 93    01/17/24 2130 -- -- 78 -- 111/68 -- 89    01/17/24 2100 -- -- 76 16 115/68 -- 92    01/17/24 2030 -- -- 76 -- 94/75 -- 94    01/17/24 2000 98.1 (36.7) Oral 77 17 142/76 -- 92    01/17/24 1930 -- -- 75 -- 133/86 -- 91    01/17/24 1900 -- -- 74 16 121/74 -- 95    01/17/24 1856 -- -- -- 22 -- -- --    01/17/24 1846 -- -- 80 20 -- -- 92    01/17/24 1727 -- -- 90 -- 125/90 -- 89    01/17/24 1700 -- -- 82 18 145/78 -- 93    01/17/24 1600 98.5 (36.9) Oral 75 -- 127/115 -- 94    01/17/24 1400 -- -- 85 20 117/88 -- 94    01/17/24 1301 -- -- 76 -- -- -- 95    01/17/24 1247 -- -- 75 21 -- -- 93    01/17/24 1100 -- -- 75 18 132/76 -- 95    01/17/24 1018 -- -- -- -- -- -- 92    01/17/24 1000 -- -- 79 20 121/75 -- 92    01/17/24 0900 -- -- 81 16 119/63 -- 88     01/17/24 0800 98.4 (36.9) Oral 77 16 112/65 -- 93    01/17/24 0711 -- -- 72 -- -- -- 96    01/17/24 0700 -- -- -- -- 116/73 -- --    01/17/24 0657 -- -- 71 13 -- -- 87    01/17/24 0600 -- -- 70 -- 118/69 -- 99    01/17/24 0530 -- -- 69 -- 123/69 -- 98    01/17/24 0500 -- -- 67 -- 107/55 -- 97    01/17/24 0430 -- -- 71 -- 115/65 -- 99    01/17/24 0400 97.4 (36.3) Axillary 64 -- 110/59 -- 98    01/17/24 0344 -- -- 64 23 -- -- 98    01/17/24 0300 -- -- 70 -- 118/79 -- 97    01/17/24 0208 -- -- 66 19 -- -- 99    01/17/24 0200 -- -- 65 -- 105/66 -- 98    01/17/24 0100 -- -- 70 -- 109/64 -- 99    01/17/24 0019 -- -- 70 26 -- -- 94    01/17/24 0000 98.1 (36.7) Oral 66 -- 122/58 -- 95          Current Facility-Administered Medications   Medication Dose Route Frequency Provider Last Rate Last Admin    acetaminophen (TYLENOL) tablet 500 mg  500 mg Oral Q6H PRN Vandana Lee MD        acetylcysteine (MUCOMYST) 20 % nebulizer solution 3 mL  3 mL Nebulization BID PRN Vandana Lee MD   3 mL at 01/15/24 0027    acetylcysteine (MUCOMYST) 20 % nebulizer solution 3 mL  3 mL Nebulization 4x Daily - RT Vandana Lee MD   3 mL at 01/18/24 0658    amLODIPine (NORVASC) tablet 5 mg  5 mg Oral Q PM Vandana Lee MD   5 mg at 01/17/24 1728    atorvastatin (LIPITOR) tablet 10 mg  10 mg Oral Nightly Vandana Lee MD   10 mg at 01/17/24 2027    sennosides-docusate (PERICOLACE) 8.6-50 MG per tablet 2 tablet  2 tablet Oral BID Vandana Lee MD   2 tablet at 01/17/24 0939    And    polyethylene glycol (MIRALAX) packet 17 g  17 g Oral Daily PRN Vandana Lee MD        And    bisacodyl (DULCOLAX) EC tablet 5 mg  5 mg Oral Daily PRN Vandana Lee MD        And    bisacodyl (DULCOLAX) suppository 10 mg  10 mg Rectal Daily PRN Vandana Lee MD        buPROPion SR (WELLBUTRIN SR) 12 hr tablet 150 mg  150 mg Oral Nightly Vandana Lee MD   150 mg at 01/17/24 2027    Calcium Replacement - Follow Nurse  / BPA Driven Protocol   Does not apply PRN Vandana Lee MD        cetirizine (zyrTEC) tablet 5 mg  5 mg Oral Daily Cora Carver MD   5 mg at 01/18/24 0820    cholecalciferol (VITAMIN D3) capsule 50,000 Units  50,000 Units Oral Weekly Vandana Lee MD   50,000 Units at 01/16/24 0830    clopidogrel (PLAVIX) tablet 75 mg  75 mg Oral Daily Vandana Lee MD   75 mg at 01/18/24 0823    dextrose (D50W) (25 g/50 mL) IV injection 25 g  25 g Intravenous Q15 Min PRN Vandana Lee MD        dextrose (GLUTOSE) oral gel 15 g  15 g Oral Q15 Min PRN Vandana Lee MD        empagliflozin (JARDIANCE) tablet 25 mg  25 mg Oral Daily Vandana Lee MD   25 mg at 01/18/24 0821    fluticasone (FLONASE) 50 MCG/ACT nasal spray 2 spray  2 spray Nasal Daily Vandana Lee MD   2 spray at 01/18/24 0825    glucagon HCl (Diagnostic) injection 1 mg  1 mg Intramuscular Q15 Min PRN Vandana Lee MD        guaiFENesin (MUCINEX) 12 hr tablet 600 mg  600 mg Oral Q12H Vandana Lee MD   600 mg at 01/18/24 0823    Insulin Lispro (humaLOG) injection 2 Units  2 Units Subcutaneous TID With Meals Vandana Lee MD   2 Units at 01/18/24 0823    Insulin Lispro (humaLOG) injection 2-9 Units  2-9 Units Subcutaneous 4x Daily AC & at Bedtime Vandana Lee MD   4 Units at 01/17/24 2026    ipratropium-albuterol (DUO-NEB) nebulizer solution 3 mL  3 mL Nebulization 4x Daily - RT Vandana Lee MD   3 mL at 01/18/24 0658    lisinopril (PRINIVIL,ZESTRIL) tablet 5 mg  5 mg Oral Q PM Vandana Lee MD   5 mg at 01/17/24 1728    Magnesium Standard Dose Replacement - Follow Nurse / BPA Driven Protocol   Does not apply PRN Vandana Lee S, MD        melatonin tablet 10 mg  10 mg Oral Nightly PRN Vandana Lee MD   10 mg at 01/03/24 2108    metoprolol succinate XL (TOPROL-XL) 24 hr tablet 25 mg  25 mg Oral Q PM Vandana Lee MD   25 mg at 01/17/24 1727    montelukast (SINGULAIR) tablet 10 mg  10  mg Oral Nightly Vandana Lee MD   10 mg at 01/17/24 2027    nitroglycerin (NITROSTAT) SL tablet 0.4 mg  0.4 mg Sublingual Q5 Min PRN Vandana Lee MD        Phosphorus Replacement - Follow Nurse / BPA Driven Protocol   Does not apply PRN Vandana Lee MD        Potassium Replacement - Follow Nurse / BPA Driven Protocol   Does not apply PRVandana Alcala MD        prochlorperazine (COMPAZINE) injection 10 mg  10 mg Intravenous Q6H PRN Vandana Lee MD   10 mg at 01/08/24 1019    sodium chloride 0.9 % flush 10 mL  10 mL Intravenous Q12H Vandana Lee MD   10 mL at 01/18/24 0825    sodium chloride 0.9 % flush 10 mL  10 mL Intravenous PRN Vandana Lee MD   10 mL at 01/08/24 1019    sodium chloride 0.9 % flush 10 mL  10 mL Intravenous Q12H Vandana Lee MD   10 mL at 01/18/24 0825    sodium chloride 0.9 % flush 10 mL  10 mL Intravenous PRN Vandana Lee MD        sodium chloride 0.9 % infusion 40 mL  40 mL Intravenous PRN Vandana Lee MD   Rate Verify at 01/11/24 0755    sodium chloride 0.9 % infusion 40 mL  40 mL Intravenous PRN Vandana Lee MD            Operative/Procedure Notes (most recent note)        Augustin Stafford MD at 01/08/24 0823          Bronchoscopy Procedure Note    Date of Operation: 1/8/2024    Pre-op Diagnosis: Abnormal CT chest with left upper lobe mucous plugging and atelectasis with possible endobronchial mass    Post-op Diagnosis: Left upper lobe mucous plugging and abnormal CT chest    Surgeon: Augustin Stafford MD    Assistants: None    Anesthesia: Please see anesthesia report for details    Operation: Flexible fiberoptic bronchoscopy, diagnostic     Findings: Left upper lobe mucous plugging    Specimen: Bronchoalveolar lavage of left upper lobe  Bronchial brushings  Endobronchial ultrasound-guided transbronchial needle aspiration of station 10 L    Estimated Blood Loss: Minimal    Complications: Patient had mild bleeding after cleaning  the left upper lobe mucous which was controlled with local epinephrine and lidocaine spray.    Indications and History:  The patient is a 78 y.o. female with left upper lobe mucous plugging, atelectasis and abnormal CT chest.  The risks, benefits, complications, treatment options and expected outcomes were discussed with the patient.  The possibilities of reaction to medication, pulmonary aspiration, perforation of a viscus, bleeding, failure to diagnose a condition and creating a complication requiring transfusion or operation were discussed with the patient who freely signed the consent.      Description of Procedure:  The patient was seen in the Holding Room and the site of surgery properly noted/marked.  The patient was taken to endoscopy suite, identified as Evie Shah and the procedure verified as Flexible Fiberoptic Bronchoscopy.  A Time Out was held and the above information confirmed.     the patient was positioned  and the bronchoscope was passed through the LMA . The vocal cords were visualized and  2 ml 1 % lidocaine was topically placed onto the cords. The cords were normal. The scope was then passed into the trachea.      2 ml 1 % lidocaine was used topically on the chrissie.  Careful inspection of the tracheal lumen was accomplished. The scope was sequentially passed into the left main and then left upper and lower bronchi and segmental bronchi.       The scope was then withdrawn and advanced into the right main bronchus and then into the RUL, RML, and RLL bronchi and segmental bronchi.   Left upper lobe mucous plugging was seen which was cleaned after giving saline washes.  Samples-  Bronchoscope was wedged into the left upper lobe for a bronchoalveolar lavage and close to 40 cc of saline was given twice and close to 22 cc was aspirated back.    Bronchial washings  Left upper lobe bronchial brushings    In the endobronchial ultrasound was introduced mediastinal survey was done.  Station 10  lymph node was enlarged.  Endobronchial ultrasound-guided transbronchial needle aspiration of station 10 L was done once.          Findings were discussed with patient's daughter.  Answered her questions to her satisfaction.    Samples were sent for cytology and microbiology.  Please follow up the results  The Patient was taken to the Endoscopy Recovery area in satisfactory condition.        Augustin Stafford MD     Electronically signed by Augustin Stafford MD at 24 0860          Physician Progress Notes (most recent note)        Cora Carver MD at 24 1015              [facility] PROGRESS NOTE     Patient Identification:  Name:  Evie Shah  Age:  78 y.o.  Sex:  female  :  1945  MRN:  [id number]  Visit Number:  [id number]  Primary Care Provider:  Cmaryn Mota PA    Length of stay:  15    Subjective: Patient seen and examined reports coughing but less is whitish in color, her O2 sat at rest on 40 L heated high flow is 91% it goes up to 95-96 when he talks and set up.  Tolerated well decreasing oxygen supplementation yesterday.    Chief Complaint: Short of breath  ----------------------------------------------------------------------------------------------------------------------  Current Hospital Meds:  acetylcysteine, 3 mL, Nebulization, 4x Daily - RT  amLODIPine, 5 mg, Oral, Q PM  atorvastatin, 10 mg, Oral, Nightly  buPROPion SR, 150 mg, Oral, Nightly  cetirizine, 5 mg, Oral, Daily  cholecalciferol, 50,000 Units, Oral, Weekly  clopidogrel, 75 mg, Oral, Daily  empagliflozin, 25 mg, Oral, Daily  fluticasone, 2 spray, Nasal, Daily  guaiFENesin, 600 mg, Oral, Q12H  insulin lispro, 2 Units, Subcutaneous, TID With Meals  insulin lispro, 2-9 Units, Subcutaneous, 4x Daily AC & at Bedtime  ipratropium-albuterol, 3 mL, Nebulization, 4x Daily - RT  lisinopril, 5 mg, Oral, Q PM  metoprolol succinate XL, 25 mg, Oral, Q PM  montelukast, 10 mg, Oral,  Nightly  senna-docusate sodium, 2 tablet, Oral, BID  sodium chloride, 10 mL, Intravenous, Q12H  sodium chloride, 10 mL, Intravenous, Q12H         ----------------------------------------------------------------------------------------------------------------------  Vital Signs:  Temp:  [97.4 °F (36.3 °C)-98.8 °F (37.1 °C)] 98.4 °F (36.9 °C)  Heart Rate:  [64-81] 72  Resp:  [13-28] 13  BP: (104-134)/(51-82) 116/73       Tele: Sinus rhythm 72 beats per      01/15/24  0500 01/16/24  0400 01/17/24  0400   Weight: 89.6 kg (197 lb 9.6 oz) 90.6 kg (199 lb 11.8 oz) 90.5 kg (199 lb 8.3 oz)     Body mass index is 35.35 kg/m².    Intake/Output Summary (Last 24 hours) at 1/17/2024 1015  Last data filed at 1/17/2024 0800  Gross per 24 hour   Intake 720 ml   Output 300 ml   Net 420 ml     Diet: Diabetic Diets, Cardiac Diets; Healthy Heart (2-3 Na+); Consistent Carbohydrate; Texture: Regular Texture (IDDSI 7); Fluid Consistency: Thin (IDDSI 0)  ----------------------------------------------------------------------------------------------------------------------  Physical exam:  General: Comfortable,awake, alert, oriented to self, place, and time, well-developed and well-nourished.  No respiratory distress.    Skin:  Skin is warm and dry. No rash noted. No pallor.    HENT:  Head:  Normocephalic and atraumatic.  Mouth:  Moist mucous membranes.    Eyes:  Conjunctivae and EOM are normal.  Pupils are equal, round, and reactive to light.  No scleral icterus.    Neck:  Neck supple.  No JVD present.    Pulmonary/Chest:  No respiratory distress, no wheezes, no crackles, with normal breath sounds and good air movement.  Significantly improved compared to previous physical exam.  Cardiovascular:  Normal rate, regular rhythm and normal heart sounds with no murmur.  Abdominal:  Soft.  Bowel sounds are normal.  No distension and no tenderness.   Extremities:  No edema, no tenderness, and no deformity.  No red or swollen joints anywhere.  Strong  "pulses in all 4 extremities with no clubbing, no cyanosis, no edema.  Neurological:  Motor strength equal no obvious deficit, sensory grossly intact.   No cranial nerve deficit.  No tongue deviation.  No facial droop.  No slurred speech.    Genitourinary: No Palomino catheter  Back:  ----------------------------------------------------------------------------------------------------------------------  ----------------------------------------------------------------------------------------------------------------------      Results from last 7 days   Lab Units 01/15/24  0045 01/14/24  0030 01/13/24  0431 01/12/24  0423 01/11/24  0201   CRP mg/dL  --   --   --   --  2.82*   LACTATE mmol/L  --   --   --   --  1.0   WBC 10*3/mm3 14.22* 13.96* 10.05 9.81 11.05*   HEMOGLOBIN g/dL 15.3 15.5 14.3 14.2 13.8   HEMATOCRIT % 48.6* 48.3* 45.3 44.2 44.4   MCV fL 95.1 94.2 95.4 94.0 97.4*   MCHC g/dL 31.5 32.1 31.6 32.1 31.1*   PLATELETS 10*3/mm3 207 202 177 197 182   INR   --   --   --  1.02 1.02     Results from last 7 days   Lab Units 01/11/24  0425   PH, ARTERIAL pH units 7.383  7.377   PO2 ART mm Hg 81.6*  65.3*   PCO2, ARTERIAL mm Hg 47.2*  47.9*   HCO3 ART mmol/L 28.1*  28.1*     Results from last 7 days   Lab Units 01/15/24  0045 01/14/24  0030 01/13/24  0431 01/12/24  0423   SODIUM mmol/L 134* 136 137 139   POTASSIUM mmol/L 3.8 4.1 4.6 4.6   MAGNESIUM mg/dL 2.2 2.3 2.3 2.1   CHLORIDE mmol/L 100 97* 101 100   CO2 mmol/L 24.5 26.0 27.2 30.7*   BUN mg/dL 53* 46* 45* 30*   CREATININE mg/dL 1.07* 0.90 0.90 0.91   CALCIUM mg/dL 9.6 9.6 9.6 9.5   GLUCOSE mg/dL 209* 227* 297* 229*   ALBUMIN g/dL 3.4* 3.6  --  3.6   BILIRUBIN mg/dL 0.3 0.3  --  0.4   ALK PHOS U/L 93 98  --  76   AST (SGOT) U/L 15 10  --  11   ALT (SGPT) U/L 18 21  --  26   Estimated Creatinine Clearance: 47.7 mL/min (A) (by C-G formula based on SCr of 1.07 mg/dL (H)).    No results found for: \"AMMONIA\"      No results found for: \"BLOODCX\"  No results found for: " "\"URINECX\"  No results found for: \"WOUNDCX\"  No results found for: \"STOOLCX\"    I have personally looked at the labs and they are summarized above.  ----------------------------------------------------------------------------------------------------------------------  Imaging Results (Last 24 Hours)       ** No results found for the last 24 hours. **          ----------------------------------------------------------------------------------------------------------------------  Assessment and Plan:  -Acute hypoxic respiratory failure secondary to left lung atelectasis  -Left lung atelectasis initially upper lobe and subsequent lower lobe secondary to mucous plug  -Diabetes  II with hyperglycemia  -Obesity with a BMI of 35  -History of peripheral arterial disease status post stent placement of left external iliac  -COPD with exacerbation resolved    Continue Accu-Chek, patient's oxygen requirements is slowly improving, will continue to taper heated high flow today down from 40% FiO2 40 L/min care of respiratory.  Ambulate, increase activity.  Diuretics as needed, check BMP.    Plan discussed with patient and her nurse HELIO Hunter.      Disposition Home pending      Cora Carver MD  01/17/24  10:15 EST    Electronically signed by Cora Carver MD at 01/17/24 1029          Consult Notes (most recent note)        Victor Hugo Davis MD at 01/17/24 1132          Progress Note Pulmonary      Subjective     Interval History:   Pt seen on follow up for acute hypoxic respiratory failure. No acute overnight events reported. This morning she says she feels that her breathing is much improved, is hoping to be able to be discharged soon.      Review of Systems:    Reviewed ; unchanged       Vital Signs  Temp:  [97.4 °F (36.3 °C)-98.8 °F (37.1 °C)] 98.4 °F (36.9 °C)  Heart Rate:  [64-81] 72  Resp:  [13-28] 13  BP: (104-134)/(51-82) 116/73  Body mass index is 35.35 kg/m².    Intake/Output Summary (Last 24 hours) at 1/17/2024 " 1138  Last data filed at 1/17/2024 0800  Gross per 24 hour   Intake 720 ml   Output 300 ml   Net 420 ml     I/O this shift:  In: 360 [P.O.:360]  Out: -     Physical Exam:  General- normal in appearance, not in any acute distress    HEENT- pupils equally reactive to light, normal in size, no scleral icterus    Neck- supple    No JVD, no carotid bruit    Respiratory- clear and equal bilateral bs without wheeze or rhonchi    Cardiovascular-  Normal S1 and S2. No S3, S4 or murmurs.    GI-nontender nondistended bowel sounds positive    CNS-alert oriented x3, grossly nonfocal    Extremities- pulses normal bilaterally , no clubbing and edema        Results Review:      Results from last 7 days   Lab Units 01/15/24  0045 01/14/24  0030 01/13/24  0431   WBC 10*3/mm3 14.22* 13.96* 10.05   HEMOGLOBIN g/dL 15.3 15.5 14.3   PLATELETS 10*3/mm3 207 202 177     Results from last 7 days   Lab Units 01/15/24  0045 01/14/24  0030 01/13/24  0431   SODIUM mmol/L 134* 136 137   POTASSIUM mmol/L 3.8 4.1 4.6   CHLORIDE mmol/L 100 97* 101   CO2 mmol/L 24.5 26.0 27.2   BUN mg/dL 53* 46* 45*   CREATININE mg/dL 1.07* 0.90 0.90   CALCIUM mg/dL 9.6 9.6 9.6   GLUCOSE mg/dL 209* 227* 297*   MAGNESIUM mg/dL 2.2 2.3 2.3     Lab Results   Component Value Date    INR 1.02 01/12/2024    INR 1.02 01/11/2024    INR 0.94 01/08/2024    PROTIME 13.9 01/12/2024    PROTIME 13.9 01/11/2024    PROTIME 13.1 01/08/2024     Results from last 7 days   Lab Units 01/15/24  0045 01/14/24  0030 01/12/24  0423   ALK PHOS U/L 93 98 76   BILIRUBIN mg/dL 0.3 0.3 0.4   ALT (SGPT) U/L 18 21 26   AST (SGOT) U/L 15 10 11     Results from last 7 days   Lab Units 01/11/24 0425   PH, ARTERIAL pH units 7.383  7.377   PO2 ART mm Hg 81.6*  65.3*   PCO2, ARTERIAL mm Hg 47.2*  47.9*   HCO3 ART mmol/L 28.1*  28.1*     Imaging Results (Last 24 Hours)       ** No results found for the last 24 hours. **                 acetylcysteine, 3 mL, Nebulization, 4x Daily - RT  amLODIPine, 5  mg, Oral, Q PM  atorvastatin, 10 mg, Oral, Nightly  buPROPion SR, 150 mg, Oral, Nightly  cetirizine, 5 mg, Oral, Daily  cholecalciferol, 50,000 Units, Oral, Weekly  clopidogrel, 75 mg, Oral, Daily  empagliflozin, 25 mg, Oral, Daily  fluticasone, 2 spray, Nasal, Daily  guaiFENesin, 600 mg, Oral, Q12H  insulin lispro, 2 Units, Subcutaneous, TID With Meals  insulin lispro, 2-9 Units, Subcutaneous, 4x Daily AC & at Bedtime  ipratropium-albuterol, 3 mL, Nebulization, 4x Daily - RT  lisinopril, 5 mg, Oral, Q PM  metoprolol succinate XL, 25 mg, Oral, Q PM  montelukast, 10 mg, Oral, Nightly  senna-docusate sodium, 2 tablet, Oral, BID  sodium chloride, 10 mL, Intravenous, Q12H  sodium chloride, 10 mL, Intravenous, Q12H           Medication Review:     Assessment & Plan   # WILLIAM atelectasis with mucous plugging s/p bronchoscopy   # Acute hypoxic respiratory failure 2/2 above    -Pt overall improving and feeling well, is likely close to discharge    -recommend trial of NC today with titration of O2 for goal saturation 88-92%    -Continue mucomyst, guaifenesin, duonebs, chest PT, incentive spirometer and flutter valve    -Additional recommendations per Dr. Susan Brumfield,   24  11:38 EST      Electronically signed by Victor Hugo Davis MD at 24 1251       Nutrition Notes (most recent note)    No notes exist for this encounter.          Physical Therapy Notes (most recent note)        Jesenia Louis, PT at 24 1343  Version 1 of 1         Acute Care - Physical Therapy Treatment Note  [facility]     Patient Name: Evie Shah  : 1945  MRN: [id number]  Today's Date: 2024      Visit Dx:     ICD-10-CM ICD-9-CM   1. Hypoxemia  R09.02 799.02   2. COPD exacerbation  J44.1 491.21   3. Acute respiratory failure with hypoxia  J96.01 518.81   4. Lung mass  R91.8 786.6     Patient Active Problem List   Diagnosis    DM type 2 with diabetic mixed hyperlipidemia    Dyslipidemia    Essential  hypertension    Senile osteoporosis    Chronic allergic rhinitis    VISHAL (generalized anxiety disorder)    PAD (peripheral artery disease)    Severe obesity with body mass index (BMI) of 35.0 to 35.9 and comorbidity    COPD mixed type    Former smoker    Chronic respiratory failure with hypoxia    Type 2 diabetes mellitus with hyperglycemia, without long-term current use of insulin    Primary osteoarthritis of both knees    Coronary artery calcification seen on CT scan    Elevated hematocrit    Acute idiopathic gout of left hand    Vitamin D deficiency    Polycythemia vera    Respiratory failure with hypoxia    Lung mass     Past Medical History:   Diagnosis Date    Allergic rhinitis     Asthma     Atherosclerosis     COPD (chronic obstructive pulmonary disease)     Cough     Diabetes mellitus     Hyperlipidemia     Hypertension     Menopause     Nausea and vomiting     Obesity 3/7/2017    Osteoarthritis     Osteoporosis     Vertigo, benign paroxysmal      Past Surgical History:   Procedure Laterality Date    BRONCHOSCOPY Bilateral 1/8/2024    Procedure: BRONCHOSCOPY WITH ENDOBRONCHIAL ULTRASOUND;  Surgeon: Augustin Stafford MD;  Location: [facility];  Service: Pulmonary;  Laterality: Bilateral;    CATARACT EXTRACTION      COLONOSCOPY      EYE SURGERY      catarac    ILIAC ARTERY STENT  02/25/2015    SKIN CANCER EXCISION      nose    TONSILLECTOMY      and adenoidectomy    TUBAL ABDOMINAL LIGATION       PT Assessment (last 12 hours)       PT Evaluation and Treatment       Row Name 01/17/24 1348          Physical Therapy Time and Intention    Subjective Information complains of;weakness;fatigue  -CT     Document Type therapy note (daily note)  -CT     Mode of Treatment individual therapy;physical therapy  -CT     Patient Effort good  -CT     Comment Pt ambulated in hallway this date  -CT       Row Name 01/17/24 1342          General Information    Patient Profile Reviewed yes  -CT     Existing Precautions/Restrictions  fall;oxygen therapy device and L/min  -CT       Row Name 01/17/24 1340          Living Environment    Primary Care Provided by self  -CT       Row Name 01/17/24 1340          Home Use of Assistive/Adaptive Equipment    Equipment Currently Used at Home bp cuff;rollator;oxygen  -CT       Row Name 01/17/24 1340          Cognition    Affect/Mental Status (Cognition) WFL  -CT     Orientation Status (Cognition) oriented x 4  -CT     Follows Commands (Cognition) WFL  -CT       Row Name 01/17/24 1340          Sit-Stand Transfer    Sit-Stand [place] (Transfers) standby assist  -CT       Row Name 01/17/24 1340          Stand-Sit Transfer    Stand-Sit [place] (Transfers) standby assist  -CT       Row Name 01/17/24 1340          Gait/Stairs (Locomotion)    [place] Level (Gait) contact guard  -CT     Patient was able to Ambulate yes  -CT     Distance in Feet (Gait) 90  -CT     Pattern (Gait) step-through  -CT     Deviations/Abnormal Patterns (Gait) gait speed decreased;weight shifting decreased  -CT       Row Name 01/17/24 1340          Safety Issues, Functional Mobility    Impairments Affecting Function (Mobility) balance;endurance/activity tolerance  -CT       Row Name 01/17/24 1340          Motor Skills    Therapeutic Exercise --  BLE sitting ther ex, BLE standing ther ex  -CT       Row Name 01/17/24 1340          Coping    Observed Emotional State calm;cooperative  -CT     Verbalized Emotional State acceptance  -CT       Row Name 01/17/24 1340          Plan of Care Review    Plan of Care Reviewed With patient  -CT       Row Name 01/17/24 1340          Positioning and Restraints    Pre-Treatment Position in bed  -CT     Post Treatment Position chair  -CT     In Chair sitting;call light within reach;encouraged to call for assist  -CT       Row Name 01/17/24 1340          Therapy Assessment/Plan (PT)    Rehab Potential (PT) good, to achieve stated therapy goals  -CT     Criteria for Skilled Interventions Met (PT)  yes;skilled treatment is necessary  -CT     Therapy Frequency (PT) 2 times/wk  2-5x/wk  -CT     Problem List (PT) problems related to;balance;mobility  -CT     Activity Limitations Related to Problem List (PT) unable to ambulate safely;unable to transfer safely  -CT               User Key  (r) = Recorded By, (t) = Taken By, (c) = Cosigned By      Initials Name Provider Type    CT Jesenia Louis, THANG Physical Therapist                    Physical Therapy Education       Title: PT OT SLP Therapies (Done)       Topic: Physical Therapy (Done)       Point: Mobility training (Done)       Learning Progress Summary             Patient Acceptance, E,TB, VU by  at 1/15/2024 2040    Acceptance, E,TB, VU by  at 1/15/2024 0002    Acceptance, E, NR,VU by  at 1/14/2024 0303    Acceptance, E, VU,NR by  at 1/11/2024 0223    Acceptance, E, NR by  at 1/9/2024 0357    Acceptance, E, NR by  at 1/7/2024 2301    Acceptance, E,TB, VU,NR by  at 1/4/2024 2314    Acceptance, E,TB, VU by WESLEY at 1/4/2024 1515                         Point: Home exercise program (Done)       Learning Progress Summary             Patient Acceptance, E,TB, VU by  at 1/15/2024 2040    Acceptance, E,TB, VU by  at 1/15/2024 0002    Acceptance, E, NR,VU by  at 1/14/2024 0303    Acceptance, E, VU,NR by  at 1/11/2024 0223    Acceptance, E, NR by  at 1/9/2024 0357    Acceptance, E, NR by  at 1/7/2024 2301    Acceptance, E,TB, VU,NR by EB at 1/4/2024 2314    Acceptance, E,TB, VU by KM at 1/4/2024 1515                         Point: Body mechanics (Done)       Learning Progress Summary             Patient Acceptance, E,TB, VU by  at 1/15/2024 2040    Acceptance, E,TB, VU by  at 1/15/2024 0002    Acceptance, E, NR,VU by  at 1/14/2024 0303    Acceptance, E, VU,NR by  at 1/11/2024 0223    Acceptance, E, NR by MF at 1/9/2024 0357    Acceptance, E, NR by MF at 1/7/2024 2301    Acceptance, E,TB, VU,NR by EB at 1/4/2024 2314    Acceptance, E,TB,  VU by  at 1/4/2024 1515                         Point: Precautions (Done)       Learning Progress Summary             Patient Acceptance, E,TB, VU by  at 1/15/2024 2040    Acceptance, E,TB, VU by  at 1/15/2024 0002    Acceptance, E, NR,VU by  at 1/14/2024 0303    Acceptance, E, VU,NR by  at 1/11/2024 0223    Acceptance, E, NR by  at 1/9/2024 0357    Acceptance, E, NR by  at 1/7/2024 2301    Acceptance, E,TB, VU,NR by  at 1/4/2024 2314    Acceptance, E,TB, VU by  at 1/4/2024 1515                                         User Key       Initials Effective Dates Name Provider Type Discipline     06/16/21 -  Rebecca Berman, RN Registered Nurse Nurse     12/15/23 -  Amina Muniz RN Registered Nurse Nurse     06/16/21 -  Naye Lewis, RN Registered Nurse Nurse     05/24/22 -  Chon Rowell, PT Physical Therapist PT     06/21/23 -  Brooklynn Kruger, HELIO Registered Nurse Nurse                  PT Recommendation and Plan  Anticipated Discharge Disposition (PT): home, home with assist  Therapy Frequency (PT): 2 times/wk (2-5x/wk)  Plan of Care Reviewed With: patient       Time Calculation:    PT Charges       Row Name 01/17/24 1343             Time Calculation    PT Received On 01/17/24  -CT         Time Calculation- PT    Total Timed Code Minutes- PT 40 minute(s)  -CT                User Key  (r) = Recorded By, (t) = Taken By, (c) = Cosigned By      Initials Name Provider Type    CT Jesenia Louis, PT Physical Therapist                  Therapy Charges for Today       Code Description Service Date Service Provider Modifiers Qty    75306057960 HC GAIT TRAINING EA 15 MIN 1/16/2024 Jesenia Louis, PT GP 1    68728364358 HC PT THERAPEUTIC ACT EA 15 MIN 1/16/2024 Jesenia Louis, PT GP 1    89954854416 HC GAIT TRAINING EA 15 MIN 1/17/2024 Jesenia Louis, PT GP 2    42746663954 HC PT THERAPEUTIC ACT EA 15 MIN 1/17/2024 Jesenia Louis, PT GP 1            PT G-Codes  AM-PAC 6 Clicks Score (PT):  20    Jesenia Louis, PT  2024      Electronically signed by Jesenia Louis, PT at 24 1344       Discharge Order (From admission, onward)       Start     Ordered    24 0911  Discharge patient  Once        Expected Discharge Date: 24   Discharge Disposition: Home-Health Care [facility]   Physician of Record for Attribution - Please select from Treatment Team: MAXX YEN [[id number]]   Review needed by CMO to determine Physician of Record: No      Question Answer Comment   Physician of Record for Attribution - Please select from Treatment Team MAXX YEN    Review needed by CMO to determine Physician of Record No        24 0916                  [address] [address]  Phone:  [phone]  Fax:  [phone] Date: 2024      Ambulatory Referral to Home Health     Patient:  Evie Shah MRN:  [id number]   [address]   [place] [address] :  1945  SSN:    Phone: [phone] Sex:  F      INSURANCE PAYOR PLAN GROUP # SUBSCRIBER ID   Primary:  Secondary:    [employer] MEDICARE REPLACEMENT  [place] MEDICAID [id number]  [id number]    [id number]  [id number]      Referring Provider Information:  MAXX YEN Phone: [phone] Fax: [phone]       Referral Information:   # Visits:  999 Referral Type: Home Health [42]   Urgency:  Routine Referral Reason: Specialty Services Required   Start Date: 2024 End Date:  To be determined by Insurer   Diagnosis: Acute respiratory failure with hypoxia (J96.01 [ICD-10-CM] 518.81 [ICD-9-CM])      Refer to Dept:   Refer to Provider:   Refer to Provider Phone:   Refer to Facility:       Face to Face Visit Date: 2024  Follow-up provider for Plan of Care? I treated the patient in an acute care facility and will not continue treatment after discharge.  Follow-up provider: SHAY YANEZ [[id number]]  Reason/Clinical Findings: Acute hypoxic respiratory failure,  hypercapnia from left lung atelectasis  Describe mobility limitations that make leaving home difficult: Patient is respiratory failure, will require company to leave the house  Nursing/Therapeutic Services Requested: Skilled Nursing  Nursing/Therapeutic Services Requested: Physical Therapy  Nursing/Therapeutic Services Requested: Occupational Therapy  Skilled nursing orders: COPD management  Skilled nursing orders: O2 instruction  PT orders: Gait Training  PT orders: Therapeutic exercise  PT orders: Home safety assessment  PT orders: Strengthening  Weight Bearing Status: As Tolerated  Occupational orders: Activities of daily living  Occupational orders: Strengthening  Occupational orders: Energy conservation  Occupational orders: Home safety assessment  Frequency: 1 Week 1     This document serves as a request of services and does not constitute Insurance authorization or approval of services.  To determine eligibility, please contact the members Insurance carrier to verify and review coverage.     If you have medical questions regarding this request for services. Please contact [facility] at [phone] during normal business hours.        Authorizing Provider:Cora Carver MD  Authorizing Provider's NPI: [id number]  Order Entered By: Cora Carver MD 1/18/2024  9:16 AM     Electronically signed by: Cora Carver MD 1/18/2024  9:16 AM

## 2024-01-18 NOTE — PLAN OF CARE
Goal Outcome Evaluation:  Plan of Care Reviewed With: patient    Problem: Adult Inpatient Plan of Care  Goal: Absence of Hospital-Acquired Illness or Injury  Intervention: Identify and Manage Fall Risk  Description: Perform standard risk assessment on admission using a validated tool or comprehensive approach appropriate to the patient; reassess fall risk frequently, with change in status or transfer to another level of care.  Communicate fall injury risk to interprofessional healthcare team.  Determine need for increased observation, equipment and environmental modification, such as low bed, signage and supportive, nonskid footwear.  Adjust safety measures to individual developmental age, stage and identified risk factors.  Reinforce the importance of safety and physical activity with patient and family.  Perform regular intentional rounding to assess need for position change, pain assessment and personal needs, including assistance with toileting.  Recent Flowsheet Documentation  Taken 1/18/2024 0300 by Bettye Sanchez RN  Safety Promotion/Fall Prevention:   nonskid shoes/slippers when out of bed   safety round/check completed  Taken 1/18/2024 0100 by Bettye Sanchez RN  Safety Promotion/Fall Prevention:   nonskid shoes/slippers when out of bed   safety round/check completed  Taken 1/17/2024 2300 by Bettye Sanchez RN  Safety Promotion/Fall Prevention:   nonskid shoes/slippers when out of bed   safety round/check completed  Taken 1/17/2024 2100 by Bettye Sanchez RN  Safety Promotion/Fall Prevention:   nonskid shoes/slippers when out of bed   safety round/check completed  Taken 1/17/2024 2005 by Bettye Sanchez RN  Safety Promotion/Fall Prevention:   safety round/check completed   nonskid shoes/slippers when out of bed   lighting adjusted   fall prevention program maintained   activity supervised   clutter free environment maintained  Taken 1/17/2024 1900 by Bettye Sanchez RN  Safety Promotion/Fall  Prevention:   nonskid shoes/slippers when out of bed   safety round/check completed        Progress: improving

## 2024-01-18 NOTE — CASE MANAGEMENT/SOCIAL WORK
Discharge Planning Assessment  [facility]     Patient Name: Evie Shah  MRN: [id number]  Today's Date: 1/18/2024    Admit Date: 1/2/2024     Discharge Plan       Row Name 01/18/24 0934       Plan    Final Discharge Disposition Code 06 - home with home health care    Final Note Pt is being discharged home with home health services at this time. SS spoke with pt who states she is returning home with her daughter Yin at [address] [place] [address] ([facility]). [facility]. SS faxed HH order to [phone]. SS will provide RN with report number when referral has been reviewed and accepted. SS to follow.    11:51: SS received call from [facility] per [place] who states referral was accepted.  provided RN with report number [phone]. No other needs identified.                QUINN JohnsonW

## 2024-01-18 NOTE — PLAN OF CARE
Goal Outcome Evaluation:              Outcome Evaluation: a&o 3LNC, ambulating to bathroom NSR, VSS. Discharge today                               Problem: Adult Inpatient Plan of Care  Goal: Patient-Specific Goal (Individualized)  Outcome: Met     Problem: Adult Inpatient Plan of Care  Goal: Absence of Hospital-Acquired Illness or Injury  Outcome: Met     Problem: Adult Inpatient Plan of Care  Goal: Absence of Hospital-Acquired Illness or Injury    Problem: Adult Inpatient Plan of Care  Goal: Readiness for Transition of Care  Outcome: Met     Problem: Respiratory Compromise (Pneumonia)  Goal: Effective Oxygenation and Ventilation  Outcome: Met     Problem: Infection (Pneumonia)  Goal: Resolution of Infection Signs and Symptoms  Outcome: Met     Problem: Fluid Imbalance (Pneumonia)  Goal: Fluid Balance  Outcome: Met        Problem: Adult Inpatient Plan of Care  Goal: Optimal Comfort and Wellbeing  Outcome: Met

## 2024-01-18 NOTE — PROGRESS NOTES
Progress Note Pulmonary      Subjective     Interval History: This morning Ms Shah says she feels well and is looking forward to going home. Denied any shortness of breath on 2-3lpm by nc.         Review of Systems:    Reviewed ; unchanged     Vital Signs  Temp:  [97.7 °F (36.5 °C)-98.5 °F (36.9 °C)] 97.7 °F (36.5 °C)  Heart Rate:  [63-90] 75  Resp:  [14-22] 18  BP: ()/() 127/80  Body mass index is 35.39 kg/m².    Intake/Output Summary (Last 24 hours) at 1/18/2024 1233  Last data filed at 1/18/2024 0800  Gross per 24 hour   Intake 354 ml   Output --   Net 354 ml     I/O this shift:  In: 354 [P.O.:354]  Out: -     Physical Exam:  General- normal in appearance, not in any acute distress  HEENT- pupils equally reactive to light, normal in size, no scleral icterus  Neck- supple  No JVD, no carotid bruit  Respiratory- equal and clear bilateral without wheeze or rhonchi  Cardiovascular-  regular without murmurs.    GI-nontender nondistended bowel sounds positive  CNS-alert oriented x3, grossly nonfocal  Extremities- pulses normal bilaterally , no clubbing or edema        Results Review:      Results from last 7 days   Lab Units 01/15/24  0045 01/14/24  0030 01/13/24  0431   WBC 10*3/mm3 14.22* 13.96* 10.05   HEMOGLOBIN g/dL 15.3 15.5 14.3   PLATELETS 10*3/mm3 207 202 177     Results from last 7 days   Lab Units 01/18/24  0017 01/15/24  0045 01/14/24  0030 01/13/24  0431   SODIUM mmol/L 143 134* 136 137   POTASSIUM mmol/L 4.0 3.8 4.1 4.6   CHLORIDE mmol/L 109* 100 97* 101   CO2 mmol/L 26.6 24.5 26.0 27.2   BUN mg/dL 18 53* 46* 45*   CREATININE mg/dL 0.83 1.07* 0.90 0.90   CALCIUM mg/dL 8.2* 9.6 9.6 9.6   GLUCOSE mg/dL 147* 209* 227* 297*   MAGNESIUM mg/dL  --  2.2 2.3 2.3     Lab Results   Component Value Date    INR 1.02 01/12/2024    INR 1.02 01/11/2024    INR 0.94 01/08/2024    PROTIME 13.9 01/12/2024    PROTIME 13.9 01/11/2024    PROTIME 13.1 01/08/2024     Results from last 7 days   Lab Units  01/15/24  0045 01/14/24  0030 01/12/24  0423   ALK PHOS U/L 93 98 76   BILIRUBIN mg/dL 0.3 0.3 0.4   ALT (SGPT) U/L 18 21 26   AST (SGOT) U/L 15 10 11         Imaging Results (Last 24 Hours)       ** No results found for the last 24 hours. **                 acetylcysteine, 3 mL, Nebulization, 4x Daily - RT  amLODIPine, 5 mg, Oral, Q PM  atorvastatin, 10 mg, Oral, Nightly  buPROPion SR, 150 mg, Oral, Nightly  cetirizine, 5 mg, Oral, Daily  cholecalciferol, 50,000 Units, Oral, Weekly  clopidogrel, 75 mg, Oral, Daily  empagliflozin, 25 mg, Oral, Daily  fluticasone, 2 spray, Nasal, Daily  guaiFENesin, 600 mg, Oral, Q12H  insulin lispro, 2 Units, Subcutaneous, TID With Meals  insulin lispro, 2-9 Units, Subcutaneous, 4x Daily AC & at Bedtime  ipratropium-albuterol, 3 mL, Nebulization, 4x Daily - RT  lisinopril, 5 mg, Oral, Q PM  metoprolol succinate XL, 25 mg, Oral, Q PM  montelukast, 10 mg, Oral, Nightly  senna-docusate sodium, 2 tablet, Oral, BID  sodium chloride, 10 mL, Intravenous, Q12H  sodium chloride, 10 mL, Intravenous, Q12H           Medication Review:     Assessment & Plan   # WILLIAM atelectasis with mucous plugging s/p bronchoscopy   # Acute hypoxic respiratory failure 2/2 above    -Pt overall improving and feeling well, is awaiting discharge     -o2 sats remaining >90% on 2-3lpm by nc     -Additional recommendations per Dr. Davis     Thank you for letting us participate in Ms Shah's care      Santhosh Brumfield DO  01/18/24  12:33 EST      I have performed an independent face-to-face diagnostic evaluation including performing an independent physical examination.  The documented plan of care above was reviewed and developed with Dr. Trevizo who performed portions of the examination and documentation for this patient's care under my direct supervision    I concur with the assessment and plan outlined by Dr. Trevizo

## 2024-01-19 ENCOUNTER — TRANSITIONAL CARE MANAGEMENT TELEPHONE ENCOUNTER (OUTPATIENT)
Dept: CALL CENTER | Facility: HOSPITAL | Age: 79
End: 2024-01-19
Payer: MEDICARE

## 2024-01-19 LAB
FUNGUS SPEC CULT: NORMAL
FUNGUS SPEC FUNGUS STN: NORMAL

## 2024-01-19 NOTE — OUTREACH NOTE
Call Center TCM Note      Flowsheet Row Responses   [facility] patient discharged from? [facility]   Does the patient have one of the following disease processes/diagnoses(primary or secondary)? Pneumonia   TCM attempt successful? Yes   Call start time 0918   Call end time 0921   Discharge diagnosis Left lung atelectasis secondary to mucous plug, acute hypoxic respiratory failure, PNA   Meds reviewed with patient/caregiver? Yes   Is the patient having any side effects they believe may be caused by any medication additions or changes? No   Does the patient have all medications ordered at discharge? Yes   Is the patient taking all medications as directed (includes completed medication regime)? Yes   Comments Hosp dc fu apt on 1/29/24,  Pulm doc apt on 2/1/24   Does the patient have an appointment with their PCP within 7-14 days of discharge? Yes   What is the [facility] health agency?  [facility]   Has home health visited the patient within 72 hours of discharge? Yes   Home health comments HH contacted patient's family   Pulse Ox monitoring Intermittent   Pulse Ox device source Patient   O2 Sat comments 94% (has oxygen prn)   O2 Sat: education provided Sat levels, Monitoring frequency, When to seek care   Did the patient receive a copy of their discharge instructions? Yes   Nursing interventions Reviewed instructions with patient   What is the patient's perception of their health status since discharge? Improving   If the patient is a current smoker, are they able to teach back resources for cessation? Not a smoker   Is the patient/caregiver able to teach back the hierarchy of who to call/visit for symptoms/problems? PCP, Specialist, Home health nurse, Urgent Care, ED, 911 Yes   Is the patient/caregiver able to teach back signs and symptoms of worsening condition: Fever/chills, Shortness of breath, Chest pain   Is the patient/caregiver able to teach back importance of completing antibiotic course of treatment? Yes    TCM call completed? Yes   Call end time 0921            Opal Cerrato RN    1/19/2024, 09:24 EST

## 2024-01-26 ENCOUNTER — READMISSION MANAGEMENT (OUTPATIENT)
Dept: CALL CENTER | Facility: HOSPITAL | Age: 79
End: 2024-01-26
Payer: MEDICARE

## 2024-01-26 LAB
FUNGUS SPEC CULT ORG #2: ABNORMAL
FUNGUS SPEC CULT: ABNORMAL
FUNGUS SPEC FUNGUS STN: ABNORMAL

## 2024-01-26 NOTE — OUTREACH NOTE
Medical Week 2 Survey      Flowsheet Row Responses   [facility]? [facility]   Does the patient have one of the following disease processes/diagnoses(primary or secondary)? Pneumonia            MADHU BALL - Registered Nurse

## 2024-01-29 ENCOUNTER — READMISSION MANAGEMENT (OUTPATIENT)
Dept: CALL CENTER | Facility: HOSPITAL | Age: 79
End: 2024-01-29
Payer: MEDICARE

## 2024-01-29 ENCOUNTER — OFFICE VISIT (OUTPATIENT)
Dept: FAMILY MEDICINE CLINIC | Facility: CLINIC | Age: 79
End: 2024-01-29
Payer: MEDICARE

## 2024-01-29 VITALS
OXYGEN SATURATION: 90 % | BODY MASS INDEX: 34.66 KG/M2 | DIASTOLIC BLOOD PRESSURE: 80 MMHG | WEIGHT: 195.6 LBS | TEMPERATURE: 97.1 F | SYSTOLIC BLOOD PRESSURE: 125 MMHG | HEIGHT: 63 IN | HEART RATE: 97 BPM

## 2024-01-29 DIAGNOSIS — I25.10 CORONARY ARTERY CALCIFICATION SEEN ON CT SCAN: Chronic | ICD-10-CM

## 2024-01-29 DIAGNOSIS — E78.5 DYSLIPIDEMIA: Chronic | ICD-10-CM

## 2024-01-29 DIAGNOSIS — I10 ESSENTIAL HYPERTENSION: Chronic | ICD-10-CM

## 2024-01-29 DIAGNOSIS — I51.7 CARDIOMEGALY: Chronic | ICD-10-CM

## 2024-01-29 DIAGNOSIS — R53.81 DEBILITY: Chronic | ICD-10-CM

## 2024-01-29 DIAGNOSIS — J96.11 CHRONIC RESPIRATORY FAILURE WITH HYPOXIA: Primary | Chronic | ICD-10-CM

## 2024-01-29 PROCEDURE — 3074F SYST BP LT 130 MM HG: CPT | Performed by: PHYSICIAN ASSISTANT

## 2024-01-29 PROCEDURE — 3079F DIAST BP 80-89 MM HG: CPT | Performed by: PHYSICIAN ASSISTANT

## 2024-01-29 PROCEDURE — 99495 TRANSJ CARE MGMT MOD F2F 14D: CPT | Performed by: PHYSICIAN ASSISTANT

## 2024-01-29 PROCEDURE — 1159F MED LIST DOCD IN RCRD: CPT | Performed by: PHYSICIAN ASSISTANT

## 2024-01-29 PROCEDURE — 1160F RVW MEDS BY RX/DR IN RCRD: CPT | Performed by: PHYSICIAN ASSISTANT

## 2024-01-29 PROCEDURE — 1111F DSCHRG MED/CURRENT MED MERGE: CPT | Performed by: PHYSICIAN ASSISTANT

## 2024-01-29 NOTE — PROGRESS NOTES
Transitional Care Follow Up Visit  Subjective     Evie Shah is a 78 y.o. female who presents for a transitional care management visit.    Within 48 business hours after discharge our office contacted her via telephone to coordinate her care and needs.      I reviewed and discussed the details of that call along with the discharge summary, hospital problems, inpatient lab results, inpatient diagnostic studies, and consultation reports with Evie.     Current outpatient and discharge medications have been reconciled for the patient.  Reviewed by: RUBI Michael          1/18/2024     4:28 PM   Date of TCM Phone Call   Hospital [facility]   Date of Admission 1/2/2024   Date of Discharge 1/18/2024   Discharge Disposition Home-Health Care [facility]     Risk for Readmission (LACE) Score: 15 (1/18/2024  6:00 AM)      History of Present Illness   Course During Hospital Stay:      She is a 78-year-old female who presented to [facility] with shortness of breath and cough.  Normally she was on oxygen as needed but had been using it continuously and still remained hypoxic on room air.  She went to [facility] emergency room where she was noted to have left upper lobe atelectasis.  She underwent bronchoscopy which revealed thick mucus.  She developed severe hypoxic respiratory failure requiring heated high flow supplemental oxygen.  Repeat radiology study revealed atelectasis on the left lower lobe.  She underwent bronchoscopy again and mucus was noted.  She was continued on high flow oxygen supplementation with diuretic as needed.  Eventually the patient's oxygen requirements improved.  She was doing well with oxygen saturation at 3 L nasal cannula with O2 ranging 93-96%.  She was discharged home on 1/18/2024.    Discharge diagnosis included:  Left lung atelectasis secondary to mucous plugging-resolved  Acute hypoxic respiratory failure-stable  COPD without exacerbation-stable  Probable  left lung pneumonia cultures negative after completing antibiotic treatment  QTc prolongation-stable  Obesity-stable  Hypertension-stable  Hyperlipidemia-stable  Peripheral artery disease status post PCI left external iliac with stent placement-stable    1/2/2024 CT angiogram revealed:  1.  No PE  2.  Left upper lobe complete atelectasis favorable for mucous plugging  3.  Luminal debris in the left greater then right lower lobe bronchial airway favoring mucous secretions  4.  Bronchial inflammation compatible with bronchitis  5.  Superimposed pneumonia left upper lobe likely  6.  Cardiomegaly with coronary artery calcifications  7.  No effusion, no pneumothorax  8.  COPD    1/9/2024 chest x-ray revealed left lower lobe pneumonia and tiny left pleural effusion    1/12/2024 chest x-ray revealed mild cardiomegaly again but was otherwise unremarkable      The following portions of the patient's history were reviewed and updated as appropriate: allergies, current medications, past family history, past medical history, past social history, past surgical history, and problem list.    Review of Systems    Objective   Physical Exam  Vitals and nursing note reviewed.   Constitutional:       Appearance: Normal appearance. She is well-developed.   Eyes:      Extraocular Movements: Extraocular movements intact.      Conjunctiva/sclera: Conjunctivae normal.   Cardiovascular:      Rate and Rhythm: Normal rate and regular rhythm.      Heart sounds: Normal heart sounds. No murmur heard.  Pulmonary:      Effort: Pulmonary effort is normal. No respiratory distress.      Breath sounds: Normal breath sounds. No wheezing.   Musculoskeletal:         General: No tenderness.   Skin:     General: Skin is warm and dry.      Findings: No rash.   Neurological:      Mental Status: She is alert and oriented to person, place, and time.   Psychiatric:         Mood and Affect: Mood normal.         Behavior: Behavior normal.         Thought Content:  Thought content normal.         Assessment & Plan   Diagnoses and all orders for this visit:    1. Chronic respiratory failure with hypoxia (Primary)  Comments:  Prescription written for portable oxygen concentrator  We will contact home health to have them evaluate for help with ADLs    2. Coronary artery calcification seen on CT scan  Comments:  Advised risk factor modification including atorvastatin    3. Cardiomegaly  Comments:  Advised tight control of blood pressure  Continue amlodipine and metoprolol    4. Dyslipidemia  Comments:  Continue atorvastatin  Advised low-cholesterol diet    5. Essential hypertension  Comments:  Continue amlodipine and metoprolol    6. Debility  Comments:  Prescription written for quad cane  [facility]    Prescription written for blood glucose monitor, test strips and lancets due to diabetes mellitus with hyperglycemia.

## 2024-01-29 NOTE — PATIENT INSTRUCTIONS
Fall Prevention in the Home, Adult  Falls can cause injuries and can happen to people of all ages. There are many things you can do to make your home safer and to help prevent falls.  What actions can I take to prevent falls?  General information  Use good lighting in all rooms. Make sure to:  Replace any light bulbs that burn out.  Turn on the lights in dark areas and use night-lights.  Keep items that you use often in easy-to-reach places. Lower the shelves around your home if needed.  Move furniture so that there are clear paths around it.  Do not use throw rugs or other things on the floor that can make you trip.  If any of your floors are uneven, fix them.  Add color or contrast paint or tape to clearly bambi and help you see:  Grab bars or handrails.  First and last steps of staircases.  Where the edge of each step is.  If you use a ladder or stepladder:  Make sure that it is fully opened. Do not climb a closed ladder.  Make sure the sides of the ladder are locked in place.  Have someone hold the ladder while you use it.  Know where your pets are as you move through your home.  What can I do in the bathroom?         Keep the floor dry. Clean up any water on the floor right away.  Remove soap buildup in the bathtub or shower. Buildup makes bathtubs and showers slippery.  Use non-skid mats or decals on the floor of the bathtub or shower.  Attach bath mats securely with double-sided, non-slip rug tape.  If you need to sit down in the shower, use a non-slip stool.  Install grab bars by the toilet and in the bathtub and shower. Do not use towel bars as grab bars.  What can I do in the bedroom?  Make sure that you have a light by your bed that is easy to reach.  Do not use any sheets or blankets on your bed that hang to the floor.  Have a firm chair or bench with side arms that you can use for support when you get dressed.  What can I do in the kitchen?  Clean up any spills right away.  If you need to reach something  above you, use a step stool with a grab bar.  Keep electrical cords out of the way.  Do not use floor polish or wax that makes floors slippery.  What can I do with my stairs?  Do not leave anything on the stairs.  Make sure that you have a light switch at the top and the bottom of the stairs.  Make sure that there are handrails on both sides of the stairs. Fix handrails that are broken or loose.  Install non-slip stair treads on all your stairs if they do not have carpet.  Avoid having throw rugs at the top or bottom of the stairs.  Choose a carpet that does not hide the edge of the steps on the stairs. Make sure that the carpet is firmly attached to the stairs. Fix carpet that is loose or worn.  What can I do on the outside of my home?  Use bright outdoor lighting.  Fix the edges of walkways and driveways and fix any cracks. Clear paths of anything that can make you trip, such as tools or rocks.  Add color or contrast paint or tape to clearly bambi and help you see anything that might make you trip as you walk through a door, such as a raised step or threshold.  Trim any bushes or trees on paths to your home.  Check to see if handrails are loose or broken and that both sides of all steps have handrails. Install guardrails along the edges of any raised decks and porches.  Have leaves, snow, or ice cleared regularly. Use sand, salt, or ice melter on paths if you live where there is ice and snow during the winter.  Clean up any spills in your garage right away. This includes grease or oil spills.  What other actions can I take?  Review your medicines with your doctor. Some medicines can cause dizziness or changes in blood pressure, which increase your risk of falling.  Wear shoes that:  Have a low heel. Do not wear high heels.  Have rubber bottoms and are closed at the toe.  Feel good on your feet and fit well.  Use tools that help you move around if needed. These include:  Canes.  Walkers.  Scooters.  Crutches.  Ask  your doctor what else you can do to help prevent falls. This may include seeing a physical therapist to learn to do exercises to move better and get stronger.  Where to find more information  Centers for Disease Control and PreventionRAEANN: cdc.gov  National [place] on Aging: dionne.nih.gov  National [place] on Aging: dionne.nih.gov  Contact a doctor if:  You are afraid of falling at home.  You feel weak, drowsy, or dizzy at home.  You fall at home.  Get help right away if you:  Lose consciousness or have trouble moving after a fall.  Have a fall that causes a head injury.  These symptoms may be an emergency. Get help right away. Call 911.  Do not wait to see if the symptoms will go away.  Do not drive yourself to the hospital.  This information is not intended to replace advice given to you by your health care provider. Make sure you discuss any questions you have with your health care provider.  Document Revised: 08/21/2023 Document Reviewed: 08/21/2023  Elsevier Patient Education © 2023 Elsevier Inc.

## 2024-01-30 LAB
FUNGUS SPEC CULT ORG #2: ABNORMAL
FUNGUS SPEC CULT: ABNORMAL
FUNGUS SPEC FUNGUS STN: ABNORMAL

## 2024-01-30 NOTE — OUTREACH NOTE
COPD/PN Week 2 Survey      Flowsheet Row Responses   [facility] facility patient discharged from? [facility]   Does the patient have one of the following disease processes/diagnoses(primary or secondary)? Pneumonia   Week 2 attempt successful? Yes   Call start time 2012   Call end time 2012   Discharge diagnosis Left lung atelectasis secondary to mucous plug, acute hypoxic respiratory failure, PNA   Person spoke with today (if not patient) and relationship patient   Meds reviewed with patient/caregiver? Yes   Does the patient have a primary care provider?  Yes   Comments regarding PCP Seen pcp today   Psychosocial issues? No   Is the patient/caregiver able to teach back signs and symptoms of worsening condition: Fever/chills, Shortness of breath, Chest pain   Is the patient/caregiver able to teach back importance of completing antibiotic course of treatment? Yes   Week 2 call completed? Yes   Graduated Yes   Wrap up additional comments patient  reports doing well no concerns or questions noted.   Call end time 2012            Faye GILLETTE - Registered Nurse

## 2024-02-01 DIAGNOSIS — M85.80 OSTEOPENIA, UNSPECIFIED LOCATION: Chronic | ICD-10-CM

## 2024-02-01 RX ORDER — RALOXIFENE HYDROCHLORIDE 60 MG/1
60 TABLET, FILM COATED ORAL DAILY
Qty: 90 TABLET | Refills: 3 | Status: SHIPPED | OUTPATIENT
Start: 2024-02-01

## 2024-02-02 DIAGNOSIS — B44.9 ASPERGILLUS: Primary | ICD-10-CM

## 2024-02-06 ENCOUNTER — TELEPHONE (OUTPATIENT)
Dept: FAMILY MEDICINE CLINIC | Facility: CLINIC | Age: 79
End: 2024-02-06
Payer: MEDICARE

## 2024-02-06 LAB
FUNGUS SPEC CULT: NORMAL
FUNGUS SPEC FUNGUS STN: NORMAL

## 2024-02-06 NOTE — TELEPHONE ENCOUNTER
Spoke to [facility] and asked them about adding help with housework and bathing. Sent a new referral per [facility].         ----- Message from RUBI Michael sent at 1/29/2024  2:10 PM EST -----   [facility] to go out and evaluate Evie for services ?  She already has PT with them.  She needs help with housework or bathing etc.

## 2024-02-08 ENCOUNTER — LAB (OUTPATIENT)
Dept: LAB | Facility: HOSPITAL | Age: 79
End: 2024-02-08
Payer: MEDICARE

## 2024-02-08 DIAGNOSIS — B44.9 ASPERGILLUS: ICD-10-CM

## 2024-02-08 PROCEDURE — 87305 ASPERGILLUS AG IA: CPT

## 2024-02-12 LAB
FUNGUS SPEC CULT ORG #2: ABNORMAL
FUNGUS SPEC CULT: ABNORMAL
FUNGUS SPEC FUNGUS STN: ABNORMAL
GALACTOMANNAN AG SPEC IA-ACNC: 0.04 INDEX (ref 0–0.49)

## 2024-02-13 ENCOUNTER — TELEPHONE (OUTPATIENT)
Dept: PULMONOLOGY | Facility: CLINIC | Age: 79
End: 2024-02-13
Payer: MEDICARE

## 2024-02-24 LAB
ACID FAST STN SPEC: NEGATIVE
ACID FAST STN SPEC: NEGATIVE
MYCOBACTERIUM SPEC QL CULT: NEGATIVE
MYCOBACTERIUM SPEC QL CULT: NEGATIVE
SPECIMEN PREPARATION: NORMAL
SPECIMEN PREPARATION: NORMAL

## 2024-03-01 DIAGNOSIS — E11.65 TYPE 2 DIABETES MELLITUS WITH HYPERGLYCEMIA, WITHOUT LONG-TERM CURRENT USE OF INSULIN: Chronic | ICD-10-CM

## 2024-03-01 RX ORDER — TIRZEPATIDE 5 MG/.5ML
INJECTION, SOLUTION SUBCUTANEOUS
Qty: 4 ML | Refills: 0 | Status: SHIPPED | OUTPATIENT
Start: 2024-03-01

## 2024-03-14 ENCOUNTER — TELEPHONE (OUTPATIENT)
Dept: FAMILY MEDICINE CLINIC | Facility: CLINIC | Age: 79
End: 2024-03-14

## 2024-03-14 NOTE — TELEPHONE ENCOUNTER
Yes that is a change.  She should be taking the amlodipine and lisinopril.  She should not be on the amlodipine / benazepril combo.

## 2024-03-14 NOTE — TELEPHONE ENCOUNTER
Caller: [facility] [address] [place] [address] - [phone]  - [phone]    Relationship: Pharmacy    Best call back number: [phone]     What is the best time to reach you: ANY    Who are you requesting to speak with (clinical staff, provider,  specific staff member): NURSE    Do you know the name of the person who called: RICK    What was the call regarding: AMLODIPINE BENAZEPRIL COMBO.  PATIENT RECEIVED LISINOPRIL AND AMLODIPINE 5 MG FROM ANOTHER PROVIDER.  IS THIS A CHANGE?    Is it okay if the provider responds through MyChart: PHONE CALL PLEASE

## 2024-03-19 ENCOUNTER — OFFICE VISIT (OUTPATIENT)
Dept: FAMILY MEDICINE CLINIC | Facility: CLINIC | Age: 79
End: 2024-03-19
Payer: MEDICARE

## 2024-03-19 VITALS
HEART RATE: 79 BPM | DIASTOLIC BLOOD PRESSURE: 78 MMHG | TEMPERATURE: 97.5 F | OXYGEN SATURATION: 91 % | HEIGHT: 63 IN | WEIGHT: 199 LBS | BODY MASS INDEX: 35.26 KG/M2 | SYSTOLIC BLOOD PRESSURE: 124 MMHG

## 2024-03-19 DIAGNOSIS — E55.9 VITAMIN D DEFICIENCY: Chronic | ICD-10-CM

## 2024-03-19 DIAGNOSIS — I73.9 PAD (PERIPHERAL ARTERY DISEASE): ICD-10-CM

## 2024-03-19 DIAGNOSIS — E11.65 TYPE 2 DIABETES MELLITUS WITH HYPERGLYCEMIA, WITHOUT LONG-TERM CURRENT USE OF INSULIN: Chronic | ICD-10-CM

## 2024-03-19 DIAGNOSIS — D45 POLYCYTHEMIA VERA: ICD-10-CM

## 2024-03-19 DIAGNOSIS — I10 ESSENTIAL HYPERTENSION: Primary | Chronic | ICD-10-CM

## 2024-03-19 DIAGNOSIS — E78.5 DYSLIPIDEMIA: Chronic | ICD-10-CM

## 2024-03-19 PROCEDURE — 3078F DIAST BP <80 MM HG: CPT | Performed by: PHYSICIAN ASSISTANT

## 2024-03-19 PROCEDURE — 99214 OFFICE O/P EST MOD 30 MIN: CPT | Performed by: PHYSICIAN ASSISTANT

## 2024-03-19 PROCEDURE — 1160F RVW MEDS BY RX/DR IN RCRD: CPT | Performed by: PHYSICIAN ASSISTANT

## 2024-03-19 PROCEDURE — 3074F SYST BP LT 130 MM HG: CPT | Performed by: PHYSICIAN ASSISTANT

## 2024-03-19 PROCEDURE — 1159F MED LIST DOCD IN RCRD: CPT | Performed by: PHYSICIAN ASSISTANT

## 2024-03-19 RX ORDER — AMLODIPINE AND BENAZEPRIL HYDROCHLORIDE 10; 40 MG/1; MG/1
1 CAPSULE ORAL DAILY
Qty: 90 CAPSULE | Refills: 3 | Status: SHIPPED | OUTPATIENT
Start: 2024-03-19

## 2024-03-19 NOTE — PROGRESS NOTES
"Chief Complaint -hypertension    History of Present Illness -     Evie Shah is a 78 y.o. female.     Hypertension-  Stable with amlodipine/benazepril combo.  Her medication was changed in the hospital because this was not available on the formulary.  She was placed on amlodipine and lisinopril separately.  Patient states that she would like to go back on her combo pill.    Diabetes mellitus type 2-  Stable with Mounjaro, Jardiance and dietary modification    Dyslipidemia-  Stable with atorvastatin and low-cholesterol diet    Peripheral artery disease-  Stable with risk factor modification including Plavix and atorvastatin    Polycythemia vera-  Stable with conservative measures and monitoring    Vitamin D deficiency-  Stable with vitamin D supplementation      The following portions of the patient's history were reviewed and updated as appropriate: allergies, current medications, past family history, past medical history, past social history, past surgical history and problem list.        Objective  Vital signs:  /78   Pulse 79   Temp 97.5 °F (36.4 °C)   Ht 160 cm (62.99\")   Wt 90.3 kg (199 lb)   SpO2 91%   BMI 35.26 kg/m²     Physical Exam  Vitals and nursing note reviewed.   Constitutional:       Appearance: Normal appearance. She is well-developed.   Eyes:      Extraocular Movements: Extraocular movements intact.      Conjunctiva/sclera: Conjunctivae normal.   Cardiovascular:      Rate and Rhythm: Normal rate and regular rhythm.      Heart sounds: Normal heart sounds. No murmur heard.  Pulmonary:      Effort: Pulmonary effort is normal. No respiratory distress.      Breath sounds: Normal breath sounds. No wheezing.   Musculoskeletal:         General: No tenderness.   Skin:     General: Skin is warm and dry.      Findings: No rash.   Neurological:      Mental Status: She is alert and oriented to person, place, and time.   Psychiatric:         Mood and Affect: Mood normal.         Behavior: " Behavior normal.         Thought Content: Thought content normal.         The following data was reviewed by Camryn Mota PA-C:         Lab Results   Component Value Date    BUN 18 01/18/2024    CREATININE 0.83 01/18/2024    EGFR 73.2 01/18/2024    ALT 18 01/15/2024    AST 15 01/15/2024    WBC 14.22 (H) 01/15/2024    HGB 15.3 01/15/2024    HCT 48.6 (H) 01/15/2024     01/15/2024    TRIG 210 (H) 06/09/2023    HDL 44 06/09/2023    LDL 37 06/09/2023    TSH 3.260 01/08/2024    HGBA1C 7.70 (H) 01/08/2024           Assessment & Plan     Diagnoses and all orders for this visit:    1. Essential hypertension (Primary)  -     amLODIPine-benazepril (LOTREL) 10-40 MG per capsule; Take 1 capsule by mouth Daily.  Dispense: 90 capsule; Refill: 3  -     Comprehensive Metabolic Panel; Future    2. Type 2 diabetes mellitus with hyperglycemia, without long-term current use of insulin  -     Comprehensive Metabolic Panel; Future  -     Hemoglobin A1c; Future  -     MicroAlbumin, Urine, Random - Urine, Clean Catch; Future    3. Dyslipidemia  -     Comprehensive Metabolic Panel; Future  -     Lipid Panel; Future    4. PAD (peripheral artery disease)  -     Comprehensive Metabolic Panel; Future    5. Polycythemia vera  -     CBC & Differential; Future    6. Vitamin D deficiency  -     Vitamin D,25-Hydroxy; Future                   Patient was given instructions and counseling regarding his condition or for health maintenance advice. Please see specific information pulled into the AVS if appropriate      This document has been electronically signed by:  Camryn Mota PA-C

## 2024-03-19 NOTE — PATIENT INSTRUCTIONS
"Fat and Cholesterol Restricted Eating Plan  Getting too much fat and cholesterol in your diet may cause health problems. Choosing the right foods helps keep your fat and cholesterol at normal levels. This can keep you from getting certain diseases.  Your doctor may recommend an eating plan that includes:  Total fat: ______% or less of total calories a day. This is ______g of fat a day.  Saturated fat: ______% or less of total calories a day. This is ______g of saturated fat a day.  Cholesterol: less than _________mg a day.  Fiber: ______g a day.  What are tips for following this plan?  General tips  Work with your doctor to lose weight if you need to.  Avoid:  Foods with added sugar.  Fried foods.  Foods with trans fat or partially hydrogenated oils. This includes some margarines and baked goods.  If you drink alcohol:  Limit how much you have to:  0-1 drink a day for women who are not pregnant.  0-2 drinks a day for men.  Know how much alcohol is in a drink. In the [place] one drink equals one 12 oz bottle of beer (355 mL), one 5 oz glass of wine (148 mL), or one 1½ oz glass of hard liquor (44 mL).  Reading food labels  Check food labels for:  Trans fats.  Partially hydrogenated oils.  Saturated fat (g) in each serving.  Cholesterol (mg) in each serving.  Fiber (g) in each serving.  Choose foods with healthy fats, such as:  Monounsaturated fats and polyunsaturated fats. These include olive and canola oil, flaxseeds, walnuts, almonds, and seeds.  Omega-3 fats. These are found in certain fish, flaxseed oil, and ground flaxseeds.  Choose grain products that have whole grains. Look for the word \"whole\" as the first word in the ingredient list.  Cooking  Cook foods using low-fat methods. These include baking, boiling, grilling, and broiling.  Eat more home-cooked foods. Eat at restaurants and buffets less often. Eat less fast food.  Avoid cooking using saturated fats, such as butter, cream, palm oil, palm kernel oil, and " coconut oil.  Meal planning    At meals, divide your plate into four equal parts:  Fill one-half of your plate with vegetables, green salads, and fruit.  Fill one-fourth of your plate with whole grains.  Fill one-fourth of your plate with low-fat (lean) protein foods.  Eat fish that is high in omega-3 fats at least two times a week. This includes mackerel, tuna, sardines, and salmon.  Eat foods that are high in fiber, such as whole grains, beans, apples, pears, berries, broccoli, carrots, peas, and barley.  What foods should I eat?  Fruits  All fresh, canned (in natural juice), or frozen fruits.  Vegetables  Fresh or frozen vegetables (raw, steamed, roasted, or grilled). Green salads.  Grains  Whole grains, such as whole wheat or whole grain breads, crackers, cereals, and pasta. Unsweetened oatmeal, bulgur, barley, quinoa, or brown rice. Corn or whole wheat flour tortillas.  Meats and other protein foods  Ground beef (85% or leaner), grass-fed beef, or beef trimmed of fat. Skinless chicken or turkey. Ground chicken or turkey. Pork trimmed of fat. All fish and seafood. Egg whites. Dried beans, peas, or lentils. Unsalted nuts or seeds. Unsalted canned beans. Nut butters without added sugar or oil.  Dairy  Low-fat or nonfat dairy products, such as skim or 1% milk, 2% or reduced-fat cheeses, low-fat and fat-free ricotta or cottage cheese, or plain low-fat and nonfat yogurt.  Fats and oils  Tub margarine without trans fats. Light or reduced-fat mayonnaise and salad dressings. Avocado. Olive, canola, sesame, or safflower oils.  The items listed above may not be a complete list of foods and beverages you can eat. Contact a dietitian for more information.  What foods should I avoid?  Fruits  Canned fruit in heavy syrup. Fruit in cream or butter sauce. Fried fruit.  Vegetables  Vegetables cooked in cheese, cream, or butter sauce. Fried vegetables.  Grains  White bread. White pasta. White rice. Cornbread. Bagels, pastries,  and croissants. Crackers and snack foods that contain trans fat and hydrogenated oils.  Meats and other protein foods  Fatty cuts of meat. Ribs, chicken wings, palomo, sausage, bologna, salami, chitterlings, fatback, hot dogs, bratwurst, and packaged lunch meats. Liver and organ meats. Whole eggs and egg yolks. Chicken and turkey with skin. Fried meat.  Dairy  Whole or 2% milk, cream, half-and-half, and cream cheese. Whole milk cheeses. Whole-fat or sweetened yogurt. Full-fat cheeses. Nondairy creamers and whipped toppings. Processed cheese, cheese spreads, and cheese curds.  Fats and oils  Butter, stick margarine, lard, shortening, ghee, or palomo fat. Coconut, palm kernel, and palm oils.  Beverages  Alcohol. Sugar-sweetened drinks such as sodas, lemonade, and fruit drinks.  Sweets and desserts  Corn syrup, sugars, honey, and molasses. Candy. Jam and jelly. Syrup. Sweetened cereals. Cookies, pies, cakes, donuts, muffins, and ice cream.  The items listed above may not be a complete list of foods and beverages you should avoid. Contact a dietitian for more information.  Summary  Choosing the right foods helps keep your fat and cholesterol at normal levels. This can keep you from getting certain diseases.  At meals, fill one-half of your plate with vegetables, green salads, and fruits.  Eat high fiber foods, like whole grains, beans, apples, pears, berries, carrots, peas, and barley.  Limit added sugar, saturated fats, alcohol, and fried foods.  This information is not intended to replace advice given to you by your health care provider. Make sure you discuss any questions you have with your health care provider.  Document Revised: 04/29/2022 Document Reviewed: 04/29/2022  Elsevier Patient Education © 2023 Elsevier Inc.  Carbohydrate Counting for Diabetes Mellitus, Adult  Carbohydrate counting is a method of keeping track of how many carbohydrates you eat. Eating carbohydrates increases the amount of sugar (glucose) in  the blood. Counting how many carbohydrates you eat improves how well you manage your blood glucose. This, in turn, helps you manage your diabetes.  Carbohydrates are measured in grams (g) per serving. It is important to know how many carbohydrates (in grams or by serving size) you can have in each meal. This is different for every person. A dietitian can help you make a meal plan and calculate how many carbohydrates you should have at each meal and snack.  What foods contain carbohydrates?  Carbohydrates are found in the following foods:  Grains, such as breads and cereals.  Dried beans and soy products.  Starchy vegetables, such as potatoes, peas, and corn.  Fruit and fruit juices.  Milk and yogurt.  Sweets and snack foods, such as cake, cookies, candy, chips, and soft drinks.  How do I count carbohydrates in foods?  There are two ways to count carbohydrates in food. You can read food labels or learn standard serving sizes of foods. You can use either of these methods or a combination of both.  Using the Nutrition Facts label  The Nutrition Facts list is included on the labels of almost all packaged foods and beverages in the [place]. It includes:  The serving size.  Information about nutrients in each serving, including the grams of carbohydrate per serving.  To use the Nutrition Facts, decide how many servings you will have. Then, multiply the number of servings by the number of carbohydrates per serving. The resulting number is the total grams of carbohydrates that you will be having.  Learning the standard serving sizes of foods  When you eat carbohydrate foods that are not packaged or do not include Nutrition Facts on the label, you need to measure the servings in order to count the grams of carbohydrates.  Measure the foods that you will eat with a food scale or measuring cup, if needed.  Decide how many standard-size servings you will eat.  Multiply the number of servings by 15. For foods that contain  carbohydrates, one serving equals 15 g of carbohydrates.  For example, if you eat 2 cups or 10 oz (300 g) of strawberries, you will have eaten 2 servings and 30 g of carbohydrates (2 servings x 15 g = 30 g).  For foods that have more than one food mixed, such as soups and casseroles, you must count the carbohydrates in each food that is included.  The following list contains standard serving sizes of common carbohydrate-rich foods. Each of these servings has about 15 g of carbohydrates:  1 slice of bread.  1 six-inch (15 cm) tortilla.  ? cup or 2 oz (53 g) cooked rice or pasta.  ½ cup or 3 oz (85 g) cooked or canned, drained and rinsed beans or lentils.  ½ cup or 3 oz (85 g) starchy vegetable, such as peas, corn, or squash.  ½ cup or 4 oz (120 g) hot cereal.  ½ cup or 3 oz (85 g) boiled or mashed potatoes, or ¼ or 3 oz (85 g) of a large baked potato.  ½ cup or 4 fl oz (118 mL) fruit juice.  1 cup or 8 fl oz (237 mL) milk.  1 small or 4 oz (106 g) apple.  ½ or 2 oz (63 g) of a medium banana.  1 cup or 5 oz (150 g) strawberries.  3 cups or 1 oz (28.3 g) popped popcorn.  What is an example of carbohydrate counting?  To calculate the grams of carbohydrates in this sample meal, follow the steps shown below.  Sample meal  3 oz (85 g) chicken breast.  ? cup or 4 oz (106 g) brown rice.  ½ cup or 3 oz (85 g) corn.  1 cup or 8 fl oz (237 mL) milk.  1 cup or 5 oz (150 g) strawberries with sugar-free whipped topping.  Carbohydrate calculation  Identify the foods that contain carbohydrates:  Rice.  Corn.  Milk.  Strawberries.  Calculate how many servings you have of each food:  2 servings rice.  1 serving corn.  1 serving milk.  1 serving strawberries.  Multiply each number of servings by 15  servings rice x 15 g = 30 g.  1 serving corn x 15 g = 15 g.  1 serving milk x 15 g = 15 g.  1 serving strawberries x 15 g = 15 g.  Add together all of the amounts to find the total grams of carbohydrates eaten:  30 g + 15 g + 15 g + 15  g = 75 g of carbohydrates total.  What are tips for following this plan?  Shopping  Develop a meal plan and then make a shopping list.  Buy fresh and frozen vegetables, fresh and frozen fruit, dairy, eggs, beans, lentils, and whole grains.  Look at food labels. Choose foods that have more fiber and less sugar.  Avoid processed foods and foods with added sugars.  Meal planning  Aim to have the same number of grams of carbohydrates at each meal and for each snack time.  Plan to have regular, balanced meals and snacks.  Where to find more information  American Diabetes Association: diabetes.org  Centers for Disease Control and Prevention: cdc.gov  Academy of Nutrition and Dietetics: eatright.org  Association of Diabetes Care & Education Specialists: diabeteseducator.org  Summary  Carbohydrate counting is a method of keeping track of how many carbohydrates you eat.  Eating carbohydrates increases the amount of sugar (glucose) in your blood.  Counting how many carbohydrates you eat improves how well you manage your blood glucose. This helps you manage your diabetes.  A dietitian can help you make a meal plan and calculate how many carbohydrates you should have at each meal and snack.  This information is not intended to replace advice given to you by your health care provider. Make sure you discuss any questions you have with your health care provider.  Document Revised: 07/21/2021 Document Reviewed: 07/21/2021  Elsevier Patient Education © 2023 Elsevier Inc.

## 2024-03-21 ENCOUNTER — TELEPHONE (OUTPATIENT)
Dept: FAMILY MEDICINE CLINIC | Facility: CLINIC | Age: 79
End: 2024-03-21
Payer: MEDICARE

## 2024-03-21 DIAGNOSIS — I10 ESSENTIAL HYPERTENSION: Chronic | ICD-10-CM

## 2024-03-21 RX ORDER — AMLODIPINE AND BENAZEPRIL HYDROCHLORIDE 10; 40 MG/1; MG/1
1 CAPSULE ORAL DAILY
Qty: 90 CAPSULE | Refills: 3 | Status: CANCELLED | OUTPATIENT
Start: 2024-03-21

## 2024-03-21 NOTE — TELEPHONE ENCOUNTER
Caller: [facility] [address] [place] [address] - [phone]  - [phone]     Relationship: Pharmacy    Best call back number: [phone]     Which medication are you concerned about: amLODIPine-benazepril (LOTREL) 10-40 MG per capsule     Who prescribed you this medication: SHAY YANEZ     What are your concerns: PRESCRIPTION FOR LISINOPRIL WAS FILLED IN 1/24/24 AND PHARMACY IS WANTING TO MAKE SURE THAT THE PATIENT IS BEING SWITCHED FROM THE LISINOPRIL TO THIS MEDICATION

## 2024-03-29 DIAGNOSIS — E11.65 TYPE 2 DIABETES MELLITUS WITH HYPERGLYCEMIA, WITHOUT LONG-TERM CURRENT USE OF INSULIN: Chronic | ICD-10-CM

## 2024-03-29 RX ORDER — TIRZEPATIDE 5 MG/.5ML
INJECTION, SOLUTION SUBCUTANEOUS
Qty: 4 ML | Refills: 0 | Status: SHIPPED | OUTPATIENT
Start: 2024-03-29

## 2024-04-04 DIAGNOSIS — E11.65 TYPE 2 DIABETES MELLITUS WITH HYPERGLYCEMIA, WITHOUT LONG-TERM CURRENT USE OF INSULIN: Chronic | ICD-10-CM

## 2024-04-04 RX ORDER — TIRZEPATIDE 5 MG/.5ML
INJECTION, SOLUTION SUBCUTANEOUS
Qty: 4 ML | Refills: 0 | Status: SHIPPED | OUTPATIENT
Start: 2024-04-04

## 2024-04-04 NOTE — TELEPHONE ENCOUNTER
Caller: Evie Shah    Relationship: Self    Best call back number: [phone]    Requested Prescriptions:   Requested Prescriptions     Pending Prescriptions Disp Refills    Mounjaro 5 MG/0.5ML solution pen-injector pen [Pharmacy Med Name: Mounjaro 5 MG/0.5ML Subcutaneous Solution Pen-injector] 4 mL 0     Sig: INJECT 0.5 ML SUBCUTANEOUSLY INTO THE APPROPRIATE AREA AS DRIECTED EVERY 7 DAYS        Pharmacy where request should be sent: [facility] [address] [place] [address] [phone] [facility] [phone]      Last office visit with prescribing clinician: 3/19/2024   Last telemedicine visit with prescribing clinician: Visit date not found   Next office visit with prescribing clinician: 6/20/2024     Additional details provided by patient: REQUESTING REFILL ASAP [facility] HAS IT ON HOLD AND IN STOCK FOR PATIENT     Does the patient have less than a 3 day supply:  [x] Yes  [] No    Would you like a call back once the refill request has been completed: [x] Yes [] No    If the office needs to give you a call back, can they leave a voicemail: [x] Yes [] No    Laith Suh   04/04/24 14:35 EDT

## 2024-04-25 DIAGNOSIS — E11.65 TYPE 2 DIABETES MELLITUS WITH HYPERGLYCEMIA, WITHOUT LONG-TERM CURRENT USE OF INSULIN: Chronic | ICD-10-CM

## 2024-04-25 NOTE — TELEPHONE ENCOUNTER
Caller: Evie Shah    Relationship: Self    Best call back number: [phone]     Requested Prescriptions:   Requested Prescriptions     Pending Prescriptions Disp Refills    Tirzepatide (Mounjaro) 5 MG/0.5ML solution pen-injector pen 4 mL 0    90 DAY SUPPLY    Pharmacy where request should be sent: [facility] [address] [place] [address] [phone] [facility] [phone] FX     Last office visit with prescribing clinician: 3/19/2024   Last telemedicine visit with prescribing clinician: Visit date not found   Next office visit with prescribing clinician: 6/20/2024     Additional details provided by patient:     Does the patient have less than a 3 day supply:  [x] Yes  [] No    Would you like a call back once the refill request has been completed: [] Yes [x] No    If the office needs to give you a call back, can they leave a voicemail: [] Yes [x] No    Laith Aguillon Rep   04/25/24 09:41 EDT

## 2024-06-13 ENCOUNTER — LAB (OUTPATIENT)
Dept: FAMILY MEDICINE CLINIC | Facility: CLINIC | Age: 79
End: 2024-06-13
Payer: MEDICARE

## 2024-06-13 DIAGNOSIS — E11.65 TYPE 2 DIABETES MELLITUS WITH HYPERGLYCEMIA, WITHOUT LONG-TERM CURRENT USE OF INSULIN: Chronic | ICD-10-CM

## 2024-06-13 DIAGNOSIS — E78.5 DYSLIPIDEMIA: Chronic | ICD-10-CM

## 2024-06-13 DIAGNOSIS — E55.9 VITAMIN D DEFICIENCY: Chronic | ICD-10-CM

## 2024-06-13 DIAGNOSIS — I73.9 PAD (PERIPHERAL ARTERY DISEASE): ICD-10-CM

## 2024-06-13 DIAGNOSIS — I10 ESSENTIAL HYPERTENSION: Chronic | ICD-10-CM

## 2024-06-13 LAB
25(OH)D3+25(OH)D2 SERPL-MCNC: 41.5 NG/ML (ref 30–100)
ALBUMIN SERPL-MCNC: 4.4 G/DL (ref 3.5–5.2)
ALBUMIN/GLOB SERPL: 2.1 G/DL
ALP SERPL-CCNC: 94 U/L (ref 39–117)
ALT SERPL-CCNC: 18 U/L (ref 1–33)
AST SERPL-CCNC: 16 U/L (ref 1–32)
BILIRUB SERPL-MCNC: 0.7 MG/DL (ref 0–1.2)
BUN SERPL-MCNC: 22 MG/DL (ref 8–23)
BUN/CREAT SERPL: 25.3 (ref 7–25)
CALCIUM SERPL-MCNC: 9.1 MG/DL (ref 8.6–10.5)
CHLORIDE SERPL-SCNC: 106 MMOL/L (ref 98–107)
CO2 SERPL-SCNC: 21.8 MMOL/L (ref 22–29)
CREAT SERPL-MCNC: 0.87 MG/DL (ref 0.57–1)
EGFRCR SERPLBLD CKD-EPI 2021: 68.7 ML/MIN/1.73
GLOBULIN SER CALC-MCNC: 2.1 GM/DL
GLUCOSE SERPL-MCNC: 203 MG/DL (ref 65–99)
HBA1C MFR BLD: 8.5 % (ref 4.8–5.6)
POTASSIUM SERPL-SCNC: 3.9 MMOL/L (ref 3.5–5.2)
PROT SERPL-MCNC: 6.5 G/DL (ref 6–8.5)
SODIUM SERPL-SCNC: 140 MMOL/L (ref 136–145)

## 2024-06-13 PROCEDURE — 36415 COLL VENOUS BLD VENIPUNCTURE: CPT | Performed by: PHYSICIAN ASSISTANT

## 2024-06-20 ENCOUNTER — OFFICE VISIT (OUTPATIENT)
Dept: FAMILY MEDICINE CLINIC | Facility: CLINIC | Age: 79
End: 2024-06-20
Payer: MEDICARE

## 2024-06-20 VITALS
OXYGEN SATURATION: 94 % | TEMPERATURE: 97.3 F | HEIGHT: 63 IN | BODY MASS INDEX: 35.26 KG/M2 | WEIGHT: 199 LBS | DIASTOLIC BLOOD PRESSURE: 80 MMHG | SYSTOLIC BLOOD PRESSURE: 130 MMHG | HEART RATE: 83 BPM

## 2024-06-20 DIAGNOSIS — Z13.820 ENCOUNTER FOR SCREENING FOR OSTEOPOROSIS: ICD-10-CM

## 2024-06-20 DIAGNOSIS — J44.9 COPD MIXED TYPE: Chronic | ICD-10-CM

## 2024-06-20 DIAGNOSIS — E11.65 TYPE 2 DIABETES MELLITUS WITH HYPERGLYCEMIA, WITHOUT LONG-TERM CURRENT USE OF INSULIN: Chronic | ICD-10-CM

## 2024-06-20 DIAGNOSIS — Z12.31 ENCOUNTER FOR SCREENING MAMMOGRAM FOR MALIGNANT NEOPLASM OF BREAST: ICD-10-CM

## 2024-06-20 DIAGNOSIS — Z00.00 MEDICARE ANNUAL WELLNESS VISIT, SUBSEQUENT: Primary | ICD-10-CM

## 2024-06-20 DIAGNOSIS — Z78.0 MENOPAUSE: ICD-10-CM

## 2024-06-20 DIAGNOSIS — M81.0 AGE-RELATED OSTEOPOROSIS WITHOUT CURRENT PATHOLOGICAL FRACTURE: ICD-10-CM

## 2024-06-20 RX ORDER — METHYLPREDNISOLONE ACETATE 80 MG/ML
80 INJECTION, SUSPENSION INTRA-ARTICULAR; INTRALESIONAL; INTRAMUSCULAR; SOFT TISSUE ONCE
Status: COMPLETED | OUTPATIENT
Start: 2024-06-20 | End: 2024-06-20

## 2024-06-20 RX ADMIN — METHYLPREDNISOLONE ACETATE 80 MG: 80 INJECTION, SUSPENSION INTRA-ARTICULAR; INTRALESIONAL; INTRAMUSCULAR; SOFT TISSUE at 11:16

## 2024-06-20 NOTE — PROGRESS NOTES
The ABCs of the Annual Wellness Visit  [place] to Medicare Visit    Subjective     Evie Shah is a 79 y.o. female who presents for a  Welcome to Medicare Visit.    The following portions of the patient's history were reviewed and   updated as appropriate: allergies, current medications, past family history, past medical history, past social history, past surgical history, and problem list.     Compared to one year ago, the patient feels her physical   health is worse.    Compared to one year ago, the patient feels her mental   health is better.    Recent Hospitalizations:  This patient has had a [facility] admission record on file within the last 365 days.    Current Medical Providers:  Patient Care Team:  Camryn Mota PA as PCP - General (Family Medicine)  Rashad Bernard MD as Consulting Physician (Cardiology)    Outpatient Medications Prior to Visit   Medication Sig Dispense Refill    acetaminophen (TYLENOL) 500 MG tablet Take 1 tablet by mouth Every 6 (Six) Hours As Needed for Mild Pain.      albuterol sulfate  (90 Base) MCG/ACT inhaler Inhale 2 puffs Every 4 (Four) Hours As Needed for Shortness of Air. 18 g 5    amLODIPine-benazepril (LOTREL) 10-40 MG per capsule Take 1 capsule by mouth Daily. 90 capsule 3    atorvastatin (LIPITOR) 10 MG tablet Take 1 tablet by mouth Every Night. 90 tablet 3    buPROPion SR (WELLBUTRIN SR) 150 MG 12 hr tablet Take 1 tablet by mouth Every Night.      clopidogrel (PLAVIX) 75 MG tablet Take 1 tablet by mouth Daily. 90 tablet 3    empagliflozin (Jardiance) 25 MG tablet tablet Take 1 tablet by mouth Daily. 90 tablet 3    fluticasone (FLONASE) 50 MCG/ACT nasal spray 2 sprays into the nostril(s) as directed by provider Daily. 16 g 1    furosemide (LASIX) 20 MG tablet Take 1 tablet by mouth Daily. 90 tablet 3    ipratropium-albuterol (DUO-NEB) 0.5-2.5 mg/3 ml nebulizer Take 3 mL by nebulization Every 4 (Four) Hours As Needed for Shortness of Air. 150 mL 1     metoprolol succinate XL (Toprol XL) 25 MG 24 hr tablet Take 1 tablet by mouth Daily. 90 tablet 3    montelukast (SINGULAIR) 10 MG tablet Take 1 tablet by mouth Every Night. 90 tablet 3    omega-3 acid ethyl esters (LOVAZA) 1 g capsule Take 2 capsules by mouth 2 (Two) Times a Day. 360 capsule 3    raloxifene (EVISTA) 60 MG tablet Take 1 tablet by mouth Daily. 90 tablet 3    Tirzepatide (Mounjaro) 5 MG/0.5ML solution pen-injector pen Inject 0.5 mL under the skin into the appropriate area as directed 1 (One) Time Per Week. 2 mL 2    vitamin D (ERGOCALCIFEROL) 1.25 MG (18842 UT) capsule capsule Take 1 capsule by mouth 1 (One) Time Per Week. 12 capsule 3     Facility-Administered Medications Prior to Visit   Medication Dose Route Frequency Provider Last Rate Last Admin    cyanocobalamin injection 1,000 mcg  1,000 mcg Intramuscular Q28 Days Camryn Mota PA   1,000 mcg at 09/19/23 1114       No opioid medication identified on active medication list. I have reviewed chart for other potential  high risk medication/s and harmful drug interactions in the elderly.        Aspirin is not on active medication list.  Aspirin use is not indicated based on review of current medical condition/s. Risk of harm outweighs potential benefits.  .    Patient Active Problem List   Diagnosis    DM type 2 with diabetic mixed hyperlipidemia    Dyslipidemia    Essential hypertension    Senile osteoporosis    Chronic allergic rhinitis    VISHAL (generalized anxiety disorder)    PAD (peripheral artery disease)    Severe obesity with body mass index (BMI) of 35.0 to 35.9 and comorbidity    COPD mixed type    Former smoker    Chronic respiratory failure with hypoxia    Type 2 diabetes mellitus with hyperglycemia, without long-term current use of insulin    Primary osteoarthritis of both knees    Coronary artery calcification seen on CT scan    Elevated hematocrit    Acute idiopathic gout of left hand    Vitamin D deficiency    Polycythemia vera  "   Respiratory failure with hypoxia    Lung mass    Cardiomegaly     Advance Care Planning   Advance Care Planning     Advance Directive is not on file.  ACP discussion was held with the patient during this visit. Patient does not have an advance directive, information provided.       Objective   Vitals:    24 1030   BP: 130/80   Pulse: 83   Temp: 97.3 °F (36.3 °C)   TempSrc: Temporal   SpO2: 94%   Weight: 90.3 kg (199 lb)   Height: 160 cm (62.99\")   PainSc: 0-No pain     Estimated body mass index is 35.26 kg/m² as calculated from the following:    Height as of this encounter: 160 cm (62.99\").    Weight as of this encounter: 90.3 kg (199 lb).           Does the patient have evidence of cognitive impairment?   No    Lab Results   Component Value Date    HGBA1C 8.50 (H) 2024       Procedures       HEALTH RISK ASSESSMENT    Smoking Status:  Social History     Tobacco Use   Smoking Status Former    Current packs/day: 0.00    Average packs/day: 1 pack/day for 20.0 years (20.0 ttl pk-yrs)    Types: Cigarettes    Start date: 1995    Quit date: 2015    Years since quittin.0    Passive exposure: Past   Smokeless Tobacco Never     Alcohol Consumption:  Social History     Substance and Sexual Activity   Alcohol Use No       Fall Risk Screen:    RAEANN Fall Risk Assessment was completed, and patient is at LOW risk for falls.Assessment completed on:2024    Depression Screen:       2024    10:33 AM   PHQ-2/PHQ-9 Depression Screening   Little Interest or Pleasure in Doing Things 0-->not at all   Feeling Down, Depressed or Hopeless 0-->not at all   PHQ-9: Brief Depression Severity Measure Score 0       Health Habits and Functional and Cognitive Screenin/20/2024    10:33 AM   Functional & Cognitive Status   Do you have difficulty preparing food and eating? No   Do you have difficulty bathing yourself, getting dressed or grooming yourself? No   Do you have difficulty using the toilet? No "   Do you have difficulty moving around from place to place? No   Do you have trouble with steps or getting out of a bed or a chair? No   Current Diet Well Balanced Diet   Dental Exam Up to date   Eye Exam Up to date   Exercise (times per week) 7 times per week   Current Exercises Include Walking   Do you need help using the phone?  No   Are you deaf or do you have serious difficulty hearing?  No   Do you need help to go to places out of walking distance? No   Do you need help shopping? No   Do you need help preparing meals?  No   Do you need help with housework?  No   Do you need help with laundry? No   Do you need help taking your medications? No   Do you need help managing money? No   Do you ever drive or ride in a car without wearing a seat belt? No   Have you felt unusual stress, anger or loneliness in the last month? No   Who do you live with? Alone   If you need help, do you have trouble finding someone available to you? No   Have you been bothered in the last four weeks by sexual problems? No   Do you have difficulty concentrating, remembering or making decisions? No       Visual Acuity:    Vision Screening    Right eye Left eye Both eyes   Without correction      With correction 20/40 20/50 20/40       Age-appropriate Screening Schedule:  Refer to the list below for future screening recommendations based on patient's age, sex and/or medical conditions. Orders for these recommended tests are listed in the plan section. The patient has been provided with a written plan.    Health Maintenance   Topic Date Due    ZOSTER VACCINE (1 of 2) Never done    RSV Vaccine - Adults (1 - 1-dose 60+ series) Never done    COVID-19 Vaccine (6 - 2023-24 season) 09/01/2023    LIPID PANEL  06/09/2024    DIABETIC EYE EXAM  07/26/2024    INFLUENZA VACCINE  08/01/2024    URINE MICROALBUMIN  09/19/2024    HEMOGLOBIN A1C  12/13/2024    BMI FOLLOWUP  12/26/2024    LUNG CANCER SCREENING  01/09/2025    DXA SCAN  02/14/2025    ANNUAL  WELLNESS VISIT  06/20/2025    TDAP/TD VACCINES (3 - Td or Tdap) 07/09/2028    COLORECTAL CANCER SCREENING  03/14/2032    Pneumococcal Vaccine 65+  Completed    HEPATITIS C SCREENING  Addressed        CMS Preventative Services Quick Reference  Risk Factors Identified During Encounter    Depression/Dysphoria: Current medication is effective, no change recommended  Polypharmacy: Medication List reviewed  The above risks/problems have been discussed with the patient.  Pertinent information has been shared with the patient in the After Visit Summary.    Follow Up:   Initial Medicare Visit in one year    An After Visit Summary and PPPS were made available to the patient.      Additional E&M Note during same encounter follows:  Patient has multiple medical problems which are significant and separately identifiable that require additional work above and beyond the Medicare Wellness Visit.      Chief Complaint  Diabetes    Subjective        HPI  Evie Shah is also being seen today for diabetes.  Not at goal as most recent hemoglobin A1c was 8.5.  Patient has been on Mounjaro 5 mg weekly, Jardiance and low-carb diet    Lab Results   Component Value Date    HGBA1C 8.50 (H) 06/13/2024     Lab Results   Component Value Date    GLUCOSE 203 (H) 06/13/2024    BUN 22 06/13/2024    CREATININE 0.87 06/13/2024    EGFRRESULT 68.7 06/13/2024    EGFR 73.2 01/18/2024    BCR 25.3 (H) 06/13/2024    K 3.9 06/13/2024    CO2 21.8 (L) 06/13/2024    CALCIUM 9.1 06/13/2024    PROTENTOTREF 6.5 06/13/2024    ALBUMIN 4.4 06/13/2024    BILITOT 0.7 06/13/2024    AST 16 06/13/2024    ALT 18 06/13/2024         Osteoporosis-  Stable with Evista, calcium and vitamin D supplementation.  She is due to have DEXA scan repeated.    Shortness of breath-  Patient complains of dry cough and intermittent shortness of breath when she goes outside around environmental allergens and people cutting the grass.  She denies any fever or wheezing.  COPD likely  "related.         Objective   Vital Signs:  /80   Pulse 83   Temp 97.3 °F (36.3 °C) (Temporal)   Ht 160 cm (62.99\")   Wt 90.3 kg (199 lb)   SpO2 94%   BMI 35.26 kg/m²     Physical Exam  Vitals and nursing note reviewed.   Constitutional:       Appearance: Normal appearance. She is well-developed.   Eyes:      Extraocular Movements: Extraocular movements intact.      Conjunctiva/sclera: Conjunctivae normal.   Cardiovascular:      Rate and Rhythm: Normal rate and regular rhythm.      Heart sounds: Normal heart sounds. No murmur heard.  Pulmonary:      Effort: Pulmonary effort is normal. No respiratory distress.      Breath sounds: Normal breath sounds. No wheezing.   Musculoskeletal:         General: No tenderness.   Skin:     General: Skin is warm and dry.      Findings: No rash.   Neurological:      Mental Status: She is alert and oriented to person, place, and time.   Psychiatric:         Mood and Affect: Mood normal.         Behavior: Behavior normal.         Thought Content: Thought content normal.                      Assessment and Plan   Diagnoses and all orders for this visit:    1. Medicare annual wellness visit, subsequent (Primary)  Comments:  Performed and documented today    2. Type 2 diabetes mellitus with hyperglycemia, without long-term current use of insulin  Comments:  Discontinue glyburide  Increase Mounjaro 7.5 mg weekly  Continue Jardiance  Advised low carbohydrate diet  Orders:  -     Tirzepatide (MOUNJARO) 7.5 MG/0.5ML solution pen-injector pen; Inject 0.5 mL under the skin into the appropriate area as directed 1 (One) Time Per Week.  Dispense: 2 mL; Refill: 3    3. Encounter for screening mammogram for malignant neoplasm of breast  -     Mammo Screening Digital Tomosynthesis Bilateral With CAD; Future    4. Age-related osteoporosis without current pathological fracture  -     DEXA Bone Density, Axial (Hospital); Future    5. Encounter for screening for osteoporosis  -     DEXA Bone " Density, Axial (Hospital); Future    6. Menopause    7. COPD mixed type  Comments:  Likely exacerbated by environmental allergens  Continue Singulair, Flonase and albuterol HFA  Orders:  -     methylPREDNISolone acetate (DEPO-medrol) injection 80 mg             Follow Up   Return in about 8 weeks (around 8/15/2024) for Followup with Camryn.  Patient was given instructions and counseling regarding her condition or for health maintenance advice. Please see specific information pulled into the AVS if appropriate.

## 2024-06-20 NOTE — PATIENT INSTRUCTIONS
Carbohydrate Counting for Diabetes Mellitus, Adult  Carbohydrate counting is a method of keeping track of how many carbohydrates you eat. Eating carbohydrates increases the amount of sugar (glucose) in the blood. Counting how many carbohydrates you eat improves how well you manage your blood glucose. This, in turn, helps you manage your diabetes.  Carbohydrates are measured in grams (g) per serving. It is important to know how many carbohydrates (in grams or by serving size) you can have in each meal. This is different for every person. A dietitian can help you make a meal plan and calculate how many carbohydrates you should have at each meal and snack.  What foods contain carbohydrates?  Carbohydrates are found in the following foods:  Grains, such as breads and cereals.  Dried beans and soy products.  Starchy vegetables, such as potatoes, peas, and corn.  Fruit and fruit juices.  Milk and yogurt.  Sweets and snack foods, such as cake, cookies, candy, chips, and soft drinks.  How do I count carbohydrates in foods?  There are two ways to count carbohydrates in food. You can read food labels or learn standard serving sizes of foods. You can use either of these methods or a combination of both.  Using the Nutrition Facts label  The Nutrition Facts list is included on the labels of almost all packaged foods and beverages in the [place]. It includes:  The serving size.  Information about nutrients in each serving, including the grams of carbohydrate per serving.  To use the Nutrition Facts, decide how many servings you will have. Then, multiply the number of servings by the number of carbohydrates per serving. The resulting number is the total grams of carbohydrates that you will be having.  Learning the standard serving sizes of foods  When you eat carbohydrate foods that are not packaged or do not include Nutrition Facts on the label, you need to measure the servings in order to count the grams of  carbohydrates.  Measure the foods that you will eat with a food scale or measuring cup, if needed.  Decide how many standard-size servings you will eat.  Multiply the number of servings by 15. For foods that contain carbohydrates, one serving equals 15 g of carbohydrates.  For example, if you eat 2 cups or 10 oz (300 g) of strawberries, you will have eaten 2 servings and 30 g of carbohydrates (2 servings x 15 g = 30 g).  For foods that have more than one food mixed, such as soups and casseroles, you must count the carbohydrates in each food that is included.  The following list contains standard serving sizes of common carbohydrate-rich foods. Each of these servings has about 15 g of carbohydrates:  1 slice of bread.  1 six-inch (15 cm) tortilla.  ? cup or 2 oz (53 g) cooked rice or pasta.  ½ cup or 3 oz (85 g) cooked or canned, drained and rinsed beans or lentils.  ½ cup or 3 oz (85 g) starchy vegetable, such as peas, corn, or squash.  ½ cup or 4 oz (120 g) hot cereal.  ½ cup or 3 oz (85 g) boiled or mashed potatoes, or ¼ or 3 oz (85 g) of a large baked potato.  ½ cup or 4 fl oz (118 mL) fruit juice.  1 cup or 8 fl oz (237 mL) milk.  1 small or 4 oz (106 g) apple.  ½ or 2 oz (63 g) of a medium banana.  1 cup or 5 oz (150 g) strawberries.  3 cups or 1 oz (28.3 g) popped popcorn.  What is an example of carbohydrate counting?  To calculate the grams of carbohydrates in this sample meal, follow the steps shown below.  Sample meal  3 oz (85 g) chicken breast.  ? cup or 4 oz (106 g) brown rice.  ½ cup or 3 oz (85 g) corn.  1 cup or 8 fl oz (237 mL) milk.  1 cup or 5 oz (150 g) strawberries with sugar-free whipped topping.  Carbohydrate calculation  Identify the foods that contain carbohydrates:  Rice.  Corn.  Milk.  Strawberries.  Calculate how many servings you have of each food:  2 servings rice.  1 serving corn.  1 serving milk.  1 serving strawberries.  Multiply each number of servings by 15  servings rice x 15  g = 30 g.  1 serving corn x 15 g = 15 g.  1 serving milk x 15 g = 15 g.  1 serving strawberries x 15 g = 15 g.  Add together all of the amounts to find the total grams of carbohydrates eaten:  30 g + 15 g + 15 g + 15 g = 75 g of carbohydrates total.  What are tips for following this plan?  Shopping  Develop a meal plan and then make a shopping list.  Buy fresh and frozen vegetables, fresh and frozen fruit, dairy, eggs, beans, lentils, and whole grains.  Look at food labels. Choose foods that have more fiber and less sugar.  Avoid processed foods and foods with added sugars.  Meal planning  Aim to have the same number of grams of carbohydrates at each meal and for each snack time.  Plan to have regular, balanced meals and snacks.  Where to find more information  American Diabetes Association: diabetes.org  Centers for Disease Control and Prevention: cdc.gov  Academy of Nutrition and Dietetics: eatright.org  Association of Diabetes Care & Education Specialists: diabeteseducator.org  Summary  Carbohydrate counting is a method of keeping track of how many carbohydrates you eat.  Eating carbohydrates increases the amount of sugar (glucose) in your blood.  Counting how many carbohydrates you eat improves how well you manage your blood glucose. This helps you manage your diabetes.  A dietitian can help you make a meal plan and calculate how many carbohydrates you should have at each meal and snack.  This information is not intended to replace advice given to you by your health care provider. Make sure you discuss any questions you have with your health care provider.  Document Revised: 07/21/2021 Document Reviewed: 07/21/2021  [employer] Patient Education © 2024 [employer]    Advance Care Planning and Advance Directives     You make decisions on a daily basis - decisions about where you want to live, your career, your home, your life. Perhaps one of the most important decisions you face is your choice for future medical  care. Take time to talk with your family and your healthcare team and start planning today.  Advance Care Planning is a process that can help you:  Understand possible future healthcare decisions in light of your own experiences  Reflect on those decision in light of your goals and values  Discuss your decisions with those closest to you and the healthcare professionals that care for you  Make a plan by creating a document that reflects your wishes    Surrogate Decision Maker  In the event of a medical emergency, which has left you unable to communicate or to make your own decisions, you would need someone to make decisions for you.  It is important to discuss your preferences for medical treatment with this person while you are in good health.     Qualities of a surrogate decision maker:  Willing to take on this role and responsibility  Knows what you want for future medical care  Willing to follow your wishes even if they don't agree with them  Able to make difficult medical decisions under stressful circumstances    Advance Directives  These are legal documents you can create that will guide your healthcare team and decision maker(s) when needed. These documents can be stored in the electronic medical record.    Living Will - a legal document to guide your care if you have a terminal condition or a serious illness and are unable to communicate. States vary by statute in document names/types, but most forms may include one or more of the following:        -  Directions regarding life-prolonging treatments        -  Directions regarding artificially provided nutrition/hydration        -  Choosing a healthcare decision maker        -  Direction regarding organ/tissue donation    Durable Power of  for Healthcare - this document names an -in-fact to make medical decisions for you, but it may also allow this person to make personal and financial decisions for you. Please seek the advice of an   if you need this type of document.    **Advance Directives are not required and no one may discriminate against you if you do not sign one.    Medical Orders  Many states allow specific forms/orders signed by your physician to record your wishes for medical treatment in your current state of health. This form, signed in personal communication with your physician, addresses resuscitation and other medical interventions that you may or may not want.      For more information or to schedule a time with a [facility] Advance Care Planning Facilitator contact: [facility] or call [phone] and someone will contact you directly.  You are due for Shingrix vaccination series ( the newest shingles vaccine).  It is a two shot series spaced 2-6 months apart. Please get this vaccine series started at your earliest convenience at your local pharmacy to help avoid shingles outbreak. It is more effective than the old Zostavax vaccine and is recommended even if you have had the Zostavax vaccine in the past.  Once the Shingrix series is completed, it does not need to be repeated.   For more information, please look at the website below:  [facility] Shingrix Vaccine Information      Medicare Wellness  Personal Prevention Plan of Service     Date of Office Visit:    Encounter Provider:  RUBI Michael  Place of Service:  [facility]  Patient Name: Evie Shah  :  1945    As part of the Medicare Wellness portion of your visit today, we are providing you with this personalized preventive plan of services (PPPS). This plan is based upon recommendations of the [place] Preventive Services Task Force (USPSTF) and the Advisory Committee on Immunization Practices (ACIP).    This lists the preventive care services that should be considered, and provides dates of when you are due. Items listed as completed are up-to-date and do not require any further intervention.    Health  Maintenance   Topic Date Due    ZOSTER VACCINE (1 of 2) Never done    RSV Vaccine - Adults (1 - 1-dose 60+ series) Never done    COVID-19 Vaccine (6 - 2023-24 season) 09/01/2023    ANNUAL WELLNESS VISIT  03/17/2024    LIPID PANEL  06/09/2024    DIABETIC EYE EXAM  07/26/2024    INFLUENZA VACCINE  08/01/2024    URINE MICROALBUMIN  09/19/2024    HEMOGLOBIN A1C  12/13/2024    BMI FOLLOWUP  12/26/2024    LUNG CANCER SCREENING  01/09/2025    DXA SCAN  02/14/2025    TDAP/TD VACCINES (3 - Td or Tdap) 07/09/2028    COLORECTAL CANCER SCREENING  03/14/2032    Pneumococcal Vaccine 65+  Completed    HEPATITIS C SCREENING  Addressed       Orders Placed This Encounter   Procedures    Mammo Screening Digital Tomosynthesis Bilateral With CAD     Standing Status:   Future     Standing Expiration Date:   6/20/2025     Order Specific Question:   Reason for Exam:     Answer:   breast cancer screen     Order Specific Question:   Release to patient     Answer:   Routine Release [[id number]]    DEXA Bone Density, Axial (Hospital)     Standing Status:   Future     Standing Expiration Date:   6/20/2025     Order Specific Question:   Is patient taking or have taken long term Glucocorticoid (steroids)?     Answer:   No     Order Specific Question:   Does the patient have rheumatoid arthritis?     Answer:   No     Order Specific Question:   Does the patient have secondary osteoporosis?     Answer:   Yes     Order Specific Question:   Reason for Exam:     Answer:   menopause     Order Specific Question:   Release to patient     Answer:   Routine Release [[id number]]       No follow-ups on file.

## 2024-07-05 ENCOUNTER — HOSPITAL ENCOUNTER (OUTPATIENT)
Dept: BONE DENSITY | Facility: HOSPITAL | Age: 79
Discharge: HOME | End: 2024-07-05
Payer: MEDICARE

## 2024-07-05 ENCOUNTER — HOSPITAL ENCOUNTER (OUTPATIENT)
Dept: MAMMOGRAPHY | Facility: HOSPITAL | Age: 79
Discharge: HOME | End: 2024-07-05
Payer: MEDICARE

## 2024-07-05 DIAGNOSIS — Z13.820 ENCOUNTER FOR SCREENING FOR OSTEOPOROSIS: ICD-10-CM

## 2024-07-05 DIAGNOSIS — Z12.31 ENCOUNTER FOR SCREENING MAMMOGRAM FOR MALIGNANT NEOPLASM OF BREAST: ICD-10-CM

## 2024-07-05 DIAGNOSIS — M81.0 AGE-RELATED OSTEOPOROSIS WITHOUT CURRENT PATHOLOGICAL FRACTURE: ICD-10-CM

## 2024-07-05 PROCEDURE — 77080 DXA BONE DENSITY AXIAL: CPT

## 2024-07-05 PROCEDURE — 77067 SCR MAMMO BI INCL CAD: CPT

## 2024-07-05 PROCEDURE — 77063 BREAST TOMOSYNTHESIS BI: CPT

## 2024-08-19 ENCOUNTER — OFFICE VISIT (OUTPATIENT)
Dept: FAMILY MEDICINE CLINIC | Facility: CLINIC | Age: 79
End: 2024-08-19
Payer: MEDICARE

## 2024-08-19 VITALS
SYSTOLIC BLOOD PRESSURE: 130 MMHG | WEIGHT: 197 LBS | DIASTOLIC BLOOD PRESSURE: 70 MMHG | HEIGHT: 63 IN | BODY MASS INDEX: 34.91 KG/M2 | TEMPERATURE: 98.4 F | OXYGEN SATURATION: 95 % | HEART RATE: 78 BPM

## 2024-08-19 DIAGNOSIS — I73.9 PAD (PERIPHERAL ARTERY DISEASE): Chronic | ICD-10-CM

## 2024-08-19 DIAGNOSIS — E55.9 VITAMIN D DEFICIENCY: Chronic | ICD-10-CM

## 2024-08-19 DIAGNOSIS — E78.2 MIXED HYPERLIPIDEMIA: Chronic | ICD-10-CM

## 2024-08-19 DIAGNOSIS — E78.2 DM TYPE 2 WITH DIABETIC MIXED HYPERLIPIDEMIA: Chronic | ICD-10-CM

## 2024-08-19 DIAGNOSIS — J96.11 CHRONIC RESPIRATORY FAILURE WITH HYPOXIA: Chronic | ICD-10-CM

## 2024-08-19 DIAGNOSIS — J30.1 NON-SEASONAL ALLERGIC RHINITIS DUE TO POLLEN: ICD-10-CM

## 2024-08-19 DIAGNOSIS — I10 ESSENTIAL HYPERTENSION: Chronic | ICD-10-CM

## 2024-08-19 DIAGNOSIS — E11.65 TYPE 2 DIABETES MELLITUS WITH HYPERGLYCEMIA, WITHOUT LONG-TERM CURRENT USE OF INSULIN: Chronic | ICD-10-CM

## 2024-08-19 DIAGNOSIS — M81.0 SENILE OSTEOPOROSIS: Primary | Chronic | ICD-10-CM

## 2024-08-19 DIAGNOSIS — E11.69 DM TYPE 2 WITH DIABETIC MIXED HYPERLIPIDEMIA: Chronic | ICD-10-CM

## 2024-08-19 PROCEDURE — 1159F MED LIST DOCD IN RCRD: CPT | Performed by: PHYSICIAN ASSISTANT

## 2024-08-19 PROCEDURE — 1126F AMNT PAIN NOTED NONE PRSNT: CPT | Performed by: PHYSICIAN ASSISTANT

## 2024-08-19 PROCEDURE — 99214 OFFICE O/P EST MOD 30 MIN: CPT | Performed by: PHYSICIAN ASSISTANT

## 2024-08-19 PROCEDURE — 3078F DIAST BP <80 MM HG: CPT | Performed by: PHYSICIAN ASSISTANT

## 2024-08-19 PROCEDURE — 3075F SYST BP GE 130 - 139MM HG: CPT | Performed by: PHYSICIAN ASSISTANT

## 2024-08-19 PROCEDURE — 1160F RVW MEDS BY RX/DR IN RCRD: CPT | Performed by: PHYSICIAN ASSISTANT

## 2024-08-19 RX ORDER — METOPROLOL SUCCINATE 25 MG/1
25 TABLET, EXTENDED RELEASE ORAL DAILY
Qty: 90 TABLET | Refills: 3 | Status: SHIPPED | OUTPATIENT
Start: 2024-08-19

## 2024-08-19 RX ORDER — MONTELUKAST SODIUM 10 MG/1
10 TABLET ORAL NIGHTLY
Qty: 90 TABLET | Refills: 3 | Status: SHIPPED | OUTPATIENT
Start: 2024-08-19

## 2024-08-19 RX ORDER — ERGOCALCIFEROL 1.25 MG/1
50000 CAPSULE ORAL WEEKLY
Qty: 12 CAPSULE | Refills: 3 | Status: SHIPPED | OUTPATIENT
Start: 2024-08-19

## 2024-08-19 RX ORDER — BUPROPION HYDROCHLORIDE 150 MG/1
150 TABLET, EXTENDED RELEASE ORAL NIGHTLY
Qty: 90 TABLET | Refills: 3 | Status: SHIPPED | OUTPATIENT
Start: 2024-08-19

## 2024-08-19 RX ORDER — ALENDRONATE SODIUM 70 MG/1
70 TABLET ORAL
Qty: 15 TABLET | Refills: 3 | Status: SHIPPED | OUTPATIENT
Start: 2024-08-19

## 2024-08-19 RX ORDER — ATORVASTATIN CALCIUM 10 MG/1
10 TABLET, FILM COATED ORAL NIGHTLY
Qty: 90 TABLET | Refills: 3 | Status: SHIPPED | OUTPATIENT
Start: 2024-08-19

## 2024-08-19 RX ORDER — OMEGA-3-ACID ETHYL ESTERS 1 G/1
2 CAPSULE, LIQUID FILLED ORAL 2 TIMES DAILY
Qty: 360 CAPSULE | Refills: 3 | Status: SHIPPED | OUTPATIENT
Start: 2024-08-19

## 2024-08-19 RX ORDER — ALENDRONATE SODIUM 70 MG/1
70 TABLET ORAL
Qty: 5 TABLET | Refills: 5 | Status: SHIPPED | OUTPATIENT
Start: 2024-08-19 | End: 2024-08-19 | Stop reason: SDUPTHER

## 2024-08-19 RX ORDER — CLOPIDOGREL BISULFATE 75 MG/1
75 TABLET ORAL DAILY
Qty: 90 TABLET | Refills: 3 | Status: SHIPPED | OUTPATIENT
Start: 2024-08-19

## 2024-08-19 RX ORDER — AMLODIPINE AND BENAZEPRIL HYDROCHLORIDE 10; 40 MG/1; MG/1
1 CAPSULE ORAL DAILY
Qty: 90 CAPSULE | Refills: 3 | Status: SHIPPED | OUTPATIENT
Start: 2024-08-19

## 2024-08-19 RX ORDER — FUROSEMIDE 20 MG/1
20 TABLET ORAL DAILY
Qty: 90 TABLET | Refills: 3 | Status: SHIPPED | OUTPATIENT
Start: 2024-08-19

## 2024-08-19 NOTE — PATIENT INSTRUCTIONS
Carbohydrate Counting for Diabetes Mellitus, Adult  Carbohydrate counting is a method of keeping track of how many carbohydrates you eat. Eating carbohydrates increases the amount of sugar (glucose) in the blood. Counting how many carbohydrates you eat improves how well you manage your blood glucose. This, in turn, helps you manage your diabetes.  Carbohydrates are measured in grams (g) per serving. It is important to know how many carbohydrates (in grams or by serving size) you can have in each meal. This is different for every person. A dietitian can help you make a meal plan and calculate how many carbohydrates you should have at each meal and snack.  What foods contain carbohydrates?  Carbohydrates are found in the following foods:  Grains, such as breads and cereals.  Dried beans and soy products.  Starchy vegetables, such as potatoes, peas, and corn.  Fruit and fruit juices.  Milk and yogurt.  Sweets and snack foods, such as cake, cookies, candy, chips, and soft drinks.  How do I count carbohydrates in foods?  There are two ways to count carbohydrates in food. You can read food labels or learn standard serving sizes of foods. You can use either of these methods or a combination of both.  Using the Nutrition Facts label  The Nutrition Facts list is included on the labels of almost all packaged foods and beverages in the [place]. It includes:  The serving size.  Information about nutrients in each serving, including the grams of carbohydrate per serving.  To use the Nutrition Facts, decide how many servings you will have. Then, multiply the number of servings by the number of carbohydrates per serving. The resulting number is the total grams of carbohydrates that you will be having.  Learning the standard serving sizes of foods  When you eat carbohydrate foods that are not packaged or do not include Nutrition Facts on the label, you need to measure the servings in order to count the grams of  carbohydrates.  Measure the foods that you will eat with a food scale or measuring cup, if needed.  Decide how many standard-size servings you will eat.  Multiply the number of servings by 15. For foods that contain carbohydrates, one serving equals 15 g of carbohydrates.  For example, if you eat 2 cups or 10 oz (300 g) of strawberries, you will have eaten 2 servings and 30 g of carbohydrates (2 servings x 15 g = 30 g).  For foods that have more than one food mixed, such as soups and casseroles, you must count the carbohydrates in each food that is included.  The following list contains standard serving sizes of common carbohydrate-rich foods. Each of these servings has about 15 g of carbohydrates:  1 slice of bread.  1 six-inch (15 cm) tortilla.  ? cup or 2 oz (53 g) cooked rice or pasta.  ½ cup or 3 oz (85 g) cooked or canned, drained and rinsed beans or lentils.  ½ cup or 3 oz (85 g) starchy vegetable, such as peas, corn, or squash.  ½ cup or 4 oz (120 g) hot cereal.  ½ cup or 3 oz (85 g) boiled or mashed potatoes, or ¼ or 3 oz (85 g) of a large baked potato.  ½ cup or 4 fl oz (118 mL) fruit juice.  1 cup or 8 fl oz (237 mL) milk.  1 small or 4 oz (106 g) apple.  ½ or 2 oz (63 g) of a medium banana.  1 cup or 5 oz (150 g) strawberries.  3 cups or 1 oz (28.3 g) popped popcorn.  What is an example of carbohydrate counting?  To calculate the grams of carbohydrates in this sample meal, follow the steps shown below.  Sample meal  3 oz (85 g) chicken breast.  ? cup or 4 oz (106 g) brown rice.  ½ cup or 3 oz (85 g) corn.  1 cup or 8 fl oz (237 mL) milk.  1 cup or 5 oz (150 g) strawberries with sugar-free whipped topping.  Carbohydrate calculation  Identify the foods that contain carbohydrates:  Rice.  Corn.  Milk.  Strawberries.  Calculate how many servings you have of each food:  2 servings rice.  1 serving corn.  1 serving milk.  1 serving strawberries.  Multiply each number of servings by 15  servings rice x 15  g = 30 g.  1 serving corn x 15 g = 15 g.  1 serving milk x 15 g = 15 g.  1 serving strawberries x 15 g = 15 g.  Add together all of the amounts to find the total grams of carbohydrates eaten:  30 g + 15 g + 15 g + 15 g = 75 g of carbohydrates total.  What are tips for following this plan?  Shopping  Develop a meal plan and then make a shopping list.  Buy fresh and frozen vegetables, fresh and frozen fruit, dairy, eggs, beans, lentils, and whole grains.  Look at food labels. Choose foods that have more fiber and less sugar.  Avoid processed foods and foods with added sugars.  Meal planning  Aim to have the same number of grams of carbohydrates at each meal and for each snack time.  Plan to have regular, balanced meals and snacks.  Where to find more information  American Diabetes Association: diabetes.org  Centers for Disease Control and Prevention: cdc.gov  Academy of Nutrition and Dietetics: eatright.org  Association of Diabetes Care & Education Specialists: diabeteseducator.org  Summary  Carbohydrate counting is a method of keeping track of how many carbohydrates you eat.  Eating carbohydrates increases the amount of sugar (glucose) in your blood.  Counting how many carbohydrates you eat improves how well you manage your blood glucose. This helps you manage your diabetes.  A dietitian can help you make a meal plan and calculate how many carbohydrates you should have at each meal and snack.  This information is not intended to replace advice given to you by your health care provider. Make sure you discuss any questions you have with your health care provider.  Document Revised: 07/21/2021 Document Reviewed: 07/21/2021  [employer] Patient Education © 2024 [employer]  Fall Prevention in the Home, Adult  Falls can cause injuries and can happen to people of all ages. There are many things you can do to make your home safer and to help prevent falls.  What actions can I take to prevent falls?  General information  Use  good lighting in all rooms. Make sure to:  Replace any light bulbs that burn out.  Turn on the lights in dark areas and use night-lights.  Keep items that you use often in easy-to-reach places. Lower the shelves around your home if needed.  Move furniture so that there are clear paths around it.  Do not use throw rugs or other things on the floor that can make you trip.  If any of your floors are uneven, fix them.  Add color or contrast paint or tape to clearly bambi and help you see:  Grab bars or handrails.  First and last steps of staircases.  Where the edge of each step is.  If you use a ladder or stepladder:  Make sure that it is fully opened. Do not climb a closed ladder.  Make sure the sides of the ladder are locked in place.  Have someone hold the ladder while you use it.  Know where your pets are as you move through your home.  What can I do in the bathroom?         Keep the floor dry. Clean up any water on the floor right away.  Remove soap buildup in the bathtub or shower. Buildup makes bathtubs and showers slippery.  Use non-skid mats or decals on the floor of the bathtub or shower.  Attach bath mats securely with double-sided, non-slip rug tape.  If you need to sit down in the shower, use a non-slip stool.  Install grab bars by the toilet and in the bathtub and shower. Do not use towel bars as grab bars.  What can I do in the bedroom?  Make sure that you have a light by your bed that is easy to reach.  Do not use any sheets or blankets on your bed that hang to the floor.  Have a firm chair or bench with side arms that you can use for support when you get dressed.  What can I do in the kitchen?  Clean up any spills right away.  If you need to reach something above you, use a step stool with a grab bar.  Keep electrical cords out of the way.  Do not use floor polish or wax that makes floors slippery.  What can I do with my stairs?  Do not leave anything on the stairs.  Make sure that you have a light  switch at the top and the bottom of the stairs.  Make sure that there are handrails on both sides of the stairs. Fix handrails that are broken or loose.  Install non-slip stair treads on all your stairs if they do not have carpet.  Avoid having throw rugs at the top or bottom of the stairs.  Choose a carpet that does not hide the edge of the steps on the stairs. Make sure that the carpet is firmly attached to the stairs. Fix carpet that is loose or worn.  What can I do on the outside of my home?  Use bright outdoor lighting.  Fix the edges of walkways and driveways and fix any cracks. Clear paths of anything that can make you trip, such as tools or rocks.  Add color or contrast paint or tape to clearly bambi and help you see anything that might make you trip as you walk through a door, such as a raised step or threshold.  Trim any bushes or trees on paths to your home.  Check to see if handrails are loose or broken and that both sides of all steps have handrails. Install guardrails along the edges of any raised decks and porches.  Have leaves, snow, or ice cleared regularly. Use sand, salt, or ice melter on paths if you live where there is ice and snow during the winter.  Clean up any spills in your garage right away. This includes grease or oil spills.  What other actions can I take?  Review your medicines with your doctor. Some medicines can cause dizziness or changes in blood pressure, which increase your risk of falling.  Wear shoes that:  Have a low heel. Do not wear high heels.  Have rubber bottoms and are closed at the toe.  Feel good on your feet and fit well.  Use tools that help you move around if needed. These include:  Canes.  Walkers.  Scooters.  Crutches.  Ask your doctor what else you can do to help prevent falls. This may include seeing a physical therapist to learn to do exercises to move better and get stronger.  Where to find more information  Centers for Disease Control and PreventionRAEANN:  cdc.gov  National [place] on Aging: [place]ion[place].nih.gov  National [place] on Aging: [place]ion[place].nih.gov  Contact a doctor if:  You are afraid of falling at home.  You feel weak, drowsy, or dizzy at home.  You fall at home.  Get help right away if you:  Lose consciousness or have trouble moving after a fall.  Have a fall that causes a head injury.  These symptoms may be an emergency. Get help right away. Call 911.  Do not wait to see if the symptoms will go away.  Do not drive yourself to the hospital.  This information is not intended to replace advice given to you by your health care provider. Make sure you discuss any questions you have with your health care provider.  Document Revised: 08/21/2023 Document Reviewed: 08/21/2023  [employer] Patient Education © 2024 [employer]

## 2024-08-19 NOTE — PROGRESS NOTES
"Chief Complaint -osteoporosis    History of Present Illness -     Evie Shah is a 79 y.o. female.     Osteoporosis-  Uncontrolled despite the use of raloxifene.  Patient does take calcium and vitamin D as well.      7/5/2024 DEXA scan revealed T-score of right femur was previously -2.3 and has worsened to -2.4.  She is considered to be osteopenic according to the World Health Organization criteria and is at moderate risk of fracture.    Chronic respiratory failure-  Patient continues to have issues with hypoxia.  She has oxygen that she wears at night at home that is provided by [employer].  She is interested in portable oxygen to use during the day or outside of the home.  She is unable to pull or carry the tanks due to osteoporosis and osteoarthritis.    5-minute walk test was performed today checking the patient's oxygen.  Patient's oxygen went down to 88% in office today.    Hypertension-  Controlled with amlodipine/benazepril    Hyperlipidemia-  Stable with atorvastatin    Peripheral artery disease-  Stable with risk factor modification including Plavix and atorvastatin      The following portions of the patient's history were reviewed and updated as appropriate: allergies, current medications, past family history, past medical history, past social history, past surgical history and problem list.        Objective  Vital signs:  /70   Pulse 78   Temp 98.4 °F (36.9 °C) (Temporal)   Ht 160 cm (62.99\")   Wt 89.4 kg (197 lb)   SpO2 95%   BMI 34.91 kg/m²     Physical Exam  Vitals and nursing note reviewed.   Constitutional:       Appearance: Normal appearance. She is well-developed.   Eyes:      Extraocular Movements: Extraocular movements intact.      Conjunctiva/sclera: Conjunctivae normal.   Cardiovascular:      Rate and Rhythm: Normal rate and regular rhythm.      Heart sounds: Normal heart sounds. No murmur heard.  Pulmonary:      Effort: Pulmonary effort is normal. No respiratory " distress.      Breath sounds: Normal breath sounds. No wheezing.   Musculoskeletal:         General: No tenderness.   Skin:     General: Skin is warm and dry.      Findings: No rash.   Neurological:      Mental Status: She is alert and oriented to person, place, and time.   Psychiatric:         Mood and Affect: Mood normal.         Behavior: Behavior normal.         Thought Content: Thought content normal.         The following data was reviewed by Camryn Mota PA-C:         Lab Results   Component Value Date    BUN 22 06/13/2024    CREATININE 0.87 06/13/2024    EGFR 73.2 01/18/2024    ALT 18 06/13/2024    AST 16 06/13/2024    WBC 14.22 (H) 01/15/2024    HGB 15.3 01/15/2024    HCT 48.6 (H) 01/15/2024     01/15/2024    TRIG 210 (H) 06/09/2023    HDL 44 06/09/2023    LDL 37 06/09/2023    TSH 3.260 01/08/2024    HGBA1C 8.50 (H) 06/13/2024           Assessment & Plan     Diagnoses and all orders for this visit:    1. Senile osteoporosis (Primary)  Comments:  Discontinue raloxifene (Evista)  Start Fosamax  Continue calcium and vitamin D supplementation  Fall risk precautions advised  Orders:  -     Discontinue: alendronate (Fosamax) 70 MG tablet; Take 1 tablet by mouth Every 7 (Seven) Days.  Dispense: 5 tablet; Refill: 5  -     alendronate (Fosamax) 70 MG tablet; Take 1 tablet by mouth Every 7 (Seven) Days.  Dispense: 15 tablet; Refill: 3    2. Chronic respiratory failure with hypoxia  Comments:  5-minute walk test performed today in office and oxygen went down to 88%  Prescription written for portable oxygen concentrator    3. Essential hypertension  Comments:  Continue amlodipine/benazepril  Advise daily BP monitoring  Orders:  -     amLODIPine-benazepril (LOTREL) 10-40 MG per capsule; Take 1 capsule by mouth Daily.  Dispense: 90 capsule; Refill: 3  -     furosemide (LASIX) 20 MG tablet; Take 1 tablet by mouth Daily.  Dispense: 90 tablet; Refill: 3  -     metoprolol succinate XL (Toprol XL) 25 MG 24 hr tablet;  Take 1 tablet by mouth Daily.  Dispense: 90 tablet; Refill: 3  -     Comprehensive Metabolic Panel; Future  -     Lipid Panel; Future    4. Mixed hyperlipidemia  Comments:  Continue atorvastatin  Advised low-cholesterol diet  Orders:  -     atorvastatin (LIPITOR) 10 MG tablet; Take 1 tablet by mouth Every Night.  Dispense: 90 tablet; Refill: 3  -     omega-3 acid ethyl esters (LOVAZA) 1 g capsule; Take 2 capsules by mouth 2 (Two) Times a Day.  Dispense: 360 capsule; Refill: 3  -     Lipid Panel; Future    5. PAD (peripheral artery disease)  Comments:  Continue Plavix and risk factor modification including atorvastatin  Status post stent placement  Orders:  -     clopidogrel (PLAVIX) 75 MG tablet; Take 1 tablet by mouth Daily.  Dispense: 90 tablet; Refill: 3    6. DM type 2 with diabetic mixed hyperlipidemia  Comments:  Continue Jardiance and semaglutide  Advised a more strict diabetic diet  Advised fasting and 2-hour PP blood glucose readings  Orders:  -     empagliflozin (Jardiance) 25 MG tablet tablet; Take 1 tablet by mouth Daily.  Dispense: 90 tablet; Refill: 3  -     Hemoglobin A1c; Future  -     Comprehensive Metabolic Panel; Future  -     Lipid Panel; Future    7. Non-seasonal allergic rhinitis due to pollen  Comments:  Continue Singulair and Claritin  Orders:  -     montelukast (SINGULAIR) 10 MG tablet; Take 1 tablet by mouth Every Night.  Dispense: 90 tablet; Refill: 3    8. Type 2 diabetes mellitus with hyperglycemia, without long-term current use of insulin  Comments:  Continue Mounjaro 7.5 mg weekly and Jardiance  Advised low carbohydrate diet  Orders:  -     Tirzepatide (MOUNJARO) 7.5 MG/0.5ML solution pen-injector pen; Inject 0.5 mL under the skin into the appropriate area as directed 1 (One) Time Per Week.  Dispense: 2 mL; Refill: 3  -     Hemoglobin A1c; Future    9. Vitamin D deficiency  Comments:  Continue vitamin D supplementation  Orders:  -     vitamin D (ERGOCALCIFEROL) 1.25 MG (69726 UT)  capsule capsule; Take 1 capsule by mouth 1 (One) Time Per Week.  Dispense: 12 capsule; Refill: 3    Other orders  -     buPROPion SR (WELLBUTRIN SR) 150 MG 12 hr tablet; Take 1 tablet by mouth Every Night.  Dispense: 90 tablet; Refill: 3                   Patient was given instructions and counseling regarding his condition or for health maintenance advice. Please see specific information pulled into the AVS if appropriate      This document has been electronically signed by:  Camryn Mota PA-C

## 2024-08-21 ENCOUNTER — TELEPHONE (OUTPATIENT)
Dept: FAMILY MEDICINE CLINIC | Facility: CLINIC | Age: 79
End: 2024-08-21

## 2024-10-11 DIAGNOSIS — J30.1 NON-SEASONAL ALLERGIC RHINITIS DUE TO POLLEN: ICD-10-CM

## 2024-10-11 RX ORDER — LORATADINE 10 MG/1
10 TABLET ORAL DAILY
Qty: 90 TABLET | Refills: 0 | OUTPATIENT
Start: 2024-10-11

## 2024-10-15 DIAGNOSIS — J30.1 NON-SEASONAL ALLERGIC RHINITIS DUE TO POLLEN: ICD-10-CM

## 2024-10-16 RX ORDER — LORATADINE 10 MG/1
10 TABLET ORAL DAILY
Qty: 90 TABLET | Refills: 0 | OUTPATIENT
Start: 2024-10-16

## 2024-10-21 DIAGNOSIS — J30.1 NON-SEASONAL ALLERGIC RHINITIS DUE TO POLLEN: ICD-10-CM

## 2024-10-22 RX ORDER — LORATADINE 10 MG/1
10 TABLET ORAL DAILY
Qty: 90 TABLET | Refills: 0 | OUTPATIENT
Start: 2024-10-22

## 2024-10-24 DIAGNOSIS — I10 ESSENTIAL HYPERTENSION: Chronic | ICD-10-CM

## 2024-10-24 RX ORDER — POTASSIUM CHLORIDE 750 MG/1
10 TABLET, EXTENDED RELEASE ORAL DAILY
Qty: 90 TABLET | Refills: 0 | OUTPATIENT
Start: 2024-10-24

## 2024-10-31 DIAGNOSIS — I10 ESSENTIAL HYPERTENSION: Chronic | ICD-10-CM

## 2024-10-31 RX ORDER — POTASSIUM CHLORIDE 750 MG/1
10 TABLET, EXTENDED RELEASE ORAL DAILY
Qty: 90 TABLET | Refills: 0 | OUTPATIENT
Start: 2024-10-31

## 2024-11-05 DIAGNOSIS — I10 ESSENTIAL HYPERTENSION: Chronic | ICD-10-CM

## 2024-11-05 RX ORDER — POTASSIUM CHLORIDE 750 MG/1
10 TABLET, EXTENDED RELEASE ORAL DAILY
Qty: 90 TABLET | Refills: 0 | OUTPATIENT
Start: 2024-11-05

## 2024-11-07 ENCOUNTER — TELEPHONE (OUTPATIENT)
Dept: FAMILY MEDICINE CLINIC | Facility: CLINIC | Age: 79
End: 2024-11-07

## 2024-11-07 DIAGNOSIS — I10 ESSENTIAL HYPERTENSION: Chronic | ICD-10-CM

## 2024-11-07 RX ORDER — POTASSIUM CHLORIDE 750 MG/1
10 TABLET, EXTENDED RELEASE ORAL DAILY
Qty: 90 TABLET | Refills: 1 | Status: SHIPPED | OUTPATIENT
Start: 2024-11-07

## 2024-11-07 RX ORDER — POTASSIUM CHLORIDE 750 MG/1
10 TABLET, EXTENDED RELEASE ORAL DAILY
Qty: 90 TABLET | Refills: 0 | OUTPATIENT
Start: 2024-11-07

## 2024-11-07 NOTE — TELEPHONE ENCOUNTER
Caller: Evie Shah    Relationship: Self    Best call back number: [phone]     Requested Prescriptions:   POTASSIUM     SHE HAS IT BEEN TAKING THE WATER PILL SINCE RUNNING OUT OF THE POTASSIUM.     PLEASE CALL PATIENT TO DISCUSS.          Pharmacy where request should be sent: [facility] [address] [place] [address] [phone] [facility] [phone] FX     Last office visit with prescribing clinician: 8/19/2024   Last telemedicine visit with prescribing clinician: Visit date not found   Next office visit with prescribing clinician: 11/19/2024

## 2024-11-07 NOTE — TELEPHONE ENCOUNTER
She should take the water pill and the potassium together.  I sent in a new prescription of the potassium.

## 2024-11-12 ENCOUNTER — LAB (OUTPATIENT)
Dept: FAMILY MEDICINE CLINIC | Facility: CLINIC | Age: 79
End: 2024-11-12
Payer: MEDICARE

## 2024-11-12 DIAGNOSIS — E11.69 DM TYPE 2 WITH DIABETIC MIXED HYPERLIPIDEMIA: Chronic | ICD-10-CM

## 2024-11-12 DIAGNOSIS — E78.2 MIXED HYPERLIPIDEMIA: Chronic | ICD-10-CM

## 2024-11-12 DIAGNOSIS — E78.2 DM TYPE 2 WITH DIABETIC MIXED HYPERLIPIDEMIA: Chronic | ICD-10-CM

## 2024-11-12 DIAGNOSIS — E16.2 HYPOGLYCEMIA, UNSPECIFIED: ICD-10-CM

## 2024-11-12 DIAGNOSIS — E11.65 TYPE 2 DIABETES MELLITUS WITH HYPERGLYCEMIA, WITHOUT LONG-TERM CURRENT USE OF INSULIN: Chronic | ICD-10-CM

## 2024-11-12 DIAGNOSIS — I10 ESSENTIAL HYPERTENSION: Chronic | ICD-10-CM

## 2024-11-12 DIAGNOSIS — I10 ESSENTIAL HYPERTENSION: Primary | ICD-10-CM

## 2024-11-12 LAB
ALBUMIN SERPL-MCNC: 4.1 G/DL (ref 3.5–5.2)
ALBUMIN/GLOB SERPL: 1.6 G/DL
ALP SERPL-CCNC: 106 U/L (ref 39–117)
ALT SERPL-CCNC: 21 U/L (ref 1–33)
AST SERPL-CCNC: 23 U/L (ref 1–32)
BILIRUB SERPL-MCNC: 0.7 MG/DL (ref 0–1.2)
BUN SERPL-MCNC: 16 MG/DL (ref 8–23)
BUN/CREAT SERPL: 21.6 (ref 7–25)
CALCIUM SERPL-MCNC: 9 MG/DL (ref 8.6–10.5)
CHLORIDE SERPL-SCNC: 106 MMOL/L (ref 98–107)
CHOLEST SERPL-MCNC: 134 MG/DL (ref 0–200)
CO2 SERPL-SCNC: 21.1 MMOL/L (ref 22–29)
CREAT SERPL-MCNC: 0.74 MG/DL (ref 0.57–1)
EGFRCR SERPLBLD CKD-EPI 2021: 83.4 ML/MIN/1.73
GLOBULIN SER CALC-MCNC: 2.5 GM/DL
GLUCOSE SERPL-MCNC: 199 MG/DL (ref 65–99)
HBA1C MFR BLD: 8.3 % (ref 4.8–5.6)
HDLC SERPL-MCNC: 43 MG/DL (ref 40–60)
LDLC SERPL CALC-MCNC: 55 MG/DL (ref 0–100)
POTASSIUM SERPL-SCNC: 3.9 MMOL/L (ref 3.5–5.2)
PROT SERPL-MCNC: 6.6 G/DL (ref 6–8.5)
SODIUM SERPL-SCNC: 140 MMOL/L (ref 136–145)
TRIGL SERPL-MCNC: 224 MG/DL (ref 0–150)
VLDLC SERPL CALC-MCNC: 36 MG/DL (ref 5–40)

## 2024-11-13 LAB — MICROALBUMIN UR-MCNC: 17.4 UG/ML

## 2024-11-19 ENCOUNTER — OFFICE VISIT (OUTPATIENT)
Dept: FAMILY MEDICINE CLINIC | Facility: CLINIC | Age: 79
End: 2024-11-19
Payer: MEDICARE

## 2024-11-19 VITALS
SYSTOLIC BLOOD PRESSURE: 132 MMHG | HEART RATE: 85 BPM | HEIGHT: 63 IN | OXYGEN SATURATION: 92 % | DIASTOLIC BLOOD PRESSURE: 86 MMHG | WEIGHT: 201 LBS | BODY MASS INDEX: 35.61 KG/M2 | TEMPERATURE: 97.8 F

## 2024-11-19 DIAGNOSIS — E78.5 DYSLIPIDEMIA: Chronic | ICD-10-CM

## 2024-11-19 DIAGNOSIS — K21.9 GASTROESOPHAGEAL REFLUX DISEASE WITHOUT ESOPHAGITIS: Primary | Chronic | ICD-10-CM

## 2024-11-19 DIAGNOSIS — E11.65 TYPE 2 DIABETES MELLITUS WITH HYPERGLYCEMIA, WITHOUT LONG-TERM CURRENT USE OF INSULIN: Chronic | ICD-10-CM

## 2024-11-19 DIAGNOSIS — J30.9 CHRONIC ALLERGIC RHINITIS: Chronic | ICD-10-CM

## 2024-11-19 PROCEDURE — 1159F MED LIST DOCD IN RCRD: CPT | Performed by: PHYSICIAN ASSISTANT

## 2024-11-19 PROCEDURE — 3079F DIAST BP 80-89 MM HG: CPT | Performed by: PHYSICIAN ASSISTANT

## 2024-11-19 PROCEDURE — 1126F AMNT PAIN NOTED NONE PRSNT: CPT | Performed by: PHYSICIAN ASSISTANT

## 2024-11-19 PROCEDURE — 99214 OFFICE O/P EST MOD 30 MIN: CPT | Performed by: PHYSICIAN ASSISTANT

## 2024-11-19 PROCEDURE — 3075F SYST BP GE 130 - 139MM HG: CPT | Performed by: PHYSICIAN ASSISTANT

## 2024-11-19 PROCEDURE — 1160F RVW MEDS BY RX/DR IN RCRD: CPT | Performed by: PHYSICIAN ASSISTANT

## 2024-11-19 RX ORDER — CETIRIZINE HYDROCHLORIDE 10 MG/1
10 TABLET ORAL DAILY
Qty: 90 TABLET | Refills: 3 | Status: SHIPPED | OUTPATIENT
Start: 2024-11-19

## 2024-11-19 RX ORDER — FAMOTIDINE 20 MG/1
20 TABLET, FILM COATED ORAL
Qty: 90 TABLET | Refills: 3 | Status: SHIPPED | OUTPATIENT
Start: 2024-11-19

## 2024-11-19 RX ORDER — FAMOTIDINE 20 MG/1
20 TABLET, FILM COATED ORAL
Qty: 30 TABLET | Refills: 5 | Status: SHIPPED | OUTPATIENT
Start: 2024-11-19 | End: 2024-11-19

## 2024-11-19 RX ORDER — CETIRIZINE HYDROCHLORIDE 10 MG/1
10 TABLET ORAL DAILY
Qty: 30 TABLET | Refills: 5 | Status: SHIPPED | OUTPATIENT
Start: 2024-11-19 | End: 2024-11-19

## 2024-11-19 NOTE — PATIENT INSTRUCTIONS
"Fat and Cholesterol Restricted Eating Plan  Getting too much fat and cholesterol in your diet may cause health problems. Choosing the right foods helps keep your fat and cholesterol at normal levels. This can keep you from getting certain diseases.  Your doctor may recommend an eating plan that includes:  Total fat: ______% or less of total calories a day. This is ______g of fat a day.  Saturated fat: ______% or less of total calories a day. This is ______g of saturated fat a day.  Cholesterol: less than _________mg a day.  Fiber: ______g a day.  What are tips for following this plan?  General tips  Work with your doctor to lose weight if you need to.  Avoid:  Foods with added sugar.  Fried foods.  Foods with trans fat or partially hydrogenated oils. This includes some margarines and baked goods.  If you drink alcohol:  Limit how much you have to:  0-1 drink a day for women who are not pregnant.  0-2 drinks a day for men.  Know how much alcohol is in a drink. In the [place] one drink equals one 12 oz bottle of beer (355 mL), one 5 oz glass of wine (148 mL), or one 1½ oz glass of hard liquor (44 mL).  Reading food labels  Check food labels for:  Trans fats.  Partially hydrogenated oils.  Saturated fat (g) in each serving.  Cholesterol (mg) in each serving.  Fiber (g) in each serving.  Choose foods with healthy fats, such as:  Monounsaturated fats and polyunsaturated fats. These include olive and canola oil, flaxseeds, walnuts, almonds, and seeds.  Omega-3 fats. These are found in certain fish, flaxseed oil, and ground flaxseeds.  Choose grain products that have whole grains. Look for the word \"whole\" as the first word in the ingredient list.  Cooking  Cook foods using low-fat methods. These include baking, boiling, grilling, and broiling.  Eat more home-cooked foods. Eat at restaurants and buffets less often. Eat less fast food.  Avoid cooking using saturated fats, such as butter, cream, palm oil, palm kernel oil, and " coconut oil.  Meal planning    At meals, divide your plate into four equal parts:  Fill one-half of your plate with vegetables, green salads, and fruit.  Fill one-fourth of your plate with whole grains.  Fill one-fourth of your plate with low-fat (lean) protein foods.  Eat fish that is high in omega-3 fats at least two times a week. This includes mackerel, tuna, sardines, and salmon.  Eat foods that are high in fiber, such as whole grains, beans, apples, pears, berries, broccoli, carrots, peas, and barley.  What foods should I eat?  Fruits  All fresh, canned (in natural juice), or frozen fruits.  Vegetables  Fresh or frozen vegetables (raw, steamed, roasted, or grilled). Green salads.  Grains  Whole grains, such as whole wheat or whole grain breads, crackers, cereals, and pasta. Unsweetened oatmeal, bulgur, barley, quinoa, or brown rice. Corn or whole wheat flour tortillas.  Meats and other protein foods  Ground beef (85% or leaner), grass-fed beef, or beef trimmed of fat. Skinless chicken or turkey. Ground chicken or turkey. Pork trimmed of fat. All fish and seafood. Egg whites. Dried beans, peas, or lentils. Unsalted nuts or seeds. Unsalted canned beans. Nut butters without added sugar or oil.  Dairy  Low-fat or nonfat dairy products, such as skim or 1% milk, 2% or reduced-fat cheeses, low-fat and fat-free ricotta or cottage cheese, or plain low-fat and nonfat yogurt.  Fats and oils  Tub margarine without trans fats. Light or reduced-fat mayonnaise and salad dressings. Avocado. Olive, canola, sesame, or safflower oils.  The items listed above may not be a complete list of foods and beverages you can eat. Contact a dietitian for more information.  What foods should I avoid?  Fruits  Canned fruit in heavy syrup. Fruit in cream or butter sauce. Fried fruit.  Vegetables  Vegetables cooked in cheese, cream, or butter sauce. Fried vegetables.  Grains  White bread. White pasta. White rice. Cornbread. Bagels, pastries,  and croissants. Crackers and snack foods that contain trans fat and hydrogenated oils.  Meats and other protein foods  Fatty cuts of meat. Ribs, chicken wings, palomo, sausage, bologna, salami, chitterlings, fatback, hot dogs, bratwurst, and packaged lunch meats. Liver and organ meats. Whole eggs and egg yolks. Chicken and turkey with skin. Fried meat.  Dairy  Whole or 2% milk, cream, half-and-half, and cream cheese. Whole milk cheeses. Whole-fat or sweetened yogurt. Full-fat cheeses. Nondairy creamers and whipped toppings. Processed cheese, cheese spreads, and cheese curds.  Fats and oils  Butter, stick margarine, lard, shortening, ghee, or palomo fat. Coconut, palm kernel, and palm oils.  Beverages  Alcohol. Sugar-sweetened drinks such as sodas, lemonade, and fruit drinks.  Sweets and desserts  Corn syrup, sugars, honey, and molasses. Candy. Jam and jelly. Syrup. Sweetened cereals. Cookies, pies, cakes, donuts, muffins, and ice cream.  The items listed above may not be a complete list of foods and beverages you should avoid. Contact a dietitian for more information.  Summary  Choosing the right foods helps keep your fat and cholesterol at normal levels. This can keep you from getting certain diseases.  At meals, fill one-half of your plate with vegetables, green salads, and fruits.  Eat high fiber foods, like whole grains, beans, apples, pears, berries, carrots, peas, and barley.  Limit added sugar, saturated fats, alcohol, and fried foods.  This information is not intended to replace advice given to you by your health care provider. Make sure you discuss any questions you have with your health care provider.  Document Revised: 04/29/2022 Document Reviewed: 04/29/2022  Elsevier Patient Education © 2024 [employer]  Carbohydrate Counting for Diabetes Mellitus, Adult  Carbohydrate counting is a method of keeping track of how many carbohydrates you eat. Eating carbohydrates increases the amount of sugar (glucose) in  the blood. Counting how many carbohydrates you eat improves how well you manage your blood glucose. This, in turn, helps you manage your diabetes.  Carbohydrates are measured in grams (g) per serving. It is important to know how many carbohydrates (in grams or by serving size) you can have in each meal. This is different for every person. A dietitian can help you make a meal plan and calculate how many carbohydrates you should have at each meal and snack.  What foods contain carbohydrates?  Carbohydrates are found in the following foods:  Grains, such as breads and cereals.  Dried beans and soy products.  Starchy vegetables, such as potatoes, peas, and corn.  Fruit and fruit juices.  Milk and yogurt.  Sweets and snack foods, such as cake, cookies, candy, chips, and soft drinks.  How do I count carbohydrates in foods?  There are two ways to count carbohydrates in food. You can read food labels or learn standard serving sizes of foods. You can use either of these methods or a combination of both.  Using the Nutrition Facts label  The Nutrition Facts list is included on the labels of almost all packaged foods and beverages in the [place]. It includes:  The serving size.  Information about nutrients in each serving, including the grams of carbohydrate per serving.  To use the Nutrition Facts, decide how many servings you will have. Then, multiply the number of servings by the number of carbohydrates per serving. The resulting number is the total grams of carbohydrates that you will be having.  Learning the standard serving sizes of foods  When you eat carbohydrate foods that are not packaged or do not include Nutrition Facts on the label, you need to measure the servings in order to count the grams of carbohydrates.  Measure the foods that you will eat with a food scale or measuring cup, if needed.  Decide how many standard-size servings you will eat.  Multiply the number of servings by 15. For foods that contain  carbohydrates, one serving equals 15 g of carbohydrates.  For example, if you eat 2 cups or 10 oz (300 g) of strawberries, you will have eaten 2 servings and 30 g of carbohydrates (2 servings x 15 g = 30 g).  For foods that have more than one food mixed, such as soups and casseroles, you must count the carbohydrates in each food that is included.  The following list contains standard serving sizes of common carbohydrate-rich foods. Each of these servings has about 15 g of carbohydrates:  1 slice of bread.  1 six-inch (15 cm) tortilla.  ? cup or 2 oz (53 g) cooked rice or pasta.  ½ cup or 3 oz (85 g) cooked or canned, drained and rinsed beans or lentils.  ½ cup or 3 oz (85 g) starchy vegetable, such as peas, corn, or squash.  ½ cup or 4 oz (120 g) hot cereal.  ½ cup or 3 oz (85 g) boiled or mashed potatoes, or ¼ or 3 oz (85 g) of a large baked potato.  ½ cup or 4 fl oz (118 mL) fruit juice.  1 cup or 8 fl oz (237 mL) milk.  1 small or 4 oz (106 g) apple.  ½ or 2 oz (63 g) of a medium banana.  1 cup or 5 oz (150 g) strawberries.  3 cups or 1 oz (28.3 g) popped popcorn.  What is an example of carbohydrate counting?  To calculate the grams of carbohydrates in this sample meal, follow the steps shown below.  Sample meal  3 oz (85 g) chicken breast.  ? cup or 4 oz (106 g) brown rice.  ½ cup or 3 oz (85 g) corn.  1 cup or 8 fl oz (237 mL) milk.  1 cup or 5 oz (150 g) strawberries with sugar-free whipped topping.  Carbohydrate calculation  Identify the foods that contain carbohydrates:  Rice.  Corn.  Milk.  Strawberries.  Calculate how many servings you have of each food:  2 servings rice.  1 serving corn.  1 serving milk.  1 serving strawberries.  Multiply each number of servings by 15  servings rice x 15 g = 30 g.  1 serving corn x 15 g = 15 g.  1 serving milk x 15 g = 15 g.  1 serving strawberries x 15 g = 15 g.  Add together all of the amounts to find the total grams of carbohydrates eaten:  30 g + 15 g + 15 g + 15  g = 75 g of carbohydrates total.  What are tips for following this plan?  Shopping  Develop a meal plan and then make a shopping list.  Buy fresh and frozen vegetables, fresh and frozen fruit, dairy, eggs, beans, lentils, and whole grains.  Look at food labels. Choose foods that have more fiber and less sugar.  Avoid processed foods and foods with added sugars.  Meal planning  Aim to have the same number of grams of carbohydrates at each meal and for each snack time.  Plan to have regular, balanced meals and snacks.  Where to find more information  American Diabetes Association: diabetes.org  Centers for Disease Control and Prevention: cdc.gov  Academy of Nutrition and Dietetics: eatright.org  Association of Diabetes Care & Education Specialists: diabeteseducator.org  Summary  Carbohydrate counting is a method of keeping track of how many carbohydrates you eat.  Eating carbohydrates increases the amount of sugar (glucose) in your blood.  Counting how many carbohydrates you eat improves how well you manage your blood glucose. This helps you manage your diabetes.  A dietitian can help you make a meal plan and calculate how many carbohydrates you should have at each meal and snack.  This information is not intended to replace advice given to you by your health care provider. Make sure you discuss any questions you have with your health care provider.  Document Revised: 07/21/2021 Document Reviewed: 07/21/2021  Elsevier Patient Education © 2024 [employer] Inc.

## 2024-11-19 NOTE — PROGRESS NOTES
"Chief Complaint -heartburn    History of Present Illness -     Evie Shah is a 79 y.o. female.     Heartburn-  Patient complains of heartburn described as an acidic taste in her throat that occurs at night.  She has tried sleeping on several pillows but states she wakes up on her stomach without bowel taste in her mouth.  She states that she does drink water throughout the night due to dry mouth.    Diabetes mellitus type 2-  Uncontrolled with hemoglobin A1c of 8.3.  Patient currently on Jardiance and Mounjaro 7.5 mg daily    Chronic allergic rhinitis-  Stable with montelukast and cetirizine.  She denies any suicidal ideation or side effects from these medications    Dyslipidemia-  Uncontrolled with atorvastatin and low-cholesterol diet.  We did discuss her elevated triglycerides at 224.  Since diabetes is not controlled this could be related.    The following portions of the patient's history were reviewed and updated as appropriate: allergies, current medications, past family history, past medical history, past social history, past surgical history and problem list.        Objective  Vital signs:  /86 Comment: rechecked  Pulse 85   Temp 97.8 °F (36.6 °C) (Temporal)   Ht 160 cm (62.99\")   Wt 91.2 kg (201 lb)   SpO2 92%   BMI 35.62 kg/m²     Physical Exam  Vitals and nursing note reviewed.   Constitutional:       Appearance: Normal appearance. She is well-developed. She is obese.   Eyes:      Extraocular Movements: Extraocular movements intact.      Conjunctiva/sclera: Conjunctivae normal.   Cardiovascular:      Rate and Rhythm: Normal rate and regular rhythm.      Heart sounds: Normal heart sounds. No murmur heard.  Pulmonary:      Effort: Pulmonary effort is normal. No respiratory distress.      Breath sounds: Normal breath sounds. No wheezing.   Musculoskeletal:         General: No tenderness.   Skin:     General: Skin is warm and dry.      Findings: No rash.   Neurological:      Mental " Status: She is alert and oriented to person, place, and time.   Psychiatric:         Mood and Affect: Mood normal.         Behavior: Behavior normal.         Thought Content: Thought content normal.         The following data was reviewed by Camryn Mota PA-C:         Lab Results   Component Value Date    BUN 16 11/12/2024    CREATININE 0.74 11/12/2024    EGFR 73.2 01/18/2024    ALT 21 11/12/2024    AST 23 11/12/2024    WBC 14.22 (H) 01/15/2024    HGB 15.3 01/15/2024    HCT 48.6 (H) 01/15/2024     01/15/2024    TRIG 224 (H) 11/12/2024    HDL 43 11/12/2024    LDL 55 11/12/2024    TSH 3.260 01/08/2024    HGBA1C 8.30 (H) 11/12/2024           Assessment & Plan     Diagnoses and all orders for this visit:    1. Gastroesophageal reflux disease without esophagitis (Primary)  Comments:  Advised to sleep with her head elevated  Advised not to drink or eat within 3 hours of laying supine  Start famotidine  Orders:  -     Discontinue: famotidine (Pepcid) 20 MG tablet; Take 1 tablet by mouth every night at bedtime.  Dispense: 30 tablet; Refill: 5  -     famotidine (Pepcid) 20 MG tablet; Take 1 tablet by mouth every night at bedtime.  Dispense: 90 tablet; Refill: 3    2. Type 2 diabetes mellitus with hyperglycemia, without long-term current use of insulin  Comments:  Increase Mounjaro 10 mg daily  Continue Jardiance  Advised low carbohydrate diet  Orders:  -     Tirzepatide 10 MG/0.5ML solution auto-injector; Inject 10 mg under the skin into the appropriate area as directed Every 7 (Seven) Days.  Dispense: 2 mL; Refill: 3    3. Chronic allergic rhinitis  Comments:  Continue Singulair and Zyrtec  Advised to avoid known environmental allergens when possible  Orders:  -     Discontinue: cetirizine (zyrTEC) 10 MG tablet; Take 1 tablet by mouth Daily.  Dispense: 30 tablet; Refill: 5  -     cetirizine (zyrTEC) 10 MG tablet; Take 1 tablet by mouth Daily.  Dispense: 90 tablet; Refill: 3    4. Dyslipidemia  Comments:  Continue  atorvastatin and discussed triglycerides with relation to the importance of low carbohydrate diet.  Plan to treat underlying diabetes                   Patient was given instructions and counseling regarding his condition or for health maintenance advice. Please see specific information pulled into the AVS if appropriate      This document has been electronically signed by:  Camryn Mota PA-C

## 2024-11-25 ENCOUNTER — TELEPHONE (OUTPATIENT)
Dept: FAMILY MEDICINE CLINIC | Facility: CLINIC | Age: 79
End: 2024-11-25

## 2024-11-25 NOTE — TELEPHONE ENCOUNTER
Caller: Evie Shah A    Relationship to patient: Self    Best call back number: [phone]     PATIENT STATES THAT SHE SAW [facility] LAST TUESDAY FOR A COUGH, BUT IT IS GETTING WORSE AND WANTED TO SEE IF SHE COULD GET SOMETHING CALLED IN TO [facility].

## 2024-11-26 RX ORDER — DOXYCYCLINE 100 MG/1
100 CAPSULE ORAL 2 TIMES DAILY
Qty: 20 CAPSULE | Refills: 0 | Status: SHIPPED | OUTPATIENT
Start: 2024-11-26 | End: 2024-12-06

## 2025-01-14 ENCOUNTER — OFFICE VISIT (OUTPATIENT)
Dept: FAMILY MEDICINE CLINIC | Facility: CLINIC | Age: 80
End: 2025-01-14
Payer: MEDICARE

## 2025-01-14 VITALS
TEMPERATURE: 98.6 F | DIASTOLIC BLOOD PRESSURE: 80 MMHG | OXYGEN SATURATION: 95 % | WEIGHT: 198 LBS | SYSTOLIC BLOOD PRESSURE: 148 MMHG | HEIGHT: 63 IN | HEART RATE: 78 BPM | RESPIRATION RATE: 18 BRPM | BODY MASS INDEX: 35.08 KG/M2

## 2025-01-14 DIAGNOSIS — I10 ESSENTIAL HYPERTENSION: Chronic | ICD-10-CM

## 2025-01-14 DIAGNOSIS — E11.69 DM TYPE 2 WITH DIABETIC MIXED HYPERLIPIDEMIA: Chronic | ICD-10-CM

## 2025-01-14 DIAGNOSIS — E78.2 DM TYPE 2 WITH DIABETIC MIXED HYPERLIPIDEMIA: Chronic | ICD-10-CM

## 2025-01-14 DIAGNOSIS — J44.1 COPD WITH ACUTE EXACERBATION: Primary | Chronic | ICD-10-CM

## 2025-01-14 PROCEDURE — G2211 COMPLEX E/M VISIT ADD ON: HCPCS | Performed by: PHYSICIAN ASSISTANT

## 2025-01-14 PROCEDURE — 3079F DIAST BP 80-89 MM HG: CPT | Performed by: PHYSICIAN ASSISTANT

## 2025-01-14 PROCEDURE — 1159F MED LIST DOCD IN RCRD: CPT | Performed by: PHYSICIAN ASSISTANT

## 2025-01-14 PROCEDURE — 1126F AMNT PAIN NOTED NONE PRSNT: CPT | Performed by: PHYSICIAN ASSISTANT

## 2025-01-14 PROCEDURE — 99214 OFFICE O/P EST MOD 30 MIN: CPT | Performed by: PHYSICIAN ASSISTANT

## 2025-01-14 PROCEDURE — 1160F RVW MEDS BY RX/DR IN RCRD: CPT | Performed by: PHYSICIAN ASSISTANT

## 2025-01-14 PROCEDURE — 3077F SYST BP >= 140 MM HG: CPT | Performed by: PHYSICIAN ASSISTANT

## 2025-01-14 RX ORDER — PREDNISONE 20 MG/1
40 TABLET ORAL DAILY
Qty: 10 TABLET | Refills: 0 | Status: SHIPPED | OUTPATIENT
Start: 2025-01-14

## 2025-01-14 RX ORDER — DOXYCYCLINE 100 MG/1
100 CAPSULE ORAL 2 TIMES DAILY
Qty: 20 CAPSULE | Refills: 0 | Status: SHIPPED | OUTPATIENT
Start: 2025-01-14 | End: 2025-01-24

## 2025-01-14 NOTE — PROGRESS NOTES
"Chief Complaint -cough    History of Present Illness -     Evie Shah is a 79 y.o. female.     Cough-  She complains of productive cough of thick green sputum, fever and chest congestion that began 5 days ago.  Minimal relief with Zyrtec.    COPD-  Not at goal due to acute symptoms as listed above despite the use of Singulair and Zyrtec    Hypertension-  Uncontrolled today likely due to acute coughing and illness.  Patient uses amlodipine/benazepril and furosemide    Diabetes mellitus type 2-  Stable with Mounjaro Jardiance and diet    The following portions of the patient's history were reviewed and updated as appropriate: allergies, current medications, past family history, past medical history, past social history, past surgical history and problem list.        Objective  Vital signs:  /80 (BP Location: Right arm, Patient Position: Sitting, Cuff Size: Adult)   Pulse 78   Temp 98.6 °F (37 °C) (Oral)   Resp 18   Ht 160 cm (62.99\")   Wt 89.8 kg (198 lb)   SpO2 95%   BMI 35.09 kg/m²     Physical Exam  Vitals and nursing note reviewed.   Constitutional:       General: She is not in acute distress.     Appearance: Normal appearance. She is well-developed. She is not diaphoretic.   HENT:      Head: Normocephalic and atraumatic.      Right Ear: Tympanic membrane, ear canal and external ear normal.      Left Ear: Ear canal and external ear normal.      Ears:      Comments: Clear fluid noted behind left TM with out erythema or perforation     Nose: Nose normal.      Mouth/Throat:      Mouth: Mucous membranes are moist.      Pharynx: No oropharyngeal exudate or posterior oropharyngeal erythema.   Eyes:      Extraocular Movements: Extraocular movements intact.      Conjunctiva/sclera: Conjunctivae normal.   Neck:      Thyroid: No thyromegaly.   Cardiovascular:      Rate and Rhythm: Normal rate and regular rhythm.      Heart sounds: Normal heart sounds. No murmur heard.  Pulmonary:      Effort: Pulmonary " effort is normal. No respiratory distress.      Breath sounds: Wheezing present. No rales.   Musculoskeletal:         General: No tenderness.      Cervical back: Normal range of motion and neck supple.   Lymphadenopathy:      Cervical: No cervical adenopathy.   Skin:     General: Skin is warm and dry.      Findings: No rash.   Neurological:      Mental Status: She is alert and oriented to person, place, and time.   Psychiatric:         Mood and Affect: Mood normal.         Behavior: Behavior normal.         Thought Content: Thought content normal.         The following data was reviewed by Camryn Mota PA-C:         Lab Results   Component Value Date    BUN 16 11/12/2024    CREATININE 0.74 11/12/2024    EGFR 73.2 01/18/2024    ALT 21 11/12/2024    AST 23 11/12/2024    WBC 14.22 (H) 01/15/2024    HGB 15.3 01/15/2024    HCT 48.6 (H) 01/15/2024     01/15/2024    TRIG 224 (H) 11/12/2024    HDL 43 11/12/2024    LDL 55 11/12/2024    TSH 3.260 01/08/2024    HGBA1C 8.30 (H) 11/12/2024           Assessment & Plan     Diagnoses and all orders for this visit:    1. COPD with acute exacerbation (Primary)  Comments:  Advised to use albuterol inhaler every 6 hours as needed  Orders:  -     doxycycline (VIBRAMYCIN) 100 MG capsule; Take 1 capsule by mouth 2 (Two) Times a Day for 10 days.  Dispense: 20 capsule; Refill: 0  -     predniSONE (DELTASONE) 20 MG tablet; Take 2 tablets by mouth Daily. 2 daily  Dispense: 10 tablet; Refill: 0    2. Essential hypertension  Comments:  Likely elevated today due to acute illness  Continue current medications (amlodipine, benazepril, furosemide)  Advise daily BP and pulse monitoring    3. DM type 2 with diabetic mixed hyperlipidemia  Comments:  Continue Mounjaro Jardiance and diet  Advised patient prednisone may temporarily elevate glucose levels  Advise low-carb diet        Class 2 Severe Obesity (BMI >=35 and <=39.9). Obesity-related health conditions include the following: hypertension,  coronary heart disease, diabetes mellitus, and dyslipidemias. Obesity is unchanged. BMI is is above average; BMI management plan is completed. We discussed portion control and increasing exercise.          Patient was given instructions and counseling regarding his condition or for health maintenance advice. Please see specific information pulled into the AVS if appropriate      This document has been electronically signed by:  Camryn Mota PA-C

## 2025-01-14 NOTE — PATIENT INSTRUCTIONS
Carbohydrate Counting for Diabetes Mellitus, Adult  Carbohydrate counting is a method of keeping track of how many carbohydrates you eat. Eating carbohydrates increases the amount of sugar (glucose) in the blood. Counting how many carbohydrates you eat improves how well you manage your blood glucose. This, in turn, helps you manage your diabetes.  Carbohydrates are measured in grams (g) per serving. It is important to know how many carbohydrates (in grams or by serving size) you can have in each meal. This is different for every person. A dietitian can help you make a meal plan and calculate how many carbohydrates you should have at each meal and snack.  What foods contain carbohydrates?  Carbohydrates are found in the following foods:  Grains, such as breads and cereals.  Dried beans and soy products.  Starchy vegetables, such as potatoes, peas, and corn.  Fruit and fruit juices.  Milk and yogurt.  Sweets and snack foods, such as cake, cookies, candy, chips, and soft drinks.  How do I count carbohydrates in foods?  There are two ways to count carbohydrates in food. You can read food labels or learn standard serving sizes of foods. You can use either of these methods or a combination of both.  Using the Nutrition Facts label  The Nutrition Facts list is included on the labels of almost all packaged foods and beverages in the [place]. It includes:  The serving size.  Information about nutrients in each serving, including the grams of carbohydrate per serving.  To use the Nutrition Facts, decide how many servings you will have. Then, multiply the number of servings by the number of carbohydrates per serving. The resulting number is the total grams of carbohydrates that you will be having.  Learning the standard serving sizes of foods  When you eat carbohydrate foods that are not packaged or do not include Nutrition Facts on the label, you need to measure the servings in order to count the grams of  carbohydrates.  Measure the foods that you will eat with a food scale or measuring cup, if needed.  Decide how many standard-size servings you will eat.  Multiply the number of servings by 15. For foods that contain carbohydrates, one serving equals 15 g of carbohydrates.  For example, if you eat 2 cups or 10 oz (300 g) of strawberries, you will have eaten 2 servings and 30 g of carbohydrates (2 servings x 15 g = 30 g).  For foods that have more than one food mixed, such as soups and casseroles, you must count the carbohydrates in each food that is included.  The following list contains standard serving sizes of common carbohydrate-rich foods. Each of these servings has about 15 g of carbohydrates:  1 slice of bread.  1 six-inch (15 cm) tortilla.  ? cup or 2 oz (53 g) cooked rice or pasta.  ½ cup or 3 oz (85 g) cooked or canned, drained and rinsed beans or lentils.  ½ cup or 3 oz (85 g) starchy vegetable, such as peas, corn, or squash.  ½ cup or 4 oz (120 g) hot cereal.  ½ cup or 3 oz (85 g) boiled or mashed potatoes, or ¼ or 3 oz (85 g) of a large baked potato.  ½ cup or 4 fl oz (118 mL) fruit juice.  1 cup or 8 fl oz (237 mL) milk.  1 small or 4 oz (106 g) apple.  ½ or 2 oz (63 g) of a medium banana.  1 cup or 5 oz (150 g) strawberries.  3 cups or 1 oz (28.3 g) popped popcorn.  What is an example of carbohydrate counting?  To calculate the grams of carbohydrates in this sample meal, follow the steps shown below.  Sample meal  3 oz (85 g) chicken breast.  ? cup or 4 oz (106 g) brown rice.  ½ cup or 3 oz (85 g) corn.  1 cup or 8 fl oz (237 mL) milk.  1 cup or 5 oz (150 g) strawberries with sugar-free whipped topping.  Carbohydrate calculation  Identify the foods that contain carbohydrates:  Rice.  Corn.  Milk.  Strawberries.  Calculate how many servings you have of each food:  2 servings rice.  1 serving corn.  1 serving milk.  1 serving strawberries.  Multiply each number of servings by 15  servings rice x 15  g = 30 g.  1 serving corn x 15 g = 15 g.  1 serving milk x 15 g = 15 g.  1 serving strawberries x 15 g = 15 g.  Add together all of the amounts to find the total grams of carbohydrates eaten:  30 g + 15 g + 15 g + 15 g = 75 g of carbohydrates total.  What are tips for following this plan?  Shopping  Develop a meal plan and then make a shopping list.  Buy fresh and frozen vegetables, fresh and frozen fruit, dairy, eggs, beans, lentils, and whole grains.  Look at food labels. Choose foods that have more fiber and less sugar.  Avoid processed foods and foods with added sugars.  Meal planning  Aim to have the same number of grams of carbohydrates at each meal and for each snack time.  Plan to have regular, balanced meals and snacks.  Where to find more information  American Diabetes Association: diabetes.org  Centers for Disease Control and Prevention: cdc.gov  Academy of Nutrition and Dietetics: eatright.org  Association of Diabetes Care & Education Specialists: diabeteseducator.org  Summary  Carbohydrate counting is a method of keeping track of how many carbohydrates you eat.  Eating carbohydrates increases the amount of sugar (glucose) in your blood.  Counting how many carbohydrates you eat improves how well you manage your blood glucose. This helps you manage your diabetes.  A dietitian can help you make a meal plan and calculate how many carbohydrates you should have at each meal and snack.  This information is not intended to replace advice given to you by your health care provider. Make sure you discuss any questions you have with your health care provider.  Document Revised: 07/20/2021 Document Reviewed: 07/21/2021  [employer] Patient Education © 2024 [employer]Fat and Cholesterol Restricted Eating Plan  Getting too much fat and cholesterol in your diet may cause health problems. Choosing the right foods helps keep your fat and cholesterol at normal levels. This can keep you from getting certain diseases.  Your  "doctor may recommend an eating plan that includes:  Total fat: ______% or less of total calories a day. This is ______g of fat a day.  Saturated fat: ______% or less of total calories a day. This is ______g of saturated fat a day.  Cholesterol: less than _________mg a day.  Fiber: ______g a day.  What are tips for following this plan?  General tips  Work with your doctor to lose weight if you need to.  Avoid:  Foods with added sugar.  Fried foods.  Foods with trans fat or partially hydrogenated oils. This includes some margarines and baked goods.  If you drink alcohol:  Limit how much you have to:  0-1 drink a day for women who are not pregnant.  0-2 drinks a day for men.  Know how much alcohol is in a drink. In the [place] one drink equals one 12 oz bottle of beer (355 mL), one 5 oz glass of wine (148 mL), or one 1½ oz glass of hard liquor (44 mL).  Reading food labels  Check food labels for:  Trans fats.  Partially hydrogenated oils.  Saturated fat (g) in each serving.  Cholesterol (mg) in each serving.  Fiber (g) in each serving.  Choose foods with healthy fats, such as:  Monounsaturated fats and polyunsaturated fats. These include olive and canola oil, flaxseeds, walnuts, almonds, and seeds.  Omega-3 fats. These are found in certain fish, flaxseed oil, and ground flaxseeds.  Choose grain products that have whole grains. Look for the word \"whole\" as the first word in the ingredient list.  Cooking  Cook foods using low-fat methods. These include baking, boiling, grilling, and broiling.  Eat more home-cooked foods. Eat at restaurants and buffets less often. Eat less fast food.  Avoid cooking using saturated fats, such as butter, cream, palm oil, palm kernel oil, and coconut oil.  Meal planning    At meals, divide your plate into four equal parts:  Fill one-half of your plate with vegetables, green salads, and fruit.  Fill one-fourth of your plate with whole grains.  Fill one-fourth of your plate with low-fat (lean) " protein foods.  Eat fish that is high in omega-3 fats at least two times a week. This includes mackerel, tuna, sardines, and salmon.  Eat foods that are high in fiber, such as whole grains, beans, apples, pears, berries, broccoli, carrots, peas, and barley.  What foods should I eat?  Fruits  All fresh, canned (in natural juice), or frozen fruits.  Vegetables  Fresh or frozen vegetables (raw, steamed, roasted, or grilled). Green salads.  Grains  Whole grains, such as whole wheat or whole grain breads, crackers, cereals, and pasta. Unsweetened oatmeal, bulgur, barley, quinoa, or brown rice. Corn or whole wheat flour tortillas.  Meats and other protein foods  Ground beef (85% or leaner), grass-fed beef, or beef trimmed of fat. Skinless chicken or turkey. Ground chicken or turkey. Pork trimmed of fat. All fish and seafood. Egg whites. Dried beans, peas, or lentils. Unsalted nuts or seeds. Unsalted canned beans. Nut butters without added sugar or oil.  Dairy  Low-fat or nonfat dairy products, such as skim or 1% milk, 2% or reduced-fat cheeses, low-fat and fat-free ricotta or cottage cheese, or plain low-fat and nonfat yogurt.  Fats and oils  Tub margarine without trans fats. Light or reduced-fat mayonnaise and salad dressings. Avocado. Olive, canola, sesame, or safflower oils.  The items listed above may not be a complete list of foods and beverages you can eat. Contact a dietitian for more information.  What foods should I avoid?  Fruits  Canned fruit in heavy syrup. Fruit in cream or butter sauce. Fried fruit.  Vegetables  Vegetables cooked in cheese, cream, or butter sauce. Fried vegetables.  Grains  White bread. White pasta. White rice. Cornbread. Bagels, pastries, and croissants. Crackers and snack foods that contain trans fat and hydrogenated oils.  Meats and other protein foods  Fatty cuts of meat. Ribs, chicken wings, palomo, sausage, bologna, salami, chitterlings, fatback, hot dogs, bratwurst, and packaged lunch  meats. Liver and organ meats. Whole eggs and egg yolks. Chicken and turkey with skin. Fried meat.  Dairy  Whole or 2% milk, cream, half-and-half, and cream cheese. Whole milk cheeses. Whole-fat or sweetened yogurt. Full-fat cheeses. Nondairy creamers and whipped toppings. Processed cheese, cheese spreads, and cheese curds.  Fats and oils  Butter, stick margarine, lard, shortening, ghee, or palomo fat. Coconut, palm kernel, and palm oils.  Beverages  Alcohol. Sugar-sweetened drinks such as sodas, lemonade, and fruit drinks.  Sweets and desserts  Corn syrup, sugars, honey, and molasses. Candy. Jam and jelly. Syrup. Sweetened cereals. Cookies, pies, cakes, donuts, muffins, and ice cream.  The items listed above may not be a complete list of foods and beverages you should avoid. Contact a dietitian for more information.  Summary  Choosing the right foods helps keep your fat and cholesterol at normal levels. This can keep you from getting certain diseases.  At meals, fill one-half of your plate with vegetables, green salads, and fruits.  Eat high fiber foods, like whole grains, beans, apples, pears, berries, carrots, peas, and barley.  Limit added sugar, saturated fats, alcohol, and fried foods.  This information is not intended to replace advice given to you by your health care provider. Make sure you discuss any questions you have with your health care provider.  Document Revised: 04/29/2022 Document Reviewed: 04/29/2022  Elsevier Patient Education © 2024 [employer]

## 2025-01-15 DIAGNOSIS — J96.11 CHRONIC RESPIRATORY FAILURE WITH HYPOXIA: Chronic | ICD-10-CM

## 2025-01-15 NOTE — TELEPHONE ENCOUNTER
Caller: Evie Shah    Relationship: Self    Best call back number: [phone]    Requested Prescriptions:   Requested Prescriptions     Pending Prescriptions Disp Refills    albuterol sulfate  (90 Base) MCG/ACT inhaler 18 g 5     Sig: Inhale 2 puffs Every 4 (Four) Hours As Needed for Shortness of Air.        Pharmacy where request should be sent: [address] [phone] [facility] [phone]      Last office visit with prescribing clinician: 1/14/2025   Last telemedicine visit with prescribing clinician: Visit date not found   Next office visit with prescribing clinician: 4/14/2025     Additional details provided by patient: ALMOST OUT OF OLDER ONES. WOULD LIKE TO HAVE IT BEFORE SNOW COMES IN.    Does the patient have less than a 3 day supply:  [x] Yes  [] No    Linda Ibarra MA   01/15/25 13:01 EST

## 2025-01-16 RX ORDER — ALBUTEROL SULFATE 90 UG/1
2 INHALANT RESPIRATORY (INHALATION) EVERY 4 HOURS PRN
Qty: 18 G | Refills: 5 | Status: SHIPPED | OUTPATIENT
Start: 2025-01-16

## 2025-03-03 DIAGNOSIS — E11.65 TYPE 2 DIABETES MELLITUS WITH HYPERGLYCEMIA, WITHOUT LONG-TERM CURRENT USE OF INSULIN: Chronic | ICD-10-CM

## 2025-03-03 NOTE — TELEPHONE ENCOUNTER
Caller: Evie Shah    Relationship: Self    Best call back number: [phone]     Requested Prescriptions:   Requested Prescriptions     Pending Prescriptions Disp Refills    Tirzepatide 10 MG/0.5ML solution auto-injector 2 mL 3     Sig: Inject 10 mg under the skin into the appropriate area as directed Every 7 (Seven) Days.    [facility]    Pharmacy where request should be sent: [address] [phone] [facility] [phone] [facility]     Last office visit with prescribing clinician: 1/14/2025   Last telemedicine visit with prescribing clinician: Visit date not found   Next office visit with prescribing clinician: 4/14/2025     Additional details provided by patient: NEEDS [email or web address], SHOT IS DUE [id number]    Does the patient have less than a 3 day supply:  [x] Yes  [] No    Would you like a call back once the refill request has been completed: [] Yes [] No    If the office needs to give you a call back, can they leave a voicemail: [] Yes [] No    Laith Corona Rep   03/03/25 10:36 EST

## 2025-03-24 ENCOUNTER — OFFICE VISIT (OUTPATIENT)
Dept: FAMILY MEDICINE CLINIC | Facility: CLINIC | Age: 80
End: 2025-03-24
Payer: MEDICARE

## 2025-03-24 VITALS
HEART RATE: 74 BPM | WEIGHT: 195 LBS | DIASTOLIC BLOOD PRESSURE: 80 MMHG | HEIGHT: 63 IN | OXYGEN SATURATION: 88 % | RESPIRATION RATE: 16 BRPM | BODY MASS INDEX: 34.55 KG/M2 | SYSTOLIC BLOOD PRESSURE: 142 MMHG | TEMPERATURE: 98 F

## 2025-03-24 DIAGNOSIS — F17.211 CIGARETTE NICOTINE DEPENDENCE IN REMISSION: ICD-10-CM

## 2025-03-24 DIAGNOSIS — E11.65 TYPE 2 DIABETES MELLITUS WITH HYPERGLYCEMIA, WITHOUT LONG-TERM CURRENT USE OF INSULIN: Chronic | ICD-10-CM

## 2025-03-24 DIAGNOSIS — J44.1 COPD WITH ACUTE EXACERBATION: Primary | Chronic | ICD-10-CM

## 2025-03-24 PROCEDURE — 1159F MED LIST DOCD IN RCRD: CPT | Performed by: PHYSICIAN ASSISTANT

## 2025-03-24 PROCEDURE — 3077F SYST BP >= 140 MM HG: CPT | Performed by: PHYSICIAN ASSISTANT

## 2025-03-24 PROCEDURE — 82570 ASSAY OF URINE CREATININE: CPT | Performed by: PHYSICIAN ASSISTANT

## 2025-03-24 PROCEDURE — 82043 UR ALBUMIN QUANTITATIVE: CPT | Performed by: PHYSICIAN ASSISTANT

## 2025-03-24 PROCEDURE — 1160F RVW MEDS BY RX/DR IN RCRD: CPT | Performed by: PHYSICIAN ASSISTANT

## 2025-03-24 PROCEDURE — 99214 OFFICE O/P EST MOD 30 MIN: CPT | Performed by: PHYSICIAN ASSISTANT

## 2025-03-24 PROCEDURE — 1126F AMNT PAIN NOTED NONE PRSNT: CPT | Performed by: PHYSICIAN ASSISTANT

## 2025-03-24 PROCEDURE — 3079F DIAST BP 80-89 MM HG: CPT | Performed by: PHYSICIAN ASSISTANT

## 2025-03-24 PROCEDURE — G2211 COMPLEX E/M VISIT ADD ON: HCPCS | Performed by: PHYSICIAN ASSISTANT

## 2025-03-24 RX ORDER — PREDNISONE 20 MG/1
40 TABLET ORAL DAILY
Qty: 10 TABLET | Refills: 0 | Status: SHIPPED | OUTPATIENT
Start: 2025-03-24

## 2025-03-24 RX ORDER — DOXYCYCLINE 100 MG/1
100 CAPSULE ORAL 2 TIMES DAILY
Qty: 20 CAPSULE | Refills: 0 | Status: SHIPPED | OUTPATIENT
Start: 2025-03-24 | End: 2025-04-03

## 2025-03-24 RX ORDER — DEXTROMETHORPHAN HYDROBROMIDE AND PROMETHAZINE HYDROCHLORIDE 15; 6.25 MG/5ML; MG/5ML
5 SYRUP ORAL 4 TIMES DAILY PRN
Qty: 180 ML | Refills: 0 | Status: SHIPPED | OUTPATIENT
Start: 2025-03-24

## 2025-03-24 NOTE — PATIENT INSTRUCTIONS
Carbohydrate Counting for Diabetes Mellitus, Adult  Carbohydrate counting is a method of keeping track of how many carbohydrates you eat. Eating carbohydrates increases the amount of sugar (glucose) in the blood. Counting how many carbohydrates you eat improves how well you manage your blood glucose. This, in turn, helps you manage your diabetes.  Carbohydrates are measured in grams (g) per serving. It is important to know how many carbohydrates (in grams or by serving size) you can have in each meal. This is different for every person. A dietitian can help you make a meal plan and calculate how many carbohydrates you should have at each meal and snack.  What foods contain carbohydrates?  Carbohydrates are found in the following foods:  Grains, such as breads and cereals.  Dried beans and soy products.  Starchy vegetables, such as potatoes, peas, and corn.  Fruit and fruit juices.  Milk and yogurt.  Sweets and snack foods, such as cake, cookies, candy, chips, and soft drinks.  How do I count carbohydrates in foods?  There are two ways to count carbohydrates in food. You can read food labels or learn standard serving sizes of foods. You can use either of these methods or a combination of both.  Using the Nutrition Facts label  The Nutrition Facts list is included on the labels of almost all packaged foods and beverages in the [place]. It includes:  The serving size.  Information about nutrients in each serving, including the grams of carbohydrate per serving.  To use the Nutrition Facts, decide how many servings you will have. Then, multiply the number of servings by the number of carbohydrates per serving. The resulting number is the total grams of carbohydrates that you will be having.  Learning the standard serving sizes of foods  When you eat carbohydrate foods that are not packaged or do not include Nutrition Facts on the label, you need to measure the servings in order to count the grams of  carbohydrates.  Measure the foods that you will eat with a food scale or measuring cup, if needed.  Decide how many standard-size servings you will eat.  Multiply the number of servings by 15. For foods that contain carbohydrates, one serving equals 15 g of carbohydrates.  For example, if you eat 2 cups or 10 oz (300 g) of strawberries, you will have eaten 2 servings and 30 g of carbohydrates (2 servings x 15 g = 30 g).  For foods that have more than one food mixed, such as soups and casseroles, you must count the carbohydrates in each food that is included.  The following list contains standard serving sizes of common carbohydrate-rich foods. Each of these servings has about 15 g of carbohydrates:  1 slice of bread.  1 six-inch (15 cm) tortilla.  ? cup or 2 oz (53 g) cooked rice or pasta.  ½ cup or 3 oz (85 g) cooked or canned, drained and rinsed beans or lentils.  ½ cup or 3 oz (85 g) starchy vegetable, such as peas, corn, or squash.  ½ cup or 4 oz (120 g) hot cereal.  ½ cup or 3 oz (85 g) boiled or mashed potatoes, or ¼ or 3 oz (85 g) of a large baked potato.  ½ cup or 4 fl oz (118 mL) fruit juice.  1 cup or 8 fl oz (237 mL) milk.  1 small or 4 oz (106 g) apple.  ½ or 2 oz (63 g) of a medium banana.  1 cup or 5 oz (150 g) strawberries.  3 cups or 1 oz (28.3 g) popped popcorn.  What is an example of carbohydrate counting?  To calculate the grams of carbohydrates in this sample meal, follow the steps shown below.  Sample meal  3 oz (85 g) chicken breast.  ? cup or 4 oz (106 g) brown rice.  ½ cup or 3 oz (85 g) corn.  1 cup or 8 fl oz (237 mL) milk.  1 cup or 5 oz (150 g) strawberries with sugar-free whipped topping.  Carbohydrate calculation  Identify the foods that contain carbohydrates:  Rice.  Corn.  Milk.  Strawberries.  Calculate how many servings you have of each food:  2 servings rice.  1 serving corn.  1 serving milk.  1 serving strawberries.  Multiply each number of servings by 15  servings rice x 15  g = 30 g.  1 serving corn x 15 g = 15 g.  1 serving milk x 15 g = 15 g.  1 serving strawberries x 15 g = 15 g.  Add together all of the amounts to find the total grams of carbohydrates eaten:  30 g + 15 g + 15 g + 15 g = 75 g of carbohydrates total.  What are tips for following this plan?  Shopping  Develop a meal plan and then make a shopping list.  Buy fresh and frozen vegetables, fresh and frozen fruit, dairy, eggs, beans, lentils, and whole grains.  Look at food labels. Choose foods that have more fiber and less sugar.  Avoid processed foods and foods with added sugars.  Meal planning  Aim to have the same number of grams of carbohydrates at each meal and for each snack time.  Plan to have regular, balanced meals and snacks.  Where to find more information  American Diabetes Association: diabetes.org  Centers for Disease Control and Prevention: cdc.gov  Academy of Nutrition and Dietetics: eatright.org  Association of Diabetes Care & Education Specialists: diabeteseducator.org  Summary  Carbohydrate counting is a method of keeping track of how many carbohydrates you eat.  Eating carbohydrates increases the amount of sugar (glucose) in your blood.  Counting how many carbohydrates you eat improves how well you manage your blood glucose. This helps you manage your diabetes.  A dietitian can help you make a meal plan and calculate how many carbohydrates you should have at each meal and snack.  This information is not intended to replace advice given to you by your health care provider. Make sure you discuss any questions you have with your health care provider.  Document Revised: 07/20/2021 Document Reviewed: 07/21/2021  Elsevier Patient Education © 2024 [employer]

## 2025-03-24 NOTE — PROGRESS NOTES
"Chief Complaint -  cough    History of Present Illness -     Evie Shah is a 79 y.o. female.     Cough-  She complains of productive cough that began 2 days ago.  She has chest congestion and shortness of breath.  She has been using her oxygen 2 L at home and DuoNeb nebulizer treatment with some relief.    COPD-  Not at goal due to acute cough and shortness of breath.  She is a previous smoker.  She has associated wheezing.  She denies any fever.    Former smoker-  Patient quit smoking 5 years ago.  She had smoked 1 pack/day for 50 years prior to cessation.    Diabetes mellitus type 2-  Not at goal with glucose elevated likely due to acute illness.  Patient is on Mounjaro and reports no unwanted side effects.      The following portions of the patient's history were reviewed and updated as appropriate: allergies, current medications, past family history, past medical history, past social history, past surgical history and problem list.        Objective  Vital signs:  /80 (BP Location: Left arm, Patient Position: Sitting, Cuff Size: Adult)   Pulse 74   Temp 98 °F (36.7 °C) (Temporal)   Resp 16   Ht 160 cm (62.99\")   Wt 88.5 kg (195 lb)   SpO2 (!) 88%   BMI 34.55 kg/m²     Physical Exam  Vitals and nursing note reviewed.   Constitutional:       General: She is not in acute distress.     Appearance: Normal appearance. She is well-developed. She is not diaphoretic.   HENT:      Head: Normocephalic and atraumatic.      Right Ear: Tympanic membrane, ear canal and external ear normal.      Left Ear: Ear canal and external ear normal.      Ears:      Comments: Small amount of clear fluid noted behind left TM without bulging, erythema, or perforation noted     Nose: Nose normal.      Mouth/Throat:      Mouth: Mucous membranes are moist.      Pharynx: Posterior oropharyngeal erythema present. No oropharyngeal exudate.   Neck:      Thyroid: No thyromegaly.   Cardiovascular:      Rate and Rhythm: Normal " rate and regular rhythm.      Heart sounds: Normal heart sounds. No murmur heard.  Pulmonary:      Effort: Pulmonary effort is normal.      Breath sounds: Wheezing present. No rales.   Musculoskeletal:      Cervical back: Normal range of motion and neck supple.   Lymphadenopathy:      Cervical: No cervical adenopathy.   Skin:     General: Skin is warm and dry.      Findings: No rash.   Neurological:      Mental Status: She is alert and oriented to person, place, and time.   Psychiatric:         Mood and Affect: Mood normal.         Behavior: Behavior normal.         Thought Content: Thought content normal.         The following data was reviewed by Camryn Mota PA-C:         Lab Results   Component Value Date    BUN 16 11/12/2024    CREATININE 0.74 11/12/2024    EGFR 83.4 11/12/2024    ALT 21 11/12/2024    AST 23 11/12/2024    WBC 14.22 (H) 01/15/2024    HGB 15.3 01/15/2024    HCT 48.6 (H) 01/15/2024     01/15/2024    TRIG 224 (H) 11/12/2024    HDL 43 11/12/2024    LDL 55 11/12/2024    TSH 3.260 01/08/2024    HGBA1C 8.30 (H) 11/12/2024           Assessment & Plan     Diagnoses and all orders for this visit:    1. COPD with acute exacerbation (Primary)  Comments:  Advised to use albuterol inhaler every 6 hours as needed  Orders:  -     doxycycline (VIBRAMYCIN) 100 MG capsule; Take 1 capsule by mouth 2 (Two) Times a Day for 10 days.  Dispense: 20 capsule; Refill: 0  -     predniSONE (DELTASONE) 20 MG tablet; Take 2 tablets by mouth Daily. 2 daily  Dispense: 10 tablet; Refill: 0  -     promethazine-dextromethorphan (PROMETHAZINE-DM) 6.25-15 MG/5ML syrup; Take 5 mL by mouth 4 (Four) Times a Day As Needed for Cough.  Dispense: 180 mL; Refill: 0    2. Type 2 diabetes mellitus with hyperglycemia, without long-term current use of insulin  Comments:  Advised that prednisone may temporarily elevate glucose levels  Continue Mounjaro and low-carb diet  Orders:  -     Microalbumin / Creatinine Urine Ratio - Urine, Clean  Catch; Future  -     Microalbumin / Creatinine Urine Ratio - Urine, Clean Catch    3. Cigarette nicotine dependence in remission  -     CT Chest Low Dose Wo; Future                   Patient was given instructions and counseling regarding his condition or for health maintenance advice. Please see specific information pulled into the AVS if appropriate      This document has been electronically signed by:  Camryn Mota PA-C

## 2025-03-25 LAB
ALBUMIN UR-MCNC: <1.2 MG/DL
CREAT UR-MCNC: 10.3 MG/DL
MICROALBUMIN/CREAT UR: NORMAL MG/G{CREAT}

## 2025-04-02 DIAGNOSIS — M81.0 SENILE OSTEOPOROSIS: Chronic | ICD-10-CM

## 2025-04-03 RX ORDER — ALENDRONATE SODIUM 70 MG/1
70 TABLET ORAL WEEKLY
Qty: 12 TABLET | Refills: 0 | Status: SHIPPED | OUTPATIENT
Start: 2025-04-03

## 2025-04-07 ENCOUNTER — HOSPITAL ENCOUNTER (OUTPATIENT)
Facility: HOSPITAL | Age: 80
Discharge: HOME | End: 2025-04-07
Admitting: PHYSICIAN ASSISTANT
Payer: MEDICARE

## 2025-04-07 DIAGNOSIS — E78.2 DM TYPE 2 WITH DIABETIC MIXED HYPERLIPIDEMIA: ICD-10-CM

## 2025-04-07 DIAGNOSIS — D45 POLYCYTHEMIA VERA: ICD-10-CM

## 2025-04-07 DIAGNOSIS — I10 ESSENTIAL HYPERTENSION: ICD-10-CM

## 2025-04-07 DIAGNOSIS — E78.5 DYSLIPIDEMIA: ICD-10-CM

## 2025-04-07 DIAGNOSIS — F17.211 CIGARETTE NICOTINE DEPENDENCE IN REMISSION: ICD-10-CM

## 2025-04-07 DIAGNOSIS — E01.0 THYROMEGALY: Primary | ICD-10-CM

## 2025-04-07 DIAGNOSIS — E11.69 DM TYPE 2 WITH DIABETIC MIXED HYPERLIPIDEMIA: ICD-10-CM

## 2025-04-07 PROCEDURE — 71271 CT THORAX LUNG CANCER SCR C-: CPT | Performed by: RADIOLOGY

## 2025-04-07 PROCEDURE — 71271 CT THORAX LUNG CANCER SCR C-: CPT

## 2025-04-08 ENCOUNTER — LAB (OUTPATIENT)
Dept: FAMILY MEDICINE CLINIC | Facility: CLINIC | Age: 80
End: 2025-04-08
Payer: MEDICARE

## 2025-04-08 DIAGNOSIS — E11.69 DM TYPE 2 WITH DIABETIC MIXED HYPERLIPIDEMIA: Primary | ICD-10-CM

## 2025-04-08 DIAGNOSIS — E11.65 TYPE 2 DIABETES MELLITUS WITH HYPERGLYCEMIA, WITHOUT LONG-TERM CURRENT USE OF INSULIN: ICD-10-CM

## 2025-04-08 DIAGNOSIS — E78.2 DM TYPE 2 WITH DIABETIC MIXED HYPERLIPIDEMIA: Primary | ICD-10-CM

## 2025-04-08 DIAGNOSIS — I10 ESSENTIAL HYPERTENSION: ICD-10-CM

## 2025-04-08 LAB
ALBUMIN SERPL-MCNC: 4 G/DL (ref 3.5–5.2)
ALBUMIN/GLOB SERPL: 1.8 G/DL
ALP SERPL-CCNC: 103 U/L (ref 39–117)
ALT SERPL-CCNC: 14 U/L (ref 1–33)
AST SERPL-CCNC: 15 U/L (ref 1–32)
BASOPHILS # BLD AUTO: 0.05 10*3/MM3 (ref 0–0.2)
BASOPHILS NFR BLD AUTO: 0.6 % (ref 0–1.5)
BILIRUB SERPL-MCNC: 0.8 MG/DL (ref 0–1.2)
BUN SERPL-MCNC: 15 MG/DL (ref 8–23)
BUN/CREAT SERPL: 16.1 (ref 7–25)
CALCIUM SERPL-MCNC: 9.2 MG/DL (ref 8.6–10.5)
CHLORIDE SERPL-SCNC: 104 MMOL/L (ref 98–107)
CHOLEST SERPL-MCNC: 136 MG/DL (ref 0–200)
CO2 SERPL-SCNC: 25.4 MMOL/L (ref 22–29)
CREAT SERPL-MCNC: 0.93 MG/DL (ref 0.57–1)
EGFRCR SERPLBLD CKD-EPI 2021: 63.4 ML/MIN/1.73
EOSINOPHIL # BLD AUTO: 0.26 10*3/MM3 (ref 0–0.4)
EOSINOPHIL NFR BLD AUTO: 3 % (ref 0.3–6.2)
ERYTHROCYTE [DISTWIDTH] IN BLOOD BY AUTOMATED COUNT: 13.1 % (ref 12.3–15.4)
GLOBULIN SER CALC-MCNC: 2.2 GM/DL
GLUCOSE SERPL-MCNC: 184 MG/DL (ref 65–99)
HBA1C MFR BLD: 8.3 % (ref 4.8–5.6)
HCT VFR BLD AUTO: 47.8 % (ref 34–46.6)
HDLC SERPL-MCNC: 38 MG/DL (ref 40–60)
HGB BLD-MCNC: 15.5 G/DL (ref 12–15.9)
IMM GRANULOCYTES # BLD AUTO: 0.02 10*3/MM3 (ref 0–0.05)
IMM GRANULOCYTES NFR BLD AUTO: 0.2 % (ref 0–0.5)
LDLC SERPL CALC-MCNC: 56 MG/DL (ref 0–100)
LYMPHOCYTES # BLD AUTO: 2.45 10*3/MM3 (ref 0.7–3.1)
LYMPHOCYTES NFR BLD AUTO: 28.7 % (ref 19.6–45.3)
MCH RBC QN AUTO: 30 PG (ref 26.6–33)
MCHC RBC AUTO-ENTMCNC: 32.4 G/DL (ref 31.5–35.7)
MCV RBC AUTO: 92.6 FL (ref 79–97)
MONOCYTES # BLD AUTO: 0.73 10*3/MM3 (ref 0.1–0.9)
MONOCYTES NFR BLD AUTO: 8.5 % (ref 5–12)
NEUTROPHILS # BLD AUTO: 5.04 10*3/MM3 (ref 1.7–7)
NEUTROPHILS NFR BLD AUTO: 59 % (ref 42.7–76)
NRBC BLD AUTO-RTO: 0 /100 WBC (ref 0–0.2)
PLATELET # BLD AUTO: 196 10*3/MM3 (ref 140–450)
POTASSIUM SERPL-SCNC: 3.9 MMOL/L (ref 3.5–5.2)
PROT SERPL-MCNC: 6.2 G/DL (ref 6–8.5)
RBC # BLD AUTO: 5.16 10*6/MM3 (ref 3.77–5.28)
SODIUM SERPL-SCNC: 143 MMOL/L (ref 136–145)
T4 FREE SERPL-MCNC: 1.23 NG/DL (ref 0.92–1.68)
TRIGL SERPL-MCNC: 265 MG/DL (ref 0–150)
TSH SERPL DL<=0.005 MIU/L-ACNC: 4.41 UIU/ML (ref 0.27–4.2)
VLDLC SERPL CALC-MCNC: 42 MG/DL (ref 5–40)
WBC # BLD AUTO: 8.55 10*3/MM3 (ref 3.4–10.8)

## 2025-04-09 LAB
ALBUMIN/CREAT UR: 14 MG/G CREAT (ref 0–29)
CREAT UR-MCNC: 87.1 MG/DL
MICROALBUMIN UR-MCNC: 11.8 UG/ML

## 2025-04-14 ENCOUNTER — TELEPHONE (OUTPATIENT)
Dept: FAMILY MEDICINE CLINIC | Facility: CLINIC | Age: 80
End: 2025-04-14

## 2025-04-14 ENCOUNTER — OFFICE VISIT (OUTPATIENT)
Dept: FAMILY MEDICINE CLINIC | Facility: CLINIC | Age: 80
End: 2025-04-14
Payer: MEDICARE

## 2025-04-14 VITALS
HEART RATE: 71 BPM | OXYGEN SATURATION: 95 % | SYSTOLIC BLOOD PRESSURE: 130 MMHG | DIASTOLIC BLOOD PRESSURE: 78 MMHG | WEIGHT: 191.6 LBS | HEIGHT: 63 IN | TEMPERATURE: 98 F | BODY MASS INDEX: 33.95 KG/M2

## 2025-04-14 DIAGNOSIS — I73.9 PAD (PERIPHERAL ARTERY DISEASE): Chronic | ICD-10-CM

## 2025-04-14 DIAGNOSIS — E11.22 TYPE 2 DIABETES MELLITUS WITH STAGE 2 CHRONIC KIDNEY DISEASE, WITHOUT LONG-TERM CURRENT USE OF INSULIN: Primary | Chronic | ICD-10-CM

## 2025-04-14 DIAGNOSIS — E01.0 THYROMEGALY: Chronic | ICD-10-CM

## 2025-04-14 DIAGNOSIS — N18.2 TYPE 2 DIABETES MELLITUS WITH STAGE 2 CHRONIC KIDNEY DISEASE, WITHOUT LONG-TERM CURRENT USE OF INSULIN: Primary | Chronic | ICD-10-CM

## 2025-04-14 DIAGNOSIS — I25.10 CORONARY ARTERY CALCIFICATION SEEN ON CT SCAN: Chronic | ICD-10-CM

## 2025-04-14 RX ORDER — DAPAGLIFLOZIN 5 MG/1
5 TABLET, FILM COATED ORAL EVERY MORNING
Qty: 30 TABLET | Refills: 5 | Status: SHIPPED | OUTPATIENT
Start: 2025-04-14

## 2025-04-14 NOTE — PATIENT INSTRUCTIONS
Carbohydrate Counting for Diabetes Mellitus, Adult  Carbohydrate counting is a method of keeping track of how many carbohydrates you eat. Eating carbohydrates increases the amount of sugar (glucose) in the blood. Counting how many carbohydrates you eat improves how well you manage your blood glucose. This, in turn, helps you manage your diabetes.  Carbohydrates are measured in grams (g) per serving. It is important to know how many carbohydrates (in grams or by serving size) you can have in each meal. This is different for every person. A dietitian can help you make a meal plan and calculate how many carbohydrates you should have at each meal and snack.  What foods contain carbohydrates?  Carbohydrates are found in the following foods:  Grains, such as breads and cereals.  Dried beans and soy products.  Starchy vegetables, such as potatoes, peas, and corn.  Fruit and fruit juices.  Milk and yogurt.  Sweets and snack foods, such as cake, cookies, candy, chips, and soft drinks.  How do I count carbohydrates in foods?  There are two ways to count carbohydrates in food. You can read food labels or learn standard serving sizes of foods. You can use either of these methods or a combination of both.  Using the Nutrition Facts label  The Nutrition Facts list is included on the labels of almost all packaged foods and beverages in the [place]. It includes:  The serving size.  Information about nutrients in each serving, including the grams of carbohydrate per serving.  To use the Nutrition Facts, decide how many servings you will have. Then, multiply the number of servings by the number of carbohydrates per serving. The resulting number is the total grams of carbohydrates that you will be having.  Learning the standard serving sizes of foods  When you eat carbohydrate foods that are not packaged or do not include Nutrition Facts on the label, you need to measure the servings in order to count the grams of  carbohydrates.  Measure the foods that you will eat with a food scale or measuring cup, if needed.  Decide how many standard-size servings you will eat.  Multiply the number of servings by 15. For foods that contain carbohydrates, one serving equals 15 g of carbohydrates.  For example, if you eat 2 cups or 10 oz (300 g) of strawberries, you will have eaten 2 servings and 30 g of carbohydrates (2 servings x 15 g = 30 g).  For foods that have more than one food mixed, such as soups and casseroles, you must count the carbohydrates in each food that is included.  The following list contains standard serving sizes of common carbohydrate-rich foods. Each of these servings has about 15 g of carbohydrates:  1 slice of bread.  1 six-inch (15 cm) tortilla.  ? cup or 2 oz (53 g) cooked rice or pasta.  ½ cup or 3 oz (85 g) cooked or canned, drained and rinsed beans or lentils.  ½ cup or 3 oz (85 g) starchy vegetable, such as peas, corn, or squash.  ½ cup or 4 oz (120 g) hot cereal.  ½ cup or 3 oz (85 g) boiled or mashed potatoes, or ¼ or 3 oz (85 g) of a large baked potato.  ½ cup or 4 fl oz (118 mL) fruit juice.  1 cup or 8 fl oz (237 mL) milk.  1 small or 4 oz (106 g) apple.  ½ or 2 oz (63 g) of a medium banana.  1 cup or 5 oz (150 g) strawberries.  3 cups or 1 oz (28.3 g) popped popcorn.  What is an example of carbohydrate counting?  To calculate the grams of carbohydrates in this sample meal, follow the steps shown below.  Sample meal  3 oz (85 g) chicken breast.  ? cup or 4 oz (106 g) brown rice.  ½ cup or 3 oz (85 g) corn.  1 cup or 8 fl oz (237 mL) milk.  1 cup or 5 oz (150 g) strawberries with sugar-free whipped topping.  Carbohydrate calculation  Identify the foods that contain carbohydrates:  Rice.  Corn.  Milk.  Strawberries.  Calculate how many servings you have of each food:  2 servings rice.  1 serving corn.  1 serving milk.  1 serving strawberries.  Multiply each number of servings by 15  servings rice x 15  g = 30 g.  1 serving corn x 15 g = 15 g.  1 serving milk x 15 g = 15 g.  1 serving strawberries x 15 g = 15 g.  Add together all of the amounts to find the total grams of carbohydrates eaten:  30 g + 15 g + 15 g + 15 g = 75 g of carbohydrates total.  What are tips for following this plan?  Shopping  Develop a meal plan and then make a shopping list.  Buy fresh and frozen vegetables, fresh and frozen fruit, dairy, eggs, beans, lentils, and whole grains.  Look at food labels. Choose foods that have more fiber and less sugar.  Avoid processed foods and foods with added sugars.  Meal planning  Aim to have the same number of grams of carbohydrates at each meal and for each snack time.  Plan to have regular, balanced meals and snacks.  Where to find more information  American Diabetes Association: diabetes.org  Centers for Disease Control and Prevention: cdc.gov  Academy of Nutrition and Dietetics: eatright.org  Association of Diabetes Care & Education Specialists: diabeteseducator.org  Summary  Carbohydrate counting is a method of keeping track of how many carbohydrates you eat.  Eating carbohydrates increases the amount of sugar (glucose) in your blood.  Counting how many carbohydrates you eat improves how well you manage your blood glucose. This helps you manage your diabetes.  A dietitian can help you make a meal plan and calculate how many carbohydrates you should have at each meal and snack.  This information is not intended to replace advice given to you by your health care provider. Make sure you discuss any questions you have with your health care provider.  Document Revised: 07/20/2021 Document Reviewed: 07/21/2021  [employer] Patient Education © 2024 [employer]Fat and Cholesterol Restricted Eating Plan  Getting too much fat and cholesterol in your diet may cause health problems. Choosing the right foods helps keep your fat and cholesterol at normal levels. This can keep you from getting certain diseases.  Your  "doctor may recommend an eating plan that includes:  Total fat: ______% or less of total calories a day. This is ______g of fat a day.  Saturated fat: ______% or less of total calories a day. This is ______g of saturated fat a day.  Cholesterol: less than _________mg a day.  Fiber: ______g a day.  What are tips for following this plan?  General tips  Work with your doctor to lose weight if you need to.  Avoid:  Foods with added sugar.  Fried foods.  Foods with trans fat or partially hydrogenated oils. This includes some margarines and baked goods.  If you drink alcohol:  Limit how much you have to:  0-1 drink a day for women who are not pregnant.  0-2 drinks a day for men.  Know how much alcohol is in a drink. In the [place] one drink equals one 12 oz bottle of beer (355 mL), one 5 oz glass of wine (148 mL), or one 1½ oz glass of hard liquor (44 mL).  Reading food labels  Check food labels for:  Trans fats.  Partially hydrogenated oils.  Saturated fat (g) in each serving.  Cholesterol (mg) in each serving.  Fiber (g) in each serving.  Choose foods with healthy fats, such as:  Monounsaturated fats and polyunsaturated fats. These include olive and canola oil, flaxseeds, walnuts, almonds, and seeds.  Omega-3 fats. These are found in certain fish, flaxseed oil, and ground flaxseeds.  Choose grain products that have whole grains. Look for the word \"whole\" as the first word in the ingredient list.  Cooking  Cook foods using low-fat methods. These include baking, boiling, grilling, and broiling.  Eat more home-cooked foods. Eat at restaurants and buffets less often. Eat less fast food.  Avoid cooking using saturated fats, such as butter, cream, palm oil, palm kernel oil, and coconut oil.  Meal planning    At meals, divide your plate into four equal parts:  Fill one-half of your plate with vegetables, green salads, and fruit.  Fill one-fourth of your plate with whole grains.  Fill one-fourth of your plate with low-fat (lean) " protein foods.  Eat fish that is high in omega-3 fats at least two times a week. This includes mackerel, tuna, sardines, and salmon.  Eat foods that are high in fiber, such as whole grains, beans, apples, pears, berries, broccoli, carrots, peas, and barley.  What foods should I eat?  Fruits  All fresh, canned (in natural juice), or frozen fruits.  Vegetables  Fresh or frozen vegetables (raw, steamed, roasted, or grilled). Green salads.  Grains  Whole grains, such as whole wheat or whole grain breads, crackers, cereals, and pasta. Unsweetened oatmeal, bulgur, barley, quinoa, or brown rice. Corn or whole wheat flour tortillas.  Meats and other protein foods  Ground beef (85% or leaner), grass-fed beef, or beef trimmed of fat. Skinless chicken or turkey. Ground chicken or turkey. Pork trimmed of fat. All fish and seafood. Egg whites. Dried beans, peas, or lentils. Unsalted nuts or seeds. Unsalted canned beans. Nut butters without added sugar or oil.  Dairy  Low-fat or nonfat dairy products, such as skim or 1% milk, 2% or reduced-fat cheeses, low-fat and fat-free ricotta or cottage cheese, or plain low-fat and nonfat yogurt.  Fats and oils  Tub margarine without trans fats. Light or reduced-fat mayonnaise and salad dressings. Avocado. Olive, canola, sesame, or safflower oils.  The items listed above may not be a complete list of foods and beverages you can eat. Contact a dietitian for more information.  What foods should I avoid?  Fruits  Canned fruit in heavy syrup. Fruit in cream or butter sauce. Fried fruit.  Vegetables  Vegetables cooked in cheese, cream, or butter sauce. Fried vegetables.  Grains  White bread. White pasta. White rice. Cornbread. Bagels, pastries, and croissants. Crackers and snack foods that contain trans fat and hydrogenated oils.  Meats and other protein foods  Fatty cuts of meat. Ribs, chicken wings, palomo, sausage, bologna, salami, chitterlings, fatback, hot dogs, bratwurst, and packaged lunch  meats. Liver and organ meats. Whole eggs and egg yolks. Chicken and turkey with skin. Fried meat.  Dairy  Whole or 2% milk, cream, half-and-half, and cream cheese. Whole milk cheeses. Whole-fat or sweetened yogurt. Full-fat cheeses. Nondairy creamers and whipped toppings. Processed cheese, cheese spreads, and cheese curds.  Fats and oils  Butter, stick margarine, lard, shortening, ghee, or palomo fat. Coconut, palm kernel, and palm oils.  Beverages  Alcohol. Sugar-sweetened drinks such as sodas, lemonade, and fruit drinks.  Sweets and desserts  Corn syrup, sugars, honey, and molasses. Candy. Jam and jelly. Syrup. Sweetened cereals. Cookies, pies, cakes, donuts, muffins, and ice cream.  The items listed above may not be a complete list of foods and beverages you should avoid. Contact a dietitian for more information.  Summary  Choosing the right foods helps keep your fat and cholesterol at normal levels. This can keep you from getting certain diseases.  At meals, fill one-half of your plate with vegetables, green salads, and fruits.  Eat high fiber foods, like whole grains, beans, apples, pears, berries, carrots, peas, and barley.  Limit added sugar, saturated fats, alcohol, and fried foods.  This information is not intended to replace advice given to you by your health care provider. Make sure you discuss any questions you have with your health care provider.  Document Revised: 04/29/2022 Document Reviewed: 04/29/2022  Elsevier Patient Education © 2024 [employer]

## 2025-04-14 NOTE — PROGRESS NOTES
"Chief Complaint -diabetes    History of Present Illness -     Evie Shah is a 79 y.o. female.     Diabetes mellitus-  Type 2 diabetes mellitus is not at goal with hemoglobin A1c recently 8.3.  She is using Mounjaro 10 mg weekly, Jardiance 25 mg daily and low-carb diet.    Abnormal lab work-  Lab work showed a decline in renal function with estimated GFR declining to 63.  Patient states she has not been drinking as much water lately.  She does not take NSAIDs.    Abnormal imaging-  On 4/7/2025 low-dose CT chest revealed  1.  No suspicious pulmonary nodules or masses identified.  2.  Upper lung predominant emphysema appears stable.  3.  Thyroid gland enlargement with substernal extension, stable.  Correlation with ultrasound recommended.  4.  Mild cardiomegaly with severe coronary artery calcifications, stable  from previous.    Lung-RADS score: 1S - Negative.    Peripheral artery disease-  Stable with risk factor modification including Plavix (status post stenting) and atorvastatin    The following portions of the patient's history were reviewed and updated as appropriate: allergies, current medications, past family history, past medical history, past social history, past surgical history and problem list.        Objective  Vital signs:  /78 (BP Location: Right arm, Patient Position: Sitting, Cuff Size: Adult)   Pulse 71   Temp 98 °F (36.7 °C) (Temporal)   Ht 160 cm (62.99\")   Wt 86.9 kg (191 lb 9.6 oz)   SpO2 95%   BMI 33.95 kg/m²     Physical Exam  Vitals and nursing note reviewed.   Constitutional:       Appearance: Normal appearance. She is well-developed. She is obese.   Eyes:      Extraocular Movements: Extraocular movements intact.      Conjunctiva/sclera: Conjunctivae normal.   Cardiovascular:      Rate and Rhythm: Normal rate and regular rhythm.      Heart sounds: Normal heart sounds. No murmur heard.  Pulmonary:      Effort: Pulmonary effort is normal. No respiratory distress.      " Breath sounds: Normal breath sounds. No wheezing.   Musculoskeletal:         General: No tenderness.   Skin:     General: Skin is warm and dry.      Findings: No rash.   Neurological:      Mental Status: She is alert and oriented to person, place, and time.   Psychiatric:         Mood and Affect: Mood normal.         Behavior: Behavior normal.         Thought Content: Thought content normal.         The following data was reviewed by Camryn Mota PA-C:         Lab Results   Component Value Date    BUN 15 04/08/2025    CREATININE 0.93 04/08/2025    EGFR 63.4 04/08/2025    ALT 14 04/08/2025    AST 15 04/08/2025    WBC 8.55 04/08/2025    HGB 15.5 04/08/2025    HCT 47.8 (H) 04/08/2025     04/08/2025    TRIG 265 (H) 04/08/2025    HDL 38 (L) 04/08/2025    LDL 56 04/08/2025    TSH 4.410 (H) 04/08/2025    HGBA1C 8.30 (H) 04/08/2025           Assessment & Plan     Diagnoses and all orders for this visit:    1. Type 2 diabetes mellitus with stage 2 chronic kidney disease, without long-term current use of insulin (Primary)  Comments:  Discontinue furosemide and potassium  Start Farxiga 5 mg daily  Continue Mounjaro 10 mg weekly  Orders:  -     dapagliflozin (FARXIGA) 5 MG tablet tablet; Take 1 tablet by mouth Every Morning.  Dispense: 30 tablet; Refill: 5    2. Coronary artery calcification seen on CT scan  Comments:  Discussed CT chest findings with patient  Continue risk factor modification including atorvastatin and Plavix    3. Thyromegaly  Comments:  Ordered ultrasound of the thyroid for further evaluation    4. PAD (peripheral artery disease)  Comments:  Continue risk factor modification including Plavix and atorvastatin  Advised a low-cholesterol diet                   Patient was given instructions and counseling regarding his condition or for health maintenance advice. Please see specific information pulled into the AVS if appropriate      This document has been electronically signed by:  Camryn Mota PA-C

## 2025-04-21 ENCOUNTER — HOSPITAL ENCOUNTER (OUTPATIENT)
Facility: HOSPITAL | Age: 80
Discharge: HOME | End: 2025-04-21
Admitting: PHYSICIAN ASSISTANT
Payer: MEDICARE

## 2025-04-21 DIAGNOSIS — E01.0 THYROMEGALY: ICD-10-CM

## 2025-04-21 PROCEDURE — 76536 US EXAM OF HEAD AND NECK: CPT

## 2025-04-21 PROCEDURE — 76536 US EXAM OF HEAD AND NECK: CPT | Performed by: RADIOLOGY

## 2025-04-29 RX ORDER — POTASSIUM CHLORIDE 750 MG/1
10 TABLET, EXTENDED RELEASE ORAL DAILY
Qty: 90 TABLET | Refills: 0 | OUTPATIENT
Start: 2025-04-29

## 2025-05-06 ENCOUNTER — OFFICE VISIT (OUTPATIENT)
Dept: SURGERY | Facility: CLINIC | Age: 80
End: 2025-05-06
Payer: MEDICARE

## 2025-05-06 VITALS — BODY MASS INDEX: 34.2 KG/M2 | HEIGHT: 63 IN | WEIGHT: 193 LBS

## 2025-05-06 DIAGNOSIS — E04.1 THYROID NODULE: Primary | ICD-10-CM

## 2025-05-06 PROCEDURE — 1159F MED LIST DOCD IN RCRD: CPT | Performed by: SURGERY

## 2025-05-06 PROCEDURE — 1160F RVW MEDS BY RX/DR IN RCRD: CPT | Performed by: SURGERY

## 2025-05-06 PROCEDURE — 99203 OFFICE O/P NEW LOW 30 MIN: CPT | Performed by: SURGERY

## 2025-05-06 NOTE — PROGRESS NOTES
Subjective   Evie Shah is a 79 y.o. female.     Chief Complaint: thyroid nodule    History of Present Illness She is a obese 80 yo who has had a enlarged thyroid seen on a chest CT and had a US showing a small suspicious nodule on the right and a enlarge cystic area on the left. TSH was slightly elevated and free T4 was normal.     The following portions of the patient's history were reviewed and updated as appropriate: current medications, past family history, past medical history, past social history, past surgical history and problem list.    Review of Systems   Constitutional:  Negative for activity change, appetite change, chills, fever and unexpected weight change.   HENT:  Negative for congestion, facial swelling, sore throat and trouble swallowing.    Eyes:  Negative for photophobia and visual disturbance.   Respiratory:  Negative for chest tightness, shortness of breath and wheezing.    Cardiovascular:  Negative for chest pain, palpitations and leg swelling.   Gastrointestinal:  Negative for abdominal distention, abdominal pain, anal bleeding, blood in stool, constipation, diarrhea, nausea, rectal pain and vomiting.   Endocrine: Negative for cold intolerance, heat intolerance, polydipsia and polyuria.   Genitourinary:  Negative for difficulty urinating, dysuria, flank pain and urgency.   Musculoskeletal:  Negative for back pain and myalgias.   Skin:  Negative for rash and wound.   Allergic/Immunologic: Negative for immunocompromised state.   Neurological:  Negative for dizziness, seizures, syncope, light-headedness, numbness and headaches.   Hematological:  Negative for adenopathy. Does not bruise/bleed easily.   Psychiatric/Behavioral:  Negative for behavioral problems and confusion. The patient is not nervous/anxious.        Objective   Physical Exam  Vitals reviewed.   Constitutional:       General: She is not in acute distress.     Appearance: She is well-developed. She is not ill-appearing.       Comments: I cannot define the nodules on either side.   HENT:      Head: Normocephalic. No laceration. Hair is normal.      Right Ear: Hearing and ear canal normal.      Left Ear: Hearing and ear canal normal.      Nose: Nose normal.      Right Sinus: No maxillary sinus tenderness or frontal sinus tenderness.      Left Sinus: No maxillary sinus tenderness or frontal sinus tenderness.   Eyes:      General: Lids are normal.      Conjunctiva/sclera: Conjunctivae normal.      Pupils: Pupils are equal, round, and reactive to light.   Neck:      Thyroid: No thyroid mass or thyromegaly.      Vascular: No JVD.      Trachea: No tracheal tenderness or tracheal deviation.   Cardiovascular:      Rate and Rhythm: Normal rate and regular rhythm.      Heart sounds: No murmur heard.     No gallop.   Pulmonary:      Effort: Pulmonary effort is normal.      Breath sounds: Normal breath sounds. No stridor. No wheezing.   Chest:      Chest wall: No tenderness.   Abdominal:      General: Bowel sounds are normal. There is no distension.      Palpations: Abdomen is soft. There is no mass.      Tenderness: There is no abdominal tenderness. There is no guarding or rebound.      Hernia: No hernia is present.   Musculoskeletal:         General: No deformity.      Cervical back: Normal range of motion.   Lymphadenopathy:      Cervical: No cervical adenopathy.      Upper Body:      Right upper body: No supraclavicular adenopathy.      Left upper body: No supraclavicular adenopathy.   Skin:     General: Skin is warm and dry.      Coloration: Skin is not pale.      Findings: No erythema or rash.   Neurological:      Mental Status: She is alert and oriented to person, place, and time.      Motor: No abnormal muscle tone.   Psychiatric:         Behavior: Behavior normal.         Thought Content: Thought content normal.         Past Medical History:   Diagnosis Date    Allergic rhinitis     Asthma     Atherosclerosis     COPD (chronic  obstructive pulmonary disease)     Cough     Diabetes mellitus     Hyperlipidemia     Hypertension     Menopause     Nausea and vomiting     Obesity 3/7/2017    Osteoarthritis     Osteoporosis     Vertigo, benign paroxysmal        Family History   Problem Relation Age of Onset    Arthritis Mother     Asthma Mother     Cancer Mother     Osteoarthritis Mother     Osteoporosis Mother     Diabetes Mother     Arthritis Father     Hypertension Father     Stroke Father     Osteoarthritis Father     Osteoporosis Father     Heart disease Father     Diabetes Father     Breast cancer Neg Hx        Social History     Tobacco Use    Smoking status: Former     Current packs/day: 0.00     Average packs/day: 1 pack/day for 20.0 years (20.0 ttl pk-yrs)     Types: Cigarettes     Start date: 1995     Quit date: 2015     Years since quittin.9     Passive exposure: Past    Smokeless tobacco: Never   Vaping Use    Vaping status: Never Used   Substance Use Topics    Alcohol use: No    Drug use: No       Past Surgical History:   Procedure Laterality Date    BRONCHOSCOPY Bilateral 2024    Procedure: BRONCHOSCOPY WITH ENDOBRONCHIAL ULTRASOUND;  Surgeon: Augustin Stafford MD;  Location: [facility];  Service: Pulmonary;  Laterality: Bilateral;    CATARACT EXTRACTION      COLONOSCOPY      EYE SURGERY      catarac    ILIAC ARTERY STENT  2015    SKIN CANCER EXCISION      nose    TONSILLECTOMY      and adenoidectomy    TUBAL ABDOMINAL LIGATION         Current Outpatient Medications   Medication Instructions    acetaminophen (TYLENOL) 500 mg, Every 6 Hours PRN    albuterol sulfate  (90 Base) MCG/ACT inhaler 2 puffs, Inhalation, Every 4 Hours PRN    alendronate (FOSAMAX) 70 mg, Oral, Weekly    amLODIPine-benazepril (LOTREL) 10-40 MG per capsule 1 capsule, Oral, Daily    atorvastatin (LIPITOR) 10 mg, Oral, Nightly    buPROPion SR (WELLBUTRIN SR) 150 mg, Oral, Nightly    cetirizine (ZYRTEC) 10 mg, Oral, Daily     clopidogrel (PLAVIX) 75 mg, Oral, Daily    dapagliflozin (FARXIGA) 5 mg, Oral, Every Morning    famotidine (PEPCID) 20 mg, Oral, Every Night at Bedtime    fluticasone (FLONASE) 50 MCG/ACT nasal spray 2 sprays, Nasal, Daily    ipratropium-albuterol (DUO-NEB) 0.5-2.5 mg/3 ml nebulizer 3 mL, Nebulization, Every 4 Hours PRN    metoprolol succinate XL (TOPROL XL) 25 mg, Oral, Daily    montelukast (SINGULAIR) 10 mg, Oral, Nightly    omega-3 acid ethyl esters (LOVAZA) 2 g, Oral, 2 Times Daily    Tirzepatide 10 mg, Subcutaneous, Every 7 Days    vitamin D (ERGOCALCIFEROL) 50,000 Units, Oral, Weekly         Assessment & Plan   Diagnoses and all orders for this visit:    1. Thyroid nodule (Primary)    US guided FNA             This document has been electronically signed by Ilya Ramirez MD   May 6, 2025 10:53 EDT

## 2025-05-09 DIAGNOSIS — E04.1 THYROID NODULE: Primary | ICD-10-CM

## 2025-05-20 ENCOUNTER — OFFICE VISIT (OUTPATIENT)
Dept: FAMILY MEDICINE CLINIC | Facility: CLINIC | Age: 80
End: 2025-05-20
Payer: MEDICARE

## 2025-05-20 VITALS
OXYGEN SATURATION: 95 % | BODY MASS INDEX: 34.3 KG/M2 | TEMPERATURE: 98 F | WEIGHT: 193.6 LBS | HEIGHT: 63 IN | DIASTOLIC BLOOD PRESSURE: 82 MMHG | HEART RATE: 55 BPM | SYSTOLIC BLOOD PRESSURE: 152 MMHG

## 2025-05-20 DIAGNOSIS — E78.5 DYSLIPIDEMIA: Chronic | ICD-10-CM

## 2025-05-20 DIAGNOSIS — E11.22 TYPE 2 DIABETES MELLITUS WITH STAGE 2 CHRONIC KIDNEY DISEASE, WITHOUT LONG-TERM CURRENT USE OF INSULIN: Chronic | ICD-10-CM

## 2025-05-20 DIAGNOSIS — M10.042 ACUTE IDIOPATHIC GOUT OF LEFT HAND: Primary | ICD-10-CM

## 2025-05-20 DIAGNOSIS — N18.2 TYPE 2 DIABETES MELLITUS WITH STAGE 2 CHRONIC KIDNEY DISEASE, WITHOUT LONG-TERM CURRENT USE OF INSULIN: Chronic | ICD-10-CM

## 2025-05-20 RX ORDER — METHYLPREDNISOLONE ACETATE 80 MG/ML
80 INJECTION, SUSPENSION INTRA-ARTICULAR; INTRALESIONAL; INTRAMUSCULAR; SOFT TISSUE ONCE
Status: COMPLETED | OUTPATIENT
Start: 2025-05-20 | End: 2025-05-20

## 2025-05-20 RX ADMIN — METHYLPREDNISOLONE ACETATE 80 MG: 80 INJECTION, SUSPENSION INTRA-ARTICULAR; INTRALESIONAL; INTRAMUSCULAR; SOFT TISSUE at 10:29

## 2025-05-20 NOTE — PATIENT INSTRUCTIONS
Carbohydrate Counting for Diabetes Mellitus, Adult  Carbohydrate counting is a method of keeping track of how many carbohydrates you eat. Eating carbohydrates increases the amount of sugar (glucose) in the blood. Counting how many carbohydrates you eat improves how well you manage your blood glucose. This, in turn, helps you manage your diabetes.  Carbohydrates are measured in grams (g) per serving. It is important to know how many carbohydrates (in grams or by serving size) you can have in each meal. This is different for every person. A dietitian can help you make a meal plan and calculate how many carbohydrates you should have at each meal and snack.  What foods contain carbohydrates?  Carbohydrates are found in the following foods:  Grains, such as breads and cereals.  Dried beans and soy products.  Starchy vegetables, such as potatoes, peas, and corn.  Fruit and fruit juices.  Milk and yogurt.  Sweets and snack foods, such as cake, cookies, candy, chips, and soft drinks.  How do I count carbohydrates in foods?  There are two ways to count carbohydrates in food. You can read food labels or learn standard serving sizes of foods. You can use either of these methods or a combination of both.  Using the Nutrition Facts label  The Nutrition Facts list is included on the labels of almost all packaged foods and beverages in the [place]. It includes:  The serving size.  Information about nutrients in each serving, including the grams of carbohydrate per serving.  To use the Nutrition Facts, decide how many servings you will have. Then, multiply the number of servings by the number of carbohydrates per serving. The resulting number is the total grams of carbohydrates that you will be having.  Learning the standard serving sizes of foods  When you eat carbohydrate foods that are not packaged or do not include Nutrition Facts on the label, you need to measure the servings in order to count the grams of  carbohydrates.  Measure the foods that you will eat with a food scale or measuring cup, if needed.  Decide how many standard-size servings you will eat.  Multiply the number of servings by 15. For foods that contain carbohydrates, one serving equals 15 g of carbohydrates.  For example, if you eat 2 cups or 10 oz (300 g) of strawberries, you will have eaten 2 servings and 30 g of carbohydrates (2 servings x 15 g = 30 g).  For foods that have more than one food mixed, such as soups and casseroles, you must count the carbohydrates in each food that is included.  The following list contains standard serving sizes of common carbohydrate-rich foods. Each of these servings has about 15 g of carbohydrates:  1 slice of bread.  1 six-inch (15 cm) tortilla.  ? cup or 2 oz (53 g) cooked rice or pasta.  ½ cup or 3 oz (85 g) cooked or canned, drained and rinsed beans or lentils.  ½ cup or 3 oz (85 g) starchy vegetable, such as peas, corn, or squash.  ½ cup or 4 oz (120 g) hot cereal.  ½ cup or 3 oz (85 g) boiled or mashed potatoes, or ¼ or 3 oz (85 g) of a large baked potato.  ½ cup or 4 fl oz (118 mL) fruit juice.  1 cup or 8 fl oz (237 mL) milk.  1 small or 4 oz (106 g) apple.  ½ or 2 oz (63 g) of a medium banana.  1 cup or 5 oz (150 g) strawberries.  3 cups or 1 oz (28.3 g) popped popcorn.  What is an example of carbohydrate counting?  To calculate the grams of carbohydrates in this sample meal, follow the steps shown below.  Sample meal  3 oz (85 g) chicken breast.  ? cup or 4 oz (106 g) brown rice.  ½ cup or 3 oz (85 g) corn.  1 cup or 8 fl oz (237 mL) milk.  1 cup or 5 oz (150 g) strawberries with sugar-free whipped topping.  Carbohydrate calculation  Identify the foods that contain carbohydrates:  Rice.  Corn.  Milk.  Strawberries.  Calculate how many servings you have of each food:  2 servings rice.  1 serving corn.  1 serving milk.  1 serving strawberries.  Multiply each number of servings by 15  servings rice x 15  g = 30 g.  1 serving corn x 15 g = 15 g.  1 serving milk x 15 g = 15 g.  1 serving strawberries x 15 g = 15 g.  Add together all of the amounts to find the total grams of carbohydrates eaten:  30 g + 15 g + 15 g + 15 g = 75 g of carbohydrates total.  What are tips for following this plan?  Shopping  Develop a meal plan and then make a shopping list.  Buy fresh and frozen vegetables, fresh and frozen fruit, dairy, eggs, beans, lentils, and whole grains.  Look at food labels. Choose foods that have more fiber and less sugar.  Avoid processed foods and foods with added sugars.  Meal planning  Aim to have the same number of grams of carbohydrates at each meal and for each snack time.  Plan to have regular, balanced meals and snacks.  Where to find more information  American Diabetes Association: diabetes.org  Centers for Disease Control and Prevention: cdc.gov  Academy of Nutrition and Dietetics: eatright.org  Association of Diabetes Care & Education Specialists: diabeteseducator.org  Summary  Carbohydrate counting is a method of keeping track of how many carbohydrates you eat.  Eating carbohydrates increases the amount of sugar (glucose) in your blood.  Counting how many carbohydrates you eat improves how well you manage your blood glucose. This helps you manage your diabetes.  A dietitian can help you make a meal plan and calculate how many carbohydrates you should have at each meal and snack.  This information is not intended to replace advice given to you by your health care provider. Make sure you discuss any questions you have with your health care provider.  Document Revised: 07/20/2021 Document Reviewed: 07/21/2021  [employer] Patient Education © 2024 [employer]Fat and Cholesterol Restricted Eating Plan  Getting too much fat and cholesterol in your diet may cause health problems. Choosing the right foods helps keep your fat and cholesterol at normal levels. This can keep you from getting certain diseases.  Your  "doctor may recommend an eating plan that includes:  Total fat: ______% or less of total calories a day. This is ______g of fat a day.  Saturated fat: ______% or less of total calories a day. This is ______g of saturated fat a day.  Cholesterol: less than _________mg a day.  Fiber: ______g a day.  What are tips for following this plan?  General tips  Work with your doctor to lose weight if you need to.  Avoid:  Foods with added sugar.  Fried foods.  Foods with trans fat or partially hydrogenated oils. This includes some margarines and baked goods.  If you drink alcohol:  Limit how much you have to:  0-1 drink a day for women who are not pregnant.  0-2 drinks a day for men.  Know how much alcohol is in a drink. In the [place] one drink equals one 12 oz bottle of beer (355 mL), one 5 oz glass of wine (148 mL), or one 1½ oz glass of hard liquor (44 mL).  Reading food labels  Check food labels for:  Trans fats.  Partially hydrogenated oils.  Saturated fat (g) in each serving.  Cholesterol (mg) in each serving.  Fiber (g) in each serving.  Choose foods with healthy fats, such as:  Monounsaturated fats and polyunsaturated fats. These include olive and canola oil, flaxseeds, walnuts, almonds, and seeds.  Omega-3 fats. These are found in certain fish, flaxseed oil, and ground flaxseeds.  Choose grain products that have whole grains. Look for the word \"whole\" as the first word in the ingredient list.  Cooking  Cook foods using low-fat methods. These include baking, boiling, grilling, and broiling.  Eat more home-cooked foods. Eat at restaurants and buffets less often. Eat less fast food.  Avoid cooking using saturated fats, such as butter, cream, palm oil, palm kernel oil, and coconut oil.  Meal planning    At meals, divide your plate into four equal parts:  Fill one-half of your plate with vegetables, green salads, and fruit.  Fill one-fourth of your plate with whole grains.  Fill one-fourth of your plate with low-fat (lean) " protein foods.  Eat fish that is high in omega-3 fats at least two times a week. This includes mackerel, tuna, sardines, and salmon.  Eat foods that are high in fiber, such as whole grains, beans, apples, pears, berries, broccoli, carrots, peas, and barley.  What foods should I eat?  Fruits  All fresh, canned (in natural juice), or frozen fruits.  Vegetables  Fresh or frozen vegetables (raw, steamed, roasted, or grilled). Green salads.  Grains  Whole grains, such as whole wheat or whole grain breads, crackers, cereals, and pasta. Unsweetened oatmeal, bulgur, barley, quinoa, or brown rice. Corn or whole wheat flour tortillas.  Meats and other protein foods  Ground beef (85% or leaner), grass-fed beef, or beef trimmed of fat. Skinless chicken or turkey. Ground chicken or turkey. Pork trimmed of fat. All fish and seafood. Egg whites. Dried beans, peas, or lentils. Unsalted nuts or seeds. Unsalted canned beans. Nut butters without added sugar or oil.  Dairy  Low-fat or nonfat dairy products, such as skim or 1% milk, 2% or reduced-fat cheeses, low-fat and fat-free ricotta or cottage cheese, or plain low-fat and nonfat yogurt.  Fats and oils  Tub margarine without trans fats. Light or reduced-fat mayonnaise and salad dressings. Avocado. Olive, canola, sesame, or safflower oils.  The items listed above may not be a complete list of foods and beverages you can eat. Contact a dietitian for more information.  What foods should I avoid?  Fruits  Canned fruit in heavy syrup. Fruit in cream or butter sauce. Fried fruit.  Vegetables  Vegetables cooked in cheese, cream, or butter sauce. Fried vegetables.  Grains  White bread. White pasta. White rice. Cornbread. Bagels, pastries, and croissants. Crackers and snack foods that contain trans fat and hydrogenated oils.  Meats and other protein foods  Fatty cuts of meat. Ribs, chicken wings, palomo, sausage, bologna, salami, chitterlings, fatback, hot dogs, bratwurst, and packaged lunch  meats. Liver and organ meats. Whole eggs and egg yolks. Chicken and turkey with skin. Fried meat.  Dairy  Whole or 2% milk, cream, half-and-half, and cream cheese. Whole milk cheeses. Whole-fat or sweetened yogurt. Full-fat cheeses. Nondairy creamers and whipped toppings. Processed cheese, cheese spreads, and cheese curds.  Fats and oils  Butter, stick margarine, lard, shortening, ghee, or palomo fat. Coconut, palm kernel, and palm oils.  Beverages  Alcohol. Sugar-sweetened drinks such as sodas, lemonade, and fruit drinks.  Sweets and desserts  Corn syrup, sugars, honey, and molasses. Candy. Jam and jelly. Syrup. Sweetened cereals. Cookies, pies, cakes, donuts, muffins, and ice cream.  The items listed above may not be a complete list of foods and beverages you should avoid. Contact a dietitian for more information.  Summary  Choosing the right foods helps keep your fat and cholesterol at normal levels. This can keep you from getting certain diseases.  At meals, fill one-half of your plate with vegetables, green salads, and fruits.  Eat high fiber foods, like whole grains, beans, apples, pears, berries, carrots, peas, and barley.  Limit added sugar, saturated fats, alcohol, and fried foods.  This information is not intended to replace advice given to you by your health care provider. Make sure you discuss any questions you have with your health care provider.  Document Revised: 04/29/2022 Document Reviewed: 04/29/2022  [employer] Patient Education © 2024 [employer]

## 2025-05-20 NOTE — PROGRESS NOTES
"Chief Complaint -hand pain    History of Present Illness -     Evie Shah is a 79 y.o. female.     Hand pain-  Patient complains of pain in her left hand near the index and thumb PIP joints with associated erythema and swelling.  Onset 2 days.  Patient has history of gout.    Diabetes mellitus type 2-  Stable with Mounjaro 10 mg weekly and Farxiga with a low-carb diet.    Chronic kidney disease-  We discussed her most estimated GFR of 63 which is stage II chronic kidney disease.  She has done well in the past month with the use of Farxiga.  She is aware to avoid NSAID use    Dyslipidemia-  Stable with atorvastatin and low-cholesterol diet      The following portions of the patient's history were reviewed and updated as appropriate: allergies, current medications, past family history, past medical history, past social history, past surgical history and problem list.        Objective  Vital signs:  /82 (BP Location: Right arm, Patient Position: Sitting, Cuff Size: Adult)   Pulse 55   Temp 98 °F (36.7 °C) (Temporal)   Ht 160 cm (63\")   Wt 87.8 kg (193 lb 9.6 oz)   SpO2 95%   BMI 34.29 kg/m²     Physical Exam  Vitals and nursing note reviewed.   Constitutional:       Appearance: Normal appearance. She is well-developed.   Eyes:      Extraocular Movements: Extraocular movements intact.      Conjunctiva/sclera: Conjunctivae normal.   Cardiovascular:      Rate and Rhythm: Normal rate and regular rhythm.      Heart sounds: Normal heart sounds. No murmur heard.  Pulmonary:      Effort: Pulmonary effort is normal. No respiratory distress.      Breath sounds: Normal breath sounds. No wheezing.   Musculoskeletal:         General: Tenderness (Left hand with erythema noted at 1st and 2nd PIP joints with localized swelling) present.   Skin:     General: Skin is warm and dry.      Findings: No rash.   Neurological:      Mental Status: She is alert and oriented to person, place, and time.   Psychiatric:         " Mood and Affect: Mood normal.         Behavior: Behavior normal.         Thought Content: Thought content normal.         The following data was reviewed by Camryn Mota PA-C:         Lab Results   Component Value Date    BUN 15 04/08/2025    CREATININE 0.93 04/08/2025    EGFR 63.4 04/08/2025    ALT 14 04/08/2025    AST 15 04/08/2025    WBC 8.55 04/08/2025    HGB 15.5 04/08/2025    HCT 47.8 (H) 04/08/2025     04/08/2025    TRIG 265 (H) 04/08/2025    HDL 38 (L) 04/08/2025    LDL 56 04/08/2025    TSH 4.410 (H) 04/08/2025    HGBA1C 8.30 (H) 04/08/2025           Assessment & Plan     Diagnoses and all orders for this visit:    1. Acute idiopathic gout of left hand (Primary)  -     methylPREDNISolone acetate (DEPO-medrol) injection 80 mg    2. Type 2 diabetes mellitus with stage 2 chronic kidney disease, without long-term current use of insulin  Comments:  Continue Farxiga and Mounjaro  Advised to avoid NSAIDs  Advised low-carb diet  Orders:  -     Comprehensive Metabolic Panel    3. Dyslipidemia  Comments:  Continue atorvastatin  Advised low-cholesterol diet                   Patient was given instructions and counseling regarding his condition or for health maintenance advice. Please see specific information pulled into the AVS if appropriate      This document has been electronically signed by:  Camryn Mota PA-C

## 2025-06-26 DIAGNOSIS — M81.0 SENILE OSTEOPOROSIS: Chronic | ICD-10-CM

## 2025-06-26 RX ORDER — ALENDRONATE SODIUM 70 MG/1
70 TABLET ORAL WEEKLY
Qty: 12 TABLET | Refills: 3 | Status: SHIPPED | OUTPATIENT
Start: 2025-06-26

## 2025-06-29 DIAGNOSIS — E11.65 TYPE 2 DIABETES MELLITUS WITH HYPERGLYCEMIA, WITHOUT LONG-TERM CURRENT USE OF INSULIN: Chronic | ICD-10-CM

## 2025-06-30 RX ORDER — TIRZEPATIDE 10 MG/.5ML
INJECTION, SOLUTION SUBCUTANEOUS
Qty: 12 ML | Refills: 0 | Status: SHIPPED | OUTPATIENT
Start: 2025-06-30

## 2025-07-14 ENCOUNTER — HOSPITAL ENCOUNTER (OUTPATIENT)
Dept: ULTRASOUND IMAGING | Facility: HOSPITAL | Age: 80
Discharge: HOME | End: 2025-07-14
Admitting: RADIOLOGY
Payer: MEDICARE

## 2025-07-14 VITALS
RESPIRATION RATE: 18 BRPM | DIASTOLIC BLOOD PRESSURE: 88 MMHG | HEART RATE: 78 BPM | OXYGEN SATURATION: 93 % | SYSTOLIC BLOOD PRESSURE: 183 MMHG

## 2025-07-14 DIAGNOSIS — E04.1 THYROID NODULE: ICD-10-CM

## 2025-07-14 PROCEDURE — 76536 US EXAM OF HEAD AND NECK: CPT

## 2025-07-17 ENCOUNTER — OFFICE VISIT (OUTPATIENT)
Dept: FAMILY MEDICINE CLINIC | Facility: CLINIC | Age: 80
End: 2025-07-17
Payer: MEDICARE

## 2025-07-17 VITALS
HEIGHT: 63 IN | OXYGEN SATURATION: 98 % | DIASTOLIC BLOOD PRESSURE: 62 MMHG | SYSTOLIC BLOOD PRESSURE: 146 MMHG | WEIGHT: 191.4 LBS | BODY MASS INDEX: 33.91 KG/M2 | TEMPERATURE: 98 F | HEART RATE: 83 BPM

## 2025-07-17 DIAGNOSIS — E78.5 DYSLIPIDEMIA: Chronic | ICD-10-CM

## 2025-07-17 DIAGNOSIS — E11.65 TYPE 2 DIABETES MELLITUS WITH HYPERGLYCEMIA, WITHOUT LONG-TERM CURRENT USE OF INSULIN: Chronic | ICD-10-CM

## 2025-07-17 DIAGNOSIS — M10.042 ACUTE IDIOPATHIC GOUT OF LEFT HAND: Primary | ICD-10-CM

## 2025-07-17 RX ORDER — METHYLPREDNISOLONE ACETATE 80 MG/ML
80 INJECTION, SUSPENSION INTRA-ARTICULAR; INTRALESIONAL; INTRAMUSCULAR; SOFT TISSUE ONCE
Status: COMPLETED | OUTPATIENT
Start: 2025-07-17 | End: 2025-07-17

## 2025-07-17 RX ORDER — INDOMETHACIN 50 MG/1
50 CAPSULE ORAL 3 TIMES DAILY PRN
Qty: 15 CAPSULE | Refills: 0 | Status: SHIPPED | OUTPATIENT
Start: 2025-07-17

## 2025-07-17 RX ORDER — FUROSEMIDE 20 MG/1
1 TABLET ORAL DAILY
COMMUNITY
Start: 2025-06-10

## 2025-07-17 RX ADMIN — METHYLPREDNISOLONE ACETATE 80 MG: 80 INJECTION, SUSPENSION INTRA-ARTICULAR; INTRALESIONAL; INTRAMUSCULAR; SOFT TISSUE at 14:43

## 2025-07-18 NOTE — PROGRESS NOTES
"Chief Complaint -left hand pain    History of Present Illness -     Evie Shah is a 80 y.o. female.     Left hand pain-  Patient complains of pain in her left hand and wrist with associated erythema and swelling.  She has a history of recurrent gout in these joints.    Dyslipidemia-  Stable with atorvastatin and low-cholesterol diet    Diabetes mellitus type 2-  Stable with Farxiga and Mounjaro along with dietary modification      The following portions of the patient's history were reviewed and updated as appropriate: allergies, current medications, past family history, past medical history, past social history, past surgical history and problem list.        Objective  Vital signs:  /62 (BP Location: Right arm, Patient Position: Sitting, Cuff Size: Adult)   Pulse 83   Temp 98 °F (36.7 °C) (Temporal)   Ht 160 cm (63\")   Wt 86.8 kg (191 lb 6.4 oz)   SpO2 98%   BMI 33.90 kg/m²     Physical Exam  Vitals and nursing note reviewed.   Constitutional:       Appearance: Normal appearance. She is well-developed.   Eyes:      Extraocular Movements: Extraocular movements intact.      Conjunctiva/sclera: Conjunctivae normal.   Cardiovascular:      Rate and Rhythm: Normal rate and regular rhythm.      Heart sounds: Normal heart sounds. No murmur heard.  Pulmonary:      Effort: Pulmonary effort is normal. No respiratory distress.      Breath sounds: Normal breath sounds. No wheezing.   Musculoskeletal:         General: Tenderness present.   Skin:     General: Skin is warm and dry.      Findings: Erythema present. No rash.      Comments: Left radial surface of wrist and first PIP joint erythematous and tender   Neurological:      Mental Status: She is alert and oriented to person, place, and time.   Psychiatric:         Mood and Affect: Mood normal.         Behavior: Behavior normal.         Thought Content: Thought content normal.         The following data was reviewed by Camryn Mota PA-C:         Lab " Results   Component Value Date    BUN 15 04/08/2025    CREATININE 0.93 04/08/2025    EGFR 63.4 04/08/2025    ALT 14 04/08/2025    AST 15 04/08/2025    WBC 8.55 04/08/2025    HGB 15.5 04/08/2025    HCT 47.8 (H) 04/08/2025     04/08/2025    TRIG 265 (H) 04/08/2025    HDL 38 (L) 04/08/2025    LDL 56 04/08/2025    TSH 4.410 (H) 04/08/2025    HGBA1C 8.30 (H) 04/08/2025           Assessment & Plan     Diagnoses and all orders for this visit:    1. Acute idiopathic gout of left hand (Primary)  -     indomethacin (INDOCIN) 50 MG capsule; Take 1 capsule by mouth 3 (Three) Times a Day As Needed for Mild Pain or Moderate Pain.  Dispense: 15 capsule; Refill: 0  -     methylPREDNISolone acetate (DEPO-medrol) injection 80 mg    2. Dyslipidemia  Comments:  Continue atorvastatin  Advised low-cholesterol diet    3. Type 2 diabetes mellitus with hyperglycemia, without long-term current use of insulin  Comments:  Continue Farxiga and Mounjaro  Advised a low carbohydrate diabetic diet                   Patient was given instructions and counseling regarding his condition or for health maintenance advice. Please see specific information pulled into the AVS if appropriate      This document has been electronically signed by:  Camryn Mota PA-C

## 2025-07-31 DIAGNOSIS — M10.042 ACUTE IDIOPATHIC GOUT OF LEFT HAND: ICD-10-CM

## 2025-07-31 RX ORDER — INDOMETHACIN 50 MG/1
CAPSULE ORAL
Qty: 15 CAPSULE | Refills: 0 | Status: SHIPPED | OUTPATIENT
Start: 2025-07-31

## 2025-08-05 RX ORDER — BUPROPION HYDROCHLORIDE 150 MG/1
150 TABLET, EXTENDED RELEASE ORAL NIGHTLY
Qty: 90 TABLET | Refills: 0 | Status: SHIPPED | OUTPATIENT
Start: 2025-08-05

## 2025-08-15 ENCOUNTER — LAB (OUTPATIENT)
Dept: FAMILY MEDICINE CLINIC | Facility: CLINIC | Age: 80
End: 2025-08-15
Payer: MEDICARE

## 2025-08-15 LAB
ALBUMIN SERPL-MCNC: 4.4 G/DL (ref 3.5–5.2)
ALBUMIN/GLOB SERPL: 2 G/DL
ALP SERPL-CCNC: 105 U/L (ref 39–117)
ALT SERPL W P-5'-P-CCNC: 16 U/L (ref 1–33)
ANION GAP SERPL CALCULATED.3IONS-SCNC: 13.6 MMOL/L (ref 5–15)
AST SERPL-CCNC: 16 U/L (ref 1–32)
BILIRUB SERPL-MCNC: 0.7 MG/DL (ref 0–1.2)
BUN SERPL-MCNC: 25.2 MG/DL (ref 8–23)
BUN/CREAT SERPL: 30.7 (ref 7–25)
CALCIUM SPEC-SCNC: 9 MG/DL (ref 8.6–10.5)
CHLORIDE SERPL-SCNC: 105 MMOL/L (ref 98–107)
CO2 SERPL-SCNC: 24.4 MMOL/L (ref 22–29)
CREAT SERPL-MCNC: 0.82 MG/DL (ref 0.57–1)
EGFRCR SERPLBLD CKD-EPI 2021: 73.3 ML/MIN/1.73
GLOBULIN UR ELPH-MCNC: 2.2 GM/DL
GLUCOSE SERPL-MCNC: 163 MG/DL (ref 65–99)
POTASSIUM SERPL-SCNC: 4.1 MMOL/L (ref 3.5–5.2)
PROT SERPL-MCNC: 6.6 G/DL (ref 6–8.5)
SODIUM SERPL-SCNC: 143 MMOL/L (ref 136–145)

## 2025-08-15 PROCEDURE — 80053 COMPREHEN METABOLIC PANEL: CPT | Performed by: PHYSICIAN ASSISTANT

## 2025-08-19 ENCOUNTER — OFFICE VISIT (OUTPATIENT)
Dept: FAMILY MEDICINE CLINIC | Facility: CLINIC | Age: 80
End: 2025-08-19
Payer: MEDICARE

## 2025-08-19 VITALS
WEIGHT: 189.4 LBS | BODY MASS INDEX: 33.56 KG/M2 | HEART RATE: 74 BPM | TEMPERATURE: 98 F | SYSTOLIC BLOOD PRESSURE: 130 MMHG | DIASTOLIC BLOOD PRESSURE: 88 MMHG | HEIGHT: 63 IN | OXYGEN SATURATION: 95 %

## 2025-08-19 DIAGNOSIS — E11.22 TYPE 2 DIABETES MELLITUS WITH STAGE 2 CHRONIC KIDNEY DISEASE, WITHOUT LONG-TERM CURRENT USE OF INSULIN: Chronic | ICD-10-CM

## 2025-08-19 DIAGNOSIS — J96.11 CHRONIC RESPIRATORY FAILURE WITH HYPOXIA: Chronic | ICD-10-CM

## 2025-08-19 DIAGNOSIS — I73.9 PAD (PERIPHERAL ARTERY DISEASE): Chronic | ICD-10-CM

## 2025-08-19 DIAGNOSIS — J30.9 CHRONIC ALLERGIC RHINITIS: ICD-10-CM

## 2025-08-19 DIAGNOSIS — J30.1 NON-SEASONAL ALLERGIC RHINITIS DUE TO POLLEN: ICD-10-CM

## 2025-08-19 DIAGNOSIS — M10.042 ACUTE IDIOPATHIC GOUT OF LEFT HAND: ICD-10-CM

## 2025-08-19 DIAGNOSIS — E11.65 TYPE 2 DIABETES MELLITUS WITH HYPERGLYCEMIA, WITHOUT LONG-TERM CURRENT USE OF INSULIN: Chronic | ICD-10-CM

## 2025-08-19 DIAGNOSIS — N18.2 TYPE 2 DIABETES MELLITUS WITH STAGE 2 CHRONIC KIDNEY DISEASE, WITHOUT LONG-TERM CURRENT USE OF INSULIN: Chronic | ICD-10-CM

## 2025-08-19 DIAGNOSIS — J30.9 CHRONIC ALLERGIC RHINITIS: Chronic | ICD-10-CM

## 2025-08-19 DIAGNOSIS — E78.2 MIXED HYPERLIPIDEMIA: Chronic | ICD-10-CM

## 2025-08-19 DIAGNOSIS — E55.9 VITAMIN D DEFICIENCY: Chronic | ICD-10-CM

## 2025-08-19 DIAGNOSIS — K21.9 GASTROESOPHAGEAL REFLUX DISEASE WITHOUT ESOPHAGITIS: Chronic | ICD-10-CM

## 2025-08-19 DIAGNOSIS — I10 ESSENTIAL HYPERTENSION: Chronic | ICD-10-CM

## 2025-08-19 DIAGNOSIS — Z00.00 MEDICARE ANNUAL WELLNESS VISIT, SUBSEQUENT: Primary | ICD-10-CM

## 2025-08-19 RX ORDER — ATORVASTATIN CALCIUM 10 MG/1
10 TABLET, FILM COATED ORAL NIGHTLY
Qty: 90 TABLET | Refills: 3 | Status: SHIPPED | OUTPATIENT
Start: 2025-08-19

## 2025-08-19 RX ORDER — CETIRIZINE HYDROCHLORIDE 10 MG/1
10 TABLET ORAL DAILY
Qty: 90 TABLET | Refills: 3 | Status: SHIPPED | OUTPATIENT
Start: 2025-08-19

## 2025-08-19 RX ORDER — METOPROLOL SUCCINATE 25 MG/1
25 TABLET, EXTENDED RELEASE ORAL DAILY
Qty: 90 TABLET | Refills: 3 | Status: SHIPPED | OUTPATIENT
Start: 2025-08-19

## 2025-08-19 RX ORDER — FAMOTIDINE 20 MG/1
20 TABLET, FILM COATED ORAL
Qty: 90 TABLET | Refills: 3 | Status: SHIPPED | OUTPATIENT
Start: 2025-08-19

## 2025-08-19 RX ORDER — ERGOCALCIFEROL 1.25 MG/1
50000 CAPSULE, LIQUID FILLED ORAL WEEKLY
Qty: 12 CAPSULE | Refills: 3 | Status: SHIPPED | OUTPATIENT
Start: 2025-08-19

## 2025-08-19 RX ORDER — AMLODIPINE AND BENAZEPRIL HYDROCHLORIDE 10; 40 MG/1; MG/1
1 CAPSULE ORAL DAILY
Qty: 90 CAPSULE | Refills: 3 | Status: SHIPPED | OUTPATIENT
Start: 2025-08-19

## 2025-08-19 RX ORDER — METHYLPREDNISOLONE ACETATE 80 MG/ML
80 INJECTION, SUSPENSION INTRA-ARTICULAR; INTRALESIONAL; INTRAMUSCULAR; SOFT TISSUE ONCE
Status: COMPLETED | OUTPATIENT
Start: 2025-08-19 | End: 2025-08-19

## 2025-08-19 RX ORDER — CLOPIDOGREL BISULFATE 75 MG/1
75 TABLET ORAL DAILY
Qty: 90 TABLET | Refills: 3 | Status: SHIPPED | OUTPATIENT
Start: 2025-08-19

## 2025-08-19 RX ORDER — OMEGA-3-ACID ETHYL ESTERS 1 G/1
2 CAPSULE, LIQUID FILLED ORAL 2 TIMES DAILY
Qty: 360 CAPSULE | Refills: 3 | Status: SHIPPED | OUTPATIENT
Start: 2025-08-19

## 2025-08-19 RX ORDER — FLUTICASONE PROPIONATE 50 MCG
2 SPRAY, SUSPENSION (ML) NASAL DAILY
Qty: 16 G | Refills: 1 | Status: SHIPPED | OUTPATIENT
Start: 2025-08-19

## 2025-08-19 RX ORDER — DAPAGLIFLOZIN 5 MG/1
5 TABLET, FILM COATED ORAL EVERY MORNING
Qty: 90 TABLET | Refills: 3 | Status: SHIPPED | OUTPATIENT
Start: 2025-08-19

## 2025-08-19 RX ORDER — BUPROPION HYDROCHLORIDE 150 MG/1
150 TABLET, EXTENDED RELEASE ORAL NIGHTLY
Qty: 90 TABLET | Refills: 3 | Status: SHIPPED | OUTPATIENT
Start: 2025-08-19

## 2025-08-19 RX ORDER — MONTELUKAST SODIUM 10 MG/1
10 TABLET ORAL NIGHTLY
Qty: 90 TABLET | Refills: 3 | Status: SHIPPED | OUTPATIENT
Start: 2025-08-19

## 2025-08-19 RX ORDER — ALBUTEROL SULFATE 90 UG/1
2 INHALANT RESPIRATORY (INHALATION) EVERY 4 HOURS PRN
Qty: 18 G | Refills: 5 | Status: SHIPPED | OUTPATIENT
Start: 2025-08-19

## 2025-08-19 RX ORDER — TIRZEPATIDE 10 MG/.5ML
10 INJECTION, SOLUTION SUBCUTANEOUS
Qty: 12 ML | Refills: 1 | Status: SHIPPED | OUTPATIENT
Start: 2025-08-19

## 2025-08-19 RX ADMIN — METHYLPREDNISOLONE ACETATE 80 MG: 80 INJECTION, SUSPENSION INTRA-ARTICULAR; INTRALESIONAL; INTRAMUSCULAR; SOFT TISSUE at 12:41

## (undated) DEVICE — Device: Brand: SINGLE USE ASPIRATION NEEDLE NA-U401SX

## (undated) DEVICE — GOWN,REINF,POLY,ECL,PP SLV,XL: Brand: MEDLINE

## (undated) DEVICE — BRUSH CYTO MULTI APPL

## (undated) DEVICE — SINGLE USE SUCTION VALVE MAJ-209: Brand: SINGLE USE SUCTION VALVE (STERILE)

## (undated) DEVICE — ADAPT SWVL FIBROPTIC BRONCH

## (undated) DEVICE — FRCP BX RADJAW4 PULM WO NDL STD1.8X2 100

## (undated) DEVICE — SINGLE USE BIOPSY VALVE MAJ-210: Brand: SINGLE USE BIOPSY VALVE (STERILE)

## (undated) DEVICE — SYR LUER SLPTP 50ML

## (undated) DEVICE — Device